# Patient Record
Sex: MALE | Race: WHITE | URBAN - METROPOLITAN AREA
[De-identification: names, ages, dates, MRNs, and addresses within clinical notes are randomized per-mention and may not be internally consistent; named-entity substitution may affect disease eponyms.]

---

## 2017-10-31 ENCOUNTER — OUTPATIENT (OUTPATIENT)
Dept: OUTPATIENT SERVICES | Facility: HOSPITAL | Age: 72
LOS: 1 days | Discharge: HOME | End: 2017-10-31

## 2017-10-31 DIAGNOSIS — E11.9 TYPE 2 DIABETES MELLITUS WITHOUT COMPLICATIONS: ICD-10-CM

## 2017-10-31 DIAGNOSIS — I62.00 NONTRAUMATIC SUBDURAL HEMORRHAGE, UNSPECIFIED: ICD-10-CM

## 2017-10-31 DIAGNOSIS — E78.5 HYPERLIPIDEMIA, UNSPECIFIED: ICD-10-CM

## 2017-10-31 DIAGNOSIS — L03.90 CELLULITIS, UNSPECIFIED: ICD-10-CM

## 2017-10-31 DIAGNOSIS — I10 ESSENTIAL (PRIMARY) HYPERTENSION: ICD-10-CM

## 2017-10-31 DIAGNOSIS — M19.90 UNSPECIFIED OSTEOARTHRITIS, UNSPECIFIED SITE: ICD-10-CM

## 2017-10-31 DIAGNOSIS — S06.5X9A TRAUMATIC SUBDURAL HEMORRHAGE WITH LOSS OF CONSCIOUSNESS OF UNSPECIFIED DURATION, INITIAL ENCOUNTER: ICD-10-CM

## 2017-10-31 DIAGNOSIS — M1A.00X0 IDIOPATHIC CHRONIC GOUT, UNSPECIFIED SITE, WITHOUT TOPHUS (TOPHI): ICD-10-CM

## 2018-01-28 ENCOUNTER — EMERGENCY (EMERGENCY)
Facility: HOSPITAL | Age: 73
LOS: 0 days | Discharge: HOME | End: 2018-01-29

## 2018-01-28 DIAGNOSIS — E78.5 HYPERLIPIDEMIA, UNSPECIFIED: ICD-10-CM

## 2018-01-28 DIAGNOSIS — M1A.00X0 IDIOPATHIC CHRONIC GOUT, UNSPECIFIED SITE, WITHOUT TOPHUS (TOPHI): ICD-10-CM

## 2018-01-28 DIAGNOSIS — Z79.84 LONG TERM (CURRENT) USE OF ORAL HYPOGLYCEMIC DRUGS: ICD-10-CM

## 2018-01-28 DIAGNOSIS — I62.00 NONTRAUMATIC SUBDURAL HEMORRHAGE, UNSPECIFIED: ICD-10-CM

## 2018-01-28 DIAGNOSIS — S06.5X9A TRAUMATIC SUBDURAL HEMORRHAGE WITH LOSS OF CONSCIOUSNESS OF UNSPECIFIED DURATION, INITIAL ENCOUNTER: ICD-10-CM

## 2018-01-28 DIAGNOSIS — E11.9 TYPE 2 DIABETES MELLITUS WITHOUT COMPLICATIONS: ICD-10-CM

## 2018-01-28 DIAGNOSIS — I10 ESSENTIAL (PRIMARY) HYPERTENSION: ICD-10-CM

## 2018-01-28 DIAGNOSIS — K06.8 OTHER SPECIFIED DISORDERS OF GINGIVA AND EDENTULOUS ALVEOLAR RIDGE: ICD-10-CM

## 2018-01-28 DIAGNOSIS — M19.90 UNSPECIFIED OSTEOARTHRITIS, UNSPECIFIED SITE: ICD-10-CM

## 2018-01-28 DIAGNOSIS — E78.00 PURE HYPERCHOLESTEROLEMIA, UNSPECIFIED: ICD-10-CM

## 2018-01-28 DIAGNOSIS — K03.81 CRACKED TOOTH: ICD-10-CM

## 2018-01-28 DIAGNOSIS — L03.90 CELLULITIS, UNSPECIFIED: ICD-10-CM

## 2018-01-28 DIAGNOSIS — Z79.899 OTHER LONG TERM (CURRENT) DRUG THERAPY: ICD-10-CM

## 2018-01-28 DIAGNOSIS — Z79.82 LONG TERM (CURRENT) USE OF ASPIRIN: ICD-10-CM

## 2018-02-07 ENCOUNTER — EMERGENCY (EMERGENCY)
Facility: HOSPITAL | Age: 73
LOS: 1 days | End: 2018-02-07
Admitting: INTERNAL MEDICINE

## 2018-02-07 VITALS
HEART RATE: 81 BPM | TEMPERATURE: 97 F | OXYGEN SATURATION: 100 % | SYSTOLIC BLOOD PRESSURE: 183 MMHG | DIASTOLIC BLOOD PRESSURE: 86 MMHG | RESPIRATION RATE: 20 BRPM

## 2018-02-07 DIAGNOSIS — E78.00 PURE HYPERCHOLESTEROLEMIA, UNSPECIFIED: ICD-10-CM

## 2018-02-07 DIAGNOSIS — Z79.899 OTHER LONG TERM (CURRENT) DRUG THERAPY: ICD-10-CM

## 2018-02-07 DIAGNOSIS — K08.89 OTHER SPECIFIED DISORDERS OF TEETH AND SUPPORTING STRUCTURES: ICD-10-CM

## 2018-02-07 DIAGNOSIS — I10 ESSENTIAL (PRIMARY) HYPERTENSION: ICD-10-CM

## 2018-02-07 DIAGNOSIS — Z79.84 LONG TERM (CURRENT) USE OF ORAL HYPOGLYCEMIC DRUGS: ICD-10-CM

## 2018-02-07 DIAGNOSIS — E11.9 TYPE 2 DIABETES MELLITUS WITHOUT COMPLICATIONS: ICD-10-CM

## 2018-02-07 DIAGNOSIS — Z79.82 LONG TERM (CURRENT) USE OF ASPIRIN: ICD-10-CM

## 2018-02-07 NOTE — ED PROVIDER NOTE - OBJECTIVE STATEMENT
72 y.o. male with a PMHx of HTN, hyperlipidemia, Gout, PUD, DM, and diverticulosis presented to ER c/o pain and bleeding after dental extraction last week.  Pt denies aspirin use or any other blood thinners.  Denies dysphagia, fever, chills, SOB.  No other complaints.

## 2018-02-07 NOTE — ED PROVIDER NOTE - ENMT, MLM
Airway patent, Nasal mucosa clear. Mouth with normal mucosa. Throat has no vesicles, no oropharyngeal exudates and uvula is midline, (+) tenderness with scant bleeding over gingiva s/p extraction tooth #12

## 2018-02-07 NOTE — ED ADULT NURSE NOTE - PMH
Chronic gout of foot, unspecified cause, unspecified laterality    Diverticulitis    Herniated disc, cervical    High cholesterol    Hypertension, unspecified type    Osteoarthritis of multiple joints, unspecified osteoarthritis type    Sciatica, unspecified laterality    Type 2 diabetes mellitus without complication, unspecified long term insulin use status

## 2019-09-27 ENCOUNTER — EMERGENCY (EMERGENCY)
Facility: HOSPITAL | Age: 74
LOS: 0 days | Discharge: HOME | End: 2019-09-27
Attending: EMERGENCY MEDICINE | Admitting: EMERGENCY MEDICINE
Payer: MEDICARE

## 2019-09-27 VITALS
OXYGEN SATURATION: 95 % | DIASTOLIC BLOOD PRESSURE: 80 MMHG | SYSTOLIC BLOOD PRESSURE: 128 MMHG | RESPIRATION RATE: 17 BRPM | HEART RATE: 77 BPM

## 2019-09-27 VITALS
HEART RATE: 75 BPM | RESPIRATION RATE: 20 BRPM | DIASTOLIC BLOOD PRESSURE: 58 MMHG | SYSTOLIC BLOOD PRESSURE: 131 MMHG | OXYGEN SATURATION: 100 % | TEMPERATURE: 98 F

## 2019-09-27 DIAGNOSIS — I10 ESSENTIAL (PRIMARY) HYPERTENSION: ICD-10-CM

## 2019-09-27 DIAGNOSIS — I83.893 VARICOSE VEINS OF BILATERAL LOWER EXTREMITIES WITH OTHER COMPLICATIONS: ICD-10-CM

## 2019-09-27 DIAGNOSIS — M79.673 PAIN IN UNSPECIFIED FOOT: ICD-10-CM

## 2019-09-27 LAB
BASOPHILS # BLD AUTO: 0.08 K/UL — SIGNIFICANT CHANGE UP (ref 0–0.2)
BASOPHILS NFR BLD AUTO: 0.7 % — SIGNIFICANT CHANGE UP (ref 0–1)
EOSINOPHIL # BLD AUTO: 0.37 K/UL — SIGNIFICANT CHANGE UP (ref 0–0.7)
EOSINOPHIL NFR BLD AUTO: 3.3 % — SIGNIFICANT CHANGE UP (ref 0–8)
HCT VFR BLD CALC: 30.3 % — LOW (ref 42–52)
HGB BLD-MCNC: 9.1 G/DL — LOW (ref 14–18)
IMM GRANULOCYTES NFR BLD AUTO: 0.5 % — HIGH (ref 0.1–0.3)
LYMPHOCYTES # BLD AUTO: 1.41 K/UL — SIGNIFICANT CHANGE UP (ref 1.2–3.4)
LYMPHOCYTES # BLD AUTO: 12.4 % — LOW (ref 20.5–51.1)
MCHC RBC-ENTMCNC: 26.3 PG — LOW (ref 27–31)
MCHC RBC-ENTMCNC: 30 G/DL — LOW (ref 32–37)
MCV RBC AUTO: 87.6 FL — SIGNIFICANT CHANGE UP (ref 80–94)
MONOCYTES # BLD AUTO: 1.02 K/UL — HIGH (ref 0.1–0.6)
MONOCYTES NFR BLD AUTO: 9 % — SIGNIFICANT CHANGE UP (ref 1.7–9.3)
NEUTROPHILS # BLD AUTO: 8.43 K/UL — HIGH (ref 1.4–6.5)
NEUTROPHILS NFR BLD AUTO: 74.1 % — SIGNIFICANT CHANGE UP (ref 42.2–75.2)
NRBC # BLD: 0 /100 WBCS — SIGNIFICANT CHANGE UP (ref 0–0)
PLATELET # BLD AUTO: 298 K/UL — SIGNIFICANT CHANGE UP (ref 130–400)
RBC # BLD: 3.46 M/UL — LOW (ref 4.7–6.1)
RBC # FLD: 17.3 % — HIGH (ref 11.5–14.5)
WBC # BLD: 11.37 K/UL — HIGH (ref 4.8–10.8)
WBC # FLD AUTO: 11.37 K/UL — HIGH (ref 4.8–10.8)

## 2019-09-27 PROCEDURE — 99284 EMERGENCY DEPT VISIT MOD MDM: CPT | Mod: 25,GC

## 2019-09-27 PROCEDURE — 12001 RPR S/N/AX/GEN/TRNK 2.5CM/<: CPT | Mod: GC

## 2019-09-27 NOTE — ED PROVIDER NOTE - PATIENT PORTAL LINK FT
You can access the FollowMyHealth Patient Portal offered by [facility] by registering at the following website: [email or web address]. By joining [facility]’s FollowMyHealth portal, you will also be able to view your health information using other applications (apps) compatible with our system.

## 2019-09-27 NOTE — ED PROVIDER NOTE - ATTENDING CONTRIBUTION TO CARE
72 y.o. male with a PMHx of HTN, hyperlipidemia, Gout, PUD, DM, and diverticulosis presented to ER with persistent bleeding from wound to dorsal R foot when scab peeled off. patientd enies head injury, no loc, no n/v/d, no cp/sob    CONSTITUTIONAL: Well-developed; well-nourished; in no acute distress. Sitting up and providing appropriate history and physical examination  SKIN: skin exam is warm and dry, no acute rash.  HEAD: Normocephalic; atraumatic.  EYES: PERRL, 3 mm bilateral, no nystagmus, EOM intact; conjunctiva and sclera clear.  ENT: No nasal discharge; airway clear.  NECK: Supple; non tender. + full passive ROM in all directions. No JVD  CARD: S1, S2 normal; no murmurs, gallops, or rubs. Regular rate and rhythm. + Symmetric Strong Pulses  RESP: No wheezes, rales or rhonchi. Good air movement bilaterally  ABD: soft; non-distended; non-tender. No Rebound, No Guarding, No signs of peritonitis, No CVA tenderness. No pulsatile abdominal mass. + Strong and Symmetric Pulses  EXT: + Right foot lateral varicose vein that is oozing, repaired successfully with figure of 8 stitch, + strong dp an dpt pulses,  Normal ROM. No clubbing, cyanosis or edema. Dp and Pt Pulses intact. Cap refill less than 3 seconds  NEURO:  Alert, oriented, grossly unremarkable. No Focal deficits. GCS 15. NIH 0  PSYCH: Cooperative, appropriate.

## 2019-09-27 NOTE — ED PROVIDER NOTE - NSFOLLOWUPINSTRUCTIONS_ED_ALL_ED_FT
Please follow-up with Dr. Buchanan as soon as possible and your vascular surgeon as well.    Varicose Veins  Varicose veins are veins that have become enlarged, bulged, and twisted. They most often appear in the legs.    What are the causes?  This condition is caused by damage to the valves in the vein. These valves help blood return to your heart. When they are damaged and they stop working properly, blood may flow backward and back up in the veins near the skin, causing the veins to get larger and appear twisted.    The condition can result from any issue that causes blood to back up, like pregnancy, prolonged standing, or obesity.    What increases the risk?  This condition is more likely to develop in people who are:  On their feet a lot.  Pregnant.  Overweight.  What are the signs or symptoms?  Symptoms of this condition include:  Bulging, twisted, and bluish veins.  A feeling of heaviness. This may be worse at the end of the day.  Leg pain. This may be worse at the end of the day.  Swelling in the leg.  Changes in skin color over the veins.  How is this diagnosed?  This condition may be diagnosed based on your symptoms, a physical exam, and an ultrasound test.    How is this treated?  Treatment for this condition may involve:  Avoiding sitting or standing in one position for long periods of time.  Wearing compression stockings. These stockings help to prevent blood clots and reduce swelling in the legs.  Raising (elevating) the legs when resting.  Losing weight.  Exercising regularly.  If you have persistent symptoms or want to improve the way your varicose veins look, you may choose to have a procedure to close the varicose veins off or to remove them.    Treatments to close off the veins include:  Sclerotherapy. In this treatment, a solution is injected into a vein to close it off.  Laser treatment. In this treatment, the vein is heated with a laser to close it off.  Radiofrequency vein ablation. In this treatment, an electrical current produced by radio waves is used to close off the vein.  Treatments to remove the veins include:  Phlebectomy. In this treatment, the veins are removed through small incisions made over the veins.  Vein ligation and stripping. In this treatment, incisions are made over the veins. The veins are then removed after being tied (ligated) with stitches (sutures).  Follow these instructions at home:  Activity     Walk as much as possible. Walking increases blood flow. This helps blood return to the heart and takes pressure off your veins. It also increases your cardiovascular strength.  Follow your health care provider's instructions about exercising.  Do not stand or sit in one position for a long period of time.  Do not sit with your legs crossed.  Rest with your legs raised during the day.  General instructions     Image   Follow any diet instructions given to you by your health care provider.  Wear compression stockings as directed by your health care provider. Do not wear other kinds of tight clothing around your legs, pelvis, or waist.  Elevate your legs at night to above the level of your heart.  If you get a cut in the skin over the varicose vein and the vein bleeds:  Lie down with your leg raised.  Apply firm pressure to the cut with a clean cloth until the bleeding stops.  Place a bandage (dressing) on the cut.  Contact a health care provider if:  The skin around your varicose veins starts to break down.  You have pain, redness, tenderness, or hard swelling over a vein.  You are uncomfortable because of pain.  You get a cut in the skin over a varicose vein and it will not stop bleeding.  Summary  Varicose veins are veins that have become enlarged, bulged, and twisted. They most often appear in the legs.  This condition is caused by damage to the valves in the vein. These valves help blood return to your heart.  Treatment for this condition includes frequent movements, wearing compression stockings, losing weight, and exercising regularly. In some cases, procedures are done to close off or remove the veins.  Treatment for this condition may include wearing compression stockings, elevating the legs, losing weight, and engaging in regular activity. In some cases, procedures are done to close off or remove the veins.

## 2019-09-27 NOTE — ED PROVIDER NOTE - CLINICAL SUMMARY MEDICAL DECISION MAKING FREE TEXT BOX
I personally evaluated the patient. I reviewed the Resident’s or Physician Assistant’s note (as assigned above), and agree with the findings and plan except as documented in my note. Labs reviewed with pmd Dr. Buchanan who stated that hemoglobin is at baseline. I have fully discussed the medical management and delivery of care with the patient. I have discussed any available labs, imaging and treatment options with the patient. Patient confirms understanding and has been given detailed return precautions. Patient instructed to return to the ED should symptoms persist or worsen. Patient has demonstrated capacity and has verbalized understanding. Patient is well appearing upon discharge.

## 2019-09-27 NOTE — ED PROVIDER NOTE - PROGRESS NOTE DETAILS
Dr. Rebollar placed a figure of 8 stitch to RLE to achieve hemostasis after scab pulled off and patient had venous bleed. Hgb was found to be 9.1. Per Dr. Buchanan, pt's PCP, patient's last hgb in 06/19 was 9.3. No further bleeding.

## 2019-09-27 NOTE — ED PROVIDER NOTE - NS ED ROS FT
Constitutional:  No fevers or chills.  Eyes:  No visual changes, eye pain, or discharge.  ENT:  No hearing changes. No sore throat.  Neck:  No neck pain.  Cardiac:  No CP.  Resp:  No cough or SOB.   GI:  No nausea, vomiting, diarrhea, or abdominal pain.  :  No dysuria, frequency, or hematuria.  MSK:  No myalgias. Wound to R foot.  Neuro:  No headache, dizziness, or weakness.  Skin:  No skin rash.

## 2019-09-27 NOTE — ED PROVIDER NOTE - OBJECTIVE STATEMENT
72 y.o. male with a PMHx of HTN, hyperlipidemia, Gout, PUD, DM, and diverticulosis presented to ER with persistent bleeding from wound to dorsal R foot when scab peeled off, patient also states he thinks he has varicose veins. Denies any CP, SOB, abd pain, n/v/d, back pain, other injuries/trauma, or falls. Takes a baby ASA, otherwise no AC.

## 2019-09-27 NOTE — ED ADULT NURSE NOTE - CHIEF COMPLAINT QUOTE
patient reports scab to top of right foot that came off while placing shoe on. reports bleeding . patient arrived to ed with tourniquet in place . slightly pale . awake alert oriented x3

## 2019-09-27 NOTE — ED PROVIDER NOTE - PHYSICAL EXAMINATION
PHYSICAL EXAM: I have reviewed current vital signs.  GENERAL: NAD, well-nourished; well-developed.  HEAD:  Normocephalic, atraumatic.  EYES: EOMI, PERRL, conjunctiva and sclera clear.  ENT: MMM, no erythema/exudates.  NECK: Supple, full ROM.  CHEST/LUNG: Clear to auscultation bilaterally; no wheezes, rales, or rhonchi.  HEART: Regular rate and rhythm, normal S1 and S2; no murmurs, rubs, or gallops.  ABDOMEN: Soft, nontender, nondistended.  EXTREMITIES:  2+ peripheral pulses; 1 cm wound to dorsal aspect to R foot- tourniquet in place from EMS.  NEUROLOGY: A&O x 3. Motor 5/5. No focal neurological deficits.  SKIN: Warm and dry.

## 2019-09-27 NOTE — ED ADULT TRIAGE NOTE - CHIEF COMPLAINT QUOTE
Pt BIBA, was putting on his shoes when scab to top of right foot fell off. bleeding was uncontrolled. Pt takes daily aspirin, denies blood thinners. tourniquet applied by EMS at 1658 and pressure dressing applied. as per EMS, patient was unreponsive and hypotensive on scene. Pt awake and alert in triage.

## 2019-09-28 PROBLEM — M15.9 POLYOSTEOARTHRITIS, UNSPECIFIED: Chronic | Status: ACTIVE | Noted: 2018-02-07

## 2019-09-28 PROBLEM — E78.00 PURE HYPERCHOLESTEROLEMIA, UNSPECIFIED: Chronic | Status: ACTIVE | Noted: 2018-02-07

## 2019-09-28 PROBLEM — I10 ESSENTIAL (PRIMARY) HYPERTENSION: Chronic | Status: ACTIVE | Noted: 2018-02-07

## 2019-09-28 PROBLEM — M54.30 SCIATICA, UNSPECIFIED SIDE: Chronic | Status: ACTIVE | Noted: 2018-02-07

## 2019-09-28 PROBLEM — M50.20 OTHER CERVICAL DISC DISPLACEMENT, UNSPECIFIED CERVICAL REGION: Chronic | Status: ACTIVE | Noted: 2018-02-07

## 2019-09-28 PROBLEM — M1A.0790 IDIOPATHIC CHRONIC GOUT, UNSPECIFIED ANKLE AND FOOT, WITHOUT TOPHUS (TOPHI): Chronic | Status: ACTIVE | Noted: 2018-02-07

## 2019-09-28 PROBLEM — K57.92 DIVERTICULITIS OF INTESTINE, PART UNSPECIFIED, WITHOUT PERFORATION OR ABSCESS WITHOUT BLEEDING: Chronic | Status: ACTIVE | Noted: 2018-02-07

## 2019-09-28 PROBLEM — E11.9 TYPE 2 DIABETES MELLITUS WITHOUT COMPLICATIONS: Chronic | Status: ACTIVE | Noted: 2018-02-07

## 2019-11-12 ENCOUNTER — EMERGENCY (EMERGENCY)
Facility: HOSPITAL | Age: 74
LOS: 0 days | Discharge: HOME | End: 2019-11-12
Admitting: EMERGENCY MEDICINE
Payer: MEDICARE

## 2019-11-12 VITALS
DIASTOLIC BLOOD PRESSURE: 66 MMHG | RESPIRATION RATE: 18 BRPM | TEMPERATURE: 97 F | OXYGEN SATURATION: 96 % | HEART RATE: 72 BPM | SYSTOLIC BLOOD PRESSURE: 154 MMHG

## 2019-11-12 DIAGNOSIS — E11.9 TYPE 2 DIABETES MELLITUS WITHOUT COMPLICATIONS: ICD-10-CM

## 2019-11-12 DIAGNOSIS — I83.91 ASYMPTOMATIC VARICOSE VEINS OF RIGHT LOWER EXTREMITY: ICD-10-CM

## 2019-11-12 DIAGNOSIS — M79.673 PAIN IN UNSPECIFIED FOOT: ICD-10-CM

## 2019-11-12 DIAGNOSIS — I10 ESSENTIAL (PRIMARY) HYPERTENSION: ICD-10-CM

## 2019-11-12 PROCEDURE — 99283 EMERGENCY DEPT VISIT LOW MDM: CPT

## 2019-11-12 RX ORDER — CEPHALEXIN 500 MG
1 CAPSULE ORAL
Qty: 28 | Refills: 0
Start: 2019-11-12 | End: 2019-11-18

## 2019-11-12 RX ORDER — TRANEXAMIC ACID 100 MG/ML
1 INJECTION, SOLUTION INTRAVENOUS ONCE
Refills: 0 | Status: DISCONTINUED | OUTPATIENT
Start: 2019-11-12 | End: 2019-11-12

## 2019-11-12 NOTE — ED PROVIDER NOTE - NS ED ROS FT
Constitutional: See HPI.  Eyes: No visual changes, eye pain or discharge.   ENMT: No hearing changes, pain, discharge or infections.   Cardiac: No SOB or edema. No chest pain with exertion.  Respiratory: No cough or respiratory distress.   GI: No nausea, vomiting, diarrhea or abdominal pain.  : No dysuria, frequency or burning. No Discharge  MS: No myalgia, muscle weakness, joint pain or back pain.  Neuro: No headache or weakness.   Skin: varicose vein bleeding, + erythema of RLE.   Except as documented in the HPI, all other systems are negative.

## 2019-11-12 NOTE — ED PROVIDER NOTE - PHYSICAL EXAMINATION
CONST: Well appearing in NAD  EYES: Sclera and conjunctiva clear.  MS: Normal ROM in all extremities. No edema of lower extremities, no calf pain, pedal pulses 2+ bilaterally  SKIN: Warm, dry, no active bleeding of varicose vein, + mild erythema R anterior shin.   NEURO: A&Ox3, No focal deficits. Strength 5/5 with no sensory deficits. Steady gait

## 2019-11-12 NOTE — ED PROVIDER NOTE - CARE PROVIDER_API CALL
Hua Holliday)  Surgery; Vascular Surgery  [address] [place] [address]  Phone: [phone]  Fax: [phone]  Follow Up Time: 1-3 Days

## 2019-11-12 NOTE — ED PROVIDER NOTE - CLINICAL SUMMARY MEDICAL DECISION MAKING FREE TEXT BOX
pt ambulatory after pressure dressing. no bleeding through dressing.  asx at this time.  requesting d/c. I have discussed the discharge plan with the patient. The patient agrees with the plan, as discussed.  The patient understands Emergency Department diagnosis is a preliminary diagnosis often based on limited information and that the patient must adhere to the follow-up plan as discussed.  The patient understands that if the symptoms worsen or if prescribed medications do not have the desired/planned effect that the patient may return to the Emergency Department at any time for further evaluation and treatment.

## 2019-11-12 NOTE — ED PROVIDER NOTE - PATIENT PORTAL LINK FT
You can access the FollowMyHealth Patient Portal offered by [facility] by registering at the following website: [email or web address]. By joining [facility] [employer] [facility]’s FollowMyHealth portal, you will also be able to view your health information using other applications (apps) compatible with our system.

## 2019-11-12 NOTE — ED PROVIDER NOTE - PROGRESS NOTE DETAILS
call from Dr. Buchanan.  requests CBC for blood loss. pt states small amount.  asx.  requesting d/c.  refuses blood work. [facility] Discussed results with pt.  All questions were answered and return precautions discussed.  Pt is asx and comfortable at this time.  Unremarkable re-exam.  No further concerns at this time from pt.  Will follow up with PMD and vascular.  Pt understands and agrees with tx plan.

## 2019-11-12 NOTE — ED PROVIDER NOTE - NSFOLLOWUPINSTRUCTIONS_ED_ALL_ED_FT
Varicose Veins    WHAT YOU NEED TO KNOW:    What are varicose veins? Varicose veins are veins that become large, twisted, and swollen. They are common on the back of the calves, knees, and thighs. Varicose veins are caused by valves in your veins that do not work properly. This causes blood to collect and increase pressure in the veins of your legs. The increased pressure causes your veins to stretch, get larger, swell, and twist.         What increases my risk for varicose veins?     Pregnancy      A family history of varicose veins      Being overweight or obese      Age 50 years or older      Sitting or standing for long periods of time      Wearing tight clothing    What are the signs and symptoms of varicose veins? Your symptoms may be worse after you stand or sit for long periods of time. You may have any of the following:     Blue, purple, or bulging veins in your legs       Pain, swelling, or muscle cramps in your legs      Feeling of fatigue or heaviness in your legs    How are varicose veins diagnosed? Your healthcare provider will examine your legs and ask about your medical history. You may need tests, such as a Doppler ultrasound or duplex scan. These tests show your veins and valves, and how your blood is flowing through them. These tests may also show if there is a blockage or blood clot.    How are varicose veins treated? The goal of treatment is to decrease symptoms, improve appearance, and prevent further problems. Treatment will depend on which veins are affected and how severe your condition is. You may need procedures to treat or remove your varicose veins. For example, your healthcare provider may inject a solution or use a laser to close the varicose veins. Surgery to remove long veins may also be done. Ask your healthcare provider for more information about procedures used to treat varicose veins.    What can I do to manage my symptoms?     Do not sit or stand for long periods of time. This can cause the blood to collect in your legs and make your symptoms worse. Bend or rotate your ankles several times every hour. Walk around for a few minutes every hour to get blood moving in your legs.      Do not cross your legs when you sit. This decreases blood flow to your feet and can make your symptoms worse.      Do not wear tight clothing or shoes. Do not wear high-heeled shoes. Do not wear clothes that are tight around the waist or knees.      Maintain a healthy weight. Being overweight or obese can make your varicose veins worse. Ask your healthcare provider how much you should weigh. Ask him or her to help you create a weight loss plan if you are overweight.       Wear pressure stockings as directed. The stockings are tight and put pressure on your legs. They improve blood flow and help prevent clots. Pressure Stockings            Elevate your legs. Keep them above the level of your heart for 15 to 30 minutes several times a day. You can also prop the end of your bed up slightly to elevate your legs while you sleep. This will help blood to flow back to your heart.      Get regular exercise. Talk to your healthcare provider about the best exercise plan for you. Exercise can improve blood flow to your legs and feet.    When should I seek immediate care?     You have a wound that does not heal or is infected.      You have an injury that has broken your skin and caused your varicose veins to bleed.      Your leg is swollen and hard.      You notice that your legs or feet are turning blue or black.      Your leg feels warm, tender, and painful. It may look swollen and red.    When should I contact my healthcare provider?     You have pain in your leg that does not go away or gets worse.      You notice sudden large bruising on your legs.       You have a rash on your leg.       Your symptoms keep you from doing your daily activities.      You have questions or concerns about your condition or care.    CARE AGREEMENT:    You have the right to help plan your care. Learn about your health condition and how it may be treated. Discuss treatment options with your healthcare providers to decide what care you want to receive. You always have the right to refuse treatment.        © Copyright [employer] 2019 All illustrations and images included in CareNotes are the copyrighted property of [employer] or [employer]     Follow up with your primary medical doctor in 1-2 days

## 2019-11-12 NOTE — ED ADULT TRIAGE NOTE - CHIEF COMPLAINT QUOTE
"I was at the doctor's office and my varicose vein bursted. They put stiches in October 25 and said they would dissolve."

## 2019-11-12 NOTE — ED PROVIDER NOTE - OBJECTIVE STATEMENT
74 y.o male w/ hx of HTN, HLD, gout, Dm, PUD, diverticulitis presents to the ED for evaluation of varicose veins of R foot.  Pt seen in ED 9/27 for bleeding varicose veins of right foot and had figure of 8 suture placed.  Was seen by his nephrologist today who tried removing absorbable suture and pt developed bleeding from site which was not controlled by direct pressure prompting visit to the ED. Denies dizziness, lightheaded sensation, weakness.  Notes erythema of RLE for past week.  NO fever, chills, streaking erythema, calf pain, edema of lower extremity.

## 2019-11-15 ENCOUNTER — INBOUND DOCUMENT (OUTPATIENT)
Age: 74
End: 2019-11-15

## 2019-11-15 PROBLEM — Z00.00 ENCOUNTER FOR PREVENTIVE HEALTH EXAMINATION: Status: ACTIVE | Noted: 2019-11-15

## 2020-08-20 ENCOUNTER — INPATIENT (INPATIENT)
Facility: HOSPITAL | Age: 75
LOS: 5 days | Discharge: NURSING, REHAB OR LONG-TERM CARE | End: 2020-08-26
Attending: INTERNAL MEDICINE | Admitting: INTERNAL MEDICINE
Payer: MEDICARE

## 2020-08-20 VITALS
HEART RATE: 56 BPM | OXYGEN SATURATION: 96 % | RESPIRATION RATE: 24 BRPM | DIASTOLIC BLOOD PRESSURE: 35 MMHG | TEMPERATURE: 99 F | SYSTOLIC BLOOD PRESSURE: 76 MMHG

## 2020-08-20 DIAGNOSIS — N18.3 CHRONIC KIDNEY DISEASE, STAGE 3 (MODERATE): ICD-10-CM

## 2020-08-20 LAB
ALBUMIN SERPL ELPH-MCNC: 4 G/DL — SIGNIFICANT CHANGE UP (ref 3.5–5.2)
ALP SERPL-CCNC: 88 U/L — SIGNIFICANT CHANGE UP (ref 30–115)
ALT FLD-CCNC: 22 U/L — SIGNIFICANT CHANGE UP (ref 0–41)
ANION GAP SERPL CALC-SCNC: 44 MMOL/L — HIGH (ref 7–14)
ANION GAP SERPL CALC-SCNC: 45 MMOL/L — HIGH (ref 7–14)
ANION GAP SERPL CALC-SCNC: 45 MMOL/L — HIGH (ref 7–14)
APPEARANCE UR: CLEAR — SIGNIFICANT CHANGE UP
APTT BLD: 39.7 SEC — HIGH (ref 27–39.2)
AST SERPL-CCNC: 54 U/L — HIGH (ref 0–41)
BACTERIA # UR AUTO: ABNORMAL
BASE EXCESS BLDV CALC-SCNC: -26.7 MMOL/L — LOW (ref -2–2)
BASE EXCESS BLDV CALC-SCNC: -28.1 MMOL/L — LOW (ref -2–2)
BASOPHILS # BLD AUTO: 0.02 K/UL — SIGNIFICANT CHANGE UP (ref 0–0.2)
BASOPHILS NFR BLD AUTO: 0.1 % — SIGNIFICANT CHANGE UP (ref 0–1)
BILIRUB SERPL-MCNC: <0.2 MG/DL — SIGNIFICANT CHANGE UP (ref 0.2–1.2)
BILIRUB UR-MCNC: NEGATIVE — SIGNIFICANT CHANGE UP
BLD GP AB SCN SERPL QL: SIGNIFICANT CHANGE UP
BUN SERPL-MCNC: 111 MG/DL — CRITICAL HIGH (ref 10–20)
BUN SERPL-MCNC: 78 MG/DL — CRITICAL HIGH (ref 10–20)
BUN SERPL-MCNC: 92 MG/DL — CRITICAL HIGH (ref 10–20)
CA-I SERPL-SCNC: 1.08 MMOL/L — LOW (ref 1.12–1.3)
CA-I SERPL-SCNC: 1.11 MMOL/L — LOW (ref 1.12–1.3)
CALCIUM SERPL-MCNC: 8.2 MG/DL — LOW (ref 8.5–10.1)
CALCIUM SERPL-MCNC: 8.8 MG/DL — SIGNIFICANT CHANGE UP (ref 8.5–10.1)
CALCIUM SERPL-MCNC: 9.1 MG/DL — SIGNIFICANT CHANGE UP (ref 8.5–10.1)
CHLORIDE SERPL-SCNC: 81 MMOL/L — LOW (ref 98–110)
CHLORIDE SERPL-SCNC: 83 MMOL/L — LOW (ref 98–110)
CHLORIDE SERPL-SCNC: 84 MMOL/L — LOW (ref 98–110)
CO2 SERPL-SCNC: 3 MMOL/L — CRITICAL LOW (ref 17–32)
CO2 SERPL-SCNC: 6 MMOL/L — CRITICAL LOW (ref 17–32)
CO2 SERPL-SCNC: 7 MMOL/L — CRITICAL LOW (ref 17–32)
COLOR SPEC: YELLOW — SIGNIFICANT CHANGE UP
CREAT SERPL-MCNC: 6.4 MG/DL — CRITICAL HIGH (ref 0.7–1.5)
CREAT SERPL-MCNC: 6.9 MG/DL — CRITICAL HIGH (ref 0.7–1.5)
CREAT SERPL-MCNC: 8.9 MG/DL — CRITICAL HIGH (ref 0.7–1.5)
DIFF PNL FLD: NEGATIVE — SIGNIFICANT CHANGE UP
EOSINOPHIL # BLD AUTO: 0.07 K/UL — SIGNIFICANT CHANGE UP (ref 0–0.7)
EOSINOPHIL NFR BLD AUTO: 0.4 % — SIGNIFICANT CHANGE UP (ref 0–8)
EPI CELLS # UR: SIGNIFICANT CHANGE UP
GAS PNL BLDV: 132 MMOL/L — LOW (ref 136–145)
GAS PNL BLDV: 134 MMOL/L — LOW (ref 136–145)
GAS PNL BLDV: SIGNIFICANT CHANGE UP
GAS PNL BLDV: SIGNIFICANT CHANGE UP
GLUCOSE BLDC GLUCOMTR-MCNC: 131 MG/DL — HIGH (ref 70–99)
GLUCOSE BLDC GLUCOMTR-MCNC: 145 MG/DL — HIGH (ref 70–99)
GLUCOSE SERPL-MCNC: 111 MG/DL — HIGH (ref 70–99)
GLUCOSE SERPL-MCNC: 112 MG/DL — HIGH (ref 70–99)
GLUCOSE SERPL-MCNC: 92 MG/DL — SIGNIFICANT CHANGE UP (ref 70–99)
GLUCOSE UR QL: NEGATIVE — SIGNIFICANT CHANGE UP
HCO3 BLDV-SCNC: 4 MMOL/L — LOW (ref 22–29)
HCO3 BLDV-SCNC: 4 MMOL/L — LOW (ref 22–29)
HCT VFR BLD CALC: 25.7 % — LOW (ref 42–52)
HCT VFR BLD CALC: 28.3 % — LOW (ref 42–52)
HCT VFR BLDA CALC: 21.1 % — LOW (ref 34–44)
HCT VFR BLDA CALC: 25.1 % — LOW (ref 34–44)
HGB BLD CALC-MCNC: 6.9 G/DL — LOW (ref 14–18)
HGB BLD CALC-MCNC: 8.2 G/DL — LOW (ref 14–18)
HGB BLD-MCNC: 6.8 G/DL — CRITICAL LOW (ref 14–18)
HGB BLD-MCNC: 7.4 G/DL — LOW (ref 14–18)
IMM GRANULOCYTES NFR BLD AUTO: 1.1 % — HIGH (ref 0.1–0.3)
INR BLD: 1.3 RATIO — SIGNIFICANT CHANGE UP (ref 0.65–1.3)
KETONES UR-MCNC: NEGATIVE — SIGNIFICANT CHANGE UP
LACTATE BLDV-MCNC: 20 MMOL/L — HIGH (ref 0.5–1.6)
LACTATE BLDV-MCNC: 22 MMOL/L — HIGH (ref 0.5–1.6)
LACTATE SERPL-SCNC: 17.2 MMOL/L — CRITICAL HIGH (ref 0.7–2)
LACTATE SERPL-SCNC: 19 MMOL/L — CRITICAL HIGH (ref 0.7–2)
LEUKOCYTE ESTERASE UR-ACNC: NEGATIVE — SIGNIFICANT CHANGE UP
LYMPHOCYTES # BLD AUTO: 1.77 K/UL — SIGNIFICANT CHANGE UP (ref 1.2–3.4)
LYMPHOCYTES # BLD AUTO: 10.5 % — LOW (ref 20.5–51.1)
MCHC RBC-ENTMCNC: 23.3 PG — LOW (ref 27–31)
MCHC RBC-ENTMCNC: 23.6 PG — LOW (ref 27–31)
MCHC RBC-ENTMCNC: 26.1 G/DL — LOW (ref 32–37)
MCHC RBC-ENTMCNC: 26.5 G/DL — LOW (ref 32–37)
MCV RBC AUTO: 88 FL — SIGNIFICANT CHANGE UP (ref 80–94)
MCV RBC AUTO: 90.1 FL — SIGNIFICANT CHANGE UP (ref 80–94)
MONOCYTES # BLD AUTO: 1.14 K/UL — HIGH (ref 0.1–0.6)
MONOCYTES NFR BLD AUTO: 6.7 % — SIGNIFICANT CHANGE UP (ref 1.7–9.3)
NEUTROPHILS # BLD AUTO: 13.74 K/UL — HIGH (ref 1.4–6.5)
NEUTROPHILS NFR BLD AUTO: 81.2 % — HIGH (ref 42.2–75.2)
NITRITE UR-MCNC: NEGATIVE — SIGNIFICANT CHANGE UP
NRBC # BLD: 0 /100 WBCS — SIGNIFICANT CHANGE UP (ref 0–0)
NRBC # BLD: 0 /100 WBCS — SIGNIFICANT CHANGE UP (ref 0–0)
PCO2 BLDV: 22 MMHG — LOW (ref 41–51)
PCO2 BLDV: 22 MMHG — LOW (ref 41–51)
PH BLDV: 6.85 — LOW (ref 7.26–7.43)
PH BLDV: 6.89 — LOW (ref 7.26–7.43)
PH UR: 6 — SIGNIFICANT CHANGE UP (ref 5–8)
PLATELET # BLD AUTO: 550 K/UL — HIGH (ref 130–400)
PLATELET # BLD AUTO: 612 K/UL — HIGH (ref 130–400)
PO2 BLDV: 56 MMHG — HIGH (ref 20–40)
PO2 BLDV: 71 MMHG — HIGH (ref 20–40)
POTASSIUM BLDV-SCNC: 6.8 MMOL/L — HIGH (ref 3.3–5.6)
POTASSIUM BLDV-SCNC: 7.3 MMOL/L — HIGH (ref 3.3–5.6)
POTASSIUM SERPL-MCNC: 6.2 MMOL/L — CRITICAL HIGH (ref 3.5–5)
POTASSIUM SERPL-MCNC: 6.5 MMOL/L — CRITICAL HIGH (ref 3.5–5)
POTASSIUM SERPL-MCNC: SIGNIFICANT CHANGE UP MMOL/L (ref 3.5–5)
POTASSIUM SERPL-SCNC: 6.2 MMOL/L — CRITICAL HIGH (ref 3.5–5)
POTASSIUM SERPL-SCNC: 6.5 MMOL/L — CRITICAL HIGH (ref 3.5–5)
POTASSIUM SERPL-SCNC: SIGNIFICANT CHANGE UP MMOL/L (ref 3.5–5)
PROT SERPL-MCNC: 7.2 G/DL — SIGNIFICANT CHANGE UP (ref 6–8)
PROT UR-MCNC: 100
PROTHROM AB SERPL-ACNC: 15 SEC — HIGH (ref 9.95–12.87)
RBC # BLD: 2.92 M/UL — LOW (ref 4.7–6.1)
RBC # BLD: 3.14 M/UL — LOW (ref 4.7–6.1)
RBC # FLD: 19 % — HIGH (ref 11.5–14.5)
RBC # FLD: 19.2 % — HIGH (ref 11.5–14.5)
SAO2 % BLDV: 71 % — SIGNIFICANT CHANGE UP
SAO2 % BLDV: 85 % — SIGNIFICANT CHANGE UP
SODIUM SERPL-SCNC: 131 MMOL/L — LOW (ref 135–146)
SODIUM SERPL-SCNC: 132 MMOL/L — LOW (ref 135–146)
SODIUM SERPL-SCNC: 135 MMOL/L — SIGNIFICANT CHANGE UP (ref 135–146)
SP GR SPEC: 1.02 — SIGNIFICANT CHANGE UP (ref 1.01–1.03)
TROPONIN T SERPL-MCNC: 0.11 NG/ML — CRITICAL HIGH
TROPONIN T SERPL-MCNC: 0.13 NG/ML — CRITICAL HIGH
UROBILINOGEN FLD QL: 0.2 — SIGNIFICANT CHANGE UP (ref 0.2–0.2)
WBC # BLD: 14.25 K/UL — HIGH (ref 4.8–10.8)
WBC # BLD: 16.92 K/UL — HIGH (ref 4.8–10.8)
WBC # FLD AUTO: 14.25 K/UL — HIGH (ref 4.8–10.8)
WBC # FLD AUTO: 16.92 K/UL — HIGH (ref 4.8–10.8)

## 2020-08-20 PROCEDURE — 74177 CT ABD & PELVIS W/CONTRAST: CPT | Mod: 26

## 2020-08-20 PROCEDURE — 76770 US EXAM ABDO BACK WALL COMP: CPT | Mod: 26

## 2020-08-20 PROCEDURE — 71045 X-RAY EXAM CHEST 1 VIEW: CPT | Mod: 26

## 2020-08-20 PROCEDURE — 99291 CRITICAL CARE FIRST HOUR: CPT | Mod: CS,25,GC

## 2020-08-20 PROCEDURE — 93970 EXTREMITY STUDY: CPT | Mod: 26

## 2020-08-20 PROCEDURE — 70450 CT HEAD/BRAIN W/O DYE: CPT | Mod: 26

## 2020-08-20 PROCEDURE — 93010 ELECTROCARDIOGRAM REPORT: CPT

## 2020-08-20 PROCEDURE — 36556 INSERT NON-TUNNEL CV CATH: CPT | Mod: 59,GC

## 2020-08-20 RX ORDER — HYDROCORTISONE 20 MG
100 TABLET ORAL ONCE
Refills: 0 | Status: COMPLETED | OUTPATIENT
Start: 2020-08-20 | End: 2020-08-20

## 2020-08-20 RX ORDER — CEFEPIME 1 G/1
1000 INJECTION, POWDER, FOR SOLUTION INTRAMUSCULAR; INTRAVENOUS ONCE
Refills: 0 | Status: COMPLETED | OUTPATIENT
Start: 2020-08-20 | End: 2020-08-20

## 2020-08-20 RX ORDER — METRONIDAZOLE 500 MG
TABLET ORAL
Refills: 0 | Status: DISCONTINUED | OUTPATIENT
Start: 2020-08-20 | End: 2020-08-24

## 2020-08-20 RX ORDER — INSULIN HUMAN 100 [IU]/ML
10 INJECTION, SOLUTION SUBCUTANEOUS ONCE
Refills: 0 | Status: COMPLETED | OUTPATIENT
Start: 2020-08-20 | End: 2020-08-20

## 2020-08-20 RX ORDER — DEXTROSE 50 % IN WATER 50 %
50 SYRINGE (ML) INTRAVENOUS ONCE
Refills: 0 | Status: COMPLETED | OUTPATIENT
Start: 2020-08-20 | End: 2020-08-20

## 2020-08-20 RX ORDER — ACETAMINOPHEN 500 MG
650 TABLET ORAL ONCE
Refills: 0 | Status: COMPLETED | OUTPATIENT
Start: 2020-08-20 | End: 2020-08-20

## 2020-08-20 RX ORDER — AMPICILLIN SODIUM AND SULBACTAM SODIUM 250; 125 MG/ML; MG/ML
1.5 INJECTION, POWDER, FOR SUSPENSION INTRAMUSCULAR; INTRAVENOUS ONCE
Refills: 0 | Status: COMPLETED | OUTPATIENT
Start: 2020-08-20 | End: 2020-08-20

## 2020-08-20 RX ORDER — HYDROCORTISONE 20 MG
100 TABLET ORAL EVERY 8 HOURS
Refills: 0 | Status: DISCONTINUED | OUTPATIENT
Start: 2020-08-20 | End: 2020-08-23

## 2020-08-20 RX ORDER — SODIUM CHLORIDE 9 MG/ML
1000 INJECTION, SOLUTION INTRAVENOUS
Refills: 0 | Status: DISCONTINUED | OUTPATIENT
Start: 2020-08-20 | End: 2020-08-21

## 2020-08-20 RX ORDER — ATORVASTATIN CALCIUM 80 MG/1
40 TABLET, FILM COATED ORAL AT BEDTIME
Refills: 0 | Status: DISCONTINUED | OUTPATIENT
Start: 2020-08-20 | End: 2020-08-26

## 2020-08-20 RX ORDER — AMPICILLIN SODIUM AND SULBACTAM SODIUM 250; 125 MG/ML; MG/ML
1.5 INJECTION, POWDER, FOR SUSPENSION INTRAMUSCULAR; INTRAVENOUS EVERY 24 HOURS
Refills: 0 | Status: DISCONTINUED | OUTPATIENT
Start: 2020-08-21 | End: 2020-08-21

## 2020-08-20 RX ORDER — LEVOTHYROXINE SODIUM 125 MCG
400 TABLET ORAL ONCE
Refills: 0 | Status: COMPLETED | OUTPATIENT
Start: 2020-08-20 | End: 2020-08-20

## 2020-08-20 RX ORDER — HEPARIN SODIUM 5000 [USP'U]/ML
5000 INJECTION INTRAVENOUS; SUBCUTANEOUS EVERY 12 HOURS
Refills: 0 | Status: DISCONTINUED | OUTPATIENT
Start: 2020-08-20 | End: 2020-08-21

## 2020-08-20 RX ORDER — METRONIDAZOLE 500 MG
500 TABLET ORAL EVERY 8 HOURS
Refills: 0 | Status: DISCONTINUED | OUTPATIENT
Start: 2020-08-20 | End: 2020-08-24

## 2020-08-20 RX ORDER — VANCOMYCIN HCL 1 G
1000 VIAL (EA) INTRAVENOUS ONCE
Refills: 0 | Status: COMPLETED | OUTPATIENT
Start: 2020-08-20 | End: 2020-08-20

## 2020-08-20 RX ORDER — ASPIRIN/CALCIUM CARB/MAGNESIUM 324 MG
1 TABLET ORAL
Qty: 0 | Refills: 0 | DISCHARGE

## 2020-08-20 RX ORDER — NOREPINEPHRINE BITARTRATE/D5W 8 MG/250ML
0.05 PLASTIC BAG, INJECTION (ML) INTRAVENOUS
Qty: 16 | Refills: 0 | Status: DISCONTINUED | OUTPATIENT
Start: 2020-08-20 | End: 2020-08-20

## 2020-08-20 RX ORDER — BENZOCAINE 10 %
1 GEL (GRAM) MUCOUS MEMBRANE ONCE
Refills: 0 | Status: COMPLETED | OUTPATIENT
Start: 2020-08-20 | End: 2020-08-21

## 2020-08-20 RX ORDER — CALCIUM CHLORIDE
500 POWDER (GRAM) MISCELLANEOUS ONCE
Refills: 0 | Status: COMPLETED | OUTPATIENT
Start: 2020-08-20 | End: 2020-08-20

## 2020-08-20 RX ORDER — SODIUM BICARBONATE 1 MEQ/ML
0.17 SYRINGE (ML) INTRAVENOUS
Qty: 150 | Refills: 0 | Status: DISCONTINUED | OUTPATIENT
Start: 2020-08-20 | End: 2020-08-20

## 2020-08-20 RX ORDER — SODIUM BICARBONATE 1 MEQ/ML
100 SYRINGE (ML) INTRAVENOUS ONCE
Refills: 0 | Status: COMPLETED | OUTPATIENT
Start: 2020-08-20 | End: 2020-08-20

## 2020-08-20 RX ORDER — SENNA PLUS 8.6 MG/1
2 TABLET ORAL AT BEDTIME
Refills: 0 | Status: DISCONTINUED | OUTPATIENT
Start: 2020-08-20 | End: 2020-08-26

## 2020-08-20 RX ORDER — METRONIDAZOLE 500 MG
500 TABLET ORAL ONCE
Refills: 0 | Status: COMPLETED | OUTPATIENT
Start: 2020-08-20 | End: 2020-08-20

## 2020-08-20 RX ORDER — SODIUM CHLORIDE 9 MG/ML
2000 INJECTION, SOLUTION INTRAVENOUS ONCE
Refills: 0 | Status: COMPLETED | OUTPATIENT
Start: 2020-08-20 | End: 2020-08-20

## 2020-08-20 RX ORDER — AMPICILLIN SODIUM AND SULBACTAM SODIUM 250; 125 MG/ML; MG/ML
INJECTION, POWDER, FOR SUSPENSION INTRAMUSCULAR; INTRAVENOUS
Refills: 0 | Status: DISCONTINUED | OUTPATIENT
Start: 2020-08-20 | End: 2020-08-21

## 2020-08-20 RX ORDER — PANTOPRAZOLE SODIUM 20 MG/1
8 TABLET, DELAYED RELEASE ORAL
Qty: 80 | Refills: 0 | Status: DISCONTINUED | OUTPATIENT
Start: 2020-08-20 | End: 2020-08-21

## 2020-08-20 RX ORDER — SODIUM CHLORIDE 9 MG/ML
1000 INJECTION, SOLUTION INTRAVENOUS
Refills: 0 | Status: DISCONTINUED | OUTPATIENT
Start: 2020-08-20 | End: 2020-08-20

## 2020-08-20 RX ORDER — METFORMIN HYDROCHLORIDE 850 MG/1
1 TABLET ORAL
Qty: 0 | Refills: 0 | DISCHARGE

## 2020-08-20 RX ORDER — VANCOMYCIN HCL 1 G
125 VIAL (EA) INTRAVENOUS EVERY 6 HOURS
Refills: 0 | Status: DISCONTINUED | OUTPATIENT
Start: 2020-08-20 | End: 2020-08-21

## 2020-08-20 RX ORDER — NOREPINEPHRINE BITARTRATE/D5W 8 MG/250ML
0.05 PLASTIC BAG, INJECTION (ML) INTRAVENOUS
Qty: 8 | Refills: 0 | Status: DISCONTINUED | OUTPATIENT
Start: 2020-08-20 | End: 2020-08-21

## 2020-08-20 RX ADMIN — Medication 100 MILLIGRAM(S): at 21:41

## 2020-08-20 RX ADMIN — Medication 500 MILLIGRAM(S): at 23:23

## 2020-08-20 RX ADMIN — INSULIN HUMAN 10 UNIT(S): 100 INJECTION, SOLUTION SUBCUTANEOUS at 23:23

## 2020-08-20 RX ADMIN — Medication 400 MICROGRAM(S): at 12:37

## 2020-08-20 RX ADMIN — Medication 6.56 MICROGRAM(S)/KG/MIN: at 11:11

## 2020-08-20 RX ADMIN — AMPICILLIN SODIUM AND SULBACTAM SODIUM 100 GRAM(S): 250; 125 INJECTION, POWDER, FOR SUSPENSION INTRAMUSCULAR; INTRAVENOUS at 21:40

## 2020-08-20 RX ADMIN — PANTOPRAZOLE SODIUM 10 MG/HR: 20 TABLET, DELAYED RELEASE ORAL at 21:40

## 2020-08-20 RX ADMIN — Medication 650 MILLIGRAM(S): at 17:43

## 2020-08-20 RX ADMIN — HEPARIN SODIUM 5000 UNIT(S): 5000 INJECTION INTRAVENOUS; SUBCUTANEOUS at 17:43

## 2020-08-20 RX ADMIN — Medication 100 MILLIEQUIVALENT(S): at 11:14

## 2020-08-20 RX ADMIN — Medication 125 MILLIGRAM(S): at 17:42

## 2020-08-20 RX ADMIN — SODIUM CHLORIDE 100 MILLILITER(S): 9 INJECTION, SOLUTION INTRAVENOUS at 23:24

## 2020-08-20 RX ADMIN — Medication 100 MILLIGRAM(S): at 11:41

## 2020-08-20 RX ADMIN — ATORVASTATIN CALCIUM 40 MILLIGRAM(S): 80 TABLET, FILM COATED ORAL at 21:41

## 2020-08-20 RX ADMIN — SODIUM CHLORIDE 2000 MILLILITER(S): 9 INJECTION, SOLUTION INTRAVENOUS at 11:12

## 2020-08-20 RX ADMIN — Medication 100 MILLIGRAM(S): at 17:43

## 2020-08-20 RX ADMIN — Medication 650 MILLIGRAM(S): at 18:00

## 2020-08-20 RX ADMIN — Medication 50 MILLILITER(S): at 23:23

## 2020-08-20 NOTE — CONSULT NOTE ADULT - SUBJECTIVE AND OBJECTIVE BOX
NEPHROLOGY CONSULTATION NOTE    Patient is a 75y Male whom presented to the hospital with lethargy and weakness, confusion.  History goes back to several days ptp when patient started to complain of lethargy weakness following multiple episodes of diarrhea, to note that patient had tooth extraction/ root canal  more than a week ago was on tylenol and codeine and poor po intake and developed diarrhea. no chest pain no abdominal pain no cough no hemoptysis no melena no lower GIB .  Seen in ED , hypothermic lethargic on IVF, gil in place     PAST MEDICAL & SURGICAL HISTORY:  Sciatica, unspecified laterality  Osteoarthritis of multiple joints, unspecified osteoarthritis type  High cholesterol  Hypertension, unspecified type  Type 2 diabetes mellitus without complication, unspecified long term insulin use status  Chronic gout of foot, unspecified cause, unspecified laterality  Herniated disc, cervical  Diverticulitis    Allergies:  No Known Allergies    Home Medications Reviewed  Home Medications:  aspirin 81 mg oral tablet, chewable: 1 tab(s) orally once a day (07 Feb 2018 11:41)  atorvastatin 40 mg oral tablet: 1 tab(s) orally once a day (07 Feb 2018 11:41)  metFORMIN 500 mg oral tablet: 1 tab(s) orally 2 times a day (07 Feb 2018 11:41)    Hospital Medications:   MEDICATIONS  (STANDING):  cefepime   IVPB 1000 milliGRAM(s) IV Intermittent once  levothyroxine Injectable 400 MICROGram(s) IV Push once  norepinephrine Infusion 0.05 MICROgram(s)/kG/Min (6.56 mL/Hr) IV Continuous <Continuous>  sodium chloride 0.9% 1000 milliLiter(s) (125 mL/Hr) IV Continuous <Continuous>      SOCIAL HISTORY:  Denies ETOH,Smoking,   FAMILY HISTORY:        REVIEW OF SYSTEMS:  All other review of systems is negative unless indicated above.    VITALS:  T(F): 98.6 (08-20-20 @ 09:45), Max: 98.6 (08-20-20 @ 09:45)  HR: 56 (08-20-20 @ 09:45)  BP: 76/35 (08-20-20 @ 09:45)  RR: 24 (08-20-20 @ 09:45)  SpO2: 96% (08-20-20 @ 09:45)        I&O's Detail        PHYSICAL EXAM:  Constitutional: confused   Neck: No JVD  Respiratory: CTAB, no wheezes, rales or rhonchi  Cardiovascular: S1, S2, RRR  Gastrointestinal: BS+, soft, NT/ND  Extremities: mottled cold  Psychiatric: Normal mood, normal affect  : positive gil   Skin: No rashes  Vascular Access:    LABS:  08-20    131<L>  |  83<L>  |  111<HH>  ----------------------------<  92  TNP   |  3<LL>  |  8.9<HH>    Ca    9.1      20 Aug 2020 10:02    TPro  7.2  /  Alb  4.0  /  TBili  <0.2  /  DBili      /  AST  54<H>  /  ALT  22  /  AlkPhos  88  08-20    Creatinine Trend: 8.9 <--  Blood Gas Venous - Potassium: 7.3: Dr Pop notified of all blood gas results, read back mmoL/L (08.20.20 @ 10:34)                          7.4    16.92 )-----------( 612      ( 20 Aug 2020 10:02 )             28.3     Blood Gas Profile - Venous (08.20.20 @ 10:34)    pH, Venous: 6.85: Dr Pop notified of all blood gas results, read back    pCO2, Venous: 22: Dr Pop notified of all blood gas results, read back mmHg    pO2, Venous: 56: Dr Pop notified of all blood gas results, read back mmHg    HCO3, Venous: 4: Dr Pop notified of all blood gas results, read back mmoL/L    Base Excess, Venous: -28.1: Dr Pop notified of all blood gas results, read back mmoL/L    Oxygen Saturation, Venous: 71: Dr Pop notified of all blood gas results, read back %    Blood Gas Venous - Lactate (08.20.20 @ 10:34)    Blood Gas Venous - Lactate: 20.0: Dr Pop notified of all blood gas results, read back mmoL/L      Urine Studies:

## 2020-08-20 NOTE — CONSULT NOTE ADULT - ATTENDING COMMENTS
Patient seen and examined, agree with above, septic shock/ multiorgan failure/ acute on chronic renal failure for HD, ro c diff colitis, taper pressors, ABX, Poor prognosis

## 2020-08-20 NOTE — ED PROVIDER NOTE - OBJECTIVE STATEMENT
75M hx  CKD (unknown stage) and follows Dr. Buchanan, gastric ulcers, HTN, DM, and gout on allopurinol presents with weakness. Patient had a recent dental procedure one week ago, was placed on antibiotics. Since then, wife notes that patient has been having diarrhea and generalized weakness, brought into the hospital because he has been less responsive today. denies falls/trauma/fever/cough

## 2020-08-20 NOTE — H&P ADULT - NSICDXPASTMEDICALHX_GEN_ALL_CORE_FT
PAST MEDICAL HISTORY:  Chronic gout of foot, unspecified cause, unspecified laterality     Diverticulitis     Herniated disc, cervical     High cholesterol     Hypertension, unspecified type     Osteoarthritis of multiple joints, unspecified osteoarthritis type     Sciatica, unspecified laterality     Type 2 diabetes mellitus without complication, unspecified long term insulin use status

## 2020-08-20 NOTE — H&P ADULT - NSHPREVIEWOFSYSTEMS_GEN_ALL_CORE
REVIEW OF SYSTEMS:    CONSTITUTIONAL: Admits to weakness and chills. Denies fevers.  EYES/ENT: No visual changes;  No vertigo or throat pain   NECK: No pain or stiffness  RESPIRATORY: No cough, wheezing, hemoptysis; No shortness of breath  CARDIOVASCULAR: No chest pain or palpitations  GASTROINTESTINAL: Admits to diffused abdominal pain. No nausea, vomiting, or hematemesis; Admits to diarrhea. No melena or hematochezia.  GENITOURINARY: No dysuria, frequency or hematuria  NEUROLOGICAL: No numbness or weakness  SKIN: No itching, rashes

## 2020-08-20 NOTE — H&P ADULT - NSHPLABSRESULTS_GEN_ALL_CORE
LABS:                          7.4    16.92 )-----------( 612      ( 20 Aug 2020 10:02 )             28.3     08-20    131<L>  |  83<L>  |  111<HH>  ----------------------------<  92  TNP   |  3<LL>  |  8.9<HH>    Ca    9.1      20 Aug 2020 10:02    TPro  7.2  /  Alb  4.0  /  TBili  <0.2  /  DBili  x   /  AST  54<H>  /  ALT  22  /  AlkPhos  88  08-20        PT/INR - ( 20 Aug 2020 10:02 )   PT: 15.00 sec;   INR: 1.30 ratio      PTT - ( 20 Aug 2020 10:02 )  PTT:39.7 sec  Lactate Trend      CAPILLARY BLOOD GLUCOSE    Blood Gas Venous - Lactate: 22.0: Dr. Pop was notified of all ABG results at 12:30pm in person, read back. mmoL/L (08.20.20 @ 11:11)        RADIOLOGY:    < from: CT Head No Cont (08.20.20 @ 13:43) >  IMPRESSION:  No evidence of acute intracranial pathology.  < end of copied text >      EKGS:

## 2020-08-20 NOTE — ED PROVIDER NOTE - CRITICAL CARE PROVIDED
documentation/additional history taking/direct patient care (not related to procedure)/interpretation of diagnostic studies/consultation with other physicians/consult w/ pt's family directly relating to pts condition

## 2020-08-20 NOTE — ED PROVIDER NOTE - CARE PLAN
Principal Discharge DX:	KELL (acute kidney injury)  Secondary Diagnosis:	Acidosis, lactic  Secondary Diagnosis:	Myxedema coma  Secondary Diagnosis:	Diarrhea

## 2020-08-20 NOTE — ED PROVIDER NOTE - NS ED ROS FT
Constitutional: (-) fever  Eyes/ENT: (-) blurry vision, (-) epistaxis  Cardiovascular: (-) chest pain, (-) syncope  Respiratory: (-) cough, (-) shortness of breath  Gastrointestinal: (-) vomiting, (+) diarrhea  Musculoskeletal: (-) neck pain, (-) back pain, (-) joint pain  Integumentary: (-) rash, (-) edema  Neurological: (-) headache, (+) altered mental status  Psychiatric: (-) hallucinations  Allergic/Immunologic: (-) pruritus

## 2020-08-20 NOTE — H&P ADULT - NSHPSOCIALHISTORY_GEN_ALL_CORE
Lives at home w/ wife.  Denies tobacco use ever.  Admits to social alcohol use.   Denies illicit drug use.

## 2020-08-20 NOTE — H&P ADULT - HISTORY OF PRESENT ILLNESS
76 yo M w/ PMH of CKD (unknown stage) and follows Dr. Buchanan, gastric ulcers, HTN, DM, and gout on allopurinol brought in from home by spouse for lethargy. History obtained from spouse over phone who reported that pt had dental procedure with root canal done about 1 week and was complicated by a dental infection. Pt was placed on amoxicillin and took it for 1 week. Pt also complained about severe dental pain limiting his oral intake of food or fluids. Over the past 5 days, pt also had 3-4 episodes of diarrhea per day associated with nonbloody nonbilious vomiting. The spouse found the pt today to be very lethargic and weak and brought him in to ED for further eval. Otherwise, pt denies fever, chill, SOB, CP, dysuria, flank pain, sick contacts or recent travel.    Vital Signs Last 24 Hrs  T(C): 32 (20 Aug 2020 12:45), Max: 37 (20 Aug 2020 09:45)  T(F): 89.6 (20 Aug 2020 12:45), Max: 98.6 (20 Aug 2020 09:45)  HR: 60 (20 Aug 2020 12:45) (48 - 60)  BP: 88/43 (20 Aug 2020 12:45) (63/31 - 91/48)  BP(mean): 61 (20 Aug 2020 12:45) (38 - 68)  RR: 20 (20 Aug 2020 12:45) (20 - 24)  SpO2: 100% (20 Aug 2020 12:45) (96% - 100%)    In the ED, pt was found to be bradycardic, hypothermic and hypotensive with lactate of 20 and Ph 6.8 and K of 8. [place] placed and pt sent for urgent HD and CT abdomen. Pt also given hydrocortisone and IV levothyroxine for suspected myedema coma. Pt is admitted to ICU for suspected septic shock requiring levophed.

## 2020-08-20 NOTE — CONSULT NOTE ADULT - SUBJECTIVE AND OBJECTIVE BOX
NEPHROLOGY CONSULTATION NOTE    Patient is a 76 yo man with long standing diabetes, retinopathy, nephrotic range proteinurua, bleeding gastric ulcer, gout, and subdural hematoma.  About 1 week ago had dental procedure which, per wife, developed into oral infection and was treated with amoxicillin  Over the past week he has decreased po intake, diarrhea and lethargy until brought to ER.  Labs indicated severe lactic acidosis, KELL, and hyperkalemia and pt was emergently dialyzed by the Renal team.  He is known to me for many years and will assume renal care at this time.    PAST MEDICAL & SURGICAL HISTORY:  Sciatica, unspecified laterality  Osteoarthritis of multiple joints, unspecified osteoarthritis type  High cholesterol  Hypertension, unspecified type  Type 2 diabetes mellitus without complication, unspecified long term insulin use status  Chronic gout of foot, unspecified cause, unspecified laterality  Herniated disc, cervical  Diverticulitis    Allergies:  No Known Allergies    Home Medications Reviewed  Hospital Medications:   MEDICATIONS  (STANDING):  atorvastatin 40 milliGRAM(s) Oral at bedtime  heparin   Injectable 5000 Unit(s) SubCutaneous every 12 hours  hydrocortisone sodium succinate Injectable 100 milliGRAM(s) IV Push every 8 hours  metroNIDAZOLE  IVPB      metroNIDAZOLE  IVPB 500 milliGRAM(s) IV Intermittent every 8 hours  norepinephrine Infusion 0.05 MICROgram(s)/kG/Min (6.56 mL/Hr) IV Continuous <Continuous>  vancomycin    Solution 125 milliGRAM(s) Oral every 6 hours      SOCIAL HISTORY:  Denies ETOH,Smoking,   FAMILY HISTORY:        REVIEW OF SYSTEMS:  CONSTITUTIONAL:  positive weakness and chills  EYES/ENT: No visual changes;  No vertigo or throat pain   NECK: No pain or stiffness  RESPIRATORY: No cough, wheezing, hemoptysis; No shortness of breath  CARDIOVASCULAR: No chest pain or palpitations.  GASTROINTESTINAL: No abdominal or epigastric pain. No nausea, vomiting, or hematemesis; No diarrhea or constipation. No melena or hematochezia.  GENITOURINARY: No dysuria, frequency, foamy urine, urinary urgency, incontinence or hematuria  NEUROLOGICAL: No numbness or weakness  SKIN: No itching, burning, rashes, or lesions   VASCULAR: No bilateral lower extremity edema.   All other review of systems is negative unless indicated above.    VITALS:  T(F): 91.8 (08-20-20 @ 16:00), Max: 98.6 (08-20-20 @ 09:45)  HR: 84 (08-20-20 @ 18:00)  BP: 100/56 (08-20-20 @ 18:00)  RR: 28 (08-20-20 @ 18:00)  SpO2: 100% (08-20-20 @ 18:00)    08-20 @ 07:01  -  08-20 @ 18:19  --------------------------------------------------------  IN: 0 mL / OUT: 0 mL / NET: 0 mL      Height (cm): 180.3 (08-20 @ 14:00)  Weight (kg): 90.6 (08-20 @ 14:00)  BMI (kg/m2): 27.9 (08-20 @ 14:00)  BSA (m2): 2.11 (08-20 @ 14:00)      I&O's Detail    20 Aug 2020 07:01  -  20 Aug 2020 18:19  --------------------------------------------------------  IN:  Total IN: 0 mL    OUT:  Total OUT: 0 mL    Total NET: 0 mL            PHYSICAL EXAM:  Constitutional: NAD  HEENT: anicteric sclera, oropharynx clear, MMM  Neck: No JVD  Respiratory: CTAB, no wheezes, rales or rhonchi  Cardiovascular: S1, S2, RRR  Gastrointestinal: BS+, soft, NT/ND  Extremities: LE edema  Neurological: A/O x 3, no focal deficits  Psychiatric: Normal mood, normal affect  : No CVA tenderness. No gil.   Skin: No rashes  Vascular Access:    LABS:  08-20    135  |  84<L>  |  92<HH>  ----------------------------<  111<H>  6.2<HH>   |  7<LL>  |  6.9<HH>    Ca    8.8      20 Aug 2020 15:10    TPro  7.2  /  Alb  4.0  /  TBili  <0.2  /  DBili      /  AST  54<H>  /  ALT  22  /  AlkPhos  88  08-20    Creatinine Trend: 6.9 <--, 8.9 <--                        6.8    14.25 )-----------( 550      ( 20 Aug 2020 15:10 )             25.7     Urine Studies:    ·	sepsis with shock - etiology from oral infection - on levophed and stress dose steroids  ·	KELL (baseline creatinine 1.3 and with nephrotic range proteinuria from diabetes)  ·	oliguric output  ·	failure to thrive over past week  ·	loose stools - in this setting very concerned over ischemic colitis although CT (per housestaff) no c/w this  ·	severe (lactic acidosis)  ·	sever hyperkalemia  ·	received dialysis today  ·	decreased Hgb (has h/o gastric bleeding)      1) f/u post dialysis labs  2) suggest 2 unit prbc and stool gauaic and GI prophylaxis with IV protonix  3) suggest PCN based abx - as primary source is likely oral organism consider unasyn or even clindamycin   4) please have ID follow  5) please have dental follow  6) if BP remains low should satrt saline based IVF (ie NS) with boluses or standing rate  7) further RRT as needed  d/w housestaff

## 2020-08-20 NOTE — H&P ADULT - NSHPPHYSICALEXAM_GEN_ALL_CORE
PHYSICAL EXAM:  GENERAL: AAO, 75y M, somnolent appearing on 3L of NC, resting comfortably  HEAD:  Atraumatic, Normocephalic  EYES: EOMI, conjunctiva clear and sclera white  NECK: Supple, No JVD  CHEST/LUNG: Clear to auscultation bilaterally; No wheeze; No crackles; No accessory muscles used  HEART: Regular rate and rhythm; No murmurs;   ABDOMEN: Distending, tympanic to percuss. Soft, Nontender,  Bowel sounds present; No guarding  EXTREMITIES: pitting LE edema +2; 2+ Peripheral Pulses, No cyanosis or edema  NEUROLOGY: non-focal

## 2020-08-20 NOTE — ED PROVIDER NOTE - ATTENDING CONTRIBUTION TO CARE
76yo M with PMHx HTN, HLD, DM (on metformin), gout, PUD, presents for weakness. Pt had root canal 10 days ago, put on amoxicillin 1 week ago, had 5 episodes of diarrhea since. Today weak, could not get out of bed.     Vital signs reviewed  GENERAL: Patient appears ill  HEAD: NCAT  EYES: Anicteric  ENT: Dry MM  RESPIRATORY: slightly tachypneic. CTA B/L. No wheezing, rales, rhonchi  CARDIOVASCULAR: Bradycardic, regular rhythm  ABDOMEN: Soft. Nondistended. Nontender. No guarding or rebound.   MUSCULOSKELETAL/EXTREMITIES: Brisk cap refill. 1+ radial pulses. No leg edema.  SKIN:  cool, pale. B/L leg wounds, no erythema, no purulence, stasis changes.   NEURO: AAOx2. No gross FND. 74yo M with PMHx HTN, HLD, DM (on metformin), gout, PUD, presents for weakness. Pt had root canal 10 days ago, put on amoxicillin 1 week ago, had 3-4 episodes of diarrhea daily since. Today weak, could not get out of bed, wife called EMS. Patient is AAOx2 but intermittently confused, currently unable to provide reliable history/ROS. Wife denies fever, CP, SOB, cough, nausea, vomiting, abd pain.     Vital signs reviewed  GENERAL: Patient appears ill  HEAD: NCAT  EYES: Anicteric  ENT: Dry MM  RESPIRATORY: slightly tachypneic. CTA B/L. No wheezing, rales, rhonchi  CARDIOVASCULAR: Bradycardic, regular rhythm  ABDOMEN: Soft. Nondistended. Nontender. No guarding or rebound.   MUSCULOSKELETAL/EXTREMITIES: Brisk cap refill. 1+ radial pulses. No leg edema.  SKIN:  Cool, pale. B/L lower leg skin discoloration and superficial wounds, no erythema, no purulence, + stasis changes.   NEURO: AAOx2. No gross FND. 76yo M with PMHx HTN, HLD, DM (on metformin), gout, PUD, presents for weakness. Pt had root canal 10 days ago, put on amoxicillin 1 week ago, had 3-4 episodes of diarrhea daily since. Today weak, could not get out of bed, wife called EMS. Patient is AAOx2 but intermittently confused, currently unable to provide reliable history/ROS. Wife denies fever, CP, SOB, cough, nausea, vomiting, abd pain.     Vital signs reviewed  GENERAL: Patient appears ill  HEAD: NCAT  EYES: Anicteric  ENT: Dry MM  RESPIRATORY: slightly tachypneic. CTA B/L. No wheezing, rales, rhonchi  CARDIOVASCULAR: Bradycardic, regular rhythm  ABDOMEN: Soft. Nondistended. Nontender. No guarding or rebound.   MUSCULOSKELETAL/EXTREMITIES: Brisk cap refill. 1+ radial pulses. B/L pedal edema  SKIN:  Cool, pale. B/L lower leg skin discoloration and superficial wounds, no erythema, no purulence, + stasis changes.   NEURO: AAOx2. No gross FND.

## 2020-08-20 NOTE — ED ADULT NURSE NOTE - NS ED NURSE DISCH DISPOSITION
Anesthesiology {review:27618} Case Review for Triage    Planned Procedure: Procedure(s) (LRB):  EXCISION,LIVER (N/A)  RESECTION, COLON, LOW ANTERIOR (Right)  HEMICOLECTOMY, RIGHT (Right)  ILEOSTOMY - diverting loop (N/A) (N/A  Right  Right  N/A)  Requested Anesthesia Type: General  Surgeon: Adrien Khan MD  Known or anticipated Date of Surgery: 2018    Accessible information regarding current medical status:  Surgeon notes: {surgeon notes:}  Anesthesia questionnaire completed by patient: {anes questionnaire:}  Previous anesthesia records: {prev anes:}  Last PCP note: {PCP note age:}  Subspecialty notes: {subspecialty note age:}, {subspecialty:}  Subspecialty evaluation: {subspecialty note:29131}  Records from recent hospitalization: {recent hospitalization:}  Outside medical records: {medical records:}  RN preliminary phone screening: {EARLY SCREENING CALL:}  Medication list: {MEDICATION LIST:}    Risk analysis from chart review  Planned surgery / procedure risk classification:  {surgery risk:44506}  Functional Capacity, (based on chart review): {functional capacity:39894}  Predicted ASA Classification: {ASA class:27735}  Cardiac Status: {risk analysis:33665}  Other important medical co-morbidities: {non-cardiac morbidity:67663}  Testing Status:  {testin}    Plan  Testing:  {test plan:05289}  Phone call:  {phone plan:98379}  Anesthesia Visit:  {ane plan:49759}   Visit focus:  {ane indications:14674}  Consult:  {consults:42446}  Indications:  {med consults:60893}  Sub-specialist consult:   {subspecialty:95910}  Indications:   {specialty consult:67639}    Lilibeth Flannery RN     Admitted

## 2020-08-20 NOTE — CONSULT NOTE ADULT - SUBJECTIVE AND OBJECTIVE BOX
Patient is a 75y old  Male who presents with a chief complaint of     HPI:  75  year old M known to have CKD, Gastric ulcers, HTN, DM, and gout brought in from home by spouse for lethargy. History obtained from spouse over phone who reported that pt had dental procedure with root canal done about 1 week PTP complicated by dental infection. Pt then placed on amoxicillin and took it for 1 week. Pt also complained about severe dental pain limiting his oral intake of food or fluids. Over past 5 days, pt also had 3-4 episodes of diarrhea per day associated with nonbloody nonbilious vomiting. The spouse found the pt today to be very lethargic and weak and brought him in to ED for further eval.  In ed, found to be bradycardic, hypothermic and hypotensive with lactate of 20 and Ph 6.8 and K of 8. [place] placed and pt sent for urgent HD and CT abdomen.    PAST MEDICAL & SURGICAL HISTORY:  Sciatica, unspecified laterality  Osteoarthritis of multiple joints, unspecified osteoarthritis type  High cholesterol  Hypertension, unspecified type  Type 2 diabetes mellitus without complication, unspecified long term insulin use status  Chronic gout of foot, unspecified cause, unspecified laterality  Herniated disc, cervical  Diverticulitis      SOCIAL HX:   never smoker, no etoh abuse    FAMILY HISTORY:  :  No known cardiovascular family history     ROS:  See HPI     Allergies    No Known Allergies    Intolerances          PHYSICAL EXAM    ICU Vital Signs Last 24 Hrs  T(C): 31.6 (20 Aug 2020 11:30), Max: 37 (20 Aug 2020 09:45)  T(F): 88.9 (20 Aug 2020 11:30), Max: 98.6 (20 Aug 2020 09:45)  HR: 58 (20 Aug 2020 11:40) (48 - 60)  BP: 78/36 (20 Aug 2020 11:40) (63/31 - 84/29)  BP(mean): --  ABP: --  ABP(mean): --  RR: 20 (20 Aug 2020 11:40) (20 - 24)  SpO2: 100% (20 Aug 2020 11:40) (96% - 100%)      General: letharic, ill-appearing  HEENT:  ARCADIO              Lymphatic system: No cervical LN   Lungs: clear to auscultation bilaterally  Cardiovascular: Regular  Gastrointestinal: Soft, Positive BS. no guarding or rebound tenderness illicited  Musculoskeletal: lethargic  Skin: cold.  Intact  Neurological: could not assess. does not respond to commands or opens eyes    LABS:                          7.4    16.92 )-----------( 612      ( 20 Aug 2020 10:02 )             28.3                                               08-20    131<L>  |  83<L>  |  111<HH>  ----------------------------<  92  TNP   |  3<LL>  |  8.9<HH>    Ca    9.1      20 Aug 2020 10:02    TPro  7.2  /  Alb  4.0  /  TBili  <0.2  /  DBili  x   /  AST  54<H>  /  ALT  22  /  AlkPhos  88  08-20      PT/INR - ( 20 Aug 2020 10:02 )   PT: 15.00 sec;   INR: 1.30 ratio         PTT - ( 20 Aug 2020 10:02 )  PTT:39.7 sec                                                                                     LIVER FUNCTIONS - ( 20 Aug 2020 10:02 )  Alb: 4.0 g/dL / Pro: 7.2 g/dL / ALK PHOS: 88 U/L / ALT: 22 U/L / AST: 54 U/L / GGT: x                                                      MEDICATIONS  (STANDING):    cefepime   IVPB 1000 milliGRAM(s) IV Intermittent once  levothyroxine Injectable 400 MICROGram(s) IV Push once  norepinephrine Infusion 0.05 MICROgram(s)/kG/Min (6.56 mL/Hr) IV Continuous <Continuous>  sodium chloride 0.9% 1000 milliLiter(s) (125 mL/Hr) IV Continuous <Continuous> Patient is a 75y old  Male who presents with a chief complaint of altered MS    HPI:  75  year old M known to have CKD, Gastric ulcers, HTN, DM, and gout brought in from home by spouse for lethargy. History obtained from spouse over phone who reported that pt had dental procedure with root canal done about 1 week PTP complicated by dental infection. Pt then placed on amoxicillin and took it for 1 week. Pt also complained about severe dental pain limiting his oral intake of food or fluids. Over past 5 days, pt also had 3-4 episodes of diarrhea per day associated with nonbloody nonbilious vomiting. The spouse found the pt today to be very lethargic and weak and brought him in to ED for further eval.  In ed, found to be bradycardic, hypothermic and hypotensive with lactate of 20 and Ph 6.8 and K of 8. [place] placed and pt sent for urgent HD and CT abdomen done admitted to MICU started on bicarb drip/ levophed 0.3    PAST MEDICAL & SURGICAL HISTORY:  Sciatica, unspecified laterality  Osteoarthritis of multiple joints, unspecified osteoarthritis type  High cholesterol  Hypertension, unspecified type  Type 2 diabetes mellitus without complication, unspecified long term insulin use status  Chronic gout of foot, unspecified cause, unspecified laterality  Herniated disc, cervical  Diverticulitis      SOCIAL HX:   never smoker, no etoh abuse    FAMILY HISTORY:  :  No known cardiovascular family history     ROS:  See HPI     Allergies    No Known Allergies    Intolerances          PHYSICAL EXAM    ICU Vital Signs Last 24 Hrs  T(C): 31.6 (20 Aug 2020 11:30), Max: 37 (20 Aug 2020 09:45)  T(F): 88.9 (20 Aug 2020 11:30), Max: 98.6 (20 Aug 2020 09:45)  HR: 58 (20 Aug 2020 11:40) (48 - 60)  BP: 78/36 (20 Aug 2020 11:40) (63/31 - 84/29)  RR: 20 (20 Aug 2020 11:40) (20 - 24)  SpO2: 100% (20 Aug 2020 11:40) (96% - 100%)      General: letharic, ill-appearing  HEENT:  ARCADIO              Lymphatic system: No cervical LN   Lungs: clear to auscultation bilaterally  Cardiovascular: RIA 3/6  Gastrointestinal: Soft, Positive BS. mildly distended  Musculoskeletal: lethargic  Skin: cold.  Intact  Neurological: could not assess. does not respond to commands or opens eyes    LABS:                          7.4    16.92 )-----------( 612      ( 20 Aug 2020 10:02 )             28.3                                               08-20    131<L>  |  83<L>  |  111<HH>  ----------------------------<  92  TNP   |  3<LL>  |  8.9<HH>    Ca    9.1      20 Aug 2020 10:02    TPro  7.2  /  Alb  4.0  /  TBili  <0.2  /  DBili  x   /  AST  54<H>  /  ALT  22  /  AlkPhos  88  08-20      PT/INR - ( 20 Aug 2020 10:02 )   PT: 15.00 sec;   INR: 1.30 ratio         PTT - ( 20 Aug 2020 10:02 )  PTT:39.7 sec                                                                                     LIVER FUNCTIONS - ( 20 Aug 2020 10:02 )  Alb: 4.0 g/dL / Pro: 7.2 g/dL / ALK PHOS: 88 U/L / ALT: 22 U/L / AST: 54 U/L / GGT: x                                                      MEDICATIONS  (STANDING):    cefepime   IVPB 1000 milliGRAM(s) IV Intermittent once  levothyroxine Injectable 400 MICROGram(s) IV Push once  norepinephrine Infusion 0.05 MICROgram(s)/kG/Min (6.56 mL/Hr) IV Continuous <Continuous>  sodium chloride 0.9% 1000 milliLiter(s) (125 mL/Hr) IV Continuous <Continuous>

## 2020-08-20 NOTE — ED PROVIDER NOTE - PHYSICAL EXAMINATION
Vital Signs: I have reviewed the initial vital signs.  Constitutional: well-nourished, appears stated age, mild-moderate acute distress.  HEENT: Airway patent, moist MM, no erythema/swelling/deformity of oral structures. EOMI, PERRLA.  CV: regular rate, regular rhythm, well-perfused extremities, 2+ b/l DP and radial pulses equal.  Lungs: BCTA, no increased WOB.  ABD: NTND, no guarding or rebound, no pulsatile mass, no hernias.   MSK: Neck supple, nontender, nl ROM, no stepoff. Chest nontender. Back nontender in TLS spine or to b/l bony structures or flanks. Ext nontender, nl rom, no deformity.   INTEG: Skin warm, dry, no rash.  NEURO: A&Ox3, moving all extremities, normal speech  PSYCH: Calm, cooperative, normal affect and interaction. Vital Signs: I have reviewed the initial vital signs.  Constitutional: pale, lethargic, mild-moderate acute distress.  HEENT: Airway patent, protected.   CV: regular rate, regular rhythm, well-perfused extremities, 2+ b/l DP and radial pulses equal.  Lungs: BCTA, no increased WOB.  ABD: soft, NTND, no guarding or rebound, no pulsatile mass, no hernias.   MSK: Chest nontender. Back nontender in flanks. Ext nontender, nl rom, mild non-pitting edema on lowe rextremities b/l.   INTEG: Skin warm, dry, no rash.  NEURO: A&Ox3, moving all extremities, fatigued speech  PSYCH: Calm, cooperative, normal affect and interaction.

## 2020-08-20 NOTE — ED ADULT NURSE NOTE - NSIMPLEMENTINTERV_GEN_ALL_ED
Implemented All Fall with Harm Risk Interventions:  [place] to call system. Call bell, personal items and telephone within reach. Instruct patient to call for assistance. Room bathroom lighting operational. Non-slip footwear when patient is off stretcher. Physically safe environment: no spills, clutter or unnecessary equipment. Stretcher in lowest position, wheels locked, appropriate side rails in place. Provide visual cue, wrist band, yellow gown, etc. Monitor gait and stability. Monitor for mental status changes and reorient to person, place, and time. Review medications for side effects contributing to fall risk. Reinforce activity limits and safety measures with patient and family. Provide visual clues: red socks.

## 2020-08-20 NOTE — CONSULT NOTE ADULT - ASSESSMENT
IMPRESSION:    septic shock unclear etiology  Encephalopathy likely metabolic/possible myxedema coma  HAGMA 2/2 lactic acidosis  KELL on CKD  HO HTN  HO DM on metformin    PLAN:    CNS: no sedation. CT head.     HEENT: oral care, aspiration precautions    PULMONARY: HOB >30. O2 as needed to maintain spo2>92%. low threshold for intubation    CARDIOVASCULAR: keep i=o. wean off pressors. 2d echo. check CE x2.     GI: GI prophylaxis. NPO. CT Abdomen with IV contrast before HD    RENAL: fu lytes. [place] placement for urgent HD per nephrology. renal/bladder sono    INFECTIOUS DISEASE: Vanc and Cefepime. pancultures    HEMATOLOGICAL:  DVT prophylaxis. Serial CBC. LE venous duplex    ENDOCRINE:  Follow up FS.  Insulin protocol if needed. hydrocortisone 100mg q8h. Levothyroxine x1. Check TSH    MUSCULOSKELETAL: bedrest    MICU monitoring  Poor overall prognosis  Full code per family IMPRESSION:    septic shock unclear etiology  Encephalopathy likely toxic/metabolic/possible myxedema coma  HAGMA 2/2 lactic acidosis  KELL on CKD  HO HTN  HO DM on metformin    PLAN:    CNS: no sedation. CT head.     HEENT: oral care, aspiration precautions    PULMONARY: HOB >30. O2 as needed to maintain spo2>92%. low threshold for intubation    CARDIOVASCULAR: keep i=o. wean off pressors. 2d echo. check CE x2.     GI: GI prophylaxis. NPO. CT Abdomen with IV contrast before HD    RENAL: fu lytes. [place] placement for urgent HD per nephrology. renal/bladder sono. Start Bicarb drip in D5W    INFECTIOUS DISEASE: Vanc and Cefepime. pancultures    HEMATOLOGICAL:  DVT prophylaxis. Serial CBC. LE venous duplex    ENDOCRINE:  hold metformin. Follow up FS.  Insulin protocol if needed. hydrocortisone 100mg q8h. Levothyroxine x1. Check TSH    MUSCULOSKELETAL: bedrest    MICU monitoring  Poor overall prognosis  Full code per family IMPRESSION:    septic shock present on admission RO C diff colitis  Encephalopathy ( metabolic)  HAGMA 2/2 lactic acidosis/ metformin toxicity  KELL on CKD now on HD  HO HTN    PLAN:    CNS: no sedation. CT head reviewed    HEENT: oral care, aspiration precautions    PULMONARY: HOB >30. O2 as needed to maintain spo2>92%. low threshold for intubation, repeat ABG    CARDIOVASCULAR:  wean off pressors. 2d echo. check CE x2.     GI: GI prophylaxis. NPO. CT F/UP    RENAL: fu lytes. [place] placement for emergent HD per nephrology. renal/bladder sono. Start Bicarb drip in D5W    INFECTIOUS DISEASE: Vanc/ meropenem/ flagyl IV/ Vanco po. pancultures, stool for C diff procal, ID eval    HEMATOLOGICAL:  DVT prophylaxis. Serial CBC. LE venous duplex    ENDOCRINE:  hold metformin. Follow up FS.  Insulin protocol if needed TSH    MUSCULOSKELETAL: bedrest    MICU monitoring  Poor overall prognosis  Full code per family

## 2020-08-20 NOTE — ED PROVIDER NOTE - CLINICAL SUMMARY MEDICAL DECISION MAKING FREE TEXT BOX
76yo M presents for weakness s/p 1 week of diarrhea in context of recent root canal and ABx use. Found to be in kidney failure, severe metabolic acidosis (lactic acidosis, poss CATALINA), poss myxedema ([place], hypotension, hypothermia), encephalopathy (poss uremic/metabolic/myxedema). Diagnosis of septic shock considered, given leukocytosis, hypothermia, hypotension, though no obvious source, covered with ABx. Nephrology evaluated pt at bedside, will take for emergent HD, [place] placed. Case d/w critical care, admitted to ICU.

## 2020-08-20 NOTE — CONSULT NOTE ADULT - ASSESSMENT
76 yo man with PMH of DM / HTN/ gout /sciatica presenting with KELL confusion sp tooth extraction     ·	KELL severe/ hyperkalemia / shock/ lactic acidosis   ·	rule out metformin associated lactic acidosis compounded by KELL prerenal leading to ATN   ·	will need RRT today / IHD for hyperkalemia and acidosis  ·	DC LR for IVF switch to D5w with 150 meq bicarb at 100 cc per hour   ·	check blood cultures and urine culture  ·	renal bladder sono ( rule out obstruction)  ·	consider MICU evaluation and admission   ·	check ABG  ·	serial BMP     will follow

## 2020-08-20 NOTE — PATIENT PROFILE ADULT - NSPROHMDIABETMGMTSTRAT_GEN_A_NUR
coping strategies/diet modification/activity/exercise/routine screenings/adequate rest/medication therapy

## 2020-08-20 NOTE — H&P ADULT - ASSESSMENT
74 yo M w/ PMH of CKD (unknown stage) and follows Dr. Buchanan, gastric ulcers, HTN, DM, and gout on allopurinol brought in from home by spouse for lethargy. Admitted to ICU for septic shock requiring levophed.     IMPRESSION:   Septic shock of unclear etiology  Encephalopathy likely toxic/metabolic/possible myxedema coma  HAGMA 2/2 lactic acidosis  KELL on CKD  HO HTN  HO DM on metformin    PLAN:    CNS:   - avoid sedation  - f/u CT head    HEENT:   - oral care  - aspiration precautions    PULMONARY: HOB >30. O2 as needed to maintain spo2>92%. low threshold for intubation    CARDIOVASCULAR: keep i=o. wean off pressors. 2d echo. check CE x2.     GI: GI prophylaxis. NPO. CT Abdomen with IV contrast before HD    RENAL: fu lytes. [place] placement for urgent HD per nephrology. renal/bladder sono. Start Bicarb drip in D5W    INFECTIOUS DISEASE: Vanc and Cefepime. pancultures    HEMATOLOGICAL:  DVT prophylaxis. Serial CBC. LE venous duplex    ENDOCRINE:  hold metformin. Follow up FS.  Insulin protocol if needed. hydrocortisone 100mg q8h. Levothyroxine x1. Check TSH    MUSCULOSKELETAL: bedrest    MICU monitoring  Poor overall prognosis  Full code per family 76 yo M w/ PMH of CKD (unknown stage) and follows Dr. Buchanan, gastric ulcers, HTN, DM, and gout on allopurinol brought in from home by spouse for lethargy. Admitted to ICU for septic shock requiring levophed.     IMPRESSION:   Septic shock of unclear etiology  Encephalopathy likely toxic/metabolic/possible myxedema coma  HAGMA 2/2 lactic acidosis  KELL on CKD  HO HTN  HO DM on metformin    PLAN:    CNS:   - avoid sedation  - f/u CT head    HEENT:   - oral care  - aspiration precautions    PULMONARY:   - HOB >30   - O2 as needed to maintain spo2>92%  - low threshold for intubation  - CXR in AM     CARDIOVASCULAR:   - keep i=o  - wean off pressors  - obtain 2d echo   - check CE x 2     GI:   - GI prophylaxis  - keep NPO for now  - f/u CT Abdomen with IV contrast     RENAL:   - f/u lytes and replete PRN  - [place] placed for urgent HD per nephrology and HD as per nephro  - f/u renal/bladder sono  - c/w Bicarb drip in D5W    INFECTIOUS DISEASE:   - start Vanc and Cefepime  - f/u pancultures and if diarrhea, can send C. diff and GI PCI     HEMATOLOGICAL:    - DVT prophylaxis  - obtain Serial CBC and keep active T&S   - f/u LE venous duplex    ENDOCRINE:    - hold metformin  - Follow up FS and insulin sliding scale, Insulin protocol if needed  - hydrocortisone 100mg q8h.   - s/p Levothyroxine IV x1, f/u TSH    MUSCULOSKELETAL: bedrest    Disposition:  MICU monitoring  Poor overall prognosis  Full code per family

## 2020-08-20 NOTE — ED PROVIDER NOTE - PROGRESS NOTE DETAILS
Spoke with nephrology, will come see pt. Fem CVC placed prior to labs back for hypotension, administration of pressors. Will need kavya as pt in renal failure. D/w ICU, to be admit.

## 2020-08-21 DIAGNOSIS — R31.0 GROSS HEMATURIA: ICD-10-CM

## 2020-08-21 LAB
ALBUMIN SERPL ELPH-MCNC: 3.2 G/DL — LOW (ref 3.5–5.2)
ALBUMIN SERPL ELPH-MCNC: 3.7 G/DL — SIGNIFICANT CHANGE UP (ref 3.5–5.2)
ALP SERPL-CCNC: 76 U/L — SIGNIFICANT CHANGE UP (ref 30–115)
ALP SERPL-CCNC: 89 U/L — SIGNIFICANT CHANGE UP (ref 30–115)
ALT FLD-CCNC: 33 U/L — SIGNIFICANT CHANGE UP (ref 0–41)
ALT FLD-CCNC: 34 U/L — SIGNIFICANT CHANGE UP (ref 0–41)
ANION GAP SERPL CALC-SCNC: 22 MMOL/L — HIGH (ref 7–14)
ANION GAP SERPL CALC-SCNC: 40 MMOL/L — HIGH (ref 7–14)
AST SERPL-CCNC: 60 U/L — HIGH (ref 0–41)
AST SERPL-CCNC: 60 U/L — HIGH (ref 0–41)
BASOPHILS # BLD AUTO: 0.01 K/UL — SIGNIFICANT CHANGE UP (ref 0–0.2)
BASOPHILS # BLD AUTO: 0.02 K/UL — SIGNIFICANT CHANGE UP (ref 0–0.2)
BASOPHILS NFR BLD AUTO: 0.1 % — SIGNIFICANT CHANGE UP (ref 0–1)
BASOPHILS NFR BLD AUTO: 0.1 % — SIGNIFICANT CHANGE UP (ref 0–1)
BILIRUB SERPL-MCNC: 0.2 MG/DL — SIGNIFICANT CHANGE UP (ref 0.2–1.2)
BILIRUB SERPL-MCNC: 0.4 MG/DL — SIGNIFICANT CHANGE UP (ref 0.2–1.2)
BUN SERPL-MCNC: 43 MG/DL — HIGH (ref 10–20)
BUN SERPL-MCNC: 91 MG/DL — CRITICAL HIGH (ref 10–20)
CALCIUM SERPL-MCNC: 7 MG/DL — LOW (ref 8.5–10.1)
CALCIUM SERPL-MCNC: 8.3 MG/DL — LOW (ref 8.5–10.1)
CHLORIDE SERPL-SCNC: 83 MMOL/L — LOW (ref 98–110)
CHLORIDE SERPL-SCNC: 94 MMOL/L — LOW (ref 98–110)
CK SERPL-CCNC: 775 U/L — HIGH (ref 0–225)
CO2 SERPL-SCNC: 10 MMOL/L — LOW (ref 17–32)
CO2 SERPL-SCNC: 22 MMOL/L — SIGNIFICANT CHANGE UP (ref 17–32)
CREAT SERPL-MCNC: 3.6 MG/DL — HIGH (ref 0.7–1.5)
CREAT SERPL-MCNC: 6.7 MG/DL — CRITICAL HIGH (ref 0.7–1.5)
EOSINOPHIL # BLD AUTO: 0 K/UL — SIGNIFICANT CHANGE UP (ref 0–0.7)
EOSINOPHIL # BLD AUTO: 0.01 K/UL — SIGNIFICANT CHANGE UP (ref 0–0.7)
EOSINOPHIL NFR BLD AUTO: 0 % — SIGNIFICANT CHANGE UP (ref 0–8)
EOSINOPHIL NFR BLD AUTO: 0.1 % — SIGNIFICANT CHANGE UP (ref 0–8)
GLUCOSE BLDC GLUCOMTR-MCNC: 137 MG/DL — HIGH (ref 70–99)
GLUCOSE BLDC GLUCOMTR-MCNC: 183 MG/DL — HIGH (ref 70–99)
GLUCOSE BLDC GLUCOMTR-MCNC: 218 MG/DL — HIGH (ref 70–99)
GLUCOSE BLDC GLUCOMTR-MCNC: 234 MG/DL — HIGH (ref 70–99)
GLUCOSE SERPL-MCNC: 210 MG/DL — HIGH (ref 70–99)
GLUCOSE SERPL-MCNC: 214 MG/DL — HIGH (ref 70–99)
HBV CORE AB SER-ACNC: SIGNIFICANT CHANGE UP
HBV CORE IGM SER-ACNC: SIGNIFICANT CHANGE UP
HBV SURFACE AB SER-ACNC: SIGNIFICANT CHANGE UP
HBV SURFACE AG SER-ACNC: SIGNIFICANT CHANGE UP
HCT VFR BLD CALC: 24.7 % — LOW (ref 42–52)
HCT VFR BLD CALC: 29.6 % — LOW (ref 42–52)
HGB BLD-MCNC: 7.8 G/DL — LOW (ref 14–18)
HGB BLD-MCNC: 8.5 G/DL — LOW (ref 14–18)
IMM GRANULOCYTES NFR BLD AUTO: 0.3 % — SIGNIFICANT CHANGE UP (ref 0.1–0.3)
IMM GRANULOCYTES NFR BLD AUTO: 0.7 % — HIGH (ref 0.1–0.3)
LACTATE SERPL-SCNC: 4.5 MMOL/L — CRITICAL HIGH (ref 0.7–2)
LACTATE SERPL-SCNC: 7.8 MMOL/L — CRITICAL HIGH (ref 0.7–2)
LDH SERPL L TO P-CCNC: 268 U/L — HIGH (ref 50–242)
LYMPHOCYTES # BLD AUTO: 0.43 K/UL — LOW (ref 1.2–3.4)
LYMPHOCYTES # BLD AUTO: 1 K/UL — LOW (ref 1.2–3.4)
LYMPHOCYTES # BLD AUTO: 3.3 % — LOW (ref 20.5–51.1)
LYMPHOCYTES # BLD AUTO: 5.3 % — LOW (ref 20.5–51.1)
MAGNESIUM SERPL-MCNC: 3.1 MG/DL — CRITICAL HIGH (ref 1.8–2.4)
MAGNESIUM SERPL-MCNC: 5.1 MG/DL — CRITICAL HIGH (ref 1.8–2.4)
MCHC RBC-ENTMCNC: 24.4 PG — LOW (ref 27–31)
MCHC RBC-ENTMCNC: 24.8 PG — LOW (ref 27–31)
MCHC RBC-ENTMCNC: 28.7 G/DL — LOW (ref 32–37)
MCHC RBC-ENTMCNC: 31.6 G/DL — LOW (ref 32–37)
MCV RBC AUTO: 78.4 FL — LOW (ref 80–94)
MCV RBC AUTO: 85.1 FL — SIGNIFICANT CHANGE UP (ref 80–94)
MONOCYTES # BLD AUTO: 1.59 K/UL — HIGH (ref 0.1–0.6)
MONOCYTES # BLD AUTO: 1.66 K/UL — HIGH (ref 0.1–0.6)
MONOCYTES NFR BLD AUTO: 12.1 % — HIGH (ref 1.7–9.3)
MONOCYTES NFR BLD AUTO: 8.8 % — SIGNIFICANT CHANGE UP (ref 1.7–9.3)
MRSA PCR RESULT.: NEGATIVE — SIGNIFICANT CHANGE UP
NEUTROPHILS # BLD AUTO: 11.07 K/UL — HIGH (ref 1.4–6.5)
NEUTROPHILS # BLD AUTO: 16.09 K/UL — HIGH (ref 1.4–6.5)
NEUTROPHILS NFR BLD AUTO: 84.1 % — HIGH (ref 42.2–75.2)
NEUTROPHILS NFR BLD AUTO: 85.1 % — HIGH (ref 42.2–75.2)
NRBC # BLD: 0 /100 WBCS — SIGNIFICANT CHANGE UP (ref 0–0)
NRBC # BLD: 0 /100 WBCS — SIGNIFICANT CHANGE UP (ref 0–0)
PHOSPHATE SERPL-MCNC: 10 MG/DL — HIGH (ref 2.1–4.9)
PHOSPHATE SERPL-MCNC: 5.1 MG/DL — HIGH (ref 2.1–4.9)
PLATELET # BLD AUTO: 364 K/UL — SIGNIFICANT CHANGE UP (ref 130–400)
PLATELET # BLD AUTO: 523 K/UL — HIGH (ref 130–400)
POTASSIUM SERPL-MCNC: 4.4 MMOL/L — SIGNIFICANT CHANGE UP (ref 3.5–5)
POTASSIUM SERPL-MCNC: 7.2 MMOL/L — CRITICAL HIGH (ref 3.5–5)
POTASSIUM SERPL-SCNC: 4.4 MMOL/L — SIGNIFICANT CHANGE UP (ref 3.5–5)
POTASSIUM SERPL-SCNC: 7.2 MMOL/L — CRITICAL HIGH (ref 3.5–5)
PROT SERPL-MCNC: 5.2 G/DL — LOW (ref 6–8)
PROT SERPL-MCNC: 6.3 G/DL — SIGNIFICANT CHANGE UP (ref 6–8)
RBC # BLD: 3.15 M/UL — LOW (ref 4.7–6.1)
RBC # BLD: 3.48 M/UL — LOW (ref 4.7–6.1)
RBC # FLD: 18.1 % — HIGH (ref 11.5–14.5)
RBC # FLD: 18.3 % — HIGH (ref 11.5–14.5)
SARS-COV-2 RNA SPEC QL NAA+PROBE: SIGNIFICANT CHANGE UP
SODIUM SERPL-SCNC: 133 MMOL/L — LOW (ref 135–146)
SODIUM SERPL-SCNC: 138 MMOL/L — SIGNIFICANT CHANGE UP (ref 135–146)
T3 SERPL-MCNC: 50 NG/DL — LOW (ref 80–200)
T4 AB SER-ACNC: 4.3 UG/DL — LOW (ref 4.6–12)
TROPONIN T SERPL-MCNC: 0.47 NG/ML — CRITICAL HIGH
TROPONIN T SERPL-MCNC: 0.84 NG/ML — CRITICAL HIGH
TSH SERPL-MCNC: 2.05 UIU/ML — SIGNIFICANT CHANGE UP (ref 0.27–4.2)
TSH SERPL-MCNC: 2.69 UIU/ML — SIGNIFICANT CHANGE UP (ref 0.27–4.2)
TSH SERPL-MCNC: 4.48 UIU/ML — HIGH (ref 0.27–4.2)
WBC # BLD: 13.15 K/UL — HIGH (ref 4.8–10.8)
WBC # BLD: 18.91 K/UL — HIGH (ref 4.8–10.8)
WBC # FLD AUTO: 13.15 K/UL — HIGH (ref 4.8–10.8)
WBC # FLD AUTO: 18.91 K/UL — HIGH (ref 4.8–10.8)

## 2020-08-21 PROCEDURE — 93010 ELECTROCARDIOGRAM REPORT: CPT | Mod: 76

## 2020-08-21 PROCEDURE — 99222 1ST HOSP IP/OBS MODERATE 55: CPT

## 2020-08-21 PROCEDURE — 71045 X-RAY EXAM CHEST 1 VIEW: CPT | Mod: 26

## 2020-08-21 RX ORDER — PANTOPRAZOLE SODIUM 20 MG/1
40 TABLET, DELAYED RELEASE ORAL
Refills: 0 | Status: DISCONTINUED | OUTPATIENT
Start: 2020-08-21 | End: 2020-08-26

## 2020-08-21 RX ORDER — CHLORHEXIDINE GLUCONATE 213 G/1000ML
1 SOLUTION TOPICAL EVERY 12 HOURS
Refills: 0 | Status: COMPLETED | OUTPATIENT
Start: 2020-08-21 | End: 2020-08-23

## 2020-08-21 RX ORDER — INSULIN LISPRO 100/ML
VIAL (ML) SUBCUTANEOUS
Refills: 0 | Status: DISCONTINUED | OUTPATIENT
Start: 2020-08-21 | End: 2020-08-26

## 2020-08-21 RX ORDER — VANCOMYCIN HCL 1 G
250 VIAL (EA) INTRAVENOUS EVERY 6 HOURS
Refills: 0 | Status: DISCONTINUED | OUTPATIENT
Start: 2020-08-21 | End: 2020-08-24

## 2020-08-21 RX ORDER — NOREPINEPHRINE BITARTRATE/D5W 8 MG/250ML
0.05 PLASTIC BAG, INJECTION (ML) INTRAVENOUS
Qty: 16 | Refills: 0 | Status: DISCONTINUED | OUTPATIENT
Start: 2020-08-21 | End: 2020-08-23

## 2020-08-21 RX ORDER — CEFEPIME 1 G/1
1000 INJECTION, POWDER, FOR SOLUTION INTRAMUSCULAR; INTRAVENOUS EVERY 24 HOURS
Refills: 0 | Status: DISCONTINUED | OUTPATIENT
Start: 2020-08-21 | End: 2020-08-23

## 2020-08-21 RX ORDER — ASPIRIN/CALCIUM CARB/MAGNESIUM 324 MG
81 TABLET ORAL DAILY
Refills: 0 | Status: DISCONTINUED | OUTPATIENT
Start: 2020-08-21 | End: 2020-08-26

## 2020-08-21 RX ORDER — DEXTROSE 50 % IN WATER 50 %
50 SYRINGE (ML) INTRAVENOUS ONCE
Refills: 0 | Status: COMPLETED | OUTPATIENT
Start: 2020-08-21 | End: 2020-08-21

## 2020-08-21 RX ORDER — INSULIN GLARGINE 100 [IU]/ML
18 INJECTION, SOLUTION SUBCUTANEOUS AT BEDTIME
Refills: 0 | Status: DISCONTINUED | OUTPATIENT
Start: 2020-08-21 | End: 2020-08-26

## 2020-08-21 RX ORDER — SODIUM CHLORIDE 9 MG/ML
1000 INJECTION INTRAMUSCULAR; INTRAVENOUS; SUBCUTANEOUS
Refills: 0 | Status: DISCONTINUED | OUTPATIENT
Start: 2020-08-21 | End: 2020-08-22

## 2020-08-21 RX ORDER — INSULIN HUMAN 100 [IU]/ML
10 INJECTION, SOLUTION SUBCUTANEOUS ONCE
Refills: 0 | Status: COMPLETED | OUTPATIENT
Start: 2020-08-21 | End: 2020-08-21

## 2020-08-21 RX ORDER — MIDODRINE HYDROCHLORIDE 2.5 MG/1
10 TABLET ORAL EVERY 8 HOURS
Refills: 0 | Status: DISCONTINUED | OUTPATIENT
Start: 2020-08-21 | End: 2020-08-26

## 2020-08-21 RX ORDER — SODIUM CHLORIDE 9 MG/ML
1000 INJECTION, SOLUTION INTRAVENOUS
Refills: 0 | Status: DISCONTINUED | OUTPATIENT
Start: 2020-08-21 | End: 2020-08-26

## 2020-08-21 RX ORDER — INSULIN LISPRO 100/ML
6 VIAL (ML) SUBCUTANEOUS
Refills: 0 | Status: DISCONTINUED | OUTPATIENT
Start: 2020-08-21 | End: 2020-08-26

## 2020-08-21 RX ORDER — DEXTROSE 50 % IN WATER 50 %
12.5 SYRINGE (ML) INTRAVENOUS ONCE
Refills: 0 | Status: DISCONTINUED | OUTPATIENT
Start: 2020-08-21 | End: 2020-08-26

## 2020-08-21 RX ORDER — DEXTROSE 50 % IN WATER 50 %
15 SYRINGE (ML) INTRAVENOUS ONCE
Refills: 0 | Status: DISCONTINUED | OUTPATIENT
Start: 2020-08-21 | End: 2020-08-26

## 2020-08-21 RX ORDER — HEPARIN SODIUM 5000 [USP'U]/ML
5000 INJECTION INTRAVENOUS; SUBCUTANEOUS EVERY 12 HOURS
Refills: 0 | Status: DISCONTINUED | OUTPATIENT
Start: 2020-08-21 | End: 2020-08-26

## 2020-08-21 RX ORDER — DEXTROSE 50 % IN WATER 50 %
25 SYRINGE (ML) INTRAVENOUS ONCE
Refills: 0 | Status: DISCONTINUED | OUTPATIENT
Start: 2020-08-21 | End: 2020-08-26

## 2020-08-21 RX ORDER — SODIUM ZIRCONIUM CYCLOSILICATE 10 G/10G
10 POWDER, FOR SUSPENSION ORAL EVERY 12 HOURS
Refills: 0 | Status: DISCONTINUED | OUTPATIENT
Start: 2020-08-21 | End: 2020-08-22

## 2020-08-21 RX ORDER — GLUCAGON INJECTION, SOLUTION 0.5 MG/.1ML
1 INJECTION, SOLUTION SUBCUTANEOUS ONCE
Refills: 0 | Status: DISCONTINUED | OUTPATIENT
Start: 2020-08-21 | End: 2020-08-26

## 2020-08-21 RX ORDER — CALCIUM GLUCONATE 100 MG/ML
2 VIAL (ML) INTRAVENOUS ONCE
Refills: 0 | Status: DISCONTINUED | OUTPATIENT
Start: 2020-08-21 | End: 2020-08-21

## 2020-08-21 RX ADMIN — Medication 250 MILLIGRAM(S): at 11:18

## 2020-08-21 RX ADMIN — Medication 100 MILLIGRAM(S): at 13:19

## 2020-08-21 RX ADMIN — Medication 100 MILLIGRAM(S): at 21:17

## 2020-08-21 RX ADMIN — MIDODRINE HYDROCHLORIDE 10 MILLIGRAM(S): 2.5 TABLET ORAL at 14:11

## 2020-08-21 RX ADMIN — HEPARIN SODIUM 5000 UNIT(S): 5000 INJECTION INTRAVENOUS; SUBCUTANEOUS at 18:47

## 2020-08-21 RX ADMIN — ATORVASTATIN CALCIUM 40 MILLIGRAM(S): 80 TABLET, FILM COATED ORAL at 21:17

## 2020-08-21 RX ADMIN — Medication 125 MILLIGRAM(S): at 01:56

## 2020-08-21 RX ADMIN — INSULIN HUMAN 10 UNIT(S): 100 INJECTION, SOLUTION SUBCUTANEOUS at 08:29

## 2020-08-21 RX ADMIN — Medication 125 MILLIGRAM(S): at 05:47

## 2020-08-21 RX ADMIN — PANTOPRAZOLE SODIUM 40 MILLIGRAM(S): 20 TABLET, DELAYED RELEASE ORAL at 18:47

## 2020-08-21 RX ADMIN — SODIUM ZIRCONIUM CYCLOSILICATE 10 GRAM(S): 10 POWDER, FOR SUSPENSION ORAL at 11:18

## 2020-08-21 RX ADMIN — Medication 250 MILLIGRAM(S): at 18:46

## 2020-08-21 RX ADMIN — INSULIN GLARGINE 18 UNIT(S): 100 INJECTION, SOLUTION SUBCUTANEOUS at 21:17

## 2020-08-21 RX ADMIN — Medication 100 MILLIGRAM(S): at 05:47

## 2020-08-21 RX ADMIN — Medication 1 SPRAY(S): at 13:12

## 2020-08-21 RX ADMIN — MIDODRINE HYDROCHLORIDE 10 MILLIGRAM(S): 2.5 TABLET ORAL at 21:17

## 2020-08-21 RX ADMIN — SODIUM CHLORIDE 100 MILLILITER(S): 9 INJECTION INTRAMUSCULAR; INTRAVENOUS; SUBCUTANEOUS at 18:45

## 2020-08-21 RX ADMIN — CEFEPIME 100 MILLIGRAM(S): 1 INJECTION, POWDER, FOR SOLUTION INTRAMUSCULAR; INTRAVENOUS at 11:19

## 2020-08-21 RX ADMIN — Medication 50 MILLILITER(S): at 08:28

## 2020-08-21 RX ADMIN — Medication 81 MILLIGRAM(S): at 14:10

## 2020-08-21 NOTE — PROGRESS NOTE ADULT - ASSESSMENT
IMPRESSION:  Sepsis present on admission   septic shock present on admission RO C diff colitis  Metabolic Encephalopathy improved   KELL on CKD SP HD  HO HTN  NSTEMI     PLAN:    CNS: no sedation.     HEENT: oral care, aspiration precautions    PULMONARY: HOB >30. O2 as needed to maintain spo2>92%.      CARDIOVASCULAR:  wean off pressors. 2d echo. Baby ASA     GI: GI prophylaxis. Feeding     RENAL: fu lytes. [facility] renal.  HD today.  FU PF post HD and decide on IVF     INFECTIOUS DISEASE: FU Cdiff and cultures.  Cefepime, Falgyl and Oral Vanc until C Diff     HEMATOLOGICAL:  DVT prophylaxis. Serial CBC. LE venous duplex    ENDOCRINE:  hold metformin. Follow up FS.  Insulin protocol if needed TSH    MUSCULOSKELETAL: bedrest    MICU monitoring    Full code per family    DC Femoral TLC if off pressors

## 2020-08-21 NOTE — CONSULT NOTE ADULT - PROBLEM SELECTOR RECOMMENDATION 9
Send Urine for culture and cytology  May irrigate gil q shift prn with 60 ml od 0.9 NS  Keep Gil secured to leg with stat-daniel without tension.  Will f/u with Attending

## 2020-08-21 NOTE — ED PROCEDURE NOTE - CPROC ED INFUS LINE DETAIL1
The guidewire was recovered./The location was identified, and the area was draped and prepped./The catheter was placed using sterile technique./Ultrasound guidance was used during placement.
The location was identified, and the area was draped and prepped./Ultrasound guidance was used during placement./The catheter was placed using sterile technique./The guidewire was recovered./All lumen(s) aspirated and flushed without difficulty.

## 2020-08-21 NOTE — CONSULT NOTE ADULT - SUBJECTIVE AND OBJECTIVE BOX
HPI:  76 yo M w/ PMH of CKD (unknown stage) and follows Dr. Buchanan, gastric ulcers, HTN, DM, and gout on allopurinol brought in from home by spouse for lethargy. History obtained from spouse over phone who reported that pt had dental procedure with root canal done about 1 week and was complicated by a dental infection. Pt was placed on amoxicillin and took it for 1 week. Pt also complained about severe dental pain limiting his oral intake of food or fluids. Over the past 5 days, pt also had 3-4 episodes of diarrhea per day associated with nonbloody nonbilious vomiting. The spouse found the pt today to be very lethargic and weak and brought him in to ED for further eval. Otherwise, pt denies fever, chill, SOB, CP, dysuria, flank pain, sick contacts or recent travel. In the ED, pt was found to be bradycardic, hypothermic and hypotensive with lactate of 20 and Ph 6.8 and K of 8. [place] placed and pt sent for urgent HD and CT abdomen. Pt also given hydrocortisone and IV levothyroxine for suspected myedema coma. Pt is admitted to ICU for suspected septic shock requiring levophed. (20 Aug 2020 14:08)    Urology is consulted for hematuria. Pt is ESRD and does not make much urine, is s/p HMD yesterday. Pt developed bloody urine overnight, described as dark. Denies any hx of hematuria    Vital Signs Last 24 Hrs  T(C): 32 (20 Aug 2020 12:45), Max: 37 (20 Aug 2020 09:45)  T(F): 89.6 (20 Aug 2020 12:45), Max: 98.6 (20 Aug 2020 09:45)  HR: 60 (20 Aug 2020 12:45) (48 - 60)  BP: 88/43 (20 Aug 2020 12:45) (63/31 - 91/48)  BP(mean): 61 (20 Aug 2020 12:45) (38 - 68)  RR: 20 (20 Aug 2020 12:45) (20 - 24)  SpO2: 100% (20 Aug 2020 12:45) (96% - 100%)        PAST MEDICAL & SURGICAL HISTORY:  CKD (?stage)  Sciatica, unspecified laterality  Osteoarthritis of multiple joints, unspecified osteoarthritis type  High cholesterol  Hypertension, unspecified type  Type 2 diabetes mellitus without complication, unspecified long term insulin use status  Chronic gout of foot, unspecified cause, unspecified laterality  Herniated disc, cervical  Diverticulitis      REVIEW OF SYSTEMS:    CONSTITUTIONAL:  fevers or chills  HEENT: No visual changes  ENDO: No sweating  NECK: No pain or stiffness  MUSCULOSKELETAL: No back pain, no joint pain  RESPIRATORY: No shortness of breath  CARDIOVASCULAR: No chest pain  GASTROINTESTINAL: No abdominal or epigastric pain. No nausea, vomiting,  No diarrhea or constipation.   NEUROLOGICAL: No mental status changes  PSYCH: No depression, no mood changes  SKIN: No itching      MEDICATIONS  (STANDING):  ampicillin/sulbactam  IVPB      ampicillin/sulbactam  IVPB 1.5 Gram(s) IV Intermittent every 24 hours  atorvastatin 40 milliGRAM(s) Oral at bedtime  benzocaine 20% Spray 1 Spray(s) Topical once  dextrose 5% 1000 milliLiter(s) (100 mL/Hr) IV Continuous <Continuous>  hydrocortisone sodium succinate Injectable 100 milliGRAM(s) IV Push every 8 hours  metroNIDAZOLE  IVPB      metroNIDAZOLE  IVPB 500 milliGRAM(s) IV Intermittent every 8 hours  norepinephrine Infusion 0.05 MICROgram(s)/kG/Min (4.25 mL/Hr) IV Continuous <Continuous>  pantoprazole Infusion 8 mG/Hr (10 mL/Hr) IV Continuous <Continuous>  vancomycin    Solution 125 milliGRAM(s) Oral every 6 hours    MEDICATIONS  (PRN):  senna 2 Tablet(s) Oral at bedtime PRN Constipation      Allergies    No Known Allergies        SOCIAL HISTORY: No illicit drug use        Vital Signs Last 24 Hrs  T(C): 35.8 (21 Aug 2020 00:00), Max: 37 (20 Aug 2020 09:45)  T(F): 96.5 (21 Aug 2020 00:00), Max: 98.6 (20 Aug 2020 09:45)  HR: 82 (21 Aug 2020 04:00) (48 - 86)  BP: 119/46 (21 Aug 2020 04:00) (54/45 - 140/53)  BP(mean): 72 (21 Aug 2020 04:00) (38 - 85)  RR: 18 (21 Aug 2020 04:00) (17 - 37)  SpO2: 100% (21 Aug 2020 04:00) (95% - 100%)    PHYSICAL EXAM:    Constitutional: NAD, well-developed  HEENT: EOMI  Neck: no pain  Back: No CVA tenderness  Respiratory: No accessory respiratory muscle use  Abd: Soft, NT/ND  no organomegally  no hernia  : Right groin HMD catheter in place, left groin triple lumen angiocatheter. Vargas in place. No scrotal or penile edema. Vargas draining dark blood tinged urine, oliguric, w/o clots  Extremities: no edema  Neurological: A/O x 3  Psychiatric: Normal mood, normal affect  Skin: No rashes    I&O's Summary    20 Aug 2020 07:01  -  21 Aug 2020 06:34  --------------------------------------------------------  IN: 2116.9 mL / OUT: 10 mL / NET: 2106.9 mL        LABS:                        8.5    18.91 )-----------( 523      ( 21 Aug 2020 05:30 )             29.6         133<L>  |  83<L>  |  91<HH>  ----------------------------<  214<H>  7.2<HH>   |  10<L>  |  6.7<HH>    Ca    8.3<L>      21 Aug 2020 05:30  Phos  10.0       Mg     5.1         TPro  6.3  /  Alb  3.7  /  TBili  0.4  /  DBili  x   /  AST  60<H>  /  ALT  34  /  AlkPhos  89      PT/INR - ( 20 Aug 2020 10:02 )   PT: 15.00 sec;   INR: 1.30 ratio         PTT - ( 20 Aug 2020 10:02 )  PTT:39.7 sec  Urinalysis Basic - ( 20 Aug 2020 18:00 )    Color: Yellow / Appearance: Clear / S.025 / pH: x  Gluc: x / Ketone: Negative  / Bili: Negative / Urobili: 0.2   Blood: x / Protein: 100 / Nitrite: Negative   Leuk Esterase: Negative / RBC: x / WBC x   Sq Epi: x / Non Sq Epi: Few / Bacteria: Few            RADIOLOGY & ADDITIONAL STUDIES:  < from: US Retroperitoneal Complete (20 @ 21:14) >    EXAM:  US RETROPERITONEAL COMPLETE            PROCEDURE DATE:  2020            INTERPRETATION:  CLINICAL HISTORY: End-stage renal disease. Diarrhea.    COMPARISON: CT abdomen and pelvis 2020.    PROCEDURE: Retroperitoneal Ultrasound was performed.    FINDINGS:    RIGHT KIDNEY: Normal in echogenicity, size measuring 11.8 cm in length. Right 1.8 cm renal cyst. No evidence of hydronephrosis, calculus . Vascular flow is demonstrated at the hilum.    LEFT KIDNEY: Normal in echogenicity, size measuring 10.5 cm in length. No evidence of hydronephrosis, calculus or solid mass. Vascular flow is demonstrated at the hilum.    URINARY BLADDER: Urinary bladder nondistended with Vargas catheter. Prostate gland not delineated.    IMPRESSION:  No hydronephrosis.  Right 1.8 cm renal cyst.  Urinary bladder nondistended with Vargas catheter.  Prostate gland not delineated.                KAY GOMEZ M.D., ATTENDING RADIOLOGIST  This document has been electronically signed. Aug 20 2020  9:18PM    < end of copied text >      < from: CT Abdomen and Pelvis w/ IV Cont (20 @ 13:52) >  EXAM:  CT ABDOMEN AND PELVIS IC            PROCEDURE DATE:  2020            INTERPRETATION:  CLINICAL HISTORY: Diarrhea. End-stage renal disease..    TECHNIQUE: Contiguous axial CT images were obtained from the lower chest to the pubic symphysis following administration of Optiray intravenous contrast. Oral contrast was not administered. Reformatted images in the coronal and sagittal planes were acquired.    COMPARISON: None.      FINDINGS:    LOWER CHEST: Unremarkable.    HEPATOBILIARY: Unremarkable.    SPLEEN: Unremarkable.    PANCREAS: Unremarkable.    ADRENAL GLANDS: Punctate left adrenal calcification.    KIDNEYS: No hydronephrosis bilaterally. Right renal hypodensity measuring at least 2.7 cm, measuring higher than simple fluid,and part may be related to beam hardening artifact from patient's right flank contacting the gantry - possibly a cyst but indeterminate. Additional subcentimeter bilateral renal hypodensities too small to characterize.    ABDOMINOPELVIC NODES: Nonspecific enlarged periportal and portacaval lymph nodes measuring up to 1.6 cm in short axis (series 5 image 150 and 180). Nonspecific mildly enlarged retroperitoneal lymph nodes. For example left para-aortic lymph node measuring 1.2 cm (series 5 image 264).    PELVIC ORGANS: Bladder contracted around Vargas catheter. Intraluminal gas likely related to instrumentation.    PERITONEUM/MESENTERY/BOWEL: No evidence of bowel obstruction. Colonic diverticulosis. Unremarkable appendix. No ascites or free air.    BONES/SOFT TISSUES: Moderate right and small left fat-containing inguinal hernias. Degenerative changes of the spine. L3 vertebral body hemangioma.    OTHER: Bilateral femoral venous lines. Atherosclerotic calcifications    IMPRESSION:    1.  No evidence of bowel obstruction, colitis, or appendicitis.  2.  Enlarged periportal and portacaval lymph nodes measuring up to 1.6 cm in short axis of unclear etiology. Correlate for any history of hepatobiliary disease. Also noted mildly enlarged retroperitoneal lymph nodes measuring up to 1.2 cm, nonspecific. Correlation with any prior studies for stability would be most helpful.  3.  Right renal hypodensity measuring at least 2.7 cm, measuring higher than simple fluid, which may be artifactual asdescribed -lesion may represent a cyst but is indeterminate. Recommend evaluation with renal sonogram and/or correlation with prior imaging if available.    < end of copied text > HPI:  74 yo M w/ PMH of CKD (unknown stage) and follows Dr. Buchanan, gastric ulcers, HTN, DM, and gout on allopurinol brought in from home by spouse for lethargy. History obtained from spouse over phone who reported that pt had dental procedure with root canal done about 1 week and was complicated by a dental infection. Pt was placed on amoxicillin and took it for 1 week. Pt also complained about severe dental pain limiting his oral intake of food or fluids. Over the past 5 days, pt also had 3-4 episodes of diarrhea per day associated with nonbloody nonbilious vomiting. The spouse found the pt today to be very lethargic and weak and brought him in to ED for further eval. Otherwise, pt denies fever, chill, SOB, CP, dysuria, flank pain, sick contacts or recent travel. In the ED, pt was found to be bradycardic, hypothermic and hypotensive with lactate of 20 and Ph 6.8 and K of 8. [place] placed and pt sent for urgent HD and CT abdomen. Pt also given hydrocortisone and IV levothyroxine for suspected myedema coma. Pt is admitted to ICU for suspected septic shock requiring levophed. (20 Aug 2020 14:08)    Urology is consulted for hematuria. Pt is ESRD and does not make much urine, is s/p HMD yesterday. Pt developed bloody urine overnight, described as dark. Denies any hx of hematuria    Vital Signs Last 24 Hrs  T(C): 32 (20 Aug 2020 12:45), Max: 37 (20 Aug 2020 09:45)  T(F): 89.6 (20 Aug 2020 12:45), Max: 98.6 (20 Aug 2020 09:45)  HR: 60 (20 Aug 2020 12:45) (48 - 60)  BP: 88/43 (20 Aug 2020 12:45) (63/31 - 91/48)  BP(mean): 61 (20 Aug 2020 12:45) (38 - 68)  RR: 20 (20 Aug 2020 12:45) (20 - 24)  SpO2: 100% (20 Aug 2020 12:45) (96% - 100%)        PAST MEDICAL & SURGICAL HISTORY:  CKD (?stage)  Sciatica, unspecified laterality  Osteoarthritis of multiple joints, unspecified osteoarthritis type  High cholesterol  Hypertension, unspecified type  Type 2 diabetes mellitus without complication, unspecified long term insulin use status  Chronic gout of foot, unspecified cause, unspecified laterality  Herniated disc, cervical  Diverticulitis      REVIEW OF SYSTEMS:    CONSTITUTIONAL:  fevers or chills  HEENT: No visual changes  ENDO: No sweating  NECK: No pain or stiffness  MUSCULOSKELETAL: No back pain, no joint pain  RESPIRATORY: No shortness of breath  CARDIOVASCULAR: No chest pain  GASTROINTESTINAL: No abdominal or epigastric pain. No nausea, vomiting,  No diarrhea or constipation.   NEUROLOGICAL: No mental status changes  PSYCH: No depression, no mood changes  SKIN: No itching      MEDICATIONS  (STANDING):  ampicillin/sulbactam  IVPB      ampicillin/sulbactam  IVPB 1.5 Gram(s) IV Intermittent every 24 hours  atorvastatin 40 milliGRAM(s) Oral at bedtime  benzocaine 20% Spray 1 Spray(s) Topical once  dextrose 5% 1000 milliLiter(s) (100 mL/Hr) IV Continuous <Continuous>  hydrocortisone sodium succinate Injectable 100 milliGRAM(s) IV Push every 8 hours  metroNIDAZOLE  IVPB      metroNIDAZOLE  IVPB 500 milliGRAM(s) IV Intermittent every 8 hours  norepinephrine Infusion 0.05 MICROgram(s)/kG/Min (4.25 mL/Hr) IV Continuous <Continuous>  pantoprazole Infusion 8 mG/Hr (10 mL/Hr) IV Continuous <Continuous>  vancomycin    Solution 125 milliGRAM(s) Oral every 6 hours    MEDICATIONS  (PRN):  senna 2 Tablet(s) Oral at bedtime PRN Constipation      Allergies    No Known Allergies        SOCIAL HISTORY: No illicit drug use      fam hx no gu hx   Vital Signs Last 24 Hrs  T(C): 35.8 (21 Aug 2020 00:00), Max: 37 (20 Aug 2020 09:45)  T(F): 96.5 (21 Aug 2020 00:00), Max: 98.6 (20 Aug 2020 09:45)  HR: 82 (21 Aug 2020 04:00) (48 - 86)  BP: 119/46 (21 Aug 2020 04:00) (54/45 - 140/53)  BP(mean): 72 (21 Aug 2020 04:00) (38 - 85)  RR: 18 (21 Aug 2020 04:00) (17 - 37)  SpO2: 100% (21 Aug 2020 04:00) (95% - 100%)    PHYSICAL EXAM:    Constitutional: NAD, well-developed  HEENT: EOMI  Neck: no pain  Back: No CVA tenderness  Respiratory: No accessory respiratory muscle use  Abd: Soft, NT/ND  no organomegally  no hernia  : Right groin HMD catheter in place, left groin triple lumen angiocatheter. Vargas in place. No scrotal or penile edema. Vargas draining dark blood tinged urine, oliguric, w/o clots  Extremities: no edema  Neurological: A/O x 3  Psychiatric: Normal mood, normal affect  Skin: No rashes    I&O's Summary    20 Aug 2020 07:01  -  21 Aug 2020 06:34  --------------------------------------------------------  IN: 2116.9 mL / OUT: 10 mL / NET: 2106.9 mL        LABS:                        8.5    18.91 )-----------( 523      ( 21 Aug 2020 05:30 )             29.6         133<L>  |  83<L>  |  91<HH>  ----------------------------<  214<H>  7.2<HH>   |  10<L>  |  6.7<HH>    Ca    8.3<L>      21 Aug 2020 05:30  Phos  10.0       Mg     5.1         TPro  6.3  /  Alb  3.7  /  TBili  0.4  /  DBili  x   /  AST  60<H>  /  ALT  34  /  AlkPhos  89      PT/INR - ( 20 Aug 2020 10:02 )   PT: 15.00 sec;   INR: 1.30 ratio         PTT - ( 20 Aug 2020 10:02 )  PTT:39.7 sec  Urinalysis Basic - ( 20 Aug 2020 18:00 )    Color: Yellow / Appearance: Clear / S.025 / pH: x  Gluc: x / Ketone: Negative  / Bili: Negative / Urobili: 0.2   Blood: x / Protein: 100 / Nitrite: Negative   Leuk Esterase: Negative / RBC: x / WBC x   Sq Epi: x / Non Sq Epi: Few / Bacteria: Few            RADIOLOGY & ADDITIONAL STUDIES:  < from: US Retroperitoneal Complete (20 @ 21:14) >    EXAM:  US RETROPERITONEAL COMPLETE            PROCEDURE DATE:  2020            INTERPRETATION:  CLINICAL HISTORY: End-stage renal disease. Diarrhea.    COMPARISON: CT abdomen and pelvis 2020.    PROCEDURE: Retroperitoneal Ultrasound was performed.    FINDINGS:    RIGHT KIDNEY: Normal in echogenicity, size measuring 11.8 cm in length. Right 1.8 cm renal cyst. No evidence of hydronephrosis, calculus . Vascular flow is demonstrated at the hilum.    LEFT KIDNEY: Normal in echogenicity, size measuring 10.5 cm in length. No evidence of hydronephrosis, calculus or solid mass. Vascular flow is demonstrated at the hilum.    URINARY BLADDER: Urinary bladder nondistended with Vargas catheter. Prostate gland not delineated.    IMPRESSION:  No hydronephrosis.  Right 1.8 cm renal cyst.  Urinary bladder nondistended with Vargas catheter.  Prostate gland not delineated.                KAY GOMEZ M.D., ATTENDING RADIOLOGIST  This document has been electronically signed. Aug 20 2020  9:18PM    < end of copied text >      < from: CT Abdomen and Pelvis w/ IV Cont (20 @ 13:52) >  EXAM:  CT ABDOMEN AND PELVIS IC            PROCEDURE DATE:  2020            INTERPRETATION:  CLINICAL HISTORY: Diarrhea. End-stage renal disease..    TECHNIQUE: Contiguous axial CT images were obtained from the lower chest to the pubic symphysis following administration of Optiray intravenous contrast. Oral contrast was not administered. Reformatted images in the coronal and sagittal planes were acquired.    COMPARISON: None.      FINDINGS:    LOWER CHEST: Unremarkable.    HEPATOBILIARY: Unremarkable.    SPLEEN: Unremarkable.    PANCREAS: Unremarkable.    ADRENAL GLANDS: Punctate left adrenal calcification.    KIDNEYS: No hydronephrosis bilaterally. Right renal hypodensity measuring at least 2.7 cm, measuring higher than simple fluid,and part may be related to beam hardening artifact from patient's right flank contacting the gantry - possibly a cyst but indeterminate. Additional subcentimeter bilateral renal hypodensities too small to characterize.    ABDOMINOPELVIC NODES: Nonspecific enlarged periportal and portacaval lymph nodes measuring up to 1.6 cm in short axis (series 5 image 150 and 180). Nonspecific mildly enlarged retroperitoneal lymph nodes. For example left para-aortic lymph node measuring 1.2 cm (series 5 image 264).    PELVIC ORGANS: Bladder contracted around Vargas catheter. Intraluminal gas likely related to instrumentation.    PERITONEUM/MESENTERY/BOWEL: No evidence of bowel obstruction. Colonic diverticulosis. Unremarkable appendix. No ascites or free air.    BONES/SOFT TISSUES: Moderate right and small left fat-containing inguinal hernias. Degenerative changes of the spine. L3 vertebral body hemangioma.    OTHER: Bilateral femoral venous lines. Atherosclerotic calcifications    IMPRESSION:    1.  No evidence of bowel obstruction, colitis, or appendicitis.  2.  Enlarged periportal and portacaval lymph nodes measuring up to 1.6 cm in short axis of unclear etiology. Correlate for any history of hepatobiliary disease. Also noted mildly enlarged retroperitoneal lymph nodes measuring up to 1.2 cm, nonspecific. Correlation with any prior studies for stability would be most helpful.  3.  Right renal hypodensity measuring at least 2.7 cm, measuring higher than simple fluid, which may be artifactual asdescribed -lesion may represent a cyst but is indeterminate. Recommend evaluation with renal sonogram and/or correlation with prior imaging if available.    < end of copied text >

## 2020-08-21 NOTE — CONSULT NOTE ADULT - ASSESSMENT
74 yo M w/ PMH of CKD (unknown stage) and follows Dr. Buchanan, gastric ulcers, HTN, DM, and gout on allopurinol brought in from home by spouse for lethargy. Admitted to ICU for septic shock requiring levophed.     IMPRESSION;  #Septic shock ( HR 60/m, BP 88/43 on pressors with severe lactic acidemia 0f 19, WBC 18.9  ) secondary to CD colitis   ( pt was on po Amoxicillin for dental infection one week PTA )  MSOF with bradycardia, metabolic encephalopathy ( which has resolved ) ARF ( s/p [place] with HD )  No evidence of HUS/TTP  CT with no colitis ( several key indicators of poor prognosis : age, hypotension , septic shock on admission, lactic acidemia )  Probably has underlying mesenteric ischemia exacerbating the lactic acidemia  No pyuria  #Dental infection : still symptomatic    RECOMMENDATIONS;  Monitor lactate level  BCx  Dental evaluation  Po Fidaxomicin 200 mg q12h  Unasyn 3 gm iv q24h

## 2020-08-21 NOTE — PROGRESS NOTE ADULT - ASSESSMENT
Patient is a 74 yo man with long standing diabetes, retinopathy, nephrotic range proteinurua, bleeding gastric ulcer, gout, and subdural hematoma.  About 1 week ago had dental procedure which, per wife, developed into oral infection and was treated with amoxicillin  Over the past week he has decreased po intake, diarrhea and lethargy until brought to ER.     _lactic acidosis -diarrhea     -septic shock    -blood pressure improved    plan    getting dialysis now    antibiotic for  c diff    CPK level    IVF    infectious dis REC    care as per ICU team

## 2020-08-21 NOTE — PROGRESS NOTE ADULT - SUBJECTIVE AND OBJECTIVE BOX
SUBJECTIVE:    Patient is a 75y old Male who presents with a chief complaint of Generalized weakness (21 Aug 2020 08:48)    Currently admitted to medicine with the primary diagnosis of KELL (acute kidney injury)     Today is hospital day 1d. This morning he is resting in bed and reports no complaints. Overnight patient was pulling at lines, restraints were placed.       PAST MEDICAL & SURGICAL HISTORY  Sciatica, unspecified laterality  Osteoarthritis of multiple joints, unspecified osteoarthritis type  High cholesterol  Hypertension, unspecified type  Type 2 diabetes mellitus without complication, unspecified long term insulin use status  Chronic gout of foot, unspecified cause, unspecified laterality  Herniated disc, cervical  Diverticulitis    SOCIAL HISTORY:  Negative for smoking/alcohol/drug use.     ALLERGIES:  No Known Allergies    MEDICATIONS:  STANDING MEDICATIONS  aspirin enteric coated 81 milliGRAM(s) Oral daily  atorvastatin 40 milliGRAM(s) Oral at bedtime  benzocaine 20% Spray 1 Spray(s) Topical once  cefepime   IVPB 1000 milliGRAM(s) IV Intermittent every 24 hours  dextrose 5% 1000 milliLiter(s) IV Continuous <Continuous>  heparin   Injectable 5000 Unit(s) SubCutaneous every 12 hours  hydrocortisone sodium succinate Injectable 100 milliGRAM(s) IV Push every 8 hours  metroNIDAZOLE  IVPB      metroNIDAZOLE  IVPB 500 milliGRAM(s) IV Intermittent every 8 hours  norepinephrine Infusion 0.05 MICROgram(s)/kG/Min IV Continuous <Continuous>  pantoprazole    Tablet 40 milliGRAM(s) Oral before breakfast  sodium zirconium cyclosilicate 10 Gram(s) Oral every 12 hours  vancomycin    Solution 250 milliGRAM(s) Oral every 6 hours    PRN MEDICATIONS  senna 2 Tablet(s) Oral at bedtime PRN    VITALS:   T(F): 97  HR: 76  BP: 123/50  RR: 19  SpO2: 100%    PHYSICAL EXAM:  GEN: NAD  HEENT: Normocephalic, atraumatic, EOMI, PERRL  LUNGS: Clear to auscultation bilaterally  HEART: S1/S2 present. RRR.   ABD: Soft, non-tender, non-distended. Bowel sounds present.  EXT: 2+ peripheral pulses. Mild edema.   NEURO: Non focal.  Skin: Stasis dermatitis of bilateral lower extremities     Vargas in place draining ligia colored urine     LABS:                        8.5    18.91 )-----------( 523      ( 21 Aug 2020 05:30 )             29.6     08-21    133<L>  |  83<L>  |  91<HH>  ----------------------------<  214<H>  7.2<HH>   |  10<L>  |  6.7<HH>    Ca    8.3<L>      21 Aug 2020 05:30  Phos  10.0       Mg     5.1         TPro  6.3  /  Alb  3.7  /  TBili  0.4  /  DBili  x   /  AST  60<H>  /  ALT  34  /  AlkPhos  89      PT/INR - ( 20 Aug 2020 10:02 )   PT: 15.00 sec;   INR: 1.30 ratio         PTT - ( 20 Aug 2020 10:02 )  PTT:39.7 sec  Urinalysis Basic - ( 20 Aug 2020 18:00 )    Color: Yellow / Appearance: Clear / S.025 / pH: x  Gluc: x / Ketone: Negative  / Bili: Negative / Urobili: 0.2   Blood: x / Protein: 100 / Nitrite: Negative   Leuk Esterase: Negative / RBC: x / WBC x   Sq Epi: x / Non Sq Epi: Few / Bacteria: Few      Lactate, Blood: 7.8 mmol/L <HH> (20 @ 06:08)  Troponin T, Serum: 0.47 ng/mL <HH> (20 @ 06:08)  Troponin T, Serum: 0.13 ng/mL <HH> (20 @ 20:05)  Lactate, Blood: 17.2 mmol/L <HH> (20 @ 20:05)  Troponin T, Serum: 0.11 ng/mL <HH> (20 @ 16:00)  Lactate, Blood: 19.0 mmol/L <HH> (20 @ 16:00)      CARDIAC MARKERS ( 21 Aug 2020 06:08 )  x     / 0.47 ng/mL / x     / x     / x      CARDIAC MARKERS ( 20 Aug 2020 20:05 )  x     / 0.13 ng/mL / x     / x     / x      CARDIAC MARKERS ( 20 Aug 2020 16:00 )  x     / 0.11 ng/mL / x     / x     / x          RADIOLOGY:  < from: CT Head No Cont (20 @ 13:43) >  INTERPRETATION:  Clinical History / Reason for exam: Altered mental status    Technique: Noncontrast head CT.  Contiguous unenhanced CT axial images of the head from the base to the vertex with coronal and sagittal reformats.    Comparison: CT head dated 2016.    Findings:    The ventricles and cortical sulci are normal in size and configuration.    There is no acute intracranial hemorrhage, extra-axial fluid collection or midline shift. The previously seen interhemispheric fissure subdural hemorrhage has resolved.    There are patchy white matter lucencies consistent with mild chronic microvascular change. Gray-white matter differentiation is maintained.    There is trace mucosal thickening within the maxillary sinuses. The visualized paranasal sinuses and mastoids are otherwise clear.    IMPRESSION:    No evidence of acute intracranial pathology.    < end of copied text >      < from: CT Abdomen and Pelvis w/ IV Cont (20 @ 13:52) >  FINDINGS:    LOWER CHEST: Unremarkable.    HEPATOBILIARY: Unremarkable.    SPLEEN: Unremarkable.    PANCREAS: Unremarkable.    ADRENAL GLANDS: Punctate left adrenal calcification.    KIDNEYS: No hydronephrosis bilaterally. Right renal hypodensity measuring at least 2.7 cm, measuring higher than simple fluid,and part may be related to beam hardening artifact from patient's right flank contacting the gantry - possibly a cyst but indeterminate. Additional subcentimeter bilateral renal hypodensities too small to characterize.    ABDOMINOPELVIC NODES: Nonspecific enlarged periportal and portacaval lymph nodes measuring up to 1.6 cm in short axis (series 5 image 150 and 180). Nonspecific mildly enlarged retroperitoneal lymph nodes. For example left para-aortic lymph node measuring 1.2 cm (series 5 image 264).    PELVIC ORGANS: Bladder contracted around Vargas catheter. Intraluminal gas likely related to instrumentation.    PERITONEUM/MESENTERY/BOWEL: No evidence of bowel obstruction. Colonic diverticulosis. Unremarkable appendix. No ascites or free air.    BONES/SOFT TISSUES: Moderate right and small left fat-containing inguinal hernias. Degenerative changes of the spine. L3 vertebral body hemangioma.    OTHER: Bilateral femoral venous lines. Atherosclerotic calcifications    IMPRESSION:    1.  No evidence of bowel obstruction, colitis, or appendicitis.  2.  Enlarged periportal and portacaval lymph nodes measuring up to 1.6 cm in short axis of unclear etiology. Correlate for any history of hepatobiliary disease. Also noted mildly enlarged retroperitoneal lymph nodes measuring up to 1.2 cm, nonspecific. Correlation with any prior studies for stability would be most helpful.  3.  Right renal hypodensity measuring at least 2.7 cm, measuring higher than simple fluid, which may be artifactual asdescribed -lesion may represent a cyst but is indeterminate. Recommend evaluation with renal sonogram and/or correlation with prior imaging if available.    < end of copied text > SUBJECTIVE:    Patient is a 75y old Male who presents with a chief complaint of Generalized weakness (21 Aug 2020 08:48)    Currently admitted to medicine with the primary diagnosis of KELL (acute kidney injury)     Today is hospital day 1d. This morning he is resting in bed and reports no complaints. Overnight patient was pulling at lines, restraints were placed. Hematuria overnight.       PAST MEDICAL & SURGICAL HISTORY  Sciatica, unspecified laterality  Osteoarthritis of multiple joints, unspecified osteoarthritis type  High cholesterol  Hypertension, unspecified type  Type 2 diabetes mellitus without complication, unspecified long term insulin use status  Chronic gout of foot, unspecified cause, unspecified laterality  Herniated disc, cervical  Diverticulitis    SOCIAL HISTORY:  Negative for smoking/alcohol/drug use.     ALLERGIES:  No Known Allergies    MEDICATIONS:  STANDING MEDICATIONS  aspirin enteric coated 81 milliGRAM(s) Oral daily  atorvastatin 40 milliGRAM(s) Oral at bedtime  benzocaine 20% Spray 1 Spray(s) Topical once  cefepime   IVPB 1000 milliGRAM(s) IV Intermittent every 24 hours  dextrose 5% 1000 milliLiter(s) IV Continuous <Continuous>  heparin   Injectable 5000 Unit(s) SubCutaneous every 12 hours  hydrocortisone sodium succinate Injectable 100 milliGRAM(s) IV Push every 8 hours  metroNIDAZOLE  IVPB      metroNIDAZOLE  IVPB 500 milliGRAM(s) IV Intermittent every 8 hours  norepinephrine Infusion 0.05 MICROgram(s)/kG/Min IV Continuous <Continuous>  pantoprazole    Tablet 40 milliGRAM(s) Oral before breakfast  sodium zirconium cyclosilicate 10 Gram(s) Oral every 12 hours  vancomycin    Solution 250 milliGRAM(s) Oral every 6 hours    PRN MEDICATIONS  senna 2 Tablet(s) Oral at bedtime PRN    VITALS:   T(F): 97  HR: 76  BP: 123/50  RR: 19  SpO2: 100%    PHYSICAL EXAM:  GEN: NAD  HEENT: Normocephalic, atraumatic, EOMI, PERRL  LUNGS: Clear to auscultation bilaterally  HEART: S1/S2 present. RRR.   ABD: Soft, non-tender, non-distended. Bowel sounds present.  EXT: 2+ peripheral pulses. Mild edema.   NEURO: Non focal.  Skin: Stasis dermatitis of bilateral lower extremities     Vargas in place draining ligia colored urine     LABS:                        8.5    18.91 )-----------( 523      ( 21 Aug 2020 05:30 )             29.6     08-21    133<L>  |  83<L>  |  91<HH>  ----------------------------<  214<H>  7.2<HH>   |  10<L>  |  6.7<HH>    Ca    8.3<L>      21 Aug 2020 05:30  Phos  10.0       Mg     5.1         TPro  6.3  /  Alb  3.7  /  TBili  0.4  /  DBili  x   /  AST  60<H>  /  ALT  34  /  AlkPhos  89      PT/INR - ( 20 Aug 2020 10:02 )   PT: 15.00 sec;   INR: 1.30 ratio         PTT - ( 20 Aug 2020 10:02 )  PTT:39.7 sec  Urinalysis Basic - ( 20 Aug 2020 18:00 )    Color: Yellow / Appearance: Clear / S.025 / pH: x  Gluc: x / Ketone: Negative  / Bili: Negative / Urobili: 0.2   Blood: x / Protein: 100 / Nitrite: Negative   Leuk Esterase: Negative / RBC: x / WBC x   Sq Epi: x / Non Sq Epi: Few / Bacteria: Few      Lactate, Blood: 7.8 mmol/L <HH> (20 @ 06:08)  Troponin T, Serum: 0.47 ng/mL <HH> (20 @ 06:08)  Troponin T, Serum: 0.13 ng/mL <HH> (20 @ 20:05)  Lactate, Blood: 17.2 mmol/L <HH> (20 @ 20:05)  Troponin T, Serum: 0.11 ng/mL <HH> (20 @ 16:00)  Lactate, Blood: 19.0 mmol/L <HH> (20 @ 16:00)      CARDIAC MARKERS ( 21 Aug 2020 06:08 )  x     / 0.47 ng/mL / x     / x     / x      CARDIAC MARKERS ( 20 Aug 2020 20:05 )  x     / 0.13 ng/mL / x     / x     / x      CARDIAC MARKERS ( 20 Aug 2020 16:00 )  x     / 0.11 ng/mL / x     / x     / x          RADIOLOGY:  < from: CT Head No Cont (20 @ 13:43) >  INTERPRETATION:  Clinical History / Reason for exam: Altered mental status    Technique: Noncontrast head CT.  Contiguous unenhanced CT axial images of the head from the base to the vertex with coronal and sagittal reformats.    Comparison: CT head dated 2016.    Findings:    The ventricles and cortical sulci are normal in size and configuration.    There is no acute intracranial hemorrhage, extra-axial fluid collection or midline shift. The previously seen interhemispheric fissure subdural hemorrhage has resolved.    There are patchy white matter lucencies consistent with mild chronic microvascular change. Gray-white matter differentiation is maintained.    There is trace mucosal thickening within the maxillary sinuses. The visualized paranasal sinuses and mastoids are otherwise clear.    IMPRESSION:    No evidence of acute intracranial pathology.    < end of copied text >      < from: CT Abdomen and Pelvis w/ IV Cont (20 @ 13:52) >  FINDINGS:    LOWER CHEST: Unremarkable.    HEPATOBILIARY: Unremarkable.    SPLEEN: Unremarkable.    PANCREAS: Unremarkable.    ADRENAL GLANDS: Punctate left adrenal calcification.    KIDNEYS: No hydronephrosis bilaterally. Right renal hypodensity measuring at least 2.7 cm, measuring higher than simple fluid,and part may be related to beam hardening artifact from patient's right flank contacting the gantry - possibly a cyst but indeterminate. Additional subcentimeter bilateral renal hypodensities too small to characterize.    ABDOMINOPELVIC NODES: Nonspecific enlarged periportal and portacaval lymph nodes measuring up to 1.6 cm in short axis (series 5 image 150 and 180). Nonspecific mildly enlarged retroperitoneal lymph nodes. For example left para-aortic lymph node measuring 1.2 cm (series 5 image 264).    PELVIC ORGANS: Bladder contracted around Vargas catheter. Intraluminal gas likely related to instrumentation.    PERITONEUM/MESENTERY/BOWEL: No evidence of bowel obstruction. Colonic diverticulosis. Unremarkable appendix. No ascites or free air.    BONES/SOFT TISSUES: Moderate right and small left fat-containing inguinal hernias. Degenerative changes of the spine. L3 vertebral body hemangioma.    OTHER: Bilateral femoral venous lines. Atherosclerotic calcifications    IMPRESSION:    1.  No evidence of bowel obstruction, colitis, or appendicitis.  2.  Enlarged periportal and portacaval lymph nodes measuring up to 1.6 cm in short axis of unclear etiology. Correlate for any history of hepatobiliary disease. Also noted mildly enlarged retroperitoneal lymph nodes measuring up to 1.2 cm, nonspecific. Correlation with any prior studies for stability would be most helpful.  3.  Right renal hypodensity measuring at least 2.7 cm, measuring higher than simple fluid, which may be artifactual asdescribed -lesion may represent a cyst but is indeterminate. Recommend evaluation with renal sonogram and/or correlation with prior imaging if available.    < end of copied text >

## 2020-08-21 NOTE — PROGRESS NOTE ADULT - ASSESSMENT
74 yo M w/ PMH of DM, retinopathy, nephrotic range proteinuria, bleeding gastric ulcer and subdural hematoma who was brought in from home by his spouse for lethargy s/p Amoxicillin po for odontogenic infection. Admitted to ICU for septic shock requiring levophed.     #Severe sepsis   - possible etiology includes oral infection/ c. diff since hx of abx and diarrhea at home  - hypotensive (70's/30's) and hypothermic (30.8 C) on arrival with WBC 16.92   - lactate 20 on arrival --> 7.8   - CTH 8/20 negative   - CTAP 8/20 without evidence of colitis/bowel obstruction, renal hypodensity  - Retroperitoneal US right renal cyst  - s/p Unasyn 1500mg x 1, Vanc po 125mg q6h x 3  - c/w Cefepime 1g qd, Vanc po 250mg q6h, Flagyl 500mg q8h   - ID following   - f/u dental consult   - f/u lactate  - f/u AM CXR  - f/u BCx  - f/u CPK  - UA negative  - f/u stool for c. dif  - on levophed, try weaning  - c/w midodrine 10mg po q8h    #Severe KELL  #Lactic acidosis   #Severe Hyperkalemia/ Hypermagnesemia/ Hyperphosphatemia  - likely prerenal/ATN (baseline creatinine 1.3), r/o Metformin lactic acidosis  - hold home metformin    - s/p emergent dialysis on 8/21  - s/p insulin and D50 x2, calcium chloride x1 overnight??   - lactate 20 on arrival --> 7.8   - plan for HD today   - on D5 bicarb drip until HD   - f/u pH post dialysis, can then change IVF to NS @100   - f/u post dialysis BMP   - Nephro following     #Normocytic anemia  - Hx of bleeding gastric ulcer, per wife a few years ago had transfusion then but no longer  - s/p 2 u pRBC on 8/21  - monitor CBC   - keep active T+S   - f/u stool guaiac      #Hematuria  - started overnight   - Vargas in place  - f/u urology   - monitor CBC    #Troponemia  - likely secondary to   - f/u EKG   - f/u 2D echo  - ASA 81 mg po qd    #Loose stools  - possible c. diff as hx of abx use  - f/u stool for c. diff   - no BM since admission     #DM  - hold home metformin due to KELL/severe lactic acidosis   - fingersticks qac and qhs   - start insulin basal/bolus regimen if glucose consistently >180     #History of HTN: holding home bumetanide    #DLD: continue home Lipitor 40mg qd    #Gout: holding home meds     DVT ppx: Lovenox  GI ppx: Protonix  Diet: Renal and CC  Dispo: MICU  FULL CODE 74 yo M w/ PMH of DM, retinopathy, nephrotic range proteinuria, bleeding gastric ulcer and subdural hematoma who was brought in from home by his spouse for lethargy s/p Amoxicillin po for odontogenic infection. Admitted to ICU for septic shock requiring levophed.     #Severe sepsis   - possible etiology includes oral infection/ c. diff since hx of abx and diarrhea at home  - hypotensive (70's/30's) and hypothermic (30.8 C) on arrival with WBC 16.92   - lactate 20 on arrival --> 7.8   - CTH 8/20 negative   - CTAP 8/20 without evidence of colitis/bowel obstruction, renal hypodensity  - Retroperitoneal US right renal cyst  - s/p Unasyn 1500mg x 1, Vanc po 125mg q6h x 3  - c/w Cefepime 1g qd, Vanc po 250mg q6h, Flagyl 500mg q8h   - ID following   - f/u dental consult   - f/u lactate  - f/u AM CXR  - f/u BCx  - f/u UCx  - f/u CPK  - UA negative  - f/u stool for c. dif  - on levophed, try weaning  - c/w midodrine 10mg po q8h    #Severe KELL  #Lactic acidosis   #Severe Hyperkalemia/ Hypermagnesemia/ Hyperphosphatemia  - likely prerenal/ATN (baseline creatinine 1.3), r/o Metformin lactic acidosis  - hold home metformin    - s/p emergent dialysis on 8/21  - s/p insulin and D50 x2, calcium chloride x1 overnight??   - lactate 20 on arrival --> 7.8   - plan for HD today   - on D5 bicarb drip until HD   - f/u pH post dialysis, can then change IVF to NS @100   - f/u post dialysis BMP   - Nephro following     #Normocytic anemia  - Hx of bleeding gastric ulcer, per wife a few years ago had transfusion then but no longer  - s/p 2 u pRBC on 8/21  - monitor CBC   - keep active T+S   - f/u stool guaiac     #Hematuria  - started overnight   - Vargas in place  - f/u urology recs  - monitor CBC    #Troponemia  - likely secondary to renal   - f/u EKG   - f/u 2D echo  - ASA 81 mg po qd    #Loose stools  - possible c. diff as hx of abx use  - f/u stool for c. diff   - no BM since admission     #DM  - hold home metformin due to KELL/severe lactic acidosis   - fingersticks qac and qhs   - start insulin basal/bolus regimen if glucose consistently >180     #History of HTN: holding home bumetanide    #DLD: continue home Lipitor 40mg qd    #Gout: holding home meds     DVT ppx: Lovenox  GI ppx: Protonix  Diet: Renal and CC  Dispo: MICU  FULL CODE 76 yo M w/ PMH of DM, retinopathy, nephrotic range proteinuria, bleeding gastric ulcer and subdural hematoma who was brought in from home by his spouse for lethargy s/p Amoxicillin po for odontogenic infection. Admitted to ICU for septic shock requiring levophed.     #Severe sepsis   - possible etiology includes oral infection/ c. diff since hx of abx and diarrhea at home  - hypotensive (70's/30's) and hypothermic (30.8 C) on arrival with WBC 16.92   - lactate 20 on arrival --> 7.8   - CTH 8/20 negative   - CTAP 8/20 without evidence of colitis/bowel obstruction, renal hypodensity  - Retroperitoneal US right renal cyst  - s/p Unasyn 1500mg x 1, Vanc po 125mg q6h x 3  - c/w Cefepime 1g qd, Vanc po 250mg q6h, Flagyl 500mg q8h   - ID following   - f/u dental consult   - f/u lactate  - f/u AM CXR  - f/u BCx  - f/u UCx  - f/u CPK  - UA negative  - f/u stool for c. dif  - on levophed, try weaning  - c/w midodrine 10mg po q8h    #Severe KELL  #Lactic acidosis   #Severe Hyperkalemia/ Hypermagnesemia/ Hyperphosphatemia  - likely prerenal/ATN (baseline creatinine 1.3), decreased po intake, r/o Metformin lactic acidosis  - hold home metformin    - s/p emergent dialysis on 8/21  - s/p insulin and D50 x2, calcium chloride x1 overnight??   - lactate 20 on arrival --> 7.8   - plan for HD today   - on D5 bicarb drip until HD   - f/u pH post dialysis, can then change IVF to NS @100   - f/u post dialysis BMP   - Nephro following     #Normocytic anemia  - Hx of bleeding gastric ulcer, per wife in 1978 had transfusion and resolved with medication afterwards   - s/p 2 u pRBC on 8/21  - monitor CBC   - keep active T+S   - f/u stool guaiac     #Hematuria  - started overnight   - Vargas in place  - f/u urology recs  - monitor CBC    #Troponemia  - no chest pain   - likely secondary to KELL  - EKG without acute changes   - f/u 2D echo  - ASA 81 mg po qd    #Loose stools  - possible c. diff as hx of abx use  - f/u stool for c. diff   - no BM since admission     #DM  - hold home metformin due to KELL/severe lactic acidosis   - fingersticks qac and qhs   - start insulin basal/bolus regimen if glucose consistently >180     #History of HTN: holding home bumetanide    #DLD: continue home Lipitor 40mg qd    #Gout: holding home meds     DVT ppx: Lovenox  GI ppx: Protonix  Diet: Renal and CC  Dispo: MICU  FULL CODE 76 yo M w/ PMH of DM, retinopathy, nephrotic range proteinuria, bleeding gastric ulcer and subdural hematoma who was brought in from home by his spouse for lethargy s/p Amoxicillin po for odontogenic infection. Admitted to ICU for septic shock requiring levophed.     #Severe sepsis   - possible etiology includes oral infection/ c. diff since hx of abx and diarrhea at home  - hypotensive (70's/30's) and hypothermic (30.8 C) on arrival with WBC 16.92   - lactate 20 on arrival --> 7.8   - CTH 8/20 negative   - CTAP 8/20 without evidence of colitis/bowel obstruction, renal hypodensity  - Retroperitoneal US right renal cyst  - s/p Unasyn 1500mg x 1, Vanc po 125mg q6h x 3  - c/w Cefepime 1g qd, Vanc po 250mg q6h, Flagyl 500mg q8h   - ID following   - f/u dental consult   - f/u lactate  - f/u AM CXR  - f/u BCx  - f/u UCx  - f/u CPK, TSH  - UA negative  - f/u stool for c. dif  - on levophed, try weaning  - c/w midodrine 10mg po q8h    #Severe KELL  #Lactic acidosis   #Severe Hyperkalemia/ Hypermagnesemia/ Hyperphosphatemia  - likely prerenal/ATN (baseline creatinine 1.3), decreased po intake, r/o Metformin lactic acidosis  - hold home metformin    - s/p emergent dialysis on 8/21  - s/p insulin and D50 x2, calcium chloride x1 overnight??   - lactate 20 on arrival --> 7.8   - plan for HD today   - on D5 bicarb drip until HD   - f/u pH post dialysis, can then change IVF to NS @100   - f/u post dialysis BMP   - Nephro following     #Normocytic anemia  - Hx of bleeding gastric ulcer, per wife in 1978 had transfusion and resolved with medication afterwards   - Hb 6.8 on 8/20  - s/p 2 u pRBC, repeat Hb 8.5  - monitor CBC   - keep active T+S   - f/u stool guaiac     #Hematuria  - started overnight   - Vargas in place  - f/u urology recs  - monitor CBC    #Troponemia  - no chest pain   - likely secondary to KELL  - EKG without acute changes   - f/u 2D echo  - ASA 81 mg po qd    #Loose stools  - possible c. diff as hx of abx use  - f/u stool for c. diff   - no BM since admission     #DM  - hold home metformin due to KELL/severe lactic acidosis   - fingersticks qac and qhs   - start insulin basal/bolus regimen if glucose consistently >180     #History of HTN: holding home bumetanide    #DLD: continue home Lipitor 40mg qd    #Gout: holding home meds     DVT ppx: Lovenox  GI ppx: Protonix  Diet: Renal and CC- kosher  Dispo: MICU  FULL CODE 74 yo M w/ PMH of DM, retinopathy, nephrotic range proteinuria, bleeding gastric ulcer and subdural hematoma who was brought in from home by his spouse for lethargy s/p Amoxicillin po for odontogenic infection. Admitted to ICU for septic shock requiring levophed.     #Severe sepsis   - possible etiology includes oral infection/ c. diff since hx of abx and diarrhea at home  - hypotensive (70's/30's) and hypothermic (30.8 C) on arrival with WBC 16.92   - lactate 20 on arrival --> 7.8   - CTH 8/20 negative   - CTAP 8/20 without evidence of colitis/bowel obstruction, renal hypodensity  - Retroperitoneal US right renal cyst  - s/p Unasyn 1500mg x 1, Vanc po 125mg q6h x 3  - c/w Cefepime 1g qd, Vanc po 250mg q6h, Flagyl 500mg q8h   - ID following   - f/u dental consult   - f/u lactate  - f/u AM CXR  - f/u BCx  - f/u UCx  - f/u CPK, TSH  - UA negative  - f/u stool for c. dif  - on levophed, try weaning  - c/w midodrine 10mg po q8h  - c/w solucortef for 3-7 days (day 2)    #Severe KELL  #Lactic acidosis   #Severe Hyperkalemia/ Hypermagnesemia/ Hyperphosphatemia  - likely prerenal/ATN (baseline creatinine 1.3), decreased po intake, r/o Metformin lactic acidosis  - hold home metformin    - s/p emergent dialysis on 8/21  - s/p insulin and D50 x2, calcium chloride x1 overnight??   - lactate 20 on arrival --> 7.8   - plan for HD today   - on D5 bicarb drip until HD   - f/u pH post dialysis, can then decide if to change IVF to NS @100   - f/u post dialysis BMP   - Nephro following     #Normocytic anemia  - Hx of bleeding gastric ulcer, per wife in 1978 had transfusion and resolved with medication afterwards   - Hb 6.8 on 8/20  - s/p 2 u pRBC, repeat Hb 8.5  - monitor CBC   - keep active T+S   - f/u stool guaiac     #Hematuria  - started overnight   - Vargas in place  - f/u urology recs  - monitor CBC    #Troponemia  - no chest pain   - likely secondary to KELL  - EKG without acute changes   - f/u 2D echo  - ASA 81 mg po qd    #Loose stools  - possible c. diff as hx of abx use  - f/u stool for c. diff   - no BM since admission     #DM  - hold home metformin due to KELL/severe lactic acidosis   - fingersticks qac and qhs   - start insulin basal/bolus regimen if glucose consistently >180     #History of HTN: holding home bumetanide    #DLD: continue home Lipitor 40mg qd    #Gout: holding home meds     DVT ppx: Lovenox  GI ppx: Protonix  Diet: Renal and CC- kosher  Dispo: MICU  FULL CODE 74 yo M w/ PMH of DM, retinopathy, nephrotic range proteinuria, bleeding gastric ulcer and subdural hematoma who was brought in from home by his spouse for lethargy s/p Amoxicillin po for odontogenic infection. Admitted to ICU for septic shock requiring levophed.     #Severe sepsis   - possible etiology includes oral infection/ c. diff since hx of abx and diarrhea at home  - hypotensive (70's/30's) and hypothermic (30.8 C) on arrival with WBC 16.92   - lactate 20 on arrival --> 7.8   - CTH 8/20 negative   - CTAP 8/20 without evidence of colitis/bowel obstruction, renal hypodensity  - Retroperitoneal US right renal cyst  - s/p Unasyn 1500mg x 1, Vanc po 125mg q6h x 3  - c/w Cefepime 1g qd, Vanc po 250mg q6h, Flagyl 500mg q8h   - ID following   - f/u dental consult   - f/u lactate  - f/u AM CXR  - f/u BCx  - f/u UCx  - f/u CPK, TSH  - UA negative  - f/u stool for c. dif  - on levophed, try weaning  - dc femoral TLC if off pressors (can place IJ line)   - c/w midodrine 10mg po q8h  - c/w solucortef for 3-7 days (day 2)    #Severe KELL  #Lactic acidosis   #Severe Hyperkalemia/ Hypermagnesemia/ Hyperphosphatemia  - likely prerenal/ATN (baseline creatinine 1.3), decreased po intake, r/o Metformin lactic acidosis  - hold home metformin    - s/p emergent dialysis on 8/21  - s/p insulin and D50 x2, calcium chloride x1 overnight??   - lactate 20 on arrival --> 7.8   - plan for HD today   - on D5 bicarb drip until HD   - f/u pH post dialysis, can then decide if to change IVF to NS @100   - f/u post dialysis BMP   - Nephro following     #Normocytic anemia  - Hx of bleeding gastric ulcer, per wife in 1978 had transfusion and resolved with medication afterwards   - Hb 6.8 on 8/20  - s/p 2 u pRBC, repeat Hb 8.5  - monitor CBC   - keep active T+S   - f/u stool guaiac     #Hematuria  - started overnight   - Vargas in place  - f/u urology recs  - monitor CBC    #Troponemia  - no chest pain   - likely secondary to KELL  - EKG without acute changes   - f/u 2D echo  - ASA 81 mg po qd    #Loose stools  - possible c. diff as hx of abx use  - f/u stool for c. diff   - no BM since admission     #DM  - hold home metformin due to KELL/severe lactic acidosis   - fingersticks qac and qhs   - start insulin basal/bolus regimen if glucose consistently >180     #History of HTN: holding home bumetanide    #DLD: continue home Lipitor 40mg qd    #Gout: holding home meds     DVT ppx: Lovenox  GI ppx: Protonix  Diet: Renal and CC- kosher  Dispo: MICU  FULL CODE 74 yo M w/ PMH of DM, retinopathy, nephrotic range proteinuria, bleeding gastric ulcer and subdural hematoma who was brought in from home by his spouse for lethargy s/p Amoxicillin po for odontogenic infection. Admitted to ICU for septic shock requiring levophed.     #Severe sepsis   - possible etiology includes oral infection/ c. diff since hx of abx and diarrhea at home  - hypotensive (70's/30's) and hypothermic (30.8 C) on arrival with WBC 16.92   - lactate 20 on arrival --> 7.8   - CTH 8/20 negative   - CTAP 8/20 without evidence of colitis/bowel obstruction, renal hypodensity  - Retroperitoneal US right renal cyst  - s/p Unasyn 1500mg x 1, Vanc po 125mg q6h x 3  - c/w Cefepime 1g qd, Vanc po 250mg q6h, Flagyl 500mg q8h   - ID following   - f/u dental consult   - f/u lactate  - f/u AM CXR  - f/u BCx  - f/u UCx  - f/u CPK, TSH  - UA negative  - f/u stool for c. dif  - on levophed, try weaning  - dc femoral TLC if off pressors (can place IJ line)   - c/w midodrine 10mg po q8h  - c/w solucortef for 3-7 days (day 2)    #Severe KELL  #Lactic acidosis   #Severe Hyperkalemia/ Hypermagnesemia/ Hyperphosphatemia  - likely prerenal/ATN (baseline creatinine 1.3), decreased po intake, r/o Metformin lactic acidosis  - hold home metformin    - s/p emergent dialysis on 8/21  - s/p insulin and D50 x2, calcium chloride x1 overnight??   - lactate 20 on arrival --> 7.8   - plan for HD today   - on D5 bicarb drip until HD   - f/u pH post dialysis, can then decide if to change IVF to NS @100   - f/u post dialysis BMP   - Nephro following     #Normocytic anemia  - Hx of bleeding gastric ulcer, per wife in 1978 had transfusion and resolved with medication afterwards   - Hb 6.8 on 8/20  - s/p 2 u pRBC, repeat Hb 8.5  - monitor CBC   - keep active T+S   - f/u stool guaiac     #Hematuria  - started overnight   - Vargas in place  - f/u urology recs  - monitor CBC    #Troponemia  - no chest pain   - likely secondary to KELL  - EKG without acute changes   - f/u 2D echo  - ASA 81 mg po qd    #Loose stools  - possible c. diff as hx of abx use  - f/u stool for c. diff   - no BM since admission     #DM  - hold home metformin due to KELL/severe lactic acidosis   - fingersticks qac and qhs   - nn basal bolus insulin regimen  - f/u HbA1c    #History of HTN: holding home bumetanide    #DLD: continue home Lipitor 40mg qd    #Gout: holding home meds     DVT ppx: Lovenox  GI ppx: Protonix  Diet: Renal and CC- kosher  Dispo: MICU  FULL CODE

## 2020-08-21 NOTE — ED PROCEDURE NOTE - CPROC ED TIME OUT STATEMENT1
“Patient's name, , procedure and correct site were confirmed during the [place] Timeout.”
“Patient's name, , procedure and correct site were confirmed during the [place] Timeout.”

## 2020-08-21 NOTE — PROGRESS NOTE ADULT - SUBJECTIVE AND OBJECTIVE BOX
Patient is a 75y old  Male who presents with a chief complaint of Generalized weakness (21 Aug 2020 06:33)        Over Night Events:  On Levophed 0.4.  SP 2 Unit PRBC.  Hematuria.  On 2 liters.  More awake         ROS:     All ROS are negative except HPI         PHYSICAL EXAM    ICU Vital Signs Last 24 Hrs  T(C): 36.5 (21 Aug 2020 04:00), Max: 37 (20 Aug 2020 09:45)  T(F): 97.7 (21 Aug 2020 04:00), Max: 98.6 (20 Aug 2020 09:45)  HR: 80 (21 Aug 2020 07:00) (48 - 98)  BP: 102/39 (21 Aug 2020 07:00) (54/45 - 148/63)  BP(mean): 63 (21 Aug 2020 07:00) (38 - 92)  ABP: --  ABP(mean): --  RR: 19 (21 Aug 2020 07:00) (17 - 37)  SpO2: 99% (21 Aug 2020 07:00) (95% - 100%)      CONSTITUTIONAL:  Well nourished.  NAD    ENT:   Airway patent,   Mouth with normal mucosa.   No thrush    EYES:   Pupils equal,   Round and reactive to light.    CARDIAC:   Normal rate,   Regular rhythm.    LE edema      Vascular:  Normal systolic impulse  No Carotid bruits    RESPIRATORY:   No wheezing  Bilateral BS  Normal chest expansion  Not tachypneic,  No use of accessory muscles    GASTROINTESTINAL:  Abdomen soft,   Non-tender,   No guarding,   + BS    MUSCULOSKELETAL:   Range of motion is not limited,  No clubbing, cyanosis    NEUROLOGICAL:   Alert and oriented   No motor  deficits.    SKIN:   Skin normal color for race,   Warm and dry  No evidence of rash.    PSYCHIATRIC:   No apparent risk to self or others.    HEMATOLOGICAL:  No cervical  lymphadenopathy.  no inguinal lymphadenopathy      20 @ 07:01  -  20 @ 07:00  --------------------------------------------------------  IN:    dextrose 5%: 900 mL    IV PiggyBack: 250 mL    norepinephrine Infusion: 764.9 mL    norepinephrine Infusion: 159 mL    Oral Fluid: 120 mL    Packed Red Blood Cells: 312 mL    pantoprazole Infusion: 100 mL  Total IN: 2605.9 mL    OUT:    Indwelling Catheter - Urethral: 20 mL    Voided: 10 mL  Total OUT: 30 mL    Total NET: 2575.9 mL          LABS:                            8.5    18.91 )-----------( 523      ( 21 Aug 2020 05:30 )             29.6                                               08-21    133<L>  |  83<L>  |  91<HH>  ----------------------------<  214<H>  7.2<HH>   |  10<L>  |  6.7<HH>    Ca    8.3<L>      21 Aug 2020 05:30  Phos  10.0     08-  Mg     5.1         TPro  6.3  /  Alb  3.7  /  TBili  0.4  /  DBili  x   /  AST  60<H>  /  ALT  34  /  AlkPhos  89  -21      PT/INR - ( 20 Aug 2020 10:02 )   PT: 15.00 sec;   INR: 1.30 ratio         PTT - ( 20 Aug 2020 10:02 )  PTT:39.7 sec                                       Urinalysis Basic - ( 20 Aug 2020 18:00 )    Color: Yellow / Appearance: Clear / S.025 / pH: x  Gluc: x / Ketone: Negative  / Bili: Negative / Urobili: 0.2   Blood: x / Protein: 100 / Nitrite: Negative   Leuk Esterase: Negative / RBC: x / WBC x   Sq Epi: x / Non Sq Epi: Few / Bacteria: Few        CARDIAC MARKERS ( 21 Aug 2020 06:08 )  x     / 0.47 ng/mL / x     / x     / x      CARDIAC MARKERS ( 20 Aug 2020 20:05 )  x     / 0.13 ng/mL / x     / x     / x      CARDIAC MARKERS ( 20 Aug 2020 16:00 )  x     / 0.11 ng/mL / x     / x     / x                                                LIVER FUNCTIONS - ( 21 Aug 2020 05:30 )  Alb: 3.7 g/dL / Pro: 6.3 g/dL / ALK PHOS: 89 U/L / ALT: 34 U/L / AST: 60 U/L / GGT: x                                                                                                                                       MEDICATIONS  (STANDING):  ampicillin/sulbactam  IVPB      ampicillin/sulbactam  IVPB 1.5 Gram(s) IV Intermittent every 24 hours  atorvastatin 40 milliGRAM(s) Oral at bedtime  benzocaine 20% Spray 1 Spray(s) Topical once  dextrose 5% 1000 milliLiter(s) (100 mL/Hr) IV Continuous <Continuous>  hydrocortisone sodium succinate Injectable 100 milliGRAM(s) IV Push every 8 hours  metroNIDAZOLE  IVPB      metroNIDAZOLE  IVPB 500 milliGRAM(s) IV Intermittent every 8 hours  norepinephrine Infusion 0.05 MICROgram(s)/kG/Min (4.25 mL/Hr) IV Continuous <Continuous>  pantoprazole Infusion 8 mG/Hr (10 mL/Hr) IV Continuous <Continuous>  sodium zirconium cyclosilicate 10 Gram(s) Oral every 12 hours  vancomycin    Solution 125 milliGRAM(s) Oral every 6 hours    MEDICATIONS  (PRN):  senna 2 Tablet(s) Oral at bedtime PRN Constipation      New X-rays reviewed:                                                                                  ECHO    CXR interpreted by me:  NO infiltrates

## 2020-08-21 NOTE — CONSULT NOTE ADULT - SUBJECTIVE AND OBJECTIVE BOX
KEVINMANDA CHAVEZ  75y, Male  Allergy: No Known Allergies      All historical available data reviewed.    HPI:  74 yo M w/ PMH of CKD (unknown stage) and follows Dr. Buchanan, gastric ulcers, HTN, DM, and gout on allopurinol brought in from home by spouse for lethargy. History obtained from spouse over phone who reported that pt had dental procedure with root canal done about 1 week and was complicated by a dental infection. Pt was placed on amoxicillin and took it for 1 week. Pt also complained about severe dental pain limiting his oral intake of food or fluids. Over the past 5 days, pt also had 3-4 episodes of diarrhea per day associated with nonbloody nonbilious vomiting. The spouse found the pt today to be very lethargic and weak and brought him in to ED for further eval. Otherwise, pt denies fever, chill, SOB, CP, dysuria, flank pain, sick contacts or recent travel.    Vital Signs Last 24 Hrs  T(C): 32 (20 Aug 2020 12:45), Max: 37 (20 Aug 2020 09:45)  T(F): 89.6 (20 Aug 2020 12:45), Max: 98.6 (20 Aug 2020 09:45)  HR: 60 (20 Aug 2020 12:45) (48 - 60)  BP: 88/43 (20 Aug 2020 12:45) (63/31 - 91/48)  BP(mean): 61 (20 Aug 2020 12:45) (38 - 68)  RR: 20 (20 Aug 2020 12:45) (20 - 24)  SpO2: 100% (20 Aug 2020 12:45) (96% - 100%)    In the ED, pt was found to be bradycardic, hypothermic and hypotensive with lactate of 20 and Ph 6.8 and K of 8. [place] placed and pt sent for urgent HD and CT abdomen. Pt also given hydrocortisone and IV levothyroxine for suspected myedema coma. Pt is admitted to ICU for suspected septic shock requiring levophed. (20 Aug 2020 14:08)  ID called for septic shock and CD colitis    FAMILY HISTORY:    PAST MEDICAL & SURGICAL HISTORY:  Sciatica, unspecified laterality  Osteoarthritis of multiple joints, unspecified osteoarthritis type  High cholesterol  Hypertension, unspecified type  Type 2 diabetes mellitus without complication, unspecified long term insulin use status  Chronic gout of foot, unspecified cause, unspecified laterality  Herniated disc, cervical  Diverticulitis        VITALS:  T(F): 96.5, Max: 98.6 (- @ 09:45)  HR: 82  BP: 119/46  RR: 18Vital Signs Last 24 Hrs  T(C): 35.8 (21 Aug 2020 00:00), Max: 37 (20 Aug 2020 09:45)  T(F): 96.5 (21 Aug 2020 00:00), Max: 98.6 (20 Aug 2020 09:45)  HR: 82 (21 Aug 2020 04:00) (48 - 86)  BP: 119/46 (21 Aug 2020 04:00) (54/45 - 140/53)  BP(mean): 72 (21 Aug 2020 04:00) (38 - 85)  RR: 18 (21 Aug 2020 04:00) (17 - 37)  SpO2: 100% (21 Aug 2020 04:00) (95% - 100%)    TESTS & MEASUREMENTS:                        8.5    18.91 )-----------( 523      ( 21 Aug 2020 05:30 )             29.6     08-20    132<L>  |  81<L>  |  78<HH>  ----------------------------<  112<H>  6.5<HH>   |  6<LL>  |  6.4<HH>    Ca    8.2<L>      20 Aug 2020 20:05    TPro  7.2  /  Alb  4.0  /  TBili  <0.2  /  DBili  x   /  AST  54<H>  /  ALT  22  /  AlkPhos  88  08-20    LIVER FUNCTIONS - ( 20 Aug 2020 10:02 )  Alb: 4.0 g/dL / Pro: 7.2 g/dL / ALK PHOS: 88 U/L / ALT: 22 U/L / AST: 54 U/L / GGT: x             Urinalysis Basic - ( 20 Aug 2020 18:00 )    Color: Yellow / Appearance: Clear / S.025 / pH: x  Gluc: x / Ketone: Negative  / Bili: Negative / Urobili: 0.2   Blood: x / Protein: 100 / Nitrite: Negative   Leuk Esterase: Negative / RBC: x / WBC x   Sq Epi: x / Non Sq Epi: Few / Bacteria: Few          RADIOLOGY & ADDITIONAL TESTS:  Personal review of radiological diagnostics performed  Echo and EKG results noted when applicable.     MEDICATIONS:  ampicillin/sulbactam  IVPB      ampicillin/sulbactam  IVPB 1.5 Gram(s) IV Intermittent every 24 hours  atorvastatin 40 milliGRAM(s) Oral at bedtime  benzocaine 20% Spray 1 Spray(s) Topical once  dextrose 5% 1000 milliLiter(s) IV Continuous <Continuous>  hydrocortisone sodium succinate Injectable 100 milliGRAM(s) IV Push every 8 hours  metroNIDAZOLE  IVPB      metroNIDAZOLE  IVPB 500 milliGRAM(s) IV Intermittent every 8 hours  norepinephrine Infusion 0.05 MICROgram(s)/kG/Min IV Continuous <Continuous>  pantoprazole Infusion 8 mG/Hr IV Continuous <Continuous>  senna 2 Tablet(s) Oral at bedtime PRN  vancomycin    Solution 125 milliGRAM(s) Oral every 6 hours      ANTIBIOTICS:  ampicillin/sulbactam  IVPB      ampicillin/sulbactam  IVPB 1.5 Gram(s) IV Intermittent every 24 hours  metroNIDAZOLE  IVPB      metroNIDAZOLE  IVPB 500 milliGRAM(s) IV Intermittent every 8 hours  vancomycin    Solution 125 milliGRAM(s) Oral every 6 hours

## 2020-08-21 NOTE — PROGRESS NOTE ADULT - SUBJECTIVE AND OBJECTIVE BOX
SUBJECTIVE:    Patient is a 75y old Male who presents with a chief complaint of Generalized weakness (21 Aug 2020 08:48)    Currently admitted to medicine with the primary diagnosis of KELL (acute kidney injury)     Today is hospital day 1d.       PAST MEDICAL & SURGICAL HISTORY  Sciatica, unspecified laterality  Osteoarthritis of multiple joints, unspecified osteoarthritis type  High cholesterol  Hypertension, unspecified type  Type 2 diabetes mellitus without complication, unspecified long term insulin use status  Chronic gout of foot, unspecified cause, unspecified laterality  Herniated disc, cervical  Diverticulitis    SOCIAL HISTORY:  Negative for smoking/alcohol/drug use.     ALLERGIES:  No Known Allergies    MEDICATIONS:  STANDING MEDICATIONS  aspirin enteric coated 81 milliGRAM(s) Oral daily  atorvastatin 40 milliGRAM(s) Oral at bedtime  benzocaine 20% Spray 1 Spray(s) Topical once  cefepime   IVPB 1000 milliGRAM(s) IV Intermittent every 24 hours  dextrose 5% 1000 milliLiter(s) IV Continuous <Continuous>  heparin   Injectable 5000 Unit(s) SubCutaneous every 12 hours  hydrocortisone sodium succinate Injectable 100 milliGRAM(s) IV Push every 8 hours  metroNIDAZOLE  IVPB      metroNIDAZOLE  IVPB 500 milliGRAM(s) IV Intermittent every 8 hours  midodrine 10 milliGRAM(s) Oral every 8 hours  norepinephrine Infusion 0.05 MICROgram(s)/kG/Min IV Continuous <Continuous>  pantoprazole    Tablet 40 milliGRAM(s) Oral before breakfast  sodium zirconium cyclosilicate 10 Gram(s) Oral every 12 hours  vancomycin    Solution 250 milliGRAM(s) Oral every 6 hours    PRN MEDICATIONS  senna 2 Tablet(s) Oral at bedtime PRN    VITALS:   T(F): 97  HR: 72  BP: 120/44  RR: 17  SpO2: 100%    LABS:                        8.5    18.91 )-----------( 523      ( 21 Aug 2020 05:30 )             29.6     08-21    133<L>  |  83<L>  |  91<HH>  ----------------------------<  214<H>  7.2<HH>   |  10<L>  |  6.7<HH>    Ca    8.3<L>      21 Aug 2020 05:30  Phos  10.0     08-21  Mg     5.1     08-21    TPro  6.3  /  Alb  3.7  /  TBili  0.4  /  DBili  x   /  AST  60<H>  /  ALT  34  /  AlkPhos  89  08    PT/INR - ( 20 Aug 2020 10:02 )   PT: 15.00 sec;   INR: 1.30 ratio         PTT - ( 20 Aug 2020 10:02 )  PTT:39.7 sec  Urinalysis Basic - ( 20 Aug 2020 18:00 )    Color: Yellow / Appearance: Clear / S.025 / pH: x  Gluc: x / Ketone: Negative  / Bili: Negative / Urobili: 0.2   Blood: x / Protein: 100 / Nitrite: Negative   Leuk Esterase: Negative / RBC: x / WBC x   Sq Epi: x / Non Sq Epi: Few / Bacteria: Few        Lactate, Blood: 7.8 mmol/L <HH> (20 @ 06:08)  Troponin T, Serum: 0.47 ng/mL <HH> (20 @ 06:08)  Troponin T, Serum: 0.13 ng/mL <HH> (20 @ 20:05)  Lactate, Blood: 17.2 mmol/L <HH> (20 @ 20:05)  Troponin T, Serum: 0.11 ng/mL <HH> (20 @ 16:00)  Lactate, Blood: 19.0 mmol/L <HH> (20 @ 16:00)      CARDIAC MARKERS ( 21 Aug 2020 06:08 )  x     / 0.47 ng/mL / x     / x     / x      CARDIAC MARKERS ( 20 Aug 2020 20:05 )  x     / 0.13 ng/mL / x     / x     / x      CARDIAC MARKERS ( 20 Aug 2020 16:00 )  x     / 0.11 ng/mL / x     / x     / x          RADIOLOGY:    PHYSICAL EXAM:  GEN: No acute distress  LUNGS: Clear to auscultation bilaterally   HEART: Regular  ABD: Soft, non-tender, non-distended.  EXT: NC/NC/NE/2+PP/SUNSHINE/Skin Intact.   NEURO: confused     Intravenous access:   NG tube:   Vargas Catheter:   Indwelling Urethral Catheter:     Connect To:  Straight Drainage/Gravity    Indication:  Urine Output Monitoring in Critically Ill (20 @ 03:27) (not performed) [active]

## 2020-08-21 NOTE — PROGRESS NOTE ADULT - SUBJECTIVE AND OBJECTIVE BOX
Nephrology progress note     Patient is a 74 yo man with long standing diabetes, retinopathy, nephrotic range proteinurua, bleeding gastric ulcer, gout, and subdural hematoma.  About 1 week ago had dental procedure which, per wife, developed into oral infection and was treated with amoxicillin  Over the past week he has decreased po intake, diarrhea and lethargy until brought to ER.  Labs indicated severe lactic acidosis, KELL, and hyperkalemia and pt was emergently dialyzed by the Renal team.    ON events/Subjective:     Allergies:  No Known Allergies    Hospital Medications:   MEDICATIONS  (STANDING):  aspirin enteric coated 81 milliGRAM(s) Oral daily  atorvastatin 40 milliGRAM(s) Oral at bedtime  benzocaine 20% Spray 1 Spray(s) Topical once  cefepime   IVPB 1000 milliGRAM(s) IV Intermittent every 24 hours  dextrose 5% 1000 milliLiter(s) (100 mL/Hr) IV Continuous <Continuous>  heparin   Injectable 5000 Unit(s) SubCutaneous every 12 hours  hydrocortisone sodium succinate Injectable 100 milliGRAM(s) IV Push every 8 hours  metroNIDAZOLE  IVPB      metroNIDAZOLE  IVPB 500 milliGRAM(s) IV Intermittent every 8 hours  norepinephrine Infusion 0.05 MICROgram(s)/kG/Min (4.25 mL/Hr) IV Continuous <Continuous>  pantoprazole Infusion 8 mG/Hr (10 mL/Hr) IV Continuous <Continuous>  sodium zirconium cyclosilicate 10 Gram(s) Oral every 12 hours  vancomycin    Solution 125 milliGRAM(s) Oral every 6 hours    REVIEW OF SYSTEMS:  CONSTITUTIONAL: No weakness, fevers or chills  EYES/ENT: No visual changes;  No vertigo or throat pain   NECK: No pain or stiffness  RESPIRATORY: No cough, wheezing, hemoptysis; No shortness of breath  CARDIOVASCULAR: No chest pain or palpitations.  GASTROINTESTINAL: No abdominal or epigastric pain. No nausea, vomiting, or hematemesis; No diarrhea or constipation. No melena or hematochezia.  GENITOURINARY: No dysuria, frequency, foamy urine, urinary urgency, incontinence or hematuria  NEUROLOGICAL: weakness  SKIN: No itching, burning, rashes, or lesions   VASCULAR: No bilateral lower extremity edema.   All other review of systems is negative unless indicated above.    VITALS:  T(F): 97.7 (20 @ 04:00), Max: 98.6 (20 @ 09:45)  HR: 78 (20 @ 08:30)  BP: 133/58 (20 @ 08:30)  RR: 19 (20 @ 08:30)  SpO2: 99% (20 @ 08:30)  Wt(kg): --     @ 07:01  -   @ 07:00  --------------------------------------------------------  IN: 2605.9 mL / OUT: 30 mL / NET: 2575.9 mL      Height (cm): 180.3 ( @ 14:00)  Weight (kg): 90.6 ( @ 14:00)  BMI (kg/m2): 27.9 ( @ 14:00)  BSA (m2): 2.11 ( 14:00)  PHYSICAL EXAM:  Constitutional: NAD  HEENT: anicteric sclera, oropharynx clear, MMM  Neck: No JVD  Respiratory: CTAB, no wheezes, rales or rhonchi  Cardiovascular: S1, S2, RRR  Gastrointestinal: BS+, soft, NT/ND  Extremities: bl edema  Neurological: A/O x 3, no focal deficits  Psychiatric: Normal mood, normal affect  : No CVA tenderness. No gil.   Skin: No rashes  Vascular Access:    LABS:      133<L>  |  83<L>  |  91<HH>  ----------------------------<  214<H>  7.2<HH>   |  10<L>  |  6.7<HH>    Ca    8.3<L>      21 Aug 2020 05:30  Phos  10.0       Mg     5.1         TPro  6.3  /  Alb  3.7  /  TBili  0.4  /  DBili      /  AST  60<H>  /  ALT  34  /  AlkPhos  89                            8.5    18.91 )-----------( 523      ( 21 Aug 2020 05:30 )             29.6       Urine Studies:  Creatinine Trend: 6.7<--, 6.4<--, 6.9<--, 8.9<--  Urinalysis Basic - ( 20 Aug 2020 18:00 )    Color: Yellow / Appearance: Clear / S.025 / pH:   Gluc:  / Ketone: Negative  / Bili: Negative / Urobili: 0.2   Blood:  / Protein: 100 / Nitrite: Negative   Leuk Esterase: Negative / RBC:  / WBC    Sq Epi:  / Non Sq Epi: Few / Bacteria: Few    ·	KELL in setting of sepsis/shock and lactic acidosis  ·	recent dental procedure and infection (past week) on amoxicillin  ·	loose stools  ·	lactic acidosis - likely metformin with combination sepsis/shock -   ·	anemia - s/p 2 units prbc  ·	levophed dependent but BP improved  ·	more alert    1) dialysis again today - will use optiflux 200 (large dialyzer), 1 K bath, no uf  2) agree with abx with cdiff and oral infection coverage  3) continue bicarb drip up until dialysis then can change to NS at 100 cc/hr  4) check ph and lactic acid post dialysis to determine need for bicarbonate drip  d/w icu team and pt Nephrology progress note     Patient is a 76 yo man with long standing diabetes, retinopathy, nephrotic range proteinurua, bleeding gastric ulcer, gout, and subdural hematoma.  About 1 week ago had dental procedure which, per wife, developed into oral infection and was treated with amoxicillin  Over the past week he has decreased po intake, diarrhea and lethargy until brought to ER.  Labs indicated severe lactic acidosis, KELL, and hyperkalemia and pt was emergently dialyzed by the Renal team.    ON events/Subjective:     Allergies:  No Known Allergies    Hospital Medications:   MEDICATIONS  (STANDING):  aspirin enteric coated 81 milliGRAM(s) Oral daily  atorvastatin 40 milliGRAM(s) Oral at bedtime  benzocaine 20% Spray 1 Spray(s) Topical once  cefepime   IVPB 1000 milliGRAM(s) IV Intermittent every 24 hours  dextrose 5% 1000 milliLiter(s) (100 mL/Hr) IV Continuous <Continuous>  heparin   Injectable 5000 Unit(s) SubCutaneous every 12 hours  hydrocortisone sodium succinate Injectable 100 milliGRAM(s) IV Push every 8 hours  metroNIDAZOLE  IVPB      metroNIDAZOLE  IVPB 500 milliGRAM(s) IV Intermittent every 8 hours  norepinephrine Infusion 0.05 MICROgram(s)/kG/Min (4.25 mL/Hr) IV Continuous <Continuous>  pantoprazole Infusion 8 mG/Hr (10 mL/Hr) IV Continuous <Continuous>  sodium zirconium cyclosilicate 10 Gram(s) Oral every 12 hours  vancomycin    Solution 125 milliGRAM(s) Oral every 6 hours    REVIEW OF SYSTEMS:  CONSTITUTIONAL: No weakness, fevers or chills  EYES/ENT: No visual changes;  No vertigo or throat pain   NECK: No pain or stiffness  RESPIRATORY: No cough, wheezing, hemoptysis; No shortness of breath  CARDIOVASCULAR: No chest pain or palpitations.  GASTROINTESTINAL: No abdominal or epigastric pain. No nausea, vomiting, or hematemesis; No diarrhea or constipation. No melena or hematochezia.  GENITOURINARY: No dysuria, frequency, foamy urine, urinary urgency, incontinence or hematuria  NEUROLOGICAL: weakness  SKIN: No itching, burning, rashes, or lesions   VASCULAR: No bilateral lower extremity edema.   All other review of systems is negative unless indicated above.    VITALS:  T(F): 97.7 (20 @ 04:00), Max: 98.6 (20 @ 09:45)  HR: 78 (20 @ 08:30)  BP: 133/58 (20 @ 08:30)  RR: 19 (20 @ 08:30)  SpO2: 99% (20 @ 08:30)  Wt(kg): --     @ 07:01  -   @ 07:00  --------------------------------------------------------  IN: 2605.9 mL / OUT: 30 mL / NET: 2575.9 mL      Height (cm): 180.3 ( @ 14:00)  Weight (kg): 90.6 ( @ 14:00)  BMI (kg/m2): 27.9 ( @ 14:00)  BSA (m2): 2.11 ( 14:00)  PHYSICAL EXAM:  Constitutional: NAD  HEENT: anicteric sclera, oropharynx clear, MMM  Neck: No JVD  Respiratory: CTAB, no wheezes, rales or rhonchi  Cardiovascular: S1, S2, RRR  Gastrointestinal: BS+, soft, NT/ND  Extremities: bl edema  Neurological: A/O x 3, no focal deficits  Psychiatric: Normal mood, normal affect  : No CVA tenderness. No gil.   Skin: No rashes  Vascular Access:    LABS:      133<L>  |  83<L>  |  91<HH>  ----------------------------<  214<H>  7.2<HH>   |  10<L>  |  6.7<HH>    Ca    8.3<L>      21 Aug 2020 05:30  Phos  10.0       Mg     5.1         TPro  6.3  /  Alb  3.7  /  TBili  0.4  /  DBili      /  AST  60<H>  /  ALT  34  /  AlkPhos  89                            8.5    18.91 )-----------( 523      ( 21 Aug 2020 05:30 )             29.6       Urine Studies:  Creatinine Trend: 6.7<--, 6.4<--, 6.9<--, 8.9<--  Urinalysis Basic - ( 20 Aug 2020 18:00 )    Color: Yellow / Appearance: Clear / S.025 / pH:   Gluc:  / Ketone: Negative  / Bili: Negative / Urobili: 0.2   Blood:  / Protein: 100 / Nitrite: Negative   Leuk Esterase: Negative / RBC:  / WBC    Sq Epi:  / Non Sq Epi: Few / Bacteria: Few    ·	KELL in setting of sepsis/shock and lactic acidosis  ·	recent dental procedure and infection (past week) on amoxicillin  ·	loose stools  ·	lactic acidosis - likely metformin with combination sepsis/shock -   ·	anemia - s/p 2 units prbc  ·	levophed dependent but BP improved  ·	more alert    1) dialysis again today - will use optiflux 200 (large dialyzer), 1 K bath, no uf  2) agree with abx with cdiff and oral infection coverage  3) continue bicarb drip up until dialysis then can change to NS at 100 cc/hr  4) check ph and lactic acid post dialysis to determine need for bicarbonate drip  5) cpk level  d/w icu team and pt

## 2020-08-21 NOTE — CONSULT NOTE ADULT - ATTENDING COMMENTS
pt seen and examined 8/22. urine completely clear in gil. recommend gil mgmt as per primary team ie can tov whenever no longer needed. reg CT, the images were visualized by me there was a hyperdense right renal cyst and other simple renal cysts. sono after shows probable simple renal cyst however would still recommend fu w gu as outpatient.

## 2020-08-22 LAB
A1C WITH ESTIMATED AVERAGE GLUCOSE RESULT: 6 % — HIGH (ref 4–5.6)
ALBUMIN SERPL ELPH-MCNC: 3 G/DL — LOW (ref 3.5–5.2)
ALBUMIN SERPL ELPH-MCNC: 3.3 G/DL — LOW (ref 3.5–5.2)
ALBUMIN SERPL ELPH-MCNC: 3.3 G/DL — LOW (ref 3.5–5.2)
ALP SERPL-CCNC: 83 U/L — SIGNIFICANT CHANGE UP (ref 30–115)
ALP SERPL-CCNC: 85 U/L — SIGNIFICANT CHANGE UP (ref 30–115)
ALP SERPL-CCNC: 87 U/L — SIGNIFICANT CHANGE UP (ref 30–115)
ALT FLD-CCNC: 36 U/L — SIGNIFICANT CHANGE UP (ref 0–41)
ANION GAP SERPL CALC-SCNC: 16 MMOL/L — HIGH (ref 7–14)
AST SERPL-CCNC: 65 U/L — HIGH (ref 0–41)
AST SERPL-CCNC: 66 U/L — HIGH (ref 0–41)
AST SERPL-CCNC: 67 U/L — HIGH (ref 0–41)
BASOPHILS # BLD AUTO: 0 K/UL — SIGNIFICANT CHANGE UP (ref 0–0.2)
BASOPHILS # BLD AUTO: 0.01 K/UL — SIGNIFICANT CHANGE UP (ref 0–0.2)
BASOPHILS # BLD AUTO: 0.01 K/UL — SIGNIFICANT CHANGE UP (ref 0–0.2)
BASOPHILS NFR BLD AUTO: 0 % — SIGNIFICANT CHANGE UP (ref 0–1)
BASOPHILS NFR BLD AUTO: 0.1 % — SIGNIFICANT CHANGE UP (ref 0–1)
BASOPHILS NFR BLD AUTO: 0.1 % — SIGNIFICANT CHANGE UP (ref 0–1)
BILIRUB SERPL-MCNC: 0.5 MG/DL — SIGNIFICANT CHANGE UP (ref 0.2–1.2)
BILIRUB SERPL-MCNC: <0.2 MG/DL — SIGNIFICANT CHANGE UP (ref 0.2–1.2)
BILIRUB SERPL-MCNC: <0.2 MG/DL — SIGNIFICANT CHANGE UP (ref 0.2–1.2)
BUN SERPL-MCNC: 56 MG/DL — HIGH (ref 10–20)
BUN SERPL-MCNC: 59 MG/DL — HIGH (ref 10–20)
BUN SERPL-MCNC: 61 MG/DL — CRITICAL HIGH (ref 10–20)
C DIFF BY PCR RESULT: POSITIVE
C DIFF TOX GENS STL QL NAA+PROBE: SIGNIFICANT CHANGE UP
CALCIUM SERPL-MCNC: 7.1 MG/DL — LOW (ref 8.5–10.1)
CALCIUM SERPL-MCNC: 7.6 MG/DL — LOW (ref 8.5–10.1)
CALCIUM SERPL-MCNC: 7.8 MG/DL — LOW (ref 8.5–10.1)
CHLORIDE SERPL-SCNC: 93 MMOL/L — LOW (ref 98–110)
CHLORIDE SERPL-SCNC: 93 MMOL/L — LOW (ref 98–110)
CHLORIDE SERPL-SCNC: 94 MMOL/L — LOW (ref 98–110)
CK SERPL-CCNC: 846 U/L — HIGH (ref 0–225)
CO2 SERPL-SCNC: 27 MMOL/L — SIGNIFICANT CHANGE UP (ref 17–32)
CO2 SERPL-SCNC: 27 MMOL/L — SIGNIFICANT CHANGE UP (ref 17–32)
CO2 SERPL-SCNC: 28 MMOL/L — SIGNIFICANT CHANGE UP (ref 17–32)
CREAT SERPL-MCNC: 4.5 MG/DL — CRITICAL HIGH (ref 0.7–1.5)
CREAT SERPL-MCNC: 4.6 MG/DL — CRITICAL HIGH (ref 0.7–1.5)
CREAT SERPL-MCNC: 4.7 MG/DL — CRITICAL HIGH (ref 0.7–1.5)
CULTURE RESULTS: NO GROWTH — SIGNIFICANT CHANGE UP
EOSINOPHIL # BLD AUTO: 0 K/UL — SIGNIFICANT CHANGE UP (ref 0–0.7)
EOSINOPHIL NFR BLD AUTO: 0 % — SIGNIFICANT CHANGE UP (ref 0–8)
ESTIMATED AVERAGE GLUCOSE: 126 MG/DL — HIGH (ref 68–114)
GLUCOSE BLDC GLUCOMTR-MCNC: 130 MG/DL — HIGH (ref 70–99)
GLUCOSE BLDC GLUCOMTR-MCNC: 132 MG/DL — HIGH (ref 70–99)
GLUCOSE BLDC GLUCOMTR-MCNC: 159 MG/DL — HIGH (ref 70–99)
GLUCOSE BLDC GLUCOMTR-MCNC: 161 MG/DL — HIGH (ref 70–99)
GLUCOSE SERPL-MCNC: 122 MG/DL — HIGH (ref 70–99)
GLUCOSE SERPL-MCNC: 132 MG/DL — HIGH (ref 70–99)
GLUCOSE SERPL-MCNC: 157 MG/DL — HIGH (ref 70–99)
HCT VFR BLD CALC: 24.8 % — LOW (ref 42–52)
HCT VFR BLD CALC: 26.8 % — LOW (ref 42–52)
HCT VFR BLD CALC: 28 % — LOW (ref 42–52)
HGB BLD-MCNC: 7.9 G/DL — LOW (ref 14–18)
HGB BLD-MCNC: 8.6 G/DL — LOW (ref 14–18)
HGB BLD-MCNC: 8.8 G/DL — LOW (ref 14–18)
IMM GRANULOCYTES NFR BLD AUTO: 0.2 % — SIGNIFICANT CHANGE UP (ref 0.1–0.3)
IMM GRANULOCYTES NFR BLD AUTO: 0.3 % — SIGNIFICANT CHANGE UP (ref 0.1–0.3)
IMM GRANULOCYTES NFR BLD AUTO: 0.4 % — HIGH (ref 0.1–0.3)
LACTATE SERPL-SCNC: 3.6 MMOL/L — HIGH (ref 0.7–2)
LACTATE SERPL-SCNC: 4.9 MMOL/L — CRITICAL HIGH (ref 0.7–2)
LACTATE SERPL-SCNC: 5.7 MMOL/L — CRITICAL HIGH (ref 0.7–2)
LYMPHOCYTES # BLD AUTO: 0.46 K/UL — LOW (ref 1.2–3.4)
LYMPHOCYTES # BLD AUTO: 0.59 K/UL — LOW (ref 1.2–3.4)
LYMPHOCYTES # BLD AUTO: 0.66 K/UL — LOW (ref 1.2–3.4)
LYMPHOCYTES # BLD AUTO: 4.4 % — LOW (ref 20.5–51.1)
LYMPHOCYTES # BLD AUTO: 4.6 % — LOW (ref 20.5–51.1)
LYMPHOCYTES # BLD AUTO: 4.9 % — LOW (ref 20.5–51.1)
MAGNESIUM SERPL-MCNC: 3.4 MG/DL — CRITICAL HIGH (ref 1.8–2.4)
MAGNESIUM SERPL-MCNC: 3.8 MG/DL — CRITICAL HIGH (ref 1.8–2.4)
MCHC RBC-ENTMCNC: 24.3 PG — LOW (ref 27–31)
MCHC RBC-ENTMCNC: 24.8 PG — LOW (ref 27–31)
MCHC RBC-ENTMCNC: 25.3 PG — LOW (ref 27–31)
MCHC RBC-ENTMCNC: 31.4 G/DL — LOW (ref 32–37)
MCHC RBC-ENTMCNC: 31.9 G/DL — LOW (ref 32–37)
MCHC RBC-ENTMCNC: 32.1 G/DL — SIGNIFICANT CHANGE UP (ref 32–37)
MCV RBC AUTO: 77.2 FL — LOW (ref 80–94)
MCV RBC AUTO: 77.3 FL — LOW (ref 80–94)
MCV RBC AUTO: 79.5 FL — LOW (ref 80–94)
MONOCYTES # BLD AUTO: 0.6 K/UL — SIGNIFICANT CHANGE UP (ref 0.1–0.6)
MONOCYTES # BLD AUTO: 0.82 K/UL — HIGH (ref 0.1–0.6)
MONOCYTES # BLD AUTO: 0.86 K/UL — HIGH (ref 0.1–0.6)
MONOCYTES NFR BLD AUTO: 5.7 % — SIGNIFICANT CHANGE UP (ref 1.7–9.3)
MONOCYTES NFR BLD AUTO: 6.3 % — SIGNIFICANT CHANGE UP (ref 1.7–9.3)
MONOCYTES NFR BLD AUTO: 6.4 % — SIGNIFICANT CHANGE UP (ref 1.7–9.3)
NEUTROPHILS # BLD AUTO: 11.32 K/UL — HIGH (ref 1.4–6.5)
NEUTROPHILS # BLD AUTO: 12 K/UL — HIGH (ref 1.4–6.5)
NEUTROPHILS # BLD AUTO: 9.38 K/UL — HIGH (ref 1.4–6.5)
NEUTROPHILS NFR BLD AUTO: 88.4 % — HIGH (ref 42.2–75.2)
NEUTROPHILS NFR BLD AUTO: 88.7 % — HIGH (ref 42.2–75.2)
NEUTROPHILS NFR BLD AUTO: 89.5 % — HIGH (ref 42.2–75.2)
NRBC # BLD: 0 /100 WBCS — SIGNIFICANT CHANGE UP (ref 0–0)
PHOSPHATE SERPL-MCNC: 6.1 MG/DL — HIGH (ref 2.1–4.9)
PHOSPHATE SERPL-MCNC: 6.3 MG/DL — HIGH (ref 2.1–4.9)
PLATELET # BLD AUTO: 289 K/UL — SIGNIFICANT CHANGE UP (ref 130–400)
PLATELET # BLD AUTO: 355 K/UL — SIGNIFICANT CHANGE UP (ref 130–400)
PLATELET # BLD AUTO: 365 K/UL — SIGNIFICANT CHANGE UP (ref 130–400)
POTASSIUM SERPL-MCNC: 4 MMOL/L — SIGNIFICANT CHANGE UP (ref 3.5–5)
POTASSIUM SERPL-MCNC: 4.7 MMOL/L — SIGNIFICANT CHANGE UP (ref 3.5–5)
POTASSIUM SERPL-MCNC: 4.7 MMOL/L — SIGNIFICANT CHANGE UP (ref 3.5–5)
POTASSIUM SERPL-SCNC: 4 MMOL/L — SIGNIFICANT CHANGE UP (ref 3.5–5)
POTASSIUM SERPL-SCNC: 4.7 MMOL/L — SIGNIFICANT CHANGE UP (ref 3.5–5)
POTASSIUM SERPL-SCNC: 4.7 MMOL/L — SIGNIFICANT CHANGE UP (ref 3.5–5)
PROT SERPL-MCNC: 5.1 G/DL — LOW (ref 6–8)
PROT SERPL-MCNC: 5.9 G/DL — LOW (ref 6–8)
PROT SERPL-MCNC: 5.9 G/DL — LOW (ref 6–8)
RBC # BLD: 3.12 M/UL — LOW (ref 4.7–6.1)
RBC # BLD: 3.47 M/UL — LOW (ref 4.7–6.1)
RBC # BLD: 3.62 M/UL — LOW (ref 4.7–6.1)
RBC # FLD: 18.2 % — HIGH (ref 11.5–14.5)
RBC # FLD: 18.2 % — HIGH (ref 11.5–14.5)
RBC # FLD: 18.4 % — HIGH (ref 11.5–14.5)
SODIUM SERPL-SCNC: 136 MMOL/L — SIGNIFICANT CHANGE UP (ref 135–146)
SODIUM SERPL-SCNC: 137 MMOL/L — SIGNIFICANT CHANGE UP (ref 135–146)
SODIUM SERPL-SCNC: 137 MMOL/L — SIGNIFICANT CHANGE UP (ref 135–146)
SPECIMEN SOURCE: SIGNIFICANT CHANGE UP
TROPONIN T SERPL-MCNC: 0.88 NG/ML — CRITICAL HIGH
WBC # BLD: 10.48 K/UL — SIGNIFICANT CHANGE UP (ref 4.8–10.8)
WBC # BLD: 12.77 K/UL — HIGH (ref 4.8–10.8)
WBC # BLD: 13.57 K/UL — HIGH (ref 4.8–10.8)
WBC # FLD AUTO: 10.48 K/UL — SIGNIFICANT CHANGE UP (ref 4.8–10.8)
WBC # FLD AUTO: 12.77 K/UL — HIGH (ref 4.8–10.8)
WBC # FLD AUTO: 13.57 K/UL — HIGH (ref 4.8–10.8)

## 2020-08-22 PROCEDURE — 71045 X-RAY EXAM CHEST 1 VIEW: CPT | Mod: 26

## 2020-08-22 PROCEDURE — 99291 CRITICAL CARE FIRST HOUR: CPT

## 2020-08-22 RX ORDER — SODIUM CHLORIDE 9 MG/ML
1000 INJECTION INTRAMUSCULAR; INTRAVENOUS; SUBCUTANEOUS
Refills: 0 | Status: DISCONTINUED | OUTPATIENT
Start: 2020-08-22 | End: 2020-08-26

## 2020-08-22 RX ORDER — SODIUM CHLORIDE 9 MG/ML
1000 INJECTION, SOLUTION INTRAVENOUS
Refills: 0 | Status: DISCONTINUED | OUTPATIENT
Start: 2020-08-22 | End: 2020-08-22

## 2020-08-22 RX ORDER — LIDOCAINE 4 G/100G
1 CREAM TOPICAL EVERY 4 HOURS
Refills: 0 | Status: DISCONTINUED | OUTPATIENT
Start: 2020-08-22 | End: 2020-08-23

## 2020-08-22 RX ADMIN — CHLORHEXIDINE GLUCONATE 1 APPLICATION(S): 213 SOLUTION TOPICAL at 05:20

## 2020-08-22 RX ADMIN — Medication 100 MILLIGRAM(S): at 22:16

## 2020-08-22 RX ADMIN — MIDODRINE HYDROCHLORIDE 10 MILLIGRAM(S): 2.5 TABLET ORAL at 14:07

## 2020-08-22 RX ADMIN — Medication 6 UNIT(S): at 11:00

## 2020-08-22 RX ADMIN — Medication 6 UNIT(S): at 15:48

## 2020-08-22 RX ADMIN — MIDODRINE HYDROCHLORIDE 10 MILLIGRAM(S): 2.5 TABLET ORAL at 05:18

## 2020-08-22 RX ADMIN — Medication 100 MILLIGRAM(S): at 14:06

## 2020-08-22 RX ADMIN — HEPARIN SODIUM 5000 UNIT(S): 5000 INJECTION INTRAVENOUS; SUBCUTANEOUS at 17:58

## 2020-08-22 RX ADMIN — MIDODRINE HYDROCHLORIDE 10 MILLIGRAM(S): 2.5 TABLET ORAL at 22:16

## 2020-08-22 RX ADMIN — Medication 250 MILLIGRAM(S): at 11:56

## 2020-08-22 RX ADMIN — Medication 250 MILLIGRAM(S): at 05:18

## 2020-08-22 RX ADMIN — Medication 100 MILLIGRAM(S): at 05:19

## 2020-08-22 RX ADMIN — Medication 100 MILLIGRAM(S): at 14:07

## 2020-08-22 RX ADMIN — Medication 250 MILLIGRAM(S): at 23:31

## 2020-08-22 RX ADMIN — PANTOPRAZOLE SODIUM 40 MILLIGRAM(S): 20 TABLET, DELAYED RELEASE ORAL at 06:50

## 2020-08-22 RX ADMIN — Medication 250 MILLIGRAM(S): at 17:58

## 2020-08-22 RX ADMIN — Medication 250 MILLIGRAM(S): at 00:29

## 2020-08-22 RX ADMIN — Medication 81 MILLIGRAM(S): at 11:55

## 2020-08-22 RX ADMIN — CEFEPIME 100 MILLIGRAM(S): 1 INJECTION, POWDER, FOR SOLUTION INTRAMUSCULAR; INTRAVENOUS at 11:55

## 2020-08-22 RX ADMIN — HEPARIN SODIUM 5000 UNIT(S): 5000 INJECTION INTRAVENOUS; SUBCUTANEOUS at 05:18

## 2020-08-22 RX ADMIN — ATORVASTATIN CALCIUM 40 MILLIGRAM(S): 80 TABLET, FILM COATED ORAL at 22:16

## 2020-08-22 RX ADMIN — Medication 100 MILLIGRAM(S): at 05:18

## 2020-08-22 RX ADMIN — SODIUM ZIRCONIUM CYCLOSILICATE 10 GRAM(S): 10 POWDER, FOR SUSPENSION ORAL at 05:19

## 2020-08-22 RX ADMIN — Medication 4.25 MICROGRAM(S)/KG/MIN: at 07:00

## 2020-08-22 RX ADMIN — INSULIN GLARGINE 18 UNIT(S): 100 INJECTION, SOLUTION SUBCUTANEOUS at 22:17

## 2020-08-22 RX ADMIN — Medication 1: at 15:48

## 2020-08-22 NOTE — PROGRESS NOTE ADULT - SUBJECTIVE AND OBJECTIVE BOX
[place] NEPHROLOGY FOLLOW UP NOTE  --------------------------------------------------------------------------------  24 hour events/subjective: Patient examined in ICU. Appears comfortable.    PAST HISTORY  --------------------------------------------------------------------------------  No significant changes to PMH, PSH, FHx, SHx, unless otherwise noted    ALLERGIES & MEDICATIONS  --------------------------------------------------------------------------------  Allergies    No Known Allergies    Standing Inpatient Medications  aspirin enteric coated 81 milliGRAM(s) Oral daily  atorvastatin 40 milliGRAM(s) Oral at bedtime  cefepime   IVPB 1000 milliGRAM(s) IV Intermittent every 24 hours  chlorhexidine 4% Liquid 1 Application(s) Topical every 12 hours  dextrose 5%. 1000 milliLiter(s) IV Continuous <Continuous>  dextrose 50% Injectable 12.5 Gram(s) IV Push once  dextrose 50% Injectable 25 Gram(s) IV Push once  dextrose 50% Injectable 25 Gram(s) IV Push once  heparin   Injectable 5000 Unit(s) SubCutaneous every 12 hours  hydrocortisone sodium succinate Injectable 100 milliGRAM(s) IV Push every 8 hours  insulin glargine Injectable (LANTUS) 18 Unit(s) SubCutaneous at bedtime  insulin lispro (HumaLOG) corrective regimen sliding scale   SubCutaneous three times a day before meals  insulin lispro Injectable (HumaLOG) 6 Unit(s) SubCutaneous three times a day before meals  metroNIDAZOLE  IVPB      metroNIDAZOLE  IVPB 500 milliGRAM(s) IV Intermittent every 8 hours  midodrine 10 milliGRAM(s) Oral every 8 hours  norepinephrine Infusion 0.05 MICROgram(s)/kG/Min IV Continuous <Continuous>  pantoprazole    Tablet 40 milliGRAM(s) Oral before breakfast  sodium chloride 0.9%. 1000 milliLiter(s) IV Continuous <Continuous>  vancomycin    Solution 250 milliGRAM(s) Oral every 6 hours    PRN Inpatient Medications  dextrose 40% Gel 15 Gram(s) Oral once PRN  glucagon  Injectable 1 milliGRAM(s) IntraMuscular once PRN  senna 2 Tablet(s) Oral at bedtime PRN    VITALS/PHYSICAL EXAM  --------------------------------------------------------------------------------  T(C): 35.9 (08-22-20 @ 08:00), Max: 36.9 (08-21-20 @ 20:00)  HR: 66 (08-22-20 @ 14:00) (60 - 80)  BP: 112/55 (08-22-20 @ 14:00) (77/46 - 167/51)  RR: 24 (08-22-20 @ 14:00) (14 - 38)  SpO2: 96% (08-22-20 @ 14:00) (90% - 99%)  Height (cm): 180.34 (08-21-20 @ 11:49)  Weight (kg): 90.6 (08-21-20 @ 11:49)  BMI (kg/m2): 27.9 (08-21-20 @ 11:49)  BSA (m2): 2.11 (08-21-20 @ 11:49)    08-21-20 @ 07:01  -  08-22-20 @ 07:00  --------------------------------------------------------  IN: 2994.6 mL / OUT: 1890 mL / NET: 1104.6 mL    08-22-20 @ 07:01  -  08-22-20 @ 14:12  --------------------------------------------------------  IN: 612.9 mL / OUT: 575 mL / NET: 37.9 mL    Physical Exam:  	Gen: NAD  	Pulm: CTA B/L  	CV: RRR, S1S2  	Abd: +BS, soft, nontender/nondistended  	: No suprapubic tenderness  	LE: Warm, no edema  	Vascular access: Udal    LABS/STUDIES  --------------------------------------------------------------------------------              8.8    13.57 >-----------<  365      [08-22-20 @ 05:30]              28.0     137  |  93  |  59  ----------------------------<  122      [08-22-20 @ 05:30]  4.7   |  28  |  4.5        Ca     7.8     [08-22-20 @ 05:30]      Mg     3.8     [08-22-20 @ 05:30]      Phos  6.1     [08-22-20 @ 05:30]    TPro  5.9  /  Alb  3.3  /  TBili  <0.2  /  DBili  x   /  AST  65  /  ALT  36  /  AlkPhos  85  [08-22-20 @ 05:30]      Troponin 0.88      [08-21-20 @ 20:00]        [08-22-20 @ 05:30]        [08-21-20 @ 05:30]    Creatinine Trend:  SCr 4.5 [08-22 @ 05:30]  SCr 4.6 [08-21 @ 23:30]  SCr 3.6 [08-21 @ 16:54]  SCr 6.7 [08-21 @ 05:30]  SCr 6.4 [08-20 @ 20:05]    Urinalysis - [08-20-20 @ 18:00]      Color Yellow / Appearance Clear / SG 1.025 / pH 6.0      Gluc Negative / Ketone Negative  / Bili Negative / Urobili 0.2       Blood Negative / Protein 100 / Leuk Est Negative / Nitrite Negative      RBC  / WBC  / Hyaline  / Gran  / Sq Epi  / Non Sq Epi Few / Bacteria Few    TSH 2.05      [08-21-20 @ 05:30]    HBsAb Nonreact      [08-20-20 @ 15:10]  HBsAg Nonreact      [08-20-20 @ 15:10]  HBcAb Nonreact      [08-20-20 @ 15:10]

## 2020-08-22 NOTE — PROGRESS NOTE ADULT - SUBJECTIVE AND OBJECTIVE BOX
HPI: Patient is a 76 yo M with PMHx of ESRD last HD yesterday, gastric ulcers, HTN, DM, and gout a/w sepsis and C. diff. Pt seen and examined at bedside with Dr. Monterroso. Pt has no complaints at this time and states his urine is "fine".  Denies history of hematuria.    REVIEW OF SYSTEMS:  [ x ] A 10 Point Review of Systems was negative except where noted  [    ] Due to altered mental status/intubation, subjective information was not able to be obtained from the patient. History was obtained to the extent possible from review of the chart and collateral sources of information.     Allergies  No Known Allergies    MEDICATIONS  (STANDING):  aspirin enteric coated 81 milliGRAM(s) Oral daily  atorvastatin 40 milliGRAM(s) Oral at bedtime  cefepime   IVPB 1000 milliGRAM(s) IV Intermittent every 24 hours  chlorhexidine 4% Liquid 1 Application(s) Topical every 12 hours  dextrose 5%. 1000 milliLiter(s) (50 mL/Hr) IV Continuous <Continuous>  dextrose 50% Injectable 12.5 Gram(s) IV Push once  dextrose 50% Injectable 25 Gram(s) IV Push once  dextrose 50% Injectable 25 Gram(s) IV Push once  heparin   Injectable 5000 Unit(s) SubCutaneous every 12 hours  hydrocortisone sodium succinate Injectable 100 milliGRAM(s) IV Push every 8 hours  insulin glargine Injectable (LANTUS) 18 Unit(s) SubCutaneous at bedtime  insulin lispro (HumaLOG) corrective regimen sliding scale   SubCutaneous three times a day before meals  insulin lispro Injectable (HumaLOG) 6 Unit(s) SubCutaneous three times a day before meals  metroNIDAZOLE  IVPB      metroNIDAZOLE  IVPB 500 milliGRAM(s) IV Intermittent every 8 hours  midodrine 10 milliGRAM(s) Oral every 8 hours  norepinephrine Infusion 0.05 MICROgram(s)/kG/Min (4.25 mL/Hr) IV Continuous <Continuous>  pantoprazole    Tablet 40 milliGRAM(s) Oral before breakfast  sodium chloride 0.9%. 1000 milliLiter(s) (75 mL/Hr) IV Continuous <Continuous>  vancomycin    Solution 250 milliGRAM(s) Oral every 6 hours    MEDICATIONS  (PRN):  dextrose 40% Gel 15 Gram(s) Oral once PRN Blood Glucose LESS THAN 70 milliGRAM(s)/deciliter  glucagon  Injectable 1 milliGRAM(s) IntraMuscular once PRN Glucose LESS THAN 70 milligrams/deciliter  lidocaine 2% Gel 1 Application(s) Topical every 4 hours PRN pain  senna 2 Tablet(s) Oral at bedtime PRN Constipation    Vital Signs Last 24 Hrs  T(C): 35.6 (22 Aug 2020 16:00), Max: 36.9 (21 Aug 2020 20:00)  T(F): 96.1 (22 Aug 2020 16:00), Max: 98.5 (21 Aug 2020 20:00)  HR: 68 (22 Aug 2020 17:30) (58 - 80)  BP: 109/60 (22 Aug 2020 17:30) (77/46 - 154/59)  BP(mean): 75 (22 Aug 2020 17:30) (54 - 136)  RR: 25 (22 Aug 2020 17:30) (14 - 38)  SpO2: 97% (22 Aug 2020 17:30) (90% - 99%)    PHYSICAL EXAM:  Constitutional: NAD, well-developed, awake/alert  Back: No CVA tenderness B/L  Respiratory: No accessory respiratory muscle use  Abd: Soft, NT/ND, bladder non palpable  : +Right groin HMD catheter, +Vargas draining clear yellow urine. Meatus intact, no discharge. Testicles symmetrical, nontedner.   Neurological: A/O x 3    I&O's Summary    21 Aug 2020 07:01  -  22 Aug 2020 07:00  --------------------------------------------------------  IN: 2994.6 mL / OUT: 1890 mL / NET: 1104.6 mL    22 Aug 2020 07:01  -  22 Aug 2020 18:01  --------------------------------------------------------  IN: 788.9 mL / OUT: 780 mL / NET: 8.9 mL    LABS:             7.9    10.48 )-----------( 289      ( 22 Aug 2020 16:10 )             24.8     08-22    137  |  94<L>  |  61<HH>  ----------------------------<  157<H>  4.0   |  27  |  4.7<HH>    Ca    7.1<L>      22 Aug 2020 16:10  Phos  6.3     08-22  Mg     3.8     08-22    TPro  5.1<L>  /  Alb  3.0<L>  /  TBili  <0.2  /  DBili  x   /  AST  66<H>  /  ALT  36  /  AlkPhos  87  08-22    Culture - Urine (08.20.20 @ 18:00)    Specimen Source: .Urine Clean Catch (Midstream)    Culture Results:   No growth

## 2020-08-22 NOTE — PROGRESS NOTE ADULT - ASSESSMENT
1. Severe sepsis/lactic acidosis   2. Acute renal failure - ATN (nonoliguric), last HD yesterday  3. Hyperkalemia, resolved with HD  4. CKD III, baseline creatinine 1.3    Plan:    Hold HD over weekend and monitor for renal recovery  Wean pressor support  Continue midodrine  Taper steroids  Monitor I/Os  No NSAIDs  No IV contrast

## 2020-08-22 NOTE — PROGRESS NOTE ADULT - SUBJECTIVE AND OBJECTIVE BOX
MANDA TAVAREZ 75y Male  MRN#: [id number]   Hospital Day: 2d    SUBJECTIVE  Patient is a 75y old Male who presents with a chief complaint of Generalized weakness (21 Aug 2020 11:49)  Currently admitted to medicine with the primary diagnosis of KELL (acute kidney injury)    INTERVAL HPI AND OVERNIGHT EVENTS:  Patient was examined and seen at bedside. This morning he is resting comfortably in bed. No acute issues or overnight events.    REVIEW OF SYMPTOMS:  As per interval HPI    OBJECTIVE  PAST MEDICAL & SURGICAL HISTORY  Sciatica, unspecified laterality  Osteoarthritis of multiple joints, unspecified osteoarthritis type  High cholesterol  Hypertension, unspecified type  Type 2 diabetes mellitus without complication, unspecified long term insulin use status  Chronic gout of foot, unspecified cause, unspecified laterality  Herniated disc, cervical  Diverticulitis    ALLERGIES:  No Known Allergies    MEDICATIONS:  STANDING MEDICATIONS  aspirin enteric coated 81 milliGRAM(s) Oral daily  atorvastatin 40 milliGRAM(s) Oral at bedtime  cefepime   IVPB 1000 milliGRAM(s) IV Intermittent every 24 hours  chlorhexidine 4% Liquid 1 Application(s) Topical every 12 hours  dextrose 5%. 1000 milliLiter(s) IV Continuous <Continuous>  dextrose 50% Injectable 12.5 Gram(s) IV Push once  dextrose 50% Injectable 25 Gram(s) IV Push once  dextrose 50% Injectable 25 Gram(s) IV Push once  heparin   Injectable 5000 Unit(s) SubCutaneous every 12 hours  hydrocortisone sodium succinate Injectable 100 milliGRAM(s) IV Push every 8 hours  insulin glargine Injectable (LANTUS) 18 Unit(s) SubCutaneous at bedtime  insulin lispro (HumaLOG) corrective regimen sliding scale   SubCutaneous three times a day before meals  insulin lispro Injectable (HumaLOG) 6 Unit(s) SubCutaneous three times a day before meals  metroNIDAZOLE  IVPB      metroNIDAZOLE  IVPB 500 milliGRAM(s) IV Intermittent every 8 hours  midodrine 10 milliGRAM(s) Oral every 8 hours  norepinephrine Infusion 0.05 MICROgram(s)/kG/Min IV Continuous <Continuous>  pantoprazole    Tablet 40 milliGRAM(s) Oral before breakfast  sodium chloride 0.9%. 1000 milliLiter(s) IV Continuous <Continuous>  sodium zirconium cyclosilicate 10 Gram(s) Oral every 12 hours  vancomycin    Solution 250 milliGRAM(s) Oral every 6 hours    PRN MEDICATIONS  dextrose 40% Gel 15 Gram(s) Oral once PRN  glucagon  Injectable 1 milliGRAM(s) IntraMuscular once PRN  senna 2 Tablet(s) Oral at bedtime PRN      VITAL SIGNS: Last 24 Hours  T(C): 36.6 (22 Aug 2020 04:00), Max: 36.9 (21 Aug 2020 20:00)  T(F): 97.8 (22 Aug 2020 04:00), Max: 98.5 (21 Aug 2020 20:00)  HR: 70 (22 Aug 2020 05:00) (60 - 90)  BP: 119/56 (22 Aug 2020 05:00) (87/44 - 167/51)  BP(mean): 83 (22 Aug 2020 05:00) (55 - 136)  RR: 38 (22 Aug 2020 05:00) (14 - 38)  SpO2: 94% (22 Aug 2020 05:00) (90% - 100%)    LABS:                        8.8    13.57 )-----------( 365      ( 22 Aug 2020 05:30 )             28.0         136  |  93<L>  |  56<H>  ----------------------------<  132<H>  4.7   |  27  |  4.6<HH>    Ca    7.6<L>      21 Aug 2020 23:30  Phos  5.1       Mg     3.4         TPro  5.9<L>  /  Alb  3.3<L>  /  TBili  0.5  /  DBili  x   /  AST  67<H>  /  ALT  36  /  AlkPhos  83      PT/INR - ( 20 Aug 2020 10:02 )   PT: 15.00 sec;   INR: 1.30 ratio         PTT - ( 20 Aug 2020 10:02 )  PTT:39.7 sec  Urinalysis Basic - ( 20 Aug 2020 18:00 )    Color: Yellow / Appearance: Clear / S.025 / pH: x  Gluc: x / Ketone: Negative  / Bili: Negative / Urobili: 0.2   Blood: x / Protein: 100 / Nitrite: Negative   Leuk Esterase: Negative / RBC: x / WBC x   Sq Epi: x / Non Sq Epi: Few / Bacteria: Few        Lactate, Blood: 5.7 mmol/L <HH> (20 @ 23:30)  Troponin T, Serum: 0.88 ng/mL <HH> (20 @ 20:00)  Lactate, Blood: 4.5 mmol/L <HH> (20 @ 16:54)  Creatine Kinase, Serum: 775 U/L <H> (20 @ 16:54)  Troponin T, Serum: 0.84 ng/mL <HH> (20 @ 16:54)      Culture - Blood (collected 20 Aug 2020 10:02)  Source: .Blood Blood-Peripheral  Preliminary Report (21 Aug 2020 17:02):    No growth to date.    Culture - Blood (collected 20 Aug 2020 10:02)  Source: .Blood Blood-Peripheral  Preliminary Report (21 Aug 2020 17:01):    No growth to date.      CARDIAC MARKERS ( 21 Aug 2020 20:00 )  x     / 0.88 ng/mL / x     / x     / x      CARDIAC MARKERS ( 21 Aug 2020 16:54 )  x     / 0.84 ng/mL / 775 U/L / x     / x      CARDIAC MARKERS ( 21 Aug 2020 06:08 )  x     / 0.47 ng/mL / x     / x     / x      CARDIAC MARKERS ( 20 Aug 2020 20:05 )  x     / 0.13 ng/mL / x     / x     / x      CARDIAC MARKERS ( 20 Aug 2020 16:00 )  x     / 0.11 ng/mL / x     / x     / x          RADIOLOGY:      PHYSICAL EXAM:  CONSTITUTIONAL: No acute distress, well-developed, well-groomed, AAOx3  HEAD: Atraumatic, normocephalic  EYES: EOM intact, PERRLA, conjunctiva and sclera clear  ENT: Supple, no masses, no thyromegaly, no bruits, no JVD; moist mucous membranes  PULMONARY: Clear to auscultation bilaterally; no wheezes, rales, or rhonchi  CARDIOVASCULAR: Regular rate and rhythm; no murmurs, rubs, or gallops  GASTROINTESTINAL: Soft, non-tender, non-distended; bowel sounds present  MUSCULOSKELETAL: 2+ peripheral pulses; no clubbing, no cyanosis, no edema  NEUROLOGY: non-focal  SKIN: No rashes or lesions; warm and dry    ASSESSMENT & PLAN  #    PAST MEDICAL & SURGICAL HISTORY:  Sciatica, unspecified laterality  Osteoarthritis of multiple joints, unspecified osteoarthritis type  High cholesterol  Hypertension, unspecified type  Type 2 diabetes mellitus without complication, unspecified long term insulin use status  Chronic gout of foot, unspecified cause, unspecified laterality  Herniated disc, cervical  Diverticulitis      #Misc  - DVT Prophylaxis:  - Diet:  - GI Prophylaxis:  - Activity:  - IV Fluids:  - Code Status:    Dispo:

## 2020-08-22 NOTE — PROGRESS NOTE ADULT - ASSESSMENT
76 yo M w/ PMH of DM, retinopathy, nephrotic range proteinuria, bleeding gastric ulcer and subdural hematoma who was brought in from home by his spouse for lethargy s/p Amoxicillin po for odontogenic infection. Admitted to ICU for septic shock requiring levophed.     #Severe sepsis   - possible 2/2 c. diff since hx of abx and diarrhea at home  - on admission hypotensive (70's/30's) and hypothermic (30.8 C) on arrival with WBC 16.92   - lactate 20 on arrival --> 7.8   - CTH 8/20 negative   - CTAP 8/20 without evidence of colitis/bowel obstruction, renal hypodensity  - UA negative  - Retroperitoneal US right renal cyst  - s/p Unasyn 1500mg x 1, Vanc po 125mg q6h x 3  - c/w Cefepime 1g qd, Vanc po 250mg q6h, Flagyl 500mg q8h   - ID following   - s/p dialysis yesterday: BUN 56, Cr: 4.6  - lactate of 5.4   - f/u stool for c. dif  - f/u dental consult   - f/u AM CXR  - f/u BCx  - F/u urine culture   - f/u CPK, TSH      - on levophed, try weaning  - dc femoral TLC if off pressors (can place IJ line)   - c/w midodrine 10mg po q8h  - c/w solucortef for 3-7 days (day 2)    #Severe KELL  #Lactic acidosis   #Severe Hyperkalemia/ Hypermagnesemia/ Hyperphosphatemia  - likely prerenal/ATN (baseline creatinine 1.3), decreased po intake, r/o Metformin lactic acidosis  - hold home metformin    - s/p emergent dialysis on 8/21  - s/p insulin and D50 x2, calcium chloride x1 overnight??   - lactate 20 on arrival --> 7.8   - plan for HD today   - on D5 bicarb drip until HD   - f/u pH post dialysis, can then decide if to change IVF to NS @100   - post dialysis BMP shows elevated BUN, Cr, lactate   - Nephro following     #Normocytic anemia  - Hx of bleeding gastric ulcer, per wife in 1978 had transfusion and resolved with medication afterwards   - Hb 6.8 on 8/20  - s/p 2 u pRBC, repeat Hb 8.5  - monitor CBC   - keep active T+S   - f/u stool guaiac     #Hematuria  - started overnight   - Vargas in place  - f/u urology recs  - monitor CBC    #Troponemia  - no chest pain   - likely secondary to KELL  - EKG without acute changes   - f/u 2D echo  - ASA 81 mg po qd      #DM  - hold home metformin due to KELL/severe lactic acidosis   - fingersticks qac and qhs   - nn basal bolus insulin regimen  - f/u HbA1c    #History of HTN: holding home bumetanide    #DLD: continue home Lipitor 40mg qd    #Gout: holding home meds     DVT ppx: Lovenox  GI ppx: Protonix  Diet: Renal and CC- kosher  Dispo: MICU  FULL CODE

## 2020-08-22 NOTE — PROGRESS NOTE ADULT - SUBJECTIVE AND OBJECTIVE BOX
SUBJECTIVE:    Patient is a 75y old Male who presents with a chief complaint of Generalized weakness (22 Aug 2020 18:00)    Currently admitted to medicine with the primary diagnosis of KELL (acute kidney injury)     Today is hospital day 2d. This morning he is resting comfortably in bed and reports no new issues or overnight events.     PAST MEDICAL & SURGICAL HISTORY  Sciatica, unspecified laterality  Osteoarthritis of multiple joints, unspecified osteoarthritis type  High cholesterol  Hypertension, unspecified type  Type 2 diabetes mellitus without complication, unspecified long term insulin use status  Chronic gout of foot, unspecified cause, unspecified laterality  Herniated disc, cervical  Diverticulitis    SOCIAL HISTORY:  Negative for smoking/alcohol/drug use.     ALLERGIES:  No Known Allergies    MEDICATIONS:  STANDING MEDICATIONS  aspirin enteric coated 81 milliGRAM(s) Oral daily  atorvastatin 40 milliGRAM(s) Oral at bedtime  cefepime   IVPB 1000 milliGRAM(s) IV Intermittent every 24 hours  chlorhexidine 4% Liquid 1 Application(s) Topical every 12 hours  dextrose 5%. 1000 milliLiter(s) IV Continuous <Continuous>  dextrose 50% Injectable 12.5 Gram(s) IV Push once  dextrose 50% Injectable 25 Gram(s) IV Push once  dextrose 50% Injectable 25 Gram(s) IV Push once  heparin   Injectable 5000 Unit(s) SubCutaneous every 12 hours  hydrocortisone sodium succinate Injectable 100 milliGRAM(s) IV Push every 8 hours  insulin glargine Injectable (LANTUS) 18 Unit(s) SubCutaneous at bedtime  insulin lispro (HumaLOG) corrective regimen sliding scale   SubCutaneous three times a day before meals  insulin lispro Injectable (HumaLOG) 6 Unit(s) SubCutaneous three times a day before meals  metroNIDAZOLE  IVPB      metroNIDAZOLE  IVPB 500 milliGRAM(s) IV Intermittent every 8 hours  midodrine 10 milliGRAM(s) Oral every 8 hours  norepinephrine Infusion 0.05 MICROgram(s)/kG/Min IV Continuous <Continuous>  pantoprazole    Tablet 40 milliGRAM(s) Oral before breakfast  sodium chloride 0.9%. 1000 milliLiter(s) IV Continuous <Continuous>  vancomycin    Solution 250 milliGRAM(s) Oral every 6 hours    PRN MEDICATIONS  dextrose 40% Gel 15 Gram(s) Oral once PRN  glucagon  Injectable 1 milliGRAM(s) IntraMuscular once PRN  lidocaine 2% Gel 1 Application(s) Topical every 4 hours PRN  senna 2 Tablet(s) Oral at bedtime PRN    VITALS:   T(F): 96.1  HR: 64  BP: 99/63  RR: 23  SpO2: 98%    LABS:                        7.9    10.48 )-----------( 289      ( 22 Aug 2020 16:10 )             24.8     08-22    137  |  94<L>  |  61<HH>  ----------------------------<  157<H>  4.0   |  27  |  4.7<HH>    Ca    7.1<L>      22 Aug 2020 16:10  Phos  6.3     08-22  Mg     3.8     08-22    TPro  5.1<L>  /  Alb  3.0<L>  /  TBili  <0.2  /  DBili  x   /  AST  66<H>  /  ALT  36  /  AlkPhos  87  08-22          Lactate, Blood: 3.6 mmol/L <H> (08-22-20 @ 16:10)  Lactate, Blood: 4.9 mmol/L <HH> (08-22-20 @ 05:30)  Creatine Kinase, Serum: 846 U/L <H> (08-22-20 @ 05:30)  Lactate, Blood: 5.7 mmol/L <HH> (08-21-20 @ 23:30)  Troponin T, Serum: 0.88 ng/mL <HH> (08-21-20 @ 20:00)      Culture - Urine (collected 20 Aug 2020 18:00)  Source: .Urine Clean Catch (Midstream)  Final Report (22 Aug 2020 07:23):    No growth    Culture - Blood (collected 20 Aug 2020 10:02)  Source: .Blood Blood-Peripheral  Preliminary Report (21 Aug 2020 17:02):    No growth to date.    Culture - Blood (collected 20 Aug 2020 10:02)  Source: .Blood Blood-Peripheral  Preliminary Report (21 Aug 2020 17:01):    No growth to date.      CARDIAC MARKERS ( 22 Aug 2020 05:30 )  x     / x     / 846 U/L / x     / x      CARDIAC MARKERS ( 21 Aug 2020 20:00 )  x     / 0.88 ng/mL / x     / x     / x      CARDIAC MARKERS ( 21 Aug 2020 16:54 )  x     / 0.84 ng/mL / 775 U/L / x     / x      CARDIAC MARKERS ( 21 Aug 2020 06:08 )  x     / 0.47 ng/mL / x     / x     / x      CARDIAC MARKERS ( 20 Aug 2020 20:05 )  x     / 0.13 ng/mL / x     / x     / x          RADIOLOGY:  < from: Xray Chest 1 View- PORTABLE-Routine (08.22.20 @ 06:06) >  Bilateral opacities    < end of copied text >    PHYSICAL EXAM:  GEN: No acute distress  LUNGS: Clear to auscultation bilaterally   HEART: S1/S2 present. RRR.   ABD: Soft, non-tender, non-distended. Bowel sounds present  EXT: NC/NC/NE/2+PP/SUNSHINE  NEURO: AAOX3  Vascular access: Udal

## 2020-08-22 NOTE — PROGRESS NOTE ADULT - SUBJECTIVE AND OBJECTIVE BOX
Patient is a 75y old  Male who presents with a chief complaint of Generalized weakness (22 Aug 2020 06:10)        Over Night Events:  On Levophed 0.05.  SP HD yesterday.          ROS:     All ROS are negative except HPI         PHYSICAL EXAM    ICU Vital Signs Last 24 Hrs  T(C): 36.6 (22 Aug 2020 04:00), Max: 36.9 (21 Aug 2020 20:00)  T(F): 97.8 (22 Aug 2020 04:00), Max: 98.5 (21 Aug 2020 20:00)  HR: 62 (22 Aug 2020 07:00) (60 - 83)  BP: 120/58 (22 Aug 2020 07:00) (87/44 - 167/51)  BP(mean): 85 (22 Aug 2020 07:00) (55 - 136)  ABP: --  ABP(mean): --  RR: 16 (22 Aug 2020 07:00) (14 - 38)  SpO2: 97% (22 Aug 2020 07:00) (90% - 100%)      CONSTITUTIONAL:  Well nourished.  NAD    ENT:   Airway patent,   Mouth with normal mucosa.   No thrush    EYES:   Pupils equal,   Round and reactive to light.    CARDIAC:   Normal rate,   Regular rhythm.    No edema      Vascular:  Normal systolic impulse  No Carotid bruits    RESPIRATORY:   No wheezing  Bilateral BS  Normal chest expansion  Not tachypneic,  No use of accessory muscles    GASTROINTESTINAL:  Abdomen soft,   Non-tender,   No guarding,   + BS    MUSCULOSKELETAL:   Range of motion is not limited,  No clubbing, cyanosis    NEUROLOGICAL:   Alert and oriented   No motor  deficits.    SKIN:   Skin normal color for race,   Warm and dry   No evidence of rash.    PSYCHIATRIC:   Normal mood and affect.   No apparent risk to self or others.    HEMATOLOGICAL:  No cervical  lymphadenopathy.  no inguinal lymphadenopathy      20 @ 07:01  -  20 @ 07:00  --------------------------------------------------------  IN:    dextrose 5%: 1000 mL    IV PiggyBack: 350 mL    norepinephrine Infusion: 344.6 mL    sodium chloride 0.9%.: 1300 mL  Total IN: 2994.6 mL    OUT:    Indwelling Catheter - Urethral: 1815 mL  Total OUT: 1815 mL    Total NET: 1179.6 mL          LABS:                            8.8    13.57 )-----------( 365      ( 22 Aug 2020 05:30 )             28.0                                               08-    137  |  93<L>  |  59<H>  ----------------------------<  122<H>  4.7   |  28  |  4.5<HH>    Ca    7.8<L>      22 Aug 2020 05:30  Phos  6.1       Mg     3.8         TPro  5.9<L>  /  Alb  3.3<L>  /  TBili  <0.2  /  DBili  x   /  AST  65<H>  /  ALT  36  /  AlkPhos  85  22      PT/INR - ( 20 Aug 2020 10:02 )   PT: 15.00 sec;   INR: 1.30 ratio         PTT - ( 20 Aug 2020 10:02 )  PTT:39.7 sec                                       Urinalysis Basic - ( 20 Aug 2020 18:00 )    Color: Yellow / Appearance: Clear / S.025 / pH: x  Gluc: x / Ketone: Negative  / Bili: Negative / Urobili: 0.2   Blood: x / Protein: 100 / Nitrite: Negative   Leuk Esterase: Negative / RBC: x / WBC x   Sq Epi: x / Non Sq Epi: Few / Bacteria: Few        CARDIAC MARKERS ( 22 Aug 2020 05:30 )  x     / x     / 846 U/L / x     / x      CARDIAC MARKERS ( 21 Aug 2020 20:00 )  x     / 0.88 ng/mL / x     / x     / x      CARDIAC MARKERS ( 21 Aug 2020 16:54 )  x     / 0.84 ng/mL / 775 U/L / x     / x      CARDIAC MARKERS ( 21 Aug 2020 06:08 )  x     / 0.47 ng/mL / x     / x     / x      CARDIAC MARKERS ( 20 Aug 2020 20:05 )  x     / 0.13 ng/mL / x     / x     / x      CARDIAC MARKERS ( 20 Aug 2020 16:00 )  x     / 0.11 ng/mL / x     / x     / x                                                LIVER FUNCTIONS - ( 22 Aug 2020 05:30 )  Alb: 3.3 g/dL / Pro: 5.9 g/dL / ALK PHOS: 85 U/L / ALT: 36 U/L / AST: 65 U/L / GGT: x                                                  Culture - Urine (collected 20 Aug 2020 18:00)  Source: .Urine Clean Catch (Midstream)  Final Report (22 Aug 2020 07:23):    No growth    Culture - Blood (collected 20 Aug 2020 10:02)  Source: .Blood Blood-Peripheral  Preliminary Report (21 Aug 2020 17:02):    No growth to date.    Culture - Blood (collected 20 Aug 2020 10:02)  Source: .Blood Blood-Peripheral  Preliminary Report (21 Aug 2020 17:01):    No growth to date.                                                                                           MEDICATIONS  (STANDING):  aspirin enteric coated 81 milliGRAM(s) Oral daily  atorvastatin 40 milliGRAM(s) Oral at bedtime  cefepime   IVPB 1000 milliGRAM(s) IV Intermittent every 24 hours  chlorhexidine 4% Liquid 1 Application(s) Topical every 12 hours  dextrose 5%. 1000 milliLiter(s) (50 mL/Hr) IV Continuous <Continuous>  dextrose 50% Injectable 12.5 Gram(s) IV Push once  dextrose 50% Injectable 25 Gram(s) IV Push once  dextrose 50% Injectable 25 Gram(s) IV Push once  heparin   Injectable 5000 Unit(s) SubCutaneous every 12 hours  hydrocortisone sodium succinate Injectable 100 milliGRAM(s) IV Push every 8 hours  insulin glargine Injectable (LANTUS) 18 Unit(s) SubCutaneous at bedtime  insulin lispro (HumaLOG) corrective regimen sliding scale   SubCutaneous three times a day before meals  insulin lispro Injectable (HumaLOG) 6 Unit(s) SubCutaneous three times a day before meals  metroNIDAZOLE  IVPB      metroNIDAZOLE  IVPB 500 milliGRAM(s) IV Intermittent every 8 hours  midodrine 10 milliGRAM(s) Oral every 8 hours  norepinephrine Infusion 0.05 MICROgram(s)/kG/Min (4.25 mL/Hr) IV Continuous <Continuous>  pantoprazole    Tablet 40 milliGRAM(s) Oral before breakfast  sodium chloride 0.9%. 1000 milliLiter(s) (100 mL/Hr) IV Continuous <Continuous>  vancomycin    Solution 250 milliGRAM(s) Oral every 6 hours    MEDICATIONS  (PRN):  dextrose 40% Gel 15 Gram(s) Oral once PRN Blood Glucose LESS THAN 70 milliGRAM(s)/deciliter  glucagon  Injectable 1 milliGRAM(s) IntraMuscular once PRN Glucose LESS THAN 70 milligrams/deciliter  senna 2 Tablet(s) Oral at bedtime PRN Constipation      New X-rays reviewed:                                                                                  ECHO    CXR interpreted by me:  Small bilateral effusions

## 2020-08-22 NOTE — PROGRESS NOTE ADULT - ASSESSMENT
IMPRESSION:  Sepsis present on admission   septic shock present on admission RO C diff colitis  Metabolic Encephalopathy improved   KELL on CKD SP HD  HO HTN  NSTEMI     PLAN:    CNS: no sedation.     HEENT: oral care, aspiration precautions    PULMONARY: HOB >30. O2 as needed to maintain spo2>92%.      CARDIOVASCULAR:  wean off pressors.  Baby ASA.  NS 75 cc per hour     GI: GI prophylaxis. Feeding     RENAL:  FU     INFECTIOUS DISEASE: FU Cdiff and cultures.  Cefepime, Flagyl and Oral Vanc until C Diff     HEMATOLOGICAL:  DVT prophylaxis.    ENDOCRINE:  hold metformin. Follow up FS.  Insulin protocol if needed TSH    MUSCULOSKELETAL: bedrest    MICU monitoring    Full code per family    DC Femoral TLC if off pressors

## 2020-08-22 NOTE — PROGRESS NOTE ADULT - ASSESSMENT
Patient is a 76 yo M a/w sepsis and C. diff with new onset hematuria - resolved.    Plan  - Continue care as per medical/ICU team  - No further acute  intervention at this time  - Vargas to be discontinued as per medical team  - Recall urology prn  - Discussed with Dr. Monterroso

## 2020-08-22 NOTE — PROGRESS NOTE ADULT - ASSESSMENT
74 yo M w/ PMH of DM, retinopathy, nephrotic range proteinuria, bleeding gastric ulcer and subdural hematoma admitted to ICU for Septic shock.    # Septic shock secondary to C. Diff. Colitis  - C Diff (08/22) positive  - CTAP 8/20 without evidence of colitis/bowel obstruction  - BCx and UCx: no growth   - Lactic acidosis improving: 3.6 <-- 4.9 <-- 5.7  - plan to wean levophed --> d/c central line when off pressor   - c/w Cefepime, Vanc, Flagyl   - c/w midodrine 10mg po q8h  - c/w Solu-Cortef --> consider symone  - f/u ID recommendation     # KELL on CKD 3  - likely secondary to per-renal and ATN  - s/p emergent dialysis on 8/21  - Cr today 4.7 (baseline 1.3)  - c/w NS@75  - Nephro following     # Elevated Troponin  - likely NSTEMI type 2  - trop 0.88 <-- 0.84 < -- 0.44  - EKG: no ST changes noted  - Echo (08/21) EF 55-60%  - c/w ASA and atorvastatin     # Hematuria  - resolved  - H&H stable   - urology recommendation appreciated    # DM  - c/w sliding scale insulin  - monitor FS    # History of HTN  - bp on the lower side 99/63   - on low dose levophed  - hold bumetanide for now    # Full code 74 yo M w/ PMH of DM, retinopathy, nephrotic range proteinuria, bleeding gastric ulcer and subdural hematoma admitted to ICU for Septic shock.    # Septic shock secondary to C. Diff. Colitis  - clinically better, two loose stool since morning  - C Diff (08/22) positive  - CTAP 8/20 without evidence of colitis/bowel obstruction  - BCx and UCx: no growth   - Lactic acidosis improving: 3.6 <-- 4.9 <-- 5.7  - off levophed since 3pm  - c/w Cefepime, Vanc, Flagyl   - c/w midodrine 10mg po q8h  - c/w Solu-Cortef --> consider symone  - f/u ID recommendation     # KELL on CKD 3  - likely secondary to per-renal and ATN  - s/p emergent dialysis on 8/21  - Cr today 4.7 (baseline 1.3)  - c/w NS@75  - Nephro following     # Elevated Troponin  - likely NSTEMI type 2  - trop 0.88 <-- 0.84 < -- 0.44  - EKG: no ST changes noted  - Echo (08/21) EF 55-60%  - c/w ASA and atorvastatin     # Hematuria  - resolved  - H&H stable   - urology recommendation appreciated    # DM  - c/w sliding scale insulin  - monitor FS    # History of HTN  - bp on the lower side 99/63   - on low dose levophed  - hold bumetanide for now    # Full code

## 2020-08-23 LAB
ALBUMIN SERPL ELPH-MCNC: 3.1 G/DL — LOW (ref 3.5–5.2)
ALP SERPL-CCNC: 96 U/L — SIGNIFICANT CHANGE UP (ref 30–115)
ALT FLD-CCNC: 36 U/L — SIGNIFICANT CHANGE UP (ref 0–41)
ANION GAP SERPL CALC-SCNC: 13 MMOL/L — SIGNIFICANT CHANGE UP (ref 7–14)
AST SERPL-CCNC: 62 U/L — HIGH (ref 0–41)
BASOPHILS # BLD AUTO: 0 K/UL — SIGNIFICANT CHANGE UP (ref 0–0.2)
BASOPHILS NFR BLD AUTO: 0 % — SIGNIFICANT CHANGE UP (ref 0–1)
BILIRUB SERPL-MCNC: 0.5 MG/DL — SIGNIFICANT CHANGE UP (ref 0.2–1.2)
BUN SERPL-MCNC: 67 MG/DL — CRITICAL HIGH (ref 10–20)
CALCIUM SERPL-MCNC: 7.3 MG/DL — LOW (ref 8.5–10.1)
CHLORIDE SERPL-SCNC: 96 MMOL/L — LOW (ref 98–110)
CO2 SERPL-SCNC: 29 MMOL/L — SIGNIFICANT CHANGE UP (ref 17–32)
CREAT SERPL-MCNC: 4.7 MG/DL — CRITICAL HIGH (ref 0.7–1.5)
EOSINOPHIL # BLD AUTO: 0 K/UL — SIGNIFICANT CHANGE UP (ref 0–0.7)
EOSINOPHIL NFR BLD AUTO: 0 % — SIGNIFICANT CHANGE UP (ref 0–8)
GLUCOSE BLDC GLUCOMTR-MCNC: 137 MG/DL — HIGH (ref 70–99)
GLUCOSE BLDC GLUCOMTR-MCNC: 165 MG/DL — HIGH (ref 70–99)
GLUCOSE BLDC GLUCOMTR-MCNC: 184 MG/DL — HIGH (ref 70–99)
GLUCOSE BLDC GLUCOMTR-MCNC: 190 MG/DL — HIGH (ref 70–99)
GLUCOSE SERPL-MCNC: 119 MG/DL — HIGH (ref 70–99)
HCT VFR BLD CALC: 26.3 % — LOW (ref 42–52)
HGB BLD-MCNC: 8.2 G/DL — LOW (ref 14–18)
IMM GRANULOCYTES NFR BLD AUTO: 0.3 % — SIGNIFICANT CHANGE UP (ref 0.1–0.3)
LACTATE SERPL-SCNC: 2.1 MMOL/L — HIGH (ref 0.7–2)
LYMPHOCYTES # BLD AUTO: 0.48 K/UL — LOW (ref 1.2–3.4)
LYMPHOCYTES # BLD AUTO: 4.6 % — LOW (ref 20.5–51.1)
MAGNESIUM SERPL-MCNC: 3.6 MG/DL — CRITICAL HIGH (ref 1.8–2.4)
MCHC RBC-ENTMCNC: 24.9 PG — LOW (ref 27–31)
MCHC RBC-ENTMCNC: 31.2 G/DL — LOW (ref 32–37)
MCV RBC AUTO: 79.9 FL — LOW (ref 80–94)
MONOCYTES # BLD AUTO: 0.84 K/UL — HIGH (ref 0.1–0.6)
MONOCYTES NFR BLD AUTO: 8 % — SIGNIFICANT CHANGE UP (ref 1.7–9.3)
NEUTROPHILS # BLD AUTO: 9.18 K/UL — HIGH (ref 1.4–6.5)
NEUTROPHILS NFR BLD AUTO: 87.1 % — HIGH (ref 42.2–75.2)
NRBC # BLD: 0 /100 WBCS — SIGNIFICANT CHANGE UP (ref 0–0)
PHOSPHATE SERPL-MCNC: 6.1 MG/DL — HIGH (ref 2.1–4.9)
PLATELET # BLD AUTO: 281 K/UL — SIGNIFICANT CHANGE UP (ref 130–400)
POTASSIUM SERPL-MCNC: 3.8 MMOL/L — SIGNIFICANT CHANGE UP (ref 3.5–5)
POTASSIUM SERPL-SCNC: 3.8 MMOL/L — SIGNIFICANT CHANGE UP (ref 3.5–5)
PROT SERPL-MCNC: 5.4 G/DL — LOW (ref 6–8)
RBC # BLD: 3.29 M/UL — LOW (ref 4.7–6.1)
RBC # FLD: 18.6 % — HIGH (ref 11.5–14.5)
SODIUM SERPL-SCNC: 138 MMOL/L — SIGNIFICANT CHANGE UP (ref 135–146)
TROPONIN T SERPL-MCNC: 0.46 NG/ML — CRITICAL HIGH
WBC # BLD: 10.53 K/UL — SIGNIFICANT CHANGE UP (ref 4.8–10.8)
WBC # FLD AUTO: 10.53 K/UL — SIGNIFICANT CHANGE UP (ref 4.8–10.8)

## 2020-08-23 PROCEDURE — 71045 X-RAY EXAM CHEST 1 VIEW: CPT | Mod: 26

## 2020-08-23 RX ORDER — LIDOCAINE 4 G/100G
15 CREAM TOPICAL EVERY 4 HOURS
Refills: 0 | Status: DISCONTINUED | OUTPATIENT
Start: 2020-08-23 | End: 2020-08-26

## 2020-08-23 RX ORDER — OXYCODONE AND ACETAMINOPHEN 5; 325 MG/1; MG/1
1 TABLET ORAL EVERY 6 HOURS
Refills: 0 | Status: DISCONTINUED | OUTPATIENT
Start: 2020-08-23 | End: 2020-08-26

## 2020-08-23 RX ADMIN — Medication 100 MILLIGRAM(S): at 05:18

## 2020-08-23 RX ADMIN — Medication 250 MILLIGRAM(S): at 17:03

## 2020-08-23 RX ADMIN — Medication 250 MILLIGRAM(S): at 23:05

## 2020-08-23 RX ADMIN — Medication 6 UNIT(S): at 12:13

## 2020-08-23 RX ADMIN — Medication 100 MILLIGRAM(S): at 21:15

## 2020-08-23 RX ADMIN — Medication 250 MILLIGRAM(S): at 11:55

## 2020-08-23 RX ADMIN — Medication 100 MILLIGRAM(S): at 13:19

## 2020-08-23 RX ADMIN — Medication 1: at 12:13

## 2020-08-23 RX ADMIN — Medication 250 MILLIGRAM(S): at 05:18

## 2020-08-23 RX ADMIN — MIDODRINE HYDROCHLORIDE 10 MILLIGRAM(S): 2.5 TABLET ORAL at 21:15

## 2020-08-23 RX ADMIN — HEPARIN SODIUM 5000 UNIT(S): 5000 INJECTION INTRAVENOUS; SUBCUTANEOUS at 17:03

## 2020-08-23 RX ADMIN — MIDODRINE HYDROCHLORIDE 10 MILLIGRAM(S): 2.5 TABLET ORAL at 13:19

## 2020-08-23 RX ADMIN — Medication 81 MILLIGRAM(S): at 11:55

## 2020-08-23 RX ADMIN — HEPARIN SODIUM 5000 UNIT(S): 5000 INJECTION INTRAVENOUS; SUBCUTANEOUS at 05:18

## 2020-08-23 RX ADMIN — CHLORHEXIDINE GLUCONATE 1 APPLICATION(S): 213 SOLUTION TOPICAL at 05:18

## 2020-08-23 RX ADMIN — INSULIN GLARGINE 18 UNIT(S): 100 INJECTION, SOLUTION SUBCUTANEOUS at 23:05

## 2020-08-23 RX ADMIN — Medication 1: at 09:33

## 2020-08-23 RX ADMIN — ATORVASTATIN CALCIUM 40 MILLIGRAM(S): 80 TABLET, FILM COATED ORAL at 21:15

## 2020-08-23 RX ADMIN — SODIUM CHLORIDE 75 MILLILITER(S): 9 INJECTION INTRAMUSCULAR; INTRAVENOUS; SUBCUTANEOUS at 05:17

## 2020-08-23 RX ADMIN — MIDODRINE HYDROCHLORIDE 10 MILLIGRAM(S): 2.5 TABLET ORAL at 05:18

## 2020-08-23 RX ADMIN — Medication 6 UNIT(S): at 17:00

## 2020-08-23 RX ADMIN — PANTOPRAZOLE SODIUM 40 MILLIGRAM(S): 20 TABLET, DELAYED RELEASE ORAL at 05:18

## 2020-08-23 NOTE — PROGRESS NOTE ADULT - ASSESSMENT
74 yo M w/ PMH of CKD (unknown stage) and follows Dr. Buchanan, gastric ulcers, HTN, DM, and gout on allopurinol brought in from home by spouse for lethargy. Admitted to ICU for septic shock requiring levophed.     IMPRESSION;  #Septic shock ( HR 60/m, BP 88/43 on pressors with severe lactic acidemia 0f 19, WBC 18.9  ) secondary to CD colitis   ( pt was on po Amoxicillin for dental infection one week PTA )  MSOF with bradycardia, metabolic encephalopathy ( which has resolved ) ARF ( s/p [place] with HD )  No evidence of HUS/TTP  CT with no colitis ( several key indicators of poor prognosis : age, hypotension , septic shock on admission, lactic acidemia )  Probably has underlying mesenteric ischemia exacerbating the lactic acidemia  No pyuria  #Dental infection : still symptomatic  Lactic acid improved: 22 to 2.1    8/20 B/C x2 negative    On metroNIDAZOLE  IVPB 500 milliGRAM(s) IV Intermittent every 8 hours  vancomycin    Solution 250 milliGRAM(s) Oral every 6 hours    RECOMMENDATIONS;  To be transferred to floor or tele  Continue PO Vanco & IV Flagyl

## 2020-08-23 NOTE — CHART NOTE - NSCHARTNOTEFT_GEN_A_CORE
MICU Transfer Note    Transfer from: MICU  Transfer to:  (  ) Medicine    (x) Telemetry    (  ) RCU    (  ) Palliative    (  ) Stroke Unit    (  ) _______________    HPI:  74 yo M w/ PMH of CKD (unknown stage) and follows Dr. Buchanan, gastric ulcers, HTN, DM, and gout on allopurinol brought in from home by spouse for lethargy. History obtained from spouse over phone who reported that pt had dental procedure with root canal done about 1 week and was complicated by a dental infection. Pt was placed on amoxicillin and took it for 1 week. Pt also complained about severe dental pain limiting his oral intake of food or fluids. Over the past 5 days, pt also had 3-4 episodes of diarrhea per day associated with nonbloody nonbilious vomiting. The spouse found the pt today to be very lethargic and weak and brought him in to ED for further eval. Otherwise, pt denies fever, chill, SOB, CP, dysuria, flank pain, sick contacts or recent travel.    Vital Signs Last 24 Hrs  T(C): 32 (20 Aug 2020 12:45), Max: 37 (20 Aug 2020 09:45)  T(F): 89.6 (20 Aug 2020 12:45), Max: 98.6 (20 Aug 2020 09:45)  HR: 60 (20 Aug 2020 12:45) (48 - 60)  BP: 88/43 (20 Aug 2020 12:45) (63/31 - 91/48)  BP(mean): 61 (20 Aug 2020 12:45) (38 - 68)  RR: 20 (20 Aug 2020 12:45) (20 - 24)  SpO2: 100% (20 Aug 2020 12:45) (96% - 100%)    In the ED, pt was found to be bradycardic, hypothermic and hypotensive with lactate of 20 and Ph 6.8 and K of 8. [place] placed and pt sent for urgent HD and CT abdomen. Pt also given hydrocortisone and IV levothyroxine for suspected myedema coma. Pt is admitted to ICU for suspected septic shock requiring levophed. (20 Aug 2020 14:08)      MICU COURSE:  Patient was started on IV for suspected c. difficile, which was later confirmed positive. Was found to have hyperkalemia, hyperphosphatemia, hypermagnesemia. S/p 2 HD sessions on 8/20 and 8/21. Placed on Levophed and Midodrine for septic shock. Lactate trended down and electrolytes post HD on 8/21. Weaned off Levophed. Troponins trended up during ICU stay, EKG was normal, patient without chest pain cardio was consulted.         ASSESSMENT & PLAN:   74 yo M w/ PMH of DM, retinopathy, nephrotic range proteinuria, bleeding gastric ulcer and subdural hematoma who was brought in from home by his spouse for lethargy s/p Amoxicillin po for odontogenic infection. Admitted to ICU for septic shock requiring levophed.     #Severe sepsis   - Secondary to c. diff colitis, ptnt on Amoxicillin for odontogenic infection   - hypotensive (70's/30's) and hypothermic (30.8 C) on arrival with WBC 16.92   - lactate 20 on arrival --> 7.8 --> 2.1 today   - CTH 8/20 negative   - CTAP 8/20 without evidence of colitis/bowel obstruction, renal hypodensity  - Retroperitoneal US right renal cyst  - BCx, UCx NGTD  - s/p Unasyn 1500mg x 1, Vanc po 125mg q6h x 3  - D/c Cefepime 1g qd  - C/w Vanc po 250mg q6h, Flagyl 500mg IV q8h   - ID following   - C. diff positive   - f/u dental consult, percocet pain management   - off levophed   - c/w midodrine 10mg po q8h  - d/c steroids     #Severe KELL  #Lactic acidosis   #Severe Hyperkalemia/ Hypermagnesemia/ Hyperphosphatemia  - likely prerenal/ATN (baseline creatinine 1.3), decreased po intake, r/o Metformin lactic acidosis  - hold home metformin    - s/p two sessions HD   - Nephro following     #Normocytic anemia  - Hx of bleeding gastric ulcer, per wife in 1978 had transfusion and resolved with medication afterwards   - Hb 6.8 on 8/20  - s/p 2 u pRBC, repeat Hb 8.5  - monitor CBC   - f/u stool guaiac     #Hematuria- resolved   - started overnight on 8/20  - Varg[place] in place  - urology noted  - monitor CBC    #Troponemia  - no chest pain   - EKG without acute changes   - Echo: EF 55-60%  - ASA 81 mg po qd  - F/u cardiology     #DM  - hold home metformin due to KELL/severe lactic acidosis   - fingersticks qac and qhs   - basal bolus insulin regimen  - HbA1c: 6    #History of HTN: holding home bumetanide    #DLD: continue home Lipitor 40mg qd    #Gout: holding home meds     DVT ppx: Lovenox  GI ppx: Protonix  Diet: Renal and CC- kosher  Dispo: MICU  Activity: OOB to chair, PT/OT  FULL CODE        For Follow-Up:  Continue oral Vanc and IV Flagyl   Continue Midodrine   Monitor electrolytes   F/u cardiology  F/u nephrology   F/u dental         Vital Signs Last 24 Hrs  T(C): 35.1 (23 Aug 2020 12:00), Max: 35.7 (22 Aug 2020 20:00)  T(F): 95.1 (23 Aug 2020 12:00), Max: 96.2 (22 Aug 2020 20:00)  HR: 70 (23 Aug 2020 18:00) (48 - 70)  BP: 100/42 (23 Aug 2020 18:00) (85/46 - 128/67)  BP(mean): 64 (23 Aug 2020 18:00) (58 - 104)  RR: 21 (23 Aug 2020 18:00) (14 - 28)  SpO2: 95% (23 Aug 2020 18:00) (84% - 100%)  I&O's Summary    22 Aug 2020 07:01  -  23 Aug 2020 07:00  --------------------------------------------------------  IN: 2188.9 mL / OUT: 1745 mL / NET: 443.9 mL    23 Aug 2020 07:01  -  23 Aug 2020 19:11  --------------------------------------------------------  IN: 1000 mL / OUT: 435 mL / NET: 565 mL          MEDICATIONS  (STANDING):  aspirin enteric coated 81 milliGRAM(s) Oral daily  atorvastatin 40 milliGRAM(s) Oral at bedtime  dextrose 5%. 1000 milliLiter(s) (50 mL/Hr) IV Continuous <Continuous>  dextrose 50% Injectable 12.5 Gram(s) IV Push once  dextrose 50% Injectable 25 Gram(s) IV Push once  dextrose 50% Injectable 25 Gram(s) IV Push once  heparin   Injectable 5000 Unit(s) SubCutaneous every 12 hours  insulin glargine Injectable (LANTUS) 18 Unit(s) SubCutaneous at bedtime  insulin lispro (HumaLOG) corrective regimen sliding scale   SubCutaneous three times a day before meals  insulin lispro Injectable (HumaLOG) 6 Unit(s) SubCutaneous three times a day before meals  metroNIDAZOLE  IVPB      metroNIDAZOLE  IVPB 500 milliGRAM(s) IV Intermittent every 8 hours  midodrine 10 milliGRAM(s) Oral every 8 hours  pantoprazole    Tablet 40 milliGRAM(s) Oral before breakfast  sodium chloride 0.9%. 1000 milliLiter(s) (75 mL/Hr) IV Continuous <Continuous>  vancomycin    Solution 250 milliGRAM(s) Oral every 6 hours    MEDICATIONS  (PRN):  dextrose 40% Gel 15 Gram(s) Oral once PRN Blood Glucose LESS THAN 70 milliGRAM(s)/deciliter  glucagon  Injectable 1 milliGRAM(s) IntraMuscular once PRN Glucose LESS THAN 70 milligrams/deciliter  lidocaine 2% Viscous 15 milliLiter(s) Swish and Spit every 4 hours PRN tooth pain  oxycodone    5 mG/acetaminophen 325 mG 1 Tablet(s) Oral every 6 hours PRN Severe Pain (7 - 10)  senna 2 Tablet(s) Oral at bedtime PRN Constipation        LABS                                            8.2                   Neurophils% (auto):   87.1   (08-23 @ 04:30):    10.53)-----------(281          Lymphocytes% (auto):  4.6                                           26.3                   Eosinphils% (auto):   0.0      Manual%: Neutrophils x    ; Lymphocytes x    ; Eosinophils x    ; Bands%: x    ; Blasts x                                    138    |  96     |  67                  Calcium: 7.3   / iCa: x      (08-23 @ 04:30)    ----------------------------<  119       Magnesium: 3.6                              3.8     |  29     |  4.7              Phosphorous: 6.1      TPro  5.4    /  Alb  3.1    /  TBili  0.5    /  DBili  x      /  AST  62     /  ALT  36     /  AlkPhos  96     23 Aug 2020 04:30

## 2020-08-23 NOTE — PHYSICAL THERAPY INITIAL EVALUATION ADULT - GENERAL OBSERVATIONS, REHAB EVAL
Pt encountered semi-garner in bed with +tele, +IV, +sequentials, +BP, +pulse ox in NAD with wife present at bedside. Pt left same as found with +tele, +IV, +sequentials, +BP, +pulse ox in NAD with wife present at bedside. JOSUE Godwin made aware.

## 2020-08-23 NOTE — PHYSICAL THERAPY INITIAL EVALUATION ADULT - PATIENT/FAMILY/SIGNIFICANT OTHER GOALS STATEMENT, PT EVAL
Pt lives in pvt home with 4 KEILY, no HR. and 6 stairs up to bedroom with b/l HR and 5 stairs to basement without HR.

## 2020-08-23 NOTE — OCCUPATIONAL THERAPY INITIAL EVALUATION ADULT - SPECIFY REASON(S)
attempted to see pt for OT evaluation, pt with +L femoral A line and R femoral UDall, hold until b/l fem lines d/c'd

## 2020-08-23 NOTE — CONSULT NOTE ADULT - SUBJECTIVE AND OBJECTIVE BOX
HPI:  74 yo M w/ PMH of CKD (baseline 1.2), gastric ulcers, HTN, DM, and gout on allopurinol brought in from home by spouse for lethargy.    1 week ptp, pt had root canal, c/b post procedural dental infection and received amox.  Developed diarrhea, and was brought to ed for lethargy.      In ed, found to be bradycardic, hypothermic, with severe lactic acidosis / hagma (initial bicarb 3, pH 6.8), acute renal failure with hyperK (scr 8.9) requiring urgent HD, septic shock with wbc 16 requiring pressors and stress dose steroids.  Was treated for C. Diff colitis (improved diarrhea, still present), with improvement in labwork (scr 4.7, bicarb nml, wbc 10.      Hosp course c/b nstemi (rise in trop .1 --> peak .88 --> .46 8/23).  Pt denied any cp /palpitations. Denied any exertional symptoms and prior cardiac history.  EKG showing ischemi chagnes lateral leads:  twi I, avl, minor st dep v5-6  (new from 2016).  Echo nml lvef and mild AS.       PAST MEDICAL & SURGICAL HISTORY  Sciatica, unspecified laterality  Osteoarthritis of multiple joints, unspecified osteoarthritis type  High cholesterol  Hypertension, unspecified type  Type 2 diabetes mellitus without complication, unspecified long term insulin use status  Chronic gout of foot, unspecified cause, unspecified laterality  Herniated disc, cervical  Diverticulitis    ALLERGIES:  No Known Allergies      MEDICATIONS:  aspirin enteric coated 81 milliGRAM(s) Oral daily  atorvastatin 40 milliGRAM(s) Oral at bedtime  dextrose 40% Gel 15 Gram(s) Oral once PRN  dextrose 5%. 1000 milliLiter(s) (50 mL/Hr) IV Continuous <Continuous>  dextrose 50% Injectable 12.5 Gram(s) IV Push once  dextrose 50% Injectable 25 Gram(s) IV Push once  dextrose 50% Injectable 25 Gram(s) IV Push once  glucagon  Injectable 1 milliGRAM(s) IntraMuscular once PRN  heparin   Injectable 5000 Unit(s) SubCutaneous every 12 hours  insulin glargine Injectable (LANTUS) 18 Unit(s) SubCutaneous at bedtime  insulin lispro (HumaLOG) corrective regimen sliding scale   SubCutaneous three times a day before meals  insulin lispro Injectable (HumaLOG) 6 Unit(s) SubCutaneous three times a day before meals  lidocaine 2% Viscous 15 milliLiter(s) Swish and Spit every 4 hours PRN  metroNIDAZOLE  IVPB      metroNIDAZOLE  IVPB 500 milliGRAM(s) IV Intermittent every 8 hours  midodrine 10 milliGRAM(s) Oral every 8 hours  oxycodone    5 mG/acetaminophen 325 mG 1 Tablet(s) Oral every 6 hours PRN  pantoprazole    Tablet 40 milliGRAM(s) Oral before breakfast  senna 2 Tablet(s) Oral at bedtime PRN  sodium chloride 0.9%. 1000 milliLiter(s) (75 mL/Hr) IV Continuous <Continuous>  vancomycin    Solution 250 milliGRAM(s) Oral every 6 hours      HOME MEDICATIONS:  Home Medications:  allopurinol 300 mg oral tablet: 1 tab(s) orally once a day (20 Aug 2020 14:28)  atorvastatin 40 mg oral tablet: 1 tab(s) orally once a day (20 Aug 2020 14:28)  bumetanide 1 mg oral tablet: 1 tab(s) orally once a day (20 Aug 2020 14:28)  metFORMIN 1000 mg oral tablet: 1 tab(s) orally 2 times a day (20 Aug 2020 14:28)  Omega-3 oral capsule: 1 tab(s) orally once a day (20 Aug 2020 14:28)  quinapril 40 mg oral tablet: 1 tab(s) orally once a day (20 Aug 2020 14:28)      VITALS:   T(F): 95.1 (08-23 @ 12:00), Max: 98.5 (08-21 @ 20:00)  HR: 70 (08-23 @ 18:00) (48 - 98)  BP: 100/42 (08-23 @ 18:00) (54/45 - 167/51)  BP(mean): 64 (08-23 @ 18:00) (43 - 136)  RR: 21 (08-23 @ 18:00) (14 - 38)  SpO2: 95% (08-23 @ 18:00) (84% - 100%)    I&O's Summary    22 Aug 2020 07:01  -  23 Aug 2020 07:00  --------------------------------------------------------  IN: 2188.9 mL / OUT: 1745 mL / NET: 443.9 mL    23 Aug 2020 07:01  -  23 Aug 2020 20:18  --------------------------------------------------------  IN: 1000 mL / OUT: 435 mL / NET: 565 mL        REVIEW OF SYSTEMS:  CONSTITUTIONAL: No weakness, fevers or chills  HEENT: No visual changes, neck/ear pain  RESPIRATORY: No cough, sob  CARDIOVASCULAR: No chest pain or palpitations  GASTROINTESTINAL: No abdominal pain. +diarrhea  GENITOURINARY: No dysuria, frequency or hematuria  NEUROLOGICAL: No new focal deficits  SKIN: No new rashes    PHYSICAL EXAM:  General: Not in distress.  Non-toxic appearing.   HEENT: EOMI  Cardio: Regular rate and rhythm, S1, S2, no murmur  Pulm: B/L BS.  No wheezing / crackles  Abdomen: Soft, non-tender, non-distended. Normoactive bowel sounds  Extremities: No edema b/l   Neuro: A&O x3. No focal deficits    LABS:                        8.2    10.53 )-----------( 281      ( 23 Aug 2020 04:30 )             26.3     08-23    138  |  96<L>  |  67<HH>  ----------------------------<  119<H>  3.8   |  29  |  4.7<HH>    Ca    7.3<L>      23 Aug 2020 04:30  Phos  6.1     08-23  Mg     3.6     08-23    TPro  5.4<L>  /  Alb  3.1<L>  /  TBili  0.5  /  DBili  x   /  AST  62<H>  /  ALT  36  /  AlkPhos  96  08-23      Lactate, Blood: 2.1 mmol/L <H> (08-23-20 @ 04:30)  Troponin T, Serum: 0.46 ng/mL <HH> (08-23-20 @ 04:30)    CARDIAC MARKERS ( 23 Aug 2020 04:30 )  x     / 0.46 ng/mL / x     / x     / x      CARDIAC MARKERS ( 22 Aug 2020 05:30 )  x     / x     / 846 U/L / x     / x            Troponin trend:            RADIOLOGY:  -CXR:  -TTE:  -CCTA:  -STRESS TEST:  -CATHETERIZATION:    ECG:      TELEMETRY EVENTS: HPI:  76 yo M w/ PMH of CKD (baseline 1.2), gastric ulcers, HTN, DM, and gout on allopurinol brought in from home by spouse for lethargy.    1 week ptp, pt had root canal, c/b post procedural dental infection and received amox.  Developed diarrhea, and was brought to ed for lethargy.      In ed, found to be bradycardic, hypothermic, with severe lactic acidosis / hagma (initial bicarb 3, pH 6.8), acute renal failure with hyperK (scr 8.9) requiring urgent HD, septic shock with wbc 16 requiring pressors and stress dose steroids.  Was treated for C. Diff colitis (improved diarrhea, still present), with improvement in labwork (scr 4.7, bicarb nml, wbc 10.      Hosp course c/b nstemi (rise in trop .1 --> peak .88 --> .46 8/23).  Pt denied any cp /palpitations. Denied any exertional symptoms and prior cardiac history.  EKG showing ischemi chagnes lateral leads:  twi I, avl, minor st dep v5-6  (new from 2016).  Echo nml lvef and mild AS.       PAST MEDICAL & SURGICAL HISTORY  Sciatica, unspecified laterality  Osteoarthritis of multiple joints, unspecified osteoarthritis type  High cholesterol  Hypertension, unspecified type  Type 2 diabetes mellitus without complication, unspecified long term insulin use status  Chronic gout of foot, unspecified cause, unspecified laterality  Herniated disc, cervical  Diverticulitis    ALLERGIES:  No Known Allergies      MEDICATIONS:  aspirin enteric coated 81 milliGRAM(s) Oral daily  atorvastatin 40 milliGRAM(s) Oral at bedtime  dextrose 40% Gel 15 Gram(s) Oral once PRN  dextrose 5%. 1000 milliLiter(s) (50 mL/Hr) IV Continuous <Continuous>  dextrose 50% Injectable 12.5 Gram(s) IV Push once  dextrose 50% Injectable 25 Gram(s) IV Push once  dextrose 50% Injectable 25 Gram(s) IV Push once  glucagon  Injectable 1 milliGRAM(s) IntraMuscular once PRN  heparin   Injectable 5000 Unit(s) SubCutaneous every 12 hours  insulin glargine Injectable (LANTUS) 18 Unit(s) SubCutaneous at bedtime  insulin lispro (HumaLOG) corrective regimen sliding scale   SubCutaneous three times a day before meals  insulin lispro Injectable (HumaLOG) 6 Unit(s) SubCutaneous three times a day before meals  lidocaine 2% Viscous 15 milliLiter(s) Swish and Spit every 4 hours PRN  metroNIDAZOLE  IVPB      metroNIDAZOLE  IVPB 500 milliGRAM(s) IV Intermittent every 8 hours  midodrine 10 milliGRAM(s) Oral every 8 hours  oxycodone    5 mG/acetaminophen 325 mG 1 Tablet(s) Oral every 6 hours PRN  pantoprazole    Tablet 40 milliGRAM(s) Oral before breakfast  senna 2 Tablet(s) Oral at bedtime PRN  sodium chloride 0.9%. 1000 milliLiter(s) (75 mL/Hr) IV Continuous <Continuous>  vancomycin    Solution 250 milliGRAM(s) Oral every 6 hours      HOME MEDICATIONS:  Home Medications:  allopurinol 300 mg oral tablet: 1 tab(s) orally once a day (20 Aug 2020 14:28)  atorvastatin 40 mg oral tablet: 1 tab(s) orally once a day (20 Aug 2020 14:28)  bumetanide 1 mg oral tablet: 1 tab(s) orally once a day (20 Aug 2020 14:28)  metFORMIN 1000 mg oral tablet: 1 tab(s) orally 2 times a day (20 Aug 2020 14:28)  Omega-3 oral capsule: 1 tab(s) orally once a day (20 Aug 2020 14:28)  quinapril 40 mg oral tablet: 1 tab(s) orally once a day (20 Aug 2020 14:28)      VITALS:   T(F): 95.1 (08-23 @ 12:00), Max: 98.5 (08-21 @ 20:00)  HR: 70 (08-23 @ 18:00) (48 - 98)  BP: 100/42 (08-23 @ 18:00) (54/45 - 167/51)  BP(mean): 64 (08-23 @ 18:00) (43 - 136)  RR: 21 (08-23 @ 18:00) (14 - 38)  SpO2: 95% (08-23 @ 18:00) (84% - 100%)    I&O's Summary    22 Aug 2020 07:01  -  23 Aug 2020 07:00  --------------------------------------------------------  IN: 2188.9 mL / OUT: 1745 mL / NET: 443.9 mL    23 Aug 2020 07:01  -  23 Aug 2020 20:18  --------------------------------------------------------  IN: 1000 mL / OUT: 435 mL / NET: 565 mL        REVIEW OF SYSTEMS:  CONSTITUTIONAL: No weakness, fevers or chills  HEENT: No visual changes, neck/ear pain  RESPIRATORY: No cough, sob  CARDIOVASCULAR: No chest pain or palpitations  GASTROINTESTINAL: No abdominal pain. +diarrhea  GENITOURINARY: No dysuria, frequency or hematuria  NEUROLOGICAL: No new focal deficits  SKIN: No new rashes    PHYSICAL EXAM:  General: Not in distress.  Non-toxic appearing.   HEENT: EOMI  Cardio: Regular rate and rhythm, S1, S2, no murmur  Pulm: B/L BS.  No wheezing / crackles  Abdomen: Soft, non-tender, non-distended. Normoactive bowel sounds  Extremities: No edema b/l   Neuro: A&O x3. No focal deficits    LABS:                        8.2    10.53 )-----------( 281      ( 23 Aug 2020 04:30 )             26.3     08-23    138  |  96<L>  |  67<HH>  ----------------------------<  119<H>  3.8   |  29  |  4.7<HH>    Ca    7.3<L>      23 Aug 2020 04:30  Phos  6.1     08-23  Mg     3.6     08-23    TPro  5.4<L>  /  Alb  3.1<L>  /  TBili  0.5  /  DBili  x   /  AST  62<H>  /  ALT  36  /  AlkPhos  96  08-23      Lactate, Blood: 2.1 mmol/L <H> (08-23-20 @ 04:30)      Troponin T, Serum: 0.46 ng/mL <HH> (08-23-20 @ 04:30) HPI:  74 yo M w/ PMH of CKD (baseline 1.2), gastric ulcers, HTN, DM, and gout on allopurinol brought in from home by spouse for lethargy.    1 week ptp, pt had root canal, c/b post procedural dental infection and received amox.  Developed diarrhea, and was brought to ed for lethargy.      In ed, found to be bradycardic, hypothermic, with severe lactic acidosis / hagma (initial bicarb 3, pH 6.8), acute renal failure with hyperK (scr 8.9) requiring urgent HD, septic shock with wbc 16 requiring pressors and stress dose steroids.  Was treated for C. Diff colitis (improved diarrhea, still present), with improvement in labwork (scr 4.7, bicarb nml, wbc 10.      Hosp course c/b nstemi (rise in trop .1 --> peak .88 --> .46 8/23).  Pt denied any cp /palpitations. Denied any exertional symptoms and prior cardiac history.  EKG showing ischemi chagnes lateral leads:  twi I, avl, minor st dep v5-6  (new from 2016).  Echo nml lvef and mild AS.       PAST MEDICAL & SURGICAL HISTORY  Sciatica, unspecified laterality  Osteoarthritis of multiple joints, unspecified osteoarthritis type  High cholesterol  Hypertension, unspecified type  Type 2 diabetes mellitus without complication, unspecified long term insulin use status  Chronic gout of foot, unspecified cause, unspecified laterality  Herniated disc, cervical  Diverticulitis    ALLERGIES:  No Known Allergies      MEDICATIONS:  aspirin enteric coated 81 milliGRAM(s) Oral daily  atorvastatin 40 milliGRAM(s) Oral at bedtime  dextrose 40% Gel 15 Gram(s) Oral once PRN  dextrose 5%. 1000 milliLiter(s) (50 mL/Hr) IV Continuous <Continuous>  dextrose 50% Injectable 12.5 Gram(s) IV Push once  dextrose 50% Injectable 25 Gram(s) IV Push once  dextrose 50% Injectable 25 Gram(s) IV Push once  glucagon  Injectable 1 milliGRAM(s) IntraMuscular once PRN  heparin   Injectable 5000 Unit(s) SubCutaneous every 12 hours  insulin glargine Injectable (LANTUS) 18 Unit(s) SubCutaneous at bedtime  insulin lispro (HumaLOG) corrective regimen sliding scale   SubCutaneous three times a day before meals  insulin lispro Injectable (HumaLOG) 6 Unit(s) SubCutaneous three times a day before meals  lidocaine 2% Viscous 15 milliLiter(s) Swish and Spit every 4 hours PRN  metroNIDAZOLE  IVPB      metroNIDAZOLE  IVPB 500 milliGRAM(s) IV Intermittent every 8 hours  midodrine 10 milliGRAM(s) Oral every 8 hours  oxycodone    5 mG/acetaminophen 325 mG 1 Tablet(s) Oral every 6 hours PRN  pantoprazole    Tablet 40 milliGRAM(s) Oral before breakfast  senna 2 Tablet(s) Oral at bedtime PRN  sodium chloride 0.9%. 1000 milliLiter(s) (75 mL/Hr) IV Continuous <Continuous>  vancomycin    Solution 250 milliGRAM(s) Oral every 6 hours      HOME MEDICATIONS:  Home Medications:  allopurinol 300 mg oral tablet: 1 tab(s) orally once a day (20 Aug 2020 14:28)  atorvastatin 40 mg oral tablet: 1 tab(s) orally once a day (20 Aug 2020 14:28)  bumetanide 1 mg oral tablet: 1 tab(s) orally once a day (20 Aug 2020 14:28)  metFORMIN 1000 mg oral tablet: 1 tab(s) orally 2 times a day (20 Aug 2020 14:28)  Omega-3 oral capsule: 1 tab(s) orally once a day (20 Aug 2020 14:28)  quinapril 40 mg oral tablet: 1 tab(s) orally once a day (20 Aug 2020 14:28)      VITALS:   T(F): 95.1 (08-23 @ 12:00), Max: 98.5 (08-21 @ 20:00)  HR: 70 (08-23 @ 18:00) (48 - 98)  BP: 100/42 (08-23 @ 18:00) (54/45 - 167/51)  BP(mean): 64 (08-23 @ 18:00) (43 - 136)  RR: 21 (08-23 @ 18:00) (14 - 38)  SpO2: 95% (08-23 @ 18:00) (84% - 100%)    I&O's Summary    22 Aug 2020 07:01  -  23 Aug 2020 07:00  --------------------------------------------------------  IN: 2188.9 mL / OUT: 1745 mL / NET: 443.9 mL    23 Aug 2020 07:01  -  23 Aug 2020 20:18  --------------------------------------------------------  IN: 1000 mL / OUT: 435 mL / NET: 565 mL          PHYSICAL EXAM:  General: Not in distress.  Non-toxic appearing.   HEENT: EOMI  Cardio: Regular rate and rhythm, S1, S2, no murmur  Pulm: B/L BS.  No wheezing / crackles  Abdomen: Soft, non-tender, non-distended, Normoactive bowel sounds  Extremities: No edema b/l   Neuro: A&O x3. No focal deficits    LABS:                        8.2    10.53 )-----------( 281      ( 23 Aug 2020 04:30 )             26.3     08-23    138  |  96<L>  |  67<HH>  ----------------------------<  119<H>  3.8   |  29  |  4.7<HH>    Ca    7.3<L>      23 Aug 2020 04:30  Phos  6.1     08-23  Mg     3.6     08-23    TPro  5.4<L>  /  Alb  3.1<L>  /  TBili  0.5  /  DBili  x   /  AST  62<H>  /  ALT  36  /  AlkPhos  96  08-23      Lactate, Blood: 2.1 mmol/L <H> (08-23-20 @ 04:30)      Troponin T, Serum: 0.46 ng/mL <HH> (08-23-20 @ 04:30)        < from: Transthoracic Echocardiogram (08.21.20 @ 08:29) >  Summary:   1. Left ventricular ejection fraction, by visual estimation, is 55 to 60%.   2. Mild dynamic subvalvular gradient up to 45mmhg.   3. Trace mitral valve regurgitation.   4. Mild tricuspid regurgitation.   5. The aortic valve peak gradient is 35.2 mmHg consistent with mild aortic stenosis. Mild dynamic subvalvular gradient of 45mmhg.    < end of copied text >

## 2020-08-23 NOTE — PROGRESS NOTE ADULT - ASSESSMENT
1. Severe sepsis/lactic acidosis   2. Acute renal failure - ATN (nonoliguric), last HD Friday  3. Hyperkalemia, resolved with HD  4. CKD III, baseline creatinine 1.3    Plan:    Hold HD over weekend and monitor for renal recovery  Continue midodrine  Taper steroids  Monitor I/Os  No NSAIDs  No IV contrast

## 2020-08-23 NOTE — CONSULT NOTE ADULT - ATTENDING COMMENTS
Type 2 MI secondary to demand ischemia in the setting of septic shock, KELL, C.diff colitis    - D/C Telemetry  - Continue ASA / statin  - Type 2 MI secondary to demand ischemia in the setting of septic shock, KELL, C.diff colitis    - D/C Telemetry  - Continue ASA / statin  - No indication for acute cardiac intervention at this time

## 2020-08-23 NOTE — PROGRESS NOTE ADULT - ASSESSMENT
Septic shock secondary to C. Diff. Colitis improvin  KELL on CKD 3  Elevated Troponin type 2 kidney dis   Hematuria resolved       plan    CT ap without evidence of colitis    BCx and UCx: no growth     Cefepime, Flagyl and Vancomycin     c diff positive    echo normal EF    continue ASA and Lipitor    care as per ICU team     renal on case

## 2020-08-23 NOTE — PROGRESS NOTE ADULT - SUBJECTIVE AND OBJECTIVE BOX
SUBJECTIVE:    Patient is a 75y old Male who presents with a chief complaint of Generalized weakness (23 Aug 2020 12:41)    Currently admitted to medicine with the primary diagnosis of KELL (acute kidney injury)     Today is hospital day 3d. This morning he is resting in bed. No acute events overnight.       PAST MEDICAL & SURGICAL HISTORY  Sciatica, unspecified laterality  Osteoarthritis of multiple joints, unspecified osteoarthritis type  High cholesterol  Hypertension, unspecified type  Type 2 diabetes mellitus without complication, unspecified long term insulin use status  Chronic gout of foot, unspecified cause, unspecified laterality  Herniated disc, cervical  Diverticulitis    SOCIAL HISTORY:  Negative for smoking/alcohol/drug use.     ALLERGIES:  No Known Allergies    MEDICATIONS:  STANDING MEDICATIONS  aspirin enteric coated 81 milliGRAM(s) Oral daily  atorvastatin 40 milliGRAM(s) Oral at bedtime  dextrose 5%. 1000 milliLiter(s) IV Continuous <Continuous>  dextrose 50% Injectable 12.5 Gram(s) IV Push once  dextrose 50% Injectable 25 Gram(s) IV Push once  dextrose 50% Injectable 25 Gram(s) IV Push once  heparin   Injectable 5000 Unit(s) SubCutaneous every 12 hours  insulin glargine Injectable (LANTUS) 18 Unit(s) SubCutaneous at bedtime  insulin lispro (HumaLOG) corrective regimen sliding scale   SubCutaneous three times a day before meals  insulin lispro Injectable (HumaLOG) 6 Unit(s) SubCutaneous three times a day before meals  metroNIDAZOLE  IVPB      metroNIDAZOLE  IVPB 500 milliGRAM(s) IV Intermittent every 8 hours  midodrine 10 milliGRAM(s) Oral every 8 hours  pantoprazole    Tablet 40 milliGRAM(s) Oral before breakfast  sodium chloride 0.9%. 1000 milliLiter(s) IV Continuous <Continuous>  vancomycin    Solution 250 milliGRAM(s) Oral every 6 hours    PRN MEDICATIONS  dextrose 40% Gel 15 Gram(s) Oral once PRN  glucagon  Injectable 1 milliGRAM(s) IntraMuscular once PRN  lidocaine 2% Viscous 15 milliLiter(s) Swish and Spit every 4 hours PRN  oxycodone    5 mG/acetaminophen 325 mG 1 Tablet(s) Oral every 6 hours PRN  senna 2 Tablet(s) Oral at bedtime PRN    VITALS:   T(F): 95.1  HR: 54  BP: 92/47  RR: 16  SpO2: 100%    PHYSICAL EXAM:  GEN: NAD  HEENT: Normocephalic, atraumatic, EOMI, PERRL  LUNGS: Clear to auscultation bilaterally  HEART: S1/S2 present. RRR.   ABD: Soft, non-tender. Bowel sounds present.  EXT: 2+ peripheral pulses.   NEURO: Non focal.  Skin: Stasis dermatitis of bilateral lower extremities     LABS:                        8.2    10.53 )-----------( 281      ( 23 Aug 2020 04:30 )             26.3     08-23    138  |  96<L>  |  67<HH>  ----------------------------<  119<H>  3.8   |  29  |  4.7<HH>    Ca    7.3<L>      23 Aug 2020 04:30  Phos  6.1     08-23  Mg     3.6     08-23    TPro  5.4<L>  /  Alb  3.1<L>  /  TBili  0.5  /  DBili  x   /  AST  62<H>  /  ALT  36  /  AlkPhos  96  08-23    Lactate, Blood: 2.1 mmol/L <H> (08-23-20 @ 04:30)  Troponin T, Serum: 0.46 ng/mL <HH> (08-23-20 @ 04:30)  Lactate, Blood: 3.6 mmol/L <H> (08-22-20 @ 16:10)      Culture - Urine (collected 20 Aug 2020 18:00)  Source: .Urine Clean Catch (Midstream)  Final Report (22 Aug 2020 07:23):    No growth      CARDIAC MARKERS ( 23 Aug 2020 04:30 )  x     / 0.46 ng/mL / x     / x     / x      CARDIAC MARKERS ( 22 Aug 2020 05:30 )  x     / x     / 846 U/L / x     / x      CARDIAC MARKERS ( 21 Aug 2020 20:00 )  x     / 0.88 ng/mL / x     / x     / x      CARDIAC MARKERS ( 21 Aug 2020 16:54 )  x     / 0.84 ng/mL / 775 U/L / x     / x          RADIOLOGY:  < from: Xray Chest 1 View- PORTABLE-Routine (08.23.20 @ 06:24) >  Impression:    Stable bilateral opacities. No pneumothorax.    < end of copied text > SUBJECTIVE:    Patient is a 75y old Male who presents with a chief complaint of Generalized weakness (23 Aug 2020 12:41)    Currently admitted to medicine with the primary diagnosis of KELL (acute kidney injury)     Today is hospital day 3d. This morning he is resting in bed. No acute events overnight. Patient complaining of tooth pain.       PAST MEDICAL & SURGICAL HISTORY  Sciatica, unspecified laterality  Osteoarthritis of multiple joints, unspecified osteoarthritis type  High cholesterol  Hypertension, unspecified type  Type 2 diabetes mellitus without complication, unspecified long term insulin use status  Chronic gout of foot, unspecified cause, unspecified laterality  Herniated disc, cervical  Diverticulitis    SOCIAL HISTORY:  Negative for smoking/alcohol/drug use.     ALLERGIES:  No Known Allergies    MEDICATIONS:  STANDING MEDICATIONS  aspirin enteric coated 81 milliGRAM(s) Oral daily  atorvastatin 40 milliGRAM(s) Oral at bedtime  dextrose 5%. 1000 milliLiter(s) IV Continuous <Continuous>  dextrose 50% Injectable 12.5 Gram(s) IV Push once  dextrose 50% Injectable 25 Gram(s) IV Push once  dextrose 50% Injectable 25 Gram(s) IV Push once  heparin   Injectable 5000 Unit(s) SubCutaneous every 12 hours  insulin glargine Injectable (LANTUS) 18 Unit(s) SubCutaneous at bedtime  insulin lispro (HumaLOG) corrective regimen sliding scale   SubCutaneous three times a day before meals  insulin lispro Injectable (HumaLOG) 6 Unit(s) SubCutaneous three times a day before meals  metroNIDAZOLE  IVPB      metroNIDAZOLE  IVPB 500 milliGRAM(s) IV Intermittent every 8 hours  midodrine 10 milliGRAM(s) Oral every 8 hours  pantoprazole    Tablet 40 milliGRAM(s) Oral before breakfast  sodium chloride 0.9%. 1000 milliLiter(s) IV Continuous <Continuous>  vancomycin    Solution 250 milliGRAM(s) Oral every 6 hours    PRN MEDICATIONS  dextrose 40% Gel 15 Gram(s) Oral once PRN  glucagon  Injectable 1 milliGRAM(s) IntraMuscular once PRN  lidocaine 2% Viscous 15 milliLiter(s) Swish and Spit every 4 hours PRN  oxycodone    5 mG/acetaminophen 325 mG 1 Tablet(s) Oral every 6 hours PRN  senna 2 Tablet(s) Oral at bedtime PRN    VITALS:   T(F): 95.1  HR: 54  BP: 92/47  RR: 16  SpO2: 100%    PHYSICAL EXAM:  GEN: NAD  HEENT: Normocephalic, atraumatic, EOMI, PERRL  LUNGS: Clear to auscultation bilaterally  HEART: S1/S2 present. RRR.   ABD: Soft, non-tender. Bowel sounds present.  EXT: 2+ peripheral pulses.   NEURO: Non focal.  Skin: Stasis dermatitis of bilateral lower extremities     LABS:                        8.2    10.53 )-----------( 281      ( 23 Aug 2020 04:30 )             26.3     08-23    138  |  96<L>  |  67<HH>  ----------------------------<  119<H>  3.8   |  29  |  4.7<HH>    Ca    7.3<L>      23 Aug 2020 04:30  Phos  6.1     08-23  Mg     3.6     08-23    TPro  5.4<L>  /  Alb  3.1<L>  /  TBili  0.5  /  DBili  x   /  AST  62<H>  /  ALT  36  /  AlkPhos  96  08-23    Lactate, Blood: 2.1 mmol/L <H> (08-23-20 @ 04:30)  Troponin T, Serum: 0.46 ng/mL <HH> (08-23-20 @ 04:30)  Lactate, Blood: 3.6 mmol/L <H> (08-22-20 @ 16:10)      Culture - Urine (collected 20 Aug 2020 18:00)  Source: .Urine Clean Catch (Midstream)  Final Report (22 Aug 2020 07:23):    No growth      CARDIAC MARKERS ( 23 Aug 2020 04:30 )  x     / 0.46 ng/mL / x     / x     / x      CARDIAC MARKERS ( 22 Aug 2020 05:30 )  x     / x     / 846 U/L / x     / x      CARDIAC MARKERS ( 21 Aug 2020 20:00 )  x     / 0.88 ng/mL / x     / x     / x      CARDIAC MARKERS ( 21 Aug 2020 16:54 )  x     / 0.84 ng/mL / 775 U/L / x     / x          RADIOLOGY:  < from: Xray Chest 1 View- PORTABLE-Routine (08.23.20 @ 06:24) >  Impression:    Stable bilateral opacities. No pneumothorax.    < end of copied text >

## 2020-08-23 NOTE — PROGRESS NOTE ADULT - ASSESSMENT
76 yo M w/ PMH of DM, retinopathy, nephrotic range proteinuria, bleeding gastric ulcer and subdural hematoma who was brought in from home by his spouse for lethargy s/p Amoxicillin po for odontogenic infection. Admitted to ICU for septic shock requiring levophed.     #Severe sepsis   - Secondary to c. diff colitis, ptnt on Amoxicillin for odontogenic infection   - hypotensive (70's/30's) and hypothermic (30.8 C) on arrival with WBC 16.92   - lactate 20 on arrival --> 7.8 --> 2.1 today   - CTH 8/20 negative   - CTAP 8/20 without evidence of colitis/bowel obstruction, renal hypodensity  - Retroperitoneal US right renal cyst  - BCx, UCx NGTD  - s/p Unasyn 1500mg x 1, Vanc po 125mg q6h x 3  - D/c Cefepime 1g qd  - C/w Vanc po 250mg q6h, Flagyl 500mg IV q8h   - ID following   - C. diff positive   - f/u dental consult, percocet pain management   - off levophed   - c/w midodrine 10mg po q8h  - d/c steroids     #Severe KELL  #Lactic acidosis   #Severe Hyperkalemia/ Hypermagnesemia/ Hyperphosphatemia  - likely prerenal/ATN (baseline creatinine 1.3), decreased po intake, r/o Metformin lactic acidosis  - hold home metformin    - s/p two sessions HD   - Nephro following     #Normocytic anemia  - Hx of bleeding gastric ulcer, per wife in 1978 had transfusion and resolved with medication afterwards   - Hb 6.8 on 8/20  - s/p 2 u pRBC, repeat Hb 8.5  - monitor CBC   - f/u stool guaiac     #Hematuria- resolved   - started overnight on 8/20  - Vargas in place  - urology noted  - monitor CBC    #Troponemia  - no chest pain   - EKG without acute changes   - Echo: EF 55-60%  - ASA 81 mg po qd  - F/u cardiology     #DM  - hold home metformin due to KELL/severe lactic acidosis   - fingersticks qac and qhs   - basal bolus insulin regimen  - HbA1c: 6    #History of HTN: holding home bumetanide    #DLD: continue home Lipitor 40mg qd    #Gout: holding home meds     DVT ppx: Lovenox  GI ppx: Protonix  Diet: Renal and CC- kosher  Dispo: MICU  Activity: OOB to chair, PT/OT  FULL CODE

## 2020-08-23 NOTE — PROGRESS NOTE ADULT - ASSESSMENT
IMPRESSION:  Sepsis present on admission   septic shock resolved  C diff colitis  Metabolic Encephalopathy improved   KELL on CKD SP HD  HO HTN  NSTEMI     PLAN:    CNS: no sedation.     HEENT: oral care, aspiration precautions    PULMONARY: HOB >30. O2 as needed to maintain spo2>92%.      CARDIOVASCULAR:  Baby ASA.  NS 75 cc per hour     GI: GI prophylaxis. Feeding     RENAL:  FU lytes.      INFECTIOUS DISEASE: Continue CDiff therapy.  DC Cefepime      HEMATOLOGICAL:  DVT prophylaxis.    ENDOCRINE: Follow up FS.      MUSCULOSKELETAL: OOB to chair.  PT OT     DC Femoral TLC     Transfer to floor or tele per cardiology IMPRESSION:  Sepsis present on admission   septic shock resolved  C diff colitis  Metabolic Encephalopathy improved   KELL on CKD SP HD  HO HTN  NSTEMI     PLAN:    CNS: no sedation.     HEENT: oral care, aspiration precautions    PULMONARY: HOB >30. O2 as needed to maintain spo2>92%.      CARDIOVASCULAR:  Baby ASA.  NS 75 cc per hour     GI: GI prophylaxis. Feeding     RENAL:  FU lytes.      INFECTIOUS DISEASE: Continue CDiff therapy.  DC Cefepime      HEMATOLOGICAL:  DVT prophylaxis.    ENDOCRINE: Follow up FS.  DC HC    MUSCULOSKELETAL: OOB to chair.  PT OT     DC Femoral TLC, UDall, and Vargas    Transfer to floor or tele per cardiology

## 2020-08-23 NOTE — PROGRESS NOTE ADULT - SUBJECTIVE AND OBJECTIVE BOX
SUBJECTIVE:    Patient is a 75y old Male who presents with a chief complaint of Generalized weakness (23 Aug 2020 08:00)    Currently admitted to medicine with the primary diagnosis of KELL (acute kidney injury)     Today is hospital day 3d.   off pressor   c diff positive              PAST MEDICAL & SURGICAL HISTORY  Sciatica, unspecified laterality  Osteoarthritis of multiple joints, unspecified osteoarthritis type  High cholesterol  Hypertension, unspecified type  Type 2 diabetes mellitus without complication, unspecified long term insulin use status  Chronic gout of foot, unspecified cause, unspecified laterality  Herniated disc, cervical  Diverticulitis    SOCIAL HISTORY:  Negative for smoking/alcohol/drug use.     ALLERGIES:  No Known Allergies    MEDICATIONS:  STANDING MEDICATIONS  aspirin enteric coated 81 milliGRAM(s) Oral daily  atorvastatin 40 milliGRAM(s) Oral at bedtime  cefepime   IVPB 1000 milliGRAM(s) IV Intermittent every 24 hours  dextrose 5%. 1000 milliLiter(s) IV Continuous <Continuous>  dextrose 50% Injectable 12.5 Gram(s) IV Push once  dextrose 50% Injectable 25 Gram(s) IV Push once  dextrose 50% Injectable 25 Gram(s) IV Push once  heparin   Injectable 5000 Unit(s) SubCutaneous every 12 hours  insulin glargine Injectable (LANTUS) 18 Unit(s) SubCutaneous at bedtime  insulin lispro (HumaLOG) corrective regimen sliding scale   SubCutaneous three times a day before meals  insulin lispro Injectable (HumaLOG) 6 Unit(s) SubCutaneous three times a day before meals  metroNIDAZOLE  IVPB      metroNIDAZOLE  IVPB 500 milliGRAM(s) IV Intermittent every 8 hours  midodrine 10 milliGRAM(s) Oral every 8 hours  pantoprazole    Tablet 40 milliGRAM(s) Oral before breakfast  sodium chloride 0.9%. 1000 milliLiter(s) IV Continuous <Continuous>  vancomycin    Solution 250 milliGRAM(s) Oral every 6 hours    PRN MEDICATIONS  dextrose 40% Gel 15 Gram(s) Oral once PRN  glucagon  Injectable 1 milliGRAM(s) IntraMuscular once PRN  lidocaine 2% Viscous 15 milliLiter(s) Swish and Spit every 4 hours PRN  senna 2 Tablet(s) Oral at bedtime PRN    VITALS:   T(F): 96.1  HR: 50  BP: 119/66  RR: 14  SpO2: 100%    LABS:                        8.2    10.53 )-----------( 281      ( 23 Aug 2020 04:30 )             26.3     08-23    138  |  96<L>  |  67<HH>  ----------------------------<  119<H>  3.8   |  29  |  4.7<HH>    Ca    7.3<L>      23 Aug 2020 04:30  Phos  6.1     08-23  Mg     3.6     08-23    TPro  5.4<L>  /  Alb  3.1<L>  /  TBili  0.5  /  DBili  x   /  AST  62<H>  /  ALT  36  /  AlkPhos  96  08-23          Lactate, Blood: 2.1 mmol/L <H> (08-23-20 @ 04:30)  Troponin T, Serum: 0.46 ng/mL <HH> (08-23-20 @ 04:30)  Lactate, Blood: 3.6 mmol/L <H> (08-22-20 @ 16:10)      Culture - Urine (collected 20 Aug 2020 18:00)  Source: .Urine Clean Catch (Midstream)  Final Report (22 Aug 2020 07:23):    No growth    Culture - Blood (collected 20 Aug 2020 10:02)  Source: .Blood Blood-Peripheral  Preliminary Report (21 Aug 2020 17:02):    No growth to date.    Culture - Blood (collected 20 Aug 2020 10:02)  Source: .Blood Blood-Peripheral  Preliminary Report (21 Aug 2020 17:01):    No growth to date.      CARDIAC MARKERS ( 23 Aug 2020 04:30 )  x     / 0.46 ng/mL / x     / x     / x      CARDIAC MARKERS ( 22 Aug 2020 05:30 )  x     / x     / 846 U/L / x     / x      CARDIAC MARKERS ( 21 Aug 2020 20:00 )  x     / 0.88 ng/mL / x     / x     / x      CARDIAC MARKERS ( 21 Aug 2020 16:54 )  x     / 0.84 ng/mL / 775 U/L / x     / x          RADIOLOGY:    PHYSICAL EXAM:  GEN: No acute distress  LUNGS: Clear to auscultation bilaterally   HEART: Regular  ABD: Soft, non-tender, non-distended.  EXT: NC/NC/NE/2+PP/SUNSHINE/Skin Intact.   NEURO: AAOX3    Intravenous access:   NG tube:   Vargas Catheter:

## 2020-08-23 NOTE — PROGRESS NOTE ADULT - SUBJECTIVE AND OBJECTIVE BOX
MANDA TAVAREZ  75y, Male  Allergy: No Known Allergies      CHIEF COMPLAINT: Generalized weakness (23 Aug 2020 08:07)      INTERVAL EVENTS/HPI  - No acute events overnight  - T(F): , Max: 96.2 (08-22-20 @ 20:00)  - Denies any worsening symptoms  - Tolerating medication  - WBC Count: 10.53 K/uL (08-23-20 @ 04:30)      ROS  General: Denies fevers, chills, nightsweats, weight loss  HEENT: Denies headache, rhinorrhea, sore throat, eye pain  CV: Denies CP, palpitations  PULM: Denies SOB, cough  GI: Denies abdominal pain, diarrhea  : Denies dysuria, hematuria  MSK: Denies arthralgias  SKIN: Denies rash   NEURO: Denies paresthesias, weakness  PSYCH: Denies depression    FH non-contributory   Social Hx non-contributory    VITALS:  T(F): 95, Max: 96.2 (08-22-20 @ 20:00)  HR: 62  BP: 125/58  RR: 22Vital Signs Last 24 Hrs  T(C): 35 (23 Aug 2020 09:00), Max: 35.7 (22 Aug 2020 20:00)  T(F): 95 (23 Aug 2020 09:00), Max: 96.2 (22 Aug 2020 20:00)  HR: 62 (23 Aug 2020 09:00) (50 - 68)  BP: 125/58 (23 Aug 2020 09:00) (77/46 - 125/70)  BP(mean): 77 (23 Aug 2020 09:00) (54 - 106)  RR: 22 (23 Aug 2020 09:00) (14 - 29)  SpO2: 98% (23 Aug 2020 09:00) (94% - 100%)    PHYSICAL EXAM:  Gen: NAD, resting in bed  HEENT: Normocephalic, atraumatic  Neck: supple, no lymphadenopathy  CV: Regular rate & regular rhythm  Lungs: decreased BS at bases, no fremitus  Abdomen: Soft, BS present  Ext: Warm, well perfused  Neuro: non focal, awake  Skin: no rash, no erythema      TESTS & MEASUREMENTS:                        8.2    10.53 )-----------( 281      ( 23 Aug 2020 04:30 )             26.3     08-23    138  |  96<L>  |  67<HH>  ----------------------------<  119<H>  3.8   |  29  |  4.7<HH>    Ca    7.3<L>      23 Aug 2020 04:30  Phos  6.1     08-23  Mg     3.6     08-23    TPro  5.4<L>  /  Alb  3.1<L>  /  TBili  0.5  /  DBili  x   /  AST  62<H>  /  ALT  36  /  AlkPhos  96  08-23    eGFR if Non African American: 11 mL/min/1.73M2 (08-23-20 @ 04:30)  eGFR if African American: 13 mL/min/1.73M2 (08-23-20 @ 04:30)  eGFR if Non African American: 11 mL/min/1.73M2 (08-22-20 @ 16:10)  eGFR if : 13 mL/min/1.73M2 (08-22-20 @ 16:10)    LIVER FUNCTIONS - ( 23 Aug 2020 04:30 )  Alb: 3.1 g/dL / Pro: 5.4 g/dL / ALK PHOS: 96 U/L / ALT: 36 U/L / AST: 62 U/L / GGT: x               Culture - Urine (collected 08-20-20 @ 18:00)  Source: .Urine Clean Catch (Midstream)  Final Report (08-22-20 @ 07:23):    No growth    Culture - Blood (collected 08-20-20 @ 10:02)  Source: .Blood Blood-Peripheral  Preliminary Report (08-21-20 @ 17:02):    No growth to date.    Culture - Blood (collected 08-20-20 @ 10:02)  Source: .Blood Blood-Peripheral  Preliminary Report (08-21-20 @ 17:01):    No growth to date.        Lactate, Blood: 2.1 mmol/L (08-23-20 @ 04:30)  Lactate, Blood: 3.6 mmol/L (08-22-20 @ 16:10)  Lactate, Blood: 4.9 mmol/L (08-22-20 @ 05:30)  Lactate, Blood: 5.7 mmol/L (08-21-20 @ 23:30)  Lactate, Blood: 4.5 mmol/L (08-21-20 @ 16:54)  Lactate, Blood: 7.8 mmol/L (08-21-20 @ 06:08)  Lactate, Blood: 17.2 mmol/L (08-20-20 @ 20:05)  Lactate, Blood: 19.0 mmol/L (08-20-20 @ 16:00)  Blood Gas Venous - Lactate: 22.0 mmoL/L (08-20-20 @ 11:11)  Blood Gas Venous - Lactate: 20.0 mmoL/L (08-20-20 @ 10:34)      INFECTIOUS DISEASES TESTING  MRSA PCR Result.: Negative (08-21-20 @ 06:20)  Hepatitis B Surface Antigen: Nonreact (08-20-20 @ 15:10)      RADIOLOGY & ADDITIONAL TESTS:  I have personally reviewed the last Chest xray  CXR      CT  CT Abdomen and Pelvis w/ IV Cont:   EXAM:  CT ABDOMEN AND PELVIS IC            PROCEDURE DATE:  08/20/2020            INTERPRETATION:  CLINICAL HISTORY: Diarrhea. End-stage renal disease..    TECHNIQUE: Contiguous axial CT images were obtained from the lower chest to the pubic symphysis following administration of Optiray intravenous contrast. Oral contrast was not administered. Reformatted images in the coronal and sagittal planes were acquired.    COMPARISON: None.      FINDINGS:    LOWER CHEST: Unremarkable.    HEPATOBILIARY: Unremarkable.    SPLEEN: Unremarkable.    PANCREAS: Unremarkable.    ADRENAL GLANDS: Punctate left adrenal calcification.    KIDNEYS: No hydronephrosis bilaterally. Right renal hypodensity measuring at least 2.7 cm, measuring higher than simple fluid,and part may be related to beam hardening artifact from patient's right flank contacting the gantry - possibly a cyst but indeterminate. Additional subcentimeter bilateral renal hypodensities too small to characterize.    ABDOMINOPELVIC NODES: Nonspecific enlarged periportal and portacaval lymph nodes measuring up to 1.6 cm in short axis (series 5 image 150 and 180). Nonspecific mildly enlarged retroperitoneal lymph nodes. For example left para-aortic lymph node measuring 1.2 cm (series 5 image 264).    PELVIC ORGANS: Bladder contracted around Vargas catheter. Intraluminal gas likely related to instrumentation.    PERITONEUM/MESENTERY/BOWEL: No evidence of bowel obstruction. Colonic diverticulosis. Unremarkable appendix. No ascites or free air.    BONES/SOFT TISSUES: Moderate right and small left fat-containing inguinal hernias. Degenerative changes of the spine. L3 vertebral body hemangioma.    OTHER: Bilateral femoral venous lines. Atherosclerotic calcifications    IMPRESSION:    1.  No evidence of bowel obstruction, colitis, or appendicitis.  2.  Enlarged periportal and portacaval lymph nodes measuring up to 1.6 cm in short axis of unclear etiology. Correlate for any history of hepatobiliary disease. Also noted mildly enlarged retroperitoneal lymph nodes measuring up to 1.2 cm, nonspecific. Correlation with any prior studies for stability would be most helpful.  3.  Right renal hypodensity measuring at least 2.7 cm, measuring higher than simple fluid, which may be artifactual asdescribed -lesion may represent a cyst but is indeterminate. Recommend evaluation with renal sonogram and/or correlation with prior imaging if available.                GISELA PATEL M.D., ATTENDING RADIOLOGIST  This document has been electronically signed. Aug 20 2020  3:32PM             (08-20-20 @ 13:52)      CARDIOLOGY TESTING  Transthoracic Echocardiogram:    EXAM:  2-D ECHO (TTE) COMPLETE        PROCEDURE DATE:  08/21/2020      INTERPRETATION:  REPORT:  TRANSTHORACIC ECHOCARDIOGRAM REPORT        Patient Name:   MANDA TAVAREZ Accession #: [id number]  Medical Rec #:  [id number]       Height:      72.0 in 182.9 cm  YOB: 1945      Weight:      199.0 lb 90.26 kg  Patient Age:    75 years       BSA:         2.13 m²  Patient Gender: M              BP:          105/43 mmHg      Date of Exam:        8/21/2020 8:29:01 AM  Referring Physician: [id number] GEORGE VARGAS  Sonographer:         Tomasa Santiago  Reading Physician:   Ronn Garibay M.D.    Procedure:     2D Echo/Doppler/Color Doppler Complete.  Indications:   I42.9 - Cardiomyopathy, unspecified  Diagnosis:     Cardiomyopathy, unspecified - I42.9  Study Details: Technically difficult study. Study quality was adversely affected                 due to restricted patients mobility.        Summary:   1. Left ventricular ejection fraction, by visual estimation, is 55 to 60%.   2. Mild dynamic subvalvular gradient up to 45mmhg.   3. Trace mitral valve regurgitation.   4. Mild tricuspid regurgitation.   5. The aortic valve peak gradient is 35.2 mmHg consistent with mild aortic stenosis. Mild dynamic subvalvular gradient of 45mmhg.    PHYSICIAN INTERPRETATION:  Left Ventricle: Normal left ventricular size and wall thicknesses, with normal systolic and diastolic function. Left ventricular ejection fraction, by visual estimation, is 55 to 60%. Normal segmental left ventricular systolic function. Mild dynamic subvalvular gradient up to 45mmhg.  Right Ventricle: Normal right ventricular size and function.  Pericardium: There is no evidence of pericardial effusion.  Mitral Valve: Structurally normal mitral valve, with normal leaflet excursion. Trace mitral valve regurgitation is seen.  Tricuspid Valve: Structurally normal tricuspid valve, with normal leaflet excursion. Mild tricuspid regurgitation is visualized.  Aortic Valve: Peak transaortic gradient equals 35.2 mmHg, mean transaortic gradient equals 20.9 mmHg, the calculated aortic valve area equals 1.97 cm² by the continuity equation consistent with mild aortic stenosis. The aortic valve mean gradient is 20.9 mmHg consistent with mild aortic stenosis. No evidence of aortic valve regurgitation is seen. Aortic valve mildly thickened with adequate leaflet motion.  Pulmonic Valve: Structurally normal pulmonic valve, with normal leaflet excursion. No indication of pulmonic valve regurgitation.  Aorta: The aortic root and ascending aorta are structurally normal, with no evidence of dilitation.  Pulmonary Artery: The main pulmonary artery is normal in size.      2D AND M-MODE MEASUREMENTS (normal ranges within parentheses):  Left                  Normal   Aorta/Left           Normal  Ventricle:                     Atrium:  IVSd (2D):  1.57 cm  (0.7-1.1) AoV Cusp       1.84  (1.5-2.6)  LVPWd       1.13 cm  (0.7-1.1) Separation:     cm  (2D):                          Left Atrium    3.63  (1.9-4.0)  LVIDd       4.04 cm  (3.4-5.7) (Mmode):        cm  (2D):                          LA Volume      23.8  LVIDs       2.61 cm            Index         ml/m²  (2D):                          Right  LV FS       35.2 %    (>25%)   Ventricle:  (2D): RVd (2D):      2.73 cm  IVSd        1.17 cm  (0.7-1.1)  (Mmode):  LVPWd       1.14 cm  (0.7-1.1)  (Mmode):  LVIDd       4.52 cm  (3.4-5.7)  (Mmode):  LVIDs       2.74 cm  (Mmode):  LV FS       39.3 %    (>25%)  (Mmode):  Relative     0.56     (<0.42)  Wall  Thickness  Rel. Wall    0.50     (<0.42)  Thickness  Mm  LV Mass    88.8 g/m²  Index:  Mmode    SPECTRAL DOPPLER ANALYSIS:  LV DIASTOLIC FUNCTION:  MV Peak E: 0.93 m/s Decel Time: 237 msec  MV Peak A: 0.73 m/s  E/A Ratio: 1.28    Aortic Valve:  AoV VMax:    3.36 m/s  AoV Area, Vmax: 1.52 cm² Vmax Indx: 0.72 cm²/m²  AoV VTI:     0.48 m    AoV Area, VTI:  1.97 cm² VTI Indx:  0.93 cm²/m²  AoV Pk Grad: 35.2 mmHg  AoV Mn Grad: 20.9 mmHg    LVOT Vmax: 1.66 m/s  LVOT VTI:  0.30 m  LVOT Diam: 1.98 cm    Mitral Valve:  MV P1/2 Time: 68.83 msec  MV Area, PHT: 3.20 cm²    Tricuspid Valve and PA/RV Systolic Pressure: TR Max Velocity: 3.29 m/s RA Pressure:  RVSP/PASP: 44.5 mmHg    Pulmonic Valve:  PV Max Velocity: 1.62 m/s PV Max PG: 10.5 mmHg PV Mean PG:      [id number] Ronn Garibay M.D., Electronically signed on 8/21/2020 at 2:12:10 PM            *** Final ***                  ESTEFANI GARIBAY M.D., ATTENDING CARDIOLOGIST  This document has been electronically signed. Aug 21 2020  8:29AM           (08-21-20 @ 08:29)  12 Lead ECG:   Ventricular Rate 82 BPM    Atrial Rate 82 BPM    P-R Interval 154 ms    QRS Duration 108 ms    Q-T Interval 398 ms    QTC Calculation(Bezet) 464 ms    P Axis 31 degrees    R Axis 48 degrees    T Axis 143 degrees    Diagnosis Line Normal sinus rhythm  Marked ST abnormality, possible inferior subendocardial injury  Abnormal ECG    Confirmed by Levi Starks ([id number]) on 8/21/2020 12:29:09 PM (08-20-20 @ 19:45)      MEDICATIONS  aspirin enteric coated 81  atorvastatin 40  dextrose 5%. 1000  dextrose 50% Injectable 12.5  dextrose 50% Injectable 25  dextrose 50% Injectable 25  heparin   Injectable 5000  insulin glargine Injectable (LANTUS) 18  insulin lispro (HumaLOG) corrective regimen sliding scale   insulin lispro Injectable (HumaLOG) 6  metroNIDAZOLE  IVPB   metroNIDAZOLE  IVPB 500  midodrine 10  pantoprazole    Tablet 40  sodium chloride 0.9%. 1000  vancomycin    Solution 250      ANTIBIOTICS:  metroNIDAZOLE  IVPB      metroNIDAZOLE  IVPB 500 milliGRAM(s) IV Intermittent every 8 hours  vancomycin    Solution 250 milliGRAM(s) Oral every 6 hours      All available historical data has been reviewed

## 2020-08-23 NOTE — PROGRESS NOTE ADULT - SUBJECTIVE AND OBJECTIVE BOX
[facility] FOLLOW UP NOTE  --------------------------------------------------------------------------------  24 hour events/subjective: Patient examined. Appears comfortable.    PAST HISTORY  --------------------------------------------------------------------------------  No significant changes to PMH, PSH, FHx, SHx, unless otherwise noted    ALLERGIES & MEDICATIONS  --------------------------------------------------------------------------------  Allergies    No Known Allergies    Standing Inpatient Medications  aspirin enteric coated 81 milliGRAM(s) Oral daily  atorvastatin 40 milliGRAM(s) Oral at bedtime  dextrose 5%. 1000 milliLiter(s) IV Continuous <Continuous>  dextrose 50% Injectable 12.5 Gram(s) IV Push once  dextrose 50% Injectable 25 Gram(s) IV Push once  dextrose 50% Injectable 25 Gram(s) IV Push once  heparin   Injectable 5000 Unit(s) SubCutaneous every 12 hours  insulin glargine Injectable (LANTUS) 18 Unit(s) SubCutaneous at bedtime  insulin lispro (HumaLOG) corrective regimen sliding scale   SubCutaneous three times a day before meals  insulin lispro Injectable (HumaLOG) 6 Unit(s) SubCutaneous three times a day before meals  metroNIDAZOLE  IVPB      metroNIDAZOLE  IVPB 500 milliGRAM(s) IV Intermittent every 8 hours  midodrine 10 milliGRAM(s) Oral every 8 hours  pantoprazole    Tablet 40 milliGRAM(s) Oral before breakfast  sodium chloride 0.9%. 1000 milliLiter(s) IV Continuous <Continuous>  vancomycin    Solution 250 milliGRAM(s) Oral every 6 hours    PRN Inpatient Medications  dextrose 40% Gel 15 Gram(s) Oral once PRN  glucagon  Injectable 1 milliGRAM(s) IntraMuscular once PRN  lidocaine 2% Viscous 15 milliLiter(s) Swish and Spit every 4 hours PRN  senna 2 Tablet(s) Oral at bedtime PRN    VITALS/PHYSICAL EXAM  --------------------------------------------------------------------------------  T(C): 35 (08-23-20 @ 09:00), Max: 35.7 (08-22-20 @ 20:00)  HR: 62 (08-23-20 @ 12:00) (48 - 68)  BP: 117/62 (08-23-20 @ 12:00) (85/46 - 128/67)  RR: 21 (08-23-20 @ 12:00) (14 - 29)  SpO2: 99% (08-23-20 @ 12:00) (84% - 100%)  Wt(kg): --  Height (cm): 180.34 (08-23-20 @ 08:07)  Weight (kg): 90.6 (08-23-20 @ 08:07)  BMI (kg/m2): 27.9 (08-23-20 @ 08:07)  BSA (m2): 2.11 (08-23-20 @ 08:07)    08-22-20 @ 07:01  -  08-23-20 @ 07:00  --------------------------------------------------------  IN: 2188.9 mL / OUT: 1745 mL / NET: 443.9 mL    08-23-20 @ 07:01  -  08-23-20 @ 12:42  --------------------------------------------------------  IN: 300 mL / OUT: 275 mL / NET: 25 mL    Physical Exam:  	Gen: NAD  	Pulm: CTA B/L  	CV: RRR, S1S2  	Abd: +BS, soft, nontender/nondistended  	: No suprapubic tenderness  	LE: Warm, no edema  	Vascular access: [place]    LABS/STUDIES  --------------------------------------------------------------------------------              8.2    10.53 >-----------<  281      [08-23-20 @ 04:30]              26.3     138  |  96  |  67  ----------------------------<  119      [08-23-20 @ 04:30]  3.8   |  29  |  4.7        Ca     7.3     [08-23-20 @ 04:30]      Mg     3.6     [08-23-20 @ 04:30]      Phos  6.1     [08-23-20 @ 04:30]    TPro  5.4  /  Alb  3.1  /  TBili  0.5  /  DBili  x   /  AST  62  /  ALT  36  /  AlkPhos  96  [08-23-20 @ 04:30]    Troponin 0.46      [08-23-20 @ 04:30]        [08-22-20 @ 05:30]    Creatinine Trend:  SCr 4.7 [08-23 @ 04:30]  SCr 4.7 [08-22 @ 16:10]  SCr 4.5 [08-22 @ 05:30]  SCr 4.6 [08-21 @ 23:30]  SCr 3.6 [08-21 @ 16:54]    Urinalysis - [08-20-20 @ 18:00]      Color Yellow / Appearance Clear / SG 1.025 / pH 6.0      Gluc Negative / Ketone Negative  / Bili Negative / Urobili 0.2       Blood Negative / Protein 100 / Leuk Est Negative / Nitrite Negative      RBC  / WBC  / Hyaline  / Gran  / Sq Epi  / Non Sq Epi Few / Bacteria Few    TSH 2.05      [08-21-20 @ 05:30]    HBsAb Nonreact      [08-20-20 @ 15:10]  HBsAg Nonreact      [08-20-20 @ 15:10]  HBcAb Nonreact      [08-20-20 @ 15:10]

## 2020-08-23 NOTE — PROGRESS NOTE ADULT - SUBJECTIVE AND OBJECTIVE BOX
Patient is a 75y old  Male who presents with a chief complaint of Generalized weakness (22 Aug 2020 19:54)        Over Night Events:  O)ff pressors.  Off O2.  C-Diff positive         ROS:     All ROS are negative except HPI         PHYSICAL EXAM    ICU Vital Signs Last 24 Hrs  T(C): 35.6 (23 Aug 2020 04:00), Max: 35.7 (22 Aug 2020 20:00)  T(F): 96.1 (23 Aug 2020 04:00), Max: 96.2 (22 Aug 2020 20:00)  HR: 50 (23 Aug 2020 07:00) (50 - 68)  BP: 119/66 (23 Aug 2020 07:00) (77/46 - 125/70)  BP(mean): 92 (23 Aug 2020 07:00) (54 - 106)  ABP: --  ABP(mean): --  RR: 14 (23 Aug 2020 07:00) (14 - 29)  SpO2: 100% (23 Aug 2020 07:00) (94% - 100%)      CONSTITUTIONAL:  Well nourished.  NAD    ENT:   Airway patent,   Mouth with normal mucosa.   No thrush    EYES:   Pupils equal,   Round and reactive to light.    CARDIAC:   Normal rate,   Regular rhythm.    edema      Vascular:  Normal systolic impulse  No Carotid bruits    RESPIRATORY:   No wheezing  Bilateral BS  Normal chest expansion  Not tachypneic,  No use of accessory muscles    GASTROINTESTINAL:  Abdomen soft,   Non-tender,   No guarding,   + BS    MUSCULOSKELETAL:   Range of motion is not limited,  No clubbing, cyanosis    NEUROLOGICAL:   Alert and oriented   No motor  deficits.    SKIN:   Skin normal color for race,   Warm and dry and intact.   No evidence of rash.    PSYCHIATRIC:   Normal mood and affect.   No apparent risk to self or others.    HEMATOLOGICAL:  No cervical  lymphadenopathy.  no inguinal lymphadenopathy      08-22-20 @ 07:01  -  08-23-20 @ 07:00  --------------------------------------------------------  IN:    IV PiggyBack: 350 mL    norepinephrine Infusion: 13.9 mL    sodium chloride 0.9%: 100 mL    sodium chloride 0.9%.: 1725 mL  Total IN: 2188.9 mL    OUT:    Indwelling Catheter - Urethral: 1745 mL  Total OUT: 1745 mL    Total NET: 443.9 mL          LABS:                            8.2    10.53 )-----------( 281      ( 23 Aug 2020 04:30 )             26.3                                               08-23    138  |  96<L>  |  67<HH>  ----------------------------<  119<H>  3.8   |  29  |  4.7<HH>    23 Aug 2020 04:30    138    |  96<L>  |  67<HH>  ----------------------------<  119<H>  3.8     |  29     |  4.7<HH>  22 Aug 2020 16:10    137    |  94<L>  |  61<HH>  ----------------------------<  157<H>  4.0     |  27     |  4.7<HH>    Ca    7.3<L>      23 Aug 2020 04:30  Ca    7.1<L>      22 Aug 2020 16:10  Phos  6.1<H>     23 Aug 2020 04:30  Phos  6.3<H>     22 Aug 2020 16:10  Mg     3.6<HH>     23 Aug 2020 04:30  Mg     3.8<HH>     22 Aug 2020 05:30    TPro  5.4<L>  /  Alb  3.1<L>  /  TBili  0.5    /  DBili  x      /  AST  62<H>  /  ALT  36     /  AlkPhos  96     23 Aug 2020 04:30  TPro  5.1<L>  /  Alb  3.0<L>  /  TBili  <0.2   /  DBili  x      /  AST  66<H>  /  ALT  36     /  AlkPhos  87     22 Aug 2020 16:10      Ca    7.3<L>      23 Aug 2020 04:30  Phos  6.1     08-23  Mg     3.6     08-23    TPro  5.4<L>  /  Alb  3.1<L>  /  TBili  0.5  /  DBili  x   /  AST  62<H>  /  ALT  36  /  AlkPhos  96  08-23                                                 CARDIAC MARKERS ( 23 Aug 2020 04:30 )  x     / 0.46 ng/mL / x     / x     / x      CARDIAC MARKERS ( 22 Aug 2020 05:30 )  x     / x     / 846 U/L / x     / x      CARDIAC MARKERS ( 21 Aug 2020 20:00 )  x     / 0.88 ng/mL / x     / x     / x      CARDIAC MARKERS ( 21 Aug 2020 16:54 )  x     / 0.84 ng/mL / 775 U/L / x     / x                                                LIVER FUNCTIONS - ( 23 Aug 2020 04:30 )  Alb: 3.1 g/dL / Pro: 5.4 g/dL / ALK PHOS: 96 U/L / ALT: 36 U/L / AST: 62 U/L / GGT: x                                                  Culture - Urine (collected 20 Aug 2020 18:00)  Source: .Urine Clean Catch (Midstream)  Final Report (22 Aug 2020 07:23):    No growth    Culture - Blood (collected 20 Aug 2020 10:02)  Source: .Blood Blood-Peripheral  Preliminary Report (21 Aug 2020 17:02):    No growth to date.    Culture - Blood (collected 20 Aug 2020 10:02)  Source: .Blood Blood-Peripheral  Preliminary Report (21 Aug 2020 17:01):    No growth to date.                                                                                           MEDICATIONS  (STANDING):  aspirin enteric coated 81 milliGRAM(s) Oral daily  atorvastatin 40 milliGRAM(s) Oral at bedtime  cefepime   IVPB 1000 milliGRAM(s) IV Intermittent every 24 hours  dextrose 5%. 1000 milliLiter(s) (50 mL/Hr) IV Continuous <Continuous>  dextrose 50% Injectable 12.5 Gram(s) IV Push once  dextrose 50% Injectable 25 Gram(s) IV Push once  dextrose 50% Injectable 25 Gram(s) IV Push once  heparin   Injectable 5000 Unit(s) SubCutaneous every 12 hours  hydrocortisone sodium succinate Injectable 100 milliGRAM(s) IV Push every 8 hours  insulin glargine Injectable (LANTUS) 18 Unit(s) SubCutaneous at bedtime  insulin lispro (HumaLOG) corrective regimen sliding scale   SubCutaneous three times a day before meals  insulin lispro Injectable (HumaLOG) 6 Unit(s) SubCutaneous three times a day before meals  metroNIDAZOLE  IVPB      metroNIDAZOLE  IVPB 500 milliGRAM(s) IV Intermittent every 8 hours  midodrine 10 milliGRAM(s) Oral every 8 hours  norepinephrine Infusion 0.05 MICROgram(s)/kG/Min (4.25 mL/Hr) IV Continuous <Continuous>  pantoprazole    Tablet 40 milliGRAM(s) Oral before breakfast  sodium chloride 0.9%. 1000 milliLiter(s) (75 mL/Hr) IV Continuous <Continuous>  vancomycin    Solution 250 milliGRAM(s) Oral every 6 hours    MEDICATIONS  (PRN):  dextrose 40% Gel 15 Gram(s) Oral once PRN Blood Glucose LESS THAN 70 milliGRAM(s)/deciliter  glucagon  Injectable 1 milliGRAM(s) IntraMuscular once PRN Glucose LESS THAN 70 milligrams/deciliter  lidocaine 2% Viscous 15 milliLiter(s) Swish and Spit every 4 hours PRN tooth pain  senna 2 Tablet(s) Oral at bedtime PRN Constipation      New X-rays reviewed:                                                                                  ECHO    CXR interpreted by me:  No infiltrates

## 2020-08-23 NOTE — CONSULT NOTE ADULT - ASSESSMENT
IMPRESSION:  - NSTEMI likely type 2 in setting of severe acidemia, lactic acidosis, acute renal failure requiring urgent HD (from baseline ckd 3), septic shock 2/2 CD colitis requiring pressors)  - Ischemic lateral wall EKG changes (twi I, aVL, mild st dep v5-v6), no known pmh cad, asymptomatic.     PLAN  - cw current management for c. diff / jourdan (now on hd)  - echo reviewed. nml ef, mild as.   - labs reviewed.  scr + wbc + trops all downtrending  - no need for urgent cardiac w/u  - repeat EKG in am.   - may consider nonurgent outpt (vs inpt before dc) functional stress testing once stable and current issues resolved. IMPRESSION:  - NSTEMI likely type 2 in setting of severe acidemia, lactic acidosis, acute renal failure requiring urgent HD (from baseline ckd 3), septic shock 2/2 CD colitis requiring pressors)  - Ischemic lateral wall EKG changes (twi I, aVL, mild st dep v5-v6), no known pmh cad, asymptomatic.     PLAN  - cw current management for c. diff / jourdan (now on hd)  - echo reviewed. nml ef, mild as.   - labs reviewed.  scr + wbc + trops all downtrending  - no need for urgent cardiac w/u  - may consider nonurgent outpt functional stress testing once stable and current issues resolved

## 2020-08-24 LAB
ALBUMIN SERPL ELPH-MCNC: 3.1 G/DL — LOW (ref 3.5–5.2)
ALBUMIN SERPL ELPH-MCNC: 3.2 G/DL — LOW (ref 3.5–5.2)
ALP SERPL-CCNC: 124 U/L — HIGH (ref 30–115)
ALP SERPL-CCNC: 131 U/L — HIGH (ref 30–115)
ALT FLD-CCNC: 32 U/L — SIGNIFICANT CHANGE UP (ref 0–41)
ALT FLD-CCNC: 34 U/L — SIGNIFICANT CHANGE UP (ref 0–41)
ANION GAP SERPL CALC-SCNC: 14 MMOL/L — SIGNIFICANT CHANGE UP (ref 7–14)
ANION GAP SERPL CALC-SCNC: 14 MMOL/L — SIGNIFICANT CHANGE UP (ref 7–14)
AST SERPL-CCNC: 50 U/L — HIGH (ref 0–41)
AST SERPL-CCNC: 53 U/L — HIGH (ref 0–41)
BASOPHILS # BLD AUTO: 0 K/UL — SIGNIFICANT CHANGE UP (ref 0–0.2)
BASOPHILS # BLD AUTO: 0.01 K/UL — SIGNIFICANT CHANGE UP (ref 0–0.2)
BASOPHILS NFR BLD AUTO: 0 % — SIGNIFICANT CHANGE UP (ref 0–1)
BASOPHILS NFR BLD AUTO: 0.1 % — SIGNIFICANT CHANGE UP (ref 0–1)
BILIRUB SERPL-MCNC: 0.2 MG/DL — SIGNIFICANT CHANGE UP (ref 0.2–1.2)
BILIRUB SERPL-MCNC: 0.5 MG/DL — SIGNIFICANT CHANGE UP (ref 0.2–1.2)
BUN SERPL-MCNC: 57 MG/DL — HIGH (ref 10–20)
BUN SERPL-MCNC: 63 MG/DL — CRITICAL HIGH (ref 10–20)
CALCIUM SERPL-MCNC: 7.1 MG/DL — LOW (ref 8.5–10.1)
CALCIUM SERPL-MCNC: 7.5 MG/DL — LOW (ref 8.5–10.1)
CHLORIDE SERPL-SCNC: 101 MMOL/L — SIGNIFICANT CHANGE UP (ref 98–110)
CHLORIDE SERPL-SCNC: 101 MMOL/L — SIGNIFICANT CHANGE UP (ref 98–110)
CO2 SERPL-SCNC: 25 MMOL/L — SIGNIFICANT CHANGE UP (ref 17–32)
CO2 SERPL-SCNC: 27 MMOL/L — SIGNIFICANT CHANGE UP (ref 17–32)
CREAT SERPL-MCNC: 3.6 MG/DL — HIGH (ref 0.7–1.5)
CREAT SERPL-MCNC: 4 MG/DL — HIGH (ref 0.7–1.5)
EOSINOPHIL # BLD AUTO: 0.14 K/UL — SIGNIFICANT CHANGE UP (ref 0–0.7)
EOSINOPHIL # BLD AUTO: 0.18 K/UL — SIGNIFICANT CHANGE UP (ref 0–0.7)
EOSINOPHIL NFR BLD AUTO: 1.5 % — SIGNIFICANT CHANGE UP (ref 0–8)
EOSINOPHIL NFR BLD AUTO: 1.8 % — SIGNIFICANT CHANGE UP (ref 0–8)
GLUCOSE BLDC GLUCOMTR-MCNC: 126 MG/DL — HIGH (ref 70–99)
GLUCOSE BLDC GLUCOMTR-MCNC: 139 MG/DL — HIGH (ref 70–99)
GLUCOSE BLDC GLUCOMTR-MCNC: 143 MG/DL — HIGH (ref 70–99)
GLUCOSE BLDC GLUCOMTR-MCNC: 230 MG/DL — HIGH (ref 70–99)
GLUCOSE SERPL-MCNC: 138 MG/DL — HIGH (ref 70–99)
GLUCOSE SERPL-MCNC: 211 MG/DL — HIGH (ref 70–99)
HCT VFR BLD CALC: 27.3 % — LOW (ref 42–52)
HCT VFR BLD CALC: 28.4 % — LOW (ref 42–52)
HGB BLD-MCNC: 8.4 G/DL — LOW (ref 14–18)
HGB BLD-MCNC: 8.7 G/DL — LOW (ref 14–18)
IMM GRANULOCYTES NFR BLD AUTO: 0.5 % — HIGH (ref 0.1–0.3)
IMM GRANULOCYTES NFR BLD AUTO: 0.7 % — HIGH (ref 0.1–0.3)
LACTATE SERPL-SCNC: 1.2 MMOL/L — SIGNIFICANT CHANGE UP (ref 0.7–2)
LACTATE SERPL-SCNC: 2.6 MMOL/L — HIGH (ref 0.7–2)
LYMPHOCYTES # BLD AUTO: 0.46 K/UL — LOW (ref 1.2–3.4)
LYMPHOCYTES # BLD AUTO: 0.81 K/UL — LOW (ref 1.2–3.4)
LYMPHOCYTES # BLD AUTO: 5 % — LOW (ref 20.5–51.1)
LYMPHOCYTES # BLD AUTO: 8 % — LOW (ref 20.5–51.1)
MAGNESIUM SERPL-MCNC: 3.4 MG/DL — CRITICAL HIGH (ref 1.8–2.4)
MCHC RBC-ENTMCNC: 24.5 PG — LOW (ref 27–31)
MCHC RBC-ENTMCNC: 24.8 PG — LOW (ref 27–31)
MCHC RBC-ENTMCNC: 30.6 G/DL — LOW (ref 32–37)
MCHC RBC-ENTMCNC: 30.8 G/DL — LOW (ref 32–37)
MCV RBC AUTO: 80 FL — SIGNIFICANT CHANGE UP (ref 80–94)
MCV RBC AUTO: 80.5 FL — SIGNIFICANT CHANGE UP (ref 80–94)
MONOCYTES # BLD AUTO: 0.62 K/UL — HIGH (ref 0.1–0.6)
MONOCYTES # BLD AUTO: 0.85 K/UL — HIGH (ref 0.1–0.6)
MONOCYTES NFR BLD AUTO: 6.7 % — SIGNIFICANT CHANGE UP (ref 1.7–9.3)
MONOCYTES NFR BLD AUTO: 8.4 % — SIGNIFICANT CHANGE UP (ref 1.7–9.3)
NEUTROPHILS # BLD AUTO: 7.94 K/UL — HIGH (ref 1.4–6.5)
NEUTROPHILS # BLD AUTO: 8.28 K/UL — HIGH (ref 1.4–6.5)
NEUTROPHILS NFR BLD AUTO: 81.3 % — HIGH (ref 42.2–75.2)
NEUTROPHILS NFR BLD AUTO: 86 % — HIGH (ref 42.2–75.2)
NRBC # BLD: 0 /100 WBCS — SIGNIFICANT CHANGE UP (ref 0–0)
NRBC # BLD: 0 /100 WBCS — SIGNIFICANT CHANGE UP (ref 0–0)
PHOSPHATE SERPL-MCNC: 2.9 MG/DL — SIGNIFICANT CHANGE UP (ref 2.1–4.9)
PHOSPHATE SERPL-MCNC: 4.2 MG/DL — SIGNIFICANT CHANGE UP (ref 2.1–4.9)
PLATELET # BLD AUTO: 287 K/UL — SIGNIFICANT CHANGE UP (ref 130–400)
PLATELET # BLD AUTO: 302 K/UL — SIGNIFICANT CHANGE UP (ref 130–400)
POTASSIUM SERPL-MCNC: 2.9 MMOL/L — LOW (ref 3.5–5)
POTASSIUM SERPL-MCNC: 3.3 MMOL/L — LOW (ref 3.5–5)
POTASSIUM SERPL-SCNC: 2.9 MMOL/L — LOW (ref 3.5–5)
POTASSIUM SERPL-SCNC: 3.3 MMOL/L — LOW (ref 3.5–5)
PROT SERPL-MCNC: 5.7 G/DL — LOW (ref 6–8)
PROT SERPL-MCNC: 5.8 G/DL — LOW (ref 6–8)
RBC # BLD: 3.39 M/UL — LOW (ref 4.7–6.1)
RBC # BLD: 3.55 M/UL — LOW (ref 4.7–6.1)
RBC # FLD: 18.7 % — HIGH (ref 11.5–14.5)
RBC # FLD: 19.1 % — HIGH (ref 11.5–14.5)
SODIUM SERPL-SCNC: 140 MMOL/L — SIGNIFICANT CHANGE UP (ref 135–146)
SODIUM SERPL-SCNC: 142 MMOL/L — SIGNIFICANT CHANGE UP (ref 135–146)
WBC # BLD: 10.17 K/UL — SIGNIFICANT CHANGE UP (ref 4.8–10.8)
WBC # BLD: 9.23 K/UL — SIGNIFICANT CHANGE UP (ref 4.8–10.8)
WBC # FLD AUTO: 10.17 K/UL — SIGNIFICANT CHANGE UP (ref 4.8–10.8)
WBC # FLD AUTO: 9.23 K/UL — SIGNIFICANT CHANGE UP (ref 4.8–10.8)

## 2020-08-24 PROCEDURE — 71045 X-RAY EXAM CHEST 1 VIEW: CPT | Mod: 26

## 2020-08-24 PROCEDURE — 99222 1ST HOSP IP/OBS MODERATE 55: CPT

## 2020-08-24 RX ORDER — VANCOMYCIN HCL 1 G
125 VIAL (EA) INTRAVENOUS EVERY 6 HOURS
Refills: 0 | Status: DISCONTINUED | OUTPATIENT
Start: 2020-08-24 | End: 2020-08-26

## 2020-08-24 RX ORDER — PHENAZOPYRIDINE HCL 100 MG
100 TABLET ORAL EVERY 8 HOURS
Refills: 0 | Status: DISCONTINUED | OUTPATIENT
Start: 2020-08-24 | End: 2020-08-26

## 2020-08-24 RX ORDER — POTASSIUM CHLORIDE 20 MEQ
20 PACKET (EA) ORAL ONCE
Refills: 0 | Status: COMPLETED | OUTPATIENT
Start: 2020-08-24 | End: 2020-08-24

## 2020-08-24 RX ORDER — POTASSIUM CHLORIDE 20 MEQ
10 PACKET (EA) ORAL
Refills: 0 | Status: COMPLETED | OUTPATIENT
Start: 2020-08-24 | End: 2020-08-24

## 2020-08-24 RX ADMIN — Medication 100 MILLIGRAM(S): at 15:17

## 2020-08-24 RX ADMIN — Medication 125 MILLIGRAM(S): at 13:05

## 2020-08-24 RX ADMIN — OXYCODONE AND ACETAMINOPHEN 1 TABLET(S): 5; 325 TABLET ORAL at 11:23

## 2020-08-24 RX ADMIN — Medication 125 MILLIGRAM(S): at 17:29

## 2020-08-24 RX ADMIN — Medication 6 UNIT(S): at 17:28

## 2020-08-24 RX ADMIN — MIDODRINE HYDROCHLORIDE 10 MILLIGRAM(S): 2.5 TABLET ORAL at 05:07

## 2020-08-24 RX ADMIN — MIDODRINE HYDROCHLORIDE 10 MILLIGRAM(S): 2.5 TABLET ORAL at 21:59

## 2020-08-24 RX ADMIN — Medication 6 UNIT(S): at 12:35

## 2020-08-24 RX ADMIN — OXYCODONE AND ACETAMINOPHEN 1 TABLET(S): 5; 325 TABLET ORAL at 21:51

## 2020-08-24 RX ADMIN — OXYCODONE AND ACETAMINOPHEN 1 TABLET(S): 5; 325 TABLET ORAL at 21:11

## 2020-08-24 RX ADMIN — HEPARIN SODIUM 5000 UNIT(S): 5000 INJECTION INTRAVENOUS; SUBCUTANEOUS at 17:29

## 2020-08-24 RX ADMIN — Medication 250 MILLIGRAM(S): at 05:07

## 2020-08-24 RX ADMIN — Medication 81 MILLIGRAM(S): at 13:05

## 2020-08-24 RX ADMIN — PANTOPRAZOLE SODIUM 40 MILLIGRAM(S): 20 TABLET, DELAYED RELEASE ORAL at 06:39

## 2020-08-24 RX ADMIN — Medication 2: at 17:27

## 2020-08-24 RX ADMIN — Medication 50 MILLIEQUIVALENT(S): at 21:08

## 2020-08-24 RX ADMIN — ATORVASTATIN CALCIUM 40 MILLIGRAM(S): 80 TABLET, FILM COATED ORAL at 21:59

## 2020-08-24 RX ADMIN — HEPARIN SODIUM 5000 UNIT(S): 5000 INJECTION INTRAVENOUS; SUBCUTANEOUS at 05:07

## 2020-08-24 RX ADMIN — Medication 100 MILLIGRAM(S): at 05:07

## 2020-08-24 RX ADMIN — SODIUM CHLORIDE 75 MILLILITER(S): 9 INJECTION INTRAMUSCULAR; INTRAVENOUS; SUBCUTANEOUS at 13:06

## 2020-08-24 RX ADMIN — INSULIN GLARGINE 18 UNIT(S): 100 INJECTION, SOLUTION SUBCUTANEOUS at 21:59

## 2020-08-24 RX ADMIN — MIDODRINE HYDROCHLORIDE 10 MILLIGRAM(S): 2.5 TABLET ORAL at 13:05

## 2020-08-24 RX ADMIN — Medication 6 UNIT(S): at 08:16

## 2020-08-24 RX ADMIN — OXYCODONE AND ACETAMINOPHEN 1 TABLET(S): 5; 325 TABLET ORAL at 09:55

## 2020-08-24 NOTE — PROGRESS NOTE ADULT - SUBJECTIVE AND OBJECTIVE BOX
Patient was seen and examined. Spoke with RN. Chart reviewed.  No new events overnight- just complaining of dysuria  Vital Signs Last 24 Hrs  T(F): 96.4 (24 Aug 2020 05:24), Max: 97.4 (23 Aug 2020 22:30)  HR: 60 (24 Aug 2020 05:24) (48 - 70)  BP: 107/61 (24 Aug 2020 05:24) (75/47 - 130/65)  SpO2: 98% (23 Aug 2020 22:42) (84% - 100%)  MEDICATIONS  (STANDING):  aspirin enteric coated 81 milliGRAM(s) Oral daily  atorvastatin 40 milliGRAM(s) Oral at bedtime  dextrose 5%. 1000 milliLiter(s) (50 mL/Hr) IV Continuous <Continuous>  dextrose 50% Injectable 12.5 Gram(s) IV Push once  dextrose 50% Injectable 25 Gram(s) IV Push once  dextrose 50% Injectable 25 Gram(s) IV Push once  heparin   Injectable 5000 Unit(s) SubCutaneous every 12 hours  insulin glargine Injectable (LANTUS) 18 Unit(s) SubCutaneous at bedtime  insulin lispro (HumaLOG) corrective regimen sliding scale   SubCutaneous three times a day before meals  insulin lispro Injectable (HumaLOG) 6 Unit(s) SubCutaneous three times a day before meals  metroNIDAZOLE  IVPB      metroNIDAZOLE  IVPB 500 milliGRAM(s) IV Intermittent every 8 hours  midodrine 10 milliGRAM(s) Oral every 8 hours  pantoprazole    Tablet 40 milliGRAM(s) Oral before breakfast  sodium chloride 0.9%. 1000 milliLiter(s) (75 mL/Hr) IV Continuous <Continuous>  vancomycin    Solution 250 milliGRAM(s) Oral every 6 hours    MEDICATIONS  (PRN):  dextrose 40% Gel 15 Gram(s) Oral once PRN Blood Glucose LESS THAN 70 milliGRAM(s)/deciliter  glucagon  Injectable 1 milliGRAM(s) IntraMuscular once PRN Glucose LESS THAN 70 milligrams/deciliter  lidocaine 2% Viscous 15 milliLiter(s) Swish and Spit every 4 hours PRN tooth pain  oxycodone    5 mG/acetaminophen 325 mG 1 Tablet(s) Oral every 6 hours PRN Severe Pain (7 - 10)  senna 2 Tablet(s) Oral at bedtime PRN Constipation    Labs:                        8.7    10.17 )-----------( 302      ( 24 Aug 2020 06:43 )             28.4                         8.2    10.53 )-----------( 281      ( 23 Aug 2020 04:30 )             26.3     24 Aug 2020 06:43    142    |  101    |  63     ----------------------------<  138    3.3     |  27     |  4.0    23 Aug 2020 04:30    138    |  96     |  67     ----------------------------<  119    3.8     |  29     |  4.7      Ca    7.5        24 Aug 2020 06:43  Ca    7.3        23 Aug 2020 04:30  Phos  4.2       24 Aug 2020 06:43  Phos  6.1       23 Aug 2020 04:30  Mg     3.4       24 Aug 2020 06:43  Mg     3.6       23 Aug 2020 04:30    TPro  5.8    /  Alb  3.2    /  TBili  0.5    /  DBili  x      /  AST  53     /  ALT  34     /  AlkPhos  124    24 Aug 2020 06:43  TPro  5.4    /  Alb  3.1    /  TBili  0.5    /  DBili  x      /  AST  62     /  ALT  36     /  AlkPhos  96     23 Aug 2020 04:30          Culture - Blood (collected 22 Aug 2020 04:47)  Source: .Blood None  Preliminary Report (23 Aug 2020 18:01):    No growth to date.      General: comfortable, NAD  Neurology: A&Ox3, nonfocal  Head:  Normocephalic, atraumatic  ENT:  Mucosa moist, no ulcerations  Neck:  Supple, no JVD,   Resp: CTA B/L  CV: RRR, S1S2,   GI: Soft, NT, bowel sounds        A/P:  74 yo M w/ PMH of DM, retinopathy, nephrotic range proteinuria, bleeding gastric ulcer and subdural hematoma . Admitted with septic shock - now clinically improved    IV abx as per ID    ID f/u today    please call urology to f/u today- pt complaining of dysuria- poossibly related to gil    replace K as needed- give 40 PO    PT/rehab    renal f/u appreciated    DVT prophylaxis  Decubitus prevention- all measures as per RN protocol  Please call or text me with any questions or updates

## 2020-08-24 NOTE — PROGRESS NOTE ADULT - ASSESSMENT
74 yo M w/ PMH of DM, retinopathy, nephrotic range proteinuria, bleeding gastric ulcer and subdural hematoma who was brought in from home by his spouse for lethargy s/p Amoxicillin po for odontogenic infection. Admitted to ICU for septic shock requiring levophed.     #Severe sepsis   - Secondary to c. diff colitis, ptnt on Amoxicillin for odontogenic infection   - hypotensive (70's/30's) and hypothermic (30.8 C) on arrival with WBC 16.92   - lactate 20 on arrival --> 7.8 --> 2.1 today   - CTH 8/20 negative   - CTAP 8/20 without evidence of colitis/bowel obstruction, renal hypodensity  - Retroperitoneal US right renal cyst  - BCx, UCx NGTD  - s/p Unasyn 1500mg x 1, Vanc po 125mg q6h x 3  - D/c Cefepime 1g qd  - C/w Vanc po 250mg q6h, Flagyl 500mg IV q8h   - ID following   - C. diff positive   - f/u dental consult, percocet pain management   - off levophed   - c/w midodrine 10mg po q8h  - d/c steroids     #Severe KELL  #Lactic acidosis   #Severe Hyperkalemia/ Hypermagnesemia/ Hyperphosphatemia  - likely prerenal/ATN (baseline creatinine 1.3), decreased po intake, r/o Metformin lactic acidosis  - hold home metformin    - s/p two sessions HD   - Nephro following     #Normocytic anemia  - Hx of bleeding gastric ulcer, per wife in 1978 had transfusion and resolved with medication afterwards   - Hb 6.8 on 8/20  - s/p 2 u pRBC, repeat Hb 8.5  - monitor CBC   - f/u stool guaiac     #Hematuria- resolved   - started overnight on 8/20  - Vargas in place  - urology noted  - monitor CBC    #Troponemia  - no chest pain   - EKG without acute changes   - Echo: EF 55-60%  - ASA 81 mg po qd  - F/u cardiology     #DM  - hold home metformin due to KELL/severe lactic acidosis   - fingersticks qac and qhs   - basal bolus insulin regimen  - HbA1c: 6    #History of HTN: holding home bumetanide    #DLD: continue home Lipitor 40mg qd    #Gout: holding home meds     DVT ppx: Lovenox  GI ppx: Protonix  Diet: Renal and CC- kosher  Dispo: MICU  Activity: OOB to chair, PT/OT  FULL CODE        For Follow-Up:  Continue oral Vanc and IV Flagyl   Continue Midodrine   Monitor electrolytes   F/u cardiology  F/u nephrology   F/u dental 76 yo M w/ PMH of DM, retinopathy, nephrotic range proteinuria, bleeding gastric ulcer and subdural hematoma who was brought in from home by his spouse for lethargy s/p Amoxicillin po for odontogenic infection. Admitted to ICU for septic shock requiring levophed.     Severe sepsis   - Secondary to c. diff colitis, pt on Amoxicillin for odontogenic infection   - Hypotensive (70's/30's) and hypothermic (30.8 C) on arrival with WBC 16.92   - lactate 20 on arrival --> 7.8 --> 2.1 today   - CTH 8/20 negative   - CTAP 8/20 without evidence of colitis/bowel obstruction, renal hypodensity  - Retroperitoneal US right renal cyst  - BCx, UCx NGTD  - s/p Unasyn 1500mg x 1, Vanc po 125mg q6h x 3  - D/c Cefepime 1g qd  - C/w Vanc po 250mg q6h, Flagyl 500mg IV q8h   - ID following   - C. diff positive   - f/u dental consult, percocet pain management   - off levophed   - c/w midodrine 10mg po q8h  - d/c steroids     #Severe KELL  #Lactic acidosis   #Severe Hyperkalemia/ Hypermagnesemia/ Hyperphosphatemia  - likely prerenal/ATN (baseline creatinine 1.3), decreased po intake, r/o Metformin lactic acidosis  - hold home metformin    - s/p two sessions HD   - Nephro following     #Normocytic anemia  - Hx of bleeding gastric ulcer, per wife in 1978 had transfusion and resolved with medication afterwards   - Hb 6.8 on 8/20  - s/p 2 u pRBC, repeat Hb 8.5  - monitor CBC   - f/u stool guaiac     #Hematuria- resolved   - started overnight on 8/20  - Vargas in place  - urology noted  - monitor CBC    #Troponemia  - no chest pain   - EKG without acute changes   - Echo: EF 55-60%  - ASA 81 mg po qd  - F/u cardiology     #DM  - hold home metformin due to KELL/severe lactic acidosis   - fingersticks qac and qhs   - basal bolus insulin regimen  - HbA1c: 6    #History of HTN: holding home bumetanide    #DLD: continue home Lipitor 40mg qd    #Gout: holding home meds     DVT ppx: Lovenox  GI ppx: Protonix  Diet: Renal and CC- kosher  Dispo: MICU  Activity: OOB to chair, PT/OT  FULL CODE        For Follow-Up:  Continue oral Vanc and IV Flagyl   Continue Midodrine   Monitor electrolytes   F/u cardiology  F/u nephrology   F/u dental 76 yo M w/ PMH of DM, retinopathy, nephrotic range proteinuria, bleeding gastric ulcer and subdural hematoma who was brought in from home by his spouse for lethargy s/p Amoxicillin po for odontogenic infection. Admitted to ICU for septic shock requiring levophed.     Severe sepsis - improving  - Secondary to c. diff colitis, pt on Amoxicillin for odontogenic infection   - Hypotensive (70's/30's) and hypothermic (30.8 C) on arrival with WBC 16.92   - lactate 20 on arrival --> 7.8 --> 2.1 --> 1.2 today  - CTH (8/20) negative   - CTAP (8/20) without evidence of colitis/bowel obstruction, renal hypodensity  - Retroperitoneal US right renal cyst  - BCx, UCx NGTD  - S/p Unasyn 1500mg x 1, Vanc po 125mg q6h x 3  - On Vanc po 250mg q6h and Flagyl 500mg IV q8h   - ID following: rec Dental eval, continue with Vanc only  - C. diff positive   - off levophed, steroids  - c/w midodrine 10mg po q8h    Severe KELL on CKD, Lactic acidosis, Severe Hyperkalemia/ Hypermagnesemia/ Hyperphosphatemia  - likely prerenal/ATN (baseline creatinine 1.3), decreased po intake, r/o Metformin lactic acidosis  - hold home metformin    - s/p two sessions HD   - K 3.3 today, repleted  - Nephro following: reassess need for HD daily  - Trend BMP    Normocytic anemia  - Hx of bleeding gastric ulcer, per wife in 1978 had transfusion and resolved with medication afterwards   - Hb 6.8 on 8/20  - S/p 2u pRBC, repeat Hb 8.5 --> 8.7 today    Hematuria- resolved   - Started overnight on 8/20  - Vargas in place  - Urology noted    Troponemia  - No chest pain   - EKG without acute changes   - Echo: EF 55-60%  - ASA 81 mg po qd    DM  - Hold home metformin due to KELL/severe lactic acidosis   - Fingersticks qac and qhs   - Basal bolus insulin regimen  - HbA1c: 6    History of HTN: holding home bumetanide    DLD: continue home Lipitor 40mg qd    Gout: holding home meds     DVT ppx: Lovenox  GI ppx: Protonix  Diet: Renal and CC- kosher  Dispo: MICU  Activity: OOB to chair, PT/OT  FULL CODE

## 2020-08-24 NOTE — PROGRESS NOTE ADULT - SUBJECTIVE AND OBJECTIVE BOX
Nephrology progress note     Patient is a 76 yo man with long standing diabetes, retinopathy, nephrotic range proteinurua, bleeding gastric ulcer, gout, and subdural hematoma.  About 1 week ago had dental procedure which, per wife, developed into oral infection and was treated with amoxicillin  Over the past week he has decreased po intake, diarrhea and lethargy until brought to ER.  Labs indicated severe lactic acidosis, KELL, and hyperkalemia and pt was emergently dialyzed by the Renal team.  Now tranfered totelemetry and dialysis catheter removed.  Lactic acidosis resolved.    ON events/Subjective:     Allergies:  No Known Allergies    Hospital Medications:   MEDICATIONS  (STANDING):  aspirin enteric coated 81 milliGRAM(s) Oral daily  atorvastatin 40 milliGRAM(s) Oral at bedtime  dextrose 5%. 1000 milliLiter(s) (50 mL/Hr) IV Continuous <Continuous>  dextrose 50% Injectable 12.5 Gram(s) IV Push once  dextrose 50% Injectable 25 Gram(s) IV Push once  dextrose 50% Injectable 25 Gram(s) IV Push once  heparin   Injectable 5000 Unit(s) SubCutaneous every 12 hours  insulin glargine Injectable (LANTUS) 18 Unit(s) SubCutaneous at bedtime  insulin lispro (HumaLOG) corrective regimen sliding scale   SubCutaneous three times a day before meals  insulin lispro Injectable (HumaLOG) 6 Unit(s) SubCutaneous three times a day before meals  metroNIDAZOLE  IVPB      metroNIDAZOLE  IVPB 500 milliGRAM(s) IV Intermittent every 8 hours  midodrine 10 milliGRAM(s) Oral every 8 hours  pantoprazole    Tablet 40 milliGRAM(s) Oral before breakfast  sodium chloride 0.9%. 1000 milliLiter(s) (75 mL/Hr) IV Continuous <Continuous>  vancomycin    Solution 250 milliGRAM(s) Oral every 6 hours    REVIEW OF SYSTEMS:  CONSTITUTIONAL: No weakness, fevers or chills  EYES/ENT: No visual changes;  No vertigo or throat pain   NECK: No pain or stiffness  RESPIRATORY: No cough, wheezing, hemoptysis; No shortness of breath  CARDIOVASCULAR: No chest pain or palpitations.  GASTROINTESTINAL: No abdominal or epigastric pain. No nausea, vomiting, or hematemesis; No diarrhea or constipation. No melena or hematochezia.  GENITOURINARY: No dysuria, frequency, foamy urine, urinary urgency, incontinence or hematuria  NEUROLOGICAL: No numbness or weakness  SKIN: No itching, burning, rashes, or lesions   VASCULAR: No bilateral lower extremity edema.   All other review of systems is negative unless indicated above.    VITALS:  T(F): 96.4 (20 @ 05:24), Max: 97.4 (20 @ 22:30)  HR: 60 (20 @ 05:24)  BP: 107/61 (20 @ 05:24)  RR: 18 (20 @ 05:24)  SpO2: 98% (20 @ 22:42)  Wt(kg): --     @ 07:  -   @ 07:00  --------------------------------------------------------  IN: 2188.9 mL / OUT: 1745 mL / NET: 443.9 mL     @ 07:  -   @ 07:00  --------------------------------------------------------  IN: 1325 mL / OUT: 810 mL / NET: 515 mL        PHYSICAL EXAM:  Constitutional: NAD  HEENT: anicteric sclera, oropharynx clear, MMM  Neck: No JVD  Respiratory: CTAB, no wheezes, rales or rhonchi  Cardiovascular: S1, S2, RRR  Gastrointestinal: BS+, soft, NT/ND  Extremities: No cyanosis or clubbing. No peripheral edema  Neurological: A/O x 3, no focal deficits  Psychiatric: Normal mood, normal affect  : No CVA tenderness. No gil.   Skin: No rashes  Vascular Access:    LABS:      142  |  101  |  63<HH>  ----------------------------<  138<H>  3.3<L>   |  27  |  4.0<H>    Ca    7.5<L>      24 Aug 2020 06:43  Phos  4.2       Mg     3.6         TPro  5.8<L>  /  Alb  3.2<L>  /  TBili  0.5  /  DBili      /  AST  53<H>  /  ALT  34  /  AlkPhos  124<H>                            8.7    10.17 )-----------( 302      ( 24 Aug 2020 06:43 )             28.4       Urine Studies:  Creatinine Trend: 4.0<--, 4.7<--, 4.7<--, 4.5<--, 4.6<--, 3.6<--  Urinalysis Basic - ( 20 Aug 2020 18:00 )    Color: Yellow / Appearance: Clear / S.025 / pH:   Gluc:  / Ketone: Negative  / Bili: Negative / Urobili: 0.2         Blood:  / Protein: 100 / Nitrite: Negative   Leuk Esterase: Negative / RBC:  / WBC    Sq Epi:  / Non Sq Epi: Few / Bacteria: Few      ·	KELL in setting of sepsis/shock and lactic acidosis - stable/improved - catheter removed  ·	recent dental procedure and infection (past week) and was on amoxicillin  ·	loose stools with c diff colitis on flagyland vanc  ·	lactic acidosis - likely metformin with combination sepsis/shock - resolved  ·	anemia - s/p 2 units prbc  ·	more alert  ·	still with tooth pain    1) no indication fro dialysis at this time and will evaluate daily  2) agree with dental evaluation  3) abx per ID flagyl and vancomycin  4) ok with IVF  5) am labs

## 2020-08-24 NOTE — OCCUPATIONAL THERAPY INITIAL EVALUATION ADULT - PERTINENT HX OF CURRENT PROBLEM, REHAB EVAL
Pt brought in from home by spouse for lethargy. Pt had dental procedure with root canal done approximately 1 week prior to ED visit and was complicated by a dental infection.

## 2020-08-24 NOTE — OCCUPATIONAL THERAPY INITIAL EVALUATION ADULT - GENERAL OBSERVATIONS, REHAB EVAL
Pt received supine in bed. + IV drip, + B/L sequentials, + tele. Tx: [id number] Pt chart thoroughly reviewed prior to OT evaluation.

## 2020-08-24 NOTE — PROGRESS NOTE ADULT - SUBJECTIVE AND OBJECTIVE BOX
MANDA TAVAREZ  75y, Male    All available historical data reviewed    OVERNIGHT EVENTS:  no fevers  has penile bleed, epistaxis  diarrhea x2/24h  no abdominal pain  ROS:  General: Denies rigors, nightsweats  HEENT: Denies headache, rhinorrhea, sore throat, eye pain  CV: Denies CP, palpitations  PULM: Denies wheezing, hemoptysis  GI: Denies hematemesis, hematochezia, melena  : Denies discharge, hematuria  MSK: Denies arthralgias, myalgias  SKIN: Denies rash, lesions  NEURO: Denies paresthesias, weakness  PSYCH: Denies depression, anxiety    VITALS:  T(F): 96.4, Max: 97.4 (08-23-20 @ 22:30)  HR: 60  BP: 107/61  RR: 18Vital Signs Last 24 Hrs  T(C): 35.8 (24 Aug 2020 05:24), Max: 36.3 (23 Aug 2020 22:30)  T(F): 96.4 (24 Aug 2020 05:24), Max: 97.4 (23 Aug 2020 22:30)  HR: 60 (24 Aug 2020 05:24) (52 - 70)  BP: 107/61 (24 Aug 2020 05:24) (75/47 - 130/65)  BP(mean): 65 (23 Aug 2020 21:00) (57 - 83)  RR: 18 (24 Aug 2020 05:24) (14 - 23)  SpO2: 98% (23 Aug 2020 22:42) (95% - 100%)    TESTS & MEASUREMENTS:                        8.7    10.17 )-----------( 302      ( 24 Aug 2020 06:43 )             28.4     08-24    142  |  101  |  63<HH>  ----------------------------<  138<H>  3.3<L>   |  27  |  4.0<H>    Ca    7.5<L>      24 Aug 2020 06:43  Phos  4.2     08-24  Mg     3.4     08-24    TPro  5.8<L>  /  Alb  3.2<L>  /  TBili  0.5  /  DBili  x   /  AST  53<H>  /  ALT  34  /  AlkPhos  124<H>  08-24    LIVER FUNCTIONS - ( 24 Aug 2020 06:43 )  Alb: 3.2 g/dL / Pro: 5.8 g/dL / ALK PHOS: 124 U/L / ALT: 34 U/L / AST: 53 U/L / GGT: x             Culture - Blood (collected 08-22-20 @ 04:47)  Source: .Blood None  Preliminary Report (08-23-20 @ 18:01):    No growth to date.    Culture - Urine (collected 08-20-20 @ 18:00)  Source: .Urine Clean Catch (Midstream)  Final Report (08-22-20 @ 07:23):    No growth    Culture - Blood (collected 08-20-20 @ 10:02)  Source: .Blood Blood-Peripheral  Preliminary Report (08-21-20 @ 17:02):    No growth to date.    Culture - Blood (collected 08-20-20 @ 10:02)  Source: .Blood Blood-Peripheral  Preliminary Report (08-21-20 @ 17:01):    No growth to date.            RADIOLOGY & ADDITIONAL TESTS:  Personal review of radiological diagnostics performed  Echo and EKG results noted when applicable.     MEDICATIONS:  aspirin enteric coated 81 milliGRAM(s) Oral daily  atorvastatin 40 milliGRAM(s) Oral at bedtime  dextrose 40% Gel 15 Gram(s) Oral once PRN  dextrose 5%. 1000 milliLiter(s) IV Continuous <Continuous>  dextrose 50% Injectable 12.5 Gram(s) IV Push once  dextrose 50% Injectable 25 Gram(s) IV Push once  dextrose 50% Injectable 25 Gram(s) IV Push once  glucagon  Injectable 1 milliGRAM(s) IntraMuscular once PRN  heparin   Injectable 5000 Unit(s) SubCutaneous every 12 hours  insulin glargine Injectable (LANTUS) 18 Unit(s) SubCutaneous at bedtime  insulin lispro (HumaLOG) corrective regimen sliding scale   SubCutaneous three times a day before meals  insulin lispro Injectable (HumaLOG) 6 Unit(s) SubCutaneous three times a day before meals  lidocaine 2% Viscous 15 milliLiter(s) Swish and Spit every 4 hours PRN  metroNIDAZOLE  IVPB      metroNIDAZOLE  IVPB 500 milliGRAM(s) IV Intermittent every 8 hours  midodrine 10 milliGRAM(s) Oral every 8 hours  oxycodone    5 mG/acetaminophen 325 mG 1 Tablet(s) Oral every 6 hours PRN  pantoprazole    Tablet 40 milliGRAM(s) Oral before breakfast  senna 2 Tablet(s) Oral at bedtime PRN  sodium chloride 0.9%. 1000 milliLiter(s) IV Continuous <Continuous>  vancomycin    Solution 250 milliGRAM(s) Oral every 6 hours      ANTIBIOTICS:  metroNIDAZOLE  IVPB      metroNIDAZOLE  IVPB 500 milliGRAM(s) IV Intermittent every 8 hours  vancomycin    Solution 250 milliGRAM(s) Oral every 6 hours

## 2020-08-24 NOTE — PROGRESS NOTE ADULT - ASSESSMENT
· Assessment		  76 yo M w/ PMH of CKD (unknown stage) and follows Dr. Buchanan, gastric ulcers, HTN, DM, and gout on allopurinol brought in from home by spouse for lethargy. Admitted to ICU for septic shock requiring levophed.     IMPRESSION;  #resolved septic shock secondary to resolving CD colitis   ( pt was on po Amoxicillin for dental infection one week PTA )  CD 8/22 positive  BCX 8/20, 22 NGTD  MSOF : resolving   Metabolic encephalopathy : resolved    ARF ( s/p [place] with HD )  CT with no colitis   No pyuria  #Dental infection : needs evaluation  Penile bleed : trauma induced    RECOMMENDATIONS;  Dental evaluation  Po Vancomycin 125 mg q6h  d/c other ABx

## 2020-08-24 NOTE — PROGRESS NOTE ADULT - SUBJECTIVE AND OBJECTIVE BOX
Patient is a 75y old Male who presents with a chief complaint of Generalized weakness (24 Aug 2020 13:18)    No acute events overnight. Patient complaining of dysuria. Denies chest pain, SOB, N/V/D,    PAST MEDICAL & SURGICAL HISTORY  Sciatica, unspecified laterality  Osteoarthritis of multiple joints, unspecified osteoarthritis type  High cholesterol  Hypertension, unspecified type  Type 2 diabetes mellitus without complication, unspecified long term insulin use status  Chronic gout of foot, unspecified cause, unspecified laterality  Herniated disc, cervical  Diverticulitis      PHYSICAL EXAM  Vital Signs Last 24 Hrs  T(C): 36.1 (24 Aug 2020 12:56), Max: 36.3 (23 Aug 2020 22:30)  T(F): 97 (24 Aug 2020 12:56), Max: 97.4 (23 Aug 2020 22:30)  HR: 82 (24 Aug 2020 12:56) (52 - 82)  BP: 107/53 (24 Aug 2020 12:56) (75/47 - 130/65)  BP(mean): 65 (23 Aug 2020 21:00) (57 - 83)  RR: 18 (24 Aug 2020 12:56) (14 - 23)  SpO2: 98% (23 Aug 2020 22:42) (95% - 100%)    I&O's Summary    23 Aug 2020 07:01  -  24 Aug 2020 07:00  --------------------------------------------------------  IN: 1325 mL / OUT: 810 mL / NET: 515 mL      General: comfortable, NAD  Neurology: A&Ox3, nonfocal  Head:  Normocephalic, atraumatic  ENT:  Mucosa moist, no ulcerations  Neck:  Supple, no JVD,   Resp: CTA B/L  CV: RRR, S1S2,   GI: Soft, NT, bowel sounds      LABS                        8.7    10.17 )-----------( 302      ( 24 Aug 2020 06:43 )             28.4     Hemoglobin: 8.7 g/dL (08-24 @ 06:43)  Hemoglobin: 8.2 g/dL (08-23 @ 04:30)  Hemoglobin: 7.9 g/dL (08-22 @ 16:10)  Hemoglobin: 8.8 g/dL (08-22 @ 05:30)  Hemoglobin: 8.6 g/dL (08-21 @ 23:30)    CBC Full  -  ( 24 Aug 2020 06:43 )  WBC Count : 10.17 K/uL  RBC Count : 3.55 M/uL  Hemoglobin : 8.7 g/dL  Hematocrit : 28.4 %  Platelet Count - Automated : 302 K/uL  Mean Cell Volume : 80.0 fL  Mean Cell Hemoglobin : 24.5 pg  Mean Cell Hemoglobin Concentration : 30.6 g/dL  Auto Neutrophil # : 8.28 K/uL  Auto Lymphocyte # : 0.81 K/uL  Auto Monocyte # : 0.85 K/uL  Auto Eosinophil # : 0.18 K/uL  Auto Basophil # : 0.00 K/uL  Auto Neutrophil % : 81.3 %  Auto Lymphocyte % : 8.0 %  Auto Monocyte % : 8.4 %  Auto Eosinophil % : 1.8 %  Auto Basophil % : 0.0 %    08-24    142  |  101  |  63<HH>  ----------------------------<  138<H>  3.3<L>   |  27  |  4.0<H>    Ca    7.5<L>      24 Aug 2020 06:43  Phos  4.2     08-24  Mg     3.4     08-24    TPro  5.8<L>  /  Alb  3.2<L>  /  TBili  0.5  /  DBili  x   /  AST  53<H>  /  ALT  34  /  AlkPhos  124<H>  08-24    Creatinine Trend: 4.0<--, 4.7<--, 4.7<--, 4.5<--, 4.6<--, 3.6<--  LIVER FUNCTIONS - ( 24 Aug 2020 06:43 )  Alb: 3.2 g/dL / Pro: 5.8 g/dL / ALK PHOS: 124 U/L / ALT: 34 U/L / AST: 53 U/L / GGT: x             CARDIAC MARKERS ( 23 Aug 2020 04:30 )  x     / 0.46 ng/mL / x     / x     / x          MICROBIOLOGY    Culture - Blood (collected 22 Aug 2020 04:47)  Source: .Blood None  Preliminary Report (23 Aug 2020 18:01):    No growth to date.

## 2020-08-25 LAB
ALBUMIN SERPL ELPH-MCNC: 3 G/DL — LOW (ref 3.5–5.2)
ALP SERPL-CCNC: 119 U/L — HIGH (ref 30–115)
ALT FLD-CCNC: 27 U/L — SIGNIFICANT CHANGE UP (ref 0–41)
ANION GAP SERPL CALC-SCNC: 10 MMOL/L — SIGNIFICANT CHANGE UP (ref 7–14)
ANION GAP SERPL CALC-SCNC: 14 MMOL/L — SIGNIFICANT CHANGE UP (ref 7–14)
AST SERPL-CCNC: 39 U/L — SIGNIFICANT CHANGE UP (ref 0–41)
BILIRUB SERPL-MCNC: 0.3 MG/DL — SIGNIFICANT CHANGE UP (ref 0.2–1.2)
BUN SERPL-MCNC: 49 MG/DL — HIGH (ref 10–20)
BUN SERPL-MCNC: 50 MG/DL — HIGH (ref 10–20)
CALCIUM SERPL-MCNC: 7 MG/DL — LOW (ref 8.5–10.1)
CALCIUM SERPL-MCNC: 7.1 MG/DL — LOW (ref 8.5–10.1)
CHLORIDE SERPL-SCNC: 106 MMOL/L — SIGNIFICANT CHANGE UP (ref 98–110)
CHLORIDE SERPL-SCNC: 108 MMOL/L — SIGNIFICANT CHANGE UP (ref 98–110)
CO2 SERPL-SCNC: 22 MMOL/L — SIGNIFICANT CHANGE UP (ref 17–32)
CO2 SERPL-SCNC: 26 MMOL/L — SIGNIFICANT CHANGE UP (ref 17–32)
CREAT SERPL-MCNC: 2.8 MG/DL — HIGH (ref 0.7–1.5)
CREAT SERPL-MCNC: 3.2 MG/DL — HIGH (ref 0.7–1.5)
CULTURE RESULTS: SIGNIFICANT CHANGE UP
CULTURE RESULTS: SIGNIFICANT CHANGE UP
GLUCOSE BLDC GLUCOMTR-MCNC: 107 MG/DL — HIGH (ref 70–99)
GLUCOSE BLDC GLUCOMTR-MCNC: 166 MG/DL — HIGH (ref 70–99)
GLUCOSE BLDC GLUCOMTR-MCNC: 206 MG/DL — HIGH (ref 70–99)
GLUCOSE BLDC GLUCOMTR-MCNC: 91 MG/DL — SIGNIFICANT CHANGE UP (ref 70–99)
GLUCOSE SERPL-MCNC: 104 MG/DL — HIGH (ref 70–99)
GLUCOSE SERPL-MCNC: 143 MG/DL — HIGH (ref 70–99)
HCT VFR BLD CALC: 27.2 % — LOW (ref 42–52)
HGB BLD-MCNC: 8.3 G/DL — LOW (ref 14–18)
MAGNESIUM SERPL-MCNC: 2.8 MG/DL — HIGH (ref 1.8–2.4)
MCHC RBC-ENTMCNC: 25 PG — LOW (ref 27–31)
MCHC RBC-ENTMCNC: 30.5 G/DL — LOW (ref 32–37)
MCV RBC AUTO: 81.9 FL — SIGNIFICANT CHANGE UP (ref 80–94)
NRBC # BLD: 0 /100 WBCS — SIGNIFICANT CHANGE UP (ref 0–0)
PHOSPHATE SERPL-MCNC: 3 MG/DL — SIGNIFICANT CHANGE UP (ref 2.1–4.9)
PLATELET # BLD AUTO: 263 K/UL — SIGNIFICANT CHANGE UP (ref 130–400)
POTASSIUM SERPL-MCNC: 3 MMOL/L — LOW (ref 3.5–5)
POTASSIUM SERPL-MCNC: 3.6 MMOL/L — SIGNIFICANT CHANGE UP (ref 3.5–5)
POTASSIUM SERPL-SCNC: 3 MMOL/L — LOW (ref 3.5–5)
POTASSIUM SERPL-SCNC: 3.6 MMOL/L — SIGNIFICANT CHANGE UP (ref 3.5–5)
PROT SERPL-MCNC: 5.2 G/DL — LOW (ref 6–8)
RBC # BLD: 3.32 M/UL — LOW (ref 4.7–6.1)
RBC # FLD: 18.9 % — HIGH (ref 11.5–14.5)
SODIUM SERPL-SCNC: 142 MMOL/L — SIGNIFICANT CHANGE UP (ref 135–146)
SODIUM SERPL-SCNC: 144 MMOL/L — SIGNIFICANT CHANGE UP (ref 135–146)
SPECIMEN SOURCE: SIGNIFICANT CHANGE UP
SPECIMEN SOURCE: SIGNIFICANT CHANGE UP
WBC # BLD: 8.53 K/UL — SIGNIFICANT CHANGE UP (ref 4.8–10.8)
WBC # FLD AUTO: 8.53 K/UL — SIGNIFICANT CHANGE UP (ref 4.8–10.8)

## 2020-08-25 RX ORDER — POTASSIUM CHLORIDE 20 MEQ
10 PACKET (EA) ORAL
Refills: 0 | Status: DISCONTINUED | OUTPATIENT
Start: 2020-08-25 | End: 2020-08-25

## 2020-08-25 RX ORDER — POTASSIUM CHLORIDE 20 MEQ
20 PACKET (EA) ORAL ONCE
Refills: 0 | Status: COMPLETED | OUTPATIENT
Start: 2020-08-25 | End: 2020-08-25

## 2020-08-25 RX ORDER — POTASSIUM CHLORIDE 20 MEQ
40 PACKET (EA) ORAL ONCE
Refills: 0 | Status: COMPLETED | OUTPATIENT
Start: 2020-08-25 | End: 2020-08-25

## 2020-08-25 RX ADMIN — INSULIN GLARGINE 18 UNIT(S): 100 INJECTION, SOLUTION SUBCUTANEOUS at 22:33

## 2020-08-25 RX ADMIN — Medication 125 MILLIGRAM(S): at 17:03

## 2020-08-25 RX ADMIN — Medication 50 MILLIEQUIVALENT(S): at 13:08

## 2020-08-25 RX ADMIN — MIDODRINE HYDROCHLORIDE 10 MILLIGRAM(S): 2.5 TABLET ORAL at 05:16

## 2020-08-25 RX ADMIN — Medication 125 MILLIGRAM(S): at 01:07

## 2020-08-25 RX ADMIN — OXYCODONE AND ACETAMINOPHEN 1 TABLET(S): 5; 325 TABLET ORAL at 06:50

## 2020-08-25 RX ADMIN — PANTOPRAZOLE SODIUM 40 MILLIGRAM(S): 20 TABLET, DELAYED RELEASE ORAL at 05:14

## 2020-08-25 RX ADMIN — HEPARIN SODIUM 5000 UNIT(S): 5000 INJECTION INTRAVENOUS; SUBCUTANEOUS at 17:05

## 2020-08-25 RX ADMIN — MIDODRINE HYDROCHLORIDE 10 MILLIGRAM(S): 2.5 TABLET ORAL at 21:39

## 2020-08-25 RX ADMIN — OXYCODONE AND ACETAMINOPHEN 1 TABLET(S): 5; 325 TABLET ORAL at 11:27

## 2020-08-25 RX ADMIN — Medication 2: at 13:02

## 2020-08-25 RX ADMIN — Medication 6 UNIT(S): at 07:58

## 2020-08-25 RX ADMIN — Medication 81 MILLIGRAM(S): at 11:27

## 2020-08-25 RX ADMIN — OXYCODONE AND ACETAMINOPHEN 1 TABLET(S): 5; 325 TABLET ORAL at 05:13

## 2020-08-25 RX ADMIN — ATORVASTATIN CALCIUM 40 MILLIGRAM(S): 80 TABLET, FILM COATED ORAL at 21:40

## 2020-08-25 RX ADMIN — HEPARIN SODIUM 5000 UNIT(S): 5000 INJECTION INTRAVENOUS; SUBCUTANEOUS at 05:16

## 2020-08-25 RX ADMIN — Medication 125 MILLIGRAM(S): at 11:27

## 2020-08-25 RX ADMIN — OXYCODONE AND ACETAMINOPHEN 1 TABLET(S): 5; 325 TABLET ORAL at 13:06

## 2020-08-25 RX ADMIN — Medication 6 UNIT(S): at 13:02

## 2020-08-25 RX ADMIN — Medication 40 MILLIEQUIVALENT(S): at 11:27

## 2020-08-25 RX ADMIN — MIDODRINE HYDROCHLORIDE 10 MILLIGRAM(S): 2.5 TABLET ORAL at 13:07

## 2020-08-25 RX ADMIN — Medication 125 MILLIGRAM(S): at 23:34

## 2020-08-25 RX ADMIN — Medication 125 MILLIGRAM(S): at 05:14

## 2020-08-25 NOTE — CONSULT NOTE ADULT - REASON FOR ADMISSION
Generalized weakness
shock
Generalized weakness

## 2020-08-25 NOTE — PROGRESS NOTE ADULT - SUBJECTIVE AND OBJECTIVE BOX
Patient was seen and examined. Spoke with RN. Chart reviewed.  No events overnight.  Vital Signs Last 24 Hrs  T(F): 97.4 (25 Aug 2020 04:59), Max: 97.4 (25 Aug 2020 04:59)  HR: 63 (25 Aug 2020 04:59) (63 - 96)  BP: 143/64 (25 Aug 2020 04:59) (107/53 - 143/64)  SpO2: --  MEDICATIONS  (STANDING):  aspirin enteric coated 81 milliGRAM(s) Oral daily  atorvastatin 40 milliGRAM(s) Oral at bedtime  dextrose 5%. 1000 milliLiter(s) (50 mL/Hr) IV Continuous <Continuous>  dextrose 50% Injectable 12.5 Gram(s) IV Push once  dextrose 50% Injectable 25 Gram(s) IV Push once  dextrose 50% Injectable 25 Gram(s) IV Push once  heparin   Injectable 5000 Unit(s) SubCutaneous every 12 hours  insulin glargine Injectable (LANTUS) 18 Unit(s) SubCutaneous at bedtime  insulin lispro (HumaLOG) corrective regimen sliding scale   SubCutaneous three times a day before meals  insulin lispro Injectable (HumaLOG) 6 Unit(s) SubCutaneous three times a day before meals  midodrine 10 milliGRAM(s) Oral every 8 hours  pantoprazole    Tablet 40 milliGRAM(s) Oral before breakfast  sodium chloride 0.9%. 1000 milliLiter(s) (75 mL/Hr) IV Continuous <Continuous>  vancomycin    Solution 125 milliGRAM(s) Oral every 6 hours    MEDICATIONS  (PRN):  dextrose 40% Gel 15 Gram(s) Oral once PRN Blood Glucose LESS THAN 70 milliGRAM(s)/deciliter  glucagon  Injectable 1 milliGRAM(s) IntraMuscular once PRN Glucose LESS THAN 70 milligrams/deciliter  lidocaine 2% Viscous 15 milliLiter(s) Swish and Spit every 4 hours PRN tooth pain  oxycodone    5 mG/acetaminophen 325 mG 1 Tablet(s) Oral every 6 hours PRN Severe Pain (7 - 10)  phenazopyridine 100 milliGRAM(s) Oral every 8 hours PRN Bladder spasms  senna 2 Tablet(s) Oral at bedtime PRN Constipation    Labs:                        8.3    8.53  )-----------( 263      ( 25 Aug 2020 06:14 )             27.2                         8.4    9.23  )-----------( 287      ( 24 Aug 2020 18:52 )             27.3     25 Aug 2020 06:14    144    |  108    |  50     ----------------------------<  104    3.0     |  26     |  3.2    24 Aug 2020 18:52    140    |  101    |  57     ----------------------------<  211    2.9     |  25     |  3.6      Ca    7.0        25 Aug 2020 06:14  Ca    7.1        24 Aug 2020 18:52  Phos  3.0       25 Aug 2020 06:14  Phos  2.9       24 Aug 2020 18:52  Mg     2.8       25 Aug 2020 06:14  Mg     3.4       24 Aug 2020 06:43    TPro  5.2    /  Alb  3.0    /  TBili  0.3    /  DBili  x      /  AST  39     /  ALT  27     /  AlkPhos  119    25 Aug 2020 06:14  TPro  5.7    /  Alb  3.1    /  TBili  0.2    /  DBili  x      /  AST  50     /  ALT  32     /  AlkPhos  131    24 Aug 2020 18:52          General: comfortable, NAD  Neurology: A&Ox3, nonfocal  Head:  Normocephalic, atraumatic  ENT:  Mucosa moist, no ulcerations  Neck:  Supple, no JVD,   Skin: no breakdowns (as per RN)  Resp: CTA B/L  CV: RRR, S1S2,   GI: Soft, NT, bowel sounds  MS: No edema, + peripheral pulses, FROM all 4 extremity      A/P:  76 yo M w/ PMH of DM, retinopathy, nephrotic range proteinuria, bleeding gastric ulcer and subdural hematoma . Admitted with septic shock, cdiff- now clinically improved    ID f/u appreciated- PO vanco    dental eval    please call urology to f/u today- pt complaining of pelvic discomfort had CT done 8/20; repeat CT if pain worsens    needs to get OOB today    PT/rehab    outpt f/u anemia    DC planning 24-48    DVT prophylaxis  Decubitus prevention- all measures as per RN protocol  Please call or text me with any questions or updates

## 2020-08-25 NOTE — CONSULT NOTE ADULT - SUBJECTIVE AND OBJECTIVE BOX
Patient is a 75y old  Male who presents with a chief complaint of Generalized weakness (25 Aug 2020 11:11).       HPI:  76 yo M w/ PMH of CKD (unknown stage) and follows Dr. Buchanan, gastric ulcers, HTN, DM, and gout on allopurinol brought in from home by spouse for lethargy. History obtained from spouse over phone who reported that pt had dental procedure with root canal done about 1 week and was complicated by a dental infection. Pt was placed on amoxicillin and took it for 1 week. Pt also complained about severe dental pain limiting his oral intake of food or fluids. Over the past 5 days, pt also had 3-4 episodes of diarrhea per day associated with nonbloody nonbilious vomiting. The spouse found the pt today to be very lethargic and weak and brought him in to ED for further eval. Otherwise, pt denies fever, chill, SOB, CP, dysuria, flank pain, sick contacts or recent travel.    Vital Signs Last 24 Hrs  T(C): 32 (20 Aug 2020 12:45), Max: 37 (20 Aug 2020 09:45)  T(F): 89.6 (20 Aug 2020 12:45), Max: 98.6 (20 Aug 2020 09:45)  HR: 60 (20 Aug 2020 12:45) (48 - 60)  BP: 88/43 (20 Aug 2020 12:45) (63/31 - 91/48)  BP(mean): 61 (20 Aug 2020 12:45) (38 - 68)  RR: 20 (20 Aug 2020 12:45) (20 - 24)  SpO2: 100% (20 Aug 2020 12:45) (96% - 100%)    In the ED, pt was found to be bradycardic, hypothermic and hypotensive with lactate of 20 and Ph 6.8 and K of 8. [place] placed and pt sent for urgent HD and CT abdomen. Pt also given hydrocortisone and IV levothyroxine for suspected myedema coma. Pt is admitted to ICU for suspected septic shock requiring levophed. (20 Aug 2020 14:08)      PAST MEDICAL & SURGICAL HISTORY:  Sciatica, unspecified laterality  Osteoarthritis of multiple joints, unspecified osteoarthritis type  High cholesterol  Hypertension, unspecified type  Type 2 diabetes mellitus without complication, unspecified long term insulin use status  Chronic gout of foot, unspecified cause, unspecified laterality  Herniated disc, cervical  Diverticulitis      MEDICATIONS  (STANDING):  aspirin enteric coated 81 milliGRAM(s) Oral daily  atorvastatin 40 milliGRAM(s) Oral at bedtime  dextrose 5%. 1000 milliLiter(s) (50 mL/Hr) IV Continuous <Continuous>  dextrose 50% Injectable 12.5 Gram(s) IV Push once  dextrose 50% Injectable 25 Gram(s) IV Push once  dextrose 50% Injectable 25 Gram(s) IV Push once  heparin   Injectable 5000 Unit(s) SubCutaneous every 12 hours  insulin glargine Injectable (LANTUS) 18 Unit(s) SubCutaneous at bedtime  insulin lispro (HumaLOG) corrective regimen sliding scale   SubCutaneous three times a day before meals  insulin lispro Injectable (HumaLOG) 6 Unit(s) SubCutaneous three times a day before meals  midodrine 10 milliGRAM(s) Oral every 8 hours  pantoprazole    Tablet 40 milliGRAM(s) Oral before breakfast  potassium chloride  20 mEq/100 mL IVPB 20 milliEquivalent(s) IV Intermittent once  sodium chloride 0.9%. 1000 milliLiter(s) (75 mL/Hr) IV Continuous <Continuous>  vancomycin    Solution 125 milliGRAM(s) Oral every 6 hours    MEDICATIONS  (PRN):  dextrose 40% Gel 15 Gram(s) Oral once PRN Blood Glucose LESS THAN 70 milliGRAM(s)/deciliter  glucagon  Injectable 1 milliGRAM(s) IntraMuscular once PRN Glucose LESS THAN 70 milligrams/deciliter  lidocaine 2% Viscous 15 milliLiter(s) Swish and Spit every 4 hours PRN tooth pain  oxycodone    5 mG/acetaminophen 325 mG 1 Tablet(s) Oral every 6 hours PRN Severe Pain (7 - 10)  phenazopyridine 100 milliGRAM(s) Oral every 8 hours PRN Bladder spasms  senna 2 Tablet(s) Oral at bedtime PRN Constipation      Allergies: No Known Allergies        *SOCIAL HISTORY: ( - ) Tobacco; ( - ) ETOH    *Last Dental Visit: 1 week ago for completion of root canal on front tooth.     Vital Signs Last 24 Hrs  T(C): 36.3 (25 Aug 2020 04:59), Max: 36.3 (25 Aug 2020 04:59)  T(F): 97.4 (25 Aug 2020 04:59), Max: 97.4 (25 Aug 2020 04:59)  HR: 63 (25 Aug 2020 04:59) (63 - 96)  BP: 143/64 (25 Aug 2020 04:59) (107/53 - 143/64)  BP(mean): --  RR: 18 (25 Aug 2020 04:59) (18 - 18)  SpO2: --    LABS:                        8.3    8.53  )-----------( 263      ( 25 Aug 2020 06:14 )             27.2     08-25    144  |  108  |  50<H>  ----------------------------<  104<H>  3.0<L>   |  26  |  3.2<H>    Ca    7.0<L>      25 Aug 2020 06:14  Phos  3.0     08-25  Mg     2.8     08-25    TPro  5.2<L>  /  Alb  3.0<L>  /  TBili  0.3  /  DBili  x   /  AST  39  /  ALT  27  /  AlkPhos  119<H>  08-25    WBC Count: 8.53 K/uL [4.80 - 10.80] (08-25 @ 06:14)  Platelet Count - Automated: 263 K/uL [130 - 400] (08-25 @ 06:14)  Platelet Count - Automated: 287 K/uL [130 - 400] (08-24 @ 18:52)  WBC Count: 9.23 K/uL [4.80 - 10.80] (08-24 @ 18:52)  Platelet Count - Automated: 302 K/uL [130 - 400] (08-24 @ 06:43)  WBC Count: 10.17 K/uL [4.80 - 10.80] (08-24 @ 06:43)  Platelet Count - Automated: 281 K/uL [130 - 400] (08-23 @ 04:30)  WBC Count: 10.53 K/uL [4.80 - 10.80] (08-23 @ 04:30)  Platelet Count - Automated: 289 K/uL [130 - 400] (08-22 @ 16:10)  WBC Count: 10.48 K/uL [4.80 - 10.80] (08-22 @ 16:10)    IOE:  Permanent dentition with multiple missing teeth           hard/soft palate:  No pathology noted           tongue/FOM: No pathology noted           labial/buccal mucosa No pathology noted           ( + ) percussion           (  - ) palpation           ( -  ) swelling            ( -  ) abscess           ( - ) sinus tract    Dentition present: Partial edentulism  Mobility: #2 grade 3 mobility, residual root tips #4 and 5.     *DENTAL RADIOGRAPHS: 3 periapicals     *ASSESSMENT: Severe bone loss around #2 with residual root tips on #4 and 5. Patient complains of not being able to eat on that side due to pain. Patient stated that he had a root canal completed at his general dentist about one week or so ago, but that the root canal was completed.     *PLAN: Extract #2, 4, 5     PROCEDURE:   Verbal and written consent given.  Anesthesia: 1 Carpule 4% septocaine with 1:100,000 epinephrine  Treatment: All risks and benefits were reviewed as per OS sheet dated 7/13/00. Consent obtained. Side site marked. Anesthetized with 1 carpule 4% septocaine with 1:100,000 epinephrine. Elevated and extracted #2 and residual root tips #4 and 5 from sockets. Curettage and saline irrigation. Post operative radiograph obtained. Discussed with MD that there is no need for patient to be on antibiotics as there is no swelling or signs of active dental infection. Reviewed post operative instructions. Hemostasis achieved. Patient dismissed.     RECOMMENDATIONS:  1) Soft foods. NO straws, NO spitting, No swishing/rinsing until following day. No hot liquids for 12 hours.   2) Dental F/U with outpatient dentist for comprehensive dental care.     Emely Harvey

## 2020-08-25 NOTE — PROGRESS NOTE ADULT - SUBJECTIVE AND OBJECTIVE BOX
Nephrology progress note     Patient is a 76 yo man with long standing diabetes, retinopathy, nephrotic range proteinurua, bleeding gastric ulcer, gout, and subdural hematoma.  About 1 week ago had dental procedure which, per wife, developed into oral infection and was treated with amoxicillin  Over the past week he has decreased po intake, diarrhea and lethargy until brought to ER.  Labs indicated severe lactic acidosis, KELL, and hyperkalemia and pt was emergently dialyzed by the Renal team.  Now tranfered totelemetry and dialysis catheter removed.  Lactic acidosis resolved.    Had infected tooth extracted today    ON events/Subjective:     Allergies:  No Known Allergies    Hospital Medications:   MEDICATIONS  (STANDING):  aspirin enteric coated 81 milliGRAM(s) Oral daily  atorvastatin 40 milliGRAM(s) Oral at bedtime  dextrose 5%. 1000 milliLiter(s) (50 mL/Hr) IV Continuous <Continuous>  dextrose 50% Injectable 12.5 Gram(s) IV Push once  dextrose 50% Injectable 25 Gram(s) IV Push once  dextrose 50% Injectable 25 Gram(s) IV Push once  heparin   Injectable 5000 Unit(s) SubCutaneous every 12 hours  insulin glargine Injectable (LANTUS) 18 Unit(s) SubCutaneous at bedtime  insulin lispro (HumaLOG) corrective regimen sliding scale   SubCutaneous three times a day before meals  insulin lispro Injectable (HumaLOG) 6 Unit(s) SubCutaneous three times a day before meals  midodrine 10 milliGRAM(s) Oral every 8 hours  pantoprazole    Tablet 40 milliGRAM(s) Oral before breakfast  potassium chloride  20 mEq/100 mL IVPB 20 milliEquivalent(s) IV Intermittent once  sodium chloride 0.9%. 1000 milliLiter(s) (75 mL/Hr) IV Continuous <Continuous>  vancomycin    Solution 125 milliGRAM(s) Oral every 6 hours    REVIEW OF SYSTEMS:  CONSTITUTIONAL: No weakness, fevers or chills  EYES/ENT: No visual changes;  No vertigo or throat pain   NECK: No pain or stiffness  RESPIRATORY: No cough, wheezing, hemoptysis; No shortness of breath  CARDIOVASCULAR: No chest pain or palpitations.  GASTROINTESTINAL: No abdominal or epigastric pain. No nausea, vomiting, or hematemesis; No diarrhea or constipation. No melena or hematochezia.  GENITOURINARY: No dysuria, frequency, foamy urine, urinary urgency, incontinence or hematuria  NEUROLOGICAL: No numbness or weakness  SKIN: No itching, burning, rashes, or lesions   VASCULAR: No bilateral lower extremity edema.   All other review of systems is negative unless indicated above.    VITALS:  T(F): 97.4 (20 @ 04:59), Max: 97.4 (20 @ 04:59)  HR: 63 (20 @ 04:59)  BP: 143/64 (20 @ 04:59)  RR: 18 (20 @ 04:59)  SpO2: --  Wt(kg): --     @ 07:01  -   @ 07:00  --------------------------------------------------------  IN: 1325 mL / OUT: 810 mL / NET: 515 mL        PHYSICAL EXAM:  Constitutional: NAD  HEENT: anicteric sclera, oropharynx clear, MMM  Neck: No JVD  Respiratory: CTAB, no wheezes, rales or rhonchi  Cardiovascular: S1, S2, RRR  Gastrointestinal: BS+, soft, NT/ND  Extremities:  peripheral edema  Neurological: A/O x 3, no focal deficits  Psychiatric: Normal mood, normal affect  : No CVA tenderness. No gil.   Skin: No rashes  Vascular Access:    LABS:      144  |  108  |  50<H>  ----------------------------<  104<H>  3.0<L>   |  26  |  3.2<H>    Ca    7.0<L>      25 Aug 2020 06:14  Phos  3.0       Mg     2.8         TPro  5.2<L>  /  Alb  3.0<L>  /  TBili  0.3  /  DBili      /  AST  39  /  ALT  27  /  AlkPhos  119<H>                            8.3    8.53  )-----------( 263      ( 25 Aug 2020 06:14 )             27.2       Urine Studies:  Creatinine Trend: 3.2<--, 3.6<--, 4.0<--, 4.7<--, 4.7<--, 4.5<--  Urinalysis Basic - ( 20 Aug 2020 18:00 )    Color: Yellow / Appearance: Clear / S.025 / pH:   Gluc:  / Ketone: Negative  / Bili: Negative / Urobili: 0.2   Blood:  / Protein: 100 / Nitrite: Negative   Leuk Esterase: Negative / RBC:  / WBC    Sq Epi:  / Non Sq Epi: Few / Bacteria: Few    ·	KLEL in setting of sepsis/shock and lactic acidosis - stable/improving - catheter removed  ·	recent dental procedure and infection (past week) and was on amoxicillin  ·	loose stools with c diff colitis on vanc  ·	lactic acidosis - likely metformin with combination sepsis/shock - resolved  ·	anemia - s/p 2 units prbc  ·	more alert  ·	still with tooth pain and was extracted by dental today  ·	BP mproved on midodrine  ·	hypokalemia    1) per d/w dental no need abx for tooth extraction although ID to follow  2) if BP remains normal may stop midodrine  3) agree with replacwement of potassium but will likley need more please repeat bmp in evening

## 2020-08-25 NOTE — PROGRESS NOTE ADULT - ASSESSMENT
· Assessment		  76 yo M w/ PMH of CKD (unknown stage) and follows Dr. Buchanan, gastric ulcers, HTN, DM, and gout on allopurinol brought in from home by spouse for lethargy. Admitted to ICU for septic shock requiring levophed.     IMPRESSION;  #resolved septic shock secondary to resolving CD colitis   ( pt was on po Amoxicillin for dental infection one week PTA )  CD 8/22 positive  BCX 8/20, 22 NGTD  MSOF : resolving   Metabolic encephalopathy : resolved    ARF ( s/p [place] with HD )  CT with no colitis   No pyuria  #Dental infection : needs evaluation  Penile bleed : trauma induced    RECOMMENDATIONS;  Dental evaluation  Po Vancomycin 125 mg q6h for at least 14 more days

## 2020-08-25 NOTE — PROGRESS NOTE ADULT - SUBJECTIVE AND OBJECTIVE BOX
MANDA TAVAREZ  75y, Male    All available historical data reviewed    OVERNIGHT EVENTS:  no fevers  no epistaxis/hematuria  had one BM last night  has groin discomfort    ROS:  General: Denies rigors, nightsweats  HEENT: Denies headache, rhinorrhea, sore throat, eye pain  CV: Denies CP, palpitations  PULM: Denies wheezing, hemoptysis  GI: Denies hematemesis, hematochezia, melena  : Denies discharge, hematuria  MSK: Denies arthralgias, myalgias  SKIN: Denies rash, lesions  NEURO: Denies paresthesias, weakness  PSYCH: Denies depression, anxiety    VITALS:  T(F): 97.4, Max: 97.4 (08-25-20 @ 04:59)  HR: 63  BP: 143/64  RR: 18Vital Signs Last 24 Hrs  T(C): 36.3 (25 Aug 2020 04:59), Max: 36.3 (25 Aug 2020 04:59)  T(F): 97.4 (25 Aug 2020 04:59), Max: 97.4 (25 Aug 2020 04:59)  HR: 63 (25 Aug 2020 04:59) (63 - 96)  BP: 143/64 (25 Aug 2020 04:59) (107/53 - 143/64)  BP(mean): --  RR: 18 (25 Aug 2020 04:59) (18 - 18)  SpO2: --    TESTS & MEASUREMENTS:                        8.3    8.53  )-----------( 263      ( 25 Aug 2020 06:14 )             27.2     08-25    144  |  108  |  50<H>  ----------------------------<  104<H>  3.0<L>   |  26  |  3.2<H>    Ca    7.0<L>      25 Aug 2020 06:14  Phos  3.0     08-25  Mg     2.8     08-25    TPro  5.2<L>  /  Alb  3.0<L>  /  TBili  0.3  /  DBili  x   /  AST  39  /  ALT  27  /  AlkPhos  119<H>  08-25    LIVER FUNCTIONS - ( 25 Aug 2020 06:14 )  Alb: 3.0 g/dL / Pro: 5.2 g/dL / ALK PHOS: 119 U/L / ALT: 27 U/L / AST: 39 U/L / GGT: x             Culture - Blood (collected 08-22-20 @ 04:47)  Source: .Blood None  Preliminary Report (08-23-20 @ 18:01):    No growth to date.    Culture - Urine (collected 08-20-20 @ 18:00)  Source: .Urine Clean Catch (Midstream)  Final Report (08-22-20 @ 07:23):    No growth    Culture - Blood (collected 08-20-20 @ 10:02)  Source: .Blood Blood-Peripheral  Preliminary Report (08-21-20 @ 17:02):    No growth to date.    Culture - Blood (collected 08-20-20 @ 10:02)  Source: .Blood Blood-Peripheral  Preliminary Report (08-21-20 @ 17:01):    No growth to date.            RADIOLOGY & ADDITIONAL TESTS:  Personal review of radiological diagnostics performed  Echo and EKG results noted when applicable.     MEDICATIONS:  aspirin enteric coated 81 milliGRAM(s) Oral daily  atorvastatin 40 milliGRAM(s) Oral at bedtime  dextrose 40% Gel 15 Gram(s) Oral once PRN  dextrose 5%. 1000 milliLiter(s) IV Continuous <Continuous>  dextrose 50% Injectable 12.5 Gram(s) IV Push once  dextrose 50% Injectable 25 Gram(s) IV Push once  dextrose 50% Injectable 25 Gram(s) IV Push once  glucagon  Injectable 1 milliGRAM(s) IntraMuscular once PRN  heparin   Injectable 5000 Unit(s) SubCutaneous every 12 hours  insulin glargine Injectable (LANTUS) 18 Unit(s) SubCutaneous at bedtime  insulin lispro (HumaLOG) corrective regimen sliding scale   SubCutaneous three times a day before meals  insulin lispro Injectable (HumaLOG) 6 Unit(s) SubCutaneous three times a day before meals  lidocaine 2% Viscous 15 milliLiter(s) Swish and Spit every 4 hours PRN  midodrine 10 milliGRAM(s) Oral every 8 hours  oxycodone    5 mG/acetaminophen 325 mG 1 Tablet(s) Oral every 6 hours PRN  pantoprazole    Tablet 40 milliGRAM(s) Oral before breakfast  phenazopyridine 100 milliGRAM(s) Oral every 8 hours PRN  potassium chloride    Tablet ER 40 milliEquivalent(s) Oral once  potassium chloride  20 mEq/100 mL IVPB 20 milliEquivalent(s) IV Intermittent once  senna 2 Tablet(s) Oral at bedtime PRN  sodium chloride 0.9%. 1000 milliLiter(s) IV Continuous <Continuous>  vancomycin    Solution 125 milliGRAM(s) Oral every 6 hours      ANTIBIOTICS:  vancomycin    Solution 125 milliGRAM(s) Oral every 6 hours

## 2020-08-25 NOTE — PROGRESS NOTE ADULT - SUBJECTIVE AND OBJECTIVE BOX
Patient is a 75y old  Male who presents with a chief complaint of Generalized weakness (25 Aug 2020 12:37)        SUBJECTIVE:  Resting in bed.  no Pressors.        REVIEW OF SYSTEMS:    All Pertinent ROS are negative except per HPI       PHYSICAL EXAM  Vital Signs Last 24 Hrs  T(C): 36.6 (25 Aug 2020 13:06), Max: 36.6 (25 Aug 2020 13:06)  T(F): 97.8 (25 Aug 2020 13:06), Max: 97.8 (25 Aug 2020 13:06)  HR: 58 (25 Aug 2020 13:06) (58 - 96)  BP: 122/64 (25 Aug 2020 13:06) (122/64 - 143/64)  BP(mean): --  RR: 18 (25 Aug 2020 13:06) (18 - 18)  SpO2: --    CONSTITUTIONAL:  Well nourished.  NAD    ENT:   Airway patent,   No thrush    CARDIAC:   Normal rate,   regular rhythm.    no edema      RESPIRATORY:   NO Wheezing  Nnormal chest expansion  Nnot tachypneic,  No use of accessory muscles    GASTROINTESTINAL:  Abdomen soft,   non-tender,   no guarding,   + BS    MUSCULOSKELETAL:   range of motion is not limited,  no clubbing, cyanosis    NEUROLOGICAL:   Alert and oriented   no motor or deficits.    SKIN:   Skin normal color for race,   warm, dry   No evidence of rash.        LABS:                          8.3    8.53  )-----------( 263      ( 25 Aug 2020 06:14 )             27.2                                               08-25    144  |  108  |  50<H>  ----------------------------<  104<H>  3.0<L>   |  26  |  3.2<H>    Ca    7.0<L>      25 Aug 2020 06:14  Phos  3.0     08-25  Mg     2.8     08-25    TPro  5.2<L>  /  Alb  3.0<L>  /  TBili  0.3  /  DBili  x   /  AST  39  /  ALT  27  /  AlkPhos  119<H>  08-25                                                                                           LIVER FUNCTIONS - ( 25 Aug 2020 06:14 )  Alb: 3.0 g/dL / Pro: 5.2 g/dL / ALK PHOS: 119 U/L / ALT: 27 U/L / AST: 39 U/L / GGT: x                                                                                                MEDICATIONS  (STANDING):  aspirin enteric coated 81 milliGRAM(s) Oral daily  atorvastatin 40 milliGRAM(s) Oral at bedtime  dextrose 5%. 1000 milliLiter(s) (50 mL/Hr) IV Continuous <Continuous>  dextrose 50% Injectable 12.5 Gram(s) IV Push once  dextrose 50% Injectable 25 Gram(s) IV Push once  dextrose 50% Injectable 25 Gram(s) IV Push once  heparin   Injectable 5000 Unit(s) SubCutaneous every 12 hours  insulin glargine Injectable (LANTUS) 18 Unit(s) SubCutaneous at bedtime  insulin lispro (HumaLOG) corrective regimen sliding scale   SubCutaneous three times a day before meals  insulin lispro Injectable (HumaLOG) 6 Unit(s) SubCutaneous three times a day before meals  midodrine 10 milliGRAM(s) Oral every 8 hours  pantoprazole    Tablet 40 milliGRAM(s) Oral before breakfast  sodium chloride 0.9%. 1000 milliLiter(s) (75 mL/Hr) IV Continuous <Continuous>  vancomycin    Solution 125 milliGRAM(s) Oral every 6 hours    MEDICATIONS  (PRN):  dextrose 40% Gel 15 Gram(s) Oral once PRN Blood Glucose LESS THAN 70 milliGRAM(s)/deciliter  glucagon  Injectable 1 milliGRAM(s) IntraMuscular once PRN Glucose LESS THAN 70 milligrams/deciliter  lidocaine 2% Viscous 15 milliLiter(s) Swish and Spit every 4 hours PRN tooth pain  oxycodone    5 mG/acetaminophen 325 mG 1 Tablet(s) Oral every 6 hours PRN Severe Pain (7 - 10)  phenazopyridine 100 milliGRAM(s) Oral every 8 hours PRN Bladder spasms  senna 2 Tablet(s) Oral at bedtime PRN Constipation      X-Rays reviewed    CXR interpreted by me:

## 2020-08-25 NOTE — PROGRESS NOTE ADULT - ASSESSMENT
IMPRESSION:  Sepsis present on admission   septic shock resolved  C diff colitis  KELL on CKD improving     PLAN:    CNS: no sedation.     HEENT: oral care, aspiration precautions    PULMONARY: HOB >30. O2 as needed to maintain spo2>92%.      CARDIOVASCULAR:  avoid volume overload.  Continue hydration per renal     GI: GI prophylaxis. Feeding     RENAL:  FU lytes.      INFECTIOUS DISEASE: Continue CDiff therapy.       HEMATOLOGICAL:  DVT prophylaxis.    ENDOCRINE: Follow up FS.      MUSCULOSKELETAL: OOB to chair.  PT OT     Recall PRN

## 2020-08-25 NOTE — PROGRESS NOTE ADULT - ASSESSMENT
74 yo M w/ PMH of DM, retinopathy, nephrotic range proteinuria, bleeding gastric ulcer and subdural hematoma who was brought in from home by his spouse for lethargy s/p Amoxicillin po for odontogenic infection. Admitted to ICU for septic shock requiring levophed.     Severe sepsis - improving  - Secondary to c. diff colitis, pt on Amoxicillin for odontogenic infection   - Hypotensive (70's/30's) and hypothermic (30.8 C) on arrival with WBC 16.92   - lactate 20 on arrival --> 7.8 --> 2.1 --> 1.2 today  - CTH (8/20) negative   - CTAP (8/20) without evidence of colitis/bowel obstruction, renal hypodensity  - Retroperitoneal US right renal cyst  - BCx, UCx NGTD  - S/p Unasyn 1500mg x 1, Vanc po 125mg q6h x 3  - On Vanc po 250mg q6h and Flagyl 500mg IV q8h   - ID following: rec Dental eval, continue with Vanc only  - C. diff positive   - off levophed, steroids  - c/w midodrine 10mg po q8h    Severe KELL on CKD, Lactic acidosis, Severe Hyperkalemia/ Hypermagnesemia/ Hyperphosphatemia  - likely prerenal/ATN (baseline creatinine 1.3), decreased po intake, r/o Metformin lactic acidosis  - hold home metformin    - s/p two sessions HD   - K 3.3 yesterday, repleted  - Nephro following: reassess need for HD daily  - Trend BMP    Normocytic anemia  - Hx of bleeding gastric ulcer, per wife in 1978 had transfusion and resolved with medication afterwards   - Hb 6.8 on 8/20  - S/p 2u pRBC, repeat Hb 8.5 --> 8.7 today    Hematuria- resolved   - Started overnight on 8/20  - Vargas in place  - Urology noted    Troponemia  - No chest pain   - EKG without acute changes   - Echo: EF 55-60%  - ASA 81 mg po qd    DM  - Hold home metformin due to KELL/severe lactic acidosis   - Fingersticks qac and qhs   - Basal bolus insulin regimen  - HbA1c: 6    History of HTN: holding home bumetanide    DLD: continue home Lipitor 40mg qd    Gout: holding home meds     DVT ppx: Lovenox  GI ppx: Protonix  Diet: Renal and CC- kosher  Dispo: MICU  Activity: OOB to chair, PT/OT  FULL CODE 74 yo M w/ PMH of DM, retinopathy, nephrotic range proteinuria, bleeding gastric ulcer and subdural hematoma who was brought in from home by his spouse for lethargy s/p Amoxicillin po for odontogenic infection. Admitted to ICU for septic shock requiring levophed.     Severe sepsis - improving  - Secondary to c. diff colitis, pt on Amoxicillin for odontogenic infection   - Hypotensive (70's/30's) and hypothermic (30.8 C) on arrival with WBC 16.92   - lactate 20 on arrival --> 7.8 --> 2.1 --> 1.2 --> 2.6   - CTH (8/20) negative   - CTAP (8/20) without evidence of colitis/bowel obstruction, renal hypodensity  - Retroperitoneal US right renal cyst  - BCx, UCx NGTD  - S/p Unasyn 1500mg x 1, Vanc po 125mg q6h x 3  - On Vanc po 250mg q6h and Flagyl 500mg IV q8h   - ID following: rec Dental eval, continue with Vanc only  - C. diff positive   - off levophed, steroids  - c/w midodrine 10mg po q8h    Severe KELL on CKD, Lactic acidosis, Severe Hyperkalemia/ Hypermagnesemia/ Hyperphosphatemia  - likely prerenal/ATN (baseline creatinine 1.3), decreased po intake, r/o Metformin lactic acidosis  - hold home metformin    - s/p two sessions HD   - K 3.3 yesterday, repleted  - Nephro following: reassess need for HD daily  - Trend BMP    Normocytic anemia  - Hx of bleeding gastric ulcer, per wife in 1978 had transfusion and resolved with medication afterwards   - Hb 6.8 on 8/20  - S/p 2u pRBC, repeat Hb 8.5 --> 8.3 today    Hematuria- resolved   - Started overnight on 8/20  - Vargas in place  - Urology noted    Troponemia  - No chest pain   - EKG without acute changes   - Echo: EF 55-60%  - ASA 81 mg po qd    DM  - Hold home metformin due to KELL/severe lactic acidosis   - Fingersticks qac and qhs   - Basal bolus insulin regimen  - HbA1c: 6    History of HTN: holding home bumetanide    DLD: continue home Lipitor 40mg qd    Gout: holding home meds     DVT ppx: Lovenox  GI ppx: Protonix  Diet: Renal and CC- kosher  Dispo: MICU  Activity: OOB to chair, PT/OT  FULL CODE

## 2020-08-26 ENCOUNTER — TRANSCRIPTION ENCOUNTER (OUTPATIENT)
Age: 75
End: 2020-08-26

## 2020-08-26 VITALS
SYSTOLIC BLOOD PRESSURE: 158 MMHG | TEMPERATURE: 98 F | RESPIRATION RATE: 18 BRPM | DIASTOLIC BLOOD PRESSURE: 72 MMHG | WEIGHT: 218.26 LBS | HEART RATE: 75 BPM

## 2020-08-26 DIAGNOSIS — N18.9 CHRONIC KIDNEY DISEASE, UNSPECIFIED: ICD-10-CM

## 2020-08-26 LAB
ALBUMIN SERPL ELPH-MCNC: 3.2 G/DL — LOW (ref 3.5–5.2)
ALP SERPL-CCNC: 132 U/L — HIGH (ref 30–115)
ALT FLD-CCNC: 26 U/L — SIGNIFICANT CHANGE UP (ref 0–41)
ANION GAP SERPL CALC-SCNC: 14 MMOL/L — SIGNIFICANT CHANGE UP (ref 7–14)
AST SERPL-CCNC: 40 U/L — SIGNIFICANT CHANGE UP (ref 0–41)
BILIRUB SERPL-MCNC: 0.6 MG/DL — SIGNIFICANT CHANGE UP (ref 0.2–1.2)
BUN SERPL-MCNC: 41 MG/DL — HIGH (ref 10–20)
CA-I BLD-SCNC: 0.98 MMOL/L — LOW (ref 1.12–1.3)
CALCIUM SERPL-MCNC: 7.5 MG/DL — LOW (ref 8.5–10.1)
CHLORIDE SERPL-SCNC: 106 MMOL/L — SIGNIFICANT CHANGE UP (ref 98–110)
CO2 SERPL-SCNC: 24 MMOL/L — SIGNIFICANT CHANGE UP (ref 17–32)
CREAT SERPL-MCNC: 2.4 MG/DL — HIGH (ref 0.7–1.5)
GLUCOSE BLDC GLUCOMTR-MCNC: 103 MG/DL — HIGH (ref 70–99)
GLUCOSE BLDC GLUCOMTR-MCNC: 139 MG/DL — HIGH (ref 70–99)
GLUCOSE SERPL-MCNC: 108 MG/DL — HIGH (ref 70–99)
HCT VFR BLD CALC: 30.6 % — LOW (ref 42–52)
HGB BLD-MCNC: 9.1 G/DL — LOW (ref 14–18)
MAGNESIUM SERPL-MCNC: 2.7 MG/DL — HIGH (ref 1.8–2.4)
MCHC RBC-ENTMCNC: 24.8 PG — LOW (ref 27–31)
MCHC RBC-ENTMCNC: 29.7 G/DL — LOW (ref 32–37)
MCV RBC AUTO: 83.4 FL — SIGNIFICANT CHANGE UP (ref 80–94)
NRBC # BLD: 0 /100 WBCS — SIGNIFICANT CHANGE UP (ref 0–0)
PHOSPHATE SERPL-MCNC: 2.9 MG/DL — SIGNIFICANT CHANGE UP (ref 2.1–4.9)
PLATELET # BLD AUTO: 243 K/UL — SIGNIFICANT CHANGE UP (ref 130–400)
POTASSIUM SERPL-MCNC: 4 MMOL/L — SIGNIFICANT CHANGE UP (ref 3.5–5)
POTASSIUM SERPL-SCNC: 4 MMOL/L — SIGNIFICANT CHANGE UP (ref 3.5–5)
PROT SERPL-MCNC: 5.9 G/DL — LOW (ref 6–8)
RBC # BLD: 3.67 M/UL — LOW (ref 4.7–6.1)
RBC # FLD: 19.7 % — HIGH (ref 11.5–14.5)
SARS-COV-2 RNA SPEC QL NAA+PROBE: SIGNIFICANT CHANGE UP
SODIUM SERPL-SCNC: 144 MMOL/L — SIGNIFICANT CHANGE UP (ref 135–146)
WBC # BLD: 8.77 K/UL — SIGNIFICANT CHANGE UP (ref 4.8–10.8)
WBC # FLD AUTO: 8.77 K/UL — SIGNIFICANT CHANGE UP (ref 4.8–10.8)

## 2020-08-26 RX ORDER — PANTOPRAZOLE SODIUM 20 MG/1
1 TABLET, DELAYED RELEASE ORAL
Qty: 0 | Refills: 0 | DISCHARGE
Start: 2020-08-26

## 2020-08-26 RX ORDER — PANTOPRAZOLE SODIUM 20 MG/1
1 TABLET, DELAYED RELEASE ORAL
Qty: 30 | Refills: 0
Start: 2020-08-26 | End: 2020-09-24

## 2020-08-26 RX ORDER — MIDODRINE HYDROCHLORIDE 2.5 MG/1
1 TABLET ORAL
Qty: 30 | Refills: 0
Start: 2020-08-26 | End: 2020-09-04

## 2020-08-26 RX ORDER — VANCOMYCIN HCL 1 G
1 VIAL (EA) INTRAVENOUS
Qty: 56 | Refills: 0
Start: 2020-08-26 | End: 2020-09-08

## 2020-08-26 RX ORDER — ASPIRIN/CALCIUM CARB/MAGNESIUM 324 MG
1 TABLET ORAL
Qty: 30 | Refills: 0
Start: 2020-08-26 | End: 2020-09-24

## 2020-08-26 RX ADMIN — HEPARIN SODIUM 5000 UNIT(S): 5000 INJECTION INTRAVENOUS; SUBCUTANEOUS at 05:27

## 2020-08-26 RX ADMIN — OXYCODONE AND ACETAMINOPHEN 1 TABLET(S): 5; 325 TABLET ORAL at 11:50

## 2020-08-26 RX ADMIN — PANTOPRAZOLE SODIUM 40 MILLIGRAM(S): 20 TABLET, DELAYED RELEASE ORAL at 05:27

## 2020-08-26 RX ADMIN — Medication 6 UNIT(S): at 07:51

## 2020-08-26 RX ADMIN — OXYCODONE AND ACETAMINOPHEN 1 TABLET(S): 5; 325 TABLET ORAL at 07:00

## 2020-08-26 RX ADMIN — Medication 6 UNIT(S): at 11:51

## 2020-08-26 RX ADMIN — OXYCODONE AND ACETAMINOPHEN 1 TABLET(S): 5; 325 TABLET ORAL at 00:28

## 2020-08-26 RX ADMIN — Medication 125 MILLIGRAM(S): at 05:28

## 2020-08-26 RX ADMIN — Medication 81 MILLIGRAM(S): at 11:48

## 2020-08-26 RX ADMIN — OXYCODONE AND ACETAMINOPHEN 1 TABLET(S): 5; 325 TABLET ORAL at 06:36

## 2020-08-26 RX ADMIN — OXYCODONE AND ACETAMINOPHEN 1 TABLET(S): 5; 325 TABLET ORAL at 12:50

## 2020-08-26 RX ADMIN — Medication 125 MILLIGRAM(S): at 11:46

## 2020-08-26 RX ADMIN — OXYCODONE AND ACETAMINOPHEN 1 TABLET(S): 5; 325 TABLET ORAL at 00:22

## 2020-08-26 NOTE — DISCHARGE NOTE PROVIDER - NSDCMRMEDTOKEN_GEN_ALL_CORE_FT
allopurinol 300 mg oral tablet: 1 tab(s) orally once a day  aspirin 81 mg oral delayed release tablet: 1 tab(s) orally once a day  atorvastatin 40 mg oral tablet: 1 tab(s) orally once a day  bumetanide 1 mg oral tablet: 1 tab(s) orally once a day  metFORMIN 1000 mg oral tablet: 1 tab(s) orally 2 times a day  midodrine 10 mg oral tablet: 1 tab(s) orally every 8 hours  Omega-3 oral capsule: 1 tab(s) orally once a day  pantoprazole 40 mg oral delayed release tablet: 1 tab(s) orally once a day (before a meal)  pantoprazole 40 mg oral delayed release tablet: 1 tab(s) orally once a day (before a meal)  quinapril 40 mg oral tablet: 1 tab(s) orally once a day  vancomycin 125 mg oral capsule: 1 cap(s) orally every 6 hours

## 2020-08-26 NOTE — PROGRESS NOTE ADULT - SUBJECTIVE AND OBJECTIVE BOX
Nephrology progress note     Patient is a 76 yo man with long standing diabetes, retinopathy, nephrotic range proteinurua, bleeding gastric ulcer, gout, and subdural hematoma.  About 1 week ago had dental procedure which, per wife, developed into oral infection and was treated with amoxicillin  Over the past week he has decreased po intake, diarrhea and lethargy until brought to ER.  Labs indicated severe lactic acidosis, KELL, and hyperkalemia and pt was emergently dialyzed by the Renal team.  Now tranfered totelemetry and dialysis catheter removed.  Lactic acidosis resolved.    ON events/Subjective:     Allergies:  No Known Allergies    Hospital Medications:   MEDICATIONS  (STANDING):  aspirin enteric coated 81 milliGRAM(s) Oral daily  atorvastatin 40 milliGRAM(s) Oral at bedtime  dextrose 5%. 1000 milliLiter(s) (50 mL/Hr) IV Continuous <Continuous>  dextrose 50% Injectable 12.5 Gram(s) IV Push once  dextrose 50% Injectable 25 Gram(s) IV Push once  dextrose 50% Injectable 25 Gram(s) IV Push once  heparin   Injectable 5000 Unit(s) SubCutaneous every 12 hours  insulin glargine Injectable (LANTUS) 18 Unit(s) SubCutaneous at bedtime  insulin lispro (HumaLOG) corrective regimen sliding scale   SubCutaneous three times a day before meals  insulin lispro Injectable (HumaLOG) 6 Unit(s) SubCutaneous three times a day before meals  midodrine 10 milliGRAM(s) Oral every 8 hours  pantoprazole    Tablet 40 milliGRAM(s) Oral before breakfast  sodium chloride 0.9%. 1000 milliLiter(s) (75 mL/Hr) IV Continuous <Continuous>  vancomycin    Solution 125 milliGRAM(s) Oral every 6 hours    REVIEW OF SYSTEMS:  CONSTITUTIONAL: No weakness, fevers or chills  EYES/ENT: No visual changes;  No vertigo or throat pain   NECK: No pain or stiffness  RESPIRATORY: No cough, wheezing, hemoptysis; No shortness of breath  CARDIOVASCULAR: No chest pain or palpitations.  GASTROINTESTINAL: No abdominal or epigastric pain. No nausea, vomiting, or hematemesis; No diarrhea or constipation. No melena or hematochezia.  GENITOURINARY: No dysuria, frequency, foamy urine, urinary urgency, incontinence or hematuria  NEUROLOGICAL: No numbness or weakness  SKIN: No itching, burning, rashes, or lesions   VASCULAR: No bilateral lower extremity edema.   All other review of systems is negative unless indicated above.    VITALS:  T(F): 98 (20 @ 05:31), Max: 98.5 (20 @ 20:38)  HR: 75 (20 @ 05:31)  BP: 158/72 (20 @ 05:31)  RR: 18 (20 @ 05:31)  SpO2: --  Wt(kg): --     @ 07:01  -   @ 07:00  --------------------------------------------------------  IN: 0 mL / OUT: 1 mL / NET: -1 mL     @ 07:01  -   @ 13:37  --------------------------------------------------------  IN: 300 mL / OUT: 1 mL / NET: 299 mL        PHYSICAL EXAM:  Constitutional: NAD  HEENT: anicteric sclera, oropharynx clear, MMM  Neck: No JVD  Respiratory: CTAB, no wheezes, rales or rhonchi  Cardiovascular: S1, S2, RRR  Gastrointestinal: BS+, soft, NT/ND  Extremities: No cyanosis or clubbing. No peripheral edema  Neurological: A/O x 3, no focal deficits  Psychiatric: Normal mood, normal affect  : No CVA tenderness. No gil.   Skin: No rashes  Vascular Access:    LABS:      144  |  106  |  41<H>  ----------------------------<  108<H>  4.0   |  24  |  2.4<H>    Ca    7.5<L>      26 Aug 2020 06:15  Phos  2.9       Mg     2.7         TPro  5.9<L>  /  Alb  3.2<L>  /  TBili  0.6  /  DBili      /  AST  40  /  ALT  26  /  AlkPhos  132<H>                            9.1    8.77  )-----------( 243      ( 26 Aug 2020 06:15 )             30.6       Urine Studies:  Creatinine Trend: 2.4<--, 2.8<--, 3.2<--, 3.6<--, 4.0<--, 4.7<--  Urinalysis Basic - ( 20 Aug 2020 18:00 )    Color: Yellow / Appearance: Clear / S.025 / pH:   Gluc:  / Ketone: Negative  / Bili: Negative / Urobili: 0.2   Blood:  / Protein: 100 / Nitrite: Negative   Leuk Esterase: Negative / RBC:  / WBC    Sq Epi:  / Non Sq Epi: Few / Bacteria: Few      ·	KELL resolving  ·	lactic acidosis resolved  ·	anemia stable  ·	BP stable    1) if pt to be discharged may follow in my office within 2 weeks -   d/w pt and family

## 2020-08-26 NOTE — DISCHARGE NOTE NURSING/CASE MANAGEMENT/SOCIAL WORK - PATIENT PORTAL LINK FT
You can access the FollowMyHealth Patient Portal offered by [facility] by registering at the following website: [email or web address]. By joining [employer] [employer]’s FollowMyHealth portal, you will also be able to view your health information using other applications (apps) compatible with our system.

## 2020-08-26 NOTE — PROGRESS NOTE ADULT - SUBJECTIVE AND OBJECTIVE BOX
MANDA TAVAREZ  75y, Male    All available historical data reviewed    OVERNIGHT EVENTS:  no fevers  feels well and has no complaints   no hematuria/epistaxis  no BM last night    ROS:  General: Denies rigors, nightsweats  HEENT: Denies headache, rhinorrhea, sore throat, eye pain  CV: Denies CP, palpitations  PULM: Denies wheezing, hemoptysis  GI: Denies hematemesis, hematochezia, melena  : Denies discharge, hematuria  MSK: Denies arthralgias, myalgias  SKIN: Denies rash, lesions  NEURO: Denies paresthesias, weakness  PSYCH: Denies depression, anxiety    VITALS:  T(F): 98, Max: 98.5 (08-25-20 @ 20:38)  HR: 75  BP: 158/72  RR: 18Vital Signs Last 24 Hrs  T(C): 36.7 (26 Aug 2020 05:31), Max: 36.9 (25 Aug 2020 20:38)  T(F): 98 (26 Aug 2020 05:31), Max: 98.5 (25 Aug 2020 20:38)  HR: 75 (26 Aug 2020 05:31) (58 - 75)  BP: 158/72 (26 Aug 2020 05:31) (122/64 - 158/72)  BP(mean): --  RR: 18 (26 Aug 2020 05:31) (18 - 18)  SpO2: --    TESTS & MEASUREMENTS:                        9.1    8.77  )-----------( 243      ( 26 Aug 2020 06:15 )             30.6     08-26    144  |  106  |  41<H>  ----------------------------<  108<H>  4.0   |  24  |  2.4<H>    Ca    7.5<L>      26 Aug 2020 06:15  Phos  2.9     08-26  Mg     2.7     08-26    TPro  5.9<L>  /  Alb  3.2<L>  /  TBili  0.6  /  DBili  x   /  AST  40  /  ALT  26  /  AlkPhos  132<H>  08-26    LIVER FUNCTIONS - ( 26 Aug 2020 06:15 )  Alb: 3.2 g/dL / Pro: 5.9 g/dL / ALK PHOS: 132 U/L / ALT: 26 U/L / AST: 40 U/L / GGT: x             Culture - Blood (collected 08-22-20 @ 04:47)  Source: .Blood None  Preliminary Report (08-23-20 @ 18:01):    No growth to date.    Culture - Urine (collected 08-20-20 @ 18:00)  Source: .Urine Clean Catch (Midstream)  Final Report (08-22-20 @ 07:23):    No growth    Culture - Blood (collected 08-20-20 @ 10:02)  Source: .Blood Blood-Peripheral  Final Report (08-25-20 @ 17:07):    No Growth Final    Culture - Blood (collected 08-20-20 @ 10:02)  Source: .Blood Blood-Peripheral  Final Report (08-25-20 @ 17:07):    No Growth Final            RADIOLOGY & ADDITIONAL TESTS:  Personal review of radiological diagnostics performed  Echo and EKG results noted when applicable.     MEDICATIONS:  aspirin enteric coated 81 milliGRAM(s) Oral daily  atorvastatin 40 milliGRAM(s) Oral at bedtime  dextrose 40% Gel 15 Gram(s) Oral once PRN  dextrose 5%. 1000 milliLiter(s) IV Continuous <Continuous>  dextrose 50% Injectable 12.5 Gram(s) IV Push once  dextrose 50% Injectable 25 Gram(s) IV Push once  dextrose 50% Injectable 25 Gram(s) IV Push once  glucagon  Injectable 1 milliGRAM(s) IntraMuscular once PRN  heparin   Injectable 5000 Unit(s) SubCutaneous every 12 hours  insulin glargine Injectable (LANTUS) 18 Unit(s) SubCutaneous at bedtime  insulin lispro (HumaLOG) corrective regimen sliding scale   SubCutaneous three times a day before meals  insulin lispro Injectable (HumaLOG) 6 Unit(s) SubCutaneous three times a day before meals  lidocaine 2% Viscous 15 milliLiter(s) Swish and Spit every 4 hours PRN  midodrine 10 milliGRAM(s) Oral every 8 hours  oxycodone    5 mG/acetaminophen 325 mG 1 Tablet(s) Oral every 6 hours PRN  pantoprazole    Tablet 40 milliGRAM(s) Oral before breakfast  phenazopyridine 100 milliGRAM(s) Oral every 8 hours PRN  senna 2 Tablet(s) Oral at bedtime PRN  sodium chloride 0.9%. 1000 milliLiter(s) IV Continuous <Continuous>  vancomycin    Solution 125 milliGRAM(s) Oral every 6 hours      ANTIBIOTICS:  vancomycin    Solution 125 milliGRAM(s) Oral every 6 hours

## 2020-08-26 NOTE — PROGRESS NOTE ADULT - SUBJECTIVE AND OBJECTIVE BOX
Patient was seen and examined. Spoke with HO. Chart reviewed.  No events overnight. Feeling better, no pelvic pain   Vital Signs Last 24 Hrs  T(F): 98 (26 Aug 2020 05:31), Max: 98.5 (25 Aug 2020 20:38)  HR: 75 (26 Aug 2020 05:31) (58 - 75)  BP: 158/72 (26 Aug 2020 05:31) (122/64 - 158/72)  SpO2: --  MEDICATIONS  (STANDING):  aspirin enteric coated 81 milliGRAM(s) Oral daily  atorvastatin 40 milliGRAM(s) Oral at bedtime  dextrose 5%. 1000 milliLiter(s) (50 mL/Hr) IV Continuous <Continuous>  dextrose 50% Injectable 12.5 Gram(s) IV Push once  dextrose 50% Injectable 25 Gram(s) IV Push once  dextrose 50% Injectable 25 Gram(s) IV Push once  heparin   Injectable 5000 Unit(s) SubCutaneous every 12 hours  insulin glargine Injectable (LANTUS) 18 Unit(s) SubCutaneous at bedtime  insulin lispro (HumaLOG) corrective regimen sliding scale   SubCutaneous three times a day before meals  insulin lispro Injectable (HumaLOG) 6 Unit(s) SubCutaneous three times a day before meals  midodrine 10 milliGRAM(s) Oral every 8 hours  pantoprazole    Tablet 40 milliGRAM(s) Oral before breakfast  sodium chloride 0.9%. 1000 milliLiter(s) (75 mL/Hr) IV Continuous <Continuous>  vancomycin    Solution 125 milliGRAM(s) Oral every 6 hours    MEDICATIONS  (PRN):  dextrose 40% Gel 15 Gram(s) Oral once PRN Blood Glucose LESS THAN 70 milliGRAM(s)/deciliter  glucagon  Injectable 1 milliGRAM(s) IntraMuscular once PRN Glucose LESS THAN 70 milligrams/deciliter  lidocaine 2% Viscous 15 milliLiter(s) Swish and Spit every 4 hours PRN tooth pain  oxycodone    5 mG/acetaminophen 325 mG 1 Tablet(s) Oral every 6 hours PRN Severe Pain (7 - 10)  phenazopyridine 100 milliGRAM(s) Oral every 8 hours PRN Bladder spasms  senna 2 Tablet(s) Oral at bedtime PRN Constipation    Labs:                        9.1    8.77  )-----------( 243      ( 26 Aug 2020 06:15 )             30.6                         8.3    8.53  )-----------( 263      ( 25 Aug 2020 06:14 )             27.2     26 Aug 2020 06:15    144    |  106    |  41     ----------------------------<  108    4.0     |  24     |  2.4    25 Aug 2020 18:00    142    |  106    |  49     ----------------------------<  143    3.6     |  22     |  2.8      Ca    7.5        26 Aug 2020 06:15  Ca    7.1        25 Aug 2020 18:00  Phos  2.9       26 Aug 2020 06:15  Phos  3.0       25 Aug 2020 06:14  Mg     2.7       26 Aug 2020 06:15  Mg     2.8       25 Aug 2020 06:14    TPro  5.9    /  Alb  3.2    /  TBili  0.6    /  DBili  x      /  AST  40     /  ALT  26     /  AlkPhos  132    26 Aug 2020 06:15  TPro  5.2    /  Alb  3.0    /  TBili  0.3    /  DBili  x      /  AST  39     /  ALT  27     /  AlkPhos  119    25 Aug 2020 06:14          General: comfortable, NAD  Neurology: A&Ox3, nonfocal  Head:  Normocephalic, atraumatic  ENT:  Mucosa moist, no ulcerations  Neck:  Supple, no JVD,   Resp: CTA B/L  CV: RRR, S1S2,   GI: Soft, NT, bowel sounds      A/P:  76 yo M w/ PMH of DM, retinopathy, nephrotic range proteinuria, bleeding gastric ulcer and subdural hematoma . Admitted with septic shock, cdiff- now clinically improved    ID f/u appreciated- PO vanco    dental eval appreciated- outpt f/u    outpt urology f/u     renal to decide on midodrine    OOB today    PT/rehab    DC planning 24  DVT prophylaxis  Decubitus prevention- all measures as per RN protocol  Please call or text me with any questions or updates

## 2020-08-26 NOTE — PROGRESS NOTE ADULT - GASTROINTESTINAL DETAILS
nontender/soft/no guarding/no rebound tenderness/no rigidity
no guarding/no rebound tenderness/no rigidity/nontender/soft
soft/no guarding/nontender/no rebound tenderness/no rigidity

## 2020-08-26 NOTE — PROGRESS NOTE ADULT - ASSESSMENT
· Assessment		  74 yo M w/ PMH of CKD (unknown stage) and follows Dr. Buchanan, gastric ulcers, HTN, DM, and gout on allopurinol brought in from home by spouse for lethargy. Admitted to ICU for septic shock requiring levophed.     IMPRESSION;  #resolved septic shock secondary to resolving CD colitis   ( pt was on po Amoxicillin for dental infection one week PTA )  CD 8/22 positive  BCX 8/20, 22 NGTD  MSOF : resolving   Metabolic encephalopathy : resolved    ARF ( s/p [place] with HD )  CT with no colitis   No pyuria  #Dental infection : 8/25 teeth extracted. Dental will f/u as outpt  Penile bleed : trauma induced, resolved    RECOMMENDATIONS;  Po Vancomycin 125 mg q6h for at least 14 more days  d/c isolation if no diarrhea

## 2020-08-26 NOTE — DISCHARGE NOTE PROVIDER - PROVIDER TOKENS
PROVIDER:[TOKEN:[[id number]],FOLLOWUP:[1 week]],PROVIDER:[TOKEN:[[id number]],FOLLOWUP:[2 weeks]]

## 2020-08-26 NOTE — PROGRESS NOTE ADULT - SUBJECTIVE AND OBJECTIVE BOX
Patient is a 75y old Male who presents with a chief complaint of Generalized weakness (26 Aug 2020 08:55)  No acute events overnight. Patient has no complaints this morning. Denies chest pain, SOB, N/V/D,    PAST MEDICAL & SURGICAL HISTORY  Sciatica, unspecified laterality  Osteoarthritis of multiple joints, unspecified osteoarthritis type  High cholesterol  Hypertension, unspecified type  Type 2 diabetes mellitus without complication, unspecified long term insulin use status  Chronic gout of foot, unspecified cause, unspecified laterality  Herniated disc, cervical  Diverticulitis      PHYSICAL EXAM  Vital Signs Last 24 Hrs  T(C): 36.7 (26 Aug 2020 05:31), Max: 36.9 (25 Aug 2020 20:38)  T(F): 98 (26 Aug 2020 05:31), Max: 98.5 (25 Aug 2020 20:38)  HR: 75 (26 Aug 2020 05:31) (58 - 75)  BP: 158/72 (26 Aug 2020 05:31) (122/64 - 158/72)  BP(mean): --  RR: 18 (26 Aug 2020 05:31) (18 - 18)  SpO2: --    I&O's Summary    25 Aug 2020 07:01  -  26 Aug 2020 07:00  --------------------------------------------------------  IN: 0 mL / OUT: 1 mL / NET: -1 mL    26 Aug 2020 07:01  -  26 Aug 2020 09:54  --------------------------------------------------------  IN: 300 mL / OUT: 1 mL / NET: 299 mL      GENERAL: No acute distress, well-developed  HEAD:  Atraumatic, Normocephalic  ENT: PERRL, conjunctiva and sclera clear, neck supple  CHEST/LUNG: Clear to auscultation bilaterally; No wheeze, equal breath sounds bilaterally, respirations nonlabored  HEART: Regular rate and rhythm; No murmurs, rubs, or gallops  ABDOMEN: Soft, nontender, nondistended  EXTREMITIES:  No clubbing, cyanosis, or edema  PSYCH: Nl behavior, nl affect  NEUROLOGY: AAOx3, non-focal, moves all extremities spontaneously  SKIN: Normal color, No rashes or lesions      LABS                        9.1    8.77  )-----------( 243      ( 26 Aug 2020 06:15 )             30.6     Hemoglobin: 9.1 g/dL (08-26 @ 06:15)  Hemoglobin: 8.3 g/dL (08-25 @ 06:14)  Hemoglobin: 8.4 g/dL (08-24 @ 18:52)  Hemoglobin: 8.7 g/dL (08-24 @ 06:43)  Hemoglobin: 8.2 g/dL (08-23 @ 04:30)    CBC Full  -  ( 26 Aug 2020 06:15 )  WBC Count : 8.77 K/uL  RBC Count : 3.67 M/uL  Hemoglobin : 9.1 g/dL  Hematocrit : 30.6 %  Platelet Count - Automated : 243 K/uL  Mean Cell Volume : 83.4 fL  Mean Cell Hemoglobin : 24.8 pg  Mean Cell Hemoglobin Concentration : 29.7 g/dL  Auto Neutrophil # : x  Auto Lymphocyte # : x  Auto Monocyte # : x  Auto Eosinophil # : x  Auto Basophil # : x  Auto Neutrophil % : x  Auto Lymphocyte % : x  Auto Monocyte % : x  Auto Eosinophil % : x  Auto Basophil % : x    08-26    144  |  106  |  41<H>  ----------------------------<  108<H>  4.0   |  24  |  2.4<H>    Ca    7.5<L>      26 Aug 2020 06:15  Phos  2.9     08-26  Mg     2.7     08-26    TPro  5.9<L>  /  Alb  3.2<L>  /  TBili  0.6  /  DBili  x   /  AST  40  /  ALT  26  /  AlkPhos  132<H>  08-26    Creatinine Trend: 2.4<--, 2.8<--, 3.2<--, 3.6<--, 4.0<--, 4.7<--  LIVER FUNCTIONS - ( 26 Aug 2020 06:15 )  Alb: 3.2 g/dL / Pro: 5.9 g/dL / ALK PHOS: 132 U/L / ALT: 26 U/L / AST: 40 U/L / GGT: x

## 2020-08-26 NOTE — DISCHARGE NOTE PROVIDER - CARE PROVIDER_API CALL
Wes Stallings  INTERNAL MEDICINE  [address] [place] [address]  Phone: [phone]  Fax: [phone]  Follow Up Time: 1 week    Milton Buchanan  INTERNAL MEDICINE  [address] [place] [id number]  Phone: [phone]  Fax: [phone]  Follow Up Time: 2 weeks

## 2020-08-26 NOTE — DISCHARGE NOTE PROVIDER - HOSPITAL COURSE
76 yo M w/ PMH of CKD (unknown stage) and follows Dr. Buchanan, gastric ulcers, HTN, DM, and gout on allopurinol brought in from home by spouse for lethargy. History obtained from spouse over phone who reported that pt had dental procedure with root canal done about 1 week and was complicated by a dental infection. Pt was placed on amoxicillin and took it for 1 week. Pt also complained about severe dental pain limiting his oral intake of food or fluids. Over the past 5 days, pt also had 3-4 episodes of diarrhea per day associated with nonbloody nonbilious vomiting. The spouse found the pt today to be very lethargic and weak and brought him in to ED for further eval. Otherwise, pt denies fever, chill, SOB, CP, dysuria, flank pain, sick contacts or recent travel.        In the ED, pt was found to be bradycardic, hypothermic and hypotensive with lactate of 20 and Ph 6.8 and K of 8. [place] placed and pt sent for urgent HD and CT abdomen. 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 Pt is admitted to ICU for suspected septic shock requiring levophed. (20 Aug 2020 14:08)            MICU COURSE:    Patient was started on IV for suspected c. difficile, which was later confirmed positive. Was found to have hyperkalemia, hyperphosphatemia, hypermagnesemia. S/p 2 HD sessions on 8/20 and 8/21. Placed on Levophed and Midodrine for septic shock. Lactate trended down and electrolytes post HD on 8/21. Weaned off Levophed. Troponins trended up during ICU stay, EKG was normal, patient without chest pain cardio was consulted.         Sepsis resolved, Pt will continue Vancomycin outpatient for 14 more days. KELL improved, creatinine downtrending.        Dental pain was evaluated with extraction of teeth #2,4,5. Pt will follow up with outpatient dentist.

## 2020-08-26 NOTE — PROGRESS NOTE ADULT - PROVIDER SPECIALTY LIST ADULT
Critical Care
Infectious Disease
Internal Medicine
Nephrology
Urology
Infectious Disease
Internal Medicine
Nephrology

## 2020-08-26 NOTE — DISCHARGE NOTE PROVIDER - NSDCCPCAREPLAN_GEN_ALL_CORE_FT
PRINCIPAL DISCHARGE DIAGNOSIS  Diagnosis: Severe sepsis  Assessment and Plan of Treatment: Sepsis is a serious condition that occurs when the body overreacts to an infection. It is also called systemic inflammatory response syndrome (SIRS) with infection. An infection is usually caused by bacteria that attack the body. The body's defense system normally fights off infection within the affected body part. With sepsis, the body overreacts and causes symptoms to occur throughout the body. This leads to uncontrolled and widespread inflammation and clotting in small blood vessels. Blood flow to different body parts decreases and may lead to organ failure. Sepsis requires immediate treatment.  You were treated in the hospital with IV antibiotics, and your symptoms resolved. Please continue to take Vancomycin for 14 more days. THis antibiotic was sent to your pharmacy.  Please seek immediate medical attention if you develop fever >100.4 F, feel dizzy, have nausea or vomiting, coughing blood, have sudden trouble breathing or chest pain, severe weakness, or your skin or lips are turning blue.      SECONDARY DISCHARGE DIAGNOSES  Diagnosis: KELL (acute kidney injury)  Assessment and Plan of Treatment: You were noted to have a temporary insult to your kidney function either at the time that you arrived at the hospital or during your stay here. We have monitored your kidney function with blood work during your time here and you are at a level that no longer requires continued hospital level care, but we do recommend that you follow up to continually have your kidney function checked. You can follow up with your primary care doctor, or, if recommended in the discharge paperwork, you should follow up with a Kidney specialist called a Nephrologist.    Diagnosis: Anemia  Assessment and Plan of Treatment: Anemia  Anemia is a condition in which the concentration of red blood cells or hemoglobin in the blood is below normal. Hemoglobin is a substance in red blood cells that carries oxygen to the tissues of the body. Anemia results in not enough oxygen reaching these tissues which can cause symptoms such as weakness, dizziness/lightheadedness, shortness of breath, chest pain, paleness, or nausea. The cause of your anemia may or may not be determined immediately. If your hemoglobin was dangerously low, you may have received a blood transfusion. Usually reactions to transfusions occur immediately but monitor yourself for any fevers, rash, or shortness of breath.  Your anemia was treated with two units of blood, with appropriate response.  SEEK IMMEDIATE MEDICAL CARE IF YOU HAVE ANY OF THE FOLLOWING SYMPTOMS: extreme weakness/chest pain/shortness of breath, black or bloody stools, vomiting blood, fainting, fever, or any signs of dehydration.

## 2020-08-26 NOTE — PROGRESS NOTE ADULT - ASSESSMENT
76 yo M w/ PMH of DM, retinopathy, nephrotic range proteinuria, bleeding gastric ulcer and subdural hematoma who was brought in from home by his spouse for lethargy s/p Amoxicillin po for odontogenic infection. Admitted to ICU for septic shock requiring levophed.     Severe sepsis - resolved  - Secondary to c. diff colitis, pt on Amoxicillin for odontogenic infection   - Hypotensive (70's/30's) and hypothermic (30.8 C) on arrival with WBC 16.92   - lactate 20 on arrival --> 7.8 --> 2.1 --> 1.2 --> 2.6  - CTH (8/20) negative   - CTAP (8/20) without evidence of colitis/bowel obstruction, renal hypodensity  - Retroperitoneal US right renal cyst  - BCx, UCx NGTD  - S/p Unasyn 1500mg x 1, Vanc po 125mg q6h x 3  - On Vanc po 250mg q6h and Flagyl 500mg IV q8h   - ID recs appreciated: continue with Vanc only for at least 14 more days  - Dental consult: severe bone loss in #2 with residual roots noted in #4 and #5. #s 2,4,5 extracted, post care instructions left in note. No need for additional abx  - C. diff positive   - Off levophed, steroids  - C/w midodrine 10mg po q8h, Per Nephro, if BP remains normal can consider stopping midodrine    Severe KELL on CKD, Lactic acidosis, Severe Hyperkalemia/ Hypermagnesemia/ Hyperphosphatemia  - Creatinine downtrending  - likely prerenal/ATN (baseline creatinine 1.3), decreased po intake, r/o Metformin lactic acidosis  - hold home metformin    - s/p two sessions HD   - K+ repleted  - Nephro following  - Trend BMP    Normocytic anemia  - Hx of bleeding gastric ulcer, per wife in 1978 had transfusion and resolved with medication afterwards   - Hb 6.8 on 8/20  - S/p 2u pRBC, repeat Hb 8.5 --> 8.3 today    Hematuria- resolved   - Started overnight on 8/20  - Vargas in place  - Urology noted    Troponemia  - No chest pain   - EKG without acute changes   - Echo: EF 55-60%  - ASA 81 mg po qd    DM  - Hold home metformin due to KELL/severe lactic acidosis   - Fingersticks qac and qhs   - Basal bolus insulin regimen  - HbA1c: 6    History of HTN  - Holding home bumetanide    DLD  - Continue home Lipitor 40mg qd    Gout  - Holding home meds     Misc  DVT ppx: Lovenox  GI ppx: Protonix  Diet: Renal and CC- kosher  Dispo: MICU  Activity: OOB to chair, PT/OT  FULL CODE 74 yo M w/ PMH of DM, retinopathy, nephrotic range proteinuria, bleeding gastric ulcer and subdural hematoma who was brought in from home by his spouse for lethargy s/p Amoxicillin po for odontogenic infection. Admitted to ICU for septic shock requiring levophed.     Severe sepsis - resolved  - Secondary to c. diff colitis, pt on Amoxicillin for odontogenic infection   - Hypotensive (70's/30's) and hypothermic (30.8 C) on arrival with WBC 16.92   - lactate 20 on arrival --> 7.8 --> 2.1 --> 1.2 --> 2.6  - CTH (8/20) negative   - CTAP (8/20) without evidence of colitis/bowel obstruction, renal hypodensity  - Retroperitoneal US right renal cyst  - BCx, UCx NGTD  - S/p Unasyn 1500mg x 1, Vanc po 125mg q6h x 3  - On Vanc po 250mg q6h and Flagyl 500mg IV q8h   - ID recs appreciated: continue with Vanc only for at least 14 more days  - Dental consult: severe bone loss in #2 with residual roots noted in #4 and #5. #s 2,4,5 extracted, post care instructions left in note. No need for additional abx  - C. diff positive   - Off levophed, steroids  - C/w midodrine 10mg po q8h, Per Nephro, if BP remains normal can consider stopping midodrine    Severe KELL on CKD, Lactic acidosis, Severe Hyperkalemia/ Hypermagnesemia/ Hyperphosphatemia  - Creatinine downtrending  - likely prerenal/ATN (baseline creatinine 1.3), decreased po intake, r/o Metformin lactic acidosis  - hold home metformin    - s/p two sessions HD   - K+ repleted yesterday, 4.0 today  - Nephro following  - Trend BMP    Normocytic anemia  - Hx of bleeding gastric ulcer, per wife in 1978 had transfusion and resolved with medication afterwards   - Hb 6.8 on 8/20  - S/p 2u pRBC, repeat Hb 8.5 --> 8.3 today    Hematuria- resolved   - Started overnight on 8/20  - Urology noted  - Voiding    Troponemia  - No chest pain   - EKG without acute changes   - Echo: EF 55-60%  - ASA 81 mg po qd    DM  - Hold home metformin due to KELL/severe lactic acidosis   - Fingersticks qac and qhs   - Basal bolus insulin regimen  - HbA1c: 6    History of HTN  - Holding home bumetanide    DLD  - Continue home Lipitor 40mg qd    Gout  - Holding home meds     Misc  DVT ppx: Lovenox  GI ppx: Protonix  Diet: Renal and CC- kosher  Dispo: MICU  Activity: OOB to chair, PT/OT  FULL CODE

## 2020-08-26 NOTE — PROGRESS NOTE ADULT - REASON FOR ADMISSION
Generalized weakness

## 2020-08-27 LAB
CULTURE RESULTS: SIGNIFICANT CHANGE UP
SPECIMEN SOURCE: SIGNIFICANT CHANGE UP

## 2020-08-28 DIAGNOSIS — N18.3 CHRONIC KIDNEY DISEASE, STAGE 3 (MODERATE): ICD-10-CM

## 2020-08-28 DIAGNOSIS — N17.9 ACUTE KIDNEY FAILURE, UNSPECIFIED: ICD-10-CM

## 2020-08-28 DIAGNOSIS — E87.5 HYPERKALEMIA: ICD-10-CM

## 2020-08-28 DIAGNOSIS — E11.22 TYPE 2 DIABETES MELLITUS WITH DIABETIC CHRONIC KIDNEY DISEASE: ICD-10-CM

## 2020-08-28 DIAGNOSIS — E78.5 HYPERLIPIDEMIA, UNSPECIFIED: ICD-10-CM

## 2020-08-28 DIAGNOSIS — M50.20 OTHER CERVICAL DISC DISPLACEMENT, UNSPECIFIED CERVICAL REGION: ICD-10-CM

## 2020-08-28 DIAGNOSIS — I21.A1 MYOCARDIAL INFARCTION TYPE 2: ICD-10-CM

## 2020-08-28 DIAGNOSIS — R68.0 HYPOTHERMIA, NOT ASSOCIATED WITH LOW ENVIRONMENTAL TEMPERATURE: ICD-10-CM

## 2020-08-28 DIAGNOSIS — E83.41 HYPERMAGNESEMIA: ICD-10-CM

## 2020-08-28 DIAGNOSIS — K05.6 PERIODONTAL DISEASE, UNSPECIFIED: ICD-10-CM

## 2020-08-28 DIAGNOSIS — T38.3X5A ADVERSE EFFECT OF INSULIN AND ORAL HYPOGLYCEMIC [ANTIDIABETIC] DRUGS, INITIAL ENCOUNTER: ICD-10-CM

## 2020-08-28 DIAGNOSIS — A04.72 ENTEROCOLITIS DUE TO CLOSTRIDIUM DIFFICILE, NOT SPECIFIED AS RECURRENT: ICD-10-CM

## 2020-08-28 DIAGNOSIS — K25.9 GASTRIC ULCER, UNSPECIFIED AS ACUTE OR CHRONIC, WITHOUT HEMORRHAGE OR PERFORATION: ICD-10-CM

## 2020-08-28 DIAGNOSIS — N17.0 ACUTE KIDNEY FAILURE WITH TUBULAR NECROSIS: ICD-10-CM
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
## 2020-08-28 DIAGNOSIS — E87.6 HYPOKALEMIA: ICD-10-CM
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
## 2020-08-28 DIAGNOSIS — K04.7 PERIAPICAL ABSCESS WITHOUT SINUS: ICD-10-CM

## 2020-08-28 DIAGNOSIS — N99.89 OTHER POSTPROCEDURAL COMPLICATIONS AND DISORDERS OF GENITOURINARY SYSTEM: ICD-10-CM

## 2020-08-28 DIAGNOSIS — I12.9 HYPERTENSIVE CHRONIC KIDNEY DISEASE WITH STAGE 1 THROUGH STAGE 4 CHRONIC KIDNEY DISEASE, OR UNSPECIFIED CHRONIC KIDNEY DISEASE: ICD-10-CM

## 2020-08-28 DIAGNOSIS — R65.21 SEVERE SEPSIS WITH SEPTIC SHOCK: ICD-10-CM

## 2020-08-28 DIAGNOSIS — M1A.9XX0 CHRONIC GOUT, UNSPECIFIED, WITHOUT TOPHUS (TOPHI): ICD-10-CM

## 2020-08-28 DIAGNOSIS — E87.4 MIXED DISORDER OF ACID-BASE BALANCE: ICD-10-CM
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
## 2020-08-28 DIAGNOSIS — E83.39 OTHER DISORDERS OF PHOSPHORUS METABOLISM: ICD-10-CM

## 2020-08-28 DIAGNOSIS — E11.319 TYPE 2 DIABETES MELLITUS WITH UNSPECIFIED DIABETIC RETINOPATHY WITHOUT MACULAR EDEMA: ICD-10-CM

## 2020-08-28 DIAGNOSIS — R62.7 ADULT FAILURE TO THRIVE: ICD-10-CM

## 2020-08-28 DIAGNOSIS — A41.4 SEPSIS DUE TO ANAEROBES: ICD-10-CM

## 2020-08-28 DIAGNOSIS — G93.41 METABOLIC ENCEPHALOPATHY: ICD-10-CM

## 2020-08-28 DIAGNOSIS — R31.0 GROSS HEMATURIA: ICD-10-CM

## 2020-08-28 DIAGNOSIS — D64.9 ANEMIA, UNSPECIFIED: ICD-10-CM

## 2020-09-07 DIAGNOSIS — E03.5 MYXEDEMA COMA: ICD-10-CM

## 2020-12-30 ENCOUNTER — APPOINTMENT (OUTPATIENT)
Dept: UROLOGY | Facility: CLINIC | Age: 75
End: 2020-12-30

## 2021-01-05 ENCOUNTER — OUTPATIENT (OUTPATIENT)
Dept: OUTPATIENT SERVICES | Facility: HOSPITAL | Age: 76
LOS: 1 days | Discharge: HOME | End: 2021-01-05

## 2021-01-05 DIAGNOSIS — Z11.59 ENCOUNTER FOR SCREENING FOR OTHER VIRAL DISEASES: ICD-10-CM

## 2021-01-08 ENCOUNTER — OUTPATIENT (OUTPATIENT)
Dept: OUTPATIENT SERVICES | Facility: HOSPITAL | Age: 76
LOS: 1 days | Discharge: HOME | End: 2021-01-08
Payer: MEDICARE

## 2021-01-08 ENCOUNTER — RESULT REVIEW (OUTPATIENT)
Age: 76
End: 2021-01-08

## 2021-01-08 ENCOUNTER — TRANSCRIPTION ENCOUNTER (OUTPATIENT)
Age: 76
End: 2021-01-08

## 2021-01-08 VITALS
DIASTOLIC BLOOD PRESSURE: 67 MMHG | TEMPERATURE: 98 F | HEIGHT: 70 IN | RESPIRATION RATE: 18 BRPM | HEART RATE: 798 BPM | SYSTOLIC BLOOD PRESSURE: 147 MMHG | WEIGHT: 175.05 LBS

## 2021-01-08 VITALS — DIASTOLIC BLOOD PRESSURE: 66 MMHG | SYSTOLIC BLOOD PRESSURE: 143 MMHG | HEART RATE: 76 BPM | RESPIRATION RATE: 18 BRPM

## 2021-01-08 DIAGNOSIS — Z98.890 OTHER SPECIFIED POSTPROCEDURAL STATES: Chronic | ICD-10-CM

## 2021-01-08 PROCEDURE — 88312 SPECIAL STAINS GROUP 1: CPT | Mod: 26

## 2021-01-08 PROCEDURE — 88305 TISSUE EXAM BY PATHOLOGIST: CPT | Mod: 26

## 2021-01-08 RX ORDER — QUINAPRIL HYDROCHLORIDE 40 MG/1
1 TABLET, FILM COATED ORAL
Qty: 0 | Refills: 0 | DISCHARGE

## 2021-01-08 NOTE — ASU PATIENT PROFILE, ADULT - BILL OF RIGHTS/ADMISSION INFORMATION PROVIDED TO:
KIM WEISS  65y, Female  Allergy: No Known Allergies    Hospital Day: 15d    Patient seen and examined earlier today. No acute events overnight.    PMH/PSH:  PAST MEDICAL & SURGICAL HISTORY:  Diverticulitis  Hypothyroidism  H/O tubal ligation    VITALS:  T(F): 98.2 (02-13-20 @ 05:24), Max: 98.3 (02-12-20 @ 14:11)  HR: 78 (02-13-20 @ 05:24)  BP: 118/66 (02-13-20 @ 05:24) (115/57 - 129/65)  RR: 16 (02-13-20 @ 05:24)  SpO2: --    TESTS & MEASUREMENTS:  Weight (Kg):       02-11-20 @ 07:01  -  02-12-20 @ 07:00  --------------------------------------------------------  IN: 385.2 mL / OUT: 0 mL / NET: 385.2 mL                            11.3   10.09 )-----------( 653      ( 13 Feb 2020 08:18 )             34.0       02-12    139  |  103  |  19  ----------------------------<  119<H>  4.0   |  22  |  0.6<L>    Ca    8.7      12 Feb 2020 07:01  Phos  3.2     02-12  Mg     2.1     02-12    Culture - Blood (collected 02-11-20 @ 08:00)  Source: .Blood None  Preliminary Report (02-12-20 @ 23:01):    No growth to date.    Culture - Blood (collected 02-07-20 @ 15:25)  Source: .Blood None  Final Report (02-12-20 @ 23:00):    No growth at 5 days.    Culture - Blood (collected 02-07-20 @ 15:25)  Source: .Blood None  Final Report (02-12-20 @ 23:00):    No growth at 5 days.    RECENT DIAGNOSTIC ORDERS:  Magnesium, Serum: AM Sched. Collection: 13-Feb-2020 04:30 (02-12-20 @ 12:26)  Phosphorus Level, Serum: AM Sched. Collection: 13-Feb-2020 04:30 (02-12-20 @ 12:26)  IR Procedure: Routine  Transport: Wheelchair (02-12-20 @ 16:34)    MEDICATIONS:  MEDICATIONS  (STANDING):  chlorhexidine 4% Liquid 1 Application(s) Topical <User Schedule>  heparin  Injectable 5000 Unit(s) SubCutaneous every 12 hours  levothyroxine 25 MICROGram(s) Oral daily  meropenem  IVPB 1000 milliGRAM(s) IV Intermittent every 8 hours  Parenteral Nutrition - Adult 1 Each (65 mL/Hr) TPN Continuous <Continuous>    MEDICATIONS  (PRN):  acetaminophen   Tablet .. 650 milliGRAM(s) Oral every 6 hours PRN Temp greater or equal to 38C (100.4F)  aluminum hydroxide/magnesium hydroxide/simethicone Suspension 30 milliLiter(s) Oral every 6 hours PRN Dyspepsia  morphine  - Injectable 2 milliGRAM(s) IV Push every 3 hours PRN Severe Pain (7 - 10)    HOME MEDICATIONS:  levothyroxine 25 mcg (0.025 mg) oral tablet (02-11)  Lypholyte II/Nutrilyte II/TPN Electrolytes intravenous solution (02-11)  meropenem 1000 mg intravenous injection (02-11)    REVIEW OF SYSTEMS:  All other review of systems is negative unless indicated above.     PHYSICAL EXAM:  GENERAL: NAD  HEENT: No Swelling  CHEST/LUNG: Good air entry, No wheezing  HEART: RRR, No murmurs  ABDOMEN: Soft, Bowel sounds present  EXTREMITIES:  No clubbing Patient

## 2021-01-08 NOTE — CHART NOTE - NSCHARTNOTEFT_GEN_A_CORE
PACU ANESTHESIA ADMISSION NOTE      Procedure: EGD  Post op diagnosis:      ____  Intubated  TV:______       Rate: ______      FiO2: ______    _x___  Patent Airway    _x___  Full return of protective reflexes    _x___  Full recovery from anesthesia / back to baseline status    Vitals:            T: n/a               BP :114/53                R:  18            Sat:  100             P:66      Mental Status:  _x___ Awake   _____ Alert   _____ Drowsy   _____ Sedated    Nausea/Vomiting:  _x___  NO       ______Yes,   See Post - Op Orders         Pain Scale (0-10):  __0___    Treatment: _x___ None    ____ See Post - Op/PCA Orders    Post - Operative Fluids:   __x__ Oral   ____ See Post - Op Orders    Plan: Discharge:   _x___Home       _____Floor     _____Critical Care    _____  Other:_________________    Comments:  No anesthesia issues or complications noted.  Discharge when criteria met.

## 2021-01-08 NOTE — ASU DISCHARGE PLAN (ADULT/PEDIATRIC) - CALL YOUR DOCTOR IF YOU HAVE ANY OF THE FOLLOWING:
Bleeding that does not stop/Swelling that gets worse/Pain not relieved by Medications/Fever greater than (need to indicate Fahrenheit or Celsius)/Nausea and vomiting that does not stop/Inability to tolerate liquids or foods

## 2021-01-12 LAB — SURGICAL PATHOLOGY STUDY: SIGNIFICANT CHANGE UP

## 2021-01-21 DIAGNOSIS — Z86.73 PERSONAL HISTORY OF TRANSIENT ISCHEMIC ATTACK (TIA), AND CEREBRAL INFARCTION WITHOUT RESIDUAL DEFICITS: ICD-10-CM

## 2021-01-21 DIAGNOSIS — M15.9 POLYOSTEOARTHRITIS, UNSPECIFIED: ICD-10-CM

## 2021-01-21 DIAGNOSIS — K92.2 GASTROINTESTINAL HEMORRHAGE, UNSPECIFIED: ICD-10-CM

## 2021-01-21 DIAGNOSIS — Z79.82 LONG TERM (CURRENT) USE OF ASPIRIN: ICD-10-CM

## 2021-01-21 DIAGNOSIS — D64.9 ANEMIA, UNSPECIFIED: ICD-10-CM

## 2021-01-21 DIAGNOSIS — M1A.0790 IDIOPATHIC CHRONIC GOUT, UNSPECIFIED ANKLE AND FOOT, WITHOUT TOPHUS (TOPHI): ICD-10-CM

## 2021-01-21 DIAGNOSIS — K29.50 UNSPECIFIED CHRONIC GASTRITIS WITHOUT BLEEDING: ICD-10-CM

## 2021-01-21 DIAGNOSIS — M54.30 SCIATICA, UNSPECIFIED SIDE: ICD-10-CM

## 2021-01-21 DIAGNOSIS — I10 ESSENTIAL (PRIMARY) HYPERTENSION: ICD-10-CM

## 2021-01-21 DIAGNOSIS — K29.80 DUODENITIS WITHOUT BLEEDING: ICD-10-CM

## 2021-01-21 DIAGNOSIS — E11.9 TYPE 2 DIABETES MELLITUS WITHOUT COMPLICATIONS: ICD-10-CM

## 2021-01-21 DIAGNOSIS — E78.00 PURE HYPERCHOLESTEROLEMIA, UNSPECIFIED: ICD-10-CM

## 2021-01-21 DIAGNOSIS — Z79.84 LONG TERM (CURRENT) USE OF ORAL HYPOGLYCEMIC DRUGS: ICD-10-CM

## 2021-01-22 ENCOUNTER — INPATIENT (INPATIENT)
Facility: HOSPITAL | Age: 76
LOS: 3 days | Discharge: NURSING, REHAB OR LONG-TERM CARE | End: 2021-01-26
Attending: INTERNAL MEDICINE | Admitting: INTERNAL MEDICINE
Payer: MEDICARE

## 2021-01-22 VITALS
HEIGHT: 70 IN | SYSTOLIC BLOOD PRESSURE: 159 MMHG | HEART RATE: 91 BPM | DIASTOLIC BLOOD PRESSURE: 78 MMHG | OXYGEN SATURATION: 98 % | TEMPERATURE: 97 F | RESPIRATION RATE: 18 BRPM

## 2021-01-22 DIAGNOSIS — Z98.890 OTHER SPECIFIED POSTPROCEDURAL STATES: Chronic | ICD-10-CM

## 2021-01-22 LAB
ALBUMIN SERPL ELPH-MCNC: 4 G/DL — SIGNIFICANT CHANGE UP (ref 3.5–5.2)
ALP SERPL-CCNC: 112 U/L — SIGNIFICANT CHANGE UP (ref 30–115)
ALT FLD-CCNC: 17 U/L — SIGNIFICANT CHANGE UP (ref 0–41)
ANION GAP SERPL CALC-SCNC: 11 MMOL/L — SIGNIFICANT CHANGE UP (ref 7–14)
APTT BLD: 39.7 SEC — HIGH (ref 27–39.2)
AST SERPL-CCNC: 18 U/L — SIGNIFICANT CHANGE UP (ref 0–41)
BASOPHILS # BLD AUTO: 0.03 K/UL — SIGNIFICANT CHANGE UP (ref 0–0.2)
BASOPHILS NFR BLD AUTO: 0.3 % — SIGNIFICANT CHANGE UP (ref 0–1)
BILIRUB SERPL-MCNC: 0.2 MG/DL — SIGNIFICANT CHANGE UP (ref 0.2–1.2)
BLD GP AB SCN SERPL QL: SIGNIFICANT CHANGE UP
BUN SERPL-MCNC: 38 MG/DL — HIGH (ref 10–20)
CALCIUM SERPL-MCNC: 9.3 MG/DL — SIGNIFICANT CHANGE UP (ref 8.5–10.1)
CHLORIDE SERPL-SCNC: 105 MMOL/L — SIGNIFICANT CHANGE UP (ref 98–110)
CO2 SERPL-SCNC: 24 MMOL/L — SIGNIFICANT CHANGE UP (ref 17–32)
CREAT SERPL-MCNC: 1.1 MG/DL — SIGNIFICANT CHANGE UP (ref 0.7–1.5)
EOSINOPHIL # BLD AUTO: 0.18 K/UL — SIGNIFICANT CHANGE UP (ref 0–0.7)
EOSINOPHIL NFR BLD AUTO: 2 % — SIGNIFICANT CHANGE UP (ref 0–8)
ETHANOL SERPL-MCNC: <10 MG/DL — SIGNIFICANT CHANGE UP
GLUCOSE SERPL-MCNC: 190 MG/DL — HIGH (ref 70–99)
HCT VFR BLD CALC: 19.1 % — LOW (ref 42–52)
HGB BLD-MCNC: 5.8 G/DL — CRITICAL LOW (ref 14–18)
IMM GRANULOCYTES NFR BLD AUTO: 0.7 % — HIGH (ref 0.1–0.3)
INR BLD: 1.27 RATIO — SIGNIFICANT CHANGE UP (ref 0.65–1.3)
LACTATE SERPL-SCNC: 2.1 MMOL/L — HIGH (ref 0.7–2)
LIDOCAIN IGE QN: 30 U/L — SIGNIFICANT CHANGE UP (ref 7–60)
LYMPHOCYTES # BLD AUTO: 0.55 K/UL — LOW (ref 1.2–3.4)
LYMPHOCYTES # BLD AUTO: 6.3 % — LOW (ref 20.5–51.1)
MCHC RBC-ENTMCNC: 27 PG — SIGNIFICANT CHANGE UP (ref 27–31)
MCHC RBC-ENTMCNC: 30.4 G/DL — LOW (ref 32–37)
MCV RBC AUTO: 88.8 FL — SIGNIFICANT CHANGE UP (ref 80–94)
MONOCYTES # BLD AUTO: 0.84 K/UL — HIGH (ref 0.1–0.6)
MONOCYTES NFR BLD AUTO: 9.5 % — HIGH (ref 1.7–9.3)
NEUTROPHILS # BLD AUTO: 7.14 K/UL — HIGH (ref 1.4–6.5)
NEUTROPHILS NFR BLD AUTO: 81.2 % — HIGH (ref 42.2–75.2)
NRBC # BLD: 0 /100 WBCS — SIGNIFICANT CHANGE UP (ref 0–0)
PLATELET # BLD AUTO: 286 K/UL — SIGNIFICANT CHANGE UP (ref 130–400)
POTASSIUM SERPL-MCNC: 4 MMOL/L — SIGNIFICANT CHANGE UP (ref 3.5–5)
POTASSIUM SERPL-SCNC: 4 MMOL/L — SIGNIFICANT CHANGE UP (ref 3.5–5)
PROT SERPL-MCNC: 6.7 G/DL — SIGNIFICANT CHANGE UP (ref 6–8)
PROTHROM AB SERPL-ACNC: 14.6 SEC — HIGH (ref 9.95–12.87)
RBC # BLD: 2.15 M/UL — LOW (ref 4.7–6.1)
RBC # FLD: 17.6 % — HIGH (ref 11.5–14.5)
SODIUM SERPL-SCNC: 140 MMOL/L — SIGNIFICANT CHANGE UP (ref 135–146)
WBC # BLD: 8.8 K/UL — SIGNIFICANT CHANGE UP (ref 4.8–10.8)
WBC # FLD AUTO: 8.8 K/UL — SIGNIFICANT CHANGE UP (ref 4.8–10.8)

## 2021-01-22 PROCEDURE — 70450 CT HEAD/BRAIN W/O DYE: CPT | Mod: 26

## 2021-01-22 PROCEDURE — 72125 CT NECK SPINE W/O DYE: CPT | Mod: 26

## 2021-01-22 PROCEDURE — 93010 ELECTROCARDIOGRAM REPORT: CPT

## 2021-01-22 PROCEDURE — 71260 CT THORAX DX C+: CPT | Mod: 26

## 2021-01-22 PROCEDURE — 72170 X-RAY EXAM OF PELVIS: CPT | Mod: 26

## 2021-01-22 PROCEDURE — 99285 EMERGENCY DEPT VISIT HI MDM: CPT

## 2021-01-22 PROCEDURE — 74177 CT ABD & PELVIS W/CONTRAST: CPT | Mod: 26

## 2021-01-22 PROCEDURE — 71045 X-RAY EXAM CHEST 1 VIEW: CPT | Mod: 26

## 2021-01-22 RX ORDER — METHOCARBAMOL 500 MG/1
1500 TABLET, FILM COATED ORAL ONCE
Refills: 0 | Status: COMPLETED | OUTPATIENT
Start: 2021-01-22 | End: 2021-01-22

## 2021-01-22 RX ORDER — ACETAMINOPHEN 500 MG
975 TABLET ORAL ONCE
Refills: 0 | Status: COMPLETED | OUTPATIENT
Start: 2021-01-22 | End: 2021-01-22

## 2021-01-22 RX ORDER — SODIUM CHLORIDE 9 MG/ML
1000 INJECTION, SOLUTION INTRAVENOUS ONCE
Refills: 0 | Status: COMPLETED | OUTPATIENT
Start: 2021-01-22 | End: 2021-01-22

## 2021-01-22 RX ADMIN — Medication 975 MILLIGRAM(S): at 16:37

## 2021-01-22 RX ADMIN — SODIUM CHLORIDE 1000 MILLILITER(S): 9 INJECTION, SOLUTION INTRAVENOUS at 16:38

## 2021-01-22 NOTE — ED PROVIDER NOTE - OBJECTIVE STATEMENT
76 yo male with a pmh of HTN, HLD, and DM presents for fall. pt states yesterday while sitting at the table he fell asleep causing him to fall out the chair onto his L side. pt states to have head trauma with no LOC. pt c/o L hip pain that is worsened with movement. denies any other symptoms including fevers, chill, headache, recent illness/travel, cough, abdominal pain, chest pain, or SOB.

## 2021-01-22 NOTE — ED ADULT NURSE REASSESSMENT NOTE - NS ED NURSE REASSESS COMMENT FT1
Pt assessed. Pt is awake and alert. Pt denies any pain or discomfort at this time. 1st unit PRBC running. Cardiac and 02 monitoring maintained. Pt has bruising to L eye and lac to L ankle. Fall precautions maintained. Pt is not in any distress. Safety and comfort maintained.

## 2021-01-22 NOTE — ED ADULT NURSE NOTE - NSIMPLEMENTINTERV_GEN_ALL_ED
Implemented All Fall Risk Interventions:  [place] to call system. Call bell, personal items and telephone within reach. Instruct patient to call for assistance. Room bathroom lighting operational. Non-slip footwear when patient is off stretcher. Physically safe environment: no spills, clutter or unnecessary equipment. Stretcher in lowest position, wheels locked, appropriate side rails in place. Provide visual cue, wrist band, yellow gown, etc. Monitor gait and stability. Monitor for mental status changes and reorient to person, place, and time. Review medications for side effects contributing to fall risk. Reinforce activity limits and safety measures with patient and family.

## 2021-01-22 NOTE — ED PROVIDER NOTE - ATTENDING CONTRIBUTION TO CARE
74 yo male PMH anemia, OA, elevated cholesterol, HTN, DM, diverticulosis, disc disease here for evaluation of facial ecchymosis after a fall yesterday, landed on his left side.  Patient reports he fell asleep on a chair at a table when he fell off , his wife helped him up, today they noticed facial bruising and he came for eval.  Denies any HA, neck pain, CP, SOB, abdominal pain, no black or bloody stools, no focal weakness or paresthesias.  Reports bruising on his left thigh.  Elderly male resting on a stretcher, NAD, PERRL, + left periorbital ecchymosis, no orbital rim step off, no midline spine ttp, nml work of breathing, lungs CTA b/l, no chest wall or abdominal/midline spine ttp, abdomen soft, NT/ND, FROM at both hips. + rt knee ecchymosis, but intact ROM, + left thigh ecchymosis, awake and alert, no gross neuro deficits.  Will get brain and body imaging, labs. 76 yo male PMH anemia, OA, elevated cholesterol, HTN, DM, diverticulosis, disc disease, remote h/o gastric ulcer here for evaluation of facial ecchymosis after a fall yesterday, landed on his left side.  Patient reports he fell asleep on a chair at a table when he fell off , his wife helped him up, today they noticed facial bruising and he came for eval.  Denies any HA, neck pain, CP, SOB, abdominal pain, no black or bloody stools, no focal weakness or paresthesias.  Reports bruising on his left thigh.  Elderly male resting on a stretcher, NAD, PERRL, + left periorbital ecchymosis, no orbital rim step off, no midline spine ttp, nml work of breathing, lungs CTA b/l, no chest wall or abdominal/midline spine ttp, abdomen soft, NT/ND, FROM at both hips. + rt knee ecchymosis, but intact ROM, + left thigh ecchymosis, awake and alert, no gross neuro deficits.  Will get brain and body imaging, labs.

## 2021-01-22 NOTE — ED PROVIDER NOTE - CARE PLAN
Principal Discharge DX:	Anemia  Secondary Diagnosis:	Fall in home, initial encounter  Secondary Diagnosis:	Contusion

## 2021-01-22 NOTE — ED PROVIDER NOTE - PHYSICAL EXAMINATION
Gen: NAD, AOx3  Head: NCAT  HEENT: PERRL, oral mucosa moist, normal conjunctiva, oropharynx clear without exudate or erythema  Lung: CTAB, no respiratory distress, no wheezing, rales, rhonchi  CV: normal s1/s2, rrr, Normal perfusion, pulses 2+ throughout  Abd: soft, NTND, no CVA tenderness  Genitourinary: no pelvic tenderness  MSK: No edema, no visible deformities, full range of motion in all 4 extremities, no spinal tenderness   Neuro: CN II-XII grossly intact, No focal neurologic deficits, no nystagmus/pronator drift, coordination/sensation intact, strength 5/5 BUE/BLE  Skin: ecchymosis to L periorbit and L lateral thigh  Psych: normal affect

## 2021-01-22 NOTE — ED PROVIDER NOTE - CLINICAL SUMMARY MEDICAL DECISION MAKING FREE TEXT BOX
76 yo male PMH anemia, OA, elevated cholesterol, HTN, DM, diverticulosis, disc disease here for evaluation of facial ecchymosis after a fall yesterday, landed on his left side.  Patient reports he fell asleep on a chair at a table when he fell off , his wife helped him up, today they noticed facial bruising and he came for eval.  Denies any HA, neck pain, CP, SOB, abdominal pain, no black or bloody stools, no focal weakness or paresthesias.  Reports bruising on his left thigh.  Elderly male resting on a stretcher, NAD, PERRL, + left periorbital ecchymosis, no orbital rim step off, no midline spine ttp, nml work of breathing, lungs CTA b/l, no chest wall or abdominal/midline spine ttp, abdomen soft, NT/ND, FROM at both hips. + rt knee ecchymosis, but intact ROM, + left thigh ecchymosis, awake and alert, no gross neuro deficits.  Will get brain and body imaging, labs.  Imaging studies negative for acute pathology, H&H lower than baseline, will admit for anemia work up.

## 2021-01-23 LAB
ALBUMIN SERPL ELPH-MCNC: 3.6 G/DL — SIGNIFICANT CHANGE UP (ref 3.5–5.2)
ALP SERPL-CCNC: 107 U/L — SIGNIFICANT CHANGE UP (ref 30–115)
ALT FLD-CCNC: 16 U/L — SIGNIFICANT CHANGE UP (ref 0–41)
ANION GAP SERPL CALC-SCNC: 11 MMOL/L — SIGNIFICANT CHANGE UP (ref 7–14)
APPEARANCE UR: CLEAR — SIGNIFICANT CHANGE UP
AST SERPL-CCNC: 22 U/L — SIGNIFICANT CHANGE UP (ref 0–41)
BASOPHILS # BLD AUTO: 0.03 K/UL — SIGNIFICANT CHANGE UP (ref 0–0.2)
BASOPHILS # BLD AUTO: 0.03 K/UL — SIGNIFICANT CHANGE UP (ref 0–0.2)
BASOPHILS # BLD AUTO: 0.04 K/UL — SIGNIFICANT CHANGE UP (ref 0–0.2)
BASOPHILS NFR BLD AUTO: 0.3 % — SIGNIFICANT CHANGE UP (ref 0–1)
BILIRUB SERPL-MCNC: 0.4 MG/DL — SIGNIFICANT CHANGE UP (ref 0.2–1.2)
BILIRUB UR-MCNC: NEGATIVE — SIGNIFICANT CHANGE UP
BUN SERPL-MCNC: 31 MG/DL — HIGH (ref 10–20)
CALCIUM SERPL-MCNC: 9.1 MG/DL — SIGNIFICANT CHANGE UP (ref 8.5–10.1)
CHLORIDE SERPL-SCNC: 109 MMOL/L — SIGNIFICANT CHANGE UP (ref 98–110)
CO2 SERPL-SCNC: 25 MMOL/L — SIGNIFICANT CHANGE UP (ref 17–32)
COLOR SPEC: SIGNIFICANT CHANGE UP
CREAT SERPL-MCNC: 1 MG/DL — SIGNIFICANT CHANGE UP (ref 0.7–1.5)
DIFF PNL FLD: NEGATIVE — SIGNIFICANT CHANGE UP
EOSINOPHIL # BLD AUTO: 0.09 K/UL — SIGNIFICANT CHANGE UP (ref 0–0.7)
EOSINOPHIL # BLD AUTO: 0.12 K/UL — SIGNIFICANT CHANGE UP (ref 0–0.7)
EOSINOPHIL # BLD AUTO: 0.14 K/UL — SIGNIFICANT CHANGE UP (ref 0–0.7)
EOSINOPHIL NFR BLD AUTO: 0.9 % — SIGNIFICANT CHANGE UP (ref 0–8)
EOSINOPHIL NFR BLD AUTO: 1.2 % — SIGNIFICANT CHANGE UP (ref 0–8)
EOSINOPHIL NFR BLD AUTO: 1.3 % — SIGNIFICANT CHANGE UP (ref 0–8)
GLUCOSE BLDC GLUCOMTR-MCNC: 156 MG/DL — HIGH (ref 70–99)
GLUCOSE BLDC GLUCOMTR-MCNC: 160 MG/DL — HIGH (ref 70–99)
GLUCOSE BLDC GLUCOMTR-MCNC: 183 MG/DL — HIGH (ref 70–99)
GLUCOSE BLDC GLUCOMTR-MCNC: 222 MG/DL — HIGH (ref 70–99)
GLUCOSE SERPL-MCNC: 168 MG/DL — HIGH (ref 70–99)
GLUCOSE UR QL: NEGATIVE — SIGNIFICANT CHANGE UP
HCT VFR BLD CALC: 23.7 % — LOW (ref 42–52)
HCT VFR BLD CALC: 24.9 % — LOW (ref 42–52)
HCT VFR BLD CALC: 26 % — LOW (ref 42–52)
HGB BLD-MCNC: 7.3 G/DL — LOW (ref 14–18)
HGB BLD-MCNC: 7.7 G/DL — LOW (ref 14–18)
HGB BLD-MCNC: 8.1 G/DL — LOW (ref 14–18)
IMM GRANULOCYTES NFR BLD AUTO: 0.4 % — HIGH (ref 0.1–0.3)
IMM GRANULOCYTES NFR BLD AUTO: 0.7 % — HIGH (ref 0.1–0.3)
IMM GRANULOCYTES NFR BLD AUTO: 0.9 % — HIGH (ref 0.1–0.3)
KETONES UR-MCNC: NEGATIVE — SIGNIFICANT CHANGE UP
LEUKOCYTE ESTERASE UR-ACNC: NEGATIVE — SIGNIFICANT CHANGE UP
LYMPHOCYTES # BLD AUTO: 0.54 K/UL — LOW (ref 1.2–3.4)
LYMPHOCYTES # BLD AUTO: 0.55 K/UL — LOW (ref 1.2–3.4)
LYMPHOCYTES # BLD AUTO: 0.76 K/UL — LOW (ref 1.2–3.4)
LYMPHOCYTES # BLD AUTO: 5.4 % — LOW (ref 20.5–51.1)
LYMPHOCYTES # BLD AUTO: 6 % — LOW (ref 20.5–51.1)
LYMPHOCYTES # BLD AUTO: 6.3 % — LOW (ref 20.5–51.1)
MAGNESIUM SERPL-MCNC: 2.2 MG/DL — SIGNIFICANT CHANGE UP (ref 1.8–2.4)
MCHC RBC-ENTMCNC: 27.4 PG — SIGNIFICANT CHANGE UP (ref 27–31)
MCHC RBC-ENTMCNC: 27.4 PG — SIGNIFICANT CHANGE UP (ref 27–31)
MCHC RBC-ENTMCNC: 27.6 PG — SIGNIFICANT CHANGE UP (ref 27–31)
MCHC RBC-ENTMCNC: 30.8 G/DL — LOW (ref 32–37)
MCHC RBC-ENTMCNC: 30.9 G/DL — LOW (ref 32–37)
MCHC RBC-ENTMCNC: 31.2 G/DL — LOW (ref 32–37)
MCV RBC AUTO: 88.6 FL — SIGNIFICANT CHANGE UP (ref 80–94)
MCV RBC AUTO: 88.7 FL — SIGNIFICANT CHANGE UP (ref 80–94)
MCV RBC AUTO: 89.1 FL — SIGNIFICANT CHANGE UP (ref 80–94)
MONOCYTES # BLD AUTO: 0.88 K/UL — HIGH (ref 0.1–0.6)
MONOCYTES # BLD AUTO: 1.04 K/UL — HIGH (ref 0.1–0.6)
MONOCYTES # BLD AUTO: 1.51 K/UL — HIGH (ref 0.1–0.6)
MONOCYTES NFR BLD AUTO: 10.2 % — HIGH (ref 1.7–9.3)
MONOCYTES NFR BLD AUTO: 12.4 % — HIGH (ref 1.7–9.3)
MONOCYTES NFR BLD AUTO: 9.8 % — HIGH (ref 1.7–9.3)
NEUTROPHILS # BLD AUTO: 7.37 K/UL — HIGH (ref 1.4–6.5)
NEUTROPHILS # BLD AUTO: 8.39 K/UL — HIGH (ref 1.4–6.5)
NEUTROPHILS # BLD AUTO: 9.62 K/UL — HIGH (ref 1.4–6.5)
NEUTROPHILS NFR BLD AUTO: 79.1 % — HIGH (ref 42.2–75.2)
NEUTROPHILS NFR BLD AUTO: 82.2 % — HIGH (ref 42.2–75.2)
NEUTROPHILS NFR BLD AUTO: 82.3 % — HIGH (ref 42.2–75.2)
NITRITE UR-MCNC: NEGATIVE — SIGNIFICANT CHANGE UP
NRBC # BLD: 0 /100 WBCS — SIGNIFICANT CHANGE UP (ref 0–0)
PH UR: 6.5 — SIGNIFICANT CHANGE UP (ref 5–8)
PLATELET # BLD AUTO: 293 K/UL — SIGNIFICANT CHANGE UP (ref 130–400)
PLATELET # BLD AUTO: 307 K/UL — SIGNIFICANT CHANGE UP (ref 130–400)
PLATELET # BLD AUTO: 341 K/UL — SIGNIFICANT CHANGE UP (ref 130–400)
POTASSIUM SERPL-MCNC: 4.4 MMOL/L — SIGNIFICANT CHANGE UP (ref 3.5–5)
POTASSIUM SERPL-SCNC: 4.4 MMOL/L — SIGNIFICANT CHANGE UP (ref 3.5–5)
PROT SERPL-MCNC: 6.2 G/DL — SIGNIFICANT CHANGE UP (ref 6–8)
PROT UR-MCNC: ABNORMAL
RBC # BLD: 2.66 M/UL — LOW (ref 4.7–6.1)
RBC # BLD: 2.81 M/UL — LOW (ref 4.7–6.1)
RBC # BLD: 2.93 M/UL — LOW (ref 4.7–6.1)
RBC # FLD: 16.7 % — HIGH (ref 11.5–14.5)
RBC # FLD: 17 % — HIGH (ref 11.5–14.5)
RBC # FLD: 17.2 % — HIGH (ref 11.5–14.5)
SARS-COV-2 RNA SPEC QL NAA+PROBE: SIGNIFICANT CHANGE UP
SODIUM SERPL-SCNC: 145 MMOL/L — SIGNIFICANT CHANGE UP (ref 135–146)
SP GR SPEC: 1.03 — SIGNIFICANT CHANGE UP (ref 1.01–1.03)
UROBILINOGEN FLD QL: SIGNIFICANT CHANGE UP
WBC # BLD: 10.19 K/UL — SIGNIFICANT CHANGE UP (ref 4.8–10.8)
WBC # BLD: 12.15 K/UL — HIGH (ref 4.8–10.8)
WBC # BLD: 8.98 K/UL — SIGNIFICANT CHANGE UP (ref 4.8–10.8)
WBC # FLD AUTO: 10.19 K/UL — SIGNIFICANT CHANGE UP (ref 4.8–10.8)
WBC # FLD AUTO: 12.15 K/UL — HIGH (ref 4.8–10.8)
WBC # FLD AUTO: 8.98 K/UL — SIGNIFICANT CHANGE UP (ref 4.8–10.8)

## 2021-01-23 PROCEDURE — 99223 1ST HOSP IP/OBS HIGH 75: CPT

## 2021-01-23 RX ORDER — PANTOPRAZOLE SODIUM 20 MG/1
40 TABLET, DELAYED RELEASE ORAL
Refills: 0 | Status: DISCONTINUED | OUTPATIENT
Start: 2021-01-23 | End: 2021-01-26

## 2021-01-23 RX ORDER — MIDODRINE HYDROCHLORIDE 2.5 MG/1
10 TABLET ORAL EVERY 8 HOURS
Refills: 0 | Status: DISCONTINUED | OUTPATIENT
Start: 2021-01-23 | End: 2021-01-26

## 2021-01-23 RX ORDER — ALLOPURINOL 300 MG
300 TABLET ORAL DAILY
Refills: 0 | Status: DISCONTINUED | OUTPATIENT
Start: 2021-01-23 | End: 2021-01-26

## 2021-01-23 RX ORDER — ATORVASTATIN CALCIUM 80 MG/1
40 TABLET, FILM COATED ORAL DAILY
Refills: 0 | Status: DISCONTINUED | OUTPATIENT
Start: 2021-01-23 | End: 2021-01-26

## 2021-01-23 RX ORDER — BUMETANIDE 0.25 MG/ML
1 INJECTION INTRAMUSCULAR; INTRAVENOUS DAILY
Refills: 0 | Status: DISCONTINUED | OUTPATIENT
Start: 2021-01-23 | End: 2021-01-26

## 2021-01-23 RX ORDER — OMEGA-3 ACID ETHYL ESTERS 1 G
2 CAPSULE ORAL
Refills: 0 | Status: DISCONTINUED | OUTPATIENT
Start: 2021-01-23 | End: 2021-01-26

## 2021-01-23 RX ADMIN — Medication 2 GRAM(S): at 05:31

## 2021-01-23 RX ADMIN — ATORVASTATIN CALCIUM 40 MILLIGRAM(S): 80 TABLET, FILM COATED ORAL at 13:06

## 2021-01-23 RX ADMIN — Medication 300 MILLIGRAM(S): at 13:05

## 2021-01-23 RX ADMIN — MIDODRINE HYDROCHLORIDE 10 MILLIGRAM(S): 2.5 TABLET ORAL at 13:06

## 2021-01-23 RX ADMIN — MIDODRINE HYDROCHLORIDE 10 MILLIGRAM(S): 2.5 TABLET ORAL at 21:44

## 2021-01-23 RX ADMIN — MIDODRINE HYDROCHLORIDE 10 MILLIGRAM(S): 2.5 TABLET ORAL at 05:31

## 2021-01-23 RX ADMIN — METHOCARBAMOL 1500 MILLIGRAM(S): 500 TABLET, FILM COATED ORAL at 00:50

## 2021-01-23 RX ADMIN — PANTOPRAZOLE SODIUM 40 MILLIGRAM(S): 20 TABLET, DELAYED RELEASE ORAL at 05:30

## 2021-01-23 RX ADMIN — Medication 2 GRAM(S): at 19:13

## 2021-01-23 RX ADMIN — BUMETANIDE 1 MILLIGRAM(S): 0.25 INJECTION INTRAMUSCULAR; INTRAVENOUS at 05:31

## 2021-01-23 NOTE — PHYSICAL THERAPY INITIAL EVALUATION ADULT - BED MOBILITY TRAINING, PT EVAL
Pt will participate in supine to sit and reverse using side rails with Min A by discharge to facilitate return to PLOF.

## 2021-01-23 NOTE — PATIENT PROFILE ADULT - NSPROHMDIABETMGMTSTRAT_GEN_A_NUR
activity/adequate rest/coping strategies/diet modification/exercise/medication therapy/routine screenings

## 2021-01-23 NOTE — PHYSICAL THERAPY INITIAL EVALUATION ADULT - GAIT TRAINING, PT EVAL
Pt will ambulate using RW or least restrictive AD for 30 ft with Min A by discharge to facilitate return to PLOF. Negotiate 8 steps using 1 HR with Mod A.

## 2021-01-23 NOTE — H&P ADULT - ASSESSMENT
75 PMH diverticulosis, DM, HTN, HLD, CKD, ostearthritis, bleeding gastric ulcer, anemia, disc disease presented s/p mechanically fall yesterday. Patient was sitting at the table when he fell, and had some facial bruising patient was brought to ED    #Normocytic anemia- Hx of bleeding gastric ulcer, per wife in 1978 had transfusion and resolved with medication afterwards   - s/p 2 u pRBC, repeat Hb  - monitor CBC   - keep active T+S   - DELANEY negative      #DM    #History of HTN: holding home bumetanide  #DLD: continue home Lipitor 40mg qd  #Gout: holding home meds     DVT ppx: Lovenox  GI ppx: Protonix  Diet: Renal and CC- kosher  Dispo: MICU  FULL CODE 75 PMH diverticulosis, DM, HTN, HLD, CKD, ostearthritis, bleeding gastric ulcer, anemia, disc disease presented s/p mechanically fall yesterday. Patient was sitting at the table when he fell, and had some facial bruising patient was brought to ED. Found to have Hgb 5.8, admitted for w/u anemia    #Normocytic anemia- Hx of bleeding gastric ulcer, per wife in 1978 had transfusion and resolved with medication afterwards   - s/p 2 u pRBC, repeat Hb  - monitor CBC   - keep active T+S   - DELANEY negative  - EGD report: non erosive gastritis and duodenitis  - iron studies likely not of benefit as patient already transfused  - defer GI consult (was just scoped, no evidence this is acute GI problem). Can f/u Dr. Jones op  - c/w protonix    #DM-finger stick AC/HS, begiin insulin if needed  #HTN-c/w home meds (bumex 1 mg), on quanapril 40mg at home, will give lisinopril 40mg inpt  #DLD- continue home Lipitor 40mg qd  #Gout-c/w allopurinol 300 Q24  _______________________________________________________________________  DVT ppx: Lovenox  GI ppx: Protonix  Diet: DASH/KOSHER  Dispo: acute   75 PMH diverticulosis, DM, HTN, HLD, CKD, ostearthritis, bleeding gastric ulcer, anemia, disc disease presented s/p mechanically fall yesterday. Patient was sitting at the table when he fell, and had some facial bruising patient was brought to ED. Found to have Hgb 5.8, admitted for w/u anemia    #Normocytic anemia- Hx of bleeding gastric ulcer, per wife in 1978 had transfusion and resolved with medication afterwards. Of note patient baseline hemoglobin on low side in EMR (8-9).  - s/p 2 u pRBC, repeat Hb  - monitor CBC   - keep active T+S   - DELANEY negative  - EGD report: non erosive gastritis and duodenitis  - iron studies likely not of benefit as patient already transfused  - defer GI consult (was just scoped, no evidence this is acute GI problem). Can f/u Dr. Jones op  - c/w protonix    #Mechanical Fall  -s/p fall, can f/u trauma  -PT/eval    #Hepatic Lobe Lesion-outpatient MRI  #DM-finger stick AC/HS, begin insulin if FS>180  #HTN-c/w home meds (bumex 1 mg), on quanapril 40mg at home, will give lisinopril 40mg inpt  #DLD- continue home Lipitor 40mg qd  #Gout-c/w allopurinol 300 Q24  _______________________________________________________________________  DVT ppx: Lovenox  GI ppx: Protonix  Diet: DASH/KOSHER  Dispo: acute

## 2021-01-23 NOTE — CONSULT NOTE ADULT - ASSESSMENT
75 year old male with  PMH diverticulosis, DM, HTN, HLD, CKD, ostearthritis, anemia, disc disease   presented s/p mechanically fall yesterday , trauma work up was unremarkable   On labs noted to have Hb of 5.8 drop from bl hb of 9, no overt gi bleed, recent egd no upper gi source   s/p 2 unit prbc hb improved to 7.7 , vital signs were stable   On ct abd - no retroperitoneal hematoma     #)  Acute on chronic anemia with no overt gi bleeding and stable vitals    Hb of 5.8 drop from bl hb of 9, s/p 2 unit prbc hb improved to 7.7   recent egd no upper gi source   On ct abd - no retroperitoneal hematoma   Rec   diet as tolerated  Protonix 40 mg daily   monitor cbc if hb stable pt can follow as outpatient with  for colonoscopy   if hb drops or develops overt gi bleed  will consider inpatient colonoscopy +/- EGD   check iron studies, if iron deficiency noted pt will need capsule endoscopy       #)  Incidentally noted right hepatic lobe liver lesion of 3 cm size   no evidence of liver cirrhosis   Rec   check MRI Liver protocol, AFP    75 year old male with  PMH diverticulosis, DM, HTN, HLD, CKD, ostearthritis, anemia, disc disease   presented s/p mechanically fall yesterday , trauma work up was unremarkable   On labs noted to have Hb of 5.8 drop from bl hb of 9, no overt gi bleed, recent egd no upper gi source   s/p 2 unit prbc hb improved to 7.7 , vital signs were stable   On ct abd - no retroperitoneal hematoma     #)  Acute on chronic anemia with no overt gi bleeding and stable vitals    Hb of 5.8 drop from bl hb of 9, s/p 2 unit prbc hb improved to 7.7   recent egd no upper gi source   On ct abd - no retroperitoneal hematoma   Rec   diet as tolerated  Protonix 40 mg daily   monitor cbc if hb stable pt can follow as outpatient with  for colonoscopy   if hb drops or develops overt gi bleed  will consider inpatient colonoscopy +/- EGD   check iron studies, if iron deficiency noted pt will need capsule endoscopy   - Dr. Jones to follow Monday am       #)  Incidentally noted right hepatic lobe liver lesion of 3 cm size   no evidence of liver cirrhosis   Rec   check MRI Liver protocol, AFP

## 2021-01-23 NOTE — H&P ADULT - HISTORY OF PRESENT ILLNESS
75 PMH diverticulosis, DM, HTN, HLD, CKD, ostearthritis, anemia, disc disease presented s/p mechanically fall yesterday. Patient was sitting at the table when he fell, and had some facial bruising patient was brought to ED. Patient states his left leg is stiff and locked up since the fall. During workup patient found to have anemia with Hgb 5.8. Patient had recent endoscopy with Dr. Jones 2 weeks ago showing non-erosive gastritis  75 PMH diverticulosis, DM, HTN, HLD, CKD, ostearthritis, anemia, disc disease presented s/p mechanically fall yesterday. Patient was sitting at the table when he fell, and had some facial bruising patient was brought to ED. Patient states his left leg is stiff and locked up since the fall. During workup patient found to have anemia with Hgb 5.8. Patient had recent endoscopy with Dr. Jones 2 weeks ago showing non-erosive gastritis. Patient states he has had gastric ulcers for many years. Denies any symptoms of anemia, including heart racing, dizziness, blurry vision;  denies fevers, chills chest pain, dyspna. Endorses leg discomfort secondary to swelling, Left>right.     Trauma workup did not show acute fractures, re-demonstrated known spinal stenosis. Coags were wnl in ED. Patient given 2 units, was never hemodynamically unstable. Patient being admitted for anemia.

## 2021-01-23 NOTE — H&P ADULT - NSHPSOCIALHISTORY_GEN_ALL_CORE
non smoker, occasional EtOH use. Lives with wife, has adult children, son lives in [place] currently undergoing Ca treatments which patient states has been stress in his life

## 2021-01-23 NOTE — PHYSICAL THERAPY INITIAL EVALUATION ADULT - PERTINENT HX OF CURRENT PROBLEM, REHAB EVAL
75 PMH diverticulosis, DM, HTN, HLD, CKD, ostearthritis, anemia, disc disease presented s/p mechanically fall yesterday. Patient was sitting at the table when he fell, and had some facial bruising patient was brought to ED. Patient states his left leg is stiff and locked up since the fall. During workup patient found to have anemia with Hgb 5.8. Patient had recent endoscopy with Dr. Jones 2 weeks ago showing non-erosive gastritis. Patient states he has had gastric ulcers for many years.

## 2021-01-23 NOTE — H&P ADULT - ATTENDING COMMENTS
#Fall, unclear if syncope; in setting of anemia  s/p 2 units prbc  check retic, iron studies  egd 1/8 with gastritis/ duodenitis  gi consult  ct abd no rp bleed  trauma w/u neg

## 2021-01-23 NOTE — CONSULT NOTE ADULT - SUBJECTIVE AND OBJECTIVE BOX
GI HPI:  Patient is a 75y old  Male who presents with a chief complaint of Anemia (23 Jan 2021 01:24)  . Patient complaining of       Hospital course:  75 PMH diverticulosis, DM, HTN, HLD, CKD, ostearthritis, anemia, disc disease presented s/p mechanically fall yesterday. Patient was sitting at the table when he fell, and had some facial bruising patient was brought to ED. Patient states his left leg is stiff and locked up since the fall. During workup patient found to have anemia with Hgb 5.8. Patient had recent endoscopy with Dr. Jones 2 weeks ago showing non-erosive gastritis. Patient states he has had gastric ulcers for many years. Denies any symptoms of anemia, including heart racing, dizziness, blurry vision;  denies fevers, chills chest pain, dyspna. Endorses leg discomfort secondary to swelling, Left>right.     Trauma workup did not show acute fractures, re-demonstrated known spinal stenosis. Coags were wnl in ED. Patient given 2 units, was never hemodynamically unstable. Patient being admitted for anemia.  (23 Jan 2021 01:24)      PAST MEDICAL & SURGICAL HISTORY  Sciatica, unspecified laterality    Osteoarthritis of multiple joints, unspecified osteoarthritis type    High cholesterol    Hypertension, unspecified type    Type 2 diabetes mellitus without complication, unspecified long term insulin use status    Chronic gout of foot, unspecified cause, unspecified laterality    Herniated disc, cervical    Diverticulitis    H/O colonoscopy  2018    History of tonsillectomy and adenoidectomy        FAMILY HISTORY:  FAMILY HISTORY:      SOCIAL HISTORY:  smoker:   Alcohol:  Drug:    ALLERGIES:  No Known Allergies      MEDICATIONS:  MEDICATIONS  (STANDING):  allopurinol 300 milliGRAM(s) Oral daily  atorvastatin Oral Tab/Cap - Peds 40 milliGRAM(s) Oral daily  buMETAnide 1 milliGRAM(s) Oral daily  midodrine 10 milliGRAM(s) Oral every 8 hours  omega-3-Acid Ethyl Esters 2 Gram(s) Oral two times a day  pantoprazole    Tablet 40 milliGRAM(s) Oral before breakfast    MEDICATIONS  (PRN):      HOME MEDICATIONS:  Home Medications:  allopurinol 300 mg oral tablet: 1 tab(s) orally once a day (08 Jan 2021 07:54)  atorvastatin 40 mg oral tablet: 1 tab(s) orally once a day (08 Jan 2021 07:54)  bumetanide 1 mg oral tablet: 1 tab(s) orally once a day (08 Jan 2021 07:54)  metFORMIN 1000 mg oral tablet: 1 tab(s) orally 2 times a day (08 Jan 2021 07:54)  Omega-3 oral capsule: 1 tab(s) orally once a day (08 Jan 2021 07:54)      ROS:     REVIEW OF SYSTEMS  General:  No fevers  Eyes:  No reported pain   ENT:  No sore throat   NECK: No stiffness   CV:  No chest pain   Resp:  No shortness of breath  GI:  See HPI  :  No dysuria  Muscle:  No weakness  Neuro:  No tingling  Endocrine:  No polyuria  Heme:  No ecchymosis          VITALS:   T(F): 97.2 (01-23 @ 14:04), Max: 97.9 (01-23 @ 00:54)  HR: 93 (01-23 @ 14:04) (91 - 102)  BP: 136/62 (01-23 @ 14:04) (105/49 - 159/78)  BP(mean): --  RR: 18 (01-23 @ 14:04) (18 - 20)  SpO2: 96% (01-23 @ 06:11) (96% - 98%)    I&O's Summary    22 Jan 2021 07:01  -  23 Jan 2021 07:00  --------------------------------------------------------  IN: 0 mL / OUT: 550 mL / NET: -550 mL    23 Jan 2021 07:01  -  23 Jan 2021 15:33  --------------------------------------------------------  IN: 0 mL / OUT: 900 mL / NET: -900 mL        PHYSICAL EXAM:  EYES: No scleral icterus   LUNG: Clear to auscultation bilaterally; No rales, rhonchi, wheezing, or rubs  HEART: RRR; No murmurs  ABDOMEN: Soft, +BS, Abdominal Tenderness, No guarding, No Ceron Sign   Rectal Exam:     LABS:                        7.7    10.19 )-----------( 307      ( 23 Jan 2021 11:42 )             24.9     PT/INR - ( 22 Jan 2021 17:08 )  INR: 1.27          PTT - ( 22 Jan 2021 17:08 )  PTT:39.7<H>  LIVER FUNCTIONS - ( 23 Jan 2021 05:38 )  Alb: 3.6 g/dL / Pro: 6.2 g/dL / ALK PHOS: 107 U/L / ALT: 16 U/L / AST: 22 U/L / GGT: x           01-23    145  |  109  |  31<H>  ----------------------------<  168<H>  4.4   |  25  |  1.0    Ca    9.1      23 Jan 2021 05:38  Mg     2.2     01-23              Previous EGD:    Previous colonoscopy:       RADIOLOGY:        GI HPI:  Patient is a 75y old  Male who presents with a chief complaint of Anemia (23 Jan 2021 01:24)  75 PMH diverticulosis, DM, HTN, HLD, CKD, ostearthritis, anemia, disc disease presented s/p mechanically fall yesterday. Patient was sitting at the table when he fell, and had some facial bruising patient was brought to ED. Patient states his left leg is stiff and locked up since the fall. During workup patient found to have anemia with Hgb 5.8. Patient had recent endoscopy with Dr. Jones 2 weeks ago showing non-erosive gastritis. Patient states he has had gastric ulcers for many years. Denies any symptoms of anemia, including heart racing, dizziness, blurry vision;  denies fevers, chills chest pain, dyspna. Endorses leg discomfort secondary to swelling, Left>right.     Trauma workup did not show acute fractures, re-demonstrated known spinal stenosis. Coags were wnl in ED. Patient given 2 units, was never hemodynamically unstable. Patient being admitted for anemia.  (23 Jan 2021 01:24)      PAST MEDICAL & SURGICAL HISTORY  Sciatica, unspecified laterality    Osteoarthritis of multiple joints, unspecified osteoarthritis type    High cholesterol    Hypertension, unspecified type    Type 2 diabetes mellitus without complication, unspecified long term insulin use status    Chronic gout of foot, unspecified cause, unspecified laterality    Herniated disc, cervical    Diverticulitis    H/O colonoscopy  2018    History of tonsillectomy and adenoidectomy        FAMILY HISTORY:  FAMILY HISTORY: no significant GI family history       SOCIAL HISTORY:  smoker:  denies   Alcohol: denies   Drug: denies     ALLERGIES:  No Known Allergies      MEDICATIONS:  MEDICATIONS  (STANDING):  allopurinol 300 milliGRAM(s) Oral daily  atorvastatin Oral Tab/Cap - Peds 40 milliGRAM(s) Oral daily  buMETAnide 1 milliGRAM(s) Oral daily  midodrine 10 milliGRAM(s) Oral every 8 hours  omega-3-Acid Ethyl Esters 2 Gram(s) Oral two times a day  pantoprazole    Tablet 40 milliGRAM(s) Oral before breakfast    MEDICATIONS  (PRN):      HOME MEDICATIONS:  Home Medications:  allopurinol 300 mg oral tablet: 1 tab(s) orally once a day (08 Jan 2021 07:54)  atorvastatin 40 mg oral tablet: 1 tab(s) orally once a day (08 Jan 2021 07:54)  bumetanide 1 mg oral tablet: 1 tab(s) orally once a day (08 Jan 2021 07:54)  metFORMIN 1000 mg oral tablet: 1 tab(s) orally 2 times a day (08 Jan 2021 07:54)  Omega-3 oral capsule: 1 tab(s) orally once a day (08 Jan 2021 07:54)      ROS:     REVIEW OF SYSTEMS  General:  No fevers  Eyes:  No reported pain   ENT:  No sore throat   NECK: No stiffness   CV:  No chest pain   Resp:  No shortness of breath  GI:  See HPI  :  No dysuria  Muscle:  No weakness  Neuro:  No tingling  Endocrine:  No polyuria  Heme:  No ecchymosis          VITALS:   T(F): 97.2 (01-23 @ 14:04), Max: 97.9 (01-23 @ 00:54)  HR: 93 (01-23 @ 14:04) (91 - 102)  BP: 136/62 (01-23 @ 14:04) (105/49 - 159/78)  BP(mean): --  RR: 18 (01-23 @ 14:04) (18 - 20)  SpO2: 96% (01-23 @ 06:11) (96% - 98%)    I&O's Summary    22 Jan 2021 07:01  -  23 Jan 2021 07:00  --------------------------------------------------------  IN: 0 mL / OUT: 550 mL / NET: -550 mL    23 Jan 2021 07:01  -  23 Jan 2021 15:33  --------------------------------------------------------  IN: 0 mL / OUT: 900 mL / NET: -900 mL        PHYSICAL EXAM:  Gen: comfortable   EYES: No scleral icterus   LUNG: Clear to auscultation bilaterally;  HEART: s1 and s2 heard   ABDOMEN: Soft, +BS, no abd distension, no Abdominal Tenderness, No guarding, No Ceron Sign   Neuro: AAO x 3  Ext: no edema     LABS:                        7.7    10.19 )-----------( 307      ( 23 Jan 2021 11:42 )             24.9     PT/INR - ( 22 Jan 2021 17:08 )  INR: 1.27          PTT - ( 22 Jan 2021 17:08 )  PTT:39.7<H>  LIVER FUNCTIONS - ( 23 Jan 2021 05:38 )  Alb: 3.6 g/dL / Pro: 6.2 g/dL / ALK PHOS: 107 U/L / ALT: 16 U/L / AST: 22 U/L / GGT: x           01-23    145  |  109  |  31<H>  ----------------------------<  168<H>  4.4   |  25  |  1.0    Ca    9.1      23 Jan 2021 05:38  Mg     2.2     01-23        Previous EGD: < from: EGD (01.08.21 @ 08:30) >    Impressions:    Normal mucosa in the whole esophagus.    Erythema in the stomach compatible with non-erosive gastritis.    Erosions in the distal bulb compatible with duodenitis. (Biopsy).    Erythema in the distal bulb and Second part of duodenum compatible with  duodenitis.     Plan: Dietary management education, guidance, and counseling  [id number] mg po bid      < end of copied text >      Previous colonoscopy:  none on file         RADIOLOGY:    ad< from: CT Abdomen and Pelvis w/ IV Cont (01.22.21 @ 19:32) >  FINDINGS CHEST:    TUBES/LINES: None    HEART AND VESSELS: The heart is normal in size. No evidence of central pulmonary embolism. There is no pericardial effusion.    Mild aortic calcifications are noted. There is no evidence of aortic aneurysm or dissection.    Normal caliber aorta and pulmonary artery. Brachiocephalic vessels are unremarkable.    MEDIASTINUM: There are no enlarged mediastinal, hilar or axillary lymph nodes. The visualized portion of the thyroid gland is unremarkable.    AIRWAYS, LUNGS AND PLEURA: The central tracheobronchial tree is patent. There are mild dependent atelectatic changes at the lung bases, otherwise the lungs are clear.  There is no pleural effusion. There is no pneumothorax.        FINDINGS ABDOMEN AND PELVIS:    HEPATIC: The liver is normal in size with no evidence of bile duct dilatation. Compared with the previous CT scan of 8/20/2020, there is a new 3.6 x 2.7 cm hypoechoic enhancing lesion in thelateral aspect of the right lobe of the liver (3/70). Hepatic MRI with and without contrast may be useful for further evaluation. The portal vein is patent.    BILIARY: Cholelithiasis.    SPLEEN: Unremarkable.    PANCREAS: The pancreas is normal in size and configuration. No evidence of mass or pancreatitis.    ADRENAL GLANDS: Unremarkable.    KIDNEYS: There is symmetric bilateral renal enhancement. No evidence of hydronephrosis, calcified stones, or solid mass. Right lower pole renal cyst. Several subcentimeter hypodense cortical lesions, too small to characterize, are noted in the left kidney.    ABDOMINOPELVIC NODES: Stable periportal, portacaval, and paraaortic lymph nodes, the largest measuring 2.3 x 1.6 cm.    PELVIC ORGANS: No evidenceof pelvic mass, lymphadenopathy, or fluid collection.    PERITONEUM/MESENTERY/BOWEL: Colonic diverticula noted with no evidence of diverticulitis. No evidence of bowel obstruction, colitis, inflammatory process, or ascites within the abdomen or pelvis. No pneumoperitoneum.    BONES/SOFT TISSUES: Degenerative changes and mild scoliosis of the spine are noted. No acute fractures.    OTHER: No evidence of abdominal aortic aneurysm. Fat-containing right inguinal hernia.      IMPRESSION:      No evidence of intra-thoracic, abdominal or pelvic visceral laceration or hematoma.  No acute fractures are identified.    Compared with the previous CT scan of 8/20/2020, there is a new 3.6 x 2.7 cm hypoechoic enhancing lesion in the lateral aspect of the right lobe of the liver (3/70). Hepatic MRI with and without contrast may be useful for further evaluation.      < end of copied text >

## 2021-01-23 NOTE — PHYSICAL THERAPY INITIAL EVALUATION ADULT - GENERAL OBSERVATIONS, REHAB EVAL
8:45-9:15. chart reviewed. Pt received semi-garner at B/S, alert, oriented, able to follow multi-step instructions and agreeable to PT evaluation. + IV, + L eye/knee ecchymosis, stable vitals, HGb 7.3 denies dizziness. c/o chronic L knee pain.

## 2021-01-24 LAB
ALBUMIN SERPL ELPH-MCNC: 3.5 G/DL — SIGNIFICANT CHANGE UP (ref 3.5–5.2)
ALP SERPL-CCNC: 123 U/L — HIGH (ref 30–115)
ALT FLD-CCNC: 22 U/L — SIGNIFICANT CHANGE UP (ref 0–41)
ANION GAP SERPL CALC-SCNC: 13 MMOL/L — SIGNIFICANT CHANGE UP (ref 7–14)
AST SERPL-CCNC: 39 U/L — SIGNIFICANT CHANGE UP (ref 0–41)
BASOPHILS # BLD AUTO: 0.05 K/UL — SIGNIFICANT CHANGE UP (ref 0–0.2)
BASOPHILS NFR BLD AUTO: 0.5 % — SIGNIFICANT CHANGE UP (ref 0–1)
BILIRUB SERPL-MCNC: 0.4 MG/DL — SIGNIFICANT CHANGE UP (ref 0.2–1.2)
BUN SERPL-MCNC: 37 MG/DL — HIGH (ref 10–20)
CALCIUM SERPL-MCNC: 8.9 MG/DL — SIGNIFICANT CHANGE UP (ref 8.5–10.1)
CHLORIDE SERPL-SCNC: 107 MMOL/L — SIGNIFICANT CHANGE UP (ref 98–110)
CO2 SERPL-SCNC: 23 MMOL/L — SIGNIFICANT CHANGE UP (ref 17–32)
CREAT SERPL-MCNC: 1.2 MG/DL — SIGNIFICANT CHANGE UP (ref 0.7–1.5)
EOSINOPHIL # BLD AUTO: 0.21 K/UL — SIGNIFICANT CHANGE UP (ref 0–0.7)
EOSINOPHIL NFR BLD AUTO: 2.1 % — SIGNIFICANT CHANGE UP (ref 0–8)
GLUCOSE BLDC GLUCOMTR-MCNC: 139 MG/DL — HIGH (ref 70–99)
GLUCOSE BLDC GLUCOMTR-MCNC: 139 MG/DL — HIGH (ref 70–99)
GLUCOSE BLDC GLUCOMTR-MCNC: 144 MG/DL — HIGH (ref 70–99)
GLUCOSE BLDC GLUCOMTR-MCNC: 204 MG/DL — HIGH (ref 70–99)
GLUCOSE SERPL-MCNC: 131 MG/DL — HIGH (ref 70–99)
HCT VFR BLD CALC: 24.9 % — LOW (ref 42–52)
HGB BLD-MCNC: 7.7 G/DL — LOW (ref 14–18)
IMM GRANULOCYTES NFR BLD AUTO: 0.7 % — HIGH (ref 0.1–0.3)
LYMPHOCYTES # BLD AUTO: 0.74 K/UL — LOW (ref 1.2–3.4)
LYMPHOCYTES # BLD AUTO: 7.4 % — LOW (ref 20.5–51.1)
MCHC RBC-ENTMCNC: 27.4 PG — SIGNIFICANT CHANGE UP (ref 27–31)
MCHC RBC-ENTMCNC: 30.9 G/DL — LOW (ref 32–37)
MCV RBC AUTO: 88.6 FL — SIGNIFICANT CHANGE UP (ref 80–94)
MONOCYTES # BLD AUTO: 1.23 K/UL — HIGH (ref 0.1–0.6)
MONOCYTES NFR BLD AUTO: 12.3 % — HIGH (ref 1.7–9.3)
NEUTROPHILS # BLD AUTO: 7.73 K/UL — HIGH (ref 1.4–6.5)
NEUTROPHILS NFR BLD AUTO: 77 % — HIGH (ref 42.2–75.2)
NRBC # BLD: 0 /100 WBCS — SIGNIFICANT CHANGE UP (ref 0–0)
PLATELET # BLD AUTO: 334 K/UL — SIGNIFICANT CHANGE UP (ref 130–400)
POTASSIUM SERPL-MCNC: 4.5 MMOL/L — SIGNIFICANT CHANGE UP (ref 3.5–5)
POTASSIUM SERPL-SCNC: 4.5 MMOL/L — SIGNIFICANT CHANGE UP (ref 3.5–5)
PROT SERPL-MCNC: 6 G/DL — SIGNIFICANT CHANGE UP (ref 6–8)
RBC # BLD: 2.81 M/UL — LOW (ref 4.7–6.1)
RBC # FLD: 17.3 % — HIGH (ref 11.5–14.5)
SODIUM SERPL-SCNC: 143 MMOL/L — SIGNIFICANT CHANGE UP (ref 135–146)
WBC # BLD: 10.03 K/UL — SIGNIFICANT CHANGE UP (ref 4.8–10.8)
WBC # FLD AUTO: 10.03 K/UL — SIGNIFICANT CHANGE UP (ref 4.8–10.8)

## 2021-01-24 PROCEDURE — 99233 SBSQ HOSP IP/OBS HIGH 50: CPT

## 2021-01-24 RX ORDER — OXYCODONE AND ACETAMINOPHEN 5; 325 MG/1; MG/1
1 TABLET ORAL EVERY 4 HOURS
Refills: 0 | Status: DISCONTINUED | OUTPATIENT
Start: 2021-01-24 | End: 2021-01-26

## 2021-01-24 RX ADMIN — BUMETANIDE 1 MILLIGRAM(S): 0.25 INJECTION INTRAMUSCULAR; INTRAVENOUS at 06:07

## 2021-01-24 RX ADMIN — MIDODRINE HYDROCHLORIDE 10 MILLIGRAM(S): 2.5 TABLET ORAL at 06:07

## 2021-01-24 RX ADMIN — Medication 2 GRAM(S): at 17:04

## 2021-01-24 RX ADMIN — MIDODRINE HYDROCHLORIDE 10 MILLIGRAM(S): 2.5 TABLET ORAL at 13:21

## 2021-01-24 RX ADMIN — ATORVASTATIN CALCIUM 40 MILLIGRAM(S): 80 TABLET, FILM COATED ORAL at 11:58

## 2021-01-24 RX ADMIN — PANTOPRAZOLE SODIUM 40 MILLIGRAM(S): 20 TABLET, DELAYED RELEASE ORAL at 06:07

## 2021-01-24 RX ADMIN — OXYCODONE AND ACETAMINOPHEN 1 TABLET(S): 5; 325 TABLET ORAL at 16:04

## 2021-01-24 RX ADMIN — MIDODRINE HYDROCHLORIDE 10 MILLIGRAM(S): 2.5 TABLET ORAL at 22:19

## 2021-01-24 RX ADMIN — Medication 2 GRAM(S): at 06:08

## 2021-01-24 RX ADMIN — OXYCODONE AND ACETAMINOPHEN 1 TABLET(S): 5; 325 TABLET ORAL at 22:22

## 2021-01-24 RX ADMIN — Medication 300 MILLIGRAM(S): at 11:58

## 2021-01-24 NOTE — PROGRESS NOTE ADULT - SUBJECTIVE AND OBJECTIVE BOX
INTERVAL HPI/OVERNIGHT EVENTS:    SUBJECTIVE: Patient seen and examined at bedside.     no cp, sob, abd pain, fever  no abd pain, melena, hematochezia, vomiting    OBJECTIVE:    VITAL SIGNS:  Vital Signs Last 24 Hrs  T(C): 35.4 (2021 04:56), Max: 36.2 (2021 14:04)  T(F): 95.8 (2021 04:56), Max: 97.2 (2021 14:04)  HR: 95 (2021 04:56) (93 - 104)  BP: 132/56 (2021 04:56) (127/59 - 145/63)  BP(mean): 84 (2021 04:56) (84 - 84)  RR: 20 (2021 04:56) (18 - 20)  SpO2: --      PHYSICAL EXAM:    General: NAD  HEENT: NC/AT; PERRL, clear conjunctiva  Neck: supple  Respiratory: CTA b/l  Cardiovascular: +S1/S2; RRR  Abdomen: soft, NT/ND; +BS x4  Extremities: WWP, 2+ peripheral pulses b/l; no LE edema  Skin: normal color and turgor; no rash  Neurological:    MEDICATIONS:  MEDICATIONS  (STANDING):  allopurinol 300 milliGRAM(s) Oral daily  atorvastatin Oral Tab/Cap - Peds 40 milliGRAM(s) Oral daily  buMETAnide 1 milliGRAM(s) Oral daily  midodrine 10 milliGRAM(s) Oral every 8 hours  omega-3-Acid Ethyl Esters 2 Gram(s) Oral two times a day  pantoprazole    Tablet 40 milliGRAM(s) Oral before breakfast    MEDICATIONS  (PRN):      ALLERGIES:  Allergies    No Known Allergies    Intolerances        LABS:                        7.7    10.03 )-----------( 334      ( 2021 07:10 )             24.9     Hemoglobin: 7.7 g/dL ( @ 07:10)  Hemoglobin: 8.1 g/dL ( @ 19:11)  Hemoglobin: 7.7 g/dL ( @ 11:42)  Hemoglobin: 7.3 g/dL ( @ 05:38)  Hemoglobin: 5.8 g/dL ( @ 17:08)    CBC Full  -  ( 2021 07:10 )  WBC Count : 10.03 K/uL  RBC Count : 2.81 M/uL  Hemoglobin : 7.7 g/dL  Hematocrit : 24.9 %  Platelet Count - Automated : 334 K/uL  Mean Cell Volume : 88.6 fL  Mean Cell Hemoglobin : 27.4 pg  Mean Cell Hemoglobin Concentration : 30.9 g/dL  Auto Neutrophil # : 7.73 K/uL  Auto Lymphocyte # : 0.74 K/uL  Auto Monocyte # : 1.23 K/uL  Auto Eosinophil # : 0.21 K/uL  Auto Basophil # : 0.05 K/uL  Auto Neutrophil % : 77.0 %  Auto Lymphocyte % : 7.4 %  Auto Monocyte % : 12.3 %  Auto Eosinophil % : 2.1 %  Auto Basophil % : 0.5 %        143  |  107  |  37<H>  ----------------------------<  131<H>  4.5   |  23  |  1.2    Ca    8.9      2021 07:10  Mg     2.2         TPro  6.0  /  Alb  3.5  /  TBili  0.4  /  DBili  x   /  AST  39  /  ALT  22  /  AlkPhos  123<H>      Creatinine Trend: 1.2<--, 1.0<--, 1.1<--  LIVER FUNCTIONS - ( 2021 07:10 )  Alb: 3.5 g/dL / Pro: 6.0 g/dL / ALK PHOS: 123 U/L / ALT: 22 U/L / AST: 39 U/L / GGT: x           PT/INR - ( 2021 17:08 )   PT: 14.60 sec;   INR: 1.27 ratio         PTT - ( 2021 17:08 )  PTT:39.7 sec    hs Troponin:            Urinalysis Basic - ( 2021 16:16 )    Color: Light Yellow / Appearance: Clear / S.028 / pH: x  Gluc: x / Ketone: Negative  / Bili: Negative / Urobili: <2 mg/dL   Blood: x / Protein: 30 mg/dL / Nitrite: Negative   Leuk Esterase: Negative / RBC: x / WBC x   Sq Epi: x / Non Sq Epi: x / Bacteria: x      CSF:                      EKG:   MICROBIOLOGY:    IMAGING:      Labs, imaging, EKG personally reviewed    RADIOLOGY & ADDITIONAL TESTS: Reviewed.

## 2021-01-24 NOTE — PROGRESS NOTE ADULT - ASSESSMENT
75M PMHx diverticulosis, DM2, HTN here with fall. Found to have normocytic anemia s/p 2 units prbc.    #Fall, unclear if syncope; in setting of anemia  s/p 2 units prbc  check retic, iron studies  egd 1/8 with gastritis/ duodenitis  appreciate gi  h/h stable, trend  ct abd no rp bleed  trauma w/u neg   PT  #DM2  controlled off medications  #HTN  bumex 1  #DVT ppx  scds    #Progress Note Handoff:  Pending (specify):  Consults_________, Tests________, Test Results_______, Other____trend h/h_____  Family discussion:d/w pt at bedside re: treatment plan, primary dx  Disposition: Home___/SNF___/Other________/Unknown at this time_____x___

## 2021-01-25 LAB
ALBUMIN SERPL ELPH-MCNC: 3.2 G/DL — LOW (ref 3.5–5.2)
ALP SERPL-CCNC: 131 U/L — HIGH (ref 30–115)
ALT FLD-CCNC: 21 U/L — SIGNIFICANT CHANGE UP (ref 0–41)
ANION GAP SERPL CALC-SCNC: 13 MMOL/L — SIGNIFICANT CHANGE UP (ref 7–14)
AST SERPL-CCNC: 27 U/L — SIGNIFICANT CHANGE UP (ref 0–41)
BASOPHILS # BLD AUTO: 0.06 K/UL — SIGNIFICANT CHANGE UP (ref 0–0.2)
BASOPHILS NFR BLD AUTO: 0.7 % — SIGNIFICANT CHANGE UP (ref 0–1)
BILIRUB SERPL-MCNC: 0.5 MG/DL — SIGNIFICANT CHANGE UP (ref 0.2–1.2)
BUN SERPL-MCNC: 42 MG/DL — HIGH (ref 10–20)
CALCIUM SERPL-MCNC: 8.8 MG/DL — SIGNIFICANT CHANGE UP (ref 8.5–10.1)
CHLORIDE SERPL-SCNC: 105 MMOL/L — SIGNIFICANT CHANGE UP (ref 98–110)
CO2 SERPL-SCNC: 23 MMOL/L — SIGNIFICANT CHANGE UP (ref 17–32)
CREAT SERPL-MCNC: 1.2 MG/DL — SIGNIFICANT CHANGE UP (ref 0.7–1.5)
EOSINOPHIL # BLD AUTO: 0.2 K/UL — SIGNIFICANT CHANGE UP (ref 0–0.7)
EOSINOPHIL NFR BLD AUTO: 2.2 % — SIGNIFICANT CHANGE UP (ref 0–8)
GLUCOSE BLDC GLUCOMTR-MCNC: 121 MG/DL — HIGH (ref 70–99)
GLUCOSE BLDC GLUCOMTR-MCNC: 155 MG/DL — HIGH (ref 70–99)
GLUCOSE BLDC GLUCOMTR-MCNC: 156 MG/DL — HIGH (ref 70–99)
GLUCOSE BLDC GLUCOMTR-MCNC: 224 MG/DL — HIGH (ref 70–99)
GLUCOSE SERPL-MCNC: 101 MG/DL — HIGH (ref 70–99)
HCT VFR BLD CALC: 25.8 % — LOW (ref 42–52)
HGB BLD-MCNC: 7.7 G/DL — LOW (ref 14–18)
IMM GRANULOCYTES NFR BLD AUTO: 0.9 % — HIGH (ref 0.1–0.3)
LYMPHOCYTES # BLD AUTO: 0.66 K/UL — LOW (ref 1.2–3.4)
LYMPHOCYTES # BLD AUTO: 7.2 % — LOW (ref 20.5–51.1)
MCHC RBC-ENTMCNC: 27 PG — SIGNIFICANT CHANGE UP (ref 27–31)
MCHC RBC-ENTMCNC: 29.8 G/DL — LOW (ref 32–37)
MCV RBC AUTO: 90.5 FL — SIGNIFICANT CHANGE UP (ref 80–94)
MONOCYTES # BLD AUTO: 1.16 K/UL — HIGH (ref 0.1–0.6)
MONOCYTES NFR BLD AUTO: 12.6 % — HIGH (ref 1.7–9.3)
NEUTROPHILS # BLD AUTO: 7.05 K/UL — HIGH (ref 1.4–6.5)
NEUTROPHILS NFR BLD AUTO: 76.4 % — HIGH (ref 42.2–75.2)
NRBC # BLD: 0 /100 WBCS — SIGNIFICANT CHANGE UP (ref 0–0)
PLATELET # BLD AUTO: 377 K/UL — SIGNIFICANT CHANGE UP (ref 130–400)
POTASSIUM SERPL-MCNC: 4.3 MMOL/L — SIGNIFICANT CHANGE UP (ref 3.5–5)
POTASSIUM SERPL-SCNC: 4.3 MMOL/L — SIGNIFICANT CHANGE UP (ref 3.5–5)
PROT SERPL-MCNC: 6.5 G/DL — SIGNIFICANT CHANGE UP (ref 6–8)
RBC # BLD: 2.85 M/UL — LOW (ref 4.7–6.1)
RBC # FLD: 17.2 % — HIGH (ref 11.5–14.5)
SARS-COV-2 RNA SPEC QL NAA+PROBE: SIGNIFICANT CHANGE UP
SODIUM SERPL-SCNC: 141 MMOL/L — SIGNIFICANT CHANGE UP (ref 135–146)
WBC # BLD: 9.21 K/UL — SIGNIFICANT CHANGE UP (ref 4.8–10.8)
WBC # FLD AUTO: 9.21 K/UL — SIGNIFICANT CHANGE UP (ref 4.8–10.8)

## 2021-01-25 PROCEDURE — 73700 CT LOWER EXTREMITY W/O DYE: CPT | Mod: 26,LT

## 2021-01-25 PROCEDURE — 99233 SBSQ HOSP IP/OBS HIGH 50: CPT

## 2021-01-25 RX ORDER — HEPARIN SODIUM 5000 [USP'U]/ML
5000 INJECTION INTRAVENOUS; SUBCUTANEOUS EVERY 8 HOURS
Refills: 0 | Status: DISCONTINUED | OUTPATIENT
Start: 2021-01-25 | End: 2021-01-26

## 2021-01-25 RX ORDER — HEPARIN SODIUM 5000 [USP'U]/ML
5000 INJECTION INTRAVENOUS; SUBCUTANEOUS EVERY 12 HOURS
Refills: 0 | Status: DISCONTINUED | OUTPATIENT
Start: 2021-01-25 | End: 2021-01-25

## 2021-01-25 RX ORDER — TAMSULOSIN HYDROCHLORIDE 0.4 MG/1
0.4 CAPSULE ORAL AT BEDTIME
Refills: 0 | Status: DISCONTINUED | OUTPATIENT
Start: 2021-01-25 | End: 2021-01-26

## 2021-01-25 RX ORDER — HEPARIN SODIUM 5000 [USP'U]/ML
5000 INJECTION INTRAVENOUS; SUBCUTANEOUS EVERY 8 HOURS
Refills: 0 | Status: DISCONTINUED | OUTPATIENT
Start: 2021-01-25 | End: 2021-01-25

## 2021-01-25 RX ADMIN — OXYCODONE AND ACETAMINOPHEN 1 TABLET(S): 5; 325 TABLET ORAL at 02:59

## 2021-01-25 RX ADMIN — Medication 300 MILLIGRAM(S): at 11:19

## 2021-01-25 RX ADMIN — TAMSULOSIN HYDROCHLORIDE 0.4 MILLIGRAM(S): 0.4 CAPSULE ORAL at 22:51

## 2021-01-25 RX ADMIN — Medication 2 GRAM(S): at 05:52

## 2021-01-25 RX ADMIN — HEPARIN SODIUM 5000 UNIT(S): 5000 INJECTION INTRAVENOUS; SUBCUTANEOUS at 22:51

## 2021-01-25 RX ADMIN — MIDODRINE HYDROCHLORIDE 10 MILLIGRAM(S): 2.5 TABLET ORAL at 05:51

## 2021-01-25 RX ADMIN — OXYCODONE AND ACETAMINOPHEN 1 TABLET(S): 5; 325 TABLET ORAL at 22:51

## 2021-01-25 RX ADMIN — Medication 2 GRAM(S): at 16:58

## 2021-01-25 RX ADMIN — HEPARIN SODIUM 5000 UNIT(S): 5000 INJECTION INTRAVENOUS; SUBCUTANEOUS at 16:58

## 2021-01-25 RX ADMIN — BUMETANIDE 1 MILLIGRAM(S): 0.25 INJECTION INTRAMUSCULAR; INTRAVENOUS at 05:51

## 2021-01-25 RX ADMIN — ATORVASTATIN CALCIUM 40 MILLIGRAM(S): 80 TABLET, FILM COATED ORAL at 11:21

## 2021-01-25 RX ADMIN — PANTOPRAZOLE SODIUM 40 MILLIGRAM(S): 20 TABLET, DELAYED RELEASE ORAL at 05:52

## 2021-01-25 RX ADMIN — MIDODRINE HYDROCHLORIDE 10 MILLIGRAM(S): 2.5 TABLET ORAL at 14:10

## 2021-01-25 RX ADMIN — MIDODRINE HYDROCHLORIDE 10 MILLIGRAM(S): 2.5 TABLET ORAL at 22:51

## 2021-01-25 NOTE — PROGRESS NOTE ADULT - ATTENDING COMMENTS
Patient seen and examined independently earlier today. Case discussed with housestaff, nursing, case mgmt. I agree with most of the resident's note, physical exam, and plan except as below. Patient feels better. No complaints. Denies CP, SOB, AP. Remainder ROS unremarkable.    Gen: NAD, AA0x3  HEENT: EOMI, no LAD  CV: nl S1 S2  Resp: decreased BS b/l  GI: NT/ND/S +BS  MS: no c/c/e, +pulses. Lt thigh is warm to touch and slightly bigger than Rt  Neuro: nonfocal, +reflexes    #Mechanical fall/Debility: aggressive PT  #Acute on chronic anemia: GI recommends repeat c-scopy (had 2yrs ago). Also suspect hematoma in Lt thigh. F/u Lt thigh CT. If all negative, will need outpt capsule endoscopy.  #Obesity: wt loss advised  #Liver lesion: outpt MRI  #HTN/DM/HLD: cont current meds    #Progress Note Handoff  Pending (specify):  Consults____Clinical improvement and stability__x__Tests_ CT Lt thigh, C-scopy____PT_x___  Pt/Family discussion: Pt informed and agrees with the current plan  Disposition: Home______SNF__?_____4A______To be determined____    My note supersedes the residents note should a discrepancy arise.

## 2021-01-25 NOTE — CONSULT NOTE ADULT - SUBJECTIVE AND OBJECTIVE BOX
Pt added on to treatment room. Super float aware.    74 yo male well known to me - for many years.  Admitted this time after syncope and fall at the dinner table.  Pt found to have hgb 5.8.  Has h/o gastric ulcer many years ago with recent EGD about 2 weeks ago which did not show significant pathology.   Neuro w/u is underway and GI w/u to follow looking for etiology of  severe anemia    PE - no significant findings, anemia n bruises noted

## 2021-01-25 NOTE — PROGRESS NOTE ADULT - SUBJECTIVE AND OBJECTIVE BOX
24H events:    Patient is a 75y old Male who presents with a chief complaint of Fall (25 Jan 2021 11:10)    Primary diagnosis of Acute on top of chronic anemia    Today is hospital day 3d. This morning patient was seen and examined at bedside, resting comfortably in bed. No events overnight. No melena, no bleeding episodes.    PAST MEDICAL & SURGICAL HISTORY  Sciatica, unspecified laterality    Osteoarthritis of multiple joints, unspecified osteoarthritis type    High cholesterol    Hypertension, unspecified type    Type 2 diabetes mellitus without complication, unspecified long term insulin use status    Chronic gout of foot, unspecified cause, unspecified laterality    Herniated disc, cervical    Diverticulitis    H/O colonoscopy  2018    History of tonsillectomy and adenoidectomy      SOCIAL HISTORY:  Social History:  non smoker, occasional EtOH use. Lives with wife, has adult children, son lives in [place] currently undergoing Ca treatments which patient states has been stress in his life (23 Jan 2021 01:24)      ALLERGIES:  No Known Allergies    MEDICATIONS:  STANDING MEDICATIONS  allopurinol 300 milliGRAM(s) Oral daily  atorvastatin Oral Tab/Cap - Peds 40 milliGRAM(s) Oral daily  buMETAnide 1 milliGRAM(s) Oral daily  heparin   Injectable 5000 Unit(s) SubCutaneous every 12 hours  midodrine 10 milliGRAM(s) Oral every 8 hours  omega-3-Acid Ethyl Esters 2 Gram(s) Oral two times a day  pantoprazole    Tablet 40 milliGRAM(s) Oral before breakfast  tamsulosin 0.4 milliGRAM(s) Oral at bedtime    PRN MEDICATIONS  oxycodone    5 mG/acetaminophen 325 mG 1 Tablet(s) Oral every 4 hours PRN    VITALS:   T(F): 97.3  HR: 94  BP: 135/63  RR: 18  SpO2: 96%    PHYSICAL EXAM:  HEENT: Not in distress, bruises noted over the left eye. With mild skin bruises  PERRL, clear conjunctiva  Neck: supple  Respiratory: CTA b/l  Cardiovascular: +S1/S2; RRR  Abdomen: soft, NT/ND; +BS x4  Extremities: Left sided thigh tenderness, warm likely secondary to hematoma. Pulses positive  Skin: Multiple skin lesions pruritic   LABS:                        7.7    9.21  )-----------( 377      ( 25 Jan 2021 06:08 )             25.8     01-25    141  |  105  |  42<H>  ----------------------------<  101<H>  4.3   |  23  |  1.2    Ca    8.8      25 Jan 2021 06:08    TPro  6.5  /  Alb  3.2<L>  /  TBili  0.5  /  DBili  x   /  AST  27  /  ALT  21  /  AlkPhos  131<H>  01-25        RADIOLOGY:           24H events:    Patient is a 75y old Male who presents with a chief complaint of Fall (25 Jan 2021 11:10)    Primary diagnosis of Acute on top of chronic anemia    Today is hospital day 3d. This morning patient was seen and examined at bedside, resting comfortably in bed. No events overnight. No melena, no bleeding episodes.    PAST MEDICAL & SURGICAL HISTORY  Sciatica, unspecified laterality    Osteoarthritis of multiple joints, unspecified osteoarthritis type    High cholesterol    Hypertension, unspecified type    Type 2 diabetes mellitus without complication, unspecified long term insulin use status    Chronic gout of foot, unspecified cause, unspecified laterality    Herniated disc, cervical    Diverticulitis    H/O colonoscopy  2018    History of tonsillectomy and adenoidectomy      SOCIAL HISTORY:  Social History:  non smoker, occasional EtOH use. Lives with wife, has adult children, son lives in [place] currently undergoing Ca treatments which patient states has been stress in his life (23 Jan 2021 01:24)      ALLERGIES:  No Known Allergies    MEDICATIONS:  STANDING MEDICATIONS  allopurinol 300 milliGRAM(s) Oral daily  atorvastatin Oral Tab/Cap - Peds 40 milliGRAM(s) Oral daily  buMETAnide 1 milliGRAM(s) Oral daily  heparin   Injectable 5000 Unit(s) SubCutaneous every 12 hours  midodrine 10 milliGRAM(s) Oral every 8 hours  omega-3-Acid Ethyl Esters 2 Gram(s) Oral two times a day  pantoprazole    Tablet 40 milliGRAM(s) Oral before breakfast  tamsulosin 0.4 milliGRAM(s) Oral at bedtime    PRN MEDICATIONS  oxycodone    5 mG/acetaminophen 325 mG 1 Tablet(s) Oral every 4 hours PRN    VITALS:   T(F): 97.3  HR: 94  BP: 135/63  RR: 18  SpO2: 96%    PHYSICAL EXAM:  HEENT: Not in distress, bruises noted over the left eye. With mild skin bruises  PERRL, clear conjunctiva  Neck: supple  Respiratory: CTA b/l  Cardiovascular: +S1/S2; RRR  Abdomen: soft, NT/ND; +BS x4  Extremities: Left sided thigh tenderness, warm likely secondary to hematoma. Pulses positive  Skin: Multiple skin lesions pruritic   LABS:                        7.7    9.21  )-----------( 377      ( 25 Jan 2021 06:08 )             25.8     01-25    141  |  105  |  42<H>  ----------------------------<  101<H>  4.3   |  23  |  1.2    Ca    8.8      25 Jan 2021 06:08    TPro  6.5  /  Alb  3.2<L>  /  TBili  0.5  /  DBili  x   /  AST  27  /  ALT  21  /  AlkPhos  131<H>  01-25        RADIOLOGY:    CT abdomen - pelvis  Compared with the previous CT scan of 8/20/2020, there is a new 3.6 x 2.7 cm hypoechoic enhancing lesion in the lateral aspect of the right lobe of the liver (3/70). Hepatic MRI with and without contrast may be useful for further evaluation.

## 2021-01-25 NOTE — PROGRESS NOTE ADULT - ASSESSMENT
75 PMH diverticulosis, DM, HTN, HLD, CKD, ostearthritis, bleeding gastric ulcer, anemia, disc disease presented s/p mechanically fall yesterday. Patient was sitting at the table when he fell, and had some facial bruising patient was brought to ED. Found to have Hgb 5.8, admitted for w/u anemia    #Normocytic anemia- Hx of bleeding gastric ulcer, per wife in 1978 had transfusion and resolved with medication afterwards. Of note patient baseline hemoglobin on low side in EMR (8-9).  - s/p 2 u pRBC, repeat Hb  - monitor CBC   - keep active T+S   - DELANEY negative  - EGD report: non erosive gastritis and duodenitis  - iron studies likely not of benefit as patient already transfused  - defer GI consult (was just scoped, no evidence this is acute GI problem). Can f/u Dr. Jones op  - c/w protonix    #Mechanical Fall  -s/p fall, can f/u trauma  -PT/eval    #Hepatic Lobe Lesion-outpatient MRI  #DM-finger stick AC/HS, begin insulin if FS>180  #HTN-c/w home meds (bumex 1 mg), on quanapril 40mg at home, will give lisinopril 40mg inpt  #DLD- continue home Lipitor 40mg qd  #Gout-c/w allopurinol 300 Q24  ______________________________________________________________________ 	  75 PMH diverticulosis, DM, HTN, HLD, CKD, ostearthritis, bleeding gastric ulcer, anemia, disc disease presented s/p mechanically fall yesterday. Patient was sitting at the table when he fell, and had some facial bruising patient was brought to ED. Found to have Hgb 5.8, admitted for w/u anemia    #Acute on top of chronic anemia  - No overt GI bleeding and stable vitals   - Hb of 5.8 drop from base line hb of 9, s/p 2 unit PRBCs hb improved to 7.7   - recent EGD no upper GIsource   - On ct abd - no retroperitoneal hematoma   - Suspicion of left thigh hematoma--?>CT left thigh  - Protonix 40 mg daily   - seen by Dr. Jones today: Needs colonoscopy before discharge      #Mechanical Fall  -Trauma workup negative  -PT/eval    #Hepatic Lobe Lesion  -outpatient MRI    #DM  -finger stick AC/HS, begin insulin if FS>180    #HTN  -c/w home meds (bumex 1 mg), on quanapril 40mg at home, will give lisinopril 40mg inpt    #DLD  - continue home Lipitor 40mg qd    #Gout  -c/w allopurinol 300 Q24    # DVT PPx: Hep 5000 s/c   GI PPX: Protonix 40 mg PO   Diet: DASH/Carb consistent  Activity: Increase as tolerated  Dispo: from home, no needs  Code Status: full code     	  75 PMH diverticulosis, DM, HTN, HLD, CKD, ostearthritis, bleeding gastric ulcer, anemia, disc disease presented s/p mechanically fall yesterday. Patient was sitting at the table when he fell, and had some facial bruising patient was brought to ED. Found to have Hgb 5.8, admitted for w/u anemia    #Acute on top of chronic anemia  - No overt GI bleeding and stable vitals   - Hb of 5.8 drop from base line hb of 9, s/p 2 unit PRBCs hb improved to 7.7   - recent EGD no upper GIsource   - On ct abd - no retroperitoneal hematoma   - Suspicion of left thigh hematoma--?>CT left thigh  - Protonix 40 mg daily   - seen by Dr. Jones today: Plan for colonoscopy on Wednesday. Tomorrow clear fluid diet. 4L golytle and dulcolax 20 mg . Npo after midnight on Tuesday.       #Mechanical Fall  -Trauma workup negative  -PT/eval    #Hepatic Lobe Lesion  -outpatient MRI    #DM  -finger stick AC/HS, begin insulin if FS>180    #HTN  -c/w home meds (bumex 1 mg), on quanapril 40mg at home, will give lisinopril 40mg inpt    #DLD  - continue home Lipitor 40mg qd    #Gout  -c/w allopurinol 300 Q24    # DVT PPx: Hep 5000 s/c   GI PPX: Protonix 40 mg PO   Diet: DASH/Carb consistent  Activity: Increase as tolerated  Dispo: from home, no needs  Code Status: full code

## 2021-01-25 NOTE — CONSULT NOTE ADULT - ASSESSMENT
Severe anemia r/o gi bleeding      Pt having further w/u .  Before discharge he should have a colonoscopy

## 2021-01-26 ENCOUNTER — TRANSCRIPTION ENCOUNTER (OUTPATIENT)
Age: 76
End: 2021-01-26

## 2021-01-26 VITALS
HEART RATE: 75 BPM | SYSTOLIC BLOOD PRESSURE: 112 MMHG | RESPIRATION RATE: 20 BRPM | TEMPERATURE: 96 F | DIASTOLIC BLOOD PRESSURE: 58 MMHG

## 2021-01-26 LAB
ALBUMIN SERPL ELPH-MCNC: 3 G/DL — LOW (ref 3.5–5.2)
ALP SERPL-CCNC: 154 U/L — HIGH (ref 30–115)
ALT FLD-CCNC: 21 U/L — SIGNIFICANT CHANGE UP (ref 0–41)
ANION GAP SERPL CALC-SCNC: 14 MMOL/L — SIGNIFICANT CHANGE UP (ref 7–14)
AST SERPL-CCNC: 22 U/L — SIGNIFICANT CHANGE UP (ref 0–41)
BASOPHILS # BLD AUTO: 0.04 K/UL — SIGNIFICANT CHANGE UP (ref 0–0.2)
BASOPHILS NFR BLD AUTO: 0.5 % — SIGNIFICANT CHANGE UP (ref 0–1)
BILIRUB SERPL-MCNC: 0.5 MG/DL — SIGNIFICANT CHANGE UP (ref 0.2–1.2)
BUN SERPL-MCNC: 44 MG/DL — HIGH (ref 10–20)
CALCIUM SERPL-MCNC: 8.3 MG/DL — LOW (ref 8.5–10.1)
CHLORIDE SERPL-SCNC: 102 MMOL/L — SIGNIFICANT CHANGE UP (ref 98–110)
CO2 SERPL-SCNC: 22 MMOL/L — SIGNIFICANT CHANGE UP (ref 17–32)
CREAT SERPL-MCNC: 1.4 MG/DL — SIGNIFICANT CHANGE UP (ref 0.7–1.5)
EOSINOPHIL # BLD AUTO: 0.22 K/UL — SIGNIFICANT CHANGE UP (ref 0–0.7)
EOSINOPHIL NFR BLD AUTO: 2.5 % — SIGNIFICANT CHANGE UP (ref 0–8)
GLUCOSE BLDC GLUCOMTR-MCNC: 140 MG/DL — HIGH (ref 70–99)
GLUCOSE BLDC GLUCOMTR-MCNC: 142 MG/DL — HIGH (ref 70–99)
GLUCOSE BLDC GLUCOMTR-MCNC: 172 MG/DL — HIGH (ref 70–99)
GLUCOSE SERPL-MCNC: 118 MG/DL — HIGH (ref 70–99)
HCT VFR BLD CALC: 24.5 % — LOW (ref 42–52)
HGB BLD-MCNC: 7.6 G/DL — LOW (ref 14–18)
IMM GRANULOCYTES NFR BLD AUTO: 0.7 % — HIGH (ref 0.1–0.3)
IRON SATN MFR SERPL: 10 % — LOW (ref 15–50)
IRON SATN MFR SERPL: 26 UG/DL — LOW (ref 35–150)
LYMPHOCYTES # BLD AUTO: 1.09 K/UL — LOW (ref 1.2–3.4)
LYMPHOCYTES # BLD AUTO: 12.4 % — LOW (ref 20.5–51.1)
MCHC RBC-ENTMCNC: 27.6 PG — SIGNIFICANT CHANGE UP (ref 27–31)
MCHC RBC-ENTMCNC: 31 G/DL — LOW (ref 32–37)
MCV RBC AUTO: 89.1 FL — SIGNIFICANT CHANGE UP (ref 80–94)
MONOCYTES # BLD AUTO: 1.14 K/UL — HIGH (ref 0.1–0.6)
MONOCYTES NFR BLD AUTO: 13 % — HIGH (ref 1.7–9.3)
NEUTROPHILS # BLD AUTO: 6.21 K/UL — SIGNIFICANT CHANGE UP (ref 1.4–6.5)
NEUTROPHILS NFR BLD AUTO: 70.9 % — SIGNIFICANT CHANGE UP (ref 42.2–75.2)
NRBC # BLD: 0 /100 WBCS — SIGNIFICANT CHANGE UP (ref 0–0)
PLATELET # BLD AUTO: 362 K/UL — SIGNIFICANT CHANGE UP (ref 130–400)
POTASSIUM SERPL-MCNC: 4.2 MMOL/L — SIGNIFICANT CHANGE UP (ref 3.5–5)
POTASSIUM SERPL-SCNC: 4.2 MMOL/L — SIGNIFICANT CHANGE UP (ref 3.5–5)
PROT SERPL-MCNC: 6.3 G/DL — SIGNIFICANT CHANGE UP (ref 6–8)
RBC # BLD: 2.74 M/UL — LOW (ref 4.7–6.1)
RBC # BLD: 2.75 M/UL — LOW (ref 4.7–6.1)
RBC # FLD: 16.9 % — HIGH (ref 11.5–14.5)
RETICS #: 92.1 K/UL — SIGNIFICANT CHANGE UP (ref 25–125)
RETICS/RBC NFR: 3.4 % — HIGH (ref 0.5–1.5)
SODIUM SERPL-SCNC: 138 MMOL/L — SIGNIFICANT CHANGE UP (ref 135–146)
TIBC SERPL-MCNC: 262 UG/DL — SIGNIFICANT CHANGE UP (ref 220–430)
UIBC SERPL-MCNC: 236 UG/DL — SIGNIFICANT CHANGE UP (ref 110–370)
WBC # BLD: 8.76 K/UL — SIGNIFICANT CHANGE UP (ref 4.8–10.8)
WBC # FLD AUTO: 8.76 K/UL — SIGNIFICANT CHANGE UP (ref 4.8–10.8)

## 2021-01-26 PROCEDURE — 99239 HOSP IP/OBS DSCHRG MGMT >30: CPT

## 2021-01-26 RX ORDER — CALAMINE AND ZINC OXIDE AND PHENOL 160; 10 MG/ML; MG/ML
1 LOTION TOPICAL
Refills: 0 | Status: DISCONTINUED | OUTPATIENT
Start: 2021-01-26 | End: 2021-01-26

## 2021-01-26 RX ORDER — CALAMINE AND ZINC OXIDE AND PHENOL 160; 10 MG/ML; MG/ML
1 LOTION TOPICAL
Qty: 0 | Refills: 0 | DISCHARGE
Start: 2021-01-26

## 2021-01-26 RX ORDER — TAMSULOSIN HYDROCHLORIDE 0.4 MG/1
1 CAPSULE ORAL
Qty: 0 | Refills: 0 | DISCHARGE
Start: 2021-01-26

## 2021-01-26 RX ORDER — SOD SULF/SODIUM/NAHCO3/KCL/PEG
4000 SOLUTION, RECONSTITUTED, ORAL ORAL ONCE
Refills: 0 | Status: DISCONTINUED | OUTPATIENT
Start: 2021-01-26 | End: 2021-01-26

## 2021-01-26 RX ORDER — HYDROXYZINE HCL 10 MG
25 TABLET ORAL ONCE
Refills: 0 | Status: COMPLETED | OUTPATIENT
Start: 2021-01-26 | End: 2021-01-26

## 2021-01-26 RX ADMIN — MIDODRINE HYDROCHLORIDE 10 MILLIGRAM(S): 2.5 TABLET ORAL at 05:07

## 2021-01-26 RX ADMIN — Medication 25 MILLIGRAM(S): at 11:01

## 2021-01-26 RX ADMIN — CALAMINE AND ZINC OXIDE AND PHENOL 1 APPLICATION(S): 160; 10 LOTION TOPICAL at 16:04

## 2021-01-26 RX ADMIN — PANTOPRAZOLE SODIUM 40 MILLIGRAM(S): 20 TABLET, DELAYED RELEASE ORAL at 05:07

## 2021-01-26 RX ADMIN — OXYCODONE AND ACETAMINOPHEN 1 TABLET(S): 5; 325 TABLET ORAL at 10:26

## 2021-01-26 RX ADMIN — Medication 2 GRAM(S): at 05:07

## 2021-01-26 RX ADMIN — BUMETANIDE 1 MILLIGRAM(S): 0.25 INJECTION INTRAMUSCULAR; INTRAVENOUS at 05:07

## 2021-01-26 RX ADMIN — ATORVASTATIN CALCIUM 40 MILLIGRAM(S): 80 TABLET, FILM COATED ORAL at 11:03

## 2021-01-26 RX ADMIN — OXYCODONE AND ACETAMINOPHEN 1 TABLET(S): 5; 325 TABLET ORAL at 05:06

## 2021-01-26 RX ADMIN — HEPARIN SODIUM 5000 UNIT(S): 5000 INJECTION INTRAVENOUS; SUBCUTANEOUS at 14:24

## 2021-01-26 RX ADMIN — HEPARIN SODIUM 5000 UNIT(S): 5000 INJECTION INTRAVENOUS; SUBCUTANEOUS at 05:07

## 2021-01-26 RX ADMIN — Medication 300 MILLIGRAM(S): at 11:04

## 2021-01-26 RX ADMIN — Medication 2 GRAM(S): at 16:06

## 2021-01-26 RX ADMIN — MIDODRINE HYDROCHLORIDE 10 MILLIGRAM(S): 2.5 TABLET ORAL at 14:24

## 2021-01-26 NOTE — DISCHARGE NOTE NURSING/CASE MANAGEMENT/SOCIAL WORK - PATIENT PORTAL LINK FT
You can access the FollowMyHealth Patient Portal offered by [facility] by registering at the following website: [email or web address]. By joining [facility]’s FollowMyHealth portal, you will also be able to view your health information using other applications (apps) compatible with our system.

## 2021-01-26 NOTE — PROGRESS NOTE ADULT - SUBJECTIVE AND OBJECTIVE BOX
Pt seen and examined independently. Has some itching over the upper extremities. No other. complaints. Feeling better. Denies CP, SOB, AP. Remainder ROS unremarkable. D/w Dr. Vázquez, in view of +hematoma as explanation for pt's anemia, GI is not planning c-scopy. Needs outpt workup of liver lesion.    Gen: NAD, AAOx3  CV: S1 S2  Resp: Decreased BS b/l  GI: NT/ND/S +BS  MS: neg c/c/e, +pulses  Neuro: nonfocal, +reflexes    Discharge instructions discussed and patient knows when to seek immediate medical attention.  Patient has proper follow up.  All results discussed and patient aware they require further follow up/work up.  Stressed importance of proper follow up.  Medications prescribed and changes discussed.  All questions and concerns from patient  addressed. Understanding of instructions verbalized.    Time spent in completing discharge process and coordinating care 40 minutes.    Discussed with housestaff, nursing, case mgmt, GI

## 2021-01-26 NOTE — DISCHARGE NOTE PROVIDER - HOSPITAL COURSE
75 PMH diverticulosis, DM, HTN, HLD, CKD, ostearthritis, bleeding gastric ulcer, anemia, disc disease presented s/p mechanically fall yesterday. Patient was sitting at the table when he fell, and had some facial bruising patient was brought to ED. Found to have Hgb 5.8, admitted for w/u anemia    #Acute on top of chronic anemia secondary to large intramuscular hematoma 25 cm over the left thigh  - No overt GI bleeding and stable vitals   - Hb 7.7 stable after 2 units of PRBCs  - recent EGD no upper GI source   - On ct abd - no retroperitoneal hematoma   - Protonix 40 mg daily   - seen by Dr. Jones today: was plan for colonoscopy before discharge. Cancelled       #Mechanical Fall  -Trauma workup negative  -PT/eval    #Hepatic Lobe Lesion  -outpatient MRI    #DM  -finger stick AC/HS, begin insulin if FS>180    #HTN  -c/w home meds (bumex 1 mg), on quanapril 40mg at home, will give lisinopril 40mg inpt    #DLD  - continue home Lipitor 40mg qd    #Gout  -c/w allopurinol 300 Q24    Patient seen and examined today. Patient has no complains overnight except for mild pruritis. Otherwise feeling good. Clinically and hemodynamically stable for discharge.

## 2021-01-26 NOTE — DISCHARGE NOTE PROVIDER - NSDCMRMEDTOKEN_GEN_ALL_CORE_FT
allopurinol 300 mg oral tablet: 1 tab(s) orally once a day  atorvastatin 40 mg oral tablet: 1 tab(s) orally once a day  bumetanide 1 mg oral tablet: 1 tab(s) orally once a day  calamine topical lotion: 1 application topically 2 times a day  Dulcolax Laxative 5 mg oral delayed release tablet: 1 tab(s) orally once a day  ferrous sulfate 160 mg (50 mg elemental iron) oral tablet, extended release: 1 tab(s) orally 3 times a week  metFORMIN 1000 mg oral tablet: 1 tab(s) orally 2 times a day  midodrine 10 mg oral tablet: 1 tab(s) orally every 8 hours  Omega-3 oral capsule: 1 tab(s) orally once a day  pantoprazole 40 mg oral delayed release tablet: 1 tab(s) orally once a day (before a meal)  tamsulosin 0.4 mg oral capsule: 1 cap(s) orally once a day (at bedtime)

## 2021-01-26 NOTE — DISCHARGE NOTE PROVIDER - PROVIDER TOKENS
PROVIDER:[TOKEN:[[id number]],FOLLOWUP:[1 week]],PROVIDER:[TOKEN:[[id number]],FOLLOWUP:[1 month]]

## 2021-01-26 NOTE — DISCHARGE NOTE PROVIDER - NSDCCPCAREPLAN_GEN_ALL_CORE_FT
PRINCIPAL DISCHARGE DIAGNOSIS  Diagnosis: Hematoma of left thigh  Assessment and Plan of Treatment: You were admitted for fall, and found to have left intramuscular hematoma which is a collection of blood in your muscle which casued drop in your hemoglobin requiring 2 units of transfusion.  Plan:  -Stop aspirin for now  -Continue iron supplement as prescribed  -you may need to repeat CT of the left thigh in 1 month  -If you feel dizzy, or noticed worsening pain over your left thigh, please visit your doctor.  -help keep from falling, you can:  -->To avoid falling at home, get rid of things that might make you trip or slip. This might include furniture, electrical cords, clutter, and loose rugs. Keep your home well-lit so that you can easily see where you are going. Avoid storing things in high places so you don't have to reach or climb.  -->Wear sturdy shoes that fit well – Wearing shoes with high heels or slippery soles, or shoes that are too loose, can lead to falls. Walking around in bare feet, or only socks, can also increase your risk of falling.  -      SECONDARY DISCHARGE DIAGNOSES  Diagnosis: Contusion  Assessment and Plan of Treatment:     Diagnosis: Fall in home, initial encounter  Assessment and Plan of Treatment:      PRINCIPAL DISCHARGE DIAGNOSIS  Diagnosis: Hematoma of left thigh  Assessment and Plan of Treatment: You were admitted for mechanical fall, and found to have left intramuscular hematoma which is a collection of blood in your muscle which casued drop in your hemoglobin requiring 2 units of transfusion.  Plan:  -Stop aspirin for now  -Continue iron supplement as prescribed  -monitor CBC  -If you feel dizzy, or noticed worsening pain, swelling, decreased sensation in the left leg, alert urgently the medical personnel        SECONDARY DISCHARGE DIAGNOSES  Diagnosis: Fall in home, initial encounter  Assessment and Plan of Treatment: -->To avoid falling at home, get rid of things that might make you trip or slip. This might include furniture, electrical cords, clutter, and loose rugs. Keep your home well-lit so that you can easily see where you are going. Avoid storing things in high places so you don't have to reach or climb.  -->Wear sturdy shoes that fit well – Wearing shoes with high heels or slippery soles, or shoes that are too loose, can lead to falls. Walking around in bare feet, or only socks, can also increase your risk of falling.       PRINCIPAL DISCHARGE DIAGNOSIS  Diagnosis: Hematoma of left thigh  Assessment and Plan of Treatment: You were admitted for mechanical fall, and found to have left intramuscular hematoma which is a collection of blood in your muscle which casued drop in your hemoglobin requiring 2 units of transfusion.  Plan:  -Stop aspirin for now  -Continue iron supplement as prescribed  -monitor CBC  -If you feel dizzy, or noticed worsening pain, swelling, decreased sensation in the left leg, alert urgently the medical personnel        SECONDARY DISCHARGE DIAGNOSES  Diagnosis: Liver lesion  Assessment and Plan of Treatment: please obtain an outpt MRI or dedicated CT of the liver at earliest convenience    Diagnosis: Fall in home, initial encounter  Assessment and Plan of Treatment: -->To avoid falling at home, get rid of things that might make you trip or slip. This might include furniture, electrical cords, clutter, and loose rugs. Keep your home well-lit so that you can easily see where you are going. Avoid storing things in high places so you don't have to reach or climb.  -->Wear sturdy shoes that fit well – Wearing shoes with high heels or slippery soles, or shoes that are too loose, can lead to falls. Walking around in bare feet, or only socks, can also increase your risk of falling.

## 2021-01-26 NOTE — DISCHARGE NOTE PROVIDER - CARE PROVIDER_API CALL
Sunil Mosqueda  INTERNAL MEDICINE  [address] [place] [address]  Phone: [phone]  Fax: [phone]  Follow Up Time: 1 week    Josemanuel Jones)  Gastroenterology; Internal Medicine  [address] [place] [address]  Phone: [phone]  Fax: [phone]  Follow Up Time: 1 month

## 2021-01-27 LAB — FERRITIN SERPL-MCNC: 60 NG/ML — SIGNIFICANT CHANGE UP (ref 30–400)

## 2021-02-02 DIAGNOSIS — K76.9 LIVER DISEASE, UNSPECIFIED: ICD-10-CM

## 2021-02-02 DIAGNOSIS — I10 ESSENTIAL (PRIMARY) HYPERTENSION: ICD-10-CM
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
## 2021-02-02 DIAGNOSIS — E78.00 PURE HYPERCHOLESTEROLEMIA, UNSPECIFIED: ICD-10-CM

## 2021-02-02 DIAGNOSIS — M48.00 SPINAL STENOSIS, SITE UNSPECIFIED: ICD-10-CM

## 2021-02-02 DIAGNOSIS — E11.9 TYPE 2 DIABETES MELLITUS WITHOUT COMPLICATIONS: ICD-10-CM

## 2021-02-02 DIAGNOSIS — K29.80 DUODENITIS WITHOUT BLEEDING: ICD-10-CM

## 2021-02-02 DIAGNOSIS — Z79.84 LONG TERM (CURRENT) USE OF ORAL HYPOGLYCEMIC DRUGS: ICD-10-CM

## 2021-02-02 DIAGNOSIS — D50.0 IRON DEFICIENCY ANEMIA SECONDARY TO BLOOD LOSS (CHRONIC): ICD-10-CM

## 2021-02-02 DIAGNOSIS — M10.9 GOUT, UNSPECIFIED: ICD-10-CM

## 2021-02-02 DIAGNOSIS — Z53.8 PROCEDURE AND TREATMENT NOT CARRIED OUT FOR OTHER REASONS: ICD-10-CM

## 2021-02-02 DIAGNOSIS — M19.90 UNSPECIFIED OSTEOARTHRITIS, UNSPECIFIED SITE: ICD-10-CM

## 2021-02-02 DIAGNOSIS — M25.552 PAIN IN LEFT HIP: ICD-10-CM

## 2021-02-02 DIAGNOSIS — K57.90 DIVERTICULOSIS OF INTESTINE, PART UNSPECIFIED, WITHOUT PERFORATION OR ABSCESS WITHOUT BLEEDING: ICD-10-CM

## 2021-02-02 DIAGNOSIS — S70.12XA CONTUSION OF LEFT THIGH, INITIAL ENCOUNTER: ICD-10-CM

## 2021-02-02 DIAGNOSIS — K29.70 GASTRITIS, UNSPECIFIED, WITHOUT BLEEDING: ICD-10-CM

## 2021-02-02 DIAGNOSIS — Z79.82 LONG TERM (CURRENT) USE OF ASPIRIN: ICD-10-CM
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
## 2021-02-02 DIAGNOSIS — E66.9 OBESITY, UNSPECIFIED: ICD-10-CM

## 2021-02-05 NOTE — ED PROCEDURE NOTE - CPROC ED TOLERANCE1
Patient tolerated procedure well.
Tylenol as needed for pain   Ice and heat to the knee/leg   Follow up with physical therapy  If swelling, erythema, or increased pain proceed to the ER    Leg Pain   WHAT YOU NEED TO KNOW:   Leg pain may be caused by a variety of health conditions  Your tests did not show any broken bones or blood clots  DISCHARGE INSTRUCTIONS:   Return to the emergency department if:   · You have a fever  · Your leg starts to swell  · Your leg pain gets worse  · You have numbness or tingling in your leg or toes  · You cannot put any weight on or move your leg  Contact your healthcare provider if:   · Your pain does not decrease, even after treatment  · You have questions or concerns about your condition or care  Medicines:   · NSAIDs , such as ibuprofen, help decrease swelling, pain, and fever  This medicine is available with or without a doctor's order  NSAIDs can cause stomach bleeding or kidney problems in certain people  If you take blood thinner medicine, always ask your healthcare provider if NSAIDs are safe for you  Always read the medicine label and follow directions  · Take your medicine as directed  Contact your healthcare provider if you think your medicine is not helping or if you have side effects  Tell him of her if you are allergic to any medicine  Keep a list of the medicines, vitamins, and herbs you take  Include the amounts, and when and why you take them  Bring the list or the pill bottles to follow-up visits  Carry your medicine list with you in case of an emergency  Follow up with your healthcare provider as directed: You may need more tests to find the cause of your leg pain  You may need to see an orthopedic specialist or a physical therapist  Write down your questions so you remember to ask them during your visits  Manage your leg pain:   · Rest  your injured leg so that it can heal  You may need an immobilizer, brace, or splint to limit the movement of your leg   You may need
to avoid putting any weight on your leg for at least 48 hours  Return to normal activities as directed  · Ice  the injury for 20 minutes every 4 hours for up to 24 hours, or as directed  Use an ice pack, or put crushed ice in a plastic bag  Cover it with a towel to protect your skin  Ice helps prevent tissue damage and decreases swelling and pain  · Elevate  your injured leg above the level of your heart as often as you can  This will help decrease swelling and pain  If possible, prop your leg on pillows or blankets to keep the area elevated comfortably  · Use assistive devices as directed  You may need to use a cane or crutches  Assistive devices help decrease pain and pressure on your leg when you walk  Ask your healthcare provider for more information about assistive devices and how to use them correctly  · Maintain a healthy weight  Extra body weight can cause pressure and pain in your hip, knee, and ankle joints  Ask your healthcare provider how much you should weigh  Ask him to help you create a weight loss plan if you are overweight  © Copyright [address] Information is for End User's use only and may not be sold, redistributed or otherwise used for commercial purposes  All illustrations and images included in CareNotes® are the copyrighted property of A D [employer] M [employer]  or [address] [place] [address]  The above information is an  only  It is not intended as medical advice for individual conditions or treatments  Talk to your doctor, nurse or pharmacist before following any medical regimen to see if it is safe and effective for you 
Patient tolerated procedure well.

## 2021-05-13 ENCOUNTER — INPATIENT (INPATIENT)
Facility: HOSPITAL | Age: 76
LOS: 5 days | Discharge: NURSING, REHAB OR LONG-TERM CARE | End: 2021-05-19
Attending: INTERNAL MEDICINE | Admitting: INTERNAL MEDICINE
Payer: MEDICARE

## 2021-05-13 VITALS
SYSTOLIC BLOOD PRESSURE: 154 MMHG | HEIGHT: 70 IN | OXYGEN SATURATION: 97 % | HEART RATE: 85 BPM | DIASTOLIC BLOOD PRESSURE: 66 MMHG | RESPIRATION RATE: 20 BRPM | WEIGHT: 169.98 LBS | TEMPERATURE: 98 F
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
## 2021-05-13 DIAGNOSIS — N18.32 CHRONIC KIDNEY DISEASE, STAGE 3B: ICD-10-CM

## 2021-05-13 DIAGNOSIS — E78.5 HYPERLIPIDEMIA, UNSPECIFIED: ICD-10-CM

## 2021-05-13 DIAGNOSIS — Z98.890 OTHER SPECIFIED POSTPROCEDURAL STATES: Chronic | ICD-10-CM

## 2021-05-13 DIAGNOSIS — I12.9 HYPERTENSIVE CHRONIC KIDNEY DISEASE WITH STAGE 1 THROUGH STAGE 4 CHRONIC KIDNEY DISEASE, OR UNSPECIFIED CHRONIC KIDNEY DISEASE: ICD-10-CM

## 2021-05-13 DIAGNOSIS — G93.49 OTHER ENCEPHALOPATHY: ICD-10-CM

## 2021-05-13 DIAGNOSIS — Z79.899 OTHER LONG TERM (CURRENT) DRUG THERAPY: ICD-10-CM

## 2021-05-13 DIAGNOSIS — M1A.9XX0 CHRONIC GOUT, UNSPECIFIED, WITHOUT TOPHUS (TOPHI): ICD-10-CM

## 2021-05-13 DIAGNOSIS — D64.9 ANEMIA, UNSPECIFIED: ICD-10-CM

## 2021-05-13 DIAGNOSIS — E11.22 TYPE 2 DIABETES MELLITUS WITH DIABETIC CHRONIC KIDNEY DISEASE: ICD-10-CM

## 2021-05-13 DIAGNOSIS — K57.90 DIVERTICULOSIS OF INTESTINE, PART UNSPECIFIED, WITHOUT PERFORATION OR ABSCESS WITHOUT BLEEDING: ICD-10-CM

## 2021-05-13 DIAGNOSIS — K27.9 PEPTIC ULCER, SITE UNSPECIFIED, UNSPECIFIED AS ACUTE OR CHRONIC, WITHOUT HEMORRHAGE OR PERFORATION: ICD-10-CM

## 2021-05-13 DIAGNOSIS — M54.30 SCIATICA, UNSPECIFIED SIDE: ICD-10-CM

## 2021-05-13 DIAGNOSIS — S06.5X9A TRAUMATIC SUBDURAL HEMORRHAGE WITH LOSS OF CONSCIOUSNESS OF UNSPECIFIED DURATION, INITIAL ENCOUNTER: ICD-10-CM

## 2021-05-13 DIAGNOSIS — M50.20 OTHER CERVICAL DISC DISPLACEMENT, UNSPECIFIED CERVICAL REGION: ICD-10-CM

## 2021-05-13 DIAGNOSIS — R41.82 ALTERED MENTAL STATUS, UNSPECIFIED: ICD-10-CM
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
## 2021-05-13 DIAGNOSIS — E11.649 TYPE 2 DIABETES MELLITUS WITH HYPOGLYCEMIA WITHOUT COMA: ICD-10-CM

## 2021-05-13 LAB
ALBUMIN SERPL ELPH-MCNC: 4.4 G/DL — SIGNIFICANT CHANGE UP (ref 3.5–5.2)
ALP SERPL-CCNC: 144 U/L — HIGH (ref 30–115)
ALT FLD-CCNC: 21 U/L — SIGNIFICANT CHANGE UP (ref 0–41)
ANION GAP SERPL CALC-SCNC: 15 MMOL/L — HIGH (ref 7–14)
APPEARANCE UR: CLEAR — SIGNIFICANT CHANGE UP
AST SERPL-CCNC: 27 U/L — SIGNIFICANT CHANGE UP (ref 0–41)
BASE EXCESS BLDV CALC-SCNC: -1.3 MMOL/L — SIGNIFICANT CHANGE UP (ref -2–2)
BASOPHILS # BLD AUTO: 0.02 K/UL — SIGNIFICANT CHANGE UP (ref 0–0.2)
BASOPHILS NFR BLD AUTO: 0.3 % — SIGNIFICANT CHANGE UP (ref 0–1)
BILIRUB SERPL-MCNC: <0.2 MG/DL — SIGNIFICANT CHANGE UP (ref 0.2–1.2)
BILIRUB UR-MCNC: NEGATIVE — SIGNIFICANT CHANGE UP
BLD GP AB SCN SERPL QL: SIGNIFICANT CHANGE UP
BUN SERPL-MCNC: 59 MG/DL — HIGH (ref 10–20)
CA-I SERPL-SCNC: 1.27 MMOL/L — SIGNIFICANT CHANGE UP (ref 1.12–1.3)
CALCIUM SERPL-MCNC: 9.6 MG/DL — SIGNIFICANT CHANGE UP (ref 8.5–10.1)
CHLORIDE SERPL-SCNC: 107 MMOL/L — SIGNIFICANT CHANGE UP (ref 98–110)
CO2 SERPL-SCNC: 20 MMOL/L — SIGNIFICANT CHANGE UP (ref 17–32)
COLOR SPEC: COLORLESS — SIGNIFICANT CHANGE UP
CREAT SERPL-MCNC: 1.2 MG/DL — SIGNIFICANT CHANGE UP (ref 0.7–1.5)
DIFF PNL FLD: NEGATIVE — SIGNIFICANT CHANGE UP
EOSINOPHIL # BLD AUTO: 0.55 K/UL — SIGNIFICANT CHANGE UP (ref 0–0.7)
EOSINOPHIL NFR BLD AUTO: 8.1 % — HIGH (ref 0–8)
GAS PNL BLDV: 146 MMOL/L — HIGH (ref 136–145)
GAS PNL BLDV: SIGNIFICANT CHANGE UP
GAS PNL BLDV: SIGNIFICANT CHANGE UP
GLUCOSE SERPL-MCNC: 88 MG/DL — SIGNIFICANT CHANGE UP (ref 70–99)
GLUCOSE UR QL: NEGATIVE — SIGNIFICANT CHANGE UP
HCO3 BLDV-SCNC: 24 MMOL/L — SIGNIFICANT CHANGE UP (ref 22–29)
HCT VFR BLD CALC: 33.4 % — LOW (ref 42–52)
HCT VFR BLDA CALC: 34.4 % — SIGNIFICANT CHANGE UP (ref 34–44)
HGB BLD CALC-MCNC: 11.2 G/DL — LOW (ref 14–18)
HGB BLD-MCNC: 10.5 G/DL — LOW (ref 14–18)
IMM GRANULOCYTES NFR BLD AUTO: 0.3 % — SIGNIFICANT CHANGE UP (ref 0.1–0.3)
KETONES UR-MCNC: NEGATIVE — SIGNIFICANT CHANGE UP
LACTATE BLDV-MCNC: 1 MMOL/L — SIGNIFICANT CHANGE UP (ref 0.5–1.6)
LACTATE SERPL-SCNC: 1.1 MMOL/L — SIGNIFICANT CHANGE UP (ref 0.7–2)
LEUKOCYTE ESTERASE UR-ACNC: NEGATIVE — SIGNIFICANT CHANGE UP
LYMPHOCYTES # BLD AUTO: 0.65 K/UL — LOW (ref 1.2–3.4)
LYMPHOCYTES # BLD AUTO: 9.6 % — LOW (ref 20.5–51.1)
MCHC RBC-ENTMCNC: 27.3 PG — SIGNIFICANT CHANGE UP (ref 27–31)
MCHC RBC-ENTMCNC: 31.4 G/DL — LOW (ref 32–37)
MCV RBC AUTO: 86.8 FL — SIGNIFICANT CHANGE UP (ref 80–94)
MONOCYTES # BLD AUTO: 0.49 K/UL — SIGNIFICANT CHANGE UP (ref 0.1–0.6)
MONOCYTES NFR BLD AUTO: 7.2 % — SIGNIFICANT CHANGE UP (ref 1.7–9.3)
NEUTROPHILS # BLD AUTO: 5.05 K/UL — SIGNIFICANT CHANGE UP (ref 1.4–6.5)
NEUTROPHILS NFR BLD AUTO: 74.5 % — SIGNIFICANT CHANGE UP (ref 42.2–75.2)
NITRITE UR-MCNC: NEGATIVE — SIGNIFICANT CHANGE UP
NRBC # BLD: 0 /100 WBCS — SIGNIFICANT CHANGE UP (ref 0–0)
PCO2 BLDV: 44 MMHG — SIGNIFICANT CHANGE UP (ref 41–51)
PH BLDV: 7.35 — SIGNIFICANT CHANGE UP (ref 7.26–7.43)
PH UR: 6 — SIGNIFICANT CHANGE UP (ref 5–8)
PLATELET # BLD AUTO: 292 K/UL — SIGNIFICANT CHANGE UP (ref 130–400)
PO2 BLDV: 42 MMHG — HIGH (ref 20–40)
POTASSIUM BLDV-SCNC: 4.5 MMOL/L — SIGNIFICANT CHANGE UP (ref 3.3–5.6)
POTASSIUM SERPL-MCNC: 4.7 MMOL/L — SIGNIFICANT CHANGE UP (ref 3.5–5)
POTASSIUM SERPL-SCNC: 4.7 MMOL/L — SIGNIFICANT CHANGE UP (ref 3.5–5)
PROT SERPL-MCNC: 7.5 G/DL — SIGNIFICANT CHANGE UP (ref 6–8)
PROT UR-MCNC: SIGNIFICANT CHANGE UP
RBC # BLD: 3.85 M/UL — LOW (ref 4.7–6.1)
RBC # FLD: 19.6 % — HIGH (ref 11.5–14.5)
SAO2 % BLDV: 74 % — SIGNIFICANT CHANGE UP
SARS-COV-2 RNA SPEC QL NAA+PROBE: SIGNIFICANT CHANGE UP
SODIUM SERPL-SCNC: 142 MMOL/L — SIGNIFICANT CHANGE UP (ref 135–146)
SP GR SPEC: 1.01 — SIGNIFICANT CHANGE UP (ref 1.01–1.03)
UROBILINOGEN FLD QL: SIGNIFICANT CHANGE UP
WBC # BLD: 6.78 K/UL — SIGNIFICANT CHANGE UP (ref 4.8–10.8)
WBC # FLD AUTO: 6.78 K/UL — SIGNIFICANT CHANGE UP (ref 4.8–10.8)

## 2021-05-13 PROCEDURE — 70450 CT HEAD/BRAIN W/O DYE: CPT | Mod: 26,MA

## 2021-05-13 PROCEDURE — 99221 1ST HOSP IP/OBS SF/LOW 40: CPT

## 2021-05-13 PROCEDURE — 99291 CRITICAL CARE FIRST HOUR: CPT | Mod: CS,GC

## 2021-05-13 PROCEDURE — 71045 X-RAY EXAM CHEST 1 VIEW: CPT | Mod: 26

## 2021-05-13 PROCEDURE — 86077 PHYS BLOOD BANK SERV XMATCH: CPT

## 2021-05-13 PROCEDURE — 93010 ELECTROCARDIOGRAM REPORT: CPT

## 2021-05-13 RX ORDER — BUMETANIDE 0.25 MG/ML
1 INJECTION INTRAMUSCULAR; INTRAVENOUS DAILY
Refills: 0 | Status: DISCONTINUED | OUTPATIENT
Start: 2021-05-13 | End: 2021-05-19

## 2021-05-13 RX ORDER — TAMSULOSIN HYDROCHLORIDE 0.4 MG/1
0.4 CAPSULE ORAL AT BEDTIME
Refills: 0 | Status: DISCONTINUED | OUTPATIENT
Start: 2021-05-13 | End: 2021-05-14

## 2021-05-13 RX ORDER — LEVETIRACETAM 250 MG/1
500 TABLET, FILM COATED ORAL EVERY 12 HOURS
Refills: 0 | Status: DISCONTINUED | OUTPATIENT
Start: 2021-05-13 | End: 2021-05-19

## 2021-05-13 RX ORDER — METFORMIN HYDROCHLORIDE 850 MG/1
1 TABLET ORAL
Qty: 0 | Refills: 0 | DISCHARGE

## 2021-05-13 RX ORDER — ALLOPURINOL 300 MG
300 TABLET ORAL DAILY
Refills: 0 | Status: DISCONTINUED | OUTPATIENT
Start: 2021-05-13 | End: 2021-05-19

## 2021-05-13 RX ORDER — ACETAMINOPHEN 500 MG
650 TABLET ORAL ONCE
Refills: 0 | Status: COMPLETED | OUTPATIENT
Start: 2021-05-13 | End: 2021-05-13

## 2021-05-13 RX ORDER — CHLORHEXIDINE GLUCONATE 213 G/1000ML
1 SOLUTION TOPICAL DAILY
Refills: 0 | Status: DISCONTINUED | OUTPATIENT
Start: 2021-05-13 | End: 2021-05-19

## 2021-05-13 RX ORDER — LISINOPRIL 2.5 MG/1
40 TABLET ORAL DAILY
Refills: 0 | Status: DISCONTINUED | OUTPATIENT
Start: 2021-05-13 | End: 2021-05-16

## 2021-05-13 RX ORDER — FERROUS SULFATE 325(65) MG
325 TABLET ORAL DAILY
Refills: 0 | Status: DISCONTINUED | OUTPATIENT
Start: 2021-05-13 | End: 2021-05-19

## 2021-05-13 RX ORDER — ATORVASTATIN CALCIUM 80 MG/1
40 TABLET, FILM COATED ORAL AT BEDTIME
Refills: 0 | Status: DISCONTINUED | OUTPATIENT
Start: 2021-05-13 | End: 2021-05-19

## 2021-05-13 RX ORDER — PANTOPRAZOLE SODIUM 20 MG/1
40 TABLET, DELAYED RELEASE ORAL DAILY
Refills: 0 | Status: DISCONTINUED | OUTPATIENT
Start: 2021-05-13 | End: 2021-05-19

## 2021-05-13 RX ORDER — OMEGA-3 ACID ETHYL ESTERS 1 G
2 CAPSULE ORAL
Refills: 0 | Status: DISCONTINUED | OUTPATIENT
Start: 2021-05-13 | End: 2021-05-19

## 2021-05-13 RX ORDER — CALAMINE AND ZINC OXIDE AND PHENOL 160; 10 MG/ML; MG/ML
1 LOTION TOPICAL
Refills: 0 | Status: DISCONTINUED | OUTPATIENT
Start: 2021-05-13 | End: 2021-05-19

## 2021-05-13 RX ORDER — LEVETIRACETAM 250 MG/1
500 TABLET, FILM COATED ORAL ONCE
Refills: 0 | Status: COMPLETED | OUTPATIENT
Start: 2021-05-13 | End: 2021-05-13

## 2021-05-13 RX ADMIN — LEVETIRACETAM 420 MILLIGRAM(S): 250 TABLET, FILM COATED ORAL at 21:31

## 2021-05-13 RX ADMIN — Medication 650 MILLIGRAM(S): at 20:00

## 2021-05-13 NOTE — ED PROVIDER NOTE - OBJECTIVE STATEMENT
76y M pmh diverticulosis, DM, HTN, HLD, CKD, ostearthritis, bleeding gastric ulcer, anemia presenting with period of AMS. Per wife pt had a large breakfast and shortly after was found acting confused and unable to open his eyes but saying his eyes were open. Ambulance was called, FS was found to be 70. Per wife pt seldom runs a FS under 90. Pt was given PO food/drink and FS improved as did pt's mental status. Wife says pt is currently at baseline in the ED. Pt endorsing chills everytime he goes to urinate xfew days. No hematuria, dysuria, increased frequency in urination. No f/n/v. No abdominal pain/back pain. No cough/sore throat/headache/weakness. 76y M pmh diverticulosis, DM, HTN, HLD, CKD, ostearthritis, bleeding gastric ulcer, anemia presenting with period of AMS. Per wife pt had a large breakfast and shortly after was found acting confused and unable to open his eyes but saying his eyes were open. Ambulance was called, FS was found to be 70. Per wife pt seldom runs a FS under 90. Pt was given PO food/drink and FS improved as did pt's mental status. Wife says pt is currently at baseline in the ED. Pt endorsing chills everytime he goes to urinate xfew days. No hematuria, dysuria, increased frequency in urination. No f/n/v. No abdominal pain/back pain. No cough/sore throat/headache/weakness. Pt endorses fall 10 days ago with no LOC. No A/c.

## 2021-05-13 NOTE — ED PROVIDER NOTE - ATTENDING CONTRIBUTION TO CARE
75 yo male PMH OA, sciatica, elevated cholesterol, HTN, DM, diverticulitis here for evaluation of an episode of confusion while getting ready to make to his doctor's appointment. Wife reported that he briefly slurred his words , appeared weak and pale.  When EMS arrived his glucose was 70 , his was given some juice and glucose and seemed to improve.  Patient denies any acute complaints, but states that his shoulder hurts after a fall some time ago. Patient is at his baseline now.  Denies HA, neck pain, N/V/D/black or bloody stools., no CP, SOB, or any other acute complaints.   Well-appearing well-nourished elderly male in  NAD, head AT/NC, PERRL, pink conjunctivae,  mmm, nml oropharynx, nml phonation without drooling or trismus, supple neck without midline spine ttp, nml work of breathing, lungs CTA b/l, equal air entry, RRR, well-perfused extremities, distal pulses intact, abdomen soft, NT/ND, BS present in all quadrants, no unilateral calf swelling, moving all extremities, A&Ox3, no gross neuro deficits, nml mood and affect.  CT head, labs, ECG, plan to admit.

## 2021-05-13 NOTE — ED PROVIDER NOTE - CLINICAL SUMMARY MEDICAL DECISION MAKING FREE TEXT BOX
77 yo male PMH OA, sciatica, elevated cholesterol, HTN, DM, diverticulitis here for evaluation of an episode of confusion while getting ready to make to his doctor's appointment. Wife reported that he briefly slurred his words , appeared weak and pale.  When EMS arrived his glucose was 70 , his was given some juice and glucose and seemed to improve.  Patient denies any acute complaints, but states that his shoulder hurts after a fall some time ago. Patient is at his baseline now.  Denies HA, neck pain, N/V/D/black or bloody stools., no CP, SOB, or any other acute complaints.   Well-appearing well-nourished elderly male in  NAD, head AT/NC, PERRL, pink conjunctivae,  mmm, nml oropharynx, nml phonation without drooling or trismus, supple neck without midline spine ttp, nml work of breathing, lungs CTA b/l, equal air entry, RRR, well-perfused extremities, distal pulses intact, abdomen soft, NT/ND, BS present in all quadrants, no unilateral calf swelling, moving all extremities, A&Ox3, no gross neuro deficits, nml mood and affect.  CT head shows subacute and acute subdural hematoma, mental status is at baseline, he was evaluated by neurosurgery, will admit to ICU.

## 2021-05-13 NOTE — CONSULT NOTE ADULT - SUBJECTIVE AND OBJECTIVE BOX
HISTORY OF PRESENT ILLNESS:     76y M pmh diverticulosis, DM, HTN, HLD, CKD, ostearthritis, bleeding gastric ulcer, anemia presenting with period of AMS. Per wife pt had a large breakfast and shortly after was found acting confused and unable to open his eyes but saying his eyes were open. Ambulance was called, FS was found to be 70. Per wife pt seldom runs a FS under 90. Pt was given PO food/drink and FS improved as did pt's mental status. Wife says pt is currently at baseline in the ED. Pt endorsing chills everytime he goes to urinate xfew days. No hematuria, dysuria, increased frequency in urination. No f/n/v. No abdominal pain/back pain. No cough/sore throat/headache/weakness. Pt endorses fall 10 days ago with no LOC. No A/c.      PAST MEDICAL & SURGICAL HISTORY:  Diverticulitis    Herniated disc, cervical    Chronic gout of foot, unspecified cause, unspecified laterality    Type 2 diabetes mellitus without complication, unspecified long term insulin use status    Hypertension, unspecified type    High cholesterol    Osteoarthritis of multiple joints, unspecified osteoarthritis type    Sciatica, unspecified laterality    History of tonsillectomy and adenoidectomy    H/O colonoscopy  2018      FAMILY HISTORY:  No pertinent family history in first degree relatives        SOCIAL HISTORY:  Tobacco Use:  EtOH use:   Substance:    Allergies    No Known Allergies    Intolerances        REVIEW OF SYSTEMS      MEDICATIONS:  Antibiotics:    Neuro:    Anticoagulation:    OTHER:    IVF:      Vital Signs Last 24 Hrs  T(C): 36.9 (13 May 2021 15:00), Max: 36.9 (13 May 2021 15:00)  T(F): 98.5 (13 May 2021 15:00), Max: 98.5 (13 May 2021 15:00)  HR: 85 (13 May 2021 15:00) (85 - 85)  BP: 154/66 (13 May 2021 15:00) (154/66 - 154/66)  BP(mean): --  RR: 20 (13 May 2021 15:00) (20 - 20)  SpO2: 97% (13 May 2021 15:00) (97% - 97%)    PHYSICAL EXAM:      LABS:                        10.5   6.78  )-----------( 292      ( 13 May 2021 15:51 )             33.4     05-13    142  |  107  |  59<H>  ----------------------------<  88  4.7   |  20  |  1.2    Ca    9.6      13 May 2021 15:51    TPro  7.5  /  Alb  4.4  /  TBili  <0.2  /  DBili  x   /  AST  27  /  ALT  21  /  AlkPhos  144<H>  05-13      Urinalysis Basic - ( 13 May 2021 15:51 )    Color: Colorless / Appearance: Clear / S.010 / pH: x  Gluc: x / Ketone: Negative  / Bili: Negative / Urobili: <2 mg/dL   Blood: x / Protein: Trace / Nitrite: Negative   Leuk Esterase: Negative / RBC: x / WBC x   Sq Epi: x / Non Sq Epi: x / Bacteria: x            RADIOLOGY & ADDITIONAL STUDIES:  < from: CT Head No Cont (21 @ 17:44) >    Complex right convexity subdural hematoma or collection measuring up to 1.5 cm in diameter.Hyperattenuating component along the right anterior falx suggestive of acute blood products. Localized mass effect. No midline shift. MR follow-up may be obtained as clinically warranted.    A/p          HISTORY OF PRESENT ILLNESS:     76y M pmh diverticulosis, DM, HTN, HLD, CKD, ostearthritis, bleeding gastric ulcer, anemia presenting with period of AMS. Per wife pt had a large breakfast and shortly after was found acting confused and unable to open his eyes but saying his eyes were open. Ambulance was called, FS was found to be 70. Per wife pt seldom runs a FS under 90. Pt was given PO food/drink and FS improved as did pt's mental status. Wife says pt is currently at baseline in the ED. Pt endorsing chills everytime he goes to urinate xfew days. No hematuria, dysuria, increased frequency in urination. No f/n/v. No abdominal pain/back pain. No cough/sore throat/headache/weakness. Pt endorses fall 10 days ago with no LOC. takes ASA 81 mg     pt seen and examined at bedside pt is awake alert, has c./o left shoulder pain . No HA at this time but some neck pain , follows commands       PAST MEDICAL & SURGICAL HISTORY:  Diverticulitis    Herniated disc, cervical    Chronic gout of foot, unspecified cause, unspecified laterality    Type 2 diabetes mellitus without complication, unspecified long term insulin use status    Hypertension, unspecified type    High cholesterol    Osteoarthritis of multiple joints, unspecified osteoarthritis type    Sciatica, unspecified laterality    History of tonsillectomy and adenoidectomy    H/O colonoscopy  2018      FAMILY HISTORY:  No pertinent family history in first degree relatives        SOCIAL HISTORY:  Tobacco Use:  EtOH use:   Substance:    Allergies    No Known Allergies    Intolerances        · Review of Systems: Eyes:  No visual changes, eye pain or discharge.  	ENMT:  No hearing changes, pain, no sore throat or runny nose, no difficulty swallowing  	Cardiac:  No chest pain, SOB or edema. No chest pain with exertion.  	Respiratory:  No cough or respiratory distress. No hemoptysis. No history of asthma or RAD.  	GI:  No nausea, vomiting, diarrhea or abdominal pain.  	:  No dysuria, frequency or burning.  	MS:  No myalgia, muscle weakness, joint pain or back pain.  	Neuro: see HPI  	Skin:  No skin rash.   Endocrine: No history of thyroid disease       MEDICATIONS:  Antibiotics:    Neuro:    Anticoagulation:    OTHER:    IVF:      Vital Signs Last 24 Hrs  T(C): 36.9 (13 May 2021 15:00), Max: 36.9 (13 May 2021 15:00)  T(F): 98.5 (13 May 2021 15:00), Max: 98.5 (13 May 2021 15:00)  HR: 85 (13 May 2021 15:00) (85 - 85)  BP: 154/66 (13 May 2021 15:00) (154/66 - 154/66)  BP(mean): --  RR: 20 (13 May 2021 15:00) (20 - 20)  SpO2: 97% (13 May 2021 15:00) (97% - 97%)    PHYSICAL EXAM:    A&OX3, PERRL   EOM ( I )   MAEX4 UE> LE   MS  RUE 5/5          LUE proximal 4/ 5 secondary to pain in shoulder                 distal 5/5          RLE  proximal 4+/5 , distal 4/5         LLE   proximal 4+/5 , distal 4/5   unable to fully assess secondary to pain in the left shoulder     LABS:                        10.5   6.78  )-----------( 292      ( 13 May 2021 15:51 )             33.4     05    142  |  107  |  59<H>  ----------------------------<  88  4.7   |  20  |  1.2    Ca    9.6      13 May 2021 15:51    TPro  7.5  /  Alb  4.4  /  TBili  <0.2  /  DBili  x   /  AST  27  /  ALT  21  /  AlkPhos  144<H>  0513      Urinalysis Basic - ( 13 May 2021 15:51 )    Color: Colorless / Appearance: Clear / S.010 / pH: x  Gluc: x / Ketone: Negative  / Bili: Negative / Urobili: <2 mg/dL   Blood: x / Protein: Trace / Nitrite: Negative   Leuk Esterase: Negative / RBC: x / WBC x   Sq Epi: x / Non Sq Epi: x / Bacteria: x            RADIOLOGY & ADDITIONAL STUDIES:  < from: CT Head No Cont (21 @ 17:44) >    Complex right convexity subdural hematoma or collection measuring up to 1.5 cm in diameter.Hyperattenuating component along the right anterior falx suggestive of acute blood products. Localized mass effect. No midline shift. MR follow-up may be obtained as clinically warranted.    A/p       s/p fall 10 days had AMS for short period of time today             Right Subacute / chronic SDH              platelets for reversal of ASA             neuro checks q 1hour             medical admission             Rpt HCT in am unless change in MS then Stat                HISTORY OF PRESENT ILLNESS:     76y M pmh diverticulosis, DM, HTN, HLD, CKD, ostearthritis, bleeding gastric ulcer, anemia presenting with period of AMS. Per wife pt had a large breakfast and shortly after was found acting confused and unable to open his eyes but saying his eyes were open. Ambulance was called, FS was found to be 70. Per wife pt seldom runs a FS under 90. Pt was given PO food/drink and FS improved as did pt's mental status. Wife says pt is currently at baseline in the ED. Pt endorsing chills everytime he goes to urinate xfew days. No hematuria, dysuria, increased frequency in urination. No f/n/v. No abdominal pain/back pain. No cough/sore throat/headache/weakness. Pt endorses fall 10 days ago with no LOC. takes ASA 81 mg     pt seen and examined at bedside pt is awake alert, has c./o left shoulder pain . No HA at this time but some neck pain , follows commands       PAST MEDICAL & SURGICAL HISTORY:  Diverticulitis    Herniated disc, cervical    Chronic gout of foot, unspecified cause, unspecified laterality    Type 2 diabetes mellitus without complication, unspecified long term insulin use status    Hypertension, unspecified type    High cholesterol    Osteoarthritis of multiple joints, unspecified osteoarthritis type    Sciatica, unspecified laterality    History of tonsillectomy and adenoidectomy    H/O colonoscopy  2018      FAMILY HISTORY:  No pertinent family history in first degree relatives        SOCIAL HISTORY:  Tobacco Use:  EtOH use:   Substance:    Allergies    No Known Allergies    Intolerances        · Review of Systems: Eyes:  No visual changes, eye pain or discharge.  	ENMT:  No hearing changes, pain, no sore throat or runny nose, no difficulty swallowing  	Cardiac:  No chest pain, SOB or edema. No chest pain with exertion.  	Respiratory:  No cough or respiratory distress. No hemoptysis. No history of asthma or RAD.  	GI:  No nausea, vomiting, diarrhea or abdominal pain.  	:  No dysuria, frequency or burning.  	MS:  No myalgia, muscle weakness, joint pain or back pain.  	Neuro: see HPI  	Skin:  No skin rash.   Endocrine: No history of thyroid disease       MEDICATIONS:  Antibiotics:    Neuro:    Anticoagulation:    OTHER:    IVF:      Vital Signs Last 24 Hrs  T(C): 36.9 (13 May 2021 15:00), Max: 36.9 (13 May 2021 15:00)  T(F): 98.5 (13 May 2021 15:00), Max: 98.5 (13 May 2021 15:00)  HR: 85 (13 May 2021 15:00) (85 - 85)  BP: 154/66 (13 May 2021 15:00) (154/66 - 154/66)  BP(mean): --  RR: 20 (13 May 2021 15:00) (20 - 20)  SpO2: 97% (13 May 2021 15:00) (97% - 97%)    PHYSICAL EXAM:    A&OX3, PERRL   EOM ( I )   MAEX4 UE> LE   MS  RUE 5/5          LUE proximal 4/ 5 secondary to pain in shoulder                 distal 5/5          RLE  proximal 4+/5 , distal 4/5         LLE   proximal 4+/5 , distal 4/5   unable to fully assess secondary to pain in the left shoulder     LABS:                        10.5   6.78  )-----------( 292      ( 13 May 2021 15:51 )             33.4     05    142  |  107  |  59<H>  ----------------------------<  88  4.7   |  20  |  1.2    Ca    9.6      13 May 2021 15:51    TPro  7.5  /  Alb  4.4  /  TBili  <0.2  /  DBili  x   /  AST  27  /  ALT  21  /  AlkPhos  144<H>  0513      Urinalysis Basic - ( 13 May 2021 15:51 )    Color: Colorless / Appearance: Clear / S.010 / pH: x  Gluc: x / Ketone: Negative  / Bili: Negative / Urobili: <2 mg/dL   Blood: x / Protein: Trace / Nitrite: Negative   Leuk Esterase: Negative / RBC: x / WBC x   Sq Epi: x / Non Sq Epi: x / Bacteria: x            RADIOLOGY & ADDITIONAL STUDIES:  < from: CT Head No Cont (21 @ 17:44) >    Complex right convexity subdural hematoma or collection measuring up to 1.5 cm in diameter.Hyperattenuating component along the right anterior falx suggestive of acute blood products. Localized mass effect. No midline shift. MR follow-up may be obtained as clinically warranted.    A/p       s/p fall 10 days had AMS for short period of time today             Right Subacute / chronic SDH             keppra 500 mg q 12              platelets for reversal of ASA             neuro checks q 1hour             medical admission             Rpt HCT in am unless change in MS then Stat

## 2021-05-13 NOTE — ED ADULT NURSE NOTE - NSIMPLEMENTINTERV_GEN_ALL_ED
Implemented All Fall with Harm Risk Interventions:  [place] to call system. Call bell, personal items and telephone within reach. Instruct patient to call for assistance. Room bathroom lighting operational. Non-slip footwear when patient is off stretcher. Physically safe environment: no spills, clutter or unnecessary equipment. Stretcher in lowest position, wheels locked, appropriate side rails in place. Provide visual cue, wrist band, yellow gown, etc. Monitor gait and stability. Monitor for mental status changes and reorient to person, place, and time. Review medications for side effects contributing to fall risk. Reinforce activity limits and safety measures with patient and family. Provide visual clues: red socks.

## 2021-05-13 NOTE — ED PROVIDER NOTE - PROGRESS NOTE DETAILS
Neurosurgery c/s. Neuro-critical care c/s Yobani Additional history revealed that patient fell 10 days ago at home, there was no LOC, he fell onto his left side injuring his shoulder.  This history may explain the subacute appearance of collection of CT scan of the head done today.  Patient remains awake and alert, he was evaluated by neurosurgery team, Keppra was recommended and Platelets for ASA reversal.  T7S sent to the lab. PP: Signed patient out to ICU resident. Approved by melinda. PP: Signed patient out to ICU resident. Approved by melinda. Consent for platelets given to .

## 2021-05-13 NOTE — ED ADULT TRIAGE NOTE - CHIEF COMPLAINT QUOTE
patient AMS at home . f/s 70 on scene lethargic  - given juice and dextrose patient more awake in triage. aox2

## 2021-05-13 NOTE — ED ADULT NURSE NOTE - OBJECTIVE STATEMENT
pt presents to ED BIBA from home for AMS, pt was hypoglycemic at home with fs of 70, EMS given dex and juice and pt awake and alert at this time.

## 2021-05-13 NOTE — ED ADULT NURSE NOTE - NSINTERVENTIONOPT_GEN_ALL_ED
Suturegard Intro: Intraoperative tissue expansion was performed, utilizing the SUTUREGARD device, in order to reduce wound tension. Stretcher Alarms/[place]ourly Rounding

## 2021-05-14 LAB
A1C WITH ESTIMATED AVERAGE GLUCOSE RESULT: 6.6 % — HIGH (ref 4–5.6)
ALBUMIN SERPL ELPH-MCNC: 3.7 G/DL — SIGNIFICANT CHANGE UP (ref 3.5–5.2)
ALLERGY+IMMUNOLOGY DIAG STUDY NOTE: SIGNIFICANT CHANGE UP
ALP SERPL-CCNC: 117 U/L — HIGH (ref 30–115)
ALT FLD-CCNC: 17 U/L — SIGNIFICANT CHANGE UP (ref 0–41)
ANION GAP SERPL CALC-SCNC: 13 MMOL/L — SIGNIFICANT CHANGE UP (ref 7–14)
AST SERPL-CCNC: 25 U/L — SIGNIFICANT CHANGE UP (ref 0–41)
BASOPHILS # BLD AUTO: 0.04 K/UL — SIGNIFICANT CHANGE UP (ref 0–0.2)
BASOPHILS NFR BLD AUTO: 0.6 % — SIGNIFICANT CHANGE UP (ref 0–1)
BILIRUB SERPL-MCNC: 0.2 MG/DL — SIGNIFICANT CHANGE UP (ref 0.2–1.2)
BUN SERPL-MCNC: 55 MG/DL — HIGH (ref 10–20)
CALCIUM SERPL-MCNC: 9.5 MG/DL — SIGNIFICANT CHANGE UP (ref 8.5–10.1)
CHLORIDE SERPL-SCNC: 112 MMOL/L — HIGH (ref 98–110)
CHOLEST SERPL-MCNC: 116 MG/DL — SIGNIFICANT CHANGE UP
CO2 SERPL-SCNC: 21 MMOL/L — SIGNIFICANT CHANGE UP (ref 17–32)
COVID-19 SPIKE DOMAIN AB INTERP: POSITIVE
COVID-19 SPIKE DOMAIN ANTIBODY RESULT: 21.5 U/ML — HIGH
CREAT SERPL-MCNC: 1.2 MG/DL — SIGNIFICANT CHANGE UP (ref 0.7–1.5)
CULTURE RESULTS: SIGNIFICANT CHANGE UP
DIR ANTIGLOB POLYSPECIFIC INTERPRETATION: SIGNIFICANT CHANGE UP
EOSINOPHIL # BLD AUTO: 0.44 K/UL — SIGNIFICANT CHANGE UP (ref 0–0.7)
EOSINOPHIL NFR BLD AUTO: 7 % — SIGNIFICANT CHANGE UP (ref 0–8)
ESTIMATED AVERAGE GLUCOSE: 143 MG/DL — HIGH (ref 68–114)
FERRITIN SERPL-MCNC: 79 NG/ML — SIGNIFICANT CHANGE UP (ref 30–400)
FOLATE SERPL-MCNC: 11.1 NG/ML — SIGNIFICANT CHANGE UP
GLUCOSE BLDC GLUCOMTR-MCNC: 92 MG/DL — SIGNIFICANT CHANGE UP (ref 70–99)
GLUCOSE SERPL-MCNC: 100 MG/DL — HIGH (ref 70–99)
HCT VFR BLD CALC: 31 % — LOW (ref 42–52)
HDLC SERPL-MCNC: 56 MG/DL — SIGNIFICANT CHANGE UP
HGB BLD-MCNC: 9.7 G/DL — LOW (ref 14–18)
IMM GRANULOCYTES NFR BLD AUTO: 0.3 % — SIGNIFICANT CHANGE UP (ref 0.1–0.3)
IRON SATN MFR SERPL: 17 % — SIGNIFICANT CHANGE UP (ref 15–50)
IRON SATN MFR SERPL: 55 UG/DL — SIGNIFICANT CHANGE UP (ref 35–150)
LIPID PNL WITH DIRECT LDL SERPL: 48 MG/DL — SIGNIFICANT CHANGE UP
LYMPHOCYTES # BLD AUTO: 0.9 K/UL — LOW (ref 1.2–3.4)
LYMPHOCYTES # BLD AUTO: 14.3 % — LOW (ref 20.5–51.1)
MAGNESIUM SERPL-MCNC: 2.3 MG/DL — SIGNIFICANT CHANGE UP (ref 1.8–2.4)
MCHC RBC-ENTMCNC: 27.5 PG — SIGNIFICANT CHANGE UP (ref 27–31)
MCHC RBC-ENTMCNC: 31.3 G/DL — LOW (ref 32–37)
MCV RBC AUTO: 87.8 FL — SIGNIFICANT CHANGE UP (ref 80–94)
MONOCYTES # BLD AUTO: 0.72 K/UL — HIGH (ref 0.1–0.6)
MONOCYTES NFR BLD AUTO: 11.5 % — HIGH (ref 1.7–9.3)
NEUTROPHILS # BLD AUTO: 4.16 K/UL — SIGNIFICANT CHANGE UP (ref 1.4–6.5)
NEUTROPHILS NFR BLD AUTO: 66.3 % — SIGNIFICANT CHANGE UP (ref 42.2–75.2)
NON HDL CHOLESTEROL: 60 MG/DL — SIGNIFICANT CHANGE UP
NRBC # BLD: 0 /100 WBCS — SIGNIFICANT CHANGE UP (ref 0–0)
PHOSPHATE SERPL-MCNC: 3.9 MG/DL — SIGNIFICANT CHANGE UP (ref 2.1–4.9)
PLATELET # BLD AUTO: 284 K/UL — SIGNIFICANT CHANGE UP (ref 130–400)
POTASSIUM SERPL-MCNC: 4.2 MMOL/L — SIGNIFICANT CHANGE UP (ref 3.5–5)
POTASSIUM SERPL-SCNC: 4.2 MMOL/L — SIGNIFICANT CHANGE UP (ref 3.5–5)
PROT SERPL-MCNC: 6.5 G/DL — SIGNIFICANT CHANGE UP (ref 6–8)
RBC # BLD: 3.53 M/UL — LOW (ref 4.7–6.1)
RBC # FLD: 19.5 % — HIGH (ref 11.5–14.5)
SARS-COV-2 IGG+IGM SERPL QL IA: 21.5 U/ML — HIGH
SARS-COV-2 IGG+IGM SERPL QL IA: POSITIVE
SODIUM SERPL-SCNC: 146 MMOL/L — SIGNIFICANT CHANGE UP (ref 135–146)
SPECIMEN SOURCE: SIGNIFICANT CHANGE UP
TIBC SERPL-MCNC: 324 UG/DL — SIGNIFICANT CHANGE UP (ref 220–430)
TRANSFERRIN SERPL-MCNC: 259 MG/DL — SIGNIFICANT CHANGE UP (ref 200–360)
TRIGL SERPL-MCNC: 73 MG/DL — SIGNIFICANT CHANGE UP
TSH SERPL-MCNC: 4.82 UIU/ML — HIGH (ref 0.27–4.2)
UIBC SERPL-MCNC: 269 UG/DL — SIGNIFICANT CHANGE UP (ref 110–370)
VIT B12 SERPL-MCNC: 464 PG/ML — SIGNIFICANT CHANGE UP (ref 232–1245)
WBC # BLD: 6.28 K/UL — SIGNIFICANT CHANGE UP (ref 4.8–10.8)
WBC # FLD AUTO: 6.28 K/UL — SIGNIFICANT CHANGE UP (ref 4.8–10.8)

## 2021-05-14 PROCEDURE — 99222 1ST HOSP IP/OBS MODERATE 55: CPT

## 2021-05-14 PROCEDURE — 70450 CT HEAD/BRAIN W/O DYE: CPT | Mod: 26

## 2021-05-14 RX ORDER — ACETAMINOPHEN 500 MG
650 TABLET ORAL EVERY 6 HOURS
Refills: 0 | Status: DISCONTINUED | OUTPATIENT
Start: 2021-05-14 | End: 2021-05-19

## 2021-05-14 RX ORDER — SODIUM CHLORIDE 9 MG/ML
500 INJECTION, SOLUTION INTRAVENOUS ONCE
Refills: 0 | Status: COMPLETED | OUTPATIENT
Start: 2021-05-14 | End: 2021-05-14

## 2021-05-14 RX ORDER — ACETAMINOPHEN 500 MG
650 TABLET ORAL EVERY 6 HOURS
Refills: 0 | Status: DISCONTINUED | OUTPATIENT
Start: 2021-05-14 | End: 2021-05-14

## 2021-05-14 RX ADMIN — Medication 650 MILLIGRAM(S): at 17:57

## 2021-05-14 RX ADMIN — LEVETIRACETAM 420 MILLIGRAM(S): 250 TABLET, FILM COATED ORAL at 17:57

## 2021-05-14 RX ADMIN — Medication 2 GRAM(S): at 06:19

## 2021-05-14 RX ADMIN — Medication 650 MILLIGRAM(S): at 09:42

## 2021-05-14 RX ADMIN — CALAMINE AND ZINC OXIDE AND PHENOL 1 APPLICATION(S): 160; 10 LOTION TOPICAL at 06:18

## 2021-05-14 RX ADMIN — Medication 300 MILLIGRAM(S): at 12:52

## 2021-05-14 RX ADMIN — Medication 650 MILLIGRAM(S): at 02:20

## 2021-05-14 RX ADMIN — ATORVASTATIN CALCIUM 40 MILLIGRAM(S): 80 TABLET, FILM COATED ORAL at 22:27

## 2021-05-14 RX ADMIN — SODIUM CHLORIDE 1000 MILLILITER(S): 9 INJECTION, SOLUTION INTRAVENOUS at 06:30

## 2021-05-14 RX ADMIN — BUMETANIDE 1 MILLIGRAM(S): 0.25 INJECTION INTRAMUSCULAR; INTRAVENOUS at 06:18

## 2021-05-14 RX ADMIN — Medication 650 MILLIGRAM(S): at 18:25

## 2021-05-14 RX ADMIN — Medication 2 GRAM(S): at 17:58

## 2021-05-14 RX ADMIN — LEVETIRACETAM 420 MILLIGRAM(S): 250 TABLET, FILM COATED ORAL at 06:18

## 2021-05-14 RX ADMIN — PANTOPRAZOLE SODIUM 40 MILLIGRAM(S): 20 TABLET, DELAYED RELEASE ORAL at 12:52

## 2021-05-14 RX ADMIN — Medication 325 MILLIGRAM(S): at 12:52

## 2021-05-14 NOTE — H&P ADULT - NSHPPHYSICALEXAM_GEN_ALL_CORE
Vital Signs Last 24 Hrs  T(C): 36.2 (13 May 2021 22:00), Max: 36.9 (13 May 2021 15:00)  T(F): 97.1 (13 May 2021 22:00), Max: 98.5 (13 May 2021 15:00)  HR: 95 (13 May 2021 22:00) (85 - 95)  BP: 118/63 (13 May 2021 22:00) (118/63 - 154/66)  RR: 20 (13 May 2021 22:00) (20 - 20)  SpO2: 96% (13 May 2021 22:00) (96% - 97%)    PHYSICAL EXAM:  GENERAL: NAD, speaks in full sentences, no signs of respiratory distress  HEAD:  Normocephalic  EYES: conjunctiva and sclera clear  NECK: Supple, No JVD  CHEST/LUNG: Clear to auscultation bilaterally; No wheeze; No crackles; No accessory muscles used  HEART: Regular rate and rhythm;  ABDOMEN: Soft, Nontender, Nondistended; No guarding  EXTREMITIES:  erythematous bilateral lower extremities, with dressing in place  PSYCH: AAOx3  NEUROLOGY: non-focal  SKIN: skin sloughing on posterior right thigh and sacral area

## 2021-05-14 NOTE — CONSULT NOTE ADULT - SUBJECTIVE AND OBJECTIVE BOX
NEPHROLOGY CONSULTATION NOTE    Patient is a Patient is a 77 yo man with long standing diabetes, retinopathy, nephrotic range proteinurua, bleeding gastric ulcer, gout, and subdural hematoma.  Recent admission with metformin toxicity and KELL and lactic acidosis - required hemodialysis.  He was admitted with confusion but currently is alert clsoe to baseline. Per his account he fell afew days ago  fell asleep and fell of hair) and struch head.  Now with moderate subdural hematoms      PAST MEDICAL & SURGICAL HISTORY:  Diverticulitis    Herniated disc, cervical    Chronic gout of foot, unspecified cause, unspecified laterality    Type 2 diabetes mellitus without complication, unspecified long term insulin use status    Hypertension, unspecified type    High cholesterol    Osteoarthritis of multiple joints, unspecified osteoarthritis type    Sciatica, unspecified laterality    History of tonsillectomy and adenoidectomy    H/O colonoscopy        Allergies:  No Known Allergies    Home Medications Reviewed  Hospital Medications:   MEDICATIONS  (STANDING):  allopurinol 300 milliGRAM(s) Oral daily  atorvastatin 40 milliGRAM(s) Oral at bedtime  buMETAnide 1 milliGRAM(s) Oral daily  calamine/zinc oxide Lotion 1 Application(s) Topical two times a day  chlorhexidine 4% Liquid 1 Application(s) Topical daily  ferrous    sulfate 325 milliGRAM(s) Oral daily  levETIRAcetam  IVPB 500 milliGRAM(s) IV Intermittent every 12 hours  lisinopril 40 milliGRAM(s) Oral daily  omega-3-Acid Ethyl Esters 2 Gram(s) Oral two times a day  pantoprazole  Injectable 40 milliGRAM(s) IV Push daily      SOCIAL HISTORY:  Denies ETOH,Smoking,   FAMILY HISTORY:  No pertinent family history in first degree relatives          REVIEW OF SYSTEMS:  CONSTITUTIONAL: No weakness, fevers or chills  EYES/ENT: No visual changes;  No vertigo or throat pain   NECK: No pain or stiffness  RESPIRATORY: No cough, wheezing, hemoptysis; No shortness of breath  CARDIOVASCULAR: No chest pain or palpitations.  GASTROINTESTINAL: No abdominal or epigastric pain. No nausea, vomiting, or hematemesis; No diarrhea or constipation. No melena or hematochezia.  GENITOURINARY: No dysuria, frequency, foamy urine, urinary urgency, incontinence or hematuria  NEUROLOGICAL: No numbness or weakness  SKIN: No itching, burning, rashes, or lesions   VASCULAR: No bilateral lower extremity edema.   All other review of systems is negative unless indicated above.    VITALS:  T(F): 94.9 (21 @ 09:44), Max: 98.5 (21 @ 15:00)  HR: 69 (21 @ 09:44)  BP: 130/60 (21 @ 09:44)  RR: 16 (21 @ 09:44)  SpO2: 98% (21 @ 09:44)    Height (cm): 177.8 (05-13 @ 15:00)  Weight (kg): 77.1 (05-13 @ 15:00)  BMI (kg/m2): 24.4 (05-13 @ 15:00)  BSA (m2): 1.95 (05-13 @ 15:00)    I&O's Detail        PHYSICAL EXAM:  Constitutional: NAD  HEENT: anicteric sclera, oropharynx clear, MMM  Neck: No JVD  Respiratory: CTAB, no wheezes, rales or rhonchi  Cardiovascular: S1, S2, RRR  Gastrointestinal: BS+, soft, NT/ND  Extremities: LE edema  Neurological: A/O x 3, no focal deficits  Psychiatric: Normal mood, normal affect  : No CVA tenderness. No gil.   Skin: No rashes  Vascular Access:    LABS:      146  |  112<H>  |  55<H>  ----------------------------<  100<H>  4.2   |  21  |  1.2    Ca    9.5      14 May 2021 04:43  Phos  3.9       Mg     2.3         TPro  6.5  /  Alb  3.7  /  TBili  0.2  /  DBili      /  AST  25  /  ALT  17  /  AlkPhos  117<H>      Creatinine Trend: 1.2 <--, 1.2 <--                        9.7    6.28  )-----------( 284      ( 14 May 2021 04:43 )             31.0     Urine Studies:  Urinalysis Basic - ( 13 May 2021 15:51 )    Color: Colorless / Appearance: Clear / S.010 / pH:   Gluc:  / Ketone: Negative  / Bili: Negative / Urobili: <2 mg/dL   Blood:  / Protein: Trace / Nitrite: Negative   Leuk Esterase: Negative / RBC:  / WBC    Sq Epi:  / Non Sq Epi:  / Bacteria:     bumex 1 mg po qd, liptor 40 mg po qd, allopurinol 300 mg po qd, quinapril 40 mg po qd, pantoprazole 40 mg po qd, omega 3 1 gr qid, ASA 81 mg qd, osemi bid    ·	s/p fall 2 weeks ago with head trauma and now subacute moderate subdural hematoma  ·	ckd likely stage 3  ·	bp stable on meds  ·	nephrotic 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

## 2021-05-14 NOTE — H&P ADULT - ASSESSMENT
Mr. Polanco is a 75 yo male patient with PMH of diverticulosis, DM, HTN, HLD, CKD stage 3A, ostearthritis, bleeding gastric ulcer, anemia presenting with period of AMS.     # Traumatic complex right convexity subdural hematoma  - Fall 10 days ago   - On aspirin.   - Evaluated by neurosurgery: recommending platelets transfusion for reversal of ASA  - Started on keppra 500 mg q 12    - Neuro checks q 1hour    - Repeat HCT in am    - NPO for now    # HTN  - continue Lisinopril 40 mg daily   - Continue Bumex 1 mg daily.    # DM  - holding oral antidiabetic medications  - Fingerstick Q 6 hours    # CKD stage 3B  - Serum creatinine around baseline     # PUD  # Normocytic anemia  - continue pantoprazole 40 mg daily   - Iron/TIBC, ferritin ordered    DVT prophylaxis: no pharmacologic given dubdural hematoma, and no ICD given LE chronic lesions.        Mr. Polanco is a 77 yo male patient with PMH of diverticulosis, DM, HTN, HLD, CKD stage 3A, ostearthritis, bleeding gastric ulcer, anemia presenting with period of AMS.     # Traumatic complex right convexity subdural hematoma  - Fall 10 days ago   - On aspirin.   - Evaluated by neurosurgery: recommending platelets transfusion for reversal of ASA  - Started on keppra 500 mg q 12    - Neuro checks q 1hour    - Repeat HCT in am    - NPO for now    # HTN  - continue Lisinopril 40 mg daily   - Continue Bumex 1 mg daily.    # DM  - holding oral antidiabetic medications  - Fingerstick Q 4 hours    # CKD stage 3B  - Serum creatinine around baseline     # PUD  # Normocytic anemia  - continue pantoprazole 40 mg daily   - Iron/TIBC, ferritin ordered    DVT prophylaxis: no pharmacologic given dubdural hematoma, and no ICD given LE chronic lesions.

## 2021-05-14 NOTE — SWALLOW BEDSIDE ASSESSMENT ADULT - SLP PERTINENT HISTORY OF CURRENT PROBLEM
pt is a 77 y/o w/ PMHx: diverticulosis, DM, HTN, HLD, CKD stage 3A, ostearthritis, bleeding gastric ulcer, anemia presenting with period of AMS. CTH-> Complex right convexity subdural hematoma or collection measuring up to 1.5 cm in diameter. Of note, pt w/ fall ~10 days ago. SLP c/s to assess swallow function.

## 2021-05-14 NOTE — SWALLOW BEDSIDE ASSESSMENT ADULT - SLP GENERAL OBSERVATIONS
pt received on ED stretcher asleep arousable w/ c/o pain to L-side of his body. RN aware. +room air +speech clear, pt a+ox4

## 2021-05-14 NOTE — ED ADULT NURSE REASSESSMENT NOTE - NS ED NURSE REASSESS COMMENT FT1
[id number] made aware pt rectal temp- as per md ok to go to ct scan then repeat temperature. warm blankets applied to pt. pt speaking clearly and denies complaints of discomfort

## 2021-05-14 NOTE — H&P ADULT - ATTENDING COMMENTS
Impression:    Traumatic subdural  HO DM     Recommend    FU with NeuroSX  Repeat CTH  Stroke Unit / Step down if CTH Unchanged  NPO for now   Monitor FS  DVT prophylaxis seq

## 2021-05-14 NOTE — H&P ADULT - NSHPLABSRESULTS_GEN_ALL_CORE
Labs:                            10.5   6.78  )-----------( 292      ( 13 May 2021 15:51 )             33.4           142  |  107  |  59<H>  ----------------------------<  88  4.7   |  20  |  1.2    Ca    9.6      13 May 2021 15:51    TPro  7.5  /  Alb  4.4  /  TBili  <0.2  /  DBili  x   /  AST  27  /  ALT  21  /  AlkPhos  144<H>                Urinalysis Basic - ( 13 May 2021 15:51 )    Color: Colorless / Appearance: Clear / S.010 / pH: x  Gluc: x / Ketone: Negative  / Bili: Negative / Urobili: <2 mg/dL   Blood: x / Protein: Trace / Nitrite: Negative   Leuk Esterase: Negative / RBC: x / WBC x   Sq Epi: x / Non Sq Epi: x / Bacteria: x        Lactate Trend   @ 15:51 Lactate:1.1       CAPILLARY BLOOD GLUCOSE      POCT Blood Glucose.: 111 mg/dL (13 May 2021 15:01)      < from: CT Head No Cont (21 @ 17:44) >    IMPRESSION:    Complex right convexity subdural hematoma or collection measuring up to 1.5 cm in diameter. Hyperattenuating component along the right anterior falx suggestive of acute blood products. Localized mass effect. No midline shift. MR follow-up may be obtained as clinically warranted.    < end of copied text >

## 2021-05-14 NOTE — H&P ADULT - HISTORY OF PRESENT ILLNESS
Mr. Polanco is a 75 yo male patient with PMH of diverticulosis, DM, HTN, HLD, CKD stage 3A, ostearthritis, bleeding gastric ulcer, anemia presenting with period of AMS.     Per wife pt had a large breakfast and shortly after was found acting confused and unable to open his eyes but saying his eyes were open. Ambulance was called, FS was found to be 70. Per wife pt seldom runs a FS under 90. Pt was given PO food/drink and FS improved as did pt's mental status. Wife says pt is currently at baseline in the ED. Pt endorsing chills everytime he goes to urinate xfew days. No hematuria, dysuria, increased frequency in urination. No f/n/v. No abdominal pain/back pain. No cough/sore throat/headache/weakness. Pt endorses fall 10 days ago with no LOC. No A/c.    In ED, patient was normotensive, afebrile. CT head was done showing subacute subdural hematoma. Neurosurgery evaluated patient in ED, no immediate surgical indication. Started on seizure prophylaxis. WIll be admitted to ICU with neurocheck Q 1 hour.

## 2021-05-14 NOTE — CHART NOTE - NSCHARTNOTEFT_GEN_A_CORE
Specialty: Neuro Critical Care     Interval Hx: No acute events. Repeat CTH stable. Pt seen and assessed, no complaints.      PE:  Gen: WN/WD male resting comfortably in bed  Mental status: Drowsy, responds to voice, oriented x3.  Attention normal. Follows multistep commands. Language fluent, naming and repetition intact.   Cranial nerves: PERRL, VFF, no nystagmus, EOMI, facial sensation intact bilaterally and symmetric, face symmetric, no dysarthria  Motor: 5/5 strength RUE, RLE. 4/5 strength LUE, LLE  Sensation: Intact to light touch        EXAM:  CT BRAIN        PROCEDURE DATE:  05/14/2021    IMPRESSION:  In comparison with the prior CT scan of the brain dated May 13, 2021:  Again demonstrated is a right-sided acute on chronic subdural hemorrhage with associated mass effect, unchanged.  No new areas of acute intracranial hemorrhage.          Okay to downgrade to stroke unit for q4h neurochecks  Continue Keppra, atorvastatin  Keep SBP<160        Plan discussed with GEORGE Martinez  Please feel free to contact for any questions or concerns. Thank you.  Extension: #[phone]

## 2021-05-14 NOTE — CONSULT NOTE ADULT - SUBJECTIVE AND OBJECTIVE BOX
Specialty: Neuro Critical Care     HPI:  76y M pmh diverticulosis, DM, HTN, HLD, CKD, ostearthritis, bleeding gastric ulcer, anemia presenting with period of AMS. Per wife pt had a large breakfast and shortly after was found acting confused and unable to open his eyes but saying his eyes were open. Ambulance was called, FS was found to be 70. Per wife pt seldom runs a FS under 90. Pt was given PO food/drink and FS improved as did pt's mental status. Wife says pt is currently at baseline in the ED. Pt endorsing chills everytime he goes to urinate xfew days. No hematuria, dysuria, increased frequency in urination. No f/n/v. No abdominal pain/back pain. No cough/sore throat/headache/weakness. Pt endorses fall 10 days ago with no LOC. takes ASA 81 mg     Past Medical and Surgical History:  Diverticulitis  Herniated disc, cervical  Chronic gout of foot, unspecified cause, unspecified laterality  Type 2 diabetes mellitus without complication, unspecified long term insulin use status  Hypertension, unspecified type  High cholesterol  Osteoarthritis of multiple joints, unspecified osteoarthritis type  Sciatica, unspecified laterality  History of tonsillectomy and adenoidectomy  H/O colonoscopy  2018    Allergies: No Known Allergies    ROS: Patient endorses B/L frontal headache, pain level 5/10, dull and aching in quality, and pain along left side 2/2 s/p fall. Patient denies nausea/vomiting, blurred vision, double vision, or dizziness. Otherwise, 10-point ROS is negative.    PE:  General: well-nourished, well-groomed male of appropriately stated age, lying supine on stretcher. NAD.   Neurologic:  Mental status: Awake, alert, oriented to person, place, and time. Speech is fluent, able to name objects. Follows two- and three-step commands. Attention/concentration intact. No dysarthria, no aphasia.  Cranial Nerves:  II: Visual fields are full to confrontation. Pupils equally round and reactive to light, 3mm brisk bilaterally  III, IV, VI: EOMI without nystagmus  V:  V1-V3 sensation intact bilaterally   VII: No facial droop, face is symmetric with normal eye closure and smile, no loss of nasolabial folds  XII: Tongue midline with no deviation   Motor: Normal bulk and tone. No pronator drift noted bilaterally, except LUE, with slight pronator drift however does not hit bed. Strength 5/5 in B/L UE and RLE,  strength 5/5. LLE weakness, however limited 2/2 injury and torn thigh muscle   Sensation: Intact to light touch. No neglect.  Coordination: No dysmetria on finger-to-nose    Vital Signs:  ICU Vital Signs Last 24 Hrs  T(C): 36.2 (13 May 2021 22:00), Max: 36.9 (13 May 2021 15:00)  T(F): 97.1 (13 May 2021 22:00), Max: 98.5 (13 May 2021 15:00)  HR: 95 (13 May 2021 22:00) (85 - 95)  BP: 118/63 (13 May 2021 22:00) (118/63 - 154/66)  BP(mean): --  ABP: --  ABP(mean): --  RR: 20 (13 May 2021 22:00) (20 - 20)  SpO2: 96% (13 May 2021 22:00) (96% - 97%)    Labs:      142  |  107  |  59<H>  ----------------------------<  88  4.7   |  20  |  1.2    Ca    9.6      13 May 2021 15:51    TPro  7.5  /  Alb  4.4  /  TBili  <0.2  /  DBili  x   /  AST  27  /  ALT  21  /  AlkPhos  144<H>                          10.5   6.78  )-----------( 292      ( 13 May 2021 15:51 )             33.4     LIVER FUNCTIONS - ( 13 May 2021 15:51 )  Alb: 4.4 g/dL / Pro: 7.5 g/dL / ALK PHOS: 144 U/L / ALT: 21 U/L / AST: 27 U/L / GGT: x           Microbiology:  Urinalysis Basic - ( 13 May 2021 15:51 )  Color: Colorless / Appearance: Clear / S.010 / pH: x  Gluc: x / Ketone: Negative  / Bili: Negative / Urobili: <2 mg/dL   Blood: x / Protein: Trace / Nitrite: Negative   Leuk Esterase: Negative / RBC: x / WBC x   Sq Epi: x / Non Sq Epi: x / Bacteria: x    Medications Current and PRN:  MEDICATIONS  (STANDING):  allopurinol 300 milliGRAM(s) Oral daily  atorvastatin 40 milliGRAM(s) Oral at bedtime  buMETAnide 1 milliGRAM(s) Oral daily  calamine/zinc oxide Lotion 1 Application(s) Topical two times a day  chlorhexidine 4% Liquid 1 Application(s) Topical daily  ferrous    sulfate 325 milliGRAM(s) Oral daily  levETIRAcetam  IVPB 500 milliGRAM(s) IV Intermittent every 12 hours  lisinopril 40 milliGRAM(s) Oral daily  omega-3-Acid Ethyl Esters 2 Gram(s) Oral two times a day  pantoprazole  Injectable 40 milliGRAM(s) IV Push daily  tamsulosin 0.4 milliGRAM(s) Oral at bedtime    Imaging:  EXAM:  CT BRAIN (2021):   IMPRESSION:  Complex right convexity subdural hematoma or collection measuring up to 1.5 cm in diameter. Hyperattenuating component along the right anterior falx suggestive of acute blood products. Localized mass effect. No midline shift. MR follow-up may be obtained as clinically warranted.    Assessment:  76-year-old male PMHx diverticulosis, T2DM, HTN, HLD, CKD, OA, cervical herniated disc, gout, sciatica, h/o T/A, and colonoscopy, 2018, p/w AMS. After eating breakfast, as per wife, patient was acting confused and unable to open his eyes. Blood glucose = 70. As per wife, patient blood glucose typically remains > 90; was given PO intake and mental status improved. Patient endorsed subjective chills during urination for the last few days, and s/p fall 10 days ago with no LOC, currently on ASA 81mg. CTH, 2021, revealed complex right convexity subdural hematoma or collection measuring up to 1.5 cm in diameter, with hyperattenuating component along the right anterior falx suggestive of acute blood products, and localized mass effect, with no midline shift. NeuroSx c/s, with no neurosurgical interventions at this time. On examination, patient is awake, alert and oriented, c/o bilateral frontal headache and pain along left side 2/2 fall, with slight LUE pronator drift and limited mobility LLE 2/2 injury and torn thigh muscle. Admit to ICU for frequent neurological monitoring.     Plan:  #Neuro  - Neuro checks q1hrs  - Repeat CTH in AM, 2021  - Continue Keppra 500mg IV q12hrs  - Hold A/C and A/P for now  - Continue Atorvastatin 40mg PO q24hrs HS  - Seizure precautions  - ICU delirium precautions  - Telemetry monitoring  - Send Lipid profile, TSH, HgA1c  - PT/OT  - Stat CTH if any worsening or deterioration in neurological examination and call [facility]/Neurology    #CV:  - 12-lead ECG  - 2D echo/TTE  - Keep SBP < 160  - Continue Lisinopril 40mg PO q24hrs    #Resp:  - Aggressive chest PT/pulmonary toileting/NT suctioning as needed   - Incentive spirometry 10x/hr while awake  - HOB > 35 degrees  - Aspiration precautions  - Keep SaO2 > 95%    #Renal/Fluid/Electrolytes:  - Strict I/Os  - Daily weights  - Keep euvolemic  - Keep normoglycemic  - Keep normonatremic   - Keep Magnesium level > 2  - Monitor lytes, replete as needed  - Continue Bumetanide 1mg PO q24hrs  - Continue Tamsulosin 0.4mg PO q24hrs HS  - Continue Allopurinol 300mg PO q24hrs    #GI:  - EN as per primary team  - Dysphagia screening/speech and swallow evaluation  - Continue Protonix 40mg IV q24hrs  - Continue Ferrous sulfate 325mg PO q24hrs    #Heme/Onc:  - SCS while in bed  - Hold A/C and A/P for now    #ID:  - Keep normothermic; avoid fevers      Code status: Full code  Disposition: ICU      Clemencia Rowell [facility]  Please feel free to contact for any questions or concerns   #[phone]

## 2021-05-15 ENCOUNTER — TRANSCRIPTION ENCOUNTER (OUTPATIENT)
Age: 76
End: 2021-05-15

## 2021-05-15 LAB
ALBUMIN SERPL ELPH-MCNC: 3.8 G/DL — SIGNIFICANT CHANGE UP (ref 3.5–5.2)
ALP SERPL-CCNC: 125 U/L — HIGH (ref 30–115)
ALT FLD-CCNC: 17 U/L — SIGNIFICANT CHANGE UP (ref 0–41)
ANION GAP SERPL CALC-SCNC: 14 MMOL/L — SIGNIFICANT CHANGE UP (ref 7–14)
AST SERPL-CCNC: 24 U/L — SIGNIFICANT CHANGE UP (ref 0–41)
BASOPHILS # BLD AUTO: 0.04 K/UL — SIGNIFICANT CHANGE UP (ref 0–0.2)
BASOPHILS NFR BLD AUTO: 0.6 % — SIGNIFICANT CHANGE UP (ref 0–1)
BILIRUB SERPL-MCNC: <0.2 MG/DL — SIGNIFICANT CHANGE UP (ref 0.2–1.2)
BUN SERPL-MCNC: 53 MG/DL — HIGH (ref 10–20)
CALCIUM SERPL-MCNC: 9.5 MG/DL — SIGNIFICANT CHANGE UP (ref 8.5–10.1)
CHLORIDE SERPL-SCNC: 109 MMOL/L — SIGNIFICANT CHANGE UP (ref 98–110)
CO2 SERPL-SCNC: 21 MMOL/L — SIGNIFICANT CHANGE UP (ref 17–32)
CREAT SERPL-MCNC: 1.4 MG/DL — SIGNIFICANT CHANGE UP (ref 0.7–1.5)
EOSINOPHIL # BLD AUTO: 0.48 K/UL — SIGNIFICANT CHANGE UP (ref 0–0.7)
EOSINOPHIL NFR BLD AUTO: 6.8 % — SIGNIFICANT CHANGE UP (ref 0–8)
GLUCOSE BLDC GLUCOMTR-MCNC: 102 MG/DL — HIGH (ref 70–99)
GLUCOSE BLDC GLUCOMTR-MCNC: 154 MG/DL — HIGH (ref 70–99)
GLUCOSE BLDC GLUCOMTR-MCNC: 207 MG/DL — HIGH (ref 70–99)
GLUCOSE SERPL-MCNC: 110 MG/DL — HIGH (ref 70–99)
HCT VFR BLD CALC: 32 % — LOW (ref 42–52)
HGB BLD-MCNC: 9.9 G/DL — LOW (ref 14–18)
IMM GRANULOCYTES NFR BLD AUTO: 0.3 % — SIGNIFICANT CHANGE UP (ref 0.1–0.3)
LYMPHOCYTES # BLD AUTO: 0.99 K/UL — LOW (ref 1.2–3.4)
LYMPHOCYTES # BLD AUTO: 14.1 % — LOW (ref 20.5–51.1)
MAGNESIUM SERPL-MCNC: 2.2 MG/DL — SIGNIFICANT CHANGE UP (ref 1.8–2.4)
MCHC RBC-ENTMCNC: 26.9 PG — LOW (ref 27–31)
MCHC RBC-ENTMCNC: 30.9 G/DL — LOW (ref 32–37)
MCV RBC AUTO: 87 FL — SIGNIFICANT CHANGE UP (ref 80–94)
MONOCYTES # BLD AUTO: 0.76 K/UL — HIGH (ref 0.1–0.6)
MONOCYTES NFR BLD AUTO: 10.8 % — HIGH (ref 1.7–9.3)
NEUTROPHILS # BLD AUTO: 4.72 K/UL — SIGNIFICANT CHANGE UP (ref 1.4–6.5)
NEUTROPHILS NFR BLD AUTO: 67.4 % — SIGNIFICANT CHANGE UP (ref 42.2–75.2)
NRBC # BLD: 0 /100 WBCS — SIGNIFICANT CHANGE UP (ref 0–0)
PLATELET # BLD AUTO: 297 K/UL — SIGNIFICANT CHANGE UP (ref 130–400)
POTASSIUM SERPL-MCNC: 4.5 MMOL/L — SIGNIFICANT CHANGE UP (ref 3.5–5)
POTASSIUM SERPL-SCNC: 4.5 MMOL/L — SIGNIFICANT CHANGE UP (ref 3.5–5)
PROT SERPL-MCNC: 6.6 G/DL — SIGNIFICANT CHANGE UP (ref 6–8)
RBC # BLD: 3.68 M/UL — LOW (ref 4.7–6.1)
RBC # FLD: 19.4 % — HIGH (ref 11.5–14.5)
SODIUM SERPL-SCNC: 144 MMOL/L — SIGNIFICANT CHANGE UP (ref 135–146)
WBC # BLD: 7.01 K/UL — SIGNIFICANT CHANGE UP (ref 4.8–10.8)
WBC # FLD AUTO: 7.01 K/UL — SIGNIFICANT CHANGE UP (ref 4.8–10.8)

## 2021-05-15 PROCEDURE — 99232 SBSQ HOSP IP/OBS MODERATE 35: CPT

## 2021-05-15 RX ORDER — ENOXAPARIN SODIUM 100 MG/ML
40 INJECTION SUBCUTANEOUS AT BEDTIME
Refills: 0 | Status: DISCONTINUED | OUTPATIENT
Start: 2021-05-15 | End: 2021-05-19

## 2021-05-15 RX ADMIN — Medication 2 GRAM(S): at 18:21

## 2021-05-15 RX ADMIN — BUMETANIDE 1 MILLIGRAM(S): 0.25 INJECTION INTRAMUSCULAR; INTRAVENOUS at 05:49

## 2021-05-15 RX ADMIN — Medication 325 MILLIGRAM(S): at 13:29

## 2021-05-15 RX ADMIN — ENOXAPARIN SODIUM 40 MILLIGRAM(S): 100 INJECTION SUBCUTANEOUS at 21:13

## 2021-05-15 RX ADMIN — Medication 650 MILLIGRAM(S): at 22:52

## 2021-05-15 RX ADMIN — PANTOPRAZOLE SODIUM 40 MILLIGRAM(S): 20 TABLET, DELAYED RELEASE ORAL at 13:29

## 2021-05-15 RX ADMIN — ATORVASTATIN CALCIUM 40 MILLIGRAM(S): 80 TABLET, FILM COATED ORAL at 21:13

## 2021-05-15 RX ADMIN — Medication 300 MILLIGRAM(S): at 13:29

## 2021-05-15 RX ADMIN — CALAMINE AND ZINC OXIDE AND PHENOL 1 APPLICATION(S): 160; 10 LOTION TOPICAL at 06:10

## 2021-05-15 RX ADMIN — LEVETIRACETAM 420 MILLIGRAM(S): 250 TABLET, FILM COATED ORAL at 05:49

## 2021-05-15 RX ADMIN — Medication 2 GRAM(S): at 06:11

## 2021-05-15 RX ADMIN — CHLORHEXIDINE GLUCONATE 1 APPLICATION(S): 213 SOLUTION TOPICAL at 13:30

## 2021-05-15 RX ADMIN — LEVETIRACETAM 420 MILLIGRAM(S): 250 TABLET, FILM COATED ORAL at 18:19

## 2021-05-15 RX ADMIN — CALAMINE AND ZINC OXIDE AND PHENOL 1 APPLICATION(S): 160; 10 LOTION TOPICAL at 18:19

## 2021-05-15 RX ADMIN — LISINOPRIL 40 MILLIGRAM(S): 2.5 TABLET ORAL at 05:50

## 2021-05-15 NOTE — PROGRESS NOTE ADULT - SUBJECTIVE AND OBJECTIVE BOX
Hospital Course:   HD 2 with right SDH.      Vital Signs Last 24 Hrs  T(C): 35.7 (14 May 2021 22:12), Max: 36.2 (14 May 2021 20:00)  T(F): 96.2 (14 May 2021 22:12), Max: 97.2 (14 May 2021 20:00)  HR: 76 (15 May 2021 10:00) (62 - 83)  BP: 103/52 (15 May 2021 10:00) (90/45 - 143/64)  BP(mean): 80 (14 May 2021 20:00) (65 - 87)  RR: 18 (15 May 2021 05:17) (18 - 20)  SpO2: 98% (15 May 2021 05:17) (98% - 100%)    I&O's Detail    14 May 2021 07:01  -  15 May 2021 07:00  --------------------------------------------------------  IN:  Total IN: 0 mL    OUT:    Voided (mL): 750 mL  Total OUT: 750 mL    Total NET: -750 mL        I&O's Summary    14 May 2021 07:01  -  15 May 2021 07:00  --------------------------------------------------------  IN: 0 mL / OUT: 750 mL / NET: -750 mL        PHYSICAL EXAM:  Neurological: awake, alertx3, NAD, verbalizing well, follows commands, complaints of left shoulder and arm pain and left hip and leg pain worse since fall limiting movement otherwise intact      LABS:                        9.9    7.01  )-----------( 297      ( 15 May 2021 05:57 )             32.0     05-15    144  |  109  |  53<H>  ----------------------------<  110<H>  4.5   |  21  |  1.4    Ca    9.5      15 May 2021 05:57  Phos  3.9     -  Mg     2.2     05-15    TPro  6.6  /  Alb  3.8  /  TBili  <0.2  /  DBili  x   /  AST  24  /  ALT  17  /  AlkPhos  125<H>  05-15      Urinalysis Basic - ( 13 May 2021 15:51 )    Color: Colorless / Appearance: Clear / S.010 / pH: x  Gluc: x / Ketone: Negative  / Bili: Negative / Urobili: <2 mg/dL   Blood: x / Protein: Trace / Nitrite: Negative   Leuk Esterase: Negative / RBC: x / WBC x   Sq Epi: x / Non Sq Epi: x / Bacteria: x          CAPILLARY BLOOD GLUCOSE      POCT Blood Glucose.: 154 mg/dL (15 May 2021 11:53)  POCT Blood Glucose.: 102 mg/dL (15 May 2021 07:36)      Drug Levels: [] N/A    CSF Analysis: [] N/A      Allergies    No Known Allergies    Intolerances      MEDICATIONS:  Antibiotics:    Neuro:  acetaminophen   Tablet .. 650 milliGRAM(s) Oral every 6 hours PRN  levETIRAcetam  IVPB 500 milliGRAM(s) IV Intermittent every 12 hours    Anticoagulation:  enoxaparin Injectable 40 milliGRAM(s) SubCutaneous at bedtime    OTHER:  allopurinol 300 milliGRAM(s) Oral daily  atorvastatin 40 milliGRAM(s) Oral at bedtime  buMETAnide 1 milliGRAM(s) Oral daily  calamine/zinc oxide Lotion 1 Application(s) Topical two times a day  chlorhexidine 4% Liquid 1 Application(s) Topical daily  lisinopril 40 milliGRAM(s) Oral daily  omega-3-Acid Ethyl Esters 2 Gram(s) Oral two times a day  pantoprazole  Injectable 40 milliGRAM(s) IV Push daily    IVF:  ferrous    sulfate 325 milliGRAM(s) Oral daily    CULTURES:  Culture Results:   <10,000 CFU/mL Normal Urogenital Dasia ( @ 15:51)    RADIOLOGY & ADDITIONAL TESTS:  < from: CT Head No Cont (21 @ 10:39) >    IMPRESSION:    In comparison with the prior CT scan of the brain dated May 13, 2021:    Again demonstrated is a right-sided acute on chronic subdural hemorrhage with associated mass effect, unchanged.    No new areas of acute intracranial hemorrhage.    < end of copied text >        ASSESSMENT:  76y Male right stable SDH on repeat HCT, left shoulder, arm pain, left hip and leg pain    SUBDURAL HEMATOMA    ^SUBDURAL HEMATOMA    No pertinent family history in first degree relatives    Handoff    MEWS Score    Diverticulitis    Herniated disc, cervical    Chronic gout of foot, unspecified cause, unspecified laterality    Type 2 diabetes mellitus without complication, unspecified long term insulin use status    Hypertension, unspecified type    High cholesterol    Osteoarthritis of multiple joints, unspecified osteoarthritis type    Sciatica, unspecified laterality    Subdural hematoma    History of tonsillectomy and adenoidectomy    H/O colonoscopy    AMS    90+    [email or web address]    PLAN: continue Keppra, neuro checks, may be a candidate for MMA with DR Peraza, needs X-rays of left upper and lower extremities with possible ortho evaluation. PT

## 2021-05-15 NOTE — PROGRESS NOTE ADULT - ASSESSMENT
Impression:  77 yo male patient with PMH of diverticulosis, DM, HTN, HLD, CKD stage 3A, ostearthritis, bleeding gastric ulcer, anemia presenting with period of AMS.     # Traumatic complex right convexity subdural hematoma  - Fall 10 days ago   - On aspirin  - Evaluated by neurosurgery: recommended platelets transfusion for reversal of ASA  - keppra 500 mg q 12    - Neuro checks q 1hour   - CTH: right-sided acute on chronic subdural hemorrhage with associated mass effect, unchanged.No new areas of acute intracranial hemorrhage.    # HTN  - continue Lisinopril 40 mg daily   - Continue Bumex 1 mg daily.    # DM  - holding oral antidiabetic medications  - Fingerstick Q 4 hours    # CKD stage 3B  - Serum creatinine around baseline     # PUD  # Normocytic anemia  - continue pantoprazole 40 mg daily   - Iron/TIBC wnl    DVT prophylaxis: no pharmacologic given subdural hematoma, and no ICD given LE chronic lesions.      Impression:  77 yo male patient with PMH of diverticulosis, DM, HTN, HLD, CKD stage 3A, ostearthritis, bleeding gastric ulcer, anemia presenting with period of AMS.     # Traumatic complex right convexity subdural hematoma  - Fall 10 days ago   - On aspirin  - Evaluated by neurosurgery: recommended platelets transfusion for reversal of ASA  - keppra 500 mg q 12    - Neuro checks q4h  1hour   - CTH: right-sided acute on chronic subdural hemorrhage with associated mass effect, unchanged.No new areas of acute intracranial hemorrhage.    # HTN  - continue Lisinopril 40 mg daily   - Continue Bumex 1 mg daily.  - SBP< 140     # DM  - holding oral antidiabetic medications  - Fingerstick Q 4 hours    # CKD stage 3B  - Serum creatinine around baseline     # PUD  # Normocytic anemia  - continue pantoprazole 40 mg daily   - Iron/TIBC wnl    DVT prophylaxis: no pharmacologic given subdural hematoma, and no ICD given LE chronic lesions.

## 2021-05-15 NOTE — DISCHARGE NOTE NURSING/CASE MANAGEMENT/SOCIAL WORK - PATIENT PORTAL LINK FT
You can access the FollowMyHealth Patient Portal offered by [facility] by registering at the following website: [email or web address]. By joining [employer]’s FollowMyHealth portal, you will also be able to view your health information using other applications (apps) compatible with our system.

## 2021-05-15 NOTE — PROGRESS NOTE ADULT - ASSESSMENT
s/p fall with sub dural hematoma  CKD - patient at baseline, likely diabetic nephropathy  HTN - controlled on current regimen  diabetes - BS at target      Plan:  patient clinically looks euvolemic today so would not give IVF or aggressively diurese just continue his home medication including bumetamide  BS is at target  management of SDH per neurology/neurosurgery team recommendations.

## 2021-05-15 NOTE — PROGRESS NOTE ADULT - SUBJECTIVE AND OBJECTIVE BOX
Neurology Progress Note    Interval History:    Mr. Polanco is a 77 yo male patient with PMH of diverticulosis, DM, HTN, HLD, CKD stage 3A, ostearthritis, bleeding gastric ulcer, anemia presenting with period of AMS.     Per wife pt had a large breakfast and shortly after was found acting confused and unable to open his eyes but saying his eyes were open. Ambulance was called, FS was found to be 70. Per wife pt seldom runs a FS under 90. Pt was given PO food/drink and FS improved as did pt's mental status. Wife says pt is currently at baseline in the ED. Pt endorsing chills everytime he goes to urinate xfew days. No hematuria, dysuria, increased frequency in urination. No f/n/v. No abdominal pain/back pain. No cough/sore throat/headache/weakness. Pt endorses fall 10 days ago with no LOC. No A/c.    In ED, patient was normotensive, afebrile. CT head was done showing subacute subdural hematoma. Neurosurgery evaluated patient in ED, no immediate surgical indication. Started on seizure prophylaxis. WIll be admitted to ICU with neurocheck Q 1 hour. (14 May 2021 00:01)        Vital Signs Last 24 Hrs  T(C): 36.1 (15 May 2021 18:00), Max: 36.1 (15 May 2021 18:00)  T(F): 97 (15 May 2021 18:00), Max: 97 (15 May 2021 18:00)  HR: 81 (15 May 2021 21:06) (75 - 83)  BP: 105/45 (15 May 2021 21:06) (94/51 - 143/64)  BP(mean): 67 (15 May 2021 21:06) (67 - 86)  RR: 18 (15 May 2021 21:06) (18 - 20)  SpO2: 97% (15 May 2021 18:00) (97% - 98%)    Neurological Exam:   Mental status: Awake, alert and oriented x3.  Recent and remote memory intact.  Naming, repetition and comprehension intact.  Attention/concentration intact.  No dysarthria, no aphasia.  Fund of knowledge appropriate.    Cranial nerves: Pupils equally round and reactive to light, visual fields full, no nystagmus, extraocular muscles intact, V1 through V3 intact bilaterally and symmetric, face symmetric, hearing intact to finger rub, palate elevation symmetric, tongue was midline.  Motor: RUE 5/5 LUE 4/5(limited by injury/pain) bilateral LE 5/5 Normal tone and bulk.  No abnormal movements.    Sensation: Intact to light touch, proprioception, and pinprick.   Coordination: No dysmetria on finger-to-nose and heel-to-shin.  No dysdiadokinesia.  Reflexes: 2+ in bilateral UE/LE, downgoing toes bilaterally. (-) Ko.  Gait: deferred    Medications:  MEDICATIONS  (STANDING):  allopurinol 300 milliGRAM(s) Oral daily  atorvastatin 40 milliGRAM(s) Oral at bedtime  buMETAnide 1 milliGRAM(s) Oral daily  calamine/zinc oxide Lotion 1 Application(s) Topical two times a day  chlorhexidine 4% Liquid 1 Application(s) Topical daily  enoxaparin Injectable 40 milliGRAM(s) SubCutaneous at bedtime  ferrous    sulfate 325 milliGRAM(s) Oral daily  levETIRAcetam  IVPB 500 milliGRAM(s) IV Intermittent every 12 hours  lisinopril 40 milliGRAM(s) Oral daily  omega-3-Acid Ethyl Esters 2 Gram(s) Oral two times a day  pantoprazole  Injectable 40 milliGRAM(s) IV Push daily      Labs:  CBC Full  -  ( 15 May 2021 05:57 )  WBC Count : 7.01 K/uL  RBC Count : 3.68 M/uL  Hemoglobin : 9.9 g/dL  Hematocrit : 32.0 %  Platelet Count - Automated : 297 K/uL  Mean Cell Volume : 87.0 fL  Mean Cell Hemoglobin : 26.9 pg  Mean Cell Hemoglobin Concentration : 30.9 g/dL  Auto Neutrophil # : 4.72 K/uL  Auto Lymphocyte # : 0.99 K/uL  Auto Monocyte # : 0.76 K/uL  Auto Eosinophil # : 0.48 K/uL  Auto Basophil # : 0.04 K/uL  Auto Neutrophil % : 67.4 %  Auto Lymphocyte % : 14.1 %  Auto Monocyte % : 10.8 %  Auto Eosinophil % : 6.8 %  Auto Basophil % : 0.6 %    05-15    144  |  109  |  53<H>  ----------------------------<  110<H>  4.5   |  21  |  1.4    Ca    9.5      15 May 2021 05:57  Phos  3.9     05-14  Mg     2.2     05-15    TPro  6.6  /  Alb  3.8  /  TBili  <0.2  /  DBili  x   /  AST  24  /  ALT  17  /  AlkPhos  125<H>  05-15    LIVER FUNCTIONS - ( 15 May 2021 05:57 )  Alb: 3.8 g/dL / Pro: 6.6 g/dL / ALK PHOS: 125 U/L / ALT: 17 U/L / AST: 24 U/L / GGT: x           < from: CT Abdomen and Pelvis w/ IV Cont (01.22.21 @ 19:32) >    IMPRESSION:      No evidence of intra-thoracic, abdominal or pelvic visceral laceration or hematoma.  No acute fractures are identified.    Compared with the previous CT scan of 8/20/2020, there is a new 3.6 x 2.7 cm hypoechoic enhancing lesion in the lateral aspect of the right lobe of the liver (3/70). Hepatic MRI with and without contrast may be useful for further evaluation.      < end of copied text >

## 2021-05-15 NOTE — PROGRESS NOTE ADULT - SUBJECTIVE AND OBJECTIVE BOX
24H events:    Patient is a 75y old Male who presents with a chief complaint of Fall (2021 11:10)    Primary diagnosis of Acute on top of chronic anemia    Today is hospital day 4. This morning patient was seen and examined at bedside, resting comfortably in bed. No events overnight. No melena, no bleeding episodes.    PAST MEDICAL & SURGICAL HISTORY  Sciatica, unspecified laterality    Osteoarthritis of multiple joints, unspecified osteoarthritis type    High cholesterol    Hypertension, unspecified type    Type 2 diabetes mellitus without complication, unspecified long term insulin use status    Chronic gout of foot, unspecified cause, unspecified laterality    Herniated disc, cervical    Diverticulitis    H/O colonoscopy  2018    History of tonsillectomy and adenoidectomy      SOCIAL HISTORY:  Social History:  non smoker, occasional EtOH use. Lives with wife, has adult children, son lives in [place] currently undergoing Ca treatments which patient states has been stress in his life (2021 01:24)      ALLERGIES:  No Known Allergies    MEDICATIONS:  STANDING MEDICATIONS  allopurinol 300 milliGRAM(s) Oral daily  atorvastatin Oral Tab/Cap - Peds 40 milliGRAM(s) Oral daily  buMETAnide 1 milliGRAM(s) Oral daily  heparin   Injectable 5000 Unit(s) SubCutaneous every 12 hours  midodrine 10 milliGRAM(s) Oral every 8 hours  omega-3-Acid Ethyl Esters 2 Gram(s) Oral two times a day  pantoprazole    Tablet 40 milliGRAM(s) Oral before breakfast  tamsulosin 0.4 milliGRAM(s) Oral at bedtime    PRN MEDICATIONS  oxycodone    5 mG/acetaminophen 325 mG 1 Tablet(s) Oral every 4 hours PRN    VITALS:   T(C): 35.7 (14 May 2021 22:12), Max: 36.2 (14 May 2021 20:00)  T(F): 96.2 (14 May 2021 22:12), Max: 97.2 (14 May 2021 20:00)  HR: 83 (15 May 2021 05:17) (62 - 83)  BP: 114/49 (15 May 2021 05:17) (90/45 - 143/64)  BP(mean): 80 (14 May 2021 20:00) (65 - 87)  RR: 18 (15 May 2021 05:17) (18 - 20)  SpO2: 98% (15 May 2021 05:17) (98% - 100%)      PHYSICAL EXAM:  HEENT: Not in distress, bruises noted over the left eye. With mild skin bruises  PERRL, clear conjunctiva  Neck: supple  Respiratory: CTA b/l  Cardiovascular: +S1/S2; RRR  Abdomen: soft, NT/ND; +BS x4  Extremities: Left sided thigh tenderness, warm likely secondary to hematoma. Pulses positive  Skin: Multiple skin lesions pruritic   LABS:                                     9.9    7.01  )-----------( 297      ( 15 May 2021 05:57 )             32.0       0515    144  |  109  |  53<H>  ----------------------------<  110<H>  4.5   |  21  |  1.4    Ca    9.5      15 May 2021 05:57  Phos  3.9     05-14  Mg     2.2     05-15    TPro  6.6  /  Alb  3.8  /  TBili  <0.2  /  DBili  x   /  AST  24  /  ALT  17  /  AlkPhos  125<H>  05-15              Urinalysis Basic - ( 13 May 2021 15:51 )    Color: Colorless / Appearance: Clear / S.010 / pH: x  Gluc: x / Ketone: Negative  / Bili: Negative / Urobili: <2 mg/dL   Blood: x / Protein: Trace / Nitrite: Negative   Leuk Esterase: Negative / RBC: x / WBC x   Sq Epi: x / Non Sq Epi: x / Bacteria: x            Lactate Trend   @ 15:51 Lactate:1.1             CAPILLARY BLOOD GLUCOSE      POCT Blood Glucose.: 102 mg/dL (15 May 2021 07:36)        Culture Results:   <10,000 CFU/mL Normal Urogenital Dasia ( @ 15:51)          RADIOLOGY:    CT abdomen - pelvis  Compared with the previous CT scan of 2020, there is a new 3.6 x 2.7 cm hypoechoic enhancing lesion in the lateral aspect of the right lobe of the liver (3/70). Hepatic MRI with and without contrast may be useful for further evaluation.         24H events:    Patient is a 75y old Male who presents with a chief complaint of Fall (2021 11:10)    Primary diagnosis of Acute on top of chronic anemia    Today is hospital day 4. This morning patient was seen and examined at bedside, resting comfortably in bed. No events overnight. No melena, no bleeding episodes.    PAST MEDICAL & SURGICAL HISTORY  Sciatica, unspecified laterality    Osteoarthritis of multiple joints, unspecified osteoarthritis type    High cholesterol    Hypertension, unspecified type    Type 2 diabetes mellitus without complication, unspecified long term insulin use status    Chronic gout of foot, unspecified cause, unspecified laterality    Herniated disc, cervical    Diverticulitis    H/O colonoscopy  2018    History of tonsillectomy and adenoidectomy      SOCIAL HISTORY:  Social History:  non smoker, occasional EtOH use. Lives with wife, has adult children, son lives in [place] currently undergoing Ca treatments which patient states has been stress in his life (2021 01:24)      ALLERGIES:  No Known Allergies    MEDICATIONS:  STANDING MEDICATIONS  allopurinol 300 milliGRAM(s) Oral daily  atorvastatin Oral Tab/Cap - Peds 40 milliGRAM(s) Oral daily  buMETAnide 1 milliGRAM(s) Oral daily  heparin   Injectable 5000 Unit(s) SubCutaneous every 12 hours  midodrine 10 milliGRAM(s) Oral every 8 hours  omega-3-Acid Ethyl Esters 2 Gram(s) Oral two times a day  pantoprazole    Tablet 40 milliGRAM(s) Oral before breakfast  tamsulosin 0.4 milliGRAM(s) Oral at bedtime    PRN MEDICATIONS  oxycodone    5 mG/acetaminophen 325 mG 1 Tablet(s) Oral every 4 hours PRN    VITALS:   T(C): 35.7 (14 May 2021 22:12), Max: 36.2 (14 May 2021 20:00)  T(F): 96.2 (14 May 2021 22:12), Max: 97.2 (14 May 2021 20:00)  HR: 83 (15 May 2021 05:17) (62 - 83)  BP: 114/49 (15 May 2021 05:17) (90/45 - 143/64)  BP(mean): 80 (14 May 2021 20:00) (65 - 87)  RR: 18 (15 May 2021 05:17) (18 - 20)  SpO2: 98% (15 May 2021 05:17) (98% - 100%)      PHYSICAL EXAM:  HEENT: Not in distress, bruises noted over the left eye. With mild skin bruises  PERRL, clear conjunctiva  Neck: supple  Respiratory: CTA b/l  Cardiovascular: +S1/S2; RRR  Abdomen: soft, NT/ND; +BS x4  Extremities: Left sided thigh tenderness, warm likely secondary to hematoma. Pulses positive  Neuro: CN 2-12 intact, AAOx3, Strength 5/5 except LLE 4/5 limited to pain/injury, sensation intact, no dysarthria or dysmetria  Skin: Multiple skin lesions pruritic   LABS:                                     9.9    7.01  )-----------( 297      ( 15 May 2021 05:57 )             32.0       05-15    144  |  109  |  53<H>  ----------------------------<  110<H>  4.5   |  21  |  1.4    Ca    9.5      15 May 2021 05:57  Phos  3.9     05-14  Mg     2.2     05-15    TPro  6.6  /  Alb  3.8  /  TBili  <0.2  /  DBili  x   /  AST  24  /  ALT  17  /  AlkPhos  125<H>  05-15              Urinalysis Basic - ( 13 May 2021 15:51 )    Color: Colorless / Appearance: Clear / S.010 / pH: x  Gluc: x / Ketone: Negative  / Bili: Negative / Urobili: <2 mg/dL   Blood: x / Protein: Trace / Nitrite: Negative   Leuk Esterase: Negative / RBC: x / WBC x   Sq Epi: x / Non Sq Epi: x / Bacteria: x            Lactate Trend   @ 15:51 Lactate:1.1             CAPILLARY BLOOD GLUCOSE      POCT Blood Glucose.: 102 mg/dL (15 May 2021 07:36)        Culture Results:   <10,000 CFU/mL Normal Urogenital Dasia ( @ 15:51)          RADIOLOGY:    CT abdomen - pelvis  Compared with the previous CT scan of 2020, there is a new 3.6 x 2.7 cm hypoechoic enhancing lesion in the lateral aspect of the right lobe of the liver (3/70). Hepatic MRI with and without contrast may be useful for further evaluation.

## 2021-05-15 NOTE — PROGRESS NOTE ADULT - SUBJECTIVE AND OBJECTIVE BOX
patient seen and examined earlier today.  patient currently in stroke unit.  denies HA . has no c/o blurry vision , no ches tpain no SOB  Vital Signs Last 24 Hrs  T(C): 35.7 (14 May 2021 22:12), Max: 36.2 (14 May 2021 20:00)  T(F): 96.2 (14 May 2021 22:12), Max: 97.2 (14 May 2021 20:00)  HR: 76 (15 May 2021 10:00) (69 - 83)  BP: 103/52 (15 May 2021 10:00) (103/52 - 143/64)  BP(mean): 80 (14 May 2021 20:00) (80 - 80)  RR: 18 (15 May 2021 05:17) (18 - 20)  SpO2: 98% (15 May 2021 05:17) (98% - 100%)    conj pale, no jaundice  neck supple no JVD, no bruits  lungs clear to auscultation  heart has regular rhythm.      no murmur heard no gallop  abd. Bs pos. soft nontender  extremities no edema today.  skin turgor good              9.9    7.01  )-----------( 297      ( 15 May 2021 05:57 )             32.0   05-15    144  |  109  |  53<H>  ----------------------------<  110<H>  4.5   |  21  |  1.4    Ca    9.5      15 May 2021 05:57  Phos  3.9     05-14  Mg     2.2     05-15    TPro  6.6  /  Alb  3.8  /  TBili  <0.2  /  DBili  x   /  AST  24  /  ALT  17  /  AlkPhos  125<H>  05-15  CAPILLARY BLOOD GLUCOSE      POCT Blood Glucose.: 207 mg/dL (15 May 2021 16:30)  POCT Blood Glucose.: 154 mg/dL (15 May 2021 11:53)  POCT Blood Glucose.: 102 mg/dL (15 May 2021 07:36)    MEDICATIONS  (STANDING):  allopurinol 300 milliGRAM(s) Oral daily  atorvastatin 40 milliGRAM(s) Oral at bedtime  buMETAnide 1 milliGRAM(s) Oral daily  calamine/zinc oxide Lotion 1 Application(s) Topical two times a day  chlorhexidine 4% Liquid 1 Application(s) Topical daily  enoxaparin Injectable 40 milliGRAM(s) SubCutaneous at bedtime  ferrous    sulfate 325 milliGRAM(s) Oral daily  levETIRAcetam  IVPB 500 milliGRAM(s) IV Intermittent every 12 hours  lisinopril 40 milliGRAM(s) Oral daily  omega-3-Acid Ethyl Esters 2 Gram(s) Oral two times a day  pantoprazole  Injectable 40 milliGRAM(s) IV Push daily

## 2021-05-15 NOTE — PROGRESS NOTE ADULT - ASSESSMENT
77 yo male patient with PMH of diverticulosis, DM, HTN, HLD, CKD stage 3A, ostearthritis, bleeding gastric ulcer, anemia presenting with period of AMS.     # Traumatic complex right convexity subdural hematoma  - Fall 10 days ago   - On aspirin  - Evaluated by neurosurgery: recommending platelets transfusion for reversal of ASA  - Started on keppra 500 mg q 12    - Neuro checks q 1hour   - CTH: right-sided acute on chronic subdural hemorrhage with associated mass effect, unchanged.No new areas of acute intracranial hemorrhage.    # HTN  - continue Lisinopril 40 mg daily   - Continue Bumex 1 mg daily.    # DM  - holding oral antidiabetic medications  - Fingerstick Q 4 hours    # CKD stage 3B  - Serum creatinine around baseline     # PUD  # Normocytic anemia  - continue pantoprazole 40 mg daily   - Iron/TIBC, ferritin ordered    DVT prophylaxis: no pharmacologic given dubdural hematoma, and no ICD given LE chronic lesions.  77 yo male patient with PMH of diverticulosis, DM, HTN, HLD, CKD stage 3A, ostearthritis, bleeding gastric ulcer, anemia presenting with period of AMS.     # Traumatic complex right convexity subdural hematoma  - Fall 10 days ago   - On aspirin  - Evaluated by neurosurgery: recommending platelets transfusion for reversal of ASA  - keppra 500 mg q 12    - Neuro checks q 1hour   - CTH: right-sided acute on chronic subdural hemorrhage with associated mass effect, unchanged.No new areas of acute intracranial hemorrhage.    # HTN  - continue Lisinopril 40 mg daily   - Continue Bumex 1 mg daily.    # DM  - holding oral antidiabetic medications  - Fingerstick Q 4 hours    # CKD stage 3B  - Serum creatinine around baseline     # PUD  # Normocytic anemia  - continue pantoprazole 40 mg daily   - Iron/TIBC wnl    DVT prophylaxis: no pharmacologic given subdural hematoma, and no ICD given LE chronic lesions.

## 2021-05-16 LAB
ALBUMIN SERPL ELPH-MCNC: 3.9 G/DL — SIGNIFICANT CHANGE UP (ref 3.5–5.2)
ALP SERPL-CCNC: 132 U/L — HIGH (ref 30–115)
ALT FLD-CCNC: 15 U/L — SIGNIFICANT CHANGE UP (ref 0–41)
ANION GAP SERPL CALC-SCNC: 14 MMOL/L — SIGNIFICANT CHANGE UP (ref 7–14)
AST SERPL-CCNC: 21 U/L — SIGNIFICANT CHANGE UP (ref 0–41)
BASOPHILS # BLD AUTO: 0.04 K/UL — SIGNIFICANT CHANGE UP (ref 0–0.2)
BASOPHILS NFR BLD AUTO: 0.6 % — SIGNIFICANT CHANGE UP (ref 0–1)
BILIRUB SERPL-MCNC: <0.2 MG/DL — SIGNIFICANT CHANGE UP (ref 0.2–1.2)
BUN SERPL-MCNC: 49 MG/DL — HIGH (ref 10–20)
CALCIUM SERPL-MCNC: 9.6 MG/DL — SIGNIFICANT CHANGE UP (ref 8.5–10.1)
CHLORIDE SERPL-SCNC: 107 MMOL/L — SIGNIFICANT CHANGE UP (ref 98–110)
CO2 SERPL-SCNC: 22 MMOL/L — SIGNIFICANT CHANGE UP (ref 17–32)
CREAT SERPL-MCNC: 1.2 MG/DL — SIGNIFICANT CHANGE UP (ref 0.7–1.5)
EOSINOPHIL # BLD AUTO: 0.5 K/UL — SIGNIFICANT CHANGE UP (ref 0–0.7)
EOSINOPHIL NFR BLD AUTO: 7 % — SIGNIFICANT CHANGE UP (ref 0–8)
GLUCOSE BLDC GLUCOMTR-MCNC: 121 MG/DL — HIGH (ref 70–99)
GLUCOSE BLDC GLUCOMTR-MCNC: 123 MG/DL — HIGH (ref 70–99)
GLUCOSE BLDC GLUCOMTR-MCNC: 133 MG/DL — HIGH (ref 70–99)
GLUCOSE SERPL-MCNC: 105 MG/DL — HIGH (ref 70–99)
HCT VFR BLD CALC: 34.5 % — LOW (ref 42–52)
HGB BLD-MCNC: 10.8 G/DL — LOW (ref 14–18)
IMM GRANULOCYTES NFR BLD AUTO: 0.3 % — SIGNIFICANT CHANGE UP (ref 0.1–0.3)
LYMPHOCYTES # BLD AUTO: 1.2 K/UL — SIGNIFICANT CHANGE UP (ref 1.2–3.4)
LYMPHOCYTES # BLD AUTO: 16.7 % — LOW (ref 20.5–51.1)
MAGNESIUM SERPL-MCNC: 2.1 MG/DL — SIGNIFICANT CHANGE UP (ref 1.8–2.4)
MCHC RBC-ENTMCNC: 27.1 PG — SIGNIFICANT CHANGE UP (ref 27–31)
MCHC RBC-ENTMCNC: 31.3 G/DL — LOW (ref 32–37)
MCV RBC AUTO: 86.5 FL — SIGNIFICANT CHANGE UP (ref 80–94)
MONOCYTES # BLD AUTO: 0.81 K/UL — HIGH (ref 0.1–0.6)
MONOCYTES NFR BLD AUTO: 11.3 % — HIGH (ref 1.7–9.3)
NEUTROPHILS # BLD AUTO: 4.6 K/UL — SIGNIFICANT CHANGE UP (ref 1.4–6.5)
NEUTROPHILS NFR BLD AUTO: 64.1 % — SIGNIFICANT CHANGE UP (ref 42.2–75.2)
NRBC # BLD: 0 /100 WBCS — SIGNIFICANT CHANGE UP (ref 0–0)
PLATELET # BLD AUTO: 302 K/UL — SIGNIFICANT CHANGE UP (ref 130–400)
POTASSIUM SERPL-MCNC: 4.3 MMOL/L — SIGNIFICANT CHANGE UP (ref 3.5–5)
POTASSIUM SERPL-SCNC: 4.3 MMOL/L — SIGNIFICANT CHANGE UP (ref 3.5–5)
PROT SERPL-MCNC: 6.9 G/DL — SIGNIFICANT CHANGE UP (ref 6–8)
RBC # BLD: 3.99 M/UL — LOW (ref 4.7–6.1)
RBC # FLD: 19.3 % — HIGH (ref 11.5–14.5)
SODIUM SERPL-SCNC: 143 MMOL/L — SIGNIFICANT CHANGE UP (ref 135–146)
WBC # BLD: 7.17 K/UL — SIGNIFICANT CHANGE UP (ref 4.8–10.8)
WBC # FLD AUTO: 7.17 K/UL — SIGNIFICANT CHANGE UP (ref 4.8–10.8)

## 2021-05-16 PROCEDURE — 93306 TTE W/DOPPLER COMPLETE: CPT | Mod: 26

## 2021-05-16 RX ORDER — SODIUM CHLORIDE 9 MG/ML
1000 INJECTION INTRAMUSCULAR; INTRAVENOUS; SUBCUTANEOUS
Refills: 0 | Status: DISCONTINUED | OUTPATIENT
Start: 2021-05-16 | End: 2021-05-18

## 2021-05-16 RX ORDER — SODIUM CHLORIDE 9 MG/ML
500 INJECTION INTRAMUSCULAR; INTRAVENOUS; SUBCUTANEOUS ONCE
Refills: 0 | Status: COMPLETED | OUTPATIENT
Start: 2021-05-16 | End: 2021-05-16

## 2021-05-16 RX ORDER — SODIUM CHLORIDE 9 MG/ML
1000 INJECTION INTRAMUSCULAR; INTRAVENOUS; SUBCUTANEOUS ONCE
Refills: 0 | Status: DISCONTINUED | OUTPATIENT
Start: 2021-05-16 | End: 2021-05-18

## 2021-05-16 RX ORDER — LISINOPRIL 2.5 MG/1
20 TABLET ORAL DAILY
Refills: 0 | Status: DISCONTINUED | OUTPATIENT
Start: 2021-05-17 | End: 2021-05-17

## 2021-05-16 RX ADMIN — LEVETIRACETAM 420 MILLIGRAM(S): 250 TABLET, FILM COATED ORAL at 05:39

## 2021-05-16 RX ADMIN — SODIUM CHLORIDE 1000 MILLILITER(S): 9 INJECTION INTRAMUSCULAR; INTRAVENOUS; SUBCUTANEOUS at 18:03

## 2021-05-16 RX ADMIN — Medication 300 MILLIGRAM(S): at 11:55

## 2021-05-16 RX ADMIN — LISINOPRIL 40 MILLIGRAM(S): 2.5 TABLET ORAL at 05:39

## 2021-05-16 RX ADMIN — CALAMINE AND ZINC OXIDE AND PHENOL 1 APPLICATION(S): 160; 10 LOTION TOPICAL at 05:38

## 2021-05-16 RX ADMIN — CHLORHEXIDINE GLUCONATE 1 APPLICATION(S): 213 SOLUTION TOPICAL at 12:03

## 2021-05-16 RX ADMIN — BUMETANIDE 1 MILLIGRAM(S): 0.25 INJECTION INTRAMUSCULAR; INTRAVENOUS at 05:39

## 2021-05-16 RX ADMIN — Medication 325 MILLIGRAM(S): at 11:55

## 2021-05-16 RX ADMIN — Medication 2 GRAM(S): at 18:03

## 2021-05-16 RX ADMIN — ENOXAPARIN SODIUM 40 MILLIGRAM(S): 100 INJECTION SUBCUTANEOUS at 21:31

## 2021-05-16 RX ADMIN — CALAMINE AND ZINC OXIDE AND PHENOL 1 APPLICATION(S): 160; 10 LOTION TOPICAL at 18:03

## 2021-05-16 RX ADMIN — Medication 2 GRAM(S): at 05:40

## 2021-05-16 RX ADMIN — Medication 650 MILLIGRAM(S): at 06:08

## 2021-05-16 RX ADMIN — SODIUM CHLORIDE 75 MILLILITER(S): 9 INJECTION INTRAMUSCULAR; INTRAVENOUS; SUBCUTANEOUS at 18:03

## 2021-05-16 RX ADMIN — PANTOPRAZOLE SODIUM 40 MILLIGRAM(S): 20 TABLET, DELAYED RELEASE ORAL at 11:56

## 2021-05-16 RX ADMIN — ATORVASTATIN CALCIUM 40 MILLIGRAM(S): 80 TABLET, FILM COATED ORAL at 21:32

## 2021-05-16 RX ADMIN — Medication 650 MILLIGRAM(S): at 12:19

## 2021-05-16 NOTE — CHART NOTE - NSCHARTNOTEFT_GEN_A_CORE
RN called as the patient was hypotensive  Saw and examined the patient at bedside  AAO*3  No chest pains, no shortness of breath  Vitals are stable ( No fevers, no tachy), SBP: 77mmHg  2 IV boluses given ( total of 1.5L)  The patient has been started on fluids  Neurology recommendations noted for blood pressure ( to Keep SBP <120) due to Hematoma  Will decrease Lisinopril to 20mg po once daily as off tomorrow, increase to 40mg po once daily again if needed

## 2021-05-16 NOTE — PHYSICAL THERAPY INITIAL EVALUATION ADULT - ACTIVE RANGE OF MOTION EXAMINATION, REHAB EVAL
B hips 3/4 AROM, B knees/ ankles WFL, L shoulder  90 degrees flexion with 10/10 pain , Bessie SALAS aware pt c/ o.

## 2021-05-16 NOTE — PHYSICAL THERAPY INITIAL EVALUATION ADULT - LIVES WITH, PROFILE
in pvt home, has 2 steps to enter, 6 steps to bedroom., 2 rails. Pt reports was receiving home PT 2x/week,/spouse

## 2021-05-17 LAB
ALBUMIN SERPL ELPH-MCNC: 3.5 G/DL — SIGNIFICANT CHANGE UP (ref 3.5–5.2)
ALP SERPL-CCNC: 127 U/L — HIGH (ref 30–115)
ALT FLD-CCNC: 14 U/L — SIGNIFICANT CHANGE UP (ref 0–41)
ANION GAP SERPL CALC-SCNC: 17 MMOL/L — HIGH (ref 7–14)
AST SERPL-CCNC: 15 U/L — SIGNIFICANT CHANGE UP (ref 0–41)
BASOPHILS # BLD AUTO: 0.04 K/UL — SIGNIFICANT CHANGE UP (ref 0–0.2)
BASOPHILS NFR BLD AUTO: 0.6 % — SIGNIFICANT CHANGE UP (ref 0–1)
BILIRUB SERPL-MCNC: 0.2 MG/DL — SIGNIFICANT CHANGE UP (ref 0.2–1.2)
BUN SERPL-MCNC: 51 MG/DL — HIGH (ref 10–20)
CALCIUM SERPL-MCNC: 8.8 MG/DL — SIGNIFICANT CHANGE UP (ref 8.5–10.1)
CHLORIDE SERPL-SCNC: 109 MMOL/L — SIGNIFICANT CHANGE UP (ref 98–110)
CO2 SERPL-SCNC: 17 MMOL/L — SIGNIFICANT CHANGE UP (ref 17–32)
CREAT SERPL-MCNC: 1.2 MG/DL — SIGNIFICANT CHANGE UP (ref 0.7–1.5)
EOSINOPHIL # BLD AUTO: 0.59 K/UL — SIGNIFICANT CHANGE UP (ref 0–0.7)
EOSINOPHIL NFR BLD AUTO: 8.6 % — HIGH (ref 0–8)
GLUCOSE BLDC GLUCOMTR-MCNC: 100 MG/DL — HIGH (ref 70–99)
GLUCOSE BLDC GLUCOMTR-MCNC: 112 MG/DL — HIGH (ref 70–99)
GLUCOSE BLDC GLUCOMTR-MCNC: 125 MG/DL — HIGH (ref 70–99)
GLUCOSE BLDC GLUCOMTR-MCNC: 167 MG/DL — HIGH (ref 70–99)
GLUCOSE SERPL-MCNC: 99 MG/DL — SIGNIFICANT CHANGE UP (ref 70–99)
HCT VFR BLD CALC: 34.2 % — LOW (ref 42–52)
HGB BLD-MCNC: 10.4 G/DL — LOW (ref 14–18)
IMM GRANULOCYTES NFR BLD AUTO: 0.3 % — SIGNIFICANT CHANGE UP (ref 0.1–0.3)
LYMPHOCYTES # BLD AUTO: 0.9 K/UL — LOW (ref 1.2–3.4)
LYMPHOCYTES # BLD AUTO: 13.1 % — LOW (ref 20.5–51.1)
MAGNESIUM SERPL-MCNC: 2.1 MG/DL — SIGNIFICANT CHANGE UP (ref 1.8–2.4)
MCHC RBC-ENTMCNC: 26.7 PG — LOW (ref 27–31)
MCHC RBC-ENTMCNC: 30.4 G/DL — LOW (ref 32–37)
MCV RBC AUTO: 87.9 FL — SIGNIFICANT CHANGE UP (ref 80–94)
MONOCYTES # BLD AUTO: 0.84 K/UL — HIGH (ref 0.1–0.6)
MONOCYTES NFR BLD AUTO: 12.2 % — HIGH (ref 1.7–9.3)
NEUTROPHILS # BLD AUTO: 4.48 K/UL — SIGNIFICANT CHANGE UP (ref 1.4–6.5)
NEUTROPHILS NFR BLD AUTO: 65.2 % — SIGNIFICANT CHANGE UP (ref 42.2–75.2)
NRBC # BLD: 0 /100 WBCS — SIGNIFICANT CHANGE UP (ref 0–0)
PLATELET # BLD AUTO: 276 K/UL — SIGNIFICANT CHANGE UP (ref 130–400)
POTASSIUM SERPL-MCNC: 4.3 MMOL/L — SIGNIFICANT CHANGE UP (ref 3.5–5)
POTASSIUM SERPL-SCNC: 4.3 MMOL/L — SIGNIFICANT CHANGE UP (ref 3.5–5)
PROT SERPL-MCNC: 6.1 G/DL — SIGNIFICANT CHANGE UP (ref 6–8)
RBC # BLD: 3.89 M/UL — LOW (ref 4.7–6.1)
RBC # FLD: 19.3 % — HIGH (ref 11.5–14.5)
SODIUM SERPL-SCNC: 143 MMOL/L — SIGNIFICANT CHANGE UP (ref 135–146)
WBC # BLD: 6.87 K/UL — SIGNIFICANT CHANGE UP (ref 4.8–10.8)
WBC # FLD AUTO: 6.87 K/UL — SIGNIFICANT CHANGE UP (ref 4.8–10.8)

## 2021-05-17 PROCEDURE — 99232 SBSQ HOSP IP/OBS MODERATE 35: CPT

## 2021-05-17 RX ORDER — SODIUM CHLORIDE 9 MG/ML
500 INJECTION INTRAMUSCULAR; INTRAVENOUS; SUBCUTANEOUS ONCE
Refills: 0 | Status: COMPLETED | OUTPATIENT
Start: 2021-05-17 | End: 2021-05-17

## 2021-05-17 RX ADMIN — CALAMINE AND ZINC OXIDE AND PHENOL 1 APPLICATION(S): 160; 10 LOTION TOPICAL at 17:19

## 2021-05-17 RX ADMIN — ENOXAPARIN SODIUM 40 MILLIGRAM(S): 100 INJECTION SUBCUTANEOUS at 21:07

## 2021-05-17 RX ADMIN — BUMETANIDE 1 MILLIGRAM(S): 0.25 INJECTION INTRAMUSCULAR; INTRAVENOUS at 05:21

## 2021-05-17 RX ADMIN — CHLORHEXIDINE GLUCONATE 1 APPLICATION(S): 213 SOLUTION TOPICAL at 12:06

## 2021-05-17 RX ADMIN — Medication 650 MILLIGRAM(S): at 19:22

## 2021-05-17 RX ADMIN — LEVETIRACETAM 420 MILLIGRAM(S): 250 TABLET, FILM COATED ORAL at 17:15

## 2021-05-17 RX ADMIN — SODIUM CHLORIDE 1000 MILLILITER(S): 9 INJECTION INTRAMUSCULAR; INTRAVENOUS; SUBCUTANEOUS at 16:31

## 2021-05-17 RX ADMIN — CALAMINE AND ZINC OXIDE AND PHENOL 1 APPLICATION(S): 160; 10 LOTION TOPICAL at 05:22

## 2021-05-17 RX ADMIN — ATORVASTATIN CALCIUM 40 MILLIGRAM(S): 80 TABLET, FILM COATED ORAL at 21:06

## 2021-05-17 RX ADMIN — Medication 300 MILLIGRAM(S): at 12:05

## 2021-05-17 RX ADMIN — LEVETIRACETAM 420 MILLIGRAM(S): 250 TABLET, FILM COATED ORAL at 05:21

## 2021-05-17 RX ADMIN — Medication 325 MILLIGRAM(S): at 12:05

## 2021-05-17 RX ADMIN — Medication 2 GRAM(S): at 05:22

## 2021-05-17 RX ADMIN — PANTOPRAZOLE SODIUM 40 MILLIGRAM(S): 20 TABLET, DELAYED RELEASE ORAL at 12:05

## 2021-05-17 RX ADMIN — Medication 650 MILLIGRAM(S): at 21:05

## 2021-05-17 RX ADMIN — Medication 2 GRAM(S): at 17:15

## 2021-05-17 NOTE — PROGRESS NOTE ADULT - ASSESSMENT
s/p fall with sub dural hematoma  CKD - patient at baseline, likely diabetic nephropathy  HTN - BP low past 24 hours  DM II    Plan:    stop lisinopril  f/u neurology/neurosurgery team recommendations  daily BMP  D/W resident

## 2021-05-17 NOTE — OCCUPATIONAL THERAPY INITIAL EVALUATION ADULT - PLANNED THERAPY INTERVENTIONS, OT EVAL
ADL retraining/balance training/bed mobility training/cognitive, visual perceptual/neuromuscular re-education/ROM/strengthening/stretching/transfer training

## 2021-05-17 NOTE — PROGRESS NOTE ADULT - SUBJECTIVE AND OBJECTIVE BOX
[place] [facility] FOLLOW UP NOTE  --------------------------------------------------------------------------------  24 hour events/subjective: Patient examined. Appears comfortable.    PAST HISTORY  --------------------------------------------------------------------------------  No significant changes to PMH, PSH, FHx, SHx, unless otherwise noted    ALLERGIES & MEDICATIONS  --------------------------------------------------------------------------------  Allergies    No Known Allergies    Standing Inpatient Medications  allopurinol 300 milliGRAM(s) Oral daily  atorvastatin 40 milliGRAM(s) Oral at bedtime  buMETAnide 1 milliGRAM(s) Oral daily  calamine/zinc oxide Lotion 1 Application(s) Topical two times a day  chlorhexidine 4% Liquid 1 Application(s) Topical daily  enoxaparin Injectable 40 milliGRAM(s) SubCutaneous at bedtime  ferrous    sulfate 325 milliGRAM(s) Oral daily  levETIRAcetam  IVPB 500 milliGRAM(s) IV Intermittent every 12 hours  omega-3-Acid Ethyl Esters 2 Gram(s) Oral two times a day  pantoprazole  Injectable 40 milliGRAM(s) IV Push daily  sodium chloride 0.9% Bolus 1000 milliLiter(s) IV Bolus once  sodium chloride 0.9%. 1000 milliLiter(s) IV Continuous <Continuous>    PRN Inpatient Medications  acetaminophen   Tablet .. 650 milliGRAM(s) Oral every 6 hours PRN    VITALS/PHYSICAL EXAM  --------------------------------------------------------------------------------  T(C): 36 (05-17-21 @ 04:09), Max: 36 (05-17-21 @ 04:09)  HR: 95 (05-17-21 @ 11:23) (62 - 96)  BP: 91/51 (05-17-21 @ 11:23) (86/46 - 116/58)  RR: 18 (05-17-21 @ 08:26) (18 - 18)  SpO2: 99% (05-17-21 @ 08:26) (98% - 99%)    05-16-21 @ 07:01  -  05-17-21 @ 07:00  --------------------------------------------------------  IN: 0 mL / OUT: 1780 mL / NET: -1780 mL    05-17-21 @ 07:01  -  05-17-21 @ 13:02  --------------------------------------------------------  IN: 540 mL / OUT: 650 mL / NET: -110 mL    Physical Exam:  	Gen: NAD  	Pulm: CTA B/L  	CV: RRR, S1S2  	Abd: +BS, soft, nontender/nondistended  	: No suprapubic tenderness  	LE: Warm, no edema    LABS/STUDIES  --------------------------------------------------------------------------------              10.4   6.87  >-----------<  276      [05-17-21 @ 04:57]              34.2     143  |  109  |  51  ----------------------------<  99      [05-17-21 @ 04:57]  4.3   |  17  |  1.2        Ca     8.8     [05-17-21 @ 04:57]      Mg     2.1     [05-17-21 @ 04:57]    TPro  6.1  /  Alb  3.5  /  TBili  0.2  /  DBili  x   /  AST  15  /  ALT  14  /  AlkPhos  127  [05-17-21 @ 04:57]    Creatinine Trend:  SCr 1.2 [05-17 @ 04:57]  SCr 1.2 [05-16 @ 08:09]  SCr 1.4 [05-15 @ 05:57]  SCr 1.2 [05-14 @ 04:43]  SCr 1.2 [05-13 @ 15:51]    Urinalysis - [05-13-21 @ 15:51]      Color Colorless / Appearance Clear / SG 1.010 / pH 6.0      Gluc Negative / Ketone Negative  / Bili Negative / Urobili <2 mg/dL       Blood Negative / Protein Trace / Leuk Est Negative / Nitrite Negative      RBC  / WBC  / Hyaline  / Gran  / Sq Epi  / Non Sq Epi  / Bacteria       Iron 55, TIBC 324, %sat 17      [05-14-21 @ 04:43]  Ferritin 79      [05-14-21 @ 04:43]  TSH 4.82      [05-14-21 @ 04:43]  Lipid: chol 116, TG 73, HDL 56, LDL --      [05-14-21 @ 04:43]

## 2021-05-17 NOTE — OCCUPATIONAL THERAPY INITIAL EVALUATION ADULT - VISUAL ASSESSMENT: TRACKING
pt able to track to all visual fields, however demonstrates decreased speed of tracking and decreased visual attention at end ranges

## 2021-05-17 NOTE — OCCUPATIONAL THERAPY INITIAL EVALUATION ADULT - ADL RETRAINING, OT EVAL
Patient will perform lower body dressing with moderate assistance with use of appropriate adaptive equipment as needed by discharge. ; Patient will perform upper body dressing with moderate assistance by discharge.

## 2021-05-17 NOTE — PROGRESS NOTE ADULT - SUBJECTIVE AND OBJECTIVE BOX
MANDA POLANCO 76y Male  MRN#: [id number]   Hospital Day: 4d    HPI:  Mr. Polanco is a 75 yo male patient with PMH of diverticulosis, DM, HTN, HLD, CKD stage 3A, ostearthritis, bleeding gastric ulcer, anemia presenting with period of AMS.     Per wife pt had a large breakfast and shortly after was found acting confused and unable to open his eyes but saying his eyes were open. Ambulance was called, FS was found to be 70. Per wife pt seldom runs a FS under 90. Pt was given PO food/drink and FS improved as did pt's mental status. Wife says pt is currently at baseline in the ED. Pt endorsing chills everytime he goes to urinate xfew days. No hematuria, dysuria, increased frequency in urination. No f/n/v. No abdominal pain/back pain. No cough/sore throat/headache/weakness. Pt endorses fall 10 days ago with no LOC. No A/c.    In ED, patient was normotensive, afebrile. CT head was done showing subacute subdural hematoma. Neurosurgery evaluated patient in ED, no immediate surgical indication. Started on seizure prophylaxis. WIll be admitted to ICU with neurocheck Q 1 hour. (14 May 2021 00:01)      SUBJECTIVE  Patient is a 76y old Male who presents with a chief complaint of Change in mental status (15 May 2021 21:33)  Currently admitted to medicine with the primary diagnosis of Subdural hematoma    INTERVAL HPI AND OVERNIGHT EVENTS:  Patient was examined and seen at bedside. This morning he is resting comfortably in bed      OBJECTIVE  PAST MEDICAL & SURGICAL HISTORY  Diverticulitis    Herniated disc, cervical    Chronic gout of foot, unspecified cause, unspecified laterality    Type 2 diabetes mellitus without complication, unspecified long term insulin use status    Hypertension, unspecified type    High cholesterol    Osteoarthritis of multiple joints, unspecified osteoarthritis type    Sciatica, unspecified laterality    History of tonsillectomy and adenoidectomy    H/O colonoscopy  2018      ALLERGIES:  No Known Allergies    MEDICATIONS:  STANDING MEDICATIONS  allopurinol 300 milliGRAM(s) Oral daily  atorvastatin 40 milliGRAM(s) Oral at bedtime  buMETAnide 1 milliGRAM(s) Oral daily  calamine/zinc oxide Lotion 1 Application(s) Topical two times a day  chlorhexidine 4% Liquid 1 Application(s) Topical daily  enoxaparin Injectable 40 milliGRAM(s) SubCutaneous at bedtime  ferrous    sulfate 325 milliGRAM(s) Oral daily  levETIRAcetam  IVPB 500 milliGRAM(s) IV Intermittent every 12 hours  omega-3-Acid Ethyl Esters 2 Gram(s) Oral two times a day  pantoprazole  Injectable 40 milliGRAM(s) IV Push daily  sodium chloride 0.9% Bolus 1000 milliLiter(s) IV Bolus once  sodium chloride 0.9%. 1000 milliLiter(s) IV Continuous <Continuous>    PRN MEDICATIONS  acetaminophen   Tablet .. 650 milliGRAM(s) Oral every 6 hours PRN      VITAL SIGNS: Last 24 Hours  T(C): 36 (17 May 2021 04:09), Max: 36 (17 May 2021 04:09)  T(F): 96.8 (17 May 2021 04:09), Max: 96.8 (17 May 2021 04:09)  HR: 95 (17 May 2021 11:23) (62 - 96)  BP: 91/51 (17 May 2021 11:23) (86/46 - 116/58)  BP(mean): 65 (17 May 2021 11:23) (65 - 65)  RR: 18 (17 May 2021 08:26) (18 - 18)  SpO2: 99% (17 May 2021 08:26) (98% - 99%)    LABS:                        10.4   6.87  )-----------( 276      ( 17 May 2021 04:57 )             34.2     05-17    143  |  109  |  51<H>  ----------------------------<  99  4.3   |  17  |  1.2    Ca    8.8      17 May 2021 04:57  Mg     2.1     05-17    TPro  6.1  /  Alb  3.5  /  TBili  0.2  /  DBili  x   /  AST  15  /  ALT  14  /  AlkPhos  127<H>  05-17      PHYSICAL EXAM:  GENERAL: Not in distress, AAO x 4  HEENT: PERRL, clear conjunctiva   Neck: supple  Respiratory: CTA b/l  Cardiovascular: +S1/S2; RRR  Abdomen: soft, NT/ND; +BS x4  Extremities: LUE tenderness   Neuro: CN 2-12 intact, AAOx3, Strength 5/5 except LLE 4/5 limited to pain/injury  Skin: Multiple skin lesions pruritic

## 2021-05-17 NOTE — CONSULT NOTE ADULT - ASSESSMENT
IMPRESSION: Rehab of complex right SDH / left hemiparesis    PRECAUTIONS: [   ] Cardiac  [   ] Respiratory  [   ] Seizures [   ] Contact Isolation  [   ] Droplet Isolation  [   ] Other    Weight Bearing Status:     RECOMMENDATION:    Out of Bed to Chair     DVT/Decubiti Prophylaxis    REHAB PLAN:     [  x  ] Bedside P/T 3-5 times a week   [ x   ]   Bedside O/T  2-3 times a week             [    ] Speech Therapy               [    ]  No Rehab Therapy Indicated   Conditioning/ROM                                    ADL  Bed Mobility                                               Conditioning/ROM  Transfers                                                     Bed Mobility  Sitting /Standing Balance                         Transfers                                        Gait Training                                               Sitting/Standing Balance  Stair Training [   ]Applicable                    Home equipment Eval                                                                        Splinting  [   ] Only      GOALS:   ADL   [ x   ]   Independent                    Transfers  [x    ] Independent                          Ambulation  [  x  ] Independent     [  x   ] With device                            [    ]  CG                                                         [    ]  CG                                                                  [    ] CG                            [    ] Min A                                                   [    ] Min A                                                              [    ] Min  A          DISCHARGE PLAN:   [    ]  Good candidate for Intensive Rehabilitation/Hospital based                                             Will tolerate 3hrs Intensive Rehab Daily                                       [     ]  Short Term Rehab in Skilled Nursing Facility                                       [     ]  Home with Outpatient or  services                                         [  x   ]  Possible Candidate for Intensive Hospital based Rehab

## 2021-05-17 NOTE — OCCUPATIONAL THERAPY INITIAL EVALUATION ADULT - GENERAL OBSERVATIONS, REHAB EVAL
Pt received seated in b/s chair in NAD, agreeable to OT evaluation, +tele, +BP cuff, +pulse oxi, BP 91/51 at rest +IV drip, BP inc. to 101/51 to end session.  Left seated in b/s chair in NAD, +chair alarm, +call bell in reach

## 2021-05-17 NOTE — PROGRESS NOTE ADULT - ASSESSMENT
77 YO M with PMHx of diverticulosis, DM type II, HTN, HLD, CKD stage 3A, ostearthritis, bleeding gastric ulcer, anemia presenting with period of AMS.     #Traumatic complex right convexity subdural hematoma  - CT Head No Cont (05/14/21): Right-sided acute on chronic subdural hemorrhage with associated mass effect, unchanged.  - Fall 10 days ago. Was on ASA 81mg PO QD  - Evaluated by neurosurgery. Received platelets on 05/13.   - Continue Keppra 500 mg Q12. Neuro checks and VS Q4. Will get EEG  - Neurovascular consult for MMA embolization     #Hx of HTN  #Hypotensive on 05/16  - Holding Lisinopril. Continue Bumex 1 mg QD  - 05/16: 1.5L NS and started on fluids. Continue NS @ 75    #Diabetes Mellitus type II   - Last HbA1C 6.6 % (05-14-21).  - Monitor FS. Start insulin if FS > 180. Keep between 140 - 180    #CKD stage 3B  - Creatinine Trend: 1.2 mg/dL (17 May 2021 04:57), 1.2 mg/dL (16 May 2021 08:09), 1.4 mg/dL (15 May 2021 05:57), 1.2 mg/dL (14 May 2021 04:43), 1.2 mg/dL (13 May 2021 15:51)  - Continue to monitor. Avoid nephrotoxic drugs     #Misc  - DVT Prophylaxis: Lovenox   - Diet: DASH/TLC; carb consistent   - GI Prophylaxis: Pantoprazole   - Activity: AAT  - Code Status: Full Code    Dispo: Continue to monitor in stroke unit

## 2021-05-17 NOTE — CONSULT NOTE ADULT - SUBJECTIVE AND OBJECTIVE BOX
Neuroendovascular consult:   Procedure Requested: MMA embolization     HPI: Patient is a 75 yo M PMHx diverticulosis, DM, HTN, HLD, CKD stage 3A, OA, bleeding gastric ulcer, and anemia presenting to ED with period of AM, FS 70 per EMS.  Patient endorsed a fall 10 days prior to ED visit (5/14), without LOC. CTH showing R sided acute on chronic subdural hematoma with associated mass effect, neurosurgery recommending no immediate surgical intervention.   Neuroendovascular consulted for MMA embolization.     No overnight events.     PAST MEDICAL & SURGICAL HISTORY:  Diverticulitis  Herniated disc, cervical  Chronic gout of foot, unspecified cause, unspecified laterality  Type 2 diabetes mellitus without complication, unspecified long term insulin use status  Hypertension, unspecified type  High cholesterol  Osteoarthritis of multiple joints, unspecified osteoarthritis type  Sciatica, unspecified laterality  History of tonsillectomy and adenoidectomy  H/O colonoscopy  2018    MEDICATIONS  (STANDING):  allopurinol 300 milliGRAM(s) Oral daily  atorvastatin 40 milliGRAM(s) Oral at bedtime  buMETAnide 1 milliGRAM(s) Oral daily  calamine/zinc oxide Lotion 1 Application(s) Topical two times a day  chlorhexidine 4% Liquid 1 Application(s) Topical daily  enoxaparin Injectable 40 milliGRAM(s) SubCutaneous at bedtime  ferrous    sulfate 325 milliGRAM(s) Oral daily  levETIRAcetam  IVPB 500 milliGRAM(s) IV Intermittent every 12 hours  omega-3-Acid Ethyl Esters 2 Gram(s) Oral two times a day  pantoprazole  Injectable 40 milliGRAM(s) IV Push daily  sodium chloride 0.9% Bolus 1000 milliLiter(s) IV Bolus once  sodium chloride 0.9%. 1000 milliLiter(s) (75 mL/Hr) IV Continuous <Continuous>    Allergies  No Known Allergies  Social History:   Smoking: Yes [ ]  No [ x]   ______pk yrs    FAMILY HISTORY:  No pertinent family history in first degree relatives    Physical Exam:   Vital Signs Last 24 Hrs  T(C): 36 (17 May 2021 04:09), Max: 36 (17 May 2021 04:09)  T(F): 96.8 (17 May 2021 04:09), Max: 96.8 (17 May 2021 04:09)  HR: 95 (17 May 2021 11:23) (62 - 96)  BP: 91/51 (17 May 2021 11:23) (86/46 - 116/58)  BP(mean): 65 (17 May 2021 11:23) (65 - 65)  RR: 18 (17 May 2021 08:26) (18 - 18)  SpO2: 99% (17 May 2021 08:26) (98% - 99%)    Exam:   General - NAD, sitting upright in chair    Neuro - AAOx3, alert and keenly responsive, follows 1-2 step commands, No aphasia, no dysarthria, face symmetrical, No visual deficits, Sensation intact b/l, RUE/RLE +5 strength, LUE/LLE +4 strength due to chronic pain, patient unable to lift LUE for full 10 seconds with drift. No dysmetria.     Labs:                         10.4   6.87  )-----------( 276      ( 17 May 2021 04:57 )             34.2     05-17    143  |  109  |  51<H>  ----------------------------<  99  4.3   |  17  |  1.2    Ca    8.8      17 May 2021 04:57  Mg     2.1     05-17    TPro  6.1  /  Alb  3.5  /  TBili  0.2  /  DBili  x   /  AST  15  /  ALT  14  /  AlkPhos  127<H>  05-17    Radiology & Additional Studies:   CT< from: CT Head No Cont (05.13.21 @ 17:44) >  IMPRESSION:  Complex right convexity subdural hematoma or collection measuring up to 1.5 cm in diameter.Hyperattenuating component along the right anterior falx suggestive of acute blood products. Localized mass effect. No midline shift. MR follow-up may be obtained as clinically warranted.  < end of copied text >  < from: CT Head No Cont (05.14.21 @ 10:39) >  IMPRESSION:  In comparison with the prior CT scan of the brain dated May 13, 2021:  Again demonstrated is a right-sided acute on chronic subdural hemorrhage with associated mass effect, unchanged.  No new areas of acute intracranial hemorrhage.  < end of copied text >    ASSESSMENT/ PLAN:   Patient is a 75 yo M multiple CV risk factors with acute on chronic SDH, NI consulted for MMA embolization.         Risks, benefits, and alternatives to treatment discussed. All questions answered with understanding.    Patient should undergo MMA embolization - can schedule outpatient if medically and neurologically cleared- will need to be admitted to ICU following procedure for monitoring.   Recommending f/u for repeat CTH in 2wk to assess progression vs improvement of bleed.   Discussed plan with Dr. Fernández and Dr. Wynn.   Management continued by medicine and neurology.  Can coordinate outpatient f/u and procedure scheduling pending patient dispo.       Thank you for the courtesy of this consult, please call KATRINA VAZQUEZ [phone] with any further questions.    Neuroendovascular consult:   Procedure Requested: MMA embolization     HPI: Patient is a 75 yo M PMHx diverticulosis, DM, HTN, HLD, CKD stage 3A, OA, bleeding gastric ulcer, and anemia presenting to ED with period of AM, FS 70 per EMS.  Patient endorsed a fall 10 days prior to ED visit (5/14), without LOC. CTH showing R sided acute on chronic subdural hematoma with associated mass effect, neurosurgery recommending no immediate surgical intervention.   Neuroendovascular consulted for MMA embolization.     No overnight events.     PAST MEDICAL & SURGICAL HISTORY:  Diverticulitis  Herniated disc, cervical  Chronic gout of foot, unspecified cause, unspecified laterality  Type 2 diabetes mellitus without complication, unspecified long term insulin use status  Hypertension, unspecified type  High cholesterol  Osteoarthritis of multiple joints, unspecified osteoarthritis type  Sciatica, unspecified laterality  History of tonsillectomy and adenoidectomy  H/O colonoscopy  2018    MEDICATIONS  (STANDING):  allopurinol 300 milliGRAM(s) Oral daily  atorvastatin 40 milliGRAM(s) Oral at bedtime  buMETAnide 1 milliGRAM(s) Oral daily  calamine/zinc oxide Lotion 1 Application(s) Topical two times a day  chlorhexidine 4% Liquid 1 Application(s) Topical daily  enoxaparin Injectable 40 milliGRAM(s) SubCutaneous at bedtime  ferrous    sulfate 325 milliGRAM(s) Oral daily  levETIRAcetam  IVPB 500 milliGRAM(s) IV Intermittent every 12 hours  omega-3-Acid Ethyl Esters 2 Gram(s) Oral two times a day  pantoprazole  Injectable 40 milliGRAM(s) IV Push daily  sodium chloride 0.9% Bolus 1000 milliLiter(s) IV Bolus once  sodium chloride 0.9%. 1000 milliLiter(s) (75 mL/Hr) IV Continuous <Continuous>    Allergies  No Known Allergies  Social History:   Smoking: Yes [ ]  No [ x]   ______pk yrs    FAMILY HISTORY:  No pertinent family history in first degree relatives    Physical Exam:   Vital Signs Last 24 Hrs  T(C): 36 (17 May 2021 04:09), Max: 36 (17 May 2021 04:09)  T(F): 96.8 (17 May 2021 04:09), Max: 96.8 (17 May 2021 04:09)  HR: 95 (17 May 2021 11:23) (62 - 96)  BP: 91/51 (17 May 2021 11:23) (86/46 - 116/58)  BP(mean): 65 (17 May 2021 11:23) (65 - 65)  RR: 18 (17 May 2021 08:26) (18 - 18)  SpO2: 99% (17 May 2021 08:26) (98% - 99%)    Exam:   General - NAD, sitting upright in chair    Neuro - AAOx3, alert and keenly responsive, follows 1-2 step commands, No aphasia, no dysarthria, face symmetrical, No visual deficits, Sensation intact b/l, RUE/RLE +5 strength, LUE/LLE +4 strength due to chronic pain, patient unable to lift LUE for full 10 seconds with drift. No dysmetria.     Labs:                         10.4   6.87  )-----------( 276      ( 17 May 2021 04:57 )             34.2     05-17    143  |  109  |  51<H>  ----------------------------<  99  4.3   |  17  |  1.2    Ca    8.8      17 May 2021 04:57  Mg     2.1     05-17    TPro  6.1  /  Alb  3.5  /  TBili  0.2  /  DBili  x   /  AST  15  /  ALT  14  /  AlkPhos  127<H>  05-17    Radiology & Additional Studies:   CT< from: CT Head No Cont (05.13.21 @ 17:44) >  IMPRESSION:  Complex right convexity subdural hematoma or collection measuring up to 1.5 cm in diameter.Hyperattenuating component along the right anterior falx suggestive of acute blood products. Localized mass effect. No midline shift. MR follow-up may be obtained as clinically warranted.  < end of copied text >  < from: CT Head No Cont (05.14.21 @ 10:39) >  IMPRESSION:  In comparison with the prior CT scan of the brain dated May 13, 2021:  Again demonstrated is a right-sided acute on chronic subdural hemorrhage with associated mass effect, unchanged.  No new areas of acute intracranial hemorrhage.  < end of copied text >    ASSESSMENT/ PLAN:   Patient is a 75 yo M multiple CV risk factors with acute on chronic SDH, NI consulted for MMA embolization.     Patient should undergo MMA embolization - can schedule outpatient if medically and neurologically cleared- will need to be admitted to ICU following procedure for monitoring.   Recommending f/u for repeat CTH in 2wk to assess progression vs improvement of bleed.   Discussed plan with Dr. Fernández and Dr. Wynn.   Management continued by medicine and neurology.  Can coordinate outpatient f/u and procedure scheduling pending patient dispo.     Risks, benefits, and alternatives to treatment discussed. All questions answered with understanding.  Thank you for the courtesy of this consult, please call KATRINA VAZQUEZ [phone] with any further questions.

## 2021-05-17 NOTE — OCCUPATIONAL THERAPY INITIAL EVALUATION ADULT - RANGE OF MOTION EXAMINATION, UPPER EXTREMITY
RUE shoulder WFL (slightly limited from WNL at baseline), elbow/wrist/digits WFL, LUE shoulder ~1/4 AROM ~2/3 PROM, however able to maintain hold at 90* flexion once assisted for ~5 seconds, elbow/wrist/digits WFL

## 2021-05-17 NOTE — CONSULT NOTE ADULT - SUBJECTIVE AND OBJECTIVE BOX
HPI:  Mr. Polanco is a 77 yo male patient with PMH of diverticulosis, DM, HTN, HLD, CKD stage 3A, ostearthritis, bleeding gastric ulcer, anemia presenting with period of AMS.     Per wife pt had a large breakfast and shortly after was found acting confused and unable to open his eyes but saying his eyes were open. Ambulance was called, FS was found to be 70. Per wife pt seldom runs a FS under 90. Pt was given PO food/drink and FS improved as did pt's mental status. Wife says pt is currently at baseline in the ED. Pt endorsing chills everytime he goes to urinate xfew days. No hematuria, dysuria, increased frequency in urination. No f/n/v. No abdominal pain/back pain. No cough/sore throat/headache/weakness. Pt endorses fall 10 days ago with no LOC. No A/c.    In ED, patient was normotensive, afebrile. CT head was done showing subacute subdural hematoma. Neurosurgery evaluated patient in ED, no immediate surgical indication. Started on seizure prophylaxis. WIll be admitted to ICU with neurocheck Q 1 hour. Possible MMA embolization per NI       PAST MEDICAL & SURGICAL HISTORY:  Diverticulitis    Herniated disc, cervical    Chronic gout of foot, unspecified cause, unspecified laterality    Type 2 diabetes mellitus without complication, unspecified long term insulin use status    Hypertension, unspecified type    High cholesterol    Osteoarthritis of multiple joints, unspecified osteoarthritis type    Sciatica, unspecified laterality    History of tonsillectomy and adenoidectomy    H/O colonoscopy  2018        Hospital Course:    TODAY'S SUBJECTIVE & REVIEW OF SYMPTOMS:     Constitutional WNL   Cardio WNL   Resp WNL   GI WNL  Heme WNL  Endo WNL  Skin WNL  MSK WNL  Neuro left side weakness  Cognitive WNL  Psych WNL      MEDICATIONS  (STANDING):  allopurinol 300 milliGRAM(s) Oral daily  atorvastatin 40 milliGRAM(s) Oral at bedtime  buMETAnide 1 milliGRAM(s) Oral daily  calamine/zinc oxide Lotion 1 Application(s) Topical two times a day  chlorhexidine 4% Liquid 1 Application(s) Topical daily  enoxaparin Injectable 40 milliGRAM(s) SubCutaneous at bedtime  ferrous    sulfate 325 milliGRAM(s) Oral daily  levETIRAcetam  IVPB 500 milliGRAM(s) IV Intermittent every 12 hours  omega-3-Acid Ethyl Esters 2 Gram(s) Oral two times a day  pantoprazole  Injectable 40 milliGRAM(s) IV Push daily  sodium chloride 0.9% Bolus 1000 milliLiter(s) IV Bolus once  sodium chloride 0.9%. 1000 milliLiter(s) (75 mL/Hr) IV Continuous <Continuous>    MEDICATIONS  (PRN):  acetaminophen   Tablet .. 650 milliGRAM(s) Oral every 6 hours PRN Mild Pain (1 - 3)      FAMILY HISTORY:  No pertinent family history in first degree relatives        Allergies    No Known Allergies    Intolerances        SOCIAL HISTORY:    [  ] Etoh  [  ] Smoking  [  ] Substance abuse     Home Environment:  [   ] Home Alone  [x   ] Lives with Family  [   ] Home Health Aid    Dwelling:  [   ] Apartment  [ x  ] Private House  [   ] Adult Home  [   ] Skilled Nursing Facility      [   ] Short Term  [   ] Long Term  [  x ] Stairs       Elevator [   ]    FUNCTIONAL STATUS PTA: (Check all that apply)  Ambulation: [  x  ]Independent    [   ] Dependent     [   ] Non-Ambulatory  Assistive Device: [   ] SA Cane  [   ]  Q Cane  [  x ] Walker  [   ]  Wheelchair  ADL : [x   ] Independent  [    ]  Dependent       Vital Signs Last 24 Hrs  T(C): 36 (17 May 2021 04:09), Max: 36 (17 May 2021 04:09)  T(F): 96.8 (17 May 2021 04:09), Max: 96.8 (17 May 2021 04:09)  HR: 79 (17 May 2021 14:16) (68 - 96)  BP: 103/52 (17 May 2021 14:16) (90/59 - 116/58)  BP(mean): 65 (17 May 2021 11:23) (65 - 65)  RR: 18 (17 May 2021 14:16) (18 - 18)  SpO2: 99% (17 May 2021 14:16) (98% - 99%)      PHYSICAL EXAM: Awake & Alert  GENERAL: NAD  HEAD:  Normocephalic  CHEST/LUNG: Clear   HEART: S1S2+  ABDOMEN: Soft, Nontender  EXTREMITIES:  no calf tenderness    NERVOUS SYSTEM:  Cranial Nerves 2-12 intact [   ] Abnormal  [   ]  ROM: WFL all extremities [   ]  Abnormal [ x  ]limited left side  Motor Strength: WFL all extremities  [   ]  Abnormal [ x  ]2-4/5 left side, UE weaker  Sensation: intact to light touch [ x  ] Abnormal [   ]    FUNCTIONAL STATUS:  Bed Mobility: Independent [   ]  Supervision [   ]  Needs Assistance [ x  ]  N/A [   ]  Transfers: Independent [   ]  Supervision [   ]  Needs Assistance [  x ]  N/A [   ]   Ambulation: Independent [   ]  Supervision [   ]  Needs Assistance [   ]  N/A [   ]  ADL: Independent [   ] Requires Assistance [   ] N/A [   ]      LABS:                        10.4   6.87  )-----------( 276      ( 17 May 2021 04:57 )             34.2     05-17    143  |  109  |  51<H>  ----------------------------<  99  4.3   |  17  |  1.2    Ca    8.8      17 May 2021 04:57  Mg     2.1     05-17    TPro  6.1  /  Alb  3.5  /  TBili  0.2  /  DBili  x   /  AST  15  /  ALT  14  /  AlkPhos  127<H>  05-17          RADIOLOGY & ADDITIONAL STUDIES:

## 2021-05-18 LAB
ALBUMIN SERPL ELPH-MCNC: 3.5 G/DL — SIGNIFICANT CHANGE UP (ref 3.5–5.2)
ALP SERPL-CCNC: 119 U/L — HIGH (ref 30–115)
ALT FLD-CCNC: 13 U/L — SIGNIFICANT CHANGE UP (ref 0–41)
ANION GAP SERPL CALC-SCNC: 12 MMOL/L — SIGNIFICANT CHANGE UP (ref 7–14)
AST SERPL-CCNC: 14 U/L — SIGNIFICANT CHANGE UP (ref 0–41)
BASOPHILS # BLD AUTO: 0.03 K/UL — SIGNIFICANT CHANGE UP (ref 0–0.2)
BASOPHILS NFR BLD AUTO: 0.5 % — SIGNIFICANT CHANGE UP (ref 0–1)
BILIRUB SERPL-MCNC: 0.3 MG/DL — SIGNIFICANT CHANGE UP (ref 0.2–1.2)
BUN SERPL-MCNC: 44 MG/DL — HIGH (ref 10–20)
CALCIUM SERPL-MCNC: 8.7 MG/DL — SIGNIFICANT CHANGE UP (ref 8.5–10.1)
CHLORIDE SERPL-SCNC: 110 MMOL/L — SIGNIFICANT CHANGE UP (ref 98–110)
CO2 SERPL-SCNC: 21 MMOL/L — SIGNIFICANT CHANGE UP (ref 17–32)
CREAT SERPL-MCNC: 1 MG/DL — SIGNIFICANT CHANGE UP (ref 0.7–1.5)
EOSINOPHIL # BLD AUTO: 0.78 K/UL — HIGH (ref 0–0.7)
EOSINOPHIL NFR BLD AUTO: 12.4 % — HIGH (ref 0–8)
GLUCOSE BLDC GLUCOMTR-MCNC: 101 MG/DL — HIGH (ref 70–99)
GLUCOSE BLDC GLUCOMTR-MCNC: 155 MG/DL — HIGH (ref 70–99)
GLUCOSE BLDC GLUCOMTR-MCNC: 181 MG/DL — HIGH (ref 70–99)
GLUCOSE BLDC GLUCOMTR-MCNC: 195 MG/DL — HIGH (ref 70–99)
GLUCOSE SERPL-MCNC: 91 MG/DL — SIGNIFICANT CHANGE UP (ref 70–99)
HCT VFR BLD CALC: 31.8 % — LOW (ref 42–52)
HGB BLD-MCNC: 9.9 G/DL — LOW (ref 14–18)
IMM GRANULOCYTES NFR BLD AUTO: 0.5 % — HIGH (ref 0.1–0.3)
LYMPHOCYTES # BLD AUTO: 1.04 K/UL — LOW (ref 1.2–3.4)
LYMPHOCYTES # BLD AUTO: 16.5 % — LOW (ref 20.5–51.1)
MAGNESIUM SERPL-MCNC: 1.9 MG/DL — SIGNIFICANT CHANGE UP (ref 1.8–2.4)
MCHC RBC-ENTMCNC: 27.1 PG — SIGNIFICANT CHANGE UP (ref 27–31)
MCHC RBC-ENTMCNC: 31.1 G/DL — LOW (ref 32–37)
MCV RBC AUTO: 87.1 FL — SIGNIFICANT CHANGE UP (ref 80–94)
MONOCYTES # BLD AUTO: 0.87 K/UL — HIGH (ref 0.1–0.6)
MONOCYTES NFR BLD AUTO: 13.8 % — HIGH (ref 1.7–9.3)
NEUTROPHILS # BLD AUTO: 3.56 K/UL — SIGNIFICANT CHANGE UP (ref 1.4–6.5)
NEUTROPHILS NFR BLD AUTO: 56.3 % — SIGNIFICANT CHANGE UP (ref 42.2–75.2)
NRBC # BLD: 0 /100 WBCS — SIGNIFICANT CHANGE UP (ref 0–0)
PLATELET # BLD AUTO: 261 K/UL — SIGNIFICANT CHANGE UP (ref 130–400)
POTASSIUM SERPL-MCNC: 4 MMOL/L — SIGNIFICANT CHANGE UP (ref 3.5–5)
POTASSIUM SERPL-SCNC: 4 MMOL/L — SIGNIFICANT CHANGE UP (ref 3.5–5)
PROT SERPL-MCNC: 6 G/DL — SIGNIFICANT CHANGE UP (ref 6–8)
RBC # BLD: 3.65 M/UL — LOW (ref 4.7–6.1)
RBC # FLD: 18.9 % — HIGH (ref 11.5–14.5)
SARS-COV-2 RNA SPEC QL NAA+PROBE: SIGNIFICANT CHANGE UP
SODIUM SERPL-SCNC: 143 MMOL/L — SIGNIFICANT CHANGE UP (ref 135–146)
WBC # BLD: 6.31 K/UL — SIGNIFICANT CHANGE UP (ref 4.8–10.8)
WBC # FLD AUTO: 6.31 K/UL — SIGNIFICANT CHANGE UP (ref 4.8–10.8)

## 2021-05-18 PROCEDURE — 99232 SBSQ HOSP IP/OBS MODERATE 35: CPT

## 2021-05-18 RX ORDER — ASPIRIN/CALCIUM CARB/MAGNESIUM 324 MG
81 TABLET ORAL DAILY
Refills: 0 | Status: DISCONTINUED | OUTPATIENT
Start: 2021-05-18 | End: 2021-05-19

## 2021-05-18 RX ORDER — DIPHENHYDRAMINE HCL 50 MG
25 CAPSULE ORAL EVERY 4 HOURS
Refills: 0 | Status: DISCONTINUED | OUTPATIENT
Start: 2021-05-18 | End: 2021-05-19

## 2021-05-18 RX ADMIN — Medication 25 MILLIGRAM(S): at 05:26

## 2021-05-18 RX ADMIN — PANTOPRAZOLE SODIUM 40 MILLIGRAM(S): 20 TABLET, DELAYED RELEASE ORAL at 11:43

## 2021-05-18 RX ADMIN — BUMETANIDE 1 MILLIGRAM(S): 0.25 INJECTION INTRAMUSCULAR; INTRAVENOUS at 05:26

## 2021-05-18 RX ADMIN — ENOXAPARIN SODIUM 40 MILLIGRAM(S): 100 INJECTION SUBCUTANEOUS at 21:09

## 2021-05-18 RX ADMIN — CALAMINE AND ZINC OXIDE AND PHENOL 1 APPLICATION(S): 160; 10 LOTION TOPICAL at 05:26

## 2021-05-18 RX ADMIN — ATORVASTATIN CALCIUM 40 MILLIGRAM(S): 80 TABLET, FILM COATED ORAL at 21:09

## 2021-05-18 RX ADMIN — Medication 81 MILLIGRAM(S): at 11:44

## 2021-05-18 RX ADMIN — Medication 650 MILLIGRAM(S): at 01:45

## 2021-05-18 RX ADMIN — Medication 2 GRAM(S): at 17:44

## 2021-05-18 RX ADMIN — CALAMINE AND ZINC OXIDE AND PHENOL 1 APPLICATION(S): 160; 10 LOTION TOPICAL at 17:43

## 2021-05-18 RX ADMIN — LEVETIRACETAM 420 MILLIGRAM(S): 250 TABLET, FILM COATED ORAL at 05:27

## 2021-05-18 RX ADMIN — Medication 300 MILLIGRAM(S): at 11:43

## 2021-05-18 RX ADMIN — Medication 2 GRAM(S): at 05:36

## 2021-05-18 RX ADMIN — LEVETIRACETAM 420 MILLIGRAM(S): 250 TABLET, FILM COATED ORAL at 17:44

## 2021-05-18 RX ADMIN — Medication 325 MILLIGRAM(S): at 11:44

## 2021-05-18 RX ADMIN — Medication 650 MILLIGRAM(S): at 04:31

## 2021-05-18 RX ADMIN — CHLORHEXIDINE GLUCONATE 1 APPLICATION(S): 213 SOLUTION TOPICAL at 11:44

## 2021-05-18 RX ADMIN — Medication 650 MILLIGRAM(S): at 11:49

## 2021-05-18 NOTE — PROGRESS NOTE ADULT - SUBJECTIVE AND OBJECTIVE BOX
[facility] NEPHROLOGY FOLLOW UP NOTE  --------------------------------------------------------------------------------  24 hour events/subjective: Patient examined. Appears comfortable.    PAST HISTORY  --------------------------------------------------------------------------------  No significant changes to PMH, PSH, FHx, SHx, unless otherwise noted    ALLERGIES & MEDICATIONS  --------------------------------------------------------------------------------  Allergies    No Known Allergies    Intolerances      Standing Inpatient Medications  allopurinol 300 milliGRAM(s) Oral daily  aspirin  chewable 81 milliGRAM(s) Oral daily  atorvastatin 40 milliGRAM(s) Oral at bedtime  buMETAnide 1 milliGRAM(s) Oral daily  calamine/zinc oxide Lotion 1 Application(s) Topical two times a day  chlorhexidine 4% Liquid 1 Application(s) Topical daily  enoxaparin Injectable 40 milliGRAM(s) SubCutaneous at bedtime  ferrous    sulfate 325 milliGRAM(s) Oral daily  levETIRAcetam  IVPB 500 milliGRAM(s) IV Intermittent every 12 hours  omega-3-Acid Ethyl Esters 2 Gram(s) Oral two times a day  pantoprazole  Injectable 40 milliGRAM(s) IV Push daily    PRN Inpatient Medications  acetaminophen   Tablet .. 650 milliGRAM(s) Oral every 6 hours PRN  diphenhydrAMINE 25 milliGRAM(s) Oral every 4 hours PRN      VITALS/PHYSICAL EXAM  --------------------------------------------------------------------------------  T(C): 36.1 (05-18-21 @ 05:39), Max: 36.1 (05-18-21 @ 05:39)  HR: 68 (05-18-21 @ 11:29) (60 - 84)  BP: 105/42 (05-18-21 @ 11:29) (103/52 - 135/67)  RR: 18 (05-18-21 @ 08:00) (18 - 18)  SpO2: 99% (05-18-21 @ 08:00) (98% - 100%)  Wt(kg): --        05-17-21 @ 07:01  -  05-18-21 @ 07:00  --------------------------------------------------------  IN: 2790 mL / OUT: 2200 mL / NET: 590 mL    05-18-21 @ 07:01  -  05-18-21 @ 13:41  --------------------------------------------------------  IN: 0 mL / OUT: 900 mL / NET: -900 mL      Physical Exam:  	Gen: NAD  	Pulm: CTA B/L  	CV: RRR, S1S2  	Abd: +BS, soft, nontender/nondistended  	: No suprapubic tenderness  	LE: Warm, no edema    LABS/STUDIES  --------------------------------------------------------------------------------              9.9    6.31  >-----------<  261      [05-18-21 @ 05:28]              31.8     143  |  110  |  44  ----------------------------<  91      [05-18-21 @ 05:28]  4.0   |  21  |  1.0        Ca     8.7     [05-18-21 @ 05:28]      Mg     1.9     [05-18-21 @ 05:28]    TPro  6.0  /  Alb  3.5  /  TBili  0.3  /  DBili  x   /  AST  14  /  ALT  13  /  AlkPhos  119  [05-18-21 @ 05:28]    Creatinine Trend:  SCr 1.0 [05-18 @ 05:28]  SCr 1.2 [05-17 @ 04:57]  SCr 1.2 [05-16 @ 08:09]  SCr 1.4 [05-15 @ 05:57]  SCr 1.2 [05-14 @ 04:43]    Urinalysis - [05-13-21 @ 15:51]      Color Colorless / Appearance Clear / SG 1.010 / pH 6.0      Gluc Negative / Ketone Negative  / Bili Negative / Urobili <2 mg/dL       Blood Negative / Protein Trace / Leuk Est Negative / Nitrite Negative      RBC  / WBC  / Hyaline  / Gran  / Sq Epi  / Non Sq Epi  / Bacteria       Iron 55, TIBC 324, %sat 17      [05-14-21 @ 04:43]  Ferritin 79      [05-14-21 @ 04:43]  TSH 4.82      [05-14-21 @ 04:43]  Lipid: chol 116, TG 73, HDL 56, LDL --      [05-14-21 @ 04:43]

## 2021-05-18 NOTE — PROGRESS NOTE ADULT - SUBJECTIVE AND OBJECTIVE BOX
MANDA TAVAREZ 76y Male  MRN#: [id number]   Hospital Day: 5d    SUBJECTIVE  Patient is a 76y old Male who presents with a chief complaint of Change in mental status (17 May 2021 14:50)  Currently admitted to medicine with the primary diagnosis of Subdural hematoma      INTERVAL HPI AND OVERNIGHT EVENTS:  Patient was examined and seen at bedside. This morning he is resting comfortably in bed and reports no issues or overnight events.    REVIEW OF SYMPTOMS:  CONSTITUTIONAL: No weakness, fevers or chills; No headaches  EYES: No visual changes, eye pain, or discharge  ENT: No vertigo; No ear pain or change in hearing; No sore throat or difficulty swallowing  NECK: No pain or stiffness  RESPIRATORY: No cough, wheezing, or hemoptysis; No shortness of breath  CARDIOVASCULAR: No chest pain or palpitations  GASTROINTESTINAL: No abdominal or epigastric pain; No nausea, vomiting, or hematemesis; No diarrhea or constipation; No melena or hematochezia  GENITOURINARY: No dysuria, frequency or hematuria  MUSCULOSKELETAL: No joint pain, no muscle pain, no weakness  NEUROLOGICAL: No numbness or weakness  SKIN: No itching or rashes    OBJECTIVE  PAST MEDICAL & SURGICAL HISTORY  Diverticulitis    Herniated disc, cervical    Chronic gout of foot, unspecified cause, unspecified laterality    Type 2 diabetes mellitus without complication, unspecified long term insulin use status    Hypertension, unspecified type    High cholesterol    Osteoarthritis of multiple joints, unspecified osteoarthritis type    Sciatica, unspecified laterality    History of tonsillectomy and adenoidectomy    H/O colonoscopy  2018      ALLERGIES:  No Known Allergies    MEDICATIONS:  STANDING MEDICATIONS  allopurinol 300 milliGRAM(s) Oral daily  aspirin  chewable 81 milliGRAM(s) Oral daily  atorvastatin 40 milliGRAM(s) Oral at bedtime  buMETAnide 1 milliGRAM(s) Oral daily  calamine/zinc oxide Lotion 1 Application(s) Topical two times a day  chlorhexidine 4% Liquid 1 Application(s) Topical daily  enoxaparin Injectable 40 milliGRAM(s) SubCutaneous at bedtime  ferrous    sulfate 325 milliGRAM(s) Oral daily  levETIRAcetam  IVPB 500 milliGRAM(s) IV Intermittent every 12 hours  omega-3-Acid Ethyl Esters 2 Gram(s) Oral two times a day  pantoprazole  Injectable 40 milliGRAM(s) IV Push daily    PRN MEDICATIONS  acetaminophen   Tablet .. 650 milliGRAM(s) Oral every 6 hours PRN  diphenhydrAMINE 25 milliGRAM(s) Oral every 4 hours PRN      VITAL SIGNS: Last 24 Hours  T(C): 36.1 (18 May 2021 05:39), Max: 36.1 (18 May 2021 05:39)  T(F): 96.9 (18 May 2021 05:39), Max: 96.9 (18 May 2021 05:39)  HR: 68 (18 May 2021 11:29) (60 - 84)  BP: 105/42 (18 May 2021 11:29) (103/52 - 135/67)  BP(mean): 85 (18 May 2021 08:00) (77 - 93)  RR: 18 (18 May 2021 08:00) (18 - 18)  SpO2: 99% (18 May 2021 08:00) (98% - 100%)    LABS:                        9.9    6.31  )-----------( 261      ( 18 May 2021 05:28 )             31.8     05-18    143  |  110  |  44<H>  ----------------------------<  91  4.0   |  21  |  1.0    Ca    8.7      18 May 2021 05:28  Mg     1.9     05-18    TPro  6.0  /  Alb  3.5  /  TBili  0.3  /  DBili  x   /  AST  14  /  ALT  13  /  AlkPhos  119<H>  05-18                  RADIOLOGY:      PHYSICAL EXAM:  CONSTITUTIONAL: No acute distress, well-developed, well-groomed, AAOx3  HEAD: Atraumatic, normocephalic  EYES: EOM intact, PERRLA, conjunctiva and sclera clear  ENT: Supple, no masses, no thyromegaly, no bruits, no JVD; moist mucous membranes  PULMONARY: Clear to auscultation bilaterally; no wheezes, rales, or rhonchi  CARDIOVASCULAR: Regular rate and rhythm; no murmurs, rubs, or gallops  GASTROINTESTINAL: Soft, non-tender, non-distended; bowel sounds present  MUSCULOSKELETAL: 2+ peripheral pulses; no clubbing, no cyanosis, no edema  NEUROLOGY: non-focal  SKIN: No rashes or lesions; warm and dry

## 2021-05-18 NOTE — PROGRESS NOTE ADULT - ASSESSMENT
s/p fall with sub dural hematoma  CKD - patient at baseline, likely diabetic nephropathy  HTN - BP low past 24 hours  DM II    Plan:    stopped lisinopril due to low BP, BP better now  f/u neurology/neurosurgery team recommendations  daily BMP

## 2021-05-19 ENCOUNTER — TRANSCRIPTION ENCOUNTER (OUTPATIENT)
Age: 76
End: 2021-05-19

## 2021-05-19 ENCOUNTER — INPATIENT (INPATIENT)
Facility: HOSPITAL | Age: 76
LOS: 8 days | Discharge: HOME HEALTH | End: 2021-05-28
Attending: PHYSICAL MEDICINE & REHABILITATION | Admitting: PHYSICAL MEDICINE & REHABILITATION
Payer: MEDICARE

## 2021-05-19 VITALS
OXYGEN SATURATION: 100 % | DIASTOLIC BLOOD PRESSURE: 64 MMHG | HEIGHT: 70 IN | RESPIRATION RATE: 18 BRPM | SYSTOLIC BLOOD PRESSURE: 144 MMHG | WEIGHT: 195.55 LBS | HEART RATE: 64 BPM | TEMPERATURE: 97 F

## 2021-05-19 VITALS
HEART RATE: 84 BPM | DIASTOLIC BLOOD PRESSURE: 73 MMHG | TEMPERATURE: 97 F | SYSTOLIC BLOOD PRESSURE: 124 MMHG | RESPIRATION RATE: 18 BRPM

## 2021-05-19 DIAGNOSIS — Z98.890 OTHER SPECIFIED POSTPROCEDURAL STATES: Chronic | ICD-10-CM

## 2021-05-19 LAB
ALBUMIN SERPL ELPH-MCNC: 3.9 G/DL — SIGNIFICANT CHANGE UP (ref 3.5–5.2)
ALP SERPL-CCNC: 137 U/L — HIGH (ref 30–115)
ALT FLD-CCNC: 15 U/L — SIGNIFICANT CHANGE UP (ref 0–41)
ANION GAP SERPL CALC-SCNC: 8 MMOL/L — SIGNIFICANT CHANGE UP (ref 7–14)
AST SERPL-CCNC: 19 U/L — SIGNIFICANT CHANGE UP (ref 0–41)
BILIRUB SERPL-MCNC: <0.2 MG/DL — SIGNIFICANT CHANGE UP (ref 0.2–1.2)
BUN SERPL-MCNC: 46 MG/DL — HIGH (ref 10–20)
CALCIUM SERPL-MCNC: 8.9 MG/DL — SIGNIFICANT CHANGE UP (ref 8.5–10.1)
CHLORIDE SERPL-SCNC: 109 MMOL/L — SIGNIFICANT CHANGE UP (ref 98–110)
CO2 SERPL-SCNC: 22 MMOL/L — SIGNIFICANT CHANGE UP (ref 17–32)
CREAT SERPL-MCNC: 1.1 MG/DL — SIGNIFICANT CHANGE UP (ref 0.7–1.5)
GLUCOSE SERPL-MCNC: 101 MG/DL — HIGH (ref 70–99)
HCT VFR BLD CALC: 32.7 % — LOW (ref 42–52)
HGB BLD-MCNC: 10.2 G/DL — LOW (ref 14–18)
MAGNESIUM SERPL-MCNC: 2 MG/DL — SIGNIFICANT CHANGE UP (ref 1.8–2.4)
MCHC RBC-ENTMCNC: 27.4 PG — SIGNIFICANT CHANGE UP (ref 27–31)
MCHC RBC-ENTMCNC: 31.2 G/DL — LOW (ref 32–37)
MCV RBC AUTO: 87.9 FL — SIGNIFICANT CHANGE UP (ref 80–94)
NRBC # BLD: 0 /100 WBCS — SIGNIFICANT CHANGE UP (ref 0–0)
PLATELET # BLD AUTO: 286 K/UL — SIGNIFICANT CHANGE UP (ref 130–400)
POTASSIUM SERPL-MCNC: 4.3 MMOL/L — SIGNIFICANT CHANGE UP (ref 3.5–5)
POTASSIUM SERPL-SCNC: 4.3 MMOL/L — SIGNIFICANT CHANGE UP (ref 3.5–5)
PROT SERPL-MCNC: 6.4 G/DL — SIGNIFICANT CHANGE UP (ref 6–8)
RBC # BLD: 3.72 M/UL — LOW (ref 4.7–6.1)
RBC # FLD: 19 % — HIGH (ref 11.5–14.5)
SODIUM SERPL-SCNC: 139 MMOL/L — SIGNIFICANT CHANGE UP (ref 135–146)
WBC # BLD: 7.1 K/UL — SIGNIFICANT CHANGE UP (ref 4.8–10.8)
WBC # FLD AUTO: 7.1 K/UL — SIGNIFICANT CHANGE UP (ref 4.8–10.8)

## 2021-05-19 PROCEDURE — 70450 CT HEAD/BRAIN W/O DYE: CPT | Mod: 26

## 2021-05-19 PROCEDURE — 99232 SBSQ HOSP IP/OBS MODERATE 35: CPT

## 2021-05-19 RX ORDER — PANTOPRAZOLE SODIUM 20 MG/1
40 TABLET, DELAYED RELEASE ORAL DAILY
Refills: 0 | Status: DISCONTINUED | OUTPATIENT
Start: 2021-05-19 | End: 2021-05-20

## 2021-05-19 RX ORDER — ATORVASTATIN CALCIUM 80 MG/1
40 TABLET, FILM COATED ORAL AT BEDTIME
Refills: 0 | Status: DISCONTINUED | OUTPATIENT
Start: 2021-05-19 | End: 2021-05-28

## 2021-05-19 RX ORDER — BUMETANIDE 0.25 MG/ML
1 INJECTION INTRAMUSCULAR; INTRAVENOUS DAILY
Refills: 0 | Status: DISCONTINUED | OUTPATIENT
Start: 2021-05-19 | End: 2021-05-23

## 2021-05-19 RX ORDER — ASPIRIN/CALCIUM CARB/MAGNESIUM 324 MG
1 TABLET ORAL
Qty: 0 | Refills: 0 | DISCHARGE
Start: 2021-05-19

## 2021-05-19 RX ORDER — ASPIRIN/CALCIUM CARB/MAGNESIUM 324 MG
81 TABLET ORAL DAILY
Refills: 0 | Status: DISCONTINUED | OUTPATIENT
Start: 2021-05-19 | End: 2021-05-28

## 2021-05-19 RX ORDER — QUINAPRIL HYDROCHLORIDE 40 MG/1
1 TABLET, FILM COATED ORAL
Qty: 0 | Refills: 0 | DISCHARGE

## 2021-05-19 RX ORDER — FERROUS SULFATE 325(65) MG
325 TABLET ORAL DAILY
Refills: 0 | Status: DISCONTINUED | OUTPATIENT
Start: 2021-05-19 | End: 2021-05-28

## 2021-05-19 RX ORDER — OMEGA-3 ACID ETHYL ESTERS 1 G
2 CAPSULE ORAL
Refills: 0 | Status: DISCONTINUED | OUTPATIENT
Start: 2021-05-19 | End: 2021-05-28

## 2021-05-19 RX ORDER — ALLOPURINOL 300 MG
300 TABLET ORAL DAILY
Refills: 0 | Status: DISCONTINUED | OUTPATIENT
Start: 2021-05-19 | End: 2021-05-28

## 2021-05-19 RX ORDER — LEVETIRACETAM 250 MG/1
5 TABLET, FILM COATED ORAL
Qty: 0 | Refills: 0 | DISCHARGE
Start: 2021-05-19

## 2021-05-19 RX ORDER — LEVETIRACETAM 250 MG/1
500 TABLET, FILM COATED ORAL EVERY 12 HOURS
Refills: 0 | Status: DISCONTINUED | OUTPATIENT
Start: 2021-05-19 | End: 2021-05-20

## 2021-05-19 RX ORDER — CALAMINE AND ZINC OXIDE AND PHENOL 160; 10 MG/ML; MG/ML
1 LOTION TOPICAL
Refills: 0 | Status: DISCONTINUED | OUTPATIENT
Start: 2021-05-19 | End: 2021-05-28

## 2021-05-19 RX ORDER — ACETAMINOPHEN 500 MG
650 TABLET ORAL EVERY 6 HOURS
Refills: 0 | Status: DISCONTINUED | OUTPATIENT
Start: 2021-05-19 | End: 2021-05-28

## 2021-05-19 RX ORDER — DIPHENHYDRAMINE HCL 50 MG
25 CAPSULE ORAL EVERY 4 HOURS
Refills: 0 | Status: DISCONTINUED | OUTPATIENT
Start: 2021-05-19 | End: 2021-05-24

## 2021-05-19 RX ORDER — ENOXAPARIN SODIUM 100 MG/ML
40 INJECTION SUBCUTANEOUS AT BEDTIME
Refills: 0 | Status: DISCONTINUED | OUTPATIENT
Start: 2021-05-19 | End: 2021-05-28

## 2021-05-19 RX ADMIN — Medication 300 MILLIGRAM(S): at 11:20

## 2021-05-19 RX ADMIN — Medication 25 MILLIGRAM(S): at 11:35

## 2021-05-19 RX ADMIN — CALAMINE AND ZINC OXIDE AND PHENOL 1 APPLICATION(S): 160; 10 LOTION TOPICAL at 17:25

## 2021-05-19 RX ADMIN — CHLORHEXIDINE GLUCONATE 1 APPLICATION(S): 213 SOLUTION TOPICAL at 11:20

## 2021-05-19 RX ADMIN — Medication 2 GRAM(S): at 05:01

## 2021-05-19 RX ADMIN — Medication 81 MILLIGRAM(S): at 11:20

## 2021-05-19 RX ADMIN — PANTOPRAZOLE SODIUM 40 MILLIGRAM(S): 20 TABLET, DELAYED RELEASE ORAL at 11:18

## 2021-05-19 RX ADMIN — CALAMINE AND ZINC OXIDE AND PHENOL 1 APPLICATION(S): 160; 10 LOTION TOPICAL at 05:00

## 2021-05-19 RX ADMIN — Medication 325 MILLIGRAM(S): at 11:20

## 2021-05-19 RX ADMIN — LEVETIRACETAM 420 MILLIGRAM(S): 250 TABLET, FILM COATED ORAL at 05:03

## 2021-05-19 RX ADMIN — Medication 25 MILLIGRAM(S): at 18:09

## 2021-05-19 RX ADMIN — LEVETIRACETAM 420 MILLIGRAM(S): 250 TABLET, FILM COATED ORAL at 17:24

## 2021-05-19 RX ADMIN — BUMETANIDE 1 MILLIGRAM(S): 0.25 INJECTION INTRAMUSCULAR; INTRAVENOUS at 05:00

## 2021-05-19 RX ADMIN — Medication 2 GRAM(S): at 17:25

## 2021-05-19 NOTE — H&P ADULT - HISTORY OF PRESENT ILLNESS
77 yo male patient with PMH of diverticulosis, DM, HTN, HLD, CKD stage 3A, ostearthritis, bleeding gastric ulcer, anemia presenting with period of AMS. Per wife pt had a large breakfast and shortly after was found acting confused and unable to open his eyes but saying his eyes were open. Ambulance was called, FS was found to be 70. Per wife pt seldom runs a FS under 90. Pt was given PO food/drink and FS improved as did pt's mental status. Wife says pt is currently at baseline in the ED. Pt endorsing chills everytime he goes to urinate xfew days. No hematuria, dysuria, increased frequency in urination. No f/n/v. No abdominal pain/back pain. No cough/sore throat/headache/weakness. Pt endorses fall 10 days ago with no LOC. No A/c. In ED, patient was normotensive, afebrile. CT head was done showing subacute subdural hematoma. Neurosurgery evaluated patient in ED, no immediate surgical indication. As per neurosurgery, patient received platelet transfusion for reversal of ASA and started on Keppra 500mg q12.    Patient seen and evaluated by PMR consultation team including PT/OT and found to be a good candidate for acute inpatient rehab.   Latest progress with PT: Bed mobility max assist, transfers mod assist, ambulates 5ft with RW mod assist 75 yo male patient with PMH of diverticulosis, DM, HTN, HLD, CKD stage 3A, ostearthritis, bleeding gastric ulcer, anemia presenting with period of AMS. Per wife pt had a large breakfast and shortly after was found acting confused and unable to open his eyes but saying his eyes were open. Ambulance was called, FS was found to be 70. Per wife pt seldom runs a FS under 90. Pt was given PO food/drink and FS improved as did pt's mental status. Wife says pt is currently at baseline in the ED. Pt endorsing chills everytime he goes to urinate xfew days. No hematuria, dysuria, increased frequency in urination. No f/n/v. No abdominal pain/back pain. No cough/sore throat/headache/weakness. Pt endorses fall 10 days ago with no LOC. No A/c. In ED, patient was normotensive, afebrile. CT head was done showing subacute subdural hematoma. Neurosurgery evaluated patient in ED, no immediate surgical indication. As per neurosurgery, patient received platelet transfusion for reversal of ASA and started on Keppra 500mg q12.    Patient seen and evaluated by PMR consultation team including PT/OT and found to be a good candidate for acute inpatient rehab.   Latest progress with PT: Bed mobility max assist, transfers mod assist, ambulates 5ft with RW mod assist 77 yo male patient with PMH of diverticulosis, DM, HTN, HLD, CKD stage 3A, ostearthritis, bleeding gastric ulcer, anemia presenting with period of AMS. Per wife pt had a large breakfast and shortly after was found acting confused and unable to open his eyes but saying his eyes were open. Ambulance was called, FS was found to be 70. Per wife pt seldom runs a FS under 90. Pt was given PO food/drink and FS improved as did pt's mental status. Wife says pt is currently at baseline in the ED. Pt endorsing chills everytime he goes to urinate xfew days. No hematuria, dysuria, increased frequency in urination. No f/n/v. No abdominal pain/back pain. No cough/sore throat/headache/weakness. Pt endorses fall 10 days ago with no LOC. No A/c. In ED, patient was normotensive, afebrile. CT head was done showing subacute subdural hematoma. Neurosurgery evaluated patient in ED, no immediate surgical indication. As per neurosurgery, patient received platelet transfusion for reversal of ASA and started on Keppra 500mg q12.    He has a history of LLE weakness secondary to an old thigh muscle tear, knee meniscus injury and radiculopathy and was a limited ambulator with a walker PTA.    Patient seen and evaluated by PMR consultation team including PT/OT and found to be a good candidate for acute inpatient rehab.   Latest progress with PT: Bed mobility max assist, transfers mod assist, ambulates 5ft with RW mod assist

## 2021-05-19 NOTE — DISCHARGE NOTE PROVIDER - NSDCMRMEDTOKEN_GEN_ALL_CORE_FT
allopurinol 300 mg oral tablet: 1 tab(s) orally once a day  atorvastatin 40 mg oral tablet: 1 tab(s) orally once a day  bumetanide 1 mg oral tablet: 1 tab(s) orally once a day  calamine topical lotion: 1 application topically 2 times a day  ferrous sulfate 160 mg (50 mg elemental iron) oral tablet, extended release: 1 tab(s) orally 3 times a week  Omega-3 oral capsule: 1 tab(s) orally once a day  Oseni 25 mg-30 mg oral tablet: 1 tab(s) orally once a day  pantoprazole 40 mg oral delayed release tablet: 1 tab(s) orally once a day (before a meal)  quinapril 40 mg oral tablet: 1 tab(s) orally once a day   allopurinol 300 mg oral tablet: 1 tab(s) orally once a day  aspirin 81 mg oral tablet, chewable: 1 tab(s) orally once a day  atorvastatin 40 mg oral tablet: 1 tab(s) orally once a day  bumetanide 1 mg oral tablet: 1 tab(s) orally once a day  calamine topical lotion: 1 application topically 2 times a day  ferrous sulfate 160 mg (50 mg elemental iron) oral tablet, extended release: 1 tab(s) orally 3 times a week  levETIRAcetam 100 mg/mL intravenous solution: 5 milliliter(s) intravenous every 12 hours  Omega-3 oral capsule: 1 tab(s) orally once a day  Oseni 25 mg-30 mg oral tablet: 1 tab(s) orally once a day  pantoprazole 40 mg oral delayed release tablet: 1 tab(s) orally once a day (before a meal)

## 2021-05-19 NOTE — H&P ADULT - NSHPPHYSICALEXAM_GEN_ALL_CORE
PHYSICAL EXAMINATION   VItals: T(C): 35.8 (05-19-21 @ 04:57), Max: 36.4 (05-18-21 @ 20:46)  HR: 88 (05-19-21 @ 04:57) (67 - 94)  BP: 140/66 (05-19-21 @ 04:57) (92/54 - 140/66)  RR: 18 (05-19-21 @ 04:57) (18 - 18)  SpO2: 98% (05-19-21 @ 04:57) (98% - 99%)    General:[  x ] NAD, Resting Comfortable,   [   ] other:                                HEENT: [ x  ] NC/AT, EOMI, PERRL , Normal Conjunctivae,   [   ] other:  Cardio: [ x  ] RRR, no murmer,   [   ] other:                              Pulm: [ x  ] No Respiratory Distress,  Lungs CTAB,   [   ] other:                       Abdomen: [ x  ]ND/NT, Soft,   [   ] other:    : [ x  ] NO BROOKS CATHETER, [   ] BROOKS CATHETER- no meatal tear, no discharge, [   ] other:                                            MSK: [   ] No joint swelling, Full ROM,   [ x  ] other: limited ROM in left shoulder secondary to pain, 110 degrees in flexion                                         Ext: [ x  ]No C/C/E, No calf tenderness,   [   ]other:    Skin: [  x ]intact,   [   ] other:                                                                   Neurological Examination:  Cognitive: [  x  ] AAO x 3,   [    ]  other:                                                                      Attention:  [  x  ] intact,   [    ]  other:                            Memory: [ x   ] intact,    [    ]  other:     Mood/Affect: [  x  ] wnl,    [    ]  other:                                                                             Communication: [  x  ]Fluent, no dysarthria, following commands:  [    ] other:   CN II - XII:  [ x   ] intact,  [    ] other:                                                                                        Motor:   RIGHT UE: [ x  ] WNL,  [   ] other:  LEFT    UE: [   ] WNL,  [  x ] other: 4-/5 limited secondary to pain  RIGHT LE: [ x  ] WNL,  [   ] other:   LEFT    LE: [   ] WNL,  [ x  ] other: 3+/5    Tone: [  x  ] wnl,   [    ]  other:  DTRs: [  x ]symmetric, [   ] other:  Coordination:   [  x  ] intact,   [    ] other:                                                                           Sensory: [ x   ] Intact to light touch,   [    ] other:

## 2021-05-19 NOTE — DISCHARGE NOTE PROVIDER - HOSPITAL COURSE
Mr. Polanco is a 75 yo male patient with PMH of diverticulosis, DM, HTN, HLD, CKD stage 3A, ostearthritis, bleeding gastric ulcer, anemia presenting with period of AMS.   Per wife pt had a large breakfast and shortly after was found acting confused and unable to open his eyes but saying his eyes were open. Ambulance was called, FS was found to be 70. Per wife pt seldom runs a FS under 90. Pt was given PO food/drink and FS improved as did pt's mental status. Wife says pt is currently at baseline in the ED. Pt endorsing chills everytime he goes to urinate xfew days. No hematuria, dysuria, increased frequency in urination. No f/n/v. No abdominal pain/back pain. No cough/sore throat/headache/weakness. Pt endorses fall 10 days ago with no LOC. No A/c.  In ED, patient was normotensive, afebrile. CT head was done showing subacute subdural hematoma. Neurosurgery evaluated patient in ED, no immediate surgical indication. Started on seizure prophylaxis. WIll be admitted to ICU with neurocheck Q 1 hour.      75 yo male patient with PMH of diverticulosis, DM, HTN, HLD, CKD stage 3A, ostearthritis, bleeding gastric ulcer, anemia presenting with period of AMS.     # Traumatic complex right convexity subdural hematoma  - Fall 10 days ago   - On aspirin  - Evaluated by neurosurgery: recommending platelets transfusion for reversal of ASA  - keppra 500 mg q 12    - Neuro checks q 1hour   - CTH: right-sided acute on chronic subdural hemorrhage with associated mass effect, unchanged.No new areas of acute intracranial hemorrhage.    # HTN  - continue Lisinopril 40 mg daily   - Continue Bumex 1 mg daily.    # DM  - holding oral antidiabetic medications  - Fingerstick Q 4 hours    # CKD stage 3B  - Serum creatinine around baseline     # PUD  # Normocytic anemia  - continue pantoprazole 40 mg daily   - Iron/TIBC wnl

## 2021-05-19 NOTE — H&P ADULT - NSHPLABSRESULTS_GEN_ALL_CORE
10.2   7.10  )-----------( 286      ( 19 May 2021 05:26 )             32.7     05-19    139  |  109  |  46<H>  ----------------------------<  101<H>  4.3   |  22  |  1.1    Ca    8.9      19 May 2021 05:26  Mg     2.0     05-19    TPro  6.4  /  Alb  3.9  /  TBili  <0.2  /  DBili  x   /  AST  19  /  ALT  15  /  AlkPhos  137<H>  05-19      EXAM:  CT BRAIN          *** ADDENDUM 05/13/2021  ***    Findings discussed with Dr. Simpson on 5/13/2021 at 6:43 PM.    *** END OF ADDENDUM 05/13/2021  ***        PROCEDURE DATE:  05/13/2021            INTERPRETATION:  Clinical History / Reason for exam: Altered mental status    Technique: CT head without IV contrast performed.  Contiguous unenhanced CT axial images of the head from the base to the vertex with coronal and sagittal reformats.    Comparison: CT head dated 1/22/2021    Findings:    Complex right convexity subdural hematoma or collection measuring up to 1.5 cm in diameter. Hyperattenuating component along the right anterior falx suggestive of acute blood products. Localized mass effect. No midline shift. No hydrocephalus. Gray-white differentiation appears grossly maintained.    The visualized paranasal sinuses and mastoids are well-aerated. Calvarium appears intact.    IMPRESSION:    Complex right convexity subdural hematoma or collection measuring up to 1.5 cm in diameter. Hyperattenuating component along the right anterior falx suggestive of acute blood products. Localized mass effect. No midline shift. MR follow-up may be obtained as clinically warranted.    Callback placed at the time of dictation.      ***Please see the addendum at the top of this report. It may contain additional important information or changes.****        GISELA PATEL MD; Attending Radiologist  This document has been electronically signed. May 13 2021  6:29PM  Addend:GISELA PATEL MD; Attending Radiologist  This addendum was electronically signed on: May 13 2021  6:46PM.  CT Head No Cont:   EXAM:  CT BRAIN            PROCEDURE DATE:  05/19/2021            INTERPRETATION:  CLINICAL INDICATION: Subdural hemorrhage follow-up.    Technique: CT of the head was performed without contrast.    Multiple contiguous axial images were acquired from the skullbase to the vertex without the administration of intravenous contrast.  Coronal and sagittal reformations were made.    COMPARISON:  prior head CT dated 5/14/2021    FINDINGS:    Redemonstration of a mixed density right holohemispheric subdural collection. There are rounded foci of acute hemorrhage noted within the collection. Overall the thickness appears grossly unchanged measuring approximately 1.6 cm. There is mild underlying mass effect without midline shift.    The basal cisternsare patent. There is no hydrocephalus.    The calvarium is intact. The visualized intraorbital compartments, paranasal sinuses and mastoid complexes appear free of acute disease.    IMPRESSION:  No significant change in appearance of a mixed density right holohemispheric subdural hematoma exerting mild underlying mass effect without midline shift. No significant change in acute blood product components within the subdural collection.              TEO CAMARA M.D., ATTENDING RADIOLOGIST  This document has been electronically signed. May 19 2021  9:30AM (05-19-21 @ 07:25)

## 2021-05-19 NOTE — H&P ADULT - NSHPSOCIALHISTORY_GEN_ALL_CORE
Patient denies smoking, etoh or illicit drug use.    Patient lives with wife in private home with 2 stairs to enter and inside.  Patient previously ambulated with assistance with RW inside the house, would rarely leave the house. Reports being independent with ADL's  Has visiting nurse services for wound care of bilateral feet.

## 2021-05-19 NOTE — DISCHARGE NOTE PROVIDER - PROVIDER TOKENS
PROVIDER:[TOKEN:[[id number]]],PROVIDER:[TOKEN:[[id number]]],PROVIDER:[TOKEN:[[id number]]]

## 2021-05-19 NOTE — PROGRESS NOTE ADULT - PROVIDER SPECIALTY LIST ADULT
Nephrology
Nephrology
Internal Medicine
Nephrology
Nephrology
Neurology
Internal Medicine
Neurology
Neurosurgery

## 2021-05-19 NOTE — H&P ADULT - ATTENDING COMMENTS
I examined the patient with the resident and we discussed the findings and treatment plan. Tolerating the rehab program well. I agree with the findings and treatment plan as above and made changes as indicated.      Stable traumatic SDH with gait and balance impairment. No obvious cognitive deficits, but full eval to be done.     Good candidate for acute inpatient rehab, 3 hrs a day. Good prognosis to improve and return home.

## 2021-05-19 NOTE — DISCHARGE NOTE PROVIDER - CARE PROVIDER_API CALL
Sunil Mosqueda  INTERNAL MEDICINE  [address] [place] [address]  Phone: [phone]  Fax: [phone]  Follow Up Time:     Kennedy Marks)  [facility]  [address] [place] [address], [address]  [place] [address]  Phone: [phone]  Fax: [phone]  Follow Up Time:     Kyree Oliver)  Neurology  [address] [place] [address]  Phone: [phone]  Fax: [phone]  Follow Up Time:

## 2021-05-19 NOTE — DISCHARGE NOTE PROVIDER - NSDCDCMDCOMP_GEN_ALL_CORE
Discussed treatment options for OS (Cataract with Trab) then when stable Cataract OD with glc tx, ie: hydrus /omni. This document is complete and the patient is ready for discharge.

## 2021-05-19 NOTE — H&P ADULT - ASSESSMENT
ASSESSMENT/PLAN    Rehab of Traumatic SDH with no LOC  Patient seen and evaluated with Dr. Magana    Traumatic complex right convexity subdural hematoma  - CT Head No Cont (05/14/21): Right-sided acute on chronic subdural hemorrhage with associated mass effect, unchanged.  - Fall 10 days ago. Was on ASA 81mg PO QD  - Evaluated by neurosurgery. Received platelets on 05/13.   - Continue Keppra 500 mg Q12. N  - Neurovascular f/u as outpt in 2 weeks for elective MMA embolization  - f/u Rehab recommendations  - restart ASA 81 QD and repeat HCT in 24 hrs  - if rpt HCT stable, no further inpt neurologic w/u and may d/c to SNF    #Hx of HTN  #Hypotensive on 05/16  - Holding Lisinopril. Continue Bumex 1 mg QD  - hold IVF for now    #Diabetes Mellitus type II   - Last HbA1C 6.6 % (05-14-21).  - Monitor FS. Start insulin if FS > 180. Keep between 140 - 180    #CKD stage 3B  - Creatinine Trend: 1.2 mg/dL (17 May 2021 04:57), 1.2 mg/dL (16 May 2021 08:09), 1.4 mg/dL (15 May 2021 05:57), 1.2 mg/dL (14 May 2021 04:43), 1.2 mg/dL (13 May 2021 15:51)  - Continue to monitor. Avoid nephrotoxic drugs        -Pain control:     -GI/Bowel Mgmt: bowel meds prn    -Bladder management: No issues at this time     -Skin:  No active issues at this time    -FEN     - Diet: DASH/TLC; carb consistent            Precautions / PROPHYLAXIS:      - Falls    - Ortho: Weight bearing status: No issues at this time      - DVT prophylaxis: Lovenox      MEDICAL PROGNOSIS: GOOD            REHAB POTENTIAL: GOOD             ESTIMATED DISPOSITION: HOME WITH HOME CARE       [ x ]  The goals of the IRF admission were discussed with the patient and or family member, who agreed             ELOS:  [  x   ] 7-14    [    ]  14-21    [    ]  Other    THERAPY ORDERS and INITIAL INDIVIDUALIZED PLAN OF CARE:  This initial individualized interdisciplinary plan of care, which was established by me (the attending physiatrist), is based on elements from the post admission evaluation. The interdisciplinary therapy program is to be at least 3 hrs a day, at least 5 days per week from from physical, occupational and/ or speech therapies as ordered by me below.      [ x  ] P.T. 90 mins. /day at least 5 out of 7 days:  [  x ] superficial  modalities prn, [ x  ] A/AAROM, [ x  ] PREs, [ x  ] transfer training,            [ x  ] progressive ambulation, [x   ] stairs                                               [ x  ] O.T. 90 mins. /day at least 5 out of 7 days::  [ x  ] modalities prn,  [ x  ]A/AAROM, [ x  ] PREs, [  x ] functional transfer training, [ x  ] ADL training           [ x  ] cognitive/ perceptual eval and training, [   ] splint eval                                                  [ x  ] S.L.P:  [ x  ] speech eval, [ x  ] swallow eval     [ x  ] Neuropsychology eval     [ x  ] Individualized rec. therapy      RATIONALE FOR INPATIENT ADMISSION - Patient demonstrates the following: (check all that apply)  [X] Medically appropriate for acute rehabilitation admission. Requires interdisiplinary therapy consisting of at least PT and OT, at least 3 hrs. a day at least 5 days a week  [X] Has attainable rehab goals with an appropriate initial discharge plan  [X] Has rehabilitation potential (expected to make a significant improvement within a reasonable period of time)  [X] Requires close medical management by a rehab physician, rehab nursing care,  and comprehensive interdisciplinary team (including PT, OT)    [X] Requires evaluation by a physiatrist at least 3 days a week to evaluate and manage and coordinate rehab and medical problems   ASSESSMENT/PLAN    Rehab of Traumatic SDH with no LOC  Patient seen and evaluated with Dr. Magana  Patient will require at least 3 hours, 5 days per week of PT, OT.     # Traumatic subdural hematoma without LOC  - CT Head Non Cont (05/14/21): Right-sided acute on chronic subdural hemorrhage with associated mass effect, unchanged.  - Fall 10 days ago. Was on ASA 81mg PO QD  - Evaluated by neurosurgery. Received platelets on 05/13.   - Continue Keppra 500 mg q12hrs  - Neurovascular f/u as outpt in 2 weeks for elective MMA embolization  - Continue ASA 81 QD   - Repeat Head CT on 5/19 showed stable bleed without significant change    # HTN  - Continue Bumex 1 mg daily  - Lisinopril on hold    #Diabetes Mellitus type II   - Last HbA1C 6.6 % (05/14/21).  - Monitor FS. Start insulin if FS > 180. Keep between 140 - 180    #CKD stage 3B  - Will continue to monitor. Avoid nephrotoxic drugs        -Pain control: Tylenol prn    -GI/Bowel Mgmt: bowel meds prn    -Bladder management: No issues at this time     -Skin:  No active issues at this time    -FEN     - Diet: DASH/TLC; carb consistent            Precautions / PROPHYLAXIS:      - Falls    - Ortho: Weight bearing status: WBAT      - DVT prophylaxis: Lovenox      MEDICAL PROGNOSIS: GOOD            REHAB POTENTIAL: GOOD             ESTIMATED DISPOSITION: HOME WITH HOME CARE       [ x ]  The goals of the IRF admission were discussed with the patient and or family member, who agreed             ELOS:  [  x   ] 7-14    [    ]  14-21    [    ]  Other    THERAPY ORDERS and INITIAL INDIVIDUALIZED PLAN OF CARE:  This initial individualized interdisciplinary plan of care, which was established by me (the attending physiatrist), is based on elements from the post admission evaluation. The interdisciplinary therapy program is to be at least 3 hrs a day, at least 5 days per week from from physical, occupational and/ or speech therapies as ordered by me below.      [ x  ] P.T. 90 mins. /day at least 5 out of 7 days:  [  x ] superficial  modalities prn, [ x  ] A/AAROM, [ x  ] PREs, [ x  ] transfer training,            [ x  ] progressive ambulation, [x   ] stairs                                               [ x  ] O.T. 90 mins. /day at least 5 out of 7 days::  [ x  ] modalities prn,  [ x  ]A/AAROM, [ x  ] PREs, [  x ] functional transfer training, [ x  ] ADL training           [ x  ] cognitive/ perceptual eval and training, [   ] splint eval                                                  [ x  ] S.L.P:  [ x  ] speech eval, [ x  ] swallow eval     [ x  ] Neuropsychology eval     [ x  ] Individualized rec. therapy      RATIONALE FOR INPATIENT ADMISSION - Patient demonstrates the following: (check all that apply)  [X] Medically appropriate for acute rehabilitation admission. Requires interdisiplinary therapy consisting of at least PT and OT, at least 3 hrs. a day at least 5 days a week  [X] Has attainable rehab goals with an appropriate initial discharge plan  [X] Has rehabilitation potential (expected to make a significant improvement within a reasonable period of time)  [X] Requires close medical management by a rehab physician, rehab nursing care,  and comprehensive interdisciplinary team (including PT, OT)    [X] Requires evaluation by a physiatrist at least 3 days a week to evaluate and manage and coordinate rehab and medical problems   ASSESSMENT/PLAN    Rehab of Traumatic SDH with no LOC  Patient seen and evaluated with Dr. Magana  Patient will require at least 3 hours, 5 days per week of PT, OT, ST and Neuropsych eval    # Traumatic subdural hematoma without LOC  - CT Head Non Cont (05/14/21): Right-sided acute on chronic subdural hemorrhage with associated mass effect, unchanged.  - Fall 10 days ago. Was on ASA 81mg PO QD  - Evaluated by neurosurgery. Received platelets on 05/13. Stable.  - Continue Keppra 500 mg q12hrs prophylxis  - Neurovascular f/u as outpt in 2 weeks for elective MMA embolization  - Continue ASA 81 QD   - Repeat Head CT on 5/19 showed stable bleed without significant change    # HTN  - Continue Bumex 1 mg daily  - Lisinopril on hold    #Diabetes Mellitus type II   - Last HbA1C 6.6 % (05/14/21).  - Monitor FS. Start insulin if FS > 180. Keep between 140 - 180    #CKD stage 3B  - Will continue to monitor. Avoid nephrotoxic drugs        -Pain control: Tylenol prn    -GI/Bowel Mgmt: bowel meds prn    -Bladder management: No issues at this time     -Skin:  No active issues at this time    -FEN     - Diet: DASH/TLC; carb consistent            Precautions / PROPHYLAXIS:      - Falls    - Ortho: Weight bearing status: WBAT      - DVT prophylaxis: Lovenox      MEDICAL PROGNOSIS: GOOD            REHAB POTENTIAL: GOOD             ESTIMATED DISPOSITION: HOME WITH HOME CARE       [ x ]  The goals of the IRF admission were discussed with the patient and or family member, who agreed             ELOS:  [  x   ] 7-14    [    ]  14-21    [    ]  Other    THERAPY ORDERS and INITIAL INDIVIDUALIZED PLAN OF CARE:  This initial individualized interdisciplinary plan of care, which was established by me (the attending physiatrist), is based on elements from the post admission evaluation. The interdisciplinary therapy program is to be at least 3 hrs a day, at least 5 days per week from from physical, occupational and/ or speech therapies as ordered by me below.      [ x  ] P.T. 90 mins. /day at least 5 out of 7 days:  [  x ] superficial  modalities prn, [ x  ] A/AAROM, [ x  ] PREs, [ x  ] transfer training,            [ x  ] progressive ambulation, [x   ] stairs                                               [ x  ] O.T. 90 mins. /day at least 5 out of 7 days::  [ x  ] modalities prn,  [ x  ]A/AAROM, [ x  ] PREs, [  x ] functional transfer training, [ x  ] ADL training           [ x  ] cognitive/ perceptual eval and training, [   ] splint eval                                                  [ x  ] S.L.P:  [ x  ] speech eval, [ x  ] swallow eval     [ x  ] Neuropsychology eval     [ x  ] Individualized rec. therapy      RATIONALE FOR INPATIENT ADMISSION - Patient demonstrates the following: (check all that apply)  [X] Medically appropriate for acute rehabilitation admission. Requires interdisciplinary therapy consisting of at least PT and OT, at least 3 hrs. a day at least 5 days a week  [X] Has attainable rehab goals with an appropriate initial discharge plan  [X] Has rehabilitation potential (expected to make a significant improvement within a reasonable period of time)  [X] Requires close medical management by a rehab physician, rehab nursing care,  and comprehensive interdisciplinary team (including PT, OT)    [X] Requires evaluation by a physiatrist at least 3 days a week to evaluate and manage and coordinate rehab and medical problems   ASSESSMENT/PLAN    Rehab of Traumatic SDH with no LOC  Patient seen and evaluated with Dr. Magana  Patient will require at least 3 hours, 5 days per week of PT, OT, ST and Neuropsych eval    # Traumatic subdural hematoma without LOC  - CT Head Non Cont (05/14/21): Right-sided acute on chronic subdural hemorrhage with associated mass effect, unchanged.  - Fall 10 days ago. Was on ASA 81mg PO QD  - Evaluated by neurosurgery. Received platelets on 05/13. Stable.  - Continue Keppra 500 mg q12hrs prophylxis  - Neurovascular f/u as outpt in 2 weeks for elective MMA embolization  - Continue ASA 81 QD   - Repeat Head CT on 5/19 showed stable bleed without significant change    # HTN  - Continue Bumex 1 mg daily  - Lisinopril on hold    #Diabetes Mellitus type II   - Last HbA1C 6.6 % (05/14/21).  - Monitor FS. Start insulin if FS > 180. Keep between 140 - 180    #CKD stage 3A  - Will continue to monitor. Avoid nephrotoxic drugs        -Pain control: Tylenol prn    -GI/Bowel Mgmt: bowel meds prn    -Bladder management: No issues at this time     -Skin:  No active issues at this time    -FEN     - Diet: DASH/TLC; carb consistent            Precautions / PROPHYLAXIS:      - Falls    - Ortho: Weight bearing status: WBAT      - DVT prophylaxis: Lovenox      MEDICAL PROGNOSIS: GOOD            REHAB POTENTIAL: GOOD             ESTIMATED DISPOSITION: HOME WITH HOME CARE       [ x ]  The goals of the IRF admission were discussed with the patient and or family member, who agreed             ELOS:  [  x   ] 7-14    [    ]  14-21    [    ]  Other    THERAPY ORDERS and INITIAL INDIVIDUALIZED PLAN OF CARE:  This initial individualized interdisciplinary plan of care, which was established by me (the attending physiatrist), is based on elements from the post admission evaluation. The interdisciplinary therapy program is to be at least 3 hrs a day, at least 5 days per week from from physical, occupational and/ or speech therapies as ordered by me below.      [ x  ] P.T. 90 mins. /day at least 5 out of 7 days:  [  x ] superficial  modalities prn, [ x  ] A/AAROM, [ x  ] PREs, [ x  ] transfer training,            [ x  ] progressive ambulation, [x   ] stairs                                               [ x  ] O.T. 90 mins. /day at least 5 out of 7 days::  [ x  ] modalities prn,  [ x  ]A/AAROM, [ x  ] PREs, [  x ] functional transfer training, [ x  ] ADL training           [ x  ] cognitive/ perceptual eval and training, [   ] splint eval                                                  [ x  ] S.L.P:  [ x  ] speech eval, [ x  ] swallow eval     [ x  ] Neuropsychology eval     [ x  ] Individualized rec. therapy      RATIONALE FOR INPATIENT ADMISSION - Patient demonstrates the following: (check all that apply)  [X] Medically appropriate for acute rehabilitation admission. Requires interdisciplinary therapy consisting of at least PT and OT, at least 3 hrs. a day at least 5 days a week  [X] Has attainable rehab goals with an appropriate initial discharge plan  [X] Has rehabilitation potential (expected to make a significant improvement within a reasonable period of time)  [X] Requires close medical management by a rehab physician, rehab nursing care,  and comprehensive interdisciplinary team (including PT, OT)    [X] Requires evaluation by a physiatrist at least 3 days a week to evaluate and manage and coordinate rehab and medical problems

## 2021-05-19 NOTE — PROGRESS NOTE ADULT - ASSESSMENT
77 YO M with PMHx of diverticulosis, DM type II, HTN, HLD, CKD stage 3A, ostearthritis, bleeding gastric ulcer, anemia presenting with period of AMS.     #Traumatic complex right convexity subdural hematoma  - CT Head No Cont (05/14/21): Right-sided acute on chronic subdural hemorrhage with associated mass effect, unchanged.  - Fall 10 days ago. Was on ASA 81mg PO QD  - Evaluated by neurosurgery. Received platelets on 05/13.   - Continue Keppra 500 mg Q12. N  - Neurovascular f/u as outpt in 2 weeks for elective MMA embolization  - f/u Rehab recommendations  - restart ASA 81 QD and repeat HCT in 24 hrs  - if rpt HCT stable, no further inpt neurologic w/u and may d/c to SNF    #Hx of HTN  #Hypotensive on 05/16  - Holding Lisinopril. Continue Bumex 1 mg QD  - hold IVF for now    #Diabetes Mellitus type II   - Last HbA1C 6.6 % (05-14-21).  - Monitor FS. Start insulin if FS > 180. Keep between 140 - 180    #CKD stage 3B  - Creatinine Trend: 1.2 mg/dL (17 May 2021 04:57), 1.2 mg/dL (16 May 2021 08:09), 1.4 mg/dL (15 May 2021 05:57), 1.2 mg/dL (14 May 2021 04:43), 1.2 mg/dL (13 May 2021 15:51)  - Continue to monitor. Avoid nephrotoxic drugs     #Misc  - DVT Prophylaxis: Lovenox   - Diet: DASH/TLC; carb consistent   - GI Prophylaxis: Pantoprazole   - Activity: AAT  - Code Status: Full Code    Dispo: may downgrade to floor

## 2021-05-19 NOTE — PROGRESS NOTE ADULT - SUBJECTIVE AND OBJECTIVE BOX
MANDA TAVAREZ 76y Male  MRN#: [id number]   Hospital Day: 6d    SUBJECTIVE  Patient is a 76y old Male who presents with a chief complaint of Change in mental status (18 May 2021 13:41)  Currently admitted to medicine with the primary diagnosis of Subdural hematoma      INTERVAL HPI AND OVERNIGHT EVENTS:  Patient was examined and seen at bedside. This morning he is resting comfortably in bed and reports no issues or overnight events.    REVIEW OF SYMPTOMS:  CONSTITUTIONAL: No weakness, fevers or chills; No headaches  EYES: No visual changes, eye pain, or discharge  ENT: No vertigo; No ear pain or change in hearing; No sore throat or difficulty swallowing  NECK: No pain or stiffness  RESPIRATORY: No cough, wheezing, or hemoptysis; No shortness of breath  CARDIOVASCULAR: No chest pain or palpitations  GASTROINTESTINAL: No abdominal or epigastric pain; No nausea, vomiting, or hematemesis; No diarrhea or constipation; No melena or hematochezia  GENITOURINARY: No dysuria, frequency or hematuria  MUSCULOSKELETAL: No joint pain, no muscle pain, no weakness  NEUROLOGICAL: No numbness or weakness  SKIN: No itching or rashes    OBJECTIVE  PAST MEDICAL & SURGICAL HISTORY  Diverticulitis    Herniated disc, cervical    Chronic gout of foot, unspecified cause, unspecified laterality    Type 2 diabetes mellitus without complication, unspecified long term insulin use status    Hypertension, unspecified type    High cholesterol    Osteoarthritis of multiple joints, unspecified osteoarthritis type    Sciatica, unspecified laterality    History of tonsillectomy and adenoidectomy    H/O colonoscopy  2018      ALLERGIES:  No Known Allergies    MEDICATIONS:  STANDING MEDICATIONS  allopurinol 300 milliGRAM(s) Oral daily  aspirin  chewable 81 milliGRAM(s) Oral daily  atorvastatin 40 milliGRAM(s) Oral at bedtime  buMETAnide 1 milliGRAM(s) Oral daily  calamine/zinc oxide Lotion 1 Application(s) Topical two times a day  chlorhexidine 4% Liquid 1 Application(s) Topical daily  enoxaparin Injectable 40 milliGRAM(s) SubCutaneous at bedtime  ferrous    sulfate 325 milliGRAM(s) Oral daily  levETIRAcetam  IVPB 500 milliGRAM(s) IV Intermittent every 12 hours  omega-3-Acid Ethyl Esters 2 Gram(s) Oral two times a day  pantoprazole  Injectable 40 milliGRAM(s) IV Push daily    PRN MEDICATIONS  acetaminophen   Tablet .. 650 milliGRAM(s) Oral every 6 hours PRN  diphenhydrAMINE 25 milliGRAM(s) Oral every 4 hours PRN      VITAL SIGNS: Last 24 Hours  T(C): 35.8 (19 May 2021 04:57), Max: 36.4 (18 May 2021 20:46)  T(F): 96.4 (19 May 2021 04:57), Max: 97.6 (18 May 2021 20:46)  HR: 88 (19 May 2021 04:57) (67 - 94)  BP: 140/66 (19 May 2021 04:57) (92/54 - 140/66)  BP(mean): 95 (18 May 2021 23:22) (58 - 95)  RR: 18 (19 May 2021 04:57) (18 - 18)  SpO2: 98% (19 May 2021 04:57) (98% - 99%)    LABS:                        10.2   7.10  )-----------( 286      ( 19 May 2021 05:26 )             32.7     05-19    139  |  109  |  46<H>  ----------------------------<  101<H>  4.3   |  22  |  1.1    Ca    8.9      19 May 2021 05:26  Mg     2.0     05-19    TPro  6.4  /  Alb  3.9  /  TBili  <0.2  /  DBili  x   /  AST  19  /  ALT  15  /  AlkPhos  137<H>  05-19                  RADIOLOGY:      PHYSICAL EXAM:  CONSTITUTIONAL: No acute distress, well-developed, well-groomed, AAOx3  HEAD: Atraumatic, normocephalic  EYES: EOM intact, PERRLA, conjunctiva and sclera clear  ENT: Supple, no masses, no thyromegaly, no bruits, no JVD; moist mucous membranes  PULMONARY: Clear to auscultation bilaterally; no wheezes, rales, or rhonchi  CARDIOVASCULAR: Regular rate and rhythm; no murmurs, rubs, or gallops  GASTROINTESTINAL: Soft, non-tender, non-distended; bowel sounds present  MUSCULOSKELETAL: 2+ peripheral pulses; no clubbing, no cyanosis, no edema  NEUROLOGY: non-focal  SKIN: No rashes or lesions; warm and dry

## 2021-05-19 NOTE — H&P ADULT - NSHPREVIEWOFSYSTEMS_GEN_ALL_CORE
REVIEW OF SYSTEMS   Constiutional:    [  x ] WNL           [   ] poor appetite   [   ] insomnia   [   ] tired   Cardio:                [  x ] WNL           [   ] CP   [   ] REECE   [   ] palpitations               Resp:                   [ x  ] WNL           [   ] SOB   [   ] cough   [   ] wheezing   GI:                        [ x  ] WNL           [   ] constipation   [   ] diarrhea   [   ] abdominal pain   [   ] nausea   [   ] emesis                                :                      [ x  ] WNL           [   ] BROOKS  [   ] dysuria   [   ] difficulty voiding             Endo:                   [ x  ] WNL          [   ] polyuria   [   ] temperature intolerance                 Skin:                     [ x  ] WNL          [   ] pain   [   ] wound   [   ] rash   MSK:                    [   ] WNL          [   ] muscle pain   [ x  ] joint pain/ stiffness   [   ] muscle tenderness   [   ] swelling   Neuro:                 [ x  ] WNL          [   ] HA   [   ] change in vision   [   ] tremor   [   ] weakness   [   ]dysphagia              Cognitive:           [  x ] WNL           [   ]confusion      Psych:                  [ x  ] WNL           [   ] hallucinations   [   ]agitation   [   ] delusion   [   ]depression REVIEW OF SYSTEMS   Constiutional:    [  x ] WNL           [   ] poor appetite   [   ] insomnia   [   ] tired   Cardio:                [  x ] WNL           [   ] CP   [   ] REECE   [   ] palpitations               Resp:                   [ x  ] WNL           [   ] SOB   [   ] cough   [   ] wheezing   GI:                        [ x  ] WNL           [   ] constipation   [   ] diarrhea   [   ] abdominal pain   [   ] nausea   [   ] emesis                                :                      [ x  ] WNL           [   ] BROOKS  [   ] dysuria   [   ] difficulty voiding             Endo:                   [ x  ] WNL          [   ] polyuria   [   ] temperature intolerance                 Skin:                     [ x  ] WNL          [   ] pain   [   ] wound   [   ] rash   MSK:                    [   ] WNL          [   ] muscle pain   [ x  ] joint pain/ stiffness left shoulder  [   ] muscle tenderness   [   ] swelling   Neuro:                 [   ] WNL          [   ] HA   [   ] change in vision   [   ] tremor   [   ] weakness   [   ]dysphagia   [ x ] impaired ambulation           Cognitive:           [  x ] WNL           [   ]confusion      Psych:                  [ x  ] WNL           [   ] hallucinations   [   ]agitation   [   ] delusion   [   ]depression

## 2021-05-19 NOTE — DISCHARGE NOTE PROVIDER - NSDCCPCAREPLAN_GEN_ALL_CORE_FT
PRINCIPAL DISCHARGE DIAGNOSIS  Diagnosis: Subdural hematoma  Assessment and Plan of Treatment: You were admitted to the hospital after presenting with period of altered mental status. Upon arrival you were noted to have a low blood glucose. Your mental status improved after being treated for low blood glucose. You underwent CT of the head which should subacute subdural hematoma. You were noted to have findings consistant with prior subdural hematoma. You were seen by neurology and neurosurgery and they recommended out patient follow up for elective MMA embolization.  Please follow up with your primary care physcian, neurology and neurosurgery within 1-2 week(s) after discharge. Please take your medications as prescribed.

## 2021-05-19 NOTE — DISCHARGE NOTE PROVIDER - CARE PROVIDERS DIRECT ADDRESSES
[email or web address]

## 2021-05-19 NOTE — DISCHARGE NOTE PROVIDER - NSDCFUADDINST_GEN_ALL_CORE_FT
Please follow up with your primary care physician, neurology and neurosurgery within 1-2 week(s) after discharge. Please take your medications as prescribed.

## 2021-05-19 NOTE — PROGRESS NOTE ADULT - ATTENDING COMMENTS
I have personally seen and examined this patient.  I have fully participated in the care of this patient.  I have reviewed all pertinent clinical information, including history, physical exam, plan and note.   I have reviewed all pertinent clinical information and reviewed all relevant imaging and diagnostic studies personally.  Pt w/ acute on chronic SDH currently stable neurologically.  Recommendations as above.  If no plan for MMA embolization this admission, can f/u as outpt with neuroendovascular with repeat HCT in 2 weeks.
I have personally seen and examined this patient on 5/16.  I have fully participated in the care of this patient.  I have reviewed all pertinent clinical information, including history, physical exam, plan and note.  Kathleen is alert. No acute complaint. Reports left shoulder pain. Exam shows left arm weakness. PT/OT. SBP<140.  I have reviewed all pertinent clinical information and reviewed all relevant imaging and diagnostic studies personally.  Recommendations as above.  Agree with above assessment except as noted.
I have personally seen and examined this patient.  I have fully participated in the care of this patient.  I have reviewed all pertinent clinical information, including history, physical exam, plan and note.   I have reviewed all pertinent clinical information and reviewed all relevant imaging and diagnostic studies personally.  Recommendations as above.  Agree with above assessment except as noted.
I have personally seen and examined this patient.  I have fully participated in the care of this patient.  I have reviewed all pertinent clinical information, including history, physical exam, plan and note.   I have reviewed all pertinent clinical information and reviewed all relevant imaging and diagnostic studies personally.  Pt w/ acute on chronic SDH currently stable.  restart ASA and repeat HCT and if stable, may discharge with outpt neuroendovascular f/u to scheduled elective MMA embolization.  Recommendations as above.  Agree with above assessment except as noted.
pt seen and examined. Pt with good stable MS. Concern regarding pain and limited ROM of left uppr and lower ext secondary to trauma and concern for orthopedic injury. Please check xrays as above. Repeat CTH stable. Pending MMA to prevent expansion of SDH and need for surgical intervention as currently no neurosurgical intervention is indicated. Please call for any concerns. Stat head CT for any changes in MS. Please reconsult as needed.
Patient seen this morning on stroke rounds and his imaging, notes, labs and vitals and meds reviewed.  Patient has no change in his neurological exam compared with yesterdays exam documented in EMR    Plan as above

## 2021-05-19 NOTE — PROGRESS NOTE ADULT - SUBJECTIVE AND OBJECTIVE BOX
Nephrology progress note     Patient is a Patient is a 75 yo man with long standing diabetes, retinopathy, nephrotic range proteinurua, bleeding gastric ulcer, gout, and subdural hematoma.  Recent admission with metformin toxicity and KELL and lactic acidosis - required hemodialysis.  He was admitted with confusion but currently is alert clsoe to baseline. Per his account he fell afew days ago  fell asleep and fell of hair) and struch head.  Now with moderate subdural hematoms      ON events/Subjective:     Allergies:  No Known Allergies    Hospital Medications:   MEDICATIONS  (STANDING):  allopurinol 300 milliGRAM(s) Oral daily  aspirin  chewable 81 milliGRAM(s) Oral daily  atorvastatin 40 milliGRAM(s) Oral at bedtime  buMETAnide 1 milliGRAM(s) Oral daily  calamine/zinc oxide Lotion 1 Application(s) Topical two times a day  chlorhexidine 4% Liquid 1 Application(s) Topical daily  enoxaparin Injectable 40 milliGRAM(s) SubCutaneous at bedtime  ferrous    sulfate 325 milliGRAM(s) Oral daily  levETIRAcetam  IVPB 500 milliGRAM(s) IV Intermittent every 12 hours  omega-3-Acid Ethyl Esters 2 Gram(s) Oral two times a day  pantoprazole  Injectable 40 milliGRAM(s) IV Push daily    REVIEW OF SYSTEMS:  CONSTITUTIONAL: No weakness, fevers or chills  EYES/ENT: No visual changes;  No vertigo or throat pain   NECK: No pain or stiffness  RESPIRATORY: No cough, wheezing, hemoptysis; No shortness of breath  CARDIOVASCULAR: No chest pain or palpitations.  GASTROINTESTINAL: No abdominal or epigastric pain. No nausea, vomiting, or hematemesis; No diarrhea or constipation. No melena or hematochezia.  GENITOURINARY: No dysuria, frequency, foamy urine, urinary urgency, incontinence or hematuria  NEUROLOGICAL: No numbness or weakness  SKIN: No itching, burning, rashes, or lesions   VASCULAR: No bilateral lower extremity edema.   All other review of systems is negative unless indicated above.    VITALS:  T(F): 96.4 (21 @ 04:57), Max: 97.6 (21 @ 20:46)  HR: 88 (21 @ 04:57)  BP: 140/66 (21 @ 04:57)  RR: 18 (21 @ 04:57)  SpO2: 98% (21 @ 04:57)  Wt(kg): --     @ 07:01  -   @ 07:00  --------------------------------------------------------  IN: 2790 mL / OUT: 2200 mL / NET: 590 mL     @ 07:01  -   @ 07:00  --------------------------------------------------------  IN: 100 mL / OUT: 2100 mL / NET: -2000 mL        PHYSICAL EXAM:  Constitutional: NAD  HEENT: anicteric sclera, oropharynx clear, MMM  Neck: No JVD  Respiratory: CTAB, no wheezes, rales or rhonchi  Cardiovascular: S1, S2, RRR  Gastrointestinal: BS+, soft, NT/ND  Extremities: No cyanosis or clubbing. No peripheral edema  Neurological: A/O x 3, no focal deficits  Psychiatric: Normal mood, normal affect  : No CVA tenderness. No gil.   Skin: No rashes  Vascular Access:    LABS:      139  |  109  |  46<H>  ----------------------------<  101<H>  4.3   |  22  |  1.1    Ca    8.9      19 May 2021 05:26  Mg     2.0         TPro  6.4  /  Alb  3.9  /  TBili  <0.2  /  DBili      /  AST    /  ALT  15  /  AlkPhos  137<H>                            10.2   7.10  )-----------( 286      ( 19 May 2021 05:26 )             32.7       Urine Studies:  Creatinine Trend: 1.1<--, 1.0<--, 1.2<--, 1.2<--, 1.4<--, 1.2<--  Urinalysis Basic - ( 13 May 2021 15:51 )    Color: Colorless / Appearance: Clear / S.010 / pH:   Gluc:  / Ketone: Negative  / Bili: Negative / Urobili: <2 mg/dL   Blood:  / Protein: Trace / Nitrite: Negative   Leuk Esterase: Negative / RBC:  / WBC    Sq Epi:  / Non Sq Epi:  / Bacteria:       home meds: bumex 1 mg po qd, liptor 40 mg po qd, allopurinol 300 mg po qd, quinapril 40 mg po qd, pantoprazole 40 mg po qd, omega 3 1 gr qid, ASA 81 mg qd, osemi bid    ·	s/p fall 2 weeks ago with head trauma and now subacute moderate subdural hematoma  ·	per neuro- may have subacute subdural on top of acute suggestive of more chronic bleeding independent of recent fall  ·	ckd likely stage 3  ·	bp stable   ·	anemia with adequate iron stores  ·	nephrotic range proteinuria 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

## 2021-05-19 NOTE — PROGRESS NOTE ADULT - ASSESSMENT
Impression:  Mr. Polanco is a 77 yo male patient with PMH of diverticulosis, DM, HTN, HLD, CKD stage 3A, ostearthritis, bleeding gastric ulcer, anemia presenting with period of AMS. CT head was done showing subacute subdural hematoma. Neurosurgery evaluated patient in ED, no immediate surgical indication. CTH stable NIH 0.    Suggestion:  -Follow up as outpatient with neuroendovascular Dr Wynn in 2 weeks with a ct scan of the head day of appt.   -Can continue ASA.   -Can continue Keppra for now.     Irineo Sanders NP  [id number]

## 2021-05-19 NOTE — PROGRESS NOTE ADULT - NSICDXPILOT_GEN_ALL_CORE
[place]

## 2021-05-19 NOTE — PROGRESS NOTE ADULT - SUBJECTIVE AND OBJECTIVE BOX
Talked to patient's wife in his room.     Social History/IADLs: Per wife patient had been independent till this January when he fell and needed subacute rehab for a few weeks (discharged March). Since then wife has taken over bill payment and medication management as he drops the pills and had difficulty writing. She stated that he was able to handle them cognitively and she involves him while doing the bills. Patient was getting PT at home. She gave the background of the fall and stated that patient woke up very early and did not wait for her to go down to the kitchen as he was supposed to and was holding a coffee cup in one hand and walker in the other and fell. He hit his left side. Wife came down 10-15 minutes later and saw him on the floor. She helped him up and then patient later participated in PT. No mental status changes were seen at that time. About 10 days later he had a seizure and was brought in by ambulance.    Patient denies smoking, etoh or illicit drug use.    Patient lives with wife in private home with 2 stairs to enter and inside.  Patient previously ambulated with assistance with RW inside the house, would rarely leave the house. Reports being independent with ADL's  Has visiting nurse services for wound care of bilateral feet. (19 May 2021 10:34)    Previous Cognitive Status: WFL  Previous Psychiatric History: DEnied    Previous Psychiatry/Psychotherapy follow-up: [] No    Current status:  Mood changes: No, likes to joke around and make his wife laugh.  Cognition changes: Denied any significant changes, except he blanks out for a short period at a time maybe due to 'mini-seizures' and comes back.     Behavior changes:  []No    Patient's Primary Language: ENglish  a. Changes in understanding primary language after neurological event?  No     b. Preferred language for health information: English    Family's expectation of the Rehab program and Neuropyschology services: Cognitive and mood evaluation as needed    Family Education: Patient was educated about the rehabilitation process, current status, and family education sessions.

## 2021-05-19 NOTE — PROGRESS NOTE ADULT - SUBJECTIVE AND OBJECTIVE BOX
Neurology Progress Note    Interval History:    Mr. Polanco is a 75 yo male patient with PMH of diverticulosis, DM, HTN, HLD, CKD stage 3A, ostearthritis, bleeding gastric ulcer, anemia presenting with period of AMS.     Per wife pt had a large breakfast and shortly after was found acting confused and unable to open his eyes but saying his eyes were open. Ambulance was called, FS was found to be 70. Per wife pt seldom runs a FS under 90. Pt was given PO food/drink and FS improved as did pt's mental status. Wife says pt is currently at baseline in the ED. Pt endorsing chills everytime he goes to urinate xfew days. No hematuria, dysuria, increased frequency in urination. No f/n/v. No abdominal pain/back pain. No cough/sore throat/headache/weakness. Pt endorses fall 10 days ago with no LOC. No A/c.    In ED, patient was normotensive, afebrile. CT head was done showing subacute subdural hematoma. Neurosurgery evaluated patient in ED, no immediate surgical indication. Started on seizure prophylaxis. WIll be admitted to ICU with neurocheck Q 1 hour. (14 May 2021 00:01)        Vital Signs Last 24 Hrs  T(C): 35.8 (19 May 2021 04:57), Max: 36.4 (18 May 2021 20:46)  T(F): 96.4 (19 May 2021 04:57), Max: 97.6 (18 May 2021 20:46)  HR: 88 (19 May 2021 04:57) (67 - 94)  BP: 140/66 (19 May 2021 04:57) (92/54 - 140/66)  BP(mean): 95 (18 May 2021 23:22) (58 - 95)  RR: 18 (19 May 2021 04:57) (18 - 18)  SpO2: 98% (19 May 2021 04:57) (98% - 99%)    Neurological Exam:   Mental status: Awake, alert and oriented x3.  Recent and remote memory intact.  Naming, repetition and comprehension intact.  Attention/concentration intact.  No dysarthria, no aphasia.  Fund of knowledge appropriate.    Cranial nerves: Pupils equally round and reactive to light, visual fields full, no nystagmus, extraocular muscles intact, V1 through V3 intact bilaterally and symmetric, face symmetric, hearing intact to finger rub, palate elevation symmetric, tongue was midline.  Motor:  MRC grading 5/5 b/l UE/LE.   strength 5/5.  Normal tone and bulk.  No abnormal movements.    Sensation: Intact to light touch, proprioception, and pinprick.   HPI:  Mr. Polanco is a 75 yo male patient with PMH of diverticulosis, DM, HTN, HLD, CKD stage 3A, ostearthritis, bleeding gastric ulcer, anemia presenting with period of AMS.     Per wife pt had a large breakfast and shortly after was found acting confused and unable to open his eyes but saying his eyes were open. Ambulance was called, FS was found to be 70. Per wife pt seldom runs a FS under 90. Pt was given PO food/drink and FS improved as did pt's mental status. Wife says pt is currently at baseline in the ED. Pt endorsing chills everytime he goes to urinate xfew days. No hematuria, dysuria, increased frequency in urination. No f/n/v. No abdominal pain/back pain. No cough/sore throat/headache/weakness. Pt endorses fall 10 days ago with no LOC. No A/c.    In ED, patient was normotensive, afebrile. CT head was done showing subacute subdural hematoma. Neurosurgery evaluated patient in ED, no immediate surgical indication. Started on seizure prophylaxis. WIll be admitted to ICU with neurocheck Q 1 hour. (14 May 2021 00:01)  Coordination: No dysmetria on finger-to-nose and heel-to-shin.  No dysdiadokinesia.  Gait: Narrow and steady. No ataxia.  Romberg negative      NIHSS 0    Medications:  MEDICATIONS  (STANDING):  allopurinol 300 milliGRAM(s) Oral daily  aspirin  chewable 81 milliGRAM(s) Oral daily  atorvastatin 40 milliGRAM(s) Oral at bedtime  buMETAnide 1 milliGRAM(s) Oral daily  calamine/zinc oxide Lotion 1 Application(s) Topical two times a day  chlorhexidine 4% Liquid 1 Application(s) Topical daily  enoxaparin Injectable 40 milliGRAM(s) SubCutaneous at bedtime  ferrous    sulfate 325 milliGRAM(s) Oral daily  levETIRAcetam  IVPB 500 milliGRAM(s) IV Intermittent every 12 hours  omega-3-Acid Ethyl Esters 2 Gram(s) Oral two times a day  pantoprazole  Injectable 40 milliGRAM(s) IV Push daily      Labs:  CBC Full  -  ( 19 May 2021 05:26 )  WBC Count : 7.10 K/uL  RBC Count : 3.72 M/uL  Hemoglobin : 10.2 g/dL  Hematocrit : 32.7 %  Platelet Count - Automated : 286 K/uL  Mean Cell Volume : 87.9 fL  Mean Cell Hemoglobin : 27.4 pg  Mean Cell Hemoglobin Concentration : 31.2 g/dL  Auto Neutrophil # : x  Auto Lymphocyte # : x  Auto Monocyte # : x  Auto Eosinophil # : x  Auto Basophil # : x  Auto Neutrophil % : x  Auto Lymphocyte % : x  Auto Monocyte % : x  Auto Eosinophil % : x  Auto Basophil % : x    05-19    139  |  109  |  46<H>  ----------------------------<  101<H>  4.3   |  22  |  1.1    Ca    8.9      19 May 2021 05:26  Mg     2.0     05-19    TPro  6.4  /  Alb  3.9  /  TBili  <0.2  /  DBili  x   /  AST  19  /  ALT  15  /  AlkPhos  137<H>  05-19    LIVER FUNCTIONS - ( 19 May 2021 05:26 )  Alb: 3.9 g/dL / Pro: 6.4 g/dL / ALK PHOS: 137 U/L / ALT: 15 U/L / AST: 19 U/L / GGT: x

## 2021-05-20 LAB
ALBUMIN SERPL ELPH-MCNC: 3.6 G/DL — SIGNIFICANT CHANGE UP (ref 3.5–5.2)
ALP SERPL-CCNC: 128 U/L — HIGH (ref 30–115)
ALT FLD-CCNC: 15 U/L — SIGNIFICANT CHANGE UP (ref 0–41)
ANION GAP SERPL CALC-SCNC: 13 MMOL/L — SIGNIFICANT CHANGE UP (ref 7–14)
AST SERPL-CCNC: 16 U/L — SIGNIFICANT CHANGE UP (ref 0–41)
BILIRUB SERPL-MCNC: 0.3 MG/DL — SIGNIFICANT CHANGE UP (ref 0.2–1.2)
BUN SERPL-MCNC: 48 MG/DL — HIGH (ref 10–20)
CALCIUM SERPL-MCNC: 8.8 MG/DL — SIGNIFICANT CHANGE UP (ref 8.5–10.1)
CHLORIDE SERPL-SCNC: 108 MMOL/L — SIGNIFICANT CHANGE UP (ref 98–110)
CO2 SERPL-SCNC: 22 MMOL/L — SIGNIFICANT CHANGE UP (ref 17–32)
CREAT SERPL-MCNC: 1 MG/DL — SIGNIFICANT CHANGE UP (ref 0.7–1.5)
GLUCOSE BLDC GLUCOMTR-MCNC: 115 MG/DL — HIGH (ref 70–99)
GLUCOSE BLDC GLUCOMTR-MCNC: 143 MG/DL — HIGH (ref 70–99)
GLUCOSE BLDC GLUCOMTR-MCNC: 154 MG/DL — HIGH (ref 70–99)
GLUCOSE BLDC GLUCOMTR-MCNC: 158 MG/DL — HIGH (ref 70–99)
GLUCOSE BLDC GLUCOMTR-MCNC: 162 MG/DL — HIGH (ref 70–99)
GLUCOSE SERPL-MCNC: 89 MG/DL — SIGNIFICANT CHANGE UP (ref 70–99)
HCT VFR BLD CALC: 33.3 % — LOW (ref 42–52)
HGB BLD-MCNC: 10.2 G/DL — LOW (ref 14–18)
MAGNESIUM SERPL-MCNC: 1.9 MG/DL — SIGNIFICANT CHANGE UP (ref 1.8–2.4)
MCHC RBC-ENTMCNC: 26.9 PG — LOW (ref 27–31)
MCHC RBC-ENTMCNC: 30.6 G/DL — LOW (ref 32–37)
MCV RBC AUTO: 87.9 FL — SIGNIFICANT CHANGE UP (ref 80–94)
NRBC # BLD: 0 /100 WBCS — SIGNIFICANT CHANGE UP (ref 0–0)
PLATELET # BLD AUTO: 289 K/UL — SIGNIFICANT CHANGE UP (ref 130–400)
POTASSIUM SERPL-MCNC: 4.3 MMOL/L — SIGNIFICANT CHANGE UP (ref 3.5–5)
POTASSIUM SERPL-SCNC: 4.3 MMOL/L — SIGNIFICANT CHANGE UP (ref 3.5–5)
PROT SERPL-MCNC: 6.4 G/DL — SIGNIFICANT CHANGE UP (ref 6–8)
RBC # BLD: 3.79 M/UL — LOW (ref 4.7–6.1)
RBC # FLD: 18.7 % — HIGH (ref 11.5–14.5)
SODIUM SERPL-SCNC: 143 MMOL/L — SIGNIFICANT CHANGE UP (ref 135–146)
WBC # BLD: 6.86 K/UL — SIGNIFICANT CHANGE UP (ref 4.8–10.8)
WBC # FLD AUTO: 6.86 K/UL — SIGNIFICANT CHANGE UP (ref 4.8–10.8)

## 2021-05-20 RX ORDER — LEVETIRACETAM 250 MG/1
250 TABLET, FILM COATED ORAL
Refills: 0 | Status: DISCONTINUED | OUTPATIENT
Start: 2021-05-20 | End: 2021-05-28

## 2021-05-20 RX ORDER — LANOLIN ALCOHOL/MO/W.PET/CERES
3 CREAM (GRAM) TOPICAL AT BEDTIME
Refills: 0 | Status: DISCONTINUED | OUTPATIENT
Start: 2021-05-20 | End: 2021-05-28

## 2021-05-20 RX ORDER — MAGNESIUM HYDROXIDE 400 MG/1
30 TABLET, CHEWABLE ORAL DAILY
Refills: 0 | Status: DISCONTINUED | OUTPATIENT
Start: 2021-05-20 | End: 2021-05-28

## 2021-05-20 RX ORDER — PETROLATUM,WHITE
1 JELLY (GRAM) TOPICAL DAILY
Refills: 0 | Status: DISCONTINUED | OUTPATIENT
Start: 2021-05-20 | End: 2021-05-24

## 2021-05-20 RX ORDER — PANTOPRAZOLE SODIUM 20 MG/1
40 TABLET, DELAYED RELEASE ORAL
Refills: 0 | Status: DISCONTINUED | OUTPATIENT
Start: 2021-05-20 | End: 2021-05-28

## 2021-05-20 RX ADMIN — Medication 325 MILLIGRAM(S): at 12:07

## 2021-05-20 RX ADMIN — ENOXAPARIN SODIUM 40 MILLIGRAM(S): 100 INJECTION SUBCUTANEOUS at 00:36

## 2021-05-20 RX ADMIN — ATORVASTATIN CALCIUM 40 MILLIGRAM(S): 80 TABLET, FILM COATED ORAL at 00:36

## 2021-05-20 RX ADMIN — Medication 650 MILLIGRAM(S): at 03:30

## 2021-05-20 RX ADMIN — Medication 1 APPLICATION(S): at 17:24

## 2021-05-20 RX ADMIN — Medication 2 GRAM(S): at 05:26

## 2021-05-20 RX ADMIN — ENOXAPARIN SODIUM 40 MILLIGRAM(S): 100 INJECTION SUBCUTANEOUS at 21:45

## 2021-05-20 RX ADMIN — ATORVASTATIN CALCIUM 40 MILLIGRAM(S): 80 TABLET, FILM COATED ORAL at 21:45

## 2021-05-20 RX ADMIN — PANTOPRAZOLE SODIUM 40 MILLIGRAM(S): 20 TABLET, DELAYED RELEASE ORAL at 12:07

## 2021-05-20 RX ADMIN — LEVETIRACETAM 250 MILLIGRAM(S): 250 TABLET, FILM COATED ORAL at 17:24

## 2021-05-20 RX ADMIN — CALAMINE AND ZINC OXIDE AND PHENOL 1 APPLICATION(S): 160; 10 LOTION TOPICAL at 05:25

## 2021-05-20 RX ADMIN — LEVETIRACETAM 420 MILLIGRAM(S): 250 TABLET, FILM COATED ORAL at 05:25

## 2021-05-20 RX ADMIN — CALAMINE AND ZINC OXIDE AND PHENOL 1 APPLICATION(S): 160; 10 LOTION TOPICAL at 17:24

## 2021-05-20 RX ADMIN — Medication 25 MILLIGRAM(S): at 00:36

## 2021-05-20 RX ADMIN — Medication 650 MILLIGRAM(S): at 00:37

## 2021-05-20 RX ADMIN — Medication 2 GRAM(S): at 17:25

## 2021-05-20 RX ADMIN — BUMETANIDE 1 MILLIGRAM(S): 0.25 INJECTION INTRAMUSCULAR; INTRAVENOUS at 05:25

## 2021-05-20 RX ADMIN — Medication 81 MILLIGRAM(S): at 12:07

## 2021-05-20 RX ADMIN — Medication 300 MILLIGRAM(S): at 12:07

## 2021-05-20 NOTE — PROGRESS NOTE ADULT - SUBJECTIVE AND OBJECTIVE BOX
patient seen for a follow-up evaluation: Post-traumatic amnesia evaluation (GOAT) s/p TBI - GOAT score = 95, no indication of notable Post-traumatic amnesia, pt. able to recall event and is fully oriented, did not provide an accurate date of admission to the hospital. Dc GOAT

## 2021-05-20 NOTE — PROGRESS NOTE ADULT - ASSESSMENT
ASSESSMENT/PLAN    Rehab of Traumatic SDH with no LOC  Patient seen and evaluated with Dr. Magana  Patient requires 3 hrs daily of interdisciplinary inpatient rehab at least 5 days a week.     # Traumatic subdural hematoma without LOC  - CT Head Non Cont (05/14/21): Right-sided acute on chronic subdural hemorrhage with associated mass effect, unchanged.  - Fall 10 days ago. Was on ASA 81mg PO QD  - Evaluated by neurosurgery. Received platelets on 05/13. Stable.  - Continue Keppra 500 mg q12hrs prophylxis  - Neurovascular f/u as outpt in 2 weeks for elective MMA embolization  - Continue ASA 81 QD   - Repeat Head CT on 5/19 showed stable bleed without significant change    # HTN  - Continue Bumex 1 mg daily  - Lisinopril on hold    #Diabetes Mellitus type II   - Last HbA1C 6.6 % (05/14/21).  - Monitor FS. Start insulin if FS > 180. Keep between 140 - 180    #CKD stage 3B  - Will continue to monitor. Avoid nephrotoxic drugs        -Pain control: Tylenol prn    -GI/Bowel Mgmt: bowel meds prn    -Bladder management: No issues at this time     -Skin:  No active issues at this time    -FEN     - Diet: DASH/TLC; carb consistent            Precautions / PROPHYLAXIS:      - Falls    - Ortho: Weight bearing status: WBAT      - DVT prophylaxis: Lovenox      MEDICAL PROGNOSIS: GOOD            REHAB POTENTIAL: GOOD             ESTIMATED DISPOSITION: HOME WITH HOME CARE       [ x ]  The goals of the IRF admission were discussed with the patient and or family member, who agreed             ELOS:  [  x   ] 7-14    [    ]  14-21    [    ]  Other    THERAPY ORDERS and INITIAL INDIVIDUALIZED PLAN OF CARE:  This initial individualized interdisciplinary plan of care, which was established by me (the attending physiatrist), is based on elements from the post admission evaluation. The interdisciplinary therapy program is to be at least 3 hrs a day, at least 5 days per week from from physical, occupational and/ or speech therapies as ordered by me below.      [ x  ] P.T. 90 mins. /day at least 5 out of 7 days:  [  x ] superficial  modalities prn, [ x  ] A/AAROM, [ x  ] PREs, [ x  ] transfer training,            [ x  ] progressive ambulation, [x   ] stairs                                               [ x  ] O.T. 90 mins. /day at least 5 out of 7 days::  [ x  ] modalities prn,  [ x  ]A/AAROM, [ x  ] PREs, [  x ] functional transfer training, [ x  ] ADL training           [ x  ] cognitive/ perceptual eval and training, [   ] splint eval                                                  [ x  ] S.L.P:  [ x  ] speech eval, [ x  ] swallow eval     [ x  ] Neuropsychology eval     [ x  ] Individualized rec. therapy      RATIONALE FOR INPATIENT ADMISSION - Patient demonstrates the following: (check all that apply)  [X] Medically appropriate for acute rehabilitation admission. Requires interdisciplinary therapy consisting of at least PT and OT, at least 3 hrs. a day at least 5 days a week  [X] Has attainable rehab goals with an appropriate initial discharge plan  [X] Has rehabilitation potential (expected to make a significant improvement within a reasonable period of time)  [X] Requires close medical management by a rehab physician, rehab nursing care,  and comprehensive interdisciplinary team (including PT, OT)    [X] Requires evaluation by a physiatrist at least 3 days a week to evaluate and manage and coordinate rehab and medical problems       ASSESSMENT/PLAN    Rehab of Traumatic SDH with no LOC  Patient seen and evaluated with Dr. Magana  Patient requires 3 hrs daily of interdisciplinary inpatient rehab at least 5 days a week.     # Traumatic subdural hematoma without LOC  - CT Head Non Cont (05/14/21): Right-sided acute on chronic subdural hemorrhage with associated mass effect, unchanged.  - Fall 10 days ago. Was on ASA 81mg PO QD  - Evaluated by neurosurgery. Received platelets on 05/13. Stable.  - Continue Keppra 500 mg q12hrs prophylxis  - Neurovascular f/u as outpt in 2 weeks for elective MMA embolization  - Continue ASA 81 QD   - Repeat Head CT on 5/19 showed stable bleed without significant change    # HTN  - Continue Bumex 1 mg daily  - Lisinopril on hold    # Borderline Diabetes Mellitus type II   - Last HbA1C 6.6 % (05/14/21).  - Monitor FS. Start insulin if FS > 180. Keep between 140 - 180    #CKD stage 3  - Will continue to monitor. Avoid nephrotoxic drugs     #Seizure proph  - cont Keppra.     #Anemia  - monitor    #History of Gout  - asymptomatic on allopurinol     -Pain control: Tylenol prn    -GI/Bowel Mgmt: bowel meds prn    -Bladder management: No issues at this time     -Skin:  No active issues at this time    -FEN     - Diet: DASH/TLC; carb consistent            Precautions / PROPHYLAXIS:      - Falls    - Ortho: Weight bearing status: WBAT      - DVT prophylaxis: Lovenox

## 2021-05-20 NOTE — PROGRESS NOTE ADULT - ATTENDING COMMENTS
I examined the patient with the resident and we discussed the findings and treatment plan. Tolerating the rehab program well. I agree with the findings and treatment plan as above. The patient continues to require 3 hrs a day of acute inpatient rehab.   No new complaints. Full rehab w/u in progress. Medically stable. Cont Keppra prophylaxis for now. Fingerstick glucose controlled.  On Lovenox for DVT proph.

## 2021-05-20 NOTE — PROGRESS NOTE ADULT - SUBJECTIVE AND OBJECTIVE BOX
Patient is a 76y old  Male who presents with a chief complaint of Traumatic SDH without LOC (19 May 2021 22:35)      HPI:  77 yo male patient with PMH of diverticulosis, DM, HTN, HLD, CKD stage 3A, ostearthritis, bleeding gastric ulcer, anemia presenting with period of AMS. Per wife pt had a large breakfast and shortly after was found acting confused and unable to open his eyes but saying his eyes were open. Ambulance was called, FS was found to be 70. Per wife pt seldom runs a FS under 90. Pt was given PO food/drink and FS improved as did pt's mental status. Wife says pt is currently at baseline in the ED. Pt endorsing chills everytime he goes to urinate xfew days. No hematuria, dysuria, increased frequency in urination. No f/n/v. No abdominal pain/back pain. No cough/sore throat/headache/weakness. Pt endorses fall 10 days ago with no LOC. No A/c. In ED, patient was normotensive, afebrile. CT head was done showing subacute subdural hematoma. Neurosurgery evaluated patient in ED, no immediate surgical indication. As per neurosurgery, patient received platelet transfusion for reversal of ASA and started on Keppra 500mg q12.    He has a history of LLE weakness secondary to an old thigh muscle tear, knee meniscus injury and radiculopathy and was a limited ambulator with a walker PTA.    Patient seen and evaluated by PMR consultation team including PT/OT and found to be a good candidate for acute inpatient rehab.   Latest progress with PT: Bed mobility max assist, transfers mod assist, ambulates 5ft with RW mod assist (19 May 2021 10:34)      I examined the patient and reviewed the chart. There have been no significant changes since my history and physical except where documented below.    TODAY'S SUBJECTIVE & REVIEW OF SYMPTOMS:  Patient seen and evaluated with Dr. Magana. No active complaints at this time. No acute events overnight. ROS as below.   CLOF: Bed mobility max assist, transfers mod assist, ambulates 5ft with RW mod assist       Constiutional:    [  x ] WNL           [   ] poor appetite   [   ] insomnia   [   ] tired   Cardio:                [ x  ] WNL           [   ] CP   [   ] REECE   [   ] palpitations               Resp:                   [ x  ] WNL           [   ] SOB   [   ] cough   [   ] wheezing   GI:                        [ x  ] WNL           [   ] constipation   [   ] diarrhea   [   ] abdominal pain   [   ] nausea   [   ] emesis                                :                      [x   ] WNL           [   ] BROOKS  [   ] dysuria   [   ] difficulty voiding             Endo:                   [ x  ] WNL          [   ] polyuria   [   ] temperature intolerance                 Skin:                     [ x  ] WNL          [   ] pain   [   ] wound   [   ] rash   MSK:                    [   ] WNL          [   ] muscle pain   [ x  ] joint pain/ stiffness   [   ] muscle tenderness   [   ] swelling   Neuro:                 [ x  ] WNL          [   ] HA   [   ] change in vision   [   ] tremor   [   ] weakness   [   ]dysphagia              Cognitive:           [ x  ] WNL           [   ]confusion      Psych:                  [ x  ] WNL           [   ] hallucinations   [   ]agitation   [   ] delusion   [   ]depression      PHYSICAL EXAM    Vital Signs Last 24 Hrs  T(C): 36.1 (20 May 2021 05:36), Max: 36.1 (19 May 2021 22:07)  T(F): 97 (20 May 2021 05:36), Max: 97 (20 May 2021 05:36)  HR: 73 (20 May 2021 05:36) (64 - 84)  BP: 119/56 (20 May 2021 05:36) (119/56 - 144/64)  BP(mean): 94 (19 May 2021 13:30) (94 - 94)  RR: 18 (20 May 2021 05:36) (18 - 18)  SpO2: 100% (19 May 2021 22:07) (100% - 100%)    Constitutional - [ x  ] NAD, Comfortable        [   ] other:  Chest - [ x  ] CTA     [   ] other:  Cardiovascular - [ x  ] RRR, no murmer     [   ] other:  Abdomen - [ x  ] Soft, NT/ND      [   ] other:        -  [ x ] NO BROOKS CATHETER   [   ] YES  if yes: [   ] NO MEATAL TEAR OR DISCHARGE [   ] other:  Extremities - [ x ] No C/C/E, No calf tenderness       [   ] other:  ROM - [  ] WFL     [ x  ] other: Left shoulder ROM limited to pain  Neurologic Exam -                 Cognitive - [  x ]Awake, Alert, AAO to self, place, date, year, situation         [    ] other:      Communication - [ x  ]Fluent, No dysarthria       [   ] other:      Motor - No focal deficits  RIGHT UE: [ x  ] WNL,  [   ] other:  LEFT    UE: [   ] WNL,  [  x ] other: 4-/5 limited secondary to pain  RIGHT LE: [ x  ] WNL,  [   ] other:   LEFT    LE: [   ] WNL,  [ x  ] other: 3+/5        Sensory - [ x  ] Intact to LT      [    ] other:          Reflexes - [ x  ] wnl/ symmetric     [   ] other:     Psychiatric - [ x  ]Mood stable, Affect WNL     [   ]other:     Skin - [ x  ] intact      [   ] other      acetaminophen   Tablet .. 650 milliGRAM(s) Oral every 6 hours PRN  allopurinol 300 milliGRAM(s) Oral daily  aspirin  chewable 81 milliGRAM(s) Oral daily  atorvastatin 40 milliGRAM(s) Oral at bedtime  buMETAnide 1 milliGRAM(s) Oral daily  calamine/zinc oxide Lotion 1 Application(s) Topical two times a day  diphenhydrAMINE 25 milliGRAM(s) Oral every 4 hours PRN  enoxaparin Injectable 40 milliGRAM(s) SubCutaneous at bedtime  ferrous    sulfate 325 milliGRAM(s) Oral daily  levETIRAcetam  IVPB 500 milliGRAM(s) IV Intermittent every 12 hours  omega-3-Acid Ethyl Esters 2 Gram(s) Oral two times a day  pantoprazole  Injectable 40 milliGRAM(s) IV Push daily      RECENT LABS/IMAGING                        10.2   6.86  )-----------( 289      ( 20 May 2021 06:34 )             33.3     05-20    143  |  108  |  48<H>  ----------------------------<  89  4.3   |  22  |  1.0    Ca    8.8      20 May 2021 06:34  Mg     1.9     05-20    TPro  6.4  /  Alb  3.6  /  TBili  0.3  /  DBili  x   /  AST  16  /  ALT  15  /  AlkPhos  128<H>  05-20             Patient is a 76y old  Male who presents with a chief complaint of Traumatic SDH without LOC (19 May 2021 22:35)      HPI:  75 yo male patient with PMH of diverticulosis, DM, HTN, HLD, CKD stage 3, ostearthritis, bleeding gastric ulcer, anemia presenting with period of AMS. Per wife pt had a large breakfast and shortly after was found acting confused and unable to open his eyes but saying his eyes were open. Ambulance was called, FS was found to be 70. Per wife pt seldom runs a FS under 90. Pt was given PO food/drink and FS improved as did pt's mental status. Wife says pt is currently at baseline in the ED. Pt endorsing chills everytime he goes to urinate xfew days. No hematuria, dysuria, increased frequency in urination. No f/n/v. No abdominal pain/back pain. No cough/sore throat/headache/weakness. Pt endorses fall 10 days ago with no LOC. No A/c. In ED, patient was normotensive, afebrile. CT head was done showing subacute subdural hematoma. Neurosurgery evaluated patient in ED, no immediate surgical indication. As per neurosurgery, patient received platelet transfusion for reversal of ASA and started on Keppra 500mg q12.    He has a history of LLE weakness secondary to an old thigh muscle tear, knee meniscus injury and radiculopathy and was a limited ambulator with a walker PTA.    Patient seen and evaluated by PMR consultation team including PT/OT and found to be a good candidate for acute inpatient rehab.   Latest progress with PT: Bed mobility max assist, transfers mod assist, ambulates 5ft with RW mod assist (19 May 2021 10:34)      I examined the patient and reviewed the chart. There have been no significant changes since my history and physical except where documented below.    TODAY'S SUBJECTIVE & REVIEW OF SYMPTOMS:  Patient seen and evaluated with Dr. Magana. No active complaints at this time. No acute events overnight. ROS as below.   CLOF: Bed mobility max assist, transfers mod assist, ambulates 5ft with RW mod assist on acute care. Full rehab eval in progress.       Constiutional:    [  x ] WNL           [   ] poor appetite   [   ] insomnia   [   ] tired   Cardio:                [ x  ] WNL           [   ] CP   [   ] REECE   [   ] palpitations               Resp:                   [ x  ] WNL           [   ] SOB   [   ] cough   [   ] wheezing   GI:                        [ x  ] WNL           [   ] constipation   [   ] diarrhea   [   ] abdominal pain   [   ] nausea   [   ] emesis                                :                      [x   ] WNL           [   ] BROOKS  [   ] dysuria   [   ] difficulty voiding             Endo:                   [ x  ] WNL          [   ] polyuria   [   ] temperature intolerance                 Skin:                     [ x  ] WNL          [   ] pain   [   ] wound   [   ] rash   MSK:                    [   ] WNL          [   ] muscle pain   [ x  ] joint pain/ stiffness - left shoulder, left hip and knee  [   ] muscle tenderness   [   ] swelling   Neuro:                 [ x  ] WNL          [   ] HA   [   ] change in vision   [   ] tremor   [   ] weakness   [   ]dysphagia              Cognitive:           [ x  ] WNL           [   ]confusion      Psych:                  [ x  ] WNL           [   ] hallucinations   [   ]agitation   [   ] delusion   [   ]depression      PHYSICAL EXAM    Vital Signs Last 24 Hrs  T(C): 36.1 (20 May 2021 05:36), Max: 36.1 (19 May 2021 22:07)  T(F): 97 (20 May 2021 05:36), Max: 97 (20 May 2021 05:36)  HR: 73 (20 May 2021 05:36) (64 - 84)  BP: 119/56 (20 May 2021 05:36) (119/56 - 144/64)  BP(mean): 94 (19 May 2021 13:30) (94 - 94)  RR: 18 (20 May 2021 05:36) (18 - 18)  SpO2: 100% (19 May 2021 22:07) (100% - 100%)    Constitutional - [ x  ] NAD, Comfortable        [   ] other:  Chest - [ x  ] CTA     [   ] other:  Cardiovascular - [ x  ] RRR, no murmer     [   ] other:  Abdomen - [ x  ] Soft, NT/ND      [   ] other:        -  [ x ] NO BROOKS CATHETER   [   ] YES  if yes: [   ] NO MEATAL TEAR OR DISCHARGE [   ] other:  Extremities - [ x ] No C/C/E, No calf tenderness       [   ] other:  ROM - [  ] WFL     [ x  ] other: Left shoulder ROM limited to pain  Neurologic Exam -                 Cognitive - [  x ]Awake, Alert, AAO to self, place, date, year, situation         [    ] other:      Communication - [ x  ]Fluent, No dysarthria       [   ] other:      Motor - No focal deficits  RIGHT UE: [ x  ] WNL,  [   ] other:  LEFT    UE: [   ] WNL,  [  x ] other: 4-/5 limited secondary to pain in shoulder  RIGHT LE: [ x  ] WNL,  [   ] other:   LEFT    LE: [   ] WNL,  [ x  ] other: 3+- 4-/5 - give-away weakness hip and knee. DF intact        Sensory - [ x  ] Intact to LT      [    ] other:          Reflexes - [ x  ] wnl/ symmetric     [   ] other:     Psychiatric - [ x  ]Mood stable, Affect WNL     [   ]other:     Skin - [ x  ] intact      [   ] other      acetaminophen   Tablet .. 650 milliGRAM(s) Oral every 6 hours PRN  allopurinol 300 milliGRAM(s) Oral daily  aspirin  chewable 81 milliGRAM(s) Oral daily  atorvastatin 40 milliGRAM(s) Oral at bedtime  buMETAnide 1 milliGRAM(s) Oral daily  calamine/zinc oxide Lotion 1 Application(s) Topical two times a day  diphenhydrAMINE 25 milliGRAM(s) Oral every 4 hours PRN  enoxaparin Injectable 40 milliGRAM(s) SubCutaneous at bedtime  ferrous    sulfate 325 milliGRAM(s) Oral daily  levETIRAcetam  IVPB 500 milliGRAM(s) IV Intermittent every 12 hours  omega-3-Acid Ethyl Esters 2 Gram(s) Oral two times a day  pantoprazole  Injectable 40 milliGRAM(s) IV Push daily      RECENT LABS/IMAGING                        10.2   6.86  )-----------( 289      ( 20 May 2021 06:34 )             33.3     05-20    143  |  108  |  48<H>  ----------------------------<  89  4.3   |  22  |  1.0    Ca    8.8      20 May 2021 06:34  Mg     1.9     05-20    TPro  6.4  /  Alb  3.6  /  TBili  0.3  /  DBili  x   /  AST  16  /  ALT  15  /  AlkPhos  128<H>  05-20      < from: TTE Echo Complete w/o Contrast w/ Doppler (05.16.21 @ 06:07) >  Summary:   1. LV Ejection Fraction by Tubbs's Method with a biplane EF of 65 %.   2. Moderately increased LV wall thickness.   3. Spectral Doppler shows impaired relaxation pattern of left ventricularmyocardial filling (Grade I diastolic dysfunction).   4. Normal left atrial size.   5. Normal right atrial size.   6. No evidence of mitral valve regurgitation.   7. Sclerotic aortic valve with decreased opening.    < end of copied text >    CAPILLARY BLOOD GLUCOSE      POCT Blood Glucose.: 154 mg/dL (20 May 2021 11:27)  POCT Blood Glucose.: 115 mg/dL (20 May 2021 08:12)

## 2021-05-21 LAB
GLUCOSE BLDC GLUCOMTR-MCNC: 109 MG/DL — HIGH (ref 70–99)
GLUCOSE BLDC GLUCOMTR-MCNC: 146 MG/DL — HIGH (ref 70–99)
GLUCOSE BLDC GLUCOMTR-MCNC: 186 MG/DL — HIGH (ref 70–99)
GLUCOSE BLDC GLUCOMTR-MCNC: 260 MG/DL — HIGH (ref 70–99)

## 2021-05-21 RX ORDER — POLYETHYLENE GLYCOL 3350 17 G/17G
17 POWDER, FOR SOLUTION ORAL DAILY
Refills: 0 | Status: DISCONTINUED | OUTPATIENT
Start: 2021-05-21 | End: 2021-05-27

## 2021-05-21 RX ADMIN — LEVETIRACETAM 250 MILLIGRAM(S): 250 TABLET, FILM COATED ORAL at 06:12

## 2021-05-21 RX ADMIN — Medication 300 MILLIGRAM(S): at 12:14

## 2021-05-21 RX ADMIN — Medication 1 APPLICATION(S): at 12:17

## 2021-05-21 RX ADMIN — Medication 81 MILLIGRAM(S): at 12:14

## 2021-05-21 RX ADMIN — Medication 2 GRAM(S): at 06:13

## 2021-05-21 RX ADMIN — PANTOPRAZOLE SODIUM 40 MILLIGRAM(S): 20 TABLET, DELAYED RELEASE ORAL at 06:12

## 2021-05-21 RX ADMIN — Medication 2 GRAM(S): at 17:21

## 2021-05-21 RX ADMIN — BUMETANIDE 1 MILLIGRAM(S): 0.25 INJECTION INTRAMUSCULAR; INTRAVENOUS at 06:12

## 2021-05-21 RX ADMIN — POLYETHYLENE GLYCOL 3350 17 GRAM(S): 17 POWDER, FOR SOLUTION ORAL at 17:09

## 2021-05-21 RX ADMIN — Medication 325 MILLIGRAM(S): at 12:15

## 2021-05-21 RX ADMIN — ENOXAPARIN SODIUM 40 MILLIGRAM(S): 100 INJECTION SUBCUTANEOUS at 21:52

## 2021-05-21 RX ADMIN — ATORVASTATIN CALCIUM 40 MILLIGRAM(S): 80 TABLET, FILM COATED ORAL at 21:52

## 2021-05-21 RX ADMIN — LEVETIRACETAM 250 MILLIGRAM(S): 250 TABLET, FILM COATED ORAL at 17:09

## 2021-05-21 NOTE — PROGRESS NOTE ADULT - SUBJECTIVE AND OBJECTIVE BOX
Nephrology progress note     Patient is a Patient is a 77 yo man with long standing diabetes, retinopathy, nephrotic range proteinurua, bleeding gastric ulcer, gout, and subdural hematoma.  Recent admission with metformin toxicity and KELL and lactic acidosis - required hemodialysis.  He was admitted with confusion but currently is alert at baseline. Per his account he fell afew days ago  fell asleep and fell of hair) and struch head.  Now with moderate subdural hematoms    ON events/Subjective:     Allergies:  No Known Allergies    Hospital Medications:   MEDICATIONS  (STANDING):  allopurinol 300 milliGRAM(s) Oral daily  AQUAPHOR (petrolatum Ointment) 1 Application(s) Topical daily  aspirin  chewable 81 milliGRAM(s) Oral daily  atorvastatin 40 milliGRAM(s) Oral at bedtime  buMETAnide 1 milliGRAM(s) Oral daily  calamine/zinc oxide Lotion 1 Application(s) Topical two times a day  enoxaparin Injectable 40 milliGRAM(s) SubCutaneous at bedtime  ferrous    sulfate 325 milliGRAM(s) Oral daily  levETIRAcetam 250 milliGRAM(s) Oral two times a day  omega-3-Acid Ethyl Esters 2 Gram(s) Oral two times a day  pantoprazole    Tablet 40 milliGRAM(s) Oral before breakfast  polyethylene glycol 3350 17 Gram(s) Oral daily    REVIEW OF SYSTEMS:  CONSTITUTIONAL: No weakness, fevers or chills  EYES/ENT: No visual changes;  No vertigo or throat pain   NECK: No pain or stiffness  RESPIRATORY: No cough, wheezing, hemoptysis; No shortness of breath  CARDIOVASCULAR: No chest pain or palpitations.  GASTROINTESTINAL: No abdominal or epigastric pain. No nausea, vomiting, or hematemesis; No diarrhea or constipation. No melena or hematochezia.  GENITOURINARY: No dysuria, frequency, foamy urine, urinary urgency, incontinence or hematuria  NEUROLOGICAL: No numbness or weakness  SKIN: No itching, burning, rashes, or lesions   VASCULAR: No bilateral lower extremity edema.   All other review of systems is negative unless indicated above.    VITALS:  T(F): 96.5 (05-21-21 @ 12:43), Max: 97.9 (05-20-21 @ 20:55)  HR: 76 (05-21-21 @ 12:43)  BP: 103/55 (05-21-21 @ 12:43)  RR: 18 (05-21-21 @ 12:43)  SpO2: --  Wt(kg): --    05-19 @ 07:01  -  05-20 @ 07:00  --------------------------------------------------------  IN: 0 mL / OUT: 300 mL / NET: -300 mL    05-20 @ 07:01  -  05-21 @ 07:00  --------------------------------------------------------  IN: 360 mL / OUT: 1700 mL / NET: -1340 mL    05-21 @ 07:01  -  05-21 @ 14:31  --------------------------------------------------------  IN: 570 mL / OUT: 750 mL / NET: -180 mL        PHYSICAL EXAM:  Constitutional: NAD  HEENT: anicteric sclera, oropharynx clear, MMM  Neck: No JVD  Respiratory: CTAB, no wheezes, rales or rhonchi  Cardiovascular: S1, S2, RRR  Gastrointestinal: BS+, soft, NT/ND  Extremities: No cyanosis or clubbing. No peripheral edema  Neurological: A/O x 3, no focal deficits  Psychiatric: Normal mood, normal affect  : No CVA tenderness. No gil.   Skin: No rashes  Vascular Access:    LABS:  05-20    143  |  108  |  48<H>  ----------------------------<  89  4.3   |  22  |  1.0    Ca    8.8      20 May 2021 06:34  Mg     1.9     05-20    TPro  6.4  /  Alb  3.6  /  TBili  0.3  /  DBili      /  AST  16  /  ALT  15  /  AlkPhos  128<H>  05-20                          10.2   6.86  )-----------( 289      ( 20 May 2021 06:34 )             33.3       Urine Studies:  Creatinine Trend: 1.0<--, 1.1<--, 1.0<--, 1.2<--, 1.2<--, 1.4<--      home meds: bumex 1 mg po qd, liptor 40 mg po qd, allopurinol 300 mg po qd, quinapril 40 mg po qd, pantoprazole 40 mg po qd, omega 3 1 gr qid, ASA 81 mg qd, osemi bid    ·	tranfered to rehab  ·	s/p fall 2 weeks ago with head trauma and now subacute moderate subdural hematoma  ·	per neuro- may have subacute subdural on top of acute suggestive of more chronic bleeding independent of recent fall  ·	ckd likely stage 3  ·	bp stable   ·	anemia with adequate iron stores  ·	nephrotic range proteinuria 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

## 2021-05-21 NOTE — PROGRESS NOTE ADULT - ASSESSMENT
ASSESSMENT/PLAN    Rehab of Traumatic SDH with no LOC  Patient seen and evaluated with Dr. Magana  Patient requires 3 hrs daily of interdisciplinary inpatient rehab at least 5 days a week.     # Traumatic subdural hematoma without LOC  - CT Head Non Cont (05/14/21): Right-sided acute on chronic subdural hemorrhage with associated mass effect, unchanged.  - Fall 10 days ago. Was on ASA 81mg PO QD  - Evaluated by neurosurgery. Received platelets on 05/13. Stable.  - Continue Keppra 500 mg q12hrs prophylxis  - Neurovascular f/u as outpt in 2 weeks for elective MMA embolization  - Continue ASA 81 QD   - Repeat Head CT on 5/19 showed stable bleed without significant change    # HTN  - Continue Bumex 1 mg daily  - Lisinopril on hold    # Borderline Diabetes Mellitus type II   - Last HbA1C 6.6 % (05/14/21).  - Monitor FS. Start insulin if FS > 180. Keep between 140 - 180    #CKD stage 3  - Will continue to monitor. Avoid nephrotoxic drugs     #Seizure proph  - cont Keppra.     #Anemia  - monitor    #History of Gout  - asymptomatic on allopurinol    #uncomfortable teeth  dental consult placed      -Pain control: Tylenol prn    -GI/Bowel Mgmt: bowel meds prn    -Bladder management: No issues at this time     -Skin:  No active issues at this time    -FEN     - Diet: DASH/TLC; carb consistent           Precautions / PROPHYLAXIS:      - Falls    - Ortho: Weight bearing status: WBAT      - DVT prophylaxis: Lovenox

## 2021-05-21 NOTE — PROGRESS NOTE ADULT - SUBJECTIVE AND OBJECTIVE BOX
Patient is a 76y old  Male who presents with a chief complaint of Traumatic SDH without LOC (19 May 2021 22:35)      HPI:  75 yo male patient with PMH of diverticulosis, DM, HTN, HLD, CKD stage 3, ostearthritis, bleeding gastric ulcer, anemia presenting with period of AMS. Per wife pt had a large breakfast and shortly after was found acting confused and unable to open his eyes but saying his eyes were open. Ambulance was called, FS was found to be 70. Per wife pt seldom runs a FS under 90. Pt was given PO food/drink and FS improved as did pt's mental status. Wife says pt is currently at baseline in the ED. Pt endorsing chills everytime he goes to urinate xfew days. No hematuria, dysuria, increased frequency in urination. No f/n/v. No abdominal pain/back pain. No cough/sore throat/headache/weakness. Pt endorses fall 10 days ago with no LOC. No A/c. In ED, patient was normotensive, afebrile. CT head was done showing subacute subdural hematoma. Neurosurgery evaluated patient in ED, no immediate surgical indication. As per neurosurgery, patient received platelet transfusion for reversal of ASA and started on Keppra 500mg q12.    He has a history of LLE weakness secondary to an old thigh muscle tear, knee meniscus injury and radiculopathy and was a limited ambulator with a walker PTA.    Patient seen and evaluated by PMR consultation team including PT/OT and found to be a good candidate for acute inpatient rehab.   Latest progress with PT: Bed mobility max assist, transfers mod assist, ambulates 5ft with RW mod assist (19 May 2021 10:34)      I examined the patient and reviewed the chart. There have been no significant changes since my history and physical except where documented below.    TODAY'S SUBJECTIVE & REVIEW OF SYMPTOMS:  Patient seen and evaluated with Dr. Magana. Pt complains thaT HIS TEETH ARE BOTHERING HIM.  HE SAYS THAT SOME ARE UNCOMFORTABLE AND LOOSE.   CLOF: Bed mobility max assist, transfers mod assist, ambulates 5ft with RW mod assist on acute care. Full rehab eval in progress.       Constiutional:    [  x ] WNL           [   ] poor appetite   [   ] insomnia   [   ] tired   Cardio:                [ x  ] WNL           [   ] CP   [   ] REECE   [   ] palpitations               Resp:                   [ x  ] WNL           [   ] SOB   [   ] cough   [   ] wheezing   GI:                        [ x  ] WNL           [   ] constipation   [   ] diarrhea   [   ] abdominal pain   [   ] nausea   [   ] emesis                                :                      [x   ] WNL           [   ] BROOKS  [   ] dysuria   [   ] difficulty voiding             Endo:                   [ x  ] WNL          [   ] polyuria   [   ] temperature intolerance                 Skin:                     [ x  ] WNL          [   ] pain   [   ] wound   [   ] rash   MSK:                    [   ] WNL          [   ] muscle pain   [ x  ] joint pain/ stiffness - left shoulder, left hip and knee  [   ] muscle tenderness   [   ] swelling   Neuro:                 [ x  ] WNL          [   ] HA   [   ] change in vision   [   ] tremor   [   ] weakness   [   ]dysphagia              Cognitive:           [ x  ] WNL           [   ]confusion      Psych:                  [ x  ] WNL           [   ] hallucinations   [   ]agitation   [   ] delusion   [   ]depression      PHYSICAL EXAM  Vital Signs Last 24 Hrs  T(C): 35.8 (21 May 2021 05:40), Max: 36.6 (20 May 2021 20:55)  T(F): 96.4 (21 May 2021 05:40), Max: 97.9 (20 May 2021 20:55)  HR: 91 (21 May 2021 05:40) (83 - 92)  BP: 123/59 (21 May 2021 05:40) (116/60 - 124/56)  BP(mean): --  RR: 18 (21 May 2021 05:40) (18 - 20)  SpO2: --    Constitutional - [ x  ] NAD, Comfortable        [   ] other:  Chest - [ x  ] CTA     [   ] other:  Cardiovascular - [ x  ] RRR, no murmer     [   ] other:  Abdomen - [ x  ] Soft, NT/ND      [   ] other:        -  [ x ] NO BROOKS CATHETER   [   ] YES  if yes: [   ] NO MEATAL TEAR OR DISCHARGE [   ] other:  Extremities - [ x ] No C/C/E, No calf tenderness       [   ] other:  ROM - [  ] WFL     [ x  ] other: Left shoulder ROM limited to pain  Neurologic Exam -                 Cognitive - [  x ]Awake, Alert, AAO to self, place, date, year, situation         [    ] other:      Communication - [ x  ]Fluent, No dysarthria       [   ] other:      Motor - No focal deficits  RIGHT UE: [ x  ] WNL,  [   ] other:  LEFT    UE: [   ] WNL,  [  x ] other: 4-/5 limited secondary to pain in shoulder  RIGHT LE: [ x  ] WNL,  [   ] other:   LEFT    LE: [   ] WNL,  [ x  ] other: 3+- 4-/5 - give-away weakness hip and knee. DF intact        Sensory - [ x  ] Intact to LT      [    ] other:          Reflexes - [ x  ] wnl/ symmetric     [   ] other:     Psychiatric - [ x  ]Mood stable, Affect WNL     [   ]other:     Skin - [ x  ] intact      [   ] other      acetaminophen   Tablet .. 650 milliGRAM(s) Oral every 6 hours PRN  allopurinol 300 milliGRAM(s) Oral daily  aspirin  chewable 81 milliGRAM(s) Oral daily  atorvastatin 40 milliGRAM(s) Oral at bedtime  buMETAnide 1 milliGRAM(s) Oral daily  calamine/zinc oxide Lotion 1 Application(s) Topical two times a day  diphenhydrAMINE 25 milliGRAM(s) Oral every 4 hours PRN  enoxaparin Injectable 40 milliGRAM(s) SubCutaneous at bedtime  ferrous    sulfate 325 milliGRAM(s) Oral daily  levETIRAcetam  IVPB 500 milliGRAM(s) IV Intermittent every 12 hours  omega-3-Acid Ethyl Esters 2 Gram(s) Oral two times a day  pantoprazole  Injectable 40 milliGRAM(s) IV Push daily      RECENT LABS/IMAGING                        10.2   6.86  )-----------( 289      ( 20 May 2021 06:34 )             33.3     05-20    143  |  108  |  48<H>  ----------------------------<  89  4.3   |  22  |  1.0    Ca    8.8      20 May 2021 06:34  Mg     1.9     05-20    TPro  6.4  /  Alb  3.6  /  TBili  0.3  /  DBili  x   /  AST  16  /  ALT  15  /  AlkPhos  128<H>  05-20        POCT Blood Glucose.: 186 mg/dL (05-21-21 @ 07:45)  POCT Blood Glucose.: 143 mg/dL (05-20-21 @ 21:03)  POCT Blood Glucose.: 162 mg/dL (05-20-21 @ 16:04)  POCT Blood Glucose.: 154 mg/dL (05-20-21 @ 11:27)  POCT Blood Glucose.: 115 mg/dL (05-20-21 @ 08:12)  POCT Blood Glucose.: 158 mg/dL (05-19-21 @ 21:41)  POCT Blood Glucose.: 155 mg/dL (05-18-21 @ 20:56)  POCT Blood Glucose.: 181 mg/dL (05-18-21 @ 16:25)  POCT Blood Glucose.: 195 mg/dL (05-18-21 @ 11:34)  POCT Blood Glucose.: 101 mg/dL (05-18-21 @ 07:28)  POCT Blood Glucose.: 112 mg/dL (05-17-21 @ 21:03)  POCT Blood Glucose.: 125 mg/dL (05-17-21 @ 16:23)        < from: TTE Echo Complete w/o Contrast w/ Doppler (05.16.21 @ 06:07) >  Summary:   1. LV Ejection Fraction by Tubbs's Method with a biplane EF of 65 %.   2. Moderately increased LV wall thickness.   3. Spectral Doppler shows impaired relaxation pattern of left ventricularmyocardial filling (Grade I diastolic dysfunction).   4. Normal left atrial size.   5. Normal right atrial size.   6. No evidence of mitral valve regurgitation.   7. Sclerotic aortic valve with decreased opening.    < end of copied text >    CAPILLARY BLOOD GLUCOSE      POCT Blood Glucose.: 154 mg/dL (20 May 2021 11:27)  POCT Blood Glucose.: 115 mg/dL (20 May 2021 08:12)     Patient is a 76y old  Male who presents with a chief complaint of Traumatic SDH without LOC (19 May 2021 22:35)      HPI:  75 yo male patient with PMH of diverticulosis, DM, HTN, HLD, CKD stage 3, ostearthritis, bleeding gastric ulcer, anemia presenting with period of AMS. Per wife pt had a large breakfast and shortly after was found acting confused and unable to open his eyes but saying his eyes were open. Ambulance was called, FS was found to be 70. Per wife pt seldom runs a FS under 90. Pt was given PO food/drink and FS improved as did pt's mental status. Wife says pt is currently at baseline in the ED. Pt endorsing chills everytime he goes to urinate xfew days. No hematuria, dysuria, increased frequency in urination. No f/n/v. No abdominal pain/back pain. No cough/sore throat/headache/weakness. Pt endorses fall 10 days ago with no LOC. No A/c. In ED, patient was normotensive, afebrile. CT head was done showing subacute subdural hematoma. Neurosurgery evaluated patient in ED, no immediate surgical indication. As per neurosurgery, patient received platelet transfusion for reversal of ASA and started on Keppra 500mg q12.    He has a history of LLE weakness secondary to an old thigh muscle tear, knee meniscus injury and radiculopathy and was a limited ambulator with a walker PTA.    Patient seen and evaluated by PMR consultation team including PT/OT and found to be a good candidate for acute inpatient rehab.   Latest progress with PT: Bed mobility max assist, transfers mod assist, ambulates 5ft with RW mod assist (19 May 2021 10:34)      I examined the patient and reviewed the chart. There have been no significant changes since my history and physical except where documented below.    TODAY'S SUBJECTIVE & REVIEW OF SYMPTOMS:  Patient seen and evaluated with Dr. Magana. Pt complains thaT HIS TEETH ARE BOTHERING HIM.  HE SAYS THAT SOME ARE UNCOMFORTABLE AND LOOSE.      CLOF: Bed mobility max assist, transfers mod assist, ambulates 5ft with RW mod assist on acute care. Full rehab eval in progress.       Constiutional:    [  x ] WNL           [   ] poor appetite   [   ] insomnia   [   ] tired   ENT:                                              [  x ] c/o other uncomfortable loose teeth. Requesting dental eval   Cardio:                [ x  ] WNL           [   ] CP   [   ] REECE   [   ] palpitations               Resp:                   [ x  ] WNL           [   ] SOB   [   ] cough   [   ] wheezing   GI:                        [ x  ] WNL           [   ] constipation   [   ] diarrhea   [   ] abdominal pain   [   ] nausea   [   ] emesis                                :                      [x   ] WNL           [   ] BROOKS  [   ] dysuria   [   ] difficulty voiding             Endo:                   [ x  ] WNL          [   ] polyuria   [   ] temperature intolerance                 Skin:                     [ x  ] WNL          [   ] pain   [   ] wound   [   ] rash   MSK:                    [   ] WNL          [   ] muscle pain   [ x  ] joint pain/ stiffness - left shoulder, left hip and knee  [   ] muscle tenderness   [   ] swelling   Neuro:                 [ x  ] WNL          [   ] HA   [   ] change in vision   [   ] tremor   [   ] weakness   [   ]dysphagia              Cognitive:           [ x  ] WNL           [   ]confusion      Psych:                  [ x  ] WNL           [   ] hallucinations   [   ]agitation   [   ] delusion   [   ]depression      PHYSICAL EXAM  Vital Signs Last 24 Hrs  T(C): 35.8 (21 May 2021 05:40), Max: 36.6 (20 May 2021 20:55)  T(F): 96.4 (21 May 2021 05:40), Max: 97.9 (20 May 2021 20:55)  HR: 91 (21 May 2021 05:40) (83 - 92)  BP: 123/59 (21 May 2021 05:40) (116/60 - 124/56)  BP(mean): --  RR: 18 (21 May 2021 05:40) (18 - 20)  SpO2: --    Constitutional - [ x  ] NAD, Comfortable        [   ] other:  Chest - [ x  ] CTA     [   ] other:  Cardiovascular - [ x  ] RRR, no murmer     [   ] other:  Abdomen - [ x  ] Soft, NT/ND      [   ] other:        -  [ x ] NO BROOKS CATHETER   [   ] YES  if yes: [   ] NO MEATAL TEAR OR DISCHARGE [   ] other:  Extremities - [ x ] No C/C/E, No calf tenderness       [   ] other:  ROM - [  ] WFL     [ x  ] other: Left shoulder ROM limited to pain  Neurologic Exam -                 Cognitive - [  x ]Awake, Alert, AAO to self, place, date, year, situation         [    ] other:      Communication - [ x  ]Fluent, No dysarthria       [   ] other:      Motor - No focal deficits  RIGHT UE: [ x  ] WNL,  [   ] other:  LEFT    UE: [   ] WNL,  [  x ] other: 4-/5 limited secondary to pain in shoulder  RIGHT LE: [ x  ] WNL,  [   ] other:   LEFT    LE: [   ] WNL,  [ x  ] other: 3+- 4-/5 - give-away weakness hip and knee. DF intact        Sensory - [ x  ] Intact to LT      [    ] other:          Reflexes - [ x  ] wnl/ symmetric     [   ] other:     Psychiatric - [ x  ]Mood stable, Affect WNL     [   ]other:     Skin - [ x  ] intact      [   ] other      acetaminophen   Tablet .. 650 milliGRAM(s) Oral every 6 hours PRN  allopurinol 300 milliGRAM(s) Oral daily  aspirin  chewable 81 milliGRAM(s) Oral daily  atorvastatin 40 milliGRAM(s) Oral at bedtime  buMETAnide 1 milliGRAM(s) Oral daily  calamine/zinc oxide Lotion 1 Application(s) Topical two times a day  diphenhydrAMINE 25 milliGRAM(s) Oral every 4 hours PRN  enoxaparin Injectable 40 milliGRAM(s) SubCutaneous at bedtime  ferrous    sulfate 325 milliGRAM(s) Oral daily  levETIRAcetam  IVPB 500 milliGRAM(s) IV Intermittent every 12 hours  omega-3-Acid Ethyl Esters 2 Gram(s) Oral two times a day  pantoprazole  Injectable 40 milliGRAM(s) IV Push daily      RECENT LABS/IMAGING                        10.2   6.86  )-----------( 289      ( 20 May 2021 06:34 )             33.3     05-20    143  |  108  |  48<H>  ----------------------------<  89  4.3   |  22  |  1.0    Ca    8.8      20 May 2021 06:34  Mg     1.9     05-20    TPro  6.4  /  Alb  3.6  /  TBili  0.3  /  DBili  x   /  AST  16  /  ALT  15  /  AlkPhos  128<H>  05-20        POCT Blood Glucose.: 186 mg/dL (05-21-21 @ 07:45)  POCT Blood Glucose.: 143 mg/dL (05-20-21 @ 21:03)  POCT Blood Glucose.: 162 mg/dL (05-20-21 @ 16:04)  POCT Blood Glucose.: 154 mg/dL (05-20-21 @ 11:27)  POCT Blood Glucose.: 115 mg/dL (05-20-21 @ 08:12)  POCT Blood Glucose.: 158 mg/dL (05-19-21 @ 21:41)  POCT Blood Glucose.: 155 mg/dL (05-18-21 @ 20:56)  POCT Blood Glucose.: 181 mg/dL (05-18-21 @ 16:25)  POCT Blood Glucose.: 195 mg/dL (05-18-21 @ 11:34)  POCT Blood Glucose.: 101 mg/dL (05-18-21 @ 07:28)  POCT Blood Glucose.: 112 mg/dL (05-17-21 @ 21:03)  POCT Blood Glucose.: 125 mg/dL (05-17-21 @ 16:23)        < from: TTE Echo Complete w/o Contrast w/ Doppler (05.16.21 @ 06:07) >  Summary:   1. LV Ejection Fraction by Tubbs's Method with a biplane EF of 65 %.   2. Moderately increased LV wall thickness.   3. Spectral Doppler shows impaired relaxation pattern of left ventricularmyocardial filling (Grade I diastolic dysfunction).   4. Normal left atrial size.   5. Normal right atrial size.   6. No evidence of mitral valve regurgitation.   7. Sclerotic aortic valve with decreased opening.    < end of copied text >    CAPILLARY BLOOD GLUCOSE      POCT Blood Glucose.: 154 mg/dL (20 May 2021 11:27)  POCT Blood Glucose.: 115 mg/dL (20 May 2021 08:12)

## 2021-05-21 NOTE — PROGRESS NOTE ADULT - ATTENDING COMMENTS
I examined the patient with the resident and we discussed the findings and treatment plan. Tolerating the rehab program well. I agree with the findings and treatment plan as above. The patient continues to require 3 hrs a day of acute inpatient rehab.   No new complaints. Full rehab w/u in progress. Medically stable. Cont Keppra prophylaxis for now. Fingerstick glucose controlled.  On Lovenox for DVT proph. I examined the patient with the resident and we discussed the findings and treatment plan. Tolerating the rehab program well. I agree with the findings and treatment plan as above which I adjusted as indicated.    The patient continues to require 3 hrs a day of acute inpatient rehab. Requires mod assistance for transfers and ambulation secondary to impaired balance.   No new complaints. Full rehab w/u in progress. Medically stable. Cont Keppra prophylaxis for now. Fingerstick glucose controlled.  On Lovenox for DVT proph.

## 2021-05-22 LAB
GLUCOSE BLDC GLUCOMTR-MCNC: 105 MG/DL — HIGH (ref 70–99)
GLUCOSE BLDC GLUCOMTR-MCNC: 110 MG/DL — HIGH (ref 70–99)
GLUCOSE BLDC GLUCOMTR-MCNC: 189 MG/DL — HIGH (ref 70–99)
GLUCOSE BLDC GLUCOMTR-MCNC: 196 MG/DL — HIGH (ref 70–99)

## 2021-05-22 RX ORDER — LISINOPRIL 2.5 MG/1
10 TABLET ORAL DAILY
Refills: 0 | Status: DISCONTINUED | OUTPATIENT
Start: 2021-05-22 | End: 2021-05-24

## 2021-05-22 RX ADMIN — Medication 25 MILLIGRAM(S): at 05:14

## 2021-05-22 RX ADMIN — LEVETIRACETAM 250 MILLIGRAM(S): 250 TABLET, FILM COATED ORAL at 17:36

## 2021-05-22 RX ADMIN — Medication 81 MILLIGRAM(S): at 12:24

## 2021-05-22 RX ADMIN — Medication 2 GRAM(S): at 17:39

## 2021-05-22 RX ADMIN — PANTOPRAZOLE SODIUM 40 MILLIGRAM(S): 20 TABLET, DELAYED RELEASE ORAL at 05:14

## 2021-05-22 RX ADMIN — POLYETHYLENE GLYCOL 3350 17 GRAM(S): 17 POWDER, FOR SOLUTION ORAL at 12:24

## 2021-05-22 RX ADMIN — Medication 2 GRAM(S): at 05:15

## 2021-05-22 RX ADMIN — BUMETANIDE 1 MILLIGRAM(S): 0.25 INJECTION INTRAMUSCULAR; INTRAVENOUS at 05:14

## 2021-05-22 RX ADMIN — Medication 1 APPLICATION(S): at 12:27

## 2021-05-22 RX ADMIN — Medication 25 MILLIGRAM(S): at 17:37

## 2021-05-22 RX ADMIN — ENOXAPARIN SODIUM 40 MILLIGRAM(S): 100 INJECTION SUBCUTANEOUS at 21:36

## 2021-05-22 RX ADMIN — LISINOPRIL 10 MILLIGRAM(S): 2.5 TABLET ORAL at 21:36

## 2021-05-22 RX ADMIN — CALAMINE AND ZINC OXIDE AND PHENOL 1 APPLICATION(S): 160; 10 LOTION TOPICAL at 05:15

## 2021-05-22 RX ADMIN — LEVETIRACETAM 250 MILLIGRAM(S): 250 TABLET, FILM COATED ORAL at 05:14

## 2021-05-22 RX ADMIN — Medication 300 MILLIGRAM(S): at 12:23

## 2021-05-22 RX ADMIN — Medication 325 MILLIGRAM(S): at 12:24

## 2021-05-22 RX ADMIN — ATORVASTATIN CALCIUM 40 MILLIGRAM(S): 80 TABLET, FILM COATED ORAL at 21:35

## 2021-05-22 NOTE — PROGRESS NOTE ADULT - SUBJECTIVE AND OBJECTIVE BOX
MEDICATIONS  (STANDING):  allopurinol 300 milliGRAM(s) Oral daily  AQUAPHOR (petrolatum Ointment) 1 Application(s) Topical daily  aspirin  chewable 81 milliGRAM(s) Oral daily  atorvastatin 40 milliGRAM(s) Oral at bedtime  buMETAnide 1 milliGRAM(s) Oral daily  calamine/zinc oxide Lotion 1 Application(s) Topical two times a day  enoxaparin Injectable 40 milliGRAM(s) SubCutaneous at bedtime  ferrous    sulfate 325 milliGRAM(s) Oral daily  levETIRAcetam 250 milliGRAM(s) Oral two times a day  omega-3-Acid Ethyl Esters 2 Gram(s) Oral two times a day  pantoprazole    Tablet 40 milliGRAM(s) Oral before breakfast  polyethylene glycol 3350 17 Gram(s) Oral daily    MEDICATIONS  (PRN):  acetaminophen   Tablet .. 650 milliGRAM(s) Oral every 6 hours PRN Mild Pain (1 - 3)  aluminum hydroxide/magnesium hydroxide/simethicone Suspension 30 milliLiter(s) Oral every 4 hours PRN Dyspepsia  diphenhydrAMINE 25 milliGRAM(s) Oral every 4 hours PRN Rash and/or Itching  magnesium hydroxide Suspension 30 milliLiter(s) Oral daily PRN Constipation  melatonin 3 milliGRAM(s) Oral at bedtime PRN Insomnia      Patient was stable overnight and expresses no new complaints.    T(C): 36.5 (05-22-21 @ 05:45), Max: 36.5 (05-22-21 @ 05:45)  HR: 88 (05-21-21 @ 20:52) (76 - 88)  BP: 127/58 (05-22-21 @ 05:45) (103/55 - 150/88)  RR: 18 (05-22-21 @ 05:45) (18 - 18)  SpO2: --      PE:    Alert   LUNGS- clear  COR- RRR S1S2  ABD- SOFT, NT  EXTR- w/o edema  NEURO- stable                      Rehab for SDH    Continue full acute rehab program.

## 2021-05-23 LAB
GLUCOSE BLDC GLUCOMTR-MCNC: 109 MG/DL — HIGH (ref 70–99)
GLUCOSE BLDC GLUCOMTR-MCNC: 109 MG/DL — HIGH (ref 70–99)
GLUCOSE BLDC GLUCOMTR-MCNC: 134 MG/DL — HIGH (ref 70–99)
GLUCOSE BLDC GLUCOMTR-MCNC: 155 MG/DL — HIGH (ref 70–99)

## 2021-05-23 PROCEDURE — 73620 X-RAY EXAM OF FOOT: CPT | Mod: 26,50

## 2021-05-23 RX ORDER — SODIUM CHLORIDE 9 MG/ML
1000 INJECTION INTRAMUSCULAR; INTRAVENOUS; SUBCUTANEOUS
Refills: 0 | Status: COMPLETED | OUTPATIENT
Start: 2021-05-23 | End: 2021-05-23

## 2021-05-23 RX ORDER — SODIUM CHLORIDE 9 MG/ML
150 INJECTION INTRAMUSCULAR; INTRAVENOUS; SUBCUTANEOUS ONCE
Refills: 0 | Status: COMPLETED | OUTPATIENT
Start: 2021-05-23 | End: 2021-05-23

## 2021-05-23 RX ORDER — BUMETANIDE 0.25 MG/ML
1 INJECTION INTRAMUSCULAR; INTRAVENOUS DAILY
Refills: 0 | Status: DISCONTINUED | OUTPATIENT
Start: 2021-05-24 | End: 2021-05-24

## 2021-05-23 RX ADMIN — POLYETHYLENE GLYCOL 3350 17 GRAM(S): 17 POWDER, FOR SOLUTION ORAL at 12:02

## 2021-05-23 RX ADMIN — BUMETANIDE 1 MILLIGRAM(S): 0.25 INJECTION INTRAMUSCULAR; INTRAVENOUS at 05:49

## 2021-05-23 RX ADMIN — SODIUM CHLORIDE 150 MILLILITER(S): 9 INJECTION INTRAMUSCULAR; INTRAVENOUS; SUBCUTANEOUS at 19:34

## 2021-05-23 RX ADMIN — Medication 650 MILLIGRAM(S): at 11:53

## 2021-05-23 RX ADMIN — ATORVASTATIN CALCIUM 40 MILLIGRAM(S): 80 TABLET, FILM COATED ORAL at 21:09

## 2021-05-23 RX ADMIN — Medication 1 APPLICATION(S): at 12:00

## 2021-05-23 RX ADMIN — Medication 650 MILLIGRAM(S): at 10:04

## 2021-05-23 RX ADMIN — LEVETIRACETAM 250 MILLIGRAM(S): 250 TABLET, FILM COATED ORAL at 17:43

## 2021-05-23 RX ADMIN — Medication 25 MILLIGRAM(S): at 21:09

## 2021-05-23 RX ADMIN — Medication 2 GRAM(S): at 17:42

## 2021-05-23 RX ADMIN — SODIUM CHLORIDE 150 MILLILITER(S): 9 INJECTION INTRAMUSCULAR; INTRAVENOUS; SUBCUTANEOUS at 16:46

## 2021-05-23 RX ADMIN — ENOXAPARIN SODIUM 40 MILLIGRAM(S): 100 INJECTION SUBCUTANEOUS at 21:09

## 2021-05-23 RX ADMIN — LISINOPRIL 10 MILLIGRAM(S): 2.5 TABLET ORAL at 06:41

## 2021-05-23 RX ADMIN — Medication 2 GRAM(S): at 05:50

## 2021-05-23 RX ADMIN — PANTOPRAZOLE SODIUM 40 MILLIGRAM(S): 20 TABLET, DELAYED RELEASE ORAL at 05:49

## 2021-05-23 RX ADMIN — Medication 325 MILLIGRAM(S): at 11:57

## 2021-05-23 RX ADMIN — LEVETIRACETAM 250 MILLIGRAM(S): 250 TABLET, FILM COATED ORAL at 05:49

## 2021-05-23 RX ADMIN — Medication 81 MILLIGRAM(S): at 11:57

## 2021-05-23 RX ADMIN — Medication 300 MILLIGRAM(S): at 11:57

## 2021-05-23 NOTE — CONSULT NOTE ADULT - ATTENDING COMMENTS
pt seen 5/25   pt has multiple dried eschars on his toes, b/l feet  pt reports having spontaneous blood blisters  no acute blisters present. no skin break/ulcers or signs of infection  no bandaging needed. if noted acute blistering/ruptured blister-->can use silvadene cream  My note supersedes the residents in the event of a discrepancy.

## 2021-05-23 NOTE — PROGRESS NOTE ADULT - SUBJECTIVE AND OBJECTIVE BOX
MEDICATIONS  (STANDING):  allopurinol 300 milliGRAM(s) Oral daily  AQUAPHOR (petrolatum Ointment) 1 Application(s) Topical daily  aspirin  chewable 81 milliGRAM(s) Oral daily  atorvastatin 40 milliGRAM(s) Oral at bedtime  buMETAnide 1 milliGRAM(s) Oral daily  calamine/zinc oxide Lotion 1 Application(s) Topical two times a day  enoxaparin Injectable 40 milliGRAM(s) SubCutaneous at bedtime  ferrous    sulfate 325 milliGRAM(s) Oral daily  levETIRAcetam 250 milliGRAM(s) Oral two times a day  lisinopril 10 milliGRAM(s) Oral daily  omega-3-Acid Ethyl Esters 2 Gram(s) Oral two times a day  pantoprazole    Tablet 40 milliGRAM(s) Oral before breakfast  polyethylene glycol 3350 17 Gram(s) Oral daily    MEDICATIONS  (PRN):  acetaminophen   Tablet .. 650 milliGRAM(s) Oral every 6 hours PRN Mild Pain (1 - 3)  aluminum hydroxide/magnesium hydroxide/simethicone Suspension 30 milliLiter(s) Oral every 4 hours PRN Dyspepsia  diphenhydrAMINE 25 milliGRAM(s) Oral every 4 hours PRN Rash and/or Itching  magnesium hydroxide Suspension 30 milliLiter(s) Oral daily PRN Constipation  melatonin 3 milliGRAM(s) Oral at bedtime PRN Insomnia      Patient was stable overnight and expresses complaint of small abdominal hematomas at sites of enoxaparin injection.    T(C): 36.2 (05-23-21 @ 05:39), Max: 36.2 (05-23-21 @ 05:39)  HR: 78 (05-23-21 @ 05:39) (71 - 78)  BP: 112/55 (05-23-21 @ 05:39) (112/55 - 148/67)  RR: 18 (05-23-21 @ 05:39) (18 - 18)  SpO2: 98% (05-23-21 @ 05:39) (98% - 98%)      PE:    Alert   LUNGS- clear  COR- RRR S1S2  ABD- SOFT, NT  EXTR- w/o edema  NEURO- stable                      Rehab for SDH    Continue full acute rehab program.    Continue lisinopril 10 mg daily dose.    Podiatry note acknowledged.

## 2021-05-24 LAB
ALBUMIN SERPL ELPH-MCNC: 3.7 G/DL — SIGNIFICANT CHANGE UP (ref 3.5–5.2)
ALP SERPL-CCNC: 141 U/L — HIGH (ref 30–115)
ALT FLD-CCNC: 18 U/L — SIGNIFICANT CHANGE UP (ref 0–41)
ANION GAP SERPL CALC-SCNC: 12 MMOL/L — SIGNIFICANT CHANGE UP (ref 7–14)
AST SERPL-CCNC: 19 U/L — SIGNIFICANT CHANGE UP (ref 0–41)
BILIRUB SERPL-MCNC: 0.3 MG/DL — SIGNIFICANT CHANGE UP (ref 0.2–1.2)
BUN SERPL-MCNC: 62 MG/DL — CRITICAL HIGH (ref 10–20)
CALCIUM SERPL-MCNC: 8.7 MG/DL — SIGNIFICANT CHANGE UP (ref 8.5–10.1)
CHLORIDE SERPL-SCNC: 108 MMOL/L — SIGNIFICANT CHANGE UP (ref 98–110)
CO2 SERPL-SCNC: 22 MMOL/L — SIGNIFICANT CHANGE UP (ref 17–32)
CREAT SERPL-MCNC: 1.4 MG/DL — SIGNIFICANT CHANGE UP (ref 0.7–1.5)
GLUCOSE BLDC GLUCOMTR-MCNC: 102 MG/DL — HIGH (ref 70–99)
GLUCOSE BLDC GLUCOMTR-MCNC: 123 MG/DL — HIGH (ref 70–99)
GLUCOSE BLDC GLUCOMTR-MCNC: 134 MG/DL — HIGH (ref 70–99)
GLUCOSE BLDC GLUCOMTR-MCNC: 140 MG/DL — HIGH (ref 70–99)
GLUCOSE SERPL-MCNC: 109 MG/DL — HIGH (ref 70–99)
HCT VFR BLD CALC: 31.9 % — LOW (ref 42–52)
HGB BLD-MCNC: 10.1 G/DL — LOW (ref 14–18)
MAGNESIUM SERPL-MCNC: 2.3 MG/DL — SIGNIFICANT CHANGE UP (ref 1.8–2.4)
MCHC RBC-ENTMCNC: 28.2 PG — SIGNIFICANT CHANGE UP (ref 27–31)
MCHC RBC-ENTMCNC: 31.7 G/DL — LOW (ref 32–37)
MCV RBC AUTO: 89.1 FL — SIGNIFICANT CHANGE UP (ref 80–94)
NRBC # BLD: 0 /100 WBCS — SIGNIFICANT CHANGE UP (ref 0–0)
PLATELET # BLD AUTO: 301 K/UL — SIGNIFICANT CHANGE UP (ref 130–400)
POTASSIUM SERPL-MCNC: 4.4 MMOL/L — SIGNIFICANT CHANGE UP (ref 3.5–5)
POTASSIUM SERPL-SCNC: 4.4 MMOL/L — SIGNIFICANT CHANGE UP (ref 3.5–5)
PROT SERPL-MCNC: 6.3 G/DL — SIGNIFICANT CHANGE UP (ref 6–8)
RBC # BLD: 3.58 M/UL — LOW (ref 4.7–6.1)
RBC # FLD: 18.6 % — HIGH (ref 11.5–14.5)
SODIUM SERPL-SCNC: 142 MMOL/L — SIGNIFICANT CHANGE UP (ref 135–146)
WBC # BLD: 8.55 K/UL — SIGNIFICANT CHANGE UP (ref 4.8–10.8)
WBC # FLD AUTO: 8.55 K/UL — SIGNIFICANT CHANGE UP (ref 4.8–10.8)

## 2021-05-24 PROCEDURE — 93925 LOWER EXTREMITY STUDY: CPT | Mod: 26

## 2021-05-24 PROCEDURE — 99221 1ST HOSP IP/OBS SF/LOW 40: CPT

## 2021-05-24 RX ORDER — HYDROXYZINE HCL 10 MG
10 TABLET ORAL EVERY 8 HOURS
Refills: 0 | Status: DISCONTINUED | OUTPATIENT
Start: 2021-05-24 | End: 2021-05-28

## 2021-05-24 RX ORDER — PETROLATUM,WHITE
1 JELLY (GRAM) TOPICAL
Refills: 0 | Status: DISCONTINUED | OUTPATIENT
Start: 2021-05-24 | End: 2021-05-28

## 2021-05-24 RX ADMIN — Medication 81 MILLIGRAM(S): at 12:06

## 2021-05-24 RX ADMIN — Medication 2 GRAM(S): at 06:05

## 2021-05-24 RX ADMIN — Medication 25 MILLIGRAM(S): at 06:06

## 2021-05-24 RX ADMIN — ENOXAPARIN SODIUM 40 MILLIGRAM(S): 100 INJECTION SUBCUTANEOUS at 22:13

## 2021-05-24 RX ADMIN — Medication 325 MILLIGRAM(S): at 12:06

## 2021-05-24 RX ADMIN — ATORVASTATIN CALCIUM 40 MILLIGRAM(S): 80 TABLET, FILM COATED ORAL at 22:12

## 2021-05-24 RX ADMIN — POLYETHYLENE GLYCOL 3350 17 GRAM(S): 17 POWDER, FOR SOLUTION ORAL at 12:07

## 2021-05-24 RX ADMIN — CALAMINE AND ZINC OXIDE AND PHENOL 1 APPLICATION(S): 160; 10 LOTION TOPICAL at 22:12

## 2021-05-24 RX ADMIN — BUMETANIDE 1 MILLIGRAM(S): 0.25 INJECTION INTRAMUSCULAR; INTRAVENOUS at 06:04

## 2021-05-24 RX ADMIN — PANTOPRAZOLE SODIUM 40 MILLIGRAM(S): 20 TABLET, DELAYED RELEASE ORAL at 06:04

## 2021-05-24 RX ADMIN — Medication 1 APPLICATION(S): at 22:13

## 2021-05-24 RX ADMIN — LEVETIRACETAM 250 MILLIGRAM(S): 250 TABLET, FILM COATED ORAL at 06:04

## 2021-05-24 RX ADMIN — Medication 300 MILLIGRAM(S): at 12:06

## 2021-05-24 RX ADMIN — LEVETIRACETAM 250 MILLIGRAM(S): 250 TABLET, FILM COATED ORAL at 17:08

## 2021-05-24 RX ADMIN — Medication 2 GRAM(S): at 17:08

## 2021-05-24 NOTE — CONSULT NOTE ADULT - SUBJECTIVE AND OBJECTIVE BOX
Patient is a 76y old  Male who presents with a chief complaint of lower right dental pain.       HPI: Pain started yesterday.   75 yo male patient with PMH of diverticulosis, DM, HTN, HLD, CKD stage 3A, ostearthritis, bleeding gastric ulcer, anemia presenting with period of AMS. Per wife pt had a large breakfast and shortly after was found acting confused and unable to open his eyes but saying his eyes were open. Ambulance was called, FS was found to be 70. Per wife pt seldom runs a FS under 90. Pt was given PO food/drink and FS improved as did pt's mental status. Wife says pt is currently at baseline in the ED. Pt endorsing chills everytime he goes to urinate xfew days. No hematuria, dysuria, increased frequency in urination. No f/n/v. No abdominal pain/back pain. No cough/sore throat/headache/weakness. Pt endorses fall 10 days ago with no LOC. No A/c. In ED, patient was normotensive, afebrile. CT head was done showing subacute subdural hematoma. Neurosurgery evaluated patient in ED, no immediate surgical indication. As per neurosurgery, patient received platelet transfusion for reversal of ASA and started on Keppra 500mg q12.    He has a history of LLE weakness secondary to an old thigh muscle tear, knee meniscus injury and radiculopathy and was a limited ambulator with a walker PTA.    Patient seen and evaluated by PMR consultation team including PT/OT and found to be a good candidate for acute inpatient rehab.   Latest progress with PT: Bed mobility max assist, transfers mod assist, ambulates 5ft with RW mod assist (19 May 2021 10:34)      PAST MEDICAL & SURGICAL HISTORY:  Diverticulitis    Herniated disc, cervical    Chronic gout of foot, unspecified cause, unspecified laterality    Type 2 diabetes mellitus without complication, unspecified long term insulin use status    Hypertension, unspecified type    High cholesterol    Osteoarthritis of multiple joints, unspecified osteoarthritis type    Sciatica, unspecified laterality    History of tonsillectomy and adenoidectomy    H/O colonoscopy  2018      (   ) heart valve replacement  (   ) joint replacement  (   ) pregnancy    MEDICATIONS  (STANDING):  allopurinol 300 milliGRAM(s) Oral daily  AQUAPHOR (petrolatum Ointment) 1 Application(s) Topical daily  aspirin  chewable 81 milliGRAM(s) Oral daily  atorvastatin 40 milliGRAM(s) Oral at bedtime  buMETAnide 1 milliGRAM(s) Oral daily  calamine/zinc oxide Lotion 1 Application(s) Topical two times a day  enoxaparin Injectable 40 milliGRAM(s) SubCutaneous at bedtime  ferrous    sulfate 325 milliGRAM(s) Oral daily  levETIRAcetam 250 milliGRAM(s) Oral two times a day  omega-3-Acid Ethyl Esters 2 Gram(s) Oral two times a day  pantoprazole    Tablet 40 milliGRAM(s) Oral before breakfast  polyethylene glycol 3350 17 Gram(s) Oral daily    MEDICATIONS  (PRN):  acetaminophen   Tablet .. 650 milliGRAM(s) Oral every 6 hours PRN Mild Pain (1 - 3)  aluminum hydroxide/magnesium hydroxide/simethicone Suspension 30 milliLiter(s) Oral every 4 hours PRN Dyspepsia  diphenhydrAMINE 25 milliGRAM(s) Oral every 4 hours PRN Rash and/or Itching  magnesium hydroxide Suspension 30 milliLiter(s) Oral daily PRN Constipation  melatonin 3 milliGRAM(s) Oral at bedtime PRN Insomnia      Allergies    No Known Allergies    Intolerances        FAMILY HISTORY:  No pertinent family history in first degree relatives        Vital Signs Last 24 Hrs  T(C): 35.8 (21 May 2021 12:43), Max: 36.6 (20 May 2021 20:55)  T(F): 96.5 (21 May 2021 12:43), Max: 97.9 (20 May 2021 20:55)  HR: 76 (21 May 2021 12:43) (76 - 92)  BP: 103/55 (21 May 2021 12:43) (103/55 - 123/59)  BP(mean): --  RR: 18 (21 May 2021 12:43) (18 - 18)  SpO2: --    LABS:                        10.2   6.86  )-----------( 289      ( 20 May 2021 06:34 )             33.3     05-20    143  |  108  |  48<H>  ----------------------------<  89  4.3   |  22  |  1.0    Ca    8.8      20 May 2021 06:34  Mg     1.9     05-20    TPro  6.4  /  Alb  3.6  /  TBili  0.3  /  DBili  x   /  AST  16  /  ALT  15  /  AlkPhos  128<H>  05-20    WBC Count: 6.86 K/uL [4.80 - 10.80] (05-20 @ 06:34)  Platelet Count - Automated: 289 K/uL [130 - 400] (05-20 @ 06:34)  WBC Count: 7.10 K/uL [4.80 - 10.80] (05-19 @ 05:26)  Platelet Count - Automated: 286 K/uL [130 - 400] (05-19 @ 05:26)          EOE:  TMJ ( -  ) clicks                     ( -  ) pops                     (  - ) crepitus                         ( -  ) trismus             ( -  ) lymphadenopathy             ( -  ) swelling             ( -  ) asymmetry             ( -  ) palpation             (  - ) dyspnea             ( -  ) dysphagia             (  - ) loss of consciousness    IOE:  permanent dentition, multiple missing teeth           hard/soft palate: No pathology noted           tongue: No pathology noted           labial/buccal mucosa: No pathology noted           ( +  ) percussion #31           ( -  ) palpation           (  - ) swelling            ( -  ) abscess           ( -  ) sinus tract          *DENTAL RADIOGRAPHS: none      *ASSESSMENT: Patient reports slight discomfort in lower right region for a day. Tooth #31 positive to percussion, grade 1 mobility. No abscess, no swelling noted. No difficulty breathing/swallowing.       *PLAN: return to the dental clinic Monday morning ( 5/24/21) for further evaluation.         Kimberly treviño DDS pager #[phone]
Podiatry Progress Note    Subjective:   MANDA TAVAREZ is a pleasant well-nourished, well developed 76y Male in no acute distress, alert awake, and oriented to person, place and time.  Patient is a 76y old  Male who presents with a chief complaint of Traumatic SDH without LOC (22 May 2021 12:07). Podiatry consulted for small ulcers to b/l digits. Pt reports having silvadene and DSD for his feet by nurse. Denies any pedal complaints.       PAST MEDICAL & SURGICAL HISTORY:  Diverticulitis    Herniated disc, cervical    Chronic gout of foot, unspecified cause, unspecified laterality    Type 2 diabetes mellitus without complication, unspecified long term insulin use status    Hypertension, unspecified type    High cholesterol    Osteoarthritis of multiple joints, unspecified osteoarthritis type    Sciatica, unspecified laterality    History of tonsillectomy and adenoidectomy    H/O colonoscopy  2018         Objective:  Vital Signs Last 24 Hrs  T(C): 36.2 (23 May 2021 05:39), Max: 36.2 (23 May 2021 05:39)  T(F): 97.2 (23 May 2021 05:39), Max: 97.2 (23 May 2021 05:39)  HR: 78 (23 May 2021 05:39) (71 - 78)  BP: 112/55 (23 May 2021 05:39) (112/55 - 148/67)  BP(mean): --  RR: 18 (23 May 2021 05:39) (18 - 18)  SpO2: 98% (23 May 2021 05:39) (98% - 98%)                        Physical Exam - Lower Extremity Focused:   Derm:   Closed Ulcer Sites to Digits; Right Foot 1, 2, 3; Left Foot 2,5  Minor necrotic patch Left 1st digit plantar aspect     Vascular: DP and PT Pulses Diminished; Foot is Warm to Warm to the touch   Neuro: Protective Sensation Diminished / Moderately Neuropathic     Assessment:   Ulcer sites to b/l Digits - Closed sites, Stable  Necrotic Patch Left 1st digit - Stable    Plan:  Chart reviewed and Patient evaluated. All Questions and Concerns Addressed and Answered  Discussed diagnosis and treatment with patient  No Dressing Needed; Closed Stable Wound Sites  XR Imaging B/L Feet; Pending Results  Recommend; Lower Extremity Arterial Duplex B/L; Consult Vasc if needed  WBAT  Pt stable from Podiatry standpoint  Can f/u w/Dr Rosas upon d/c   Discussed plan w/Attending    Podiatry     
Patient is a 76y old  Male who presents with a chief complaint of Traumatic SDH without LOC (23 May 2021 13:06)      HPI:  75 yo male patient with PMH of diverticulosis, DM, HTN, HLD, CKD stage 3A, ostearthritis, bleeding gastric ulcer, anemia presenting with period of AMS. Per wife pt had a large breakfast and shortly after was found acting confused and unable to open his eyes but saying his eyes were open. Ambulance was called, FS was found to be 70. Per wife pt seldom runs a FS under 90. Pt was given PO food/drink and FS improved as did pt's mental status. Wife says pt is currently at baseline in the ED. Pt endorsing chills everytime he goes to urinate xfew days. No hematuria, dysuria, increased frequency in urination. No f/n/v. No abdominal pain/back pain. No cough/sore throat/headache/weakness. Pt endorses fall 10 days ago with no LOC. No A/c. In ED, patient was normotensive, afebrile. CT head was done showing subacute subdural hematoma. Neurosurgery evaluated patient in ED, no immediate surgical indication. As per neurosurgery, patient received platelet transfusion for reversal of ASA and started on Keppra 500mg q12.    He has a history of LLE weakness secondary to an old thigh muscle tear, knee meniscus injury and radiculopathy and was a limited ambulator with a walker PTA.    Patient seen and evaluated by PMR consultation team including PT/OT and found to be a good candidate for acute inpatient rehab.   Latest progress with PT: Bed mobility max assist, transfers mod assist, ambulates 5ft with RW mod assist (19 May 2021 10:34)      Patient was sent to dental clinic for further evaluation after on-call dental resident consulted on evening of 5/21/21.   Patient states he is currently not in pain but had some pain one day ago.    PAST MEDICAL & SURGICAL HISTORY:  Diverticulitis    Herniated disc, cervical    Chronic gout of foot, unspecified cause, unspecified laterality    Type 2 diabetes mellitus without complication, unspecified long term insulin use status    Hypertension, unspecified type    High cholesterol    Osteoarthritis of multiple joints, unspecified osteoarthritis type    Sciatica, unspecified laterality    History of tonsillectomy and adenoidectomy    H/O colonoscopy  2018      (   ) heart valve replacement  (   ) joint replacement  (   ) pregnancy    MEDICATIONS  (STANDING):  allopurinol 300 milliGRAM(s) Oral daily  AQUAPHOR (petrolatum Ointment) 1 Application(s) Topical daily  aspirin  chewable 81 milliGRAM(s) Oral daily  atorvastatin 40 milliGRAM(s) Oral at bedtime  calamine/zinc oxide Lotion 1 Application(s) Topical two times a day  enoxaparin Injectable 40 milliGRAM(s) SubCutaneous at bedtime  ferrous    sulfate 325 milliGRAM(s) Oral daily  levETIRAcetam 250 milliGRAM(s) Oral two times a day  lisinopril 10 milliGRAM(s) Oral daily  omega-3-Acid Ethyl Esters 2 Gram(s) Oral two times a day  pantoprazole    Tablet 40 milliGRAM(s) Oral before breakfast  polyethylene glycol 3350 17 Gram(s) Oral daily    MEDICATIONS  (PRN):  acetaminophen   Tablet .. 650 milliGRAM(s) Oral every 6 hours PRN Mild Pain (1 - 3)  aluminum hydroxide/magnesium hydroxide/simethicone Suspension 30 milliLiter(s) Oral every 4 hours PRN Dyspepsia  diphenhydrAMINE 25 milliGRAM(s) Oral every 4 hours PRN Rash and/or Itching  magnesium hydroxide Suspension 30 milliLiter(s) Oral daily PRN Constipation  melatonin 3 milliGRAM(s) Oral at bedtime PRN Insomnia      Allergies    No Known Allergies            FAMILY HISTORY:  No pertinent family history in first degree relatives        Vital Signs Last 24 Hrs  T(C): 35.7 (24 May 2021 06:01), Max: 35.7 (24 May 2021 06:01)  T(F): 96.2 (24 May 2021 06:01), Max: 96.2 (24 May 2021 06:01)  HR: 75 (24 May 2021 06:01) (56 - 77)  BP: 114/68 (24 May 2021 06:01) (82/46 - 114/68)  BP(mean): --  RR: 18 (24 May 2021 06:01) (18 - 19)  SpO2: 97% (24 May 2021 06:01) (97% - 98%)    LABS:                        10.1   8.55  )-----------( 301      ( 24 May 2021 06:02 )             31.9     05-24    142  |  108  |  62<HH>  ----------------------------<  109<H>  4.4   |  22  |  1.4    Ca    8.7      24 May 2021 06:02  Mg     2.3     05-24    TPro  6.3  /  Alb  3.7  /  TBili  0.3  /  DBili  x   /  AST  19  /  ALT  18  /  AlkPhos  141<H>  05-24    WBC Count: 8.55 K/uL [4.80 - 10.80] (05-24 @ 06:02)  Platelet Count - Automated: 301 K/uL [130 - 400] (05-24 @ 06:02)          EOE:  no swelling noted  IOE:   multiple decayed and mobile teeth, no swelling, patent airway, pain on palpation and percussion #31. Patient was previously evaluated by on-call resident for dental pain associated with #31 on 5/21/21.          *DENTAL RADIOGRAPHS: 1 periapical lower right #31        *ASSESSMENT: class 1-2 mobility #31, caries noted. No periapical radiolucency noted. Non-restorable #31 requires extraction      *PLAN: Before proceeding with dental extraction, patient requires evaluation and written note from attending physician giving approval for extraction #31.      RECOMMENDATIONS:  1) Return to dental clinic after receiving written approval in EHR from attending physician for dental extraction  2) After discharge form hospital, dental F/U with outpatient dentist for comprehensive dental care.

## 2021-05-24 NOTE — PROGRESS NOTE ADULT - SUBJECTIVE AND OBJECTIVE BOX
Patient is a 76y old  Male who presents with a chief complaint of Traumatic SDH without LOC (19 May 2021 22:35)      HPI:  75 yo male patient with PMH of diverticulosis, DM, HTN, HLD, CKD stage 3, ostearthritis, bleeding gastric ulcer, anemia presenting with period of AMS. Per wife pt had a large breakfast and shortly after was found acting confused and unable to open his eyes but saying his eyes were open. Ambulance was called, FS was found to be 70. Per wife pt seldom runs a FS under 90. Pt was given PO food/drink and FS improved as did pt's mental status. Wife says pt is currently at baseline in the ED. Pt endorsing chills everytime he goes to urinate xfew days. No hematuria, dysuria, increased frequency in urination. No f/n/v. No abdominal pain/back pain. No cough/sore throat/headache/weakness. Pt endorses fall 10 days ago with no LOC. No A/c. In ED, patient was normotensive, afebrile. CT head was done showing subacute subdural hematoma. Neurosurgery evaluated patient in ED, no immediate surgical indication. As per neurosurgery, patient received platelet transfusion for reversal of ASA and started on Keppra 500mg q12.    He has a history of LLE weakness secondary to an old thigh muscle tear, knee meniscus injury and radiculopathy and was a limited ambulator with a walker PTA.    Patient seen and evaluated by PMR consultation team including PT/OT and found to be a good candidate for acute inpatient rehab.   Latest progress with PT: Bed mobility max assist, transfers mod assist, ambulates 5ft with RW mod assist (19 May 2021 10:34)      I examined the patient and reviewed the chart. There have been no significant changes since my history and physical except where documented below.    TODAY'S SUBJECTIVE & REVIEW OF SYMPTOMS:  Patient seen and evaluated with Dr. Magana.  Pts blood pressure has come back up after the liter of normal saline last night. Pt is goign to dental cliic this AM.   CLOF: Bed mobility max assist, transfers mod assist, ambulates 5ft with RW mod assist on acute care. Full rehab eval in progress.       Constiutional:    [  x ] WNL           [   ] poor appetite   [   ] insomnia   [   ] tired   Cardio:                [ x  ] WNL           [   ] CP   [   ] REECE   [   ] palpitations               Resp:                   [ x  ] WNL           [   ] SOB   [   ] cough   [   ] wheezing   GI:                        [ x  ] WNL           [   ] constipation   [   ] diarrhea   [   ] abdominal pain   [   ] nausea   [   ] emesis                                :                      [x   ] WNL           [   ] BROOKS  [   ] dysuria   [   ] difficulty voiding             Endo:                   [ x  ] WNL          [   ] polyuria   [   ] temperature intolerance                 Skin:                     [ x  ] WNL          [   ] pain   [   ] wound   [   ] rash   MSK:                    [   ] WNL          [   ] muscle pain   [ x  ] joint pain/ stiffness - left shoulder, left hip and knee  [   ] muscle tenderness   [   ] swelling   Neuro:                 [ x  ] WNL          [   ] HA   [   ] change in vision   [   ] tremor   [   ] weakness   [   ]dysphagia              Cognitive:           [ x  ] WNL           [   ]confusion      Psych:                  [ x  ] WNL           [   ] hallucinations   [   ]agitation   [   ] delusion   [   ]depression      PHYSICAL EXAM  Vital Signs Last 24 Hrs  T(C): 35.7 (24 May 2021 06:01), Max: 35.7 (24 May 2021 06:01)  T(F): 96.2 (24 May 2021 06:01), Max: 96.2 (24 May 2021 06:01)  HR: 75 (24 May 2021 06:01) (56 - 77)  BP: 114/68 (24 May 2021 06:01) (82/46 - 114/68)  BP(mean): --  RR: 18 (24 May 2021 06:01) (18 - 19)  SpO2: 97% (24 May 2021 06:01) (97% - 98%)    Constitutional - [ x  ] NAD, Comfortable        [   ] other:  Chest - [ x  ] CTA     [   ] other:  Cardiovascular - [ x  ] RRR, no murmer     [   ] other:  Abdomen - [ x  ] Soft, NT/ND      [   ] other:        -  [ x ] NO BROOKS CATHETER   [   ] YES  if yes: [   ] NO MEATAL TEAR OR DISCHARGE [   ] other:  Extremities - [ x ] No C/C/E, No calf tenderness       [   ] other:  ROM - [  ] WFL     [ x  ] other: Left shoulder ROM limited to pain  Neurologic Exam -                 Cognitive - [  x ]Awake, Alert, AAO to self, place, date, year, situation         [    ] other:      Communication - [ x  ]Fluent, No dysarthria       [   ] other:      Motor - No focal deficits  RIGHT UE: [ x  ] WNL,  [   ] other:  LEFT    UE: [   ] WNL,  [  x ] other: 4-/5 limited secondary to pain in shoulder  RIGHT LE: [ x  ] WNL,  [   ] other:   LEFT    LE: [   ] WNL,  [ x  ] other: 3+- 4-/5 - give-away weakness hip and knee. DF intact        Sensory - [ x  ] Intact to LT      [    ] other:          Reflexes - [ x  ] wnl/ symmetric     [   ] other:     Psychiatric - [ x  ]Mood stable, Affect WNL     [   ]other:     Skin - [ x  ] intact      [   ] other      MEDICATIONS  (STANDING):  allopurinol 300 milliGRAM(s) Oral daily  AQUAPHOR (petrolatum Ointment) 1 Application(s) Topical daily  aspirin  chewable 81 milliGRAM(s) Oral daily  atorvastatin 40 milliGRAM(s) Oral at bedtime  calamine/zinc oxide Lotion 1 Application(s) Topical two times a day  enoxaparin Injectable 40 milliGRAM(s) SubCutaneous at bedtime  ferrous    sulfate 325 milliGRAM(s) Oral daily  levETIRAcetam 250 milliGRAM(s) Oral two times a day  lisinopril 10 milliGRAM(s) Oral daily  omega-3-Acid Ethyl Esters 2 Gram(s) Oral two times a day  pantoprazole    Tablet 40 milliGRAM(s) Oral before breakfast  polyethylene glycol 3350 17 Gram(s) Oral daily    MEDICATIONS  (PRN):  acetaminophen   Tablet .. 650 milliGRAM(s) Oral every 6 hours PRN Mild Pain (1 - 3)  aluminum hydroxide/magnesium hydroxide/simethicone Suspension 30 milliLiter(s) Oral every 4 hours PRN Dyspepsia  diphenhydrAMINE 25 milliGRAM(s) Oral every 4 hours PRN Rash and/or Itching  magnesium hydroxide Suspension 30 milliLiter(s) Oral daily PRN Constipation  melatonin 3 milliGRAM(s) Oral at bedtime PRN Insomnia        RECENT LABS/IMAGING                                    10.1   8.55  )-----------( 301      ( 24 May 2021 06:02 )             31.9     05-24    142  |  108  |  62<HH>  ----------------------------<  109<H>  4.4   |  22  |  1.4    Ca    8.7      24 May 2021 06:02  Mg     2.3     05-24    TPro  6.3  /  Alb  3.7  /  TBili  0.3  /  DBili  x   /  AST  19  /  ALT  18  /  AlkPhos  141<H>  05-24        POCT Blood Glucose.: 123 mg/dL (05-24-21 @ 07:50)  POCT Blood Glucose.: 134 mg/dL (05-23-21 @ 21:19)  POCT Blood Glucose.: 155 mg/dL (05-23-21 @ 15:54)  POCT Blood Glucose.: 109 mg/dL (05-23-21 @ 11:11)  POCT Blood Glucose.: 109 mg/dL (05-23-21 @ 07:30)  POCT Blood Glucose.: 189 mg/dL (05-22-21 @ 21:26)  POCT Blood Glucose.: 105 mg/dL (05-22-21 @ 15:58)  POCT Blood Glucose.: 196 mg/dL (05-22-21 @ 11:19)  POCT Blood Glucose.: 110 mg/dL (05-22-21 @ 07:25)  POCT Blood Glucose.: 146 mg/dL (05-21-21 @ 21:50)  POCT Blood Glucose.: 260 mg/dL (05-21-21 @ 16:17)  POCT Blood Glucose.: 109 mg/dL (05-21-21 @ 11:23)      < from: TTE Echo Complete w/o Contrast w/ Doppler (05.16.21 @ 06:07) >  Summary:   1. LV Ejection Fraction by Tubbs's Method with a biplane EF of 65 %.   2. Moderately increased LV wall thickness.   3. Spectral Doppler shows impaired relaxation pattern of left ventricularmyocardial filling (Grade I diastolic dysfunction).   4. Normal left atrial size.   5. Normal right atrial size.   6. No evidence of mitral valve regurgitation.   7. Sclerotic aortic valve with decreased opening.    < end of copied text >     Patient is a 76y old  Male who presents with a chief complaint of Traumatic SDH without LOC (19 May 2021 22:35)      HPI:  77 yo male patient with PMH of diverticulosis, DM, HTN, HLD, CKD stage 3, ostearthritis, bleeding gastric ulcer, anemia presenting with period of AMS. Per wife pt had a large breakfast and shortly after was found acting confused and unable to open his eyes but saying his eyes were open. Ambulance was called, FS was found to be 70. Per wife pt seldom runs a FS under 90. Pt was given PO food/drink and FS improved as did pt's mental status. Wife says pt is currently at baseline in the ED. Pt endorsing chills everytime he goes to urinate xfew days. No hematuria, dysuria, increased frequency in urination. No f/n/v. No abdominal pain/back pain. No cough/sore throat/headache/weakness. Pt endorses fall 10 days ago with no LOC. No A/c. In ED, patient was normotensive, afebrile. CT head was done showing subacute subdural hematoma. Neurosurgery evaluated patient in ED, no immediate surgical indication. As per neurosurgery, patient received platelet transfusion for reversal of ASA and started on Keppra 500mg q12.    He has a history of LLE weakness secondary to an old thigh muscle tear, knee meniscus injury and radiculopathy and was a limited ambulator with a walker PTA.    Patient seen and evaluated by PMR consultation team including PT/OT and found to be a good candidate for acute inpatient rehab.   Latest progress with PT: Bed mobility max assist, transfers mod assist, ambulates 5ft with RW mod assist (19 May 2021 10:34)      I examined the patient and reviewed the chart. There have been no significant changes since my history and physical except where documented below.    TODAY'S SUBJECTIVE & REVIEW OF SYMPTOMS:  Patient seen and evaluated with Dr. Magana.  Pts blood pressure has come back up after the liter of normal saline last night. Pt is goign to dental cliic this AM.   CLOF: Bed mobility max assist, transfers mod assist, ambulates 5ft with RW mod assist on acute care. Full rehab eval in progress.       Constiutional:    [  x ] WNL           [   ] poor appetite   [   ] insomnia   [   ] tired   Cardio:                [ x  ] WNL           [   ] CP   [   ] REECE   [   ] palpitations               Resp:                   [ x  ] WNL           [   ] SOB   [   ] cough   [   ] wheezing   GI:                        [ x  ] WNL           [   ] constipation   [   ] diarrhea   [   ] abdominal pain   [   ] nausea   [   ] emesis                                :                      [x   ] WNL           [   ] BROOKS  [   ] dysuria   [   ] difficulty voiding             Endo:                   [ x  ] WNL          [   ] polyuria   [   ] temperature intolerance                 Skin:                     [ x  ] WNL          [   ] pain   [   ] wound   [   ] rash   MSK:                    [   ] WNL          [   ] muscle pain   [ x  ] joint pain/ stiffness - left shoulder, left hip and knee  [   ] muscle tenderness   [   ] swelling   Neuro:                 [ x  ] WNL          [   ] HA   [   ] change in vision   [   ] tremor   [   ] weakness   [   ]dysphagia              Cognitive:           [ x  ] WNL           [   ]confusion      Psych:                  [ x  ] WNL           [   ] hallucinations   [   ]agitation   [   ] delusion   [   ]depression      PHYSICAL EXAM  Vital Signs Last 24 Hrs  T(C): 35.7 (24 May 2021 06:01), Max: 35.7 (24 May 2021 06:01)  T(F): 96.2 (24 May 2021 06:01), Max: 96.2 (24 May 2021 06:01)  HR: 75 (24 May 2021 06:01) (56 - 77)  BP: 114/68 (24 May 2021 06:01) (82/46 - 114/68)  BP(mean): --  RR: 18 (24 May 2021 06:01) (18 - 19)  SpO2: 97% (24 May 2021 06:01) (97% - 98%)    Constitutional - [ x  ] NAD, Comfortable        [   ] other:  Chest - [ x  ] CTA     [   ] other:  Cardiovascular - [ x  ] RRR, no murmer     [   ] other:  Abdomen - [ x  ] Soft, NT/ND      [   ] other:        -  [ x ] NO BROOKS CATHETER   [   ] YES  if yes: [   ] NO MEATAL TEAR OR DISCHARGE [   ] other:  Extremities - [ x ] No C/C/E, No calf tenderness       [   ] other:  ROM - [  ] WFL     [ x  ] other: Left shoulder ROM limited to pain  Neurologic Exam -                 Cognitive - [  x ]Awake, Alert, AAO to self, place, date, year, situation         [    ] other:      Communication - [ x  ]Fluent, No dysarthria       [   ] other:      Motor - No focal deficits  RIGHT UE: [ x  ] WNL,  [   ] other:  LEFT    UE: [   ] WNL,  [  x ] other: 4-/5 limited secondary to pain in shoulder  RIGHT LE: [ x  ] WNL,  [   ] other:   LEFT    LE: [   ] WNL,  [ x  ] other: 3+- 4-/5 - give-away weakness hip and knee. DF intact        Sensory - [ x  ] Intact to LT      [    ] other:          Reflexes - [ x  ] wnl/ symmetric     [   ] other:     Psychiatric - [ x  ]Mood stable, Affect WNL     [   ]other:     Skin - [ x  ] intact      [   ] other      MEDICATIONS  (STANDING):  allopurinol 300 milliGRAM(s) Oral daily  AQUAPHOR (petrolatum Ointment) 1 Application(s) Topical daily  aspirin  chewable 81 milliGRAM(s) Oral daily  atorvastatin 40 milliGRAM(s) Oral at bedtime  calamine/zinc oxide Lotion 1 Application(s) Topical two times a day  enoxaparin Injectable 40 milliGRAM(s) SubCutaneous at bedtime  ferrous    sulfate 325 milliGRAM(s) Oral daily  levETIRAcetam 250 milliGRAM(s) Oral two times a day  lisinopril 10 milliGRAM(s) Oral daily  omega-3-Acid Ethyl Esters 2 Gram(s) Oral two times a day  pantoprazole    Tablet 40 milliGRAM(s) Oral before breakfast  polyethylene glycol 3350 17 Gram(s) Oral daily    MEDICATIONS  (PRN):  acetaminophen   Tablet .. 650 milliGRAM(s) Oral every 6 hours PRN Mild Pain (1 - 3)  aluminum hydroxide/magnesium hydroxide/simethicone Suspension 30 milliLiter(s) Oral every 4 hours PRN Dyspepsia  diphenhydrAMINE 25 milliGRAM(s) Oral every 4 hours PRN Rash and/or Itching  magnesium hydroxide Suspension 30 milliLiter(s) Oral daily PRN Constipation  melatonin 3 milliGRAM(s) Oral at bedtime PRN Insomnia      RECENT LABS/IMAGING                                    10.1   8.55  )-----------( 301      ( 24 May 2021 06:02 )             31.9     05-24    142  |  108  |  62<HH>  ----------------------------<  109<H>  4.4   |  22  |  1.4    Ca    8.7      24 May 2021 06:02  Mg     2.3     05-24    TPro  6.3  /  Alb  3.7  /  TBili  0.3  /  DBili  x   /  AST  19  /  ALT  18  /  AlkPhos  141<H>  05-24        POCT Blood Glucose.: 123 mg/dL (05-24-21 @ 07:50)  POCT Blood Glucose.: 134 mg/dL (05-23-21 @ 21:19)  POCT Blood Glucose.: 155 mg/dL (05-23-21 @ 15:54)  POCT Blood Glucose.: 109 mg/dL (05-23-21 @ 11:11)  POCT Blood Glucose.: 109 mg/dL (05-23-21 @ 07:30)  POCT Blood Glucose.: 189 mg/dL (05-22-21 @ 21:26)  POCT Blood Glucose.: 105 mg/dL (05-22-21 @ 15:58)  POCT Blood Glucose.: 196 mg/dL (05-22-21 @ 11:19)  POCT Blood Glucose.: 110 mg/dL (05-22-21 @ 07:25)  POCT Blood Glucose.: 146 mg/dL (05-21-21 @ 21:50)  POCT Blood Glucose.: 260 mg/dL (05-21-21 @ 16:17)  POCT Blood Glucose.: 109 mg/dL (05-21-21 @ 11:23)      < from: TTE Echo Complete w/o Contrast w/ Doppler (05.16.21 @ 06:07) >  Summary:   1. LV Ejection Fraction by Tubbs's Method with a biplane EF of 65 %.   2. Moderately increased LV wall thickness.   3. Spectral Doppler shows impaired relaxation pattern of left ventricularmyocardial filling (Grade I diastolic dysfunction).   4. Normal left atrial size.   5. Normal right atrial size.   6. No evidence of mitral valve regurgitation.   7. Sclerotic aortic valve with decreased opening.    < end of copied text >

## 2021-05-24 NOTE — PROGRESS NOTE ADULT - SUBJECTIVE AND OBJECTIVE BOX
Nephrology progress note     Patient is a Patient is a 77 yo man with long standing diabetes, retinopathy, nephrotic range proteinurua, bleeding gastric ulcer, gout, and subdural hematoma.  Recent admission with metformin toxicity and KELL and lactic acidosis - required hemodialysis.  He was admitted with confusion but currently is alert at baseline. Per his account he fell afew days ago  fell asleep and fell of hair) and struch head.  Now with moderate subdural hematoms    ON events/Subjective:     Allergies:  No Known Allergies    Hospital Medications:   MEDICATIONS  (STANDING):  allopurinol 300 milliGRAM(s) Oral daily  AQUAPHOR (petrolatum Ointment) 1 Application(s) Topical daily  aspirin  chewable 81 milliGRAM(s) Oral daily  atorvastatin 40 milliGRAM(s) Oral at bedtime  calamine/zinc oxide Lotion 1 Application(s) Topical two times a day  enoxaparin Injectable 40 milliGRAM(s) SubCutaneous at bedtime  ferrous    sulfate 325 milliGRAM(s) Oral daily  levETIRAcetam 250 milliGRAM(s) Oral two times a day  lisinopril 10 milliGRAM(s) Oral daily  omega-3-Acid Ethyl Esters 2 Gram(s) Oral two times a day  pantoprazole    Tablet 40 milliGRAM(s) Oral before breakfast  polyethylene glycol 3350 17 Gram(s) Oral daily    REVIEW OF SYSTEMS:  CONSTITUTIONAL: No weakness, fevers or chills  EYES/ENT: No visual changes;  No vertigo or throat pain   NECK: No pain or stiffness  RESPIRATORY: No cough, wheezing, hemoptysis; No shortness of breath  CARDIOVASCULAR: No chest pain or palpitations.  GASTROINTESTINAL: No abdominal or epigastric pain. No nausea, vomiting, or hematemesis; No diarrhea or constipation. No melena or hematochezia.  GENITOURINARY: No dysuria, frequency, foamy urine, urinary urgency, incontinence or hematuria  NEUROLOGICAL: No numbness or weakness  SKIN: No itching, burning, rashes, or lesions   VASCULAR: No bilateral lower extremity edema.   All other review of systems is negative unless indicated above.    VITALS:  T(F): 96.5 (05-24-21 @ 13:11), Max: 96.5 (05-24-21 @ 13:11)  HR: 73 (05-24-21 @ 13:11)  BP: 109/66 (05-24-21 @ 13:11)  RR: 18 (05-24-21 @ 13:11)  SpO2: 97% (05-24-21 @ 06:01)  Wt(kg): --    05-22 @ 07:01  -  05-23 @ 07:00  --------------------------------------------------------  IN: 0 mL / OUT: 500 mL / NET: -500 mL    05-23 @ 07:01  -  05-24 @ 07:00  --------------------------------------------------------  IN: 1720 mL / OUT: 600 mL / NET: 1120 mL        PHYSICAL EXAM:  Constitutional: NAD  HEENT: anicteric sclera, oropharynx clear, MMM  Neck: No JVD  Respiratory: CTAB, no wheezes, rales or rhonchi  Cardiovascular: S1, S2, RRR  Gastrointestinal: BS+, soft, NT/ND  Extremities: No cyanosis or clubbing. No peripheral edema  Neurological: A/O x 3, no focal deficits  Psychiatric: Normal mood, normal affect  : No CVA tenderness. No gil.   Skin: No rashes  Vascular Access:    LABS:  05-24    142  |  108  |  62<HH>  ----------------------------<  109<H>  4.4   |  22  |  1.4    Ca    8.7      24 May 2021 06:02  Mg     2.3     05-24    TPro  6.3  /  Alb  3.7  /  TBili  0.3  /  DBili      /  AST  19  /  ALT  18  /  AlkPhos  141<H>  05-24                          10.1   8.55  )-----------( 301      ( 24 May 2021 06:02 )             31.9       Urine Studies:  Creatinine Trend: 1.4<--, 1.0<--, 1.1<--, 1.0<--, 1.2<--, 1.2<--      RADIOLOGY & ADDITIONAL STUDIES:    ·	tranfered to rehab  ·	s/p fall 2 weeks ago with head trauma and now subacute moderate subdural hematoma  ·	per neuro- may have subacute subdural on top of acute suggestive of more chronic bleeding independent of recent fall  ·	ckd likely stage 3 but worsening  ·	BP low at times  ·	LE edema resolved  ·	anemia with adequate iron stores  ·	nephrotic 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  given low BP and increase in bun and creat over baseline would also stop lisinopril

## 2021-05-24 NOTE — PROGRESS NOTE ADULT - ASSESSMENT
ASSESSMENT/PLAN    Rehab of Traumatic SDH with no LOC  Patient seen and evaluated with Dr. Magaan  Patient requires 3 hrs daily of interdisciplinary inpatient rehab at least 5 days a week.     # Traumatic subdural hematoma without LOC  - CT Head Non Cont (05/14/21): Right-sided acute on chronic subdural hemorrhage with associated mass effect, unchanged.  - Fall 10 days ago. Was on ASA 81mg PO QD  - Evaluated by neurosurgery. Received platelets on 05/13. Stable.  - Continue Keppra 500 mg q12hrs prophylxis  - Neurovascular f/u as outpt in 2 weeks for elective MMA embolization  - Continue ASA 81 QD   - Repeat Head CT on 5/19 showed stable bleed without significant change    # HTN  - Bumex 1 mg daily hold  - Lisinopril on hold    # Borderline Diabetes Mellitus type II   - Last HbA1C 6.6 % (05/14/21).  - Monitor FS. Start insulin if FS > 180. Keep between 140 - 180    #CKD stage 3  - Will continue to monitor. Avoid nephrotoxic drugs     #Seizure proph  - cont Keppra.     #Anemia  - monitor    #History of Gout  - asymptomatic on allopurinol    #uncomfortable teeth  follow with dental    #closed ulcers  -xrays and arterial doppler complete but not read  -will follow  -pod rec appreciated       -Pain control: Tylenol prn    -GI/Bowel Mgmt: bowel meds prn    -Bladder management: No issues at this time     -Skin:  No active issues at this time    -FEN     - Diet: DASH/TLC; carb consistent            Precautions / PROPHYLAXIS:      - Falls    - Ortho: Weight bearing status: WBAT      - DVT prophylaxis: Lovenox       ASSESSMENT/PLAN    Rehab of Traumatic SDH with no LOC  Patient seen and evaluated with Dr. Magana  Patient requires 3 hrs daily of interdisciplinary inpatient rehab at least 5 days a week.     # Traumatic subdural hematoma without LOC  - CT Head Non Cont (05/14/21): Right-sided acute on chronic subdural hemorrhage with associated mass effect, unchanged.  - Fall 10 days ago. Was on ASA 81mg PO QD  - Evaluated by neurosurgery. Received platelets on 05/13. Stable.  - Continue Keppra 500 mg q12hrs prophylxis  - Neurovascular f/u as outpt in 2 weeks for elective MMA embolization  - Continue ASA 81 QD   - Repeat Head CT on 5/19 showed stable bleed without significant change    # History of HTN/ current hypotension  - Bumex 1 mg daily hold  - Lisinopril on hold    # Borderline Diabetes Mellitus type II   - Last HbA1C 6.6 % (05/14/21).  - Monitor FS. Start insulin if FS > 180. Keep between 140 - 180    #CKD stage 3  - Bump up in BUN and creatinine likely assoc with low bp and hypovolemia. Now s/p IVF and lisinopril and Bumex d/c'd. Monitor.    #Seizure proph  - cont Keppra. Will d/c.     #Anemia  - monitor    #History of Gout  - asymptomatic on allopurinol    #uncomfortable teeth  follow with dental. OK for extraction.     #closed ulcers  -xrays and arterial doppler complete - no evidence of OM or arterial disease.  -will follow  -pod rec appreciated       -Pain control: Tylenol prn    -GI/Bowel Mgmt: bowel meds prn    -Bladder management: No issues at this time     -Skin:  No active issues at this time    -FEN     - Diet: DASH/TLC; carb consistent            Precautions / PROPHYLAXIS:      - Falls    - Ortho: Weight bearing status: WBAT      - DVT prophylaxis: Lovenox

## 2021-05-24 NOTE — PROGRESS NOTE ADULT - ATTENDING COMMENTS
I examined the patient with the resident and we discussed the findings and treatment plan. Tolerating the rehab program well. I agree with the findings and treatment plan as above which I modified as indicated.  The patient continues to require 3 hrs a day of acute inpatient rehab.     Hypotensive over the weekend and Bumex and Lisinopril were stopped and given IVF with improved BP. BUN and Creat bump assoc with low BP. Will recheck. No evidence of OM or Vascular insufficiency on imaging.  Mod assist for transfers and ambulation. Slow steady gains.

## 2021-05-25 LAB
ANION GAP SERPL CALC-SCNC: 10 MMOL/L — SIGNIFICANT CHANGE UP (ref 7–14)
BUN SERPL-MCNC: 61 MG/DL — CRITICAL HIGH (ref 10–20)
CALCIUM SERPL-MCNC: 9.2 MG/DL — SIGNIFICANT CHANGE UP (ref 8.5–10.1)
CHLORIDE SERPL-SCNC: 106 MMOL/L — SIGNIFICANT CHANGE UP (ref 98–110)
CO2 SERPL-SCNC: 23 MMOL/L — SIGNIFICANT CHANGE UP (ref 17–32)
CREAT SERPL-MCNC: 1.4 MG/DL — SIGNIFICANT CHANGE UP (ref 0.7–1.5)
GLUCOSE BLDC GLUCOMTR-MCNC: 101 MG/DL — HIGH (ref 70–99)
GLUCOSE BLDC GLUCOMTR-MCNC: 104 MG/DL — HIGH (ref 70–99)
GLUCOSE BLDC GLUCOMTR-MCNC: 175 MG/DL — HIGH (ref 70–99)
GLUCOSE BLDC GLUCOMTR-MCNC: 179 MG/DL — HIGH (ref 70–99)
GLUCOSE SERPL-MCNC: 112 MG/DL — HIGH (ref 70–99)
PHOSPHATE SERPL-MCNC: 3.4 MG/DL — SIGNIFICANT CHANGE UP (ref 2.1–4.9)
POTASSIUM SERPL-MCNC: 4.7 MMOL/L — SIGNIFICANT CHANGE UP (ref 3.5–5)
POTASSIUM SERPL-SCNC: 4.7 MMOL/L — SIGNIFICANT CHANGE UP (ref 3.5–5)
SODIUM SERPL-SCNC: 139 MMOL/L — SIGNIFICANT CHANGE UP (ref 135–146)

## 2021-05-25 RX ORDER — SODIUM CHLORIDE 9 MG/ML
1000 INJECTION INTRAMUSCULAR; INTRAVENOUS; SUBCUTANEOUS
Refills: 0 | Status: DISCONTINUED | OUTPATIENT
Start: 2021-05-25 | End: 2021-05-28

## 2021-05-25 RX ORDER — PETROLATUM,WHITE
1 JELLY (GRAM) TOPICAL
Refills: 0 | Status: DISCONTINUED | OUTPATIENT
Start: 2021-05-25 | End: 2021-05-26

## 2021-05-25 RX ADMIN — Medication 2 GRAM(S): at 17:14

## 2021-05-25 RX ADMIN — CALAMINE AND ZINC OXIDE AND PHENOL 1 APPLICATION(S): 160; 10 LOTION TOPICAL at 17:16

## 2021-05-25 RX ADMIN — Medication 2 GRAM(S): at 05:30

## 2021-05-25 RX ADMIN — Medication 300 MILLIGRAM(S): at 11:50

## 2021-05-25 RX ADMIN — Medication 81 MILLIGRAM(S): at 11:50

## 2021-05-25 RX ADMIN — POLYETHYLENE GLYCOL 3350 17 GRAM(S): 17 POWDER, FOR SOLUTION ORAL at 11:50

## 2021-05-25 RX ADMIN — Medication 650 MILLIGRAM(S): at 14:48

## 2021-05-25 RX ADMIN — CALAMINE AND ZINC OXIDE AND PHENOL 1 APPLICATION(S): 160; 10 LOTION TOPICAL at 11:08

## 2021-05-25 RX ADMIN — LEVETIRACETAM 250 MILLIGRAM(S): 250 TABLET, FILM COATED ORAL at 05:29

## 2021-05-25 RX ADMIN — Medication 1 APPLICATION(S): at 17:15

## 2021-05-25 RX ADMIN — Medication 325 MILLIGRAM(S): at 11:50

## 2021-05-25 RX ADMIN — SODIUM CHLORIDE 75 MILLILITER(S): 9 INJECTION INTRAMUSCULAR; INTRAVENOUS; SUBCUTANEOUS at 21:58

## 2021-05-25 RX ADMIN — LEVETIRACETAM 250 MILLIGRAM(S): 250 TABLET, FILM COATED ORAL at 17:13

## 2021-05-25 RX ADMIN — PANTOPRAZOLE SODIUM 40 MILLIGRAM(S): 20 TABLET, DELAYED RELEASE ORAL at 05:29

## 2021-05-25 RX ADMIN — ATORVASTATIN CALCIUM 40 MILLIGRAM(S): 80 TABLET, FILM COATED ORAL at 21:34

## 2021-05-25 RX ADMIN — Medication 1 APPLICATION(S): at 08:11

## 2021-05-25 RX ADMIN — ENOXAPARIN SODIUM 40 MILLIGRAM(S): 100 INJECTION SUBCUTANEOUS at 21:34

## 2021-05-25 NOTE — PROGRESS NOTE ADULT - SUBJECTIVE AND OBJECTIVE BOX
Nephrology progress note     Patient is a Patient is a 75 yo man with long standing diabetes, retinopathy, nephrotic range proteinurua, bleeding gastric ulcer, gout, and subdural hematoma.  Recent admission with metformin toxicity and KELL and lactic acidosis - required hemodialysis.  He was admitted with confusion but currently is alert at baseline. Per his account he fell afew days ago  fell asleep and fell of hair) and struch head.  Now with moderate subdural hematoms      ON events/Subjective:     Allergies:  No Known Allergies    Hospital Medications:   MEDICATIONS  (STANDING):  allopurinol 300 milliGRAM(s) Oral daily  AQUAPHOR (petrolatum Ointment) 1 Application(s) Topical two times a day  aspirin  chewable 81 milliGRAM(s) Oral daily  atorvastatin 40 milliGRAM(s) Oral at bedtime  calamine/zinc oxide Lotion 1 Application(s) Topical two times a day  enoxaparin Injectable 40 milliGRAM(s) SubCutaneous at bedtime  ferrous    sulfate 325 milliGRAM(s) Oral daily  levETIRAcetam 250 milliGRAM(s) Oral two times a day  omega-3-Acid Ethyl Esters 2 Gram(s) Oral two times a day  pantoprazole    Tablet 40 milliGRAM(s) Oral before breakfast  polyethylene glycol 3350 17 Gram(s) Oral daily    REVIEW OF SYSTEMS:  CONSTITUTIONAL: No weakness, fevers or chills  EYES/ENT: No visual changes;  No vertigo or throat pain   NECK: No pain or stiffness  RESPIRATORY: No cough, wheezing, hemoptysis; No shortness of breath  CARDIOVASCULAR: No chest pain or palpitations.  GASTROINTESTINAL: No abdominal or epigastric pain. No nausea, vomiting, or hematemesis; No diarrhea or constipation. No melena or hematochezia.  GENITOURINARY: No dysuria, frequency, foamy urine, urinary urgency, incontinence or hematuria  NEUROLOGICAL: No numbness or weakness  SKIN: No itching, burning, rashes, or lesions   VASCULAR: No bilateral lower extremity edema.   All other review of systems is negative unless indicated above.    VITALS:  T(F): 96.4 (05-25-21 @ 05:42), Max: 96.5 (05-24-21 @ 13:11)  HR: 71 (05-25-21 @ 05:42)  BP: 123/70 (05-25-21 @ 05:42)  RR: 18 (05-25-21 @ 05:42)  SpO2: --  Wt(kg): --    05-23 @ 07:01  -  05-24 @ 07:00  --------------------------------------------------------  IN: 1720 mL / OUT: 600 mL / NET: 1120 mL    05-24 @ 07:01  -  05-25 @ 07:00  --------------------------------------------------------  IN: 0 mL / OUT: 1100 mL / NET: -1100 mL        PHYSICAL EXAM:  Constitutional: NAD  HEENT: anicteric sclera, oropharynx clear, MMM  Neck: No JVD  Respiratory: CTAB, no wheezes, rales or rhonchi  Cardiovascular: S1, S2, RRR  Gastrointestinal: BS+, soft, NT/ND  Extremities: No cyanosis or clubbing. No peripheral edema  Neurological: A/O x 3, no focal deficits  Psychiatric: Normal mood, normal affect  : No CVA tenderness. No gil.   Skin: No rashes  Vascular Access:    LABS:  05-25    139  |  106  |  61<HH>  ----------------------------<  112<H>  4.7   |  23  |  1.4    Ca    9.2      25 May 2021 07:08  Phos  3.4     05-25  Mg     2.3     05-24    TPro  6.3  /  Alb  3.7  /  TBili  0.3  /  DBili      /  AST  19  /  ALT  18  /  AlkPhos  141<H>  05-24                          10.1   8.55  )-----------( 301      ( 24 May 2021 06:02 )             31.9       Urine Studies:  Creatinine Trend: 1.4<--, 1.4<--, 1.0<--, 1.1<--, 1.0<--, 1.2<--    ·	tranfered to rehab  ·	s/p fall 2 weeks ago with head trauma and now subacute moderate subdural hematoma  ·	per neuro- may have subacute subdural on top of acute suggestive of more chronic bleeding independent of recent fall  ·	ckd likely stage 3 but worsening likley prerenal from low BP  ·	BP low at times now improved off BP meds  ·	LE edema resolved  ·	anemia with adequate iron stores  ·	nephrotic 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

## 2021-05-25 NOTE — PROGRESS NOTE ADULT - ATTENDING COMMENTS
I examined the patient with the resident and we discussed the findings and treatment plan. Tolerating the rehab program well. I agree with the findings and treatment plan as above. The patient continues to require 3 hrs a day of acute inpatient rehab.   Medically and neuro stable. Kidney function stable. BP ok off Bumex and Lisinopril. Recheck labs in am  Making steady gains in therapy. No new issues.

## 2021-05-25 NOTE — PROGRESS NOTE ADULT - ASSESSMENT
ASSESSMENT/PLAN    Rehab of Traumatic SDH with no LOC  Patient seen and evaluated with Dr. Magana  Patient requires 3 hrs daily of interdisciplinary inpatient rehab at least 5 days a week.     # Traumatic subdural hematoma without LOC  - CT Head Non Cont (05/14/21): Right-sided acute on chronic subdural hemorrhage with associated mass effect, unchanged.  - Fall 10 days ago. Was on ASA 81mg PO QD  - Evaluated by neurosurgery. Received platelets on 05/13. Stable.  - Continue Keppra 500 mg q12hrs prophylxis  - Neurovascular f/u as outpt in 2 weeks for elective MMA embolization  - Continue ASA 81 QD   - Repeat Head CT on 5/19 showed stable bleed without significant change    # History of HTN/ current hypotension  - Bumex 1 mg daily hold  - Lisinopril on hold    # Borderline Diabetes Mellitus type II   - Last HbA1C 6.6 % (05/14/21).  - Monitor FS. Start insulin if FS > 180. Keep between 140 - 180    #CKD stage 3  - Bump up in BUN and creatinine likely assoc with low bp and hypovolemia. Now s/p IVF and lisinopril and Bumex d/c'd. Monitor.    #Seizure proph  - cont Keppra. Will d/c.     #Anemia  - monitor    #History of Gout  - asymptomatic on allopurinol    #uncomfortable teeth  follow with dental. OK for extraction.     #closed ulcers  -xrays and arterial doppler complete - no evidence of OM or arterial disease.  -will follow  -pod rec appreciated       -Pain control: Tylenol prn    -GI/Bowel Mgmt: bowel meds prn    -Bladder management: No issues at this time     -Skin:  No active issues at this time    -FEN     - Diet: DASH/TLC; carb consistent            Precautions / PROPHYLAXIS:      - Falls    - Ortho: Weight bearing status: WBAT      - DVT prophylaxis: Lovenox       ASSESSMENT/PLAN    Rehab of Traumatic SDH with no LOC  Patient seen and evaluated with Dr. Magana  Patient requires 3 hrs daily of interdisciplinary inpatient rehab at least 5 days a week.     # Traumatic subdural hematoma without LOC  - CT Head Non Cont (05/14/21): Right-sided acute on chronic subdural hemorrhage with associated mass effect, unchanged.  - Fall 10 days ago. Was on ASA 81mg PO QD  - Evaluated by neurosurgery. Received platelets on 05/13. Stable.  - Continue Keppra 500 mg q12hrs prophylxis  - Neurovascular f/u as outpt in 2 weeks for elective MMA embolization  - Continue ASA 81 QD   - Repeat Head CT on 5/19 showed stable bleed without significant change    # History of HTN/ current hypotension  - Bumex 1 mg daily hold  - Lisinopril on hold    # Borderline Diabetes Mellitus type II   - Last HbA1C 6.6 % (05/14/21).  - Monitor FS. Start insulin if FS > 180. Keep between 140 - 180    #CKD stage 3  - Bump up in BUN and creatinine likely assoc with low bp and hypovolemia. Now s/p IVF and lisinopril and Bumex d/c'd. Monitor.    #Seizure proph  - cont Keppra.     #Anemia  - monitor    #History of Gout  - asymptomatic on allopurinol    #uncomfortable teeth  follow with dental. OK for extraction.     #closed ulcers  -xrays and arterial doppler complete - no evidence of OM or arterial disease.  -will follow  -pod rec appreciated       -Pain control: Tylenol prn    -GI/Bowel Mgmt: bowel meds prn    -Bladder management: No issues at this time     -Skin:  No active issues at this time    -FEN     - Diet: DASH/TLC; carb consistent            Precautions / PROPHYLAXIS:      - Falls    - Ortho: Weight bearing status: WBAT      - DVT prophylaxis: Lovenox

## 2021-05-25 NOTE — PROGRESS NOTE ADULT - ATTENDING COMMENTS
pt has multiple dried eschars on his toes, b/l feet  pt reports having spontaneous blood blisters  no acute blisters present. no skin break/ulcers or signs of infection  no bandaging needed. if noted acute blistering/ruptured blister-->can use silvadene cream  My note supersedes the residents in the event of a discrepancy.

## 2021-05-25 NOTE — PROGRESS NOTE ADULT - SUBJECTIVE AND OBJECTIVE BOX
Podiatry Progress Note    Subjective:   MANDA TAVAREZ is a pleasant well-nourished, well developed 76y Male in no acute distress, alert awake, and oriented to person, place and time.  Patient is a 76y old  Male who presents with a chief complaint of Traumatic SDH without LOC (25 May 2021 07:57)  Seen by bedside w/ attending; evaluated bilateral feet;     PAST MEDICAL & SURGICAL HISTORY:  Diverticulitis  Herniated disc, cervical  Chronic gout of foot, unspecified cause, unspecified laterality  Type 2 diabetes mellitus without complication, unspecified long term insulin use status  Hypertension, unspecified type  High cholesterol  Osteoarthritis of multiple joints, unspecified osteoarthritis type  Sciatica, unspecified laterality  History of tonsillectomy and adenoidectomy  H/O colonoscopy  2018    Objective:  Vital Signs Last 24 Hrs  T(C): 35.8 (25 May 2021 05:42), Max: 35.8 (24 May 2021 13:11)  T(F): 96.4 (25 May 2021 05:42), Max: 96.5 (24 May 2021 13:11)  HR: 71 (25 May 2021 05:42) (67 - 73)  BP: 123/70 (25 May 2021 05:42) (101/57 - 123/70)  BP(mean): --  RR: 18 (25 May 2021 05:42) (18 - 18)  SpO2: --                        10.1   8.55  )-----------( 301      ( 24 May 2021 06:02 )             31.9                 05-24    142  |  108  |  62<HH>  ----------------------------<  109<H>  4.4   |  22  |  1.4    Ca    8.7      24 May 2021 06:02  Mg     2.3     05-24    TPro  6.3  /  Alb  3.7  /  TBili  0.3  /  DBili  x   /  AST  19  /  ALT  18  /  AlkPhos  141<H>  05-24    Physical Exam - Lower Extremity Focused:   Derm:   Closed Ulcer Sites to Digits; Right Foot 1, 2, 3; Left Foot 2,5  Minor necrotic patch Left 1st digit plantar aspect     Vascular: DP and PT Pulses Diminished; Foot is Warm to Warm to the touch   Neuro: Protective Sensation Diminished / Moderately Neuropathic     Assessment:   Ulcer sites to b/l Digits - Closed sites, Stable  Necrotic Patch Left 1st digit - Stable    Plan:  Chart reviewed and Patient evaluated. All Questions and Concerns Addressed and Answered  Discussed diagnosis and treatment with patient  No Dressing Needed; Closed Stable Wound Sites  WBAT  Pt stable from Podiatry standpoint  Can f/u w/Dr Rosas upon d/c   Evaluated and Discussed plan w/Attending    Podiatry

## 2021-05-25 NOTE — PROGRESS NOTE ADULT - SUBJECTIVE AND OBJECTIVE BOX
Patient is a 76y old  Male who presents with a chief complaint of Traumatic SDH without LOC (19 May 2021 22:35)      HPI:  77 yo male patient with PMH of diverticulosis, DM, HTN, HLD, CKD stage 3, ostearthritis, bleeding gastric ulcer, anemia presenting with period of AMS. Per wife pt had a large breakfast and shortly after was found acting confused and unable to open his eyes but saying his eyes were open. Ambulance was called, FS was found to be 70. Per wife pt seldom runs a FS under 90. Pt was given PO food/drink and FS improved as did pt's mental status. Wife says pt is currently at baseline in the ED. Pt endorsing chills everytime he goes to urinate xfew days. No hematuria, dysuria, increased frequency in urination. No f/n/v. No abdominal pain/back pain. No cough/sore throat/headache/weakness. Pt endorses fall 10 days ago with no LOC. No A/c. In ED, patient was normotensive, afebrile. CT head was done showing subacute subdural hematoma. Neurosurgery evaluated patient in ED, no immediate surgical indication. As per neurosurgery, patient received platelet transfusion for reversal of ASA and started on Keppra 500mg q12.    He has a history of LLE weakness secondary to an old thigh muscle tear, knee meniscus injury and radiculopathy and was a limited ambulator with a walker PTA.    Patient seen and evaluated by PMR consultation team including PT/OT and found to be a good candidate for acute inpatient rehab.   Latest progress with PT: Bed mobility max assist, transfers mod assist, ambulates 5ft with RW mod assist (19 May 2021 10:34)      I examined the patient and reviewed the chart. There have been no significant changes since my history and physical except where documented below.    TODAY'S SUBJECTIVE & REVIEW OF SYMPTOMS:  Patient seen and evaluated with Dr. Magana.  Pts blood pressure has come back up after the liter of normal saline last night. Pt is goign to dental cliic this AM.   CLOF: Bed mobility partial assist, transfers partial assist, ambulates 75ft with RW touch assist on acute care.        Constiutional:    [  x ] WNL           [   ] poor appetite   [   ] insomnia   [   ] tired   Cardio:                [ x  ] WNL           [   ] CP   [   ] REECE   [   ] palpitations               Resp:                   [ x  ] WNL           [   ] SOB   [   ] cough   [   ] wheezing   GI:                        [ x  ] WNL           [   ] constipation   [   ] diarrhea   [   ] abdominal pain   [   ] nausea   [   ] emesis                                :                      [x   ] WNL           [   ] BROOKS  [   ] dysuria   [   ] difficulty voiding             Endo:                   [ x  ] WNL          [   ] polyuria   [   ] temperature intolerance                 Skin:                     [ x  ] WNL          [   ] pain   [   ] wound   [   ] rash   MSK:                    [   ] WNL          [   ] muscle pain   [ x  ] joint pain/ stiffness - left shoulder, left hip and knee  [   ] muscle tenderness   [   ] swelling   Neuro:                 [ x  ] WNL          [   ] HA   [   ] change in vision   [   ] tremor   [   ] weakness   [   ]dysphagia              Cognitive:           [ x  ] WNL           [   ]confusion      Psych:                  [ x  ] WNL           [   ] hallucinations   [   ]agitation   [   ] delusion   [   ]depression      PHYSICAL EXAM  Vital Signs Last 24 Hrs  T(C): 35.8 (25 May 2021 05:42), Max: 35.8 (24 May 2021 13:11)  T(F): 96.4 (25 May 2021 05:42), Max: 96.5 (24 May 2021 13:11)  HR: 71 (25 May 2021 05:42) (67 - 73)  BP: 123/70 (25 May 2021 05:42) (101/57 - 123/70)  RR: 18 (25 May 2021 05:42) (18 - 18)  SpO2: --      Constitutional - [ x  ] NAD, Comfortable        [   ] other:  Chest - [ x  ] CTA     [   ] other:  Cardiovascular - [ x  ] RRR, no murmer     [   ] other:  Abdomen - [ x  ] Soft, NT/ND      [   ] other:        -  [ x ] NO BROOKS CATHETER   [   ] YES  if yes: [   ] NO MEATAL TEAR OR DISCHARGE [   ] other:  Extremities - [ x ] No C/C/E, No calf tenderness       [   ] other:  ROM - [  ] WFL     [ x  ] other: Left shoulder ROM limited to pain  Neurologic Exam -                 Cognitive - [  x ]Awake, Alert, AAO to self, place, date, year, situation         [    ] other:      Communication - [ x  ]Fluent, No dysarthria       [   ] other:      Motor - No focal deficits  RIGHT UE: [ x  ] WNL,  [   ] other:  LEFT    UE: [   ] WNL,  [  x ] other: 4-/5 limited secondary to pain in shoulder  RIGHT LE: [ x  ] WNL,  [   ] other:   LEFT    LE: [   ] WNL,  [ x  ] other: 3+- 4-/5 - give-away weakness hip and knee. DF intact        Sensory - [ x  ] Intact to LT      [    ] other:          Reflexes - [ x  ] wnl/ symmetric     [   ] other:     Psychiatric - [ x  ]Mood stable, Affect WNL     [   ]other:     Skin - [ x  ] intact      [   ] other      MEDICATIONS  (STANDING):  allopurinol 300 milliGRAM(s) Oral daily  AQUAPHOR (petrolatum Ointment) 1 Application(s) Topical two times a day  aspirin  chewable 81 milliGRAM(s) Oral daily  atorvastatin 40 milliGRAM(s) Oral at bedtime  calamine/zinc oxide Lotion 1 Application(s) Topical two times a day  enoxaparin Injectable 40 milliGRAM(s) SubCutaneous at bedtime  ferrous    sulfate 325 milliGRAM(s) Oral daily  levETIRAcetam 250 milliGRAM(s) Oral two times a day  omega-3-Acid Ethyl Esters 2 Gram(s) Oral two times a day  pantoprazole    Tablet 40 milliGRAM(s) Oral before breakfast  polyethylene glycol 3350 17 Gram(s) Oral daily    MEDICATIONS  (PRN):  acetaminophen   Tablet .. 650 milliGRAM(s) Oral every 6 hours PRN Mild Pain (1 - 3)  aluminum hydroxide/magnesium hydroxide/simethicone Suspension 30 milliLiter(s) Oral every 4 hours PRN Dyspepsia  hydrOXYzine hydrochloride 10 milliGRAM(s) Oral every 8 hours PRN Itching  magnesium hydroxide Suspension 30 milliLiter(s) Oral daily PRN Constipation  melatonin 3 milliGRAM(s) Oral at bedtime PRN Insomnia        RECENT LABS/IMAGING                                                    10.1   8.55  )-----------( 301      ( 24 May 2021 06:02 )             31.9     05-24    142  |  108  |  62<HH>  ----------------------------<  109<H>  4.4   |  22  |  1.4    Ca    8.7      24 May 2021 06:02  Mg     2.3     05-24    TPro  6.3  /  Alb  3.7  /  TBili  0.3  /  DBili  x   /  AST  19  /  ALT  18  /  AlkPhos  141<H>  05-24        POCT Blood Glucose.: 104 mg/dL (05-25-21 @ 07:25)  POCT Blood Glucose.: 102 mg/dL (05-24-21 @ 21:50)  POCT Blood Glucose.: 140 mg/dL (05-24-21 @ 16:24)  POCT Blood Glucose.: 134 mg/dL (05-24-21 @ 12:07)  POCT Blood Glucose.: 123 mg/dL (05-24-21 @ 07:50)  POCT Blood Glucose.: 134 mg/dL (05-23-21 @ 21:19)  POCT Blood Glucose.: 155 mg/dL (05-23-21 @ 15:54)  POCT Blood Glucose.: 109 mg/dL (05-23-21 @ 11:11)  POCT Blood Glucose.: 109 mg/dL (05-23-21 @ 07:30)  POCT Blood Glucose.: 189 mg/dL (05-22-21 @ 21:26)  POCT Blood Glucose.: 105 mg/dL (05-22-21 @ 15:58)  POCT Blood Glucose.: 196 mg/dL (05-22-21 @ 11:19)   Patient is a 76y old  Male who presents with a chief complaint of Traumatic SDH without LOC (19 May 2021 22:35)      HPI:  77 yo male patient with PMH of diverticulosis, DM, HTN, HLD, CKD stage 3, ostearthritis, bleeding gastric ulcer, anemia presenting with period of AMS. Per wife pt had a large breakfast and shortly after was found acting confused and unable to open his eyes but saying his eyes were open. Ambulance was called, FS was found to be 70. Per wife pt seldom runs a FS under 90. Pt was given PO food/drink and FS improved as did pt's mental status. Wife says pt is currently at baseline in the ED. Pt endorsing chills everytime he goes to urinate xfew days. No hematuria, dysuria, increased frequency in urination. No f/n/v. No abdominal pain/back pain. No cough/sore throat/headache/weakness. Pt endorses fall 10 days ago with no LOC. No A/c. In ED, patient was normotensive, afebrile. CT head was done showing subacute subdural hematoma. Neurosurgery evaluated patient in ED, no immediate surgical indication. As per neurosurgery, patient received platelet transfusion for reversal of ASA and started on Keppra 500mg q12.    He has a history of LLE weakness secondary to an old thigh muscle tear, knee meniscus injury and radiculopathy and was a limited ambulator with a walker PTA.    Patient seen and evaluated by PMR consultation team including PT/OT and found to be a good candidate for acute inpatient rehab.   Latest progress with PT: Bed mobility max assist, transfers mod assist, ambulates 5ft with RW mod assist (19 May 2021 10:34)      TODAY'S SUBJECTIVE & REVIEW OF SYMPTOMS:  Patient seen and evaluated with Dr. Magana.  Pts blood pressure has come back up after the liter of normal saline last night. Pt is goign to dental cliic this AM.   CLOF: Bed mobility partial assist, transfers partial assist, ambulates 75ft with RW touch assist        Constiutional:    [  x ] WNL           [   ] poor appetite   [   ] insomnia   [   ] tired   Cardio:                [ x  ] WNL           [   ] CP   [   ] REECE   [   ] palpitations               Resp:                   [ x  ] WNL           [   ] SOB   [   ] cough   [   ] wheezing   GI:                        [ x  ] WNL           [   ] constipation   [   ] diarrhea   [   ] abdominal pain   [   ] nausea   [   ] emesis                                :                      [x   ] WNL           [   ] BROOKS  [   ] dysuria   [   ] difficulty voiding             Endo:                   [ x  ] WNL          [   ] polyuria   [   ] temperature intolerance                 Skin:                     [ x  ] WNL          [   ] pain   [   ] wound   [   ] rash   MSK:                    [   ] WNL          [   ] muscle pain   [ x  ] joint pain/ stiffness - left shoulder, left hip and knee  [   ] muscle tenderness   [   ] swelling   Neuro:                 [ x  ] WNL          [   ] HA   [   ] change in vision   [   ] tremor   [   ] weakness   [   ]dysphagia              Cognitive:           [ x  ] WNL           [   ]confusion      Psych:                  [ x  ] WNL           [   ] hallucinations   [   ]agitation   [   ] delusion   [   ]depression      PHYSICAL EXAM  Vital Signs Last 24 Hrs  T(C): 35.8 (25 May 2021 05:42), Max: 35.8 (24 May 2021 13:11)  T(F): 96.4 (25 May 2021 05:42), Max: 96.5 (24 May 2021 13:11)  HR: 71 (25 May 2021 05:42) (67 - 73)  BP: 123/70 (25 May 2021 05:42) (101/57 - 123/70)  RR: 18 (25 May 2021 05:42) (18 - 18)  SpO2: --      Constitutional - [ x  ] NAD, Comfortable        [   ] other:  Chest - [ x  ] CTA     [   ] other:  Cardiovascular - [ x  ] RRR, no murmer     [   ] other:  Abdomen - [ x  ] Soft, NT/ND      [   ] other:        -  [ x ] NO BROOKS CATHETER   [   ] YES  if yes: [   ] NO MEATAL TEAR OR DISCHARGE [   ] other:  Extremities - [ x ] No C/C/E, No calf tenderness       [   ] other:  ROM - [  ] WFL     [ x  ] other: Left shoulder ROM limited to pain  Neurologic Exam -                 Cognitive - [  x ]Awake, Alert, AAO to self, place, date, year, situation         [    ] other:      Communication - [ x  ]Fluent, No dysarthria       [   ] other:      Motor - No focal deficits  RIGHT UE: [ x  ] WNL,  [   ] other:  LEFT    UE: [   ] WNL,  [  x ] other: 4-/5 limited secondary to pain in shoulder  RIGHT LE: [ x  ] WNL,  [   ] other:   LEFT    LE: [   ] WNL,  [ x  ] other: 3+- 4-/5 - give-away weakness hip and knee. DF intact        Sensory - [ x  ] Intact to LT      [    ] other:          Reflexes - [ x  ] wnl/ symmetric     [   ] other:     Psychiatric - [ x  ]Mood stable, Affect WNL     [   ]other:     Skin - [ x  ] intact      [   ] other      MEDICATIONS  (STANDING):  allopurinol 300 milliGRAM(s) Oral daily  AQUAPHOR (petrolatum Ointment) 1 Application(s) Topical two times a day  aspirin  chewable 81 milliGRAM(s) Oral daily  atorvastatin 40 milliGRAM(s) Oral at bedtime  calamine/zinc oxide Lotion 1 Application(s) Topical two times a day  enoxaparin Injectable 40 milliGRAM(s) SubCutaneous at bedtime  ferrous    sulfate 325 milliGRAM(s) Oral daily  levETIRAcetam 250 milliGRAM(s) Oral two times a day  omega-3-Acid Ethyl Esters 2 Gram(s) Oral two times a day  pantoprazole    Tablet 40 milliGRAM(s) Oral before breakfast  polyethylene glycol 3350 17 Gram(s) Oral daily    MEDICATIONS  (PRN):  acetaminophen   Tablet .. 650 milliGRAM(s) Oral every 6 hours PRN Mild Pain (1 - 3)  aluminum hydroxide/magnesium hydroxide/simethicone Suspension 30 milliLiter(s) Oral every 4 hours PRN Dyspepsia  hydrOXYzine hydrochloride 10 milliGRAM(s) Oral every 8 hours PRN Itching  magnesium hydroxide Suspension 30 milliLiter(s) Oral daily PRN Constipation  melatonin 3 milliGRAM(s) Oral at bedtime PRN Insomnia        RECENT LABS/IMAGING                                                    10.1   8.55  )-----------( 301      ( 24 May 2021 06:02 )             31.9     05-25    139  |  106  |  61<HH>  ----------------------------<  112<H>  4.7   |  23  |  1.4    Ca    9.2      25 May 2021 07:08  Phos  3.4     05-25  Mg     2.3     05-24    TPro  6.3  /  Alb  3.7  /  TBili  0.3  /  DBili  x   /  AST  19  /  ALT  18  /  AlkPhos  141<H>  05-24        POCT Blood Glucose.: 175 mg/dL (05-25-21 @ 16:25)  POCT Blood Glucose.: 101 mg/dL (05-25-21 @ 11:26)  POCT Blood Glucose.: 104 mg/dL (05-25-21 @ 07:25)  POCT Blood Glucose.: 102 mg/dL (05-24-21 @ 21:50)  POCT Blood Glucose.: 140 mg/dL (05-24-21 @ 16:24)  POCT Blood Glucose.: 134 mg/dL (05-24-21 @ 12:07)  POCT Blood Glucose.: 123 mg/dL (05-24-21 @ 07:50)  POCT Blood Glucose.: 134 mg/dL (05-23-21 @ 21:19)  POCT Blood Glucose.: 155 mg/dL (05-23-21 @ 15:54)  POCT Blood Glucose.: 109 mg/dL (05-23-21 @ 11:11)  POCT Blood Glucose.: 109 mg/dL (05-23-21 @ 07:30)  POCT Blood Glucose.: 189 mg/dL (05-22-21 @ 21:26)

## 2021-05-26 LAB
ALBUMIN SERPL ELPH-MCNC: 3.8 G/DL — SIGNIFICANT CHANGE UP (ref 3.5–5.2)
ALP SERPL-CCNC: 148 U/L — HIGH (ref 30–115)
ALT FLD-CCNC: 20 U/L — SIGNIFICANT CHANGE UP (ref 0–41)
ANION GAP SERPL CALC-SCNC: 9 MMOL/L — SIGNIFICANT CHANGE UP (ref 7–14)
AST SERPL-CCNC: 19 U/L — SIGNIFICANT CHANGE UP (ref 0–41)
BILIRUB SERPL-MCNC: 0.3 MG/DL — SIGNIFICANT CHANGE UP (ref 0.2–1.2)
BUN SERPL-MCNC: 54 MG/DL — HIGH (ref 10–20)
CALCIUM SERPL-MCNC: 9 MG/DL — SIGNIFICANT CHANGE UP (ref 8.5–10.1)
CHLORIDE SERPL-SCNC: 109 MMOL/L — SIGNIFICANT CHANGE UP (ref 98–110)
CO2 SERPL-SCNC: 24 MMOL/L — SIGNIFICANT CHANGE UP (ref 17–32)
CREAT SERPL-MCNC: 1 MG/DL — SIGNIFICANT CHANGE UP (ref 0.7–1.5)
GLUCOSE BLDC GLUCOMTR-MCNC: 116 MG/DL — HIGH (ref 70–99)
GLUCOSE BLDC GLUCOMTR-MCNC: 135 MG/DL — HIGH (ref 70–99)
GLUCOSE BLDC GLUCOMTR-MCNC: 144 MG/DL — HIGH (ref 70–99)
GLUCOSE BLDC GLUCOMTR-MCNC: 153 MG/DL — HIGH (ref 70–99)
GLUCOSE SERPL-MCNC: 112 MG/DL — HIGH (ref 70–99)
POTASSIUM SERPL-MCNC: 5 MMOL/L — SIGNIFICANT CHANGE UP (ref 3.5–5)
POTASSIUM SERPL-SCNC: 5 MMOL/L — SIGNIFICANT CHANGE UP (ref 3.5–5)
PROT SERPL-MCNC: 6.5 G/DL — SIGNIFICANT CHANGE UP (ref 6–8)
SODIUM SERPL-SCNC: 142 MMOL/L — SIGNIFICANT CHANGE UP (ref 135–146)

## 2021-05-26 PROCEDURE — 70450 CT HEAD/BRAIN W/O DYE: CPT | Mod: 26

## 2021-05-26 RX ADMIN — LEVETIRACETAM 250 MILLIGRAM(S): 250 TABLET, FILM COATED ORAL at 17:27

## 2021-05-26 RX ADMIN — Medication 1 APPLICATION(S): at 17:28

## 2021-05-26 RX ADMIN — Medication 300 MILLIGRAM(S): at 11:37

## 2021-05-26 RX ADMIN — Medication 10 MILLIGRAM(S): at 21:30

## 2021-05-26 RX ADMIN — ATORVASTATIN CALCIUM 40 MILLIGRAM(S): 80 TABLET, FILM COATED ORAL at 21:30

## 2021-05-26 RX ADMIN — Medication 81 MILLIGRAM(S): at 11:37

## 2021-05-26 RX ADMIN — Medication 1 APPLICATION(S): at 06:49

## 2021-05-26 RX ADMIN — Medication 325 MILLIGRAM(S): at 11:37

## 2021-05-26 RX ADMIN — LEVETIRACETAM 250 MILLIGRAM(S): 250 TABLET, FILM COATED ORAL at 06:43

## 2021-05-26 RX ADMIN — PANTOPRAZOLE SODIUM 40 MILLIGRAM(S): 20 TABLET, DELAYED RELEASE ORAL at 06:43

## 2021-05-26 RX ADMIN — CALAMINE AND ZINC OXIDE AND PHENOL 1 APPLICATION(S): 160; 10 LOTION TOPICAL at 17:28

## 2021-05-26 RX ADMIN — Medication 1 APPLICATION(S): at 06:48

## 2021-05-26 RX ADMIN — SODIUM CHLORIDE 75 MILLILITER(S): 9 INJECTION INTRAMUSCULAR; INTRAVENOUS; SUBCUTANEOUS at 05:38

## 2021-05-26 RX ADMIN — Medication 2 GRAM(S): at 06:44

## 2021-05-26 RX ADMIN — POLYETHYLENE GLYCOL 3350 17 GRAM(S): 17 POWDER, FOR SOLUTION ORAL at 11:38

## 2021-05-26 RX ADMIN — MAGNESIUM HYDROXIDE 30 MILLILITER(S): 400 TABLET, CHEWABLE ORAL at 15:05

## 2021-05-26 RX ADMIN — Medication 10 MILLIGRAM(S): at 07:31

## 2021-05-26 RX ADMIN — Medication 2 GRAM(S): at 17:28

## 2021-05-26 RX ADMIN — ENOXAPARIN SODIUM 40 MILLIGRAM(S): 100 INJECTION SUBCUTANEOUS at 21:30

## 2021-05-26 RX ADMIN — CALAMINE AND ZINC OXIDE AND PHENOL 1 APPLICATION(S): 160; 10 LOTION TOPICAL at 07:12

## 2021-05-26 NOTE — PROGRESS NOTE ADULT - SUBJECTIVE AND OBJECTIVE BOX
Nephrology progress note     Patient is a Patient is a 77 yo man with long standing diabetes, retinopathy, nephrotic range proteinurua, bleeding gastric ulcer, gout, and subdural hematoma.  Recent admission with metformin toxicity and ALAN and lactic acidosis - required hemodialysis.  He was admitted with confusion but currently is alert at baseline. Per his account he fell afew days ago  fell asleep and fell of hair) and struch head.  Now with moderate subdural hematoms    ON events/Subjective:     Allergies:  No Known Allergies    Hospital Medications:   MEDICATIONS  (STANDING):  allopurinol 300 milliGRAM(s) Oral daily  AQUAPHOR (petrolatum Ointment) 1 Application(s) Topical two times a day  aspirin  chewable 81 milliGRAM(s) Oral daily  atorvastatin 40 milliGRAM(s) Oral at bedtime  calamine/zinc oxide Lotion 1 Application(s) Topical two times a day  enoxaparin Injectable 40 milliGRAM(s) SubCutaneous at bedtime  ferrous    sulfate 325 milliGRAM(s) Oral daily  levETIRAcetam 250 milliGRAM(s) Oral two times a day  omega-3-Acid Ethyl Esters 2 Gram(s) Oral two times a day  pantoprazole    Tablet 40 milliGRAM(s) Oral before breakfast  polyethylene glycol 3350 17 Gram(s) Oral daily  sodium chloride 0.9%. 1000 milliLiter(s) (75 mL/Hr) IV Continuous <Continuous>    REVIEW OF SYSTEMS:  CONSTITUTIONAL: No weakness, fevers or chills  EYES/ENT: No visual changes;  No vertigo or throat pain   NECK: No pain or stiffness  RESPIRATORY: No cough, wheezing, hemoptysis; No shortness of breath  CARDIOVASCULAR: No chest pain or palpitations.  GASTROINTESTINAL: No abdominal or epigastric pain. No nausea, vomiting, or hematemesis; No diarrhea or constipation. No melena or hematochezia.  GENITOURINARY: No dysuria, frequency, foamy urine, urinary urgency, incontinence or hematuria  NEUROLOGICAL: No numbness or weakness  SKIN: No itching, burning, rashes, or lesions   VASCULAR: No bilateral lower extremity edema.   All other review of systems is negative unless indicated above.    VITALS:  T(F): 96.6 (05-26-21 @ 05:44), Max: 96.6 (05-26-21 @ 05:41)  HR: 72 (05-26-21 @ 05:44)  BP: 142/67 (05-26-21 @ 05:44)  RR: 18 (05-26-21 @ 05:44)  SpO2: 98% (05-25-21 @ 20:58)  Wt(kg): --    05-24 @ 07:01  -  05-25 @ 07:00  --------------------------------------------------------  IN: 0 mL / OUT: 1100 mL / NET: -1100 mL    05-25 @ 07:01  -  05-26 @ 07:00  --------------------------------------------------------  IN: 0 mL / OUT: 1500 mL / NET: -1500 mL        PHYSICAL EXAM:  Constitutional: NAD  HEENT: anicteric sclera, oropharynx clear, MMM  Neck: No JVD  Respiratory: CTAB, no wheezes, rales or rhonchi  Cardiovascular: S1, S2, RRR  Gastrointestinal: BS+, soft, NT/ND  Extremities: No cyanosis or clubbing. No peripheral edema  Neurological: A/O x 3, no focal deficits  Psychiatric: Normal mood, normal affect  : No CVA tenderness. No gil.   Skin: No rashes  Vascular Access:    LABS:  05-26    142  |  109  |  54<H>  ----------------------------<  112<H>  5.0   |  24  |  1.0    Ca    9.0      26 May 2021 05:24  Phos  3.4     05-25    TPro  6.5  /  Alb  3.8  /  TBili  0.3  /  DBili      /  AST  19  /  ALT  20  /  AlkPhos  148<H>  05-26        Urine Studies:  Creatinine Trend: 1.0<--, 1.4<--, 1.4<--, 1.0<--, 1.1<--, 1.0<--    ·	participating in PT  ·	s/p fall 2 weeks ago with head trauma and now subacute moderate subdural hematoma  ·	per neuro- may have subacute subdural on top of acute suggestive of more chronic bleeding independent of recent fall  ·	ckd likely stage 3  - had Alan but resolving with stopping BP meds  ·	BP low at times now improved off BP meds  ·	LE edema resolved  ·	anemia with adequate iron stores  ·	nephrotic range proteinuria 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

## 2021-05-26 NOTE — PROGRESS NOTE ADULT - ASSESSMENT
ASSESSMENT/PLAN    Rehab of Traumatic SDH with no LOC  Patient seen and evaluated with Dr. Magana  Patient requires 3 hrs daily of interdisciplinary inpatient rehab at least 5 days a week.     # Traumatic subdural hematoma without LOC  - CT Head Non Cont (05/14/21): Right-sided acute on chronic subdural hemorrhage with associated mass effect, unchanged.  - Fall 10 days ago. Was on ASA 81mg PO QD  - Evaluated by neurosurgery. Received platelets on 05/13. Stable.  - Continue Keppra 500 mg q12hrs prophylxis  - Neurovascular f/u as outpt in 2 weeks for elective MMA embolization  - Continue ASA 81 QD   - Repeat Head CT on 5/19 showed stable bleed without significant change    # History of HTN/ current hypotension  - Bumex 1 mg daily hold  - Lisinopril on hold    # Borderline Diabetes Mellitus type II   - Last HbA1C 6.6 % (05/14/21).  - Monitor FS. Start insulin if FS > 180. Keep between 140 - 180    #CKD stage 3  - Bump up in BUN and creatinine likely assoc with low bp and hypovolemia. Now s/p IVF and lisinopril and Bumex d/c'd. Monitor.  - Cr improved from 1.4-->1.0    #Seizure proph  - cont Keppra.     #Anemia  - monitor    #History of Gout  - asymptomatic on allopurinol    #uncomfortable teeth  follow with dental. OK for extraction.     #closed ulcers  -xrays and arterial doppler complete - no evidence of OM or arterial disease.  -will follow  -pod rec appreciated       -Pain control: Tylenol prn    -GI/Bowel Mgmt: bowel meds prn    -Bladder management: No issues at this time     -Skin:  No active issues at this time    -FEN     - Diet: DASH/TLC; carb consistent            Precautions / PROPHYLAXIS:      - Falls    - Ortho: Weight bearing status: WBAT      - DVT prophylaxis: Lovenox       ASSESSMENT/PLAN    Rehab of Traumatic SDH with no LOC  Patient seen and evaluated with Dr. Magana  Patient requires 3 hrs daily of interdisciplinary inpatient rehab at least 5 days a week.     # Traumatic subdural hematoma without LOC  - CT Head Non Cont (05/14/21): Right-sided acute on chronic subdural hemorrhage with associated mass effect, unchanged.  - Evaluated by neurosurgery. Received platelets on 05/13. Stable.  - Continue Keppra 500 mg q12hrs prophylxis  - Neurovascular f/u as outpt in 2 weeks for elective MMA embolization  - Continue ASA 81 QD   - Repeat Head CT on 5/19 showed stable bleed without significant change    # History of HTN/ current hypotension  - Bumex 1 mg daily hold  - Lisinopril on hold    # Borderline Diabetes Mellitus type II   - Last HbA1C 6.6 % (05/14/21).  - Monitor FS. Start insulin if FS > 180. Keep between 140 - 180    #CKD stage 3  - Bump up in BUN and creatinine likely assoc with low bp and hypovolemia. Now s/p IVF and lisinopril and Bumex d/c'd. Monitor.  - Cr improved from 1.4-->1.0    #Seizure proph  - cont Keppra.     #Anemia  - monitor    #History of Gout  - asymptomatic on allopurinol    #uncomfortable teeth  follow with dental. OK for extraction.     #closed ulcers  -xrays and arterial doppler complete - no evidence of OM or arterial disease.  -will follow  -pod rec appreciated       -Pain control: Tylenol prn    -GI/Bowel Mgmt: bowel meds prn    -Bladder management: No issues at this time     -Skin:  No active issues at this time    -FEN     - Diet: DASH/TLC; carb consistent            Precautions / PROPHYLAXIS:      - Falls    - Ortho: Weight bearing status: WBAT      - DVT prophylaxis: Lovenox

## 2021-05-26 NOTE — PROGRESS NOTE ADULT - SUBJECTIVE AND OBJECTIVE BOX
Patient is a 76y old  Male who presents with a chief complaint of Traumatic SDH without LOC (19 May 2021 22:35)      HPI:  77 yo male patient with PMH of diverticulosis, DM, HTN, HLD, CKD stage 3, ostearthritis, bleeding gastric ulcer, anemia presenting with period of AMS. Per wife pt had a large breakfast and shortly after was found acting confused and unable to open his eyes but saying his eyes were open. Ambulance was called, FS was found to be 70. Per wife pt seldom runs a FS under 90. Pt was given PO food/drink and FS improved as did pt's mental status. Wife says pt is currently at baseline in the ED. Pt endorsing chills everytime he goes to urinate xfew days. No hematuria, dysuria, increased frequency in urination. No f/n/v. No abdominal pain/back pain. No cough/sore throat/headache/weakness. Pt endorses fall 10 days ago with no LOC. No A/c. In ED, patient was normotensive, afebrile. CT head was done showing subacute subdural hematoma. Neurosurgery evaluated patient in ED, no immediate surgical indication. As per neurosurgery, patient received platelet transfusion for reversal of ASA and started on Keppra 500mg q12.    He has a history of LLE weakness secondary to an old thigh muscle tear, knee meniscus injury and radiculopathy and was a limited ambulator with a walker PTA.    Patient seen and evaluated by PMR consultation team including PT/OT and found to be a good candidate for acute inpatient rehab.   Latest progress with PT: Bed mobility max assist, transfers mod assist, ambulates 5ft with RW mod assist (19 May 2021 10:34)      TODAY'S SUBJECTIVE & REVIEW OF SYMPTOMS:  Patient seen and evaluated with Dr. Magana.  Patient creatine and BP improved after L of NS given yesterday. Cr 1.4-->1.0. No acute events overnight. Patient s/p tooth removal yesterday. Podiatry recs appreciated.   CLOF: Bed mobility partial assist, transfers partial assist, ambulates 75ft with RW touch assist        Constiutional:    [  x ] WNL           [   ] poor appetite   [   ] insomnia   [   ] tired   Cardio:                [ x  ] WNL           [   ] CP   [   ] REECE   [   ] palpitations               Resp:                   [ x  ] WNL           [   ] SOB   [   ] cough   [   ] wheezing   GI:                        [ x  ] WNL           [   ] constipation   [   ] diarrhea   [   ] abdominal pain   [   ] nausea   [   ] emesis                                :                      [x   ] WNL           [   ] BROOKS  [   ] dysuria   [   ] difficulty voiding             Endo:                   [ x  ] WNL          [   ] polyuria   [   ] temperature intolerance                 Skin:                     [ x  ] WNL          [   ] pain   [   ] wound   [   ] rash   MSK:                    [   ] WNL          [   ] muscle pain   [ x  ] joint pain/ stiffness - left shoulder, left hip and knee  [   ] muscle tenderness   [   ] swelling   Neuro:                 [ x  ] WNL          [   ] HA   [   ] change in vision   [   ] tremor   [   ] weakness   [   ]dysphagia              Cognitive:           [ x  ] WNL           [   ]confusion      Psych:                  [ x  ] WNL           [   ] hallucinations   [   ]agitation   [   ] delusion   [   ]depression      PHYSICAL EXAM  Vital Signs Last 24 Hrs  T(C): 35.9 (26 May 2021 05:44), Max: 35.9 (26 May 2021 05:41)  T(F): 96.6 (26 May 2021 05:44), Max: 96.6 (26 May 2021 05:41)  HR: 72 (26 May 2021 05:44) (68 - 76)  BP: 142/67 (26 May 2021 05:44) (103/61 - 142/67)  RR: 18 (26 May 2021 05:44) (18 - 18)  SpO2: 98% (25 May 2021 20:58) (98% - 98%)        Constitutional - [ x  ] NAD, Comfortable        [   ] other:  Chest - [ x  ] CTA     [   ] other:  Cardiovascular - [ x  ] RRR, no murmer     [   ] other:  Abdomen - [ x  ] Soft, NT/ND      [   ] other:        -  [ x ] NO BROOKS CATHETER   [   ] YES  if yes: [   ] NO MEATAL TEAR OR DISCHARGE [   ] other:  Extremities - [ x ] No C/C/E, No calf tenderness       [   ] other:  ROM - [  ] WFL     [ x  ] other: Left shoulder ROM limited to pain  Neurologic Exam -                 Cognitive - [  x ]Awake, Alert, AAO to self, place, date, year, situation         [    ] other:      Communication - [ x  ]Fluent, No dysarthria       [   ] other:      Motor - No focal deficits  RIGHT UE: [ x  ] WNL,  [   ] other:  LEFT    UE: [   ] WNL,  [  x ] other: 4-/5 limited secondary to pain in shoulder  RIGHT LE: [ x  ] WNL,  [   ] other:   LEFT    LE: [   ] WNL,  [ x  ] other: 3+- 4-/5 - give-away weakness hip and knee. DF intact        Sensory - [ x  ] Intact to LT      [    ] other:          Reflexes - [ x  ] wnl/ symmetric     [   ] other:     Psychiatric - [ x  ]Mood stable, Affect WNL     [   ]other:     Skin - [ x  ] intact      [   ] other      MEDICATIONS  (STANDING):  allopurinol 300 milliGRAM(s) Oral daily  AQUAPHOR (petrolatum Ointment) 1 Application(s) Topical two times a day  aspirin  chewable 81 milliGRAM(s) Oral daily  atorvastatin 40 milliGRAM(s) Oral at bedtime  calamine/zinc oxide Lotion 1 Application(s) Topical two times a day  enoxaparin Injectable 40 milliGRAM(s) SubCutaneous at bedtime  ferrous    sulfate 325 milliGRAM(s) Oral daily  levETIRAcetam 250 milliGRAM(s) Oral two times a day  omega-3-Acid Ethyl Esters 2 Gram(s) Oral two times a day  pantoprazole    Tablet 40 milliGRAM(s) Oral before breakfast  polyethylene glycol 3350 17 Gram(s) Oral daily  sodium chloride 0.9%. 1000 milliLiter(s) (75 mL/Hr) IV Continuous <Continuous>    MEDICATIONS  (PRN):  acetaminophen   Tablet .. 650 milliGRAM(s) Oral every 6 hours PRN Mild Pain (1 - 3)  aluminum hydroxide/magnesium hydroxide/simethicone Suspension 30 milliLiter(s) Oral every 4 hours PRN Dyspepsia  hydrOXYzine hydrochloride 10 milliGRAM(s) Oral every 8 hours PRN Itching  magnesium hydroxide Suspension 30 milliLiter(s) Oral daily PRN Constipation  melatonin 3 milliGRAM(s) Oral at bedtime PRN Insomnia        RECENT LABS/IMAGING                                        05-26    142  |  109  |  54<H>  ----------------------------<  112<H>  5.0   |  24  |  1.0    Ca    9.0      26 May 2021 05:24  Phos  3.4     05-25    TPro  6.5  /  Alb  3.8  /  TBili  0.3  /  DBili  x   /  AST  19  /  ALT  20  /  AlkPhos  148<H>  05-26        POCT Blood Glucose.: 116 mg/dL (05-26-21 @ 07:27)  POCT Blood Glucose.: 179 mg/dL (05-25-21 @ 21:41)  POCT Blood Glucose.: 175 mg/dL (05-25-21 @ 16:25)  POCT Blood Glucose.: 101 mg/dL (05-25-21 @ 11:26)  POCT Blood Glucose.: 104 mg/dL (05-25-21 @ 07:25)  POCT Blood Glucose.: 102 mg/dL (05-24-21 @ 21:50)  POCT Blood Glucose.: 140 mg/dL (05-24-21 @ 16:24)  POCT Blood Glucose.: 134 mg/dL (05-24-21 @ 12:07)  POCT Blood Glucose.: 123 mg/dL (05-24-21 @ 07:50)  POCT Blood Glucose.: 134 mg/dL (05-23-21 @ 21:19)  POCT Blood Glucose.: 155 mg/dL (05-23-21 @ 15:54)  POCT Blood Glucose.: 109 mg/dL (05-23-21 @ 11:11)

## 2021-05-27 LAB
ANION GAP SERPL CALC-SCNC: 10 MMOL/L — SIGNIFICANT CHANGE UP (ref 7–14)
BUN SERPL-MCNC: 42 MG/DL — HIGH (ref 10–20)
CALCIUM SERPL-MCNC: 8.8 MG/DL — SIGNIFICANT CHANGE UP (ref 8.5–10.1)
CHLORIDE SERPL-SCNC: 111 MMOL/L — HIGH (ref 98–110)
CO2 SERPL-SCNC: 21 MMOL/L — SIGNIFICANT CHANGE UP (ref 17–32)
CREAT SERPL-MCNC: 0.9 MG/DL — SIGNIFICANT CHANGE UP (ref 0.7–1.5)
GLUCOSE BLDC GLUCOMTR-MCNC: 104 MG/DL — HIGH (ref 70–99)
GLUCOSE BLDC GLUCOMTR-MCNC: 112 MG/DL — HIGH (ref 70–99)
GLUCOSE BLDC GLUCOMTR-MCNC: 145 MG/DL — HIGH (ref 70–99)
GLUCOSE BLDC GLUCOMTR-MCNC: 209 MG/DL — HIGH (ref 70–99)
GLUCOSE SERPL-MCNC: 112 MG/DL — HIGH (ref 70–99)
POTASSIUM SERPL-MCNC: 4.9 MMOL/L — SIGNIFICANT CHANGE UP (ref 3.5–5)
POTASSIUM SERPL-SCNC: 4.9 MMOL/L — SIGNIFICANT CHANGE UP (ref 3.5–5)
SODIUM SERPL-SCNC: 142 MMOL/L — SIGNIFICANT CHANGE UP (ref 135–146)

## 2021-05-27 RX ORDER — HYDROXYZINE HCL 10 MG
1 TABLET ORAL
Qty: 15 | Refills: 0
Start: 2021-05-27 | End: 2021-05-31

## 2021-05-27 RX ORDER — ATORVASTATIN CALCIUM 80 MG/1
1 TABLET, FILM COATED ORAL
Qty: 30 | Refills: 0
Start: 2021-05-27 | End: 2021-06-25

## 2021-05-27 RX ORDER — ATORVASTATIN CALCIUM 80 MG/1
1 TABLET, FILM COATED ORAL
Qty: 0 | Refills: 0 | DISCHARGE

## 2021-05-27 RX ORDER — ACETAMINOPHEN 500 MG
2 TABLET ORAL
Qty: 0 | Refills: 0 | DISCHARGE
Start: 2021-05-27

## 2021-05-27 RX ORDER — ALOGLIPTIN BENZOATE AND PIOGLITAZONE HYDROCHLORIDE 12.5; 15 MG/1; MG/1
1 TABLET, FILM COATED ORAL
Qty: 0 | Refills: 0 | DISCHARGE

## 2021-05-27 RX ORDER — LEVETIRACETAM 250 MG/1
1 TABLET, FILM COATED ORAL
Qty: 60 | Refills: 0
Start: 2021-05-27 | End: 2021-06-25

## 2021-05-27 RX ORDER — BUMETANIDE 0.25 MG/ML
1 INJECTION INTRAMUSCULAR; INTRAVENOUS
Qty: 30 | Refills: 0
Start: 2021-05-27 | End: 2021-06-25

## 2021-05-27 RX ORDER — BUMETANIDE 0.25 MG/ML
1 INJECTION INTRAMUSCULAR; INTRAVENOUS
Qty: 0 | Refills: 0 | DISCHARGE

## 2021-05-27 RX ADMIN — ATORVASTATIN CALCIUM 40 MILLIGRAM(S): 80 TABLET, FILM COATED ORAL at 20:28

## 2021-05-27 RX ADMIN — SODIUM CHLORIDE 75 MILLILITER(S): 9 INJECTION INTRAMUSCULAR; INTRAVENOUS; SUBCUTANEOUS at 17:38

## 2021-05-27 RX ADMIN — SODIUM CHLORIDE 75 MILLILITER(S): 9 INJECTION INTRAMUSCULAR; INTRAVENOUS; SUBCUTANEOUS at 06:31

## 2021-05-27 RX ADMIN — Medication 1 APPLICATION(S): at 17:15

## 2021-05-27 RX ADMIN — CALAMINE AND ZINC OXIDE AND PHENOL 1 APPLICATION(S): 160; 10 LOTION TOPICAL at 06:31

## 2021-05-27 RX ADMIN — LEVETIRACETAM 250 MILLIGRAM(S): 250 TABLET, FILM COATED ORAL at 06:30

## 2021-05-27 RX ADMIN — Medication 300 MILLIGRAM(S): at 12:50

## 2021-05-27 RX ADMIN — Medication 325 MILLIGRAM(S): at 12:49

## 2021-05-27 RX ADMIN — ENOXAPARIN SODIUM 40 MILLIGRAM(S): 100 INJECTION SUBCUTANEOUS at 20:28

## 2021-05-27 RX ADMIN — PANTOPRAZOLE SODIUM 40 MILLIGRAM(S): 20 TABLET, DELAYED RELEASE ORAL at 06:30

## 2021-05-27 RX ADMIN — Medication 10 MILLIGRAM(S): at 20:28

## 2021-05-27 RX ADMIN — Medication 1 APPLICATION(S): at 06:31

## 2021-05-27 RX ADMIN — Medication 2 GRAM(S): at 06:31

## 2021-05-27 RX ADMIN — Medication 2 GRAM(S): at 17:15

## 2021-05-27 RX ADMIN — Medication 81 MILLIGRAM(S): at 12:49

## 2021-05-27 RX ADMIN — LEVETIRACETAM 250 MILLIGRAM(S): 250 TABLET, FILM COATED ORAL at 17:13

## 2021-05-27 NOTE — PROGRESS NOTE ADULT - SUBJECTIVE AND OBJECTIVE BOX
Nephrology progress note     Patient is a Patient is a 77 yo man with long standing diabetes, retinopathy, nephrotic range proteinurua, bleeding gastric ulcer, gout, and subdural hematoma.  Recent admission with metformin toxicity and ALAN and lactic acidosis - required hemodialysis.  He was admitted with confusion but currently is alert at baseline. Per his account he fell afew days ago  fell asleep and fell of hair) and struch head.  Now with moderate subdural hematoms      ON events/Subjective:     Allergies:  No Known Allergies    Hospital Medications:   MEDICATIONS  (STANDING):  allopurinol 300 milliGRAM(s) Oral daily  AQUAPHOR (petrolatum Ointment) 1 Application(s) Topical two times a day  aspirin  chewable 81 milliGRAM(s) Oral daily  atorvastatin 40 milliGRAM(s) Oral at bedtime  calamine/zinc oxide Lotion 1 Application(s) Topical two times a day  enoxaparin Injectable 40 milliGRAM(s) SubCutaneous at bedtime  ferrous    sulfate 325 milliGRAM(s) Oral daily  levETIRAcetam 250 milliGRAM(s) Oral two times a day  omega-3-Acid Ethyl Esters 2 Gram(s) Oral two times a day  pantoprazole    Tablet 40 milliGRAM(s) Oral before breakfast  polyethylene glycol 3350 17 Gram(s) Oral daily  sodium chloride 0.9%. 1000 milliLiter(s) (75 mL/Hr) IV Continuous <Continuous>    REVIEW OF SYSTEMS:  CONSTITUTIONAL: No weakness, fevers or chills  EYES/ENT: No visual changes;  No vertigo or throat pain   NECK: No pain or stiffness  RESPIRATORY: No cough, wheezing, hemoptysis; No shortness of breath  CARDIOVASCULAR: No chest pain or palpitations.  GASTROINTESTINAL: No abdominal or epigastric pain. No nausea, vomiting, or hematemesis; No diarrhea or constipation. No melena or hematochezia.  GENITOURINARY: No dysuria, frequency, foamy urine, urinary urgency, incontinence or hematuria  NEUROLOGICAL: No numbness or weakness  SKIN: No itching, burning, rashes, or lesions   VASCULAR: No bilateral lower extremity edema.   All other review of systems is negative unless indicated above.    VITALS:  T(F): 96.4 (05-27-21 @ 05:30), Max: 96.4 (05-27-21 @ 05:30)  HR: 68 (05-27-21 @ 05:30)  BP: 155/70 (05-27-21 @ 05:30)  RR: 18 (05-27-21 @ 05:30)  SpO2: 100% (05-27-21 @ 05:30)  Wt(kg): --    05-25 @ 07:01  -  05-26 @ 07:00  --------------------------------------------------------  IN: 0 mL / OUT: 1500 mL / NET: -1500 mL    05-26 @ 07:01  -  05-27 @ 07:00  --------------------------------------------------------  IN: 800 mL / OUT: 701 mL / NET: 99 mL    05-27 @ 07:01  -  05-27 @ 10:17  --------------------------------------------------------  IN: 390 mL / OUT: 300 mL / NET: 90 mL        PHYSICAL EXAM:  Constitutional: NAD  HEENT: anicteric sclera, oropharynx clear, MMM  Neck: No JVD  Respiratory: CTAB, no wheezes, rales or rhonchi  Cardiovascular: S1, S2, RRR  Gastrointestinal: BS+, soft, NT/ND  Extremities: No cyanosis or clubbing. No peripheral edema  Neurological: A/O x 3, no focal deficits  Psychiatric: Normal mood, normal affect  : No CVA tenderness. No gil.   Skin: No rashes  Vascular Access:    LABS:  05-26    142  |  109  |  54<H>  ----------------------------<  112<H>  5.0   |  24  |  1.0    Ca    9.0      26 May 2021 05:24    TPro  6.5  /  Alb  3.8  /  TBili  0.3  /  DBili      /  AST  19  /  ALT  20  /  AlkPhos  148<H>  05-26        Urine Studies:  Creatinine Trend: 1.0<--, 1.4<--, 1.4<--, 1.0<--, 1.1<--, 1.0<--        ·	participating in PT  ·	borderline hyperkalemia  ·	s/p fall 2 weeks ago with head trauma and now subacute moderate subdural hematoma  ·	per neuro- may have subacute subdural on top of acute suggestive of more chronic bleeding independent of recent fall  ·	ckd likely stage 3  - had Alan but resolving with stopping BP meds  ·	BP low at times now improved off BP meds  ·	LE edema resolved  ·	anemia with adequate iron stores  ·	nephrotic range proteinuria 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

## 2021-05-27 NOTE — PROGRESS NOTE ADULT - ASSESSMENT
ASSESSMENT/PLAN    Rehab of Traumatic SDH with no LOC  Patient seen and evaluated with Dr. Magana  Patient requires 3 hrs daily of interdisciplinary inpatient rehab at least 5 days a week.     # Traumatic subdural hematoma without LOC  - CT Head Non Cont (05/14/21): Right-sided acute on chronic subdural hemorrhage with associated mass effect, unchanged.  - Evaluated by neurosurgery. Received platelets on 05/13. Stable.  - Continue Keppra 500 mg q12hrs prophylxis  - Neurovascular f/u as outpt in 2 weeks for elective MMA embolization  - Continue ASA 81 QD   - Repeat Head CT on 5/26 showed stable bleed without significant change    # History of HTN/ current hypotension  - Bumex DC  - Lisinopril on hold    # Borderline Diabetes Mellitus type II   - Last HbA1C 6.6 % (05/14/21).  - Monitor FS. Start insulin if FS > 180. Keep between 140 - 180    #CKD stage 3  - Bump up in BUN and creatinine likely assoc with low bp and hypovolemia. Now s/p IVF and lisinopril and Bumex d/c'd. Monitor.  - Cr improving     #Seizure proph  - cont Keppra.     #Anemia  - monitor    #History of Gout  - asymptomatic on allopurinol    #uncomfortable teeth  follow with dental. OK for extraction.     #closed ulcers  -xrays and arterial doppler complete - no evidence of OM or arterial disease.  -will follow  -pod rec appreciated       -Pain control: Tylenol prn    -GI/Bowel Mgmt:  remove Miralax since pt has diarrhea     -Bladder management: No issues at this time     -Skin:  No active issues at this time    -FEN     - Diet: DASH/TLC; carb consistent            Precautions / PROPHYLAXIS:      - Falls    - Ortho: Weight bearing status: WBAT      - DVT prophylaxis: Lovenox       ASSESSMENT/PLAN    Rehab of Traumatic SDH with no LOC  Patient seen and evaluated with Dr. Magana  Patient requires 3 hrs daily of interdisciplinary inpatient rehab at least 5 days a week.     # Traumatic subdural hematoma without LOC  - CT Head Non Cont (05/14/21): Right-sided acute on chronic subdural hemorrhage with associated mass effect, unchanged.  - Evaluated by neurosurgery. Received platelets on 05/13. Stable.  - Continue Keppra 500 mg q12hrs prophylxis  - Neurovascular f/u as outpt in 2 weeks after discharge for possible elective MMA embolization  - Continue ASA 81 QD   - Repeat Head CT on 5/26 showed stable bleed without significant change    # History of HTN/ current hypotension  - Bumex DC  - Lisinopril on hold    # Borderline Diabetes Mellitus type II   - Last HbA1C 6.6 % (05/14/21).  - Monitor FS. Start insulin if FS > 180. Keep between 140 - 180    #CKD stage 3  - Bump up in BUN and creatinine likely assoc with low bp and hypovolemia. Now s/p IVF and lisinopril and Bumex d/c'd. Monitor.  - Cr improving     #Seizure proph  - cont Keppra.     #Anemia  - monitor    #History of Gout  - asymptomatic on allopurinol    #uncomfortable teeth  follow with dental. OK for extraction.     #closed ulcers/ scabs on both feet  -xrays and arterial doppler complete - no evidence of OM or arterial disease.  -will follow  -pod rec appreciated       -Pain control: Tylenol prn    -GI/Bowel Mgmt:  remove Miralax since pt has diarrhea     -Bladder management: No issues at this time    - Diet: DASH/TLC; carb consistent            Precautions / PROPHYLAXIS:      - Falls    - Ortho: Weight bearing status: WBAT      - DVT prophylaxis: Lovenox

## 2021-05-27 NOTE — PROGRESS NOTE ADULT - SUBJECTIVE AND OBJECTIVE BOX
Patient is a 76y old  Male who presents with a chief complaint of Traumatic SDH without LOC (19 May 2021 22:35)      HPI:  75 yo male patient with PMH of diverticulosis, DM, HTN, HLD, CKD stage 3, ostearthritis, bleeding gastric ulcer, anemia presenting with period of AMS. Per wife pt had a large breakfast and shortly after was found acting confused and unable to open his eyes but saying his eyes were open. Ambulance was called, FS was found to be 70. Per wife pt seldom runs a FS under 90. Pt was given PO food/drink and FS improved as did pt's mental status. Wife says pt is currently at baseline in the ED. Pt endorsing chills everytime he goes to urinate xfew days. No hematuria, dysuria, increased frequency in urination. No f/n/v. No abdominal pain/back pain. No cough/sore throat/headache/weakness. Pt endorses fall 10 days ago with no LOC. No A/c. In ED, patient was normotensive, afebrile. CT head was done showing subacute subdural hematoma. Neurosurgery evaluated patient in ED, no immediate surgical indication. As per neurosurgery, patient received platelet transfusion for reversal of ASA and started on Keppra 500mg q12.    He has a history of LLE weakness secondary to an old thigh muscle tear, knee meniscus injury and radiculopathy and was a limited ambulator with a walker PTA.    Patient seen and evaluated by PMR consultation team including PT/OT and found to be a good candidate for acute inpatient rehab.   Latest progress with PT: Bed mobility max assist, transfers mod assist, ambulates 5ft with RW mod assist (19 May 2021 10:34)      TODAY'S SUBJECTIVE & REVIEW OF SYMPTOMS:  Patient seen and evaluated with Dr. Magana.  Patient had diarrhea sisnce yesterday in afternoon.   CLOF: Bed mobility partial assist, transfers partial assist, ambulates 75ft with RW touch assist        Constiutional:    [  x ] WNL           [   ] poor appetite   [   ] insomnia   [   ] tired   Cardio:                [ x  ] WNL           [   ] CP   [   ] REECE   [   ] palpitations               Resp:                   [ x  ] WNL           [   ] SOB   [   ] cough   [   ] wheezing   GI:                        [ x ] WNL           [   ] constipation   [ x  ] diarrhea   [   ] abdominal pain   [   ] nausea   [   ] emesis                                :                      [x   ] WNL           [   ] BROOKS  [   ] dysuria   [   ] difficulty voiding             Endo:                   [ x  ] WNL          [   ] polyuria   [   ] temperature intolerance                 Skin:                     [ x  ] WNL          [   ] pain   [   ] wound   [   ] rash   MSK:                    [   ] WNL          [   ] muscle pain   [ x  ] joint pain/ stiffness - left shoulder, left hip and knee  [   ] muscle tenderness   [   ] swelling   Neuro:                 [ x  ] WNL          [   ] HA   [   ] change in vision   [   ] tremor   [   ] weakness   [   ]dysphagia              Cognitive:           [ x  ] WNL           [   ]confusion      Psych:                  [ x  ] WNL           [   ] hallucinations   [   ]agitation   [   ] delusion   [   ]depression      PHYSICAL EXAM  Vital Signs Last 24 Hrs  T(C): 35.8 (27 May 2021 05:30), Max: 35.8 (27 May 2021 05:30)  T(F): 96.4 (27 May 2021 05:30), Max: 96.4 (27 May 2021 05:30)  HR: 68 (27 May 2021 05:30) (68 - 78)  BP: 155/70 (27 May 2021 05:30) (118/70 - 155/70)  BP(mean): --  RR: 18 (27 May 2021 05:30) (18 - 18)  SpO2: 100% (27 May 2021 05:30) (97% - 100%)  Constitutional - [ x  ] NAD, Comfortable        [   ] other:  Chest - [ x  ] CTA     [   ] other:  Cardiovascular - [ x  ] RRR, no murmer     [   ] other:  Abdomen - [ x  ] Soft, NT/ND      [   ] other:        -  [ x ] NO BROOKS CATHETER   [   ] YES  if yes: [   ] NO MEATAL TEAR OR DISCHARGE [   ] other:  Extremities - [ x ] No C/C/E, No calf tenderness       [   ] other:  ROM - [  ] WFL     [ x  ] other: Left shoulder ROM limited to pain  Neurologic Exam -                 Cognitive - [  x ]Awake, Alert, AAO to self, place, date, year, situation         [    ] other:      Communication - [ x  ]Fluent, No dysarthria       [   ] other:      Motor - No focal deficits  RIGHT UE: [ x  ] WNL,  [   ] other:  LEFT    UE: [   ] WNL,  [  x ] other: 4-/5 limited secondary to pain in shoulder  RIGHT LE: [ x  ] WNL,  [   ] other:   LEFT    LE: [   ] WNL,  [ x  ] other: 3+- 4-/5 - give-away weakness hip and knee. DF intact        Sensory - [ x  ] Intact to LT      [    ] other:          Reflexes - [ x  ] wnl/ symmetric     [   ] other:     Psychiatric - [ x  ]Mood stable, Affect WNL     [   ]other:     Skin - [ x  ] intact      [   ] other  aluminum hydroxide/magnesium hydroxide/simethicone Suspension 30 milliLiter(s) Oral every 4 hours PRN Dyspepsia  hydrOXYzine hydrochloride 10 milliGRAM(s) Oral every 8 hours PRN Itching  magnesium hydroxide Suspension 30 milliLiter(s) Oral daily PRN Constipation  melatonin 3 milliGRAM(s) Oral at bedtime PRN Insomnia    MEDICATIONS  (STANDING):  allopurinol 300 milliGRAM(s) Oral daily  AQUAPHOR (petrolatum Ointment) 1 Application(s) Topical two times a day  aspirin  chewable 81 milliGRAM(s) Oral daily  atorvastatin 40 milliGRAM(s) Oral at bedtime  calamine/zinc oxide Lotion 1 Application(s) Topical two times a day  enoxaparin Injectable 40 milliGRAM(s) SubCutaneous at bedtime  ferrous    sulfate 325 milliGRAM(s) Oral daily  levETIRAcetam 250 milliGRAM(s) Oral two times a day  omega-3-Acid Ethyl Esters 2 Gram(s) Oral two times a day  pantoprazole    Tablet 40 milliGRAM(s) Oral before breakfast  sodium chloride 0.9%. 1000 milliLiter(s) (75 mL/Hr) IV Continuous <Continuous>    MEDICATIONS  (PRN):  acetaminophen   Tablet .. 650 milliGRAM(s) Oral every 6 hours PRN Mild Pain (1 - 3)  aluminum hydroxide/magnesium hydroxide/simethicone Suspension 30 milliLiter(s) Oral every 4 hours PRN Dyspepsia  hydrOXYzine hydrochloride 10 milliGRAM(s) Oral every 8 hours PRN Itching  magnesium hydroxide Suspension 30 milliLiter(s) Oral daily PRN Constipation  melatonin 3 milliGRAM(s) Oral at bedtime PRN Insomnia    05-26    142  |  109  |  54<H>  ----------------------------<  112<H>  5.0   |  24  |  1.0    Ca    9.0      26 May 2021 05:24    TPro  6.5  /  Alb  3.8  /  TBili  0.3  /  DBili  x   /  AST  19  /  ALT  20  /  AlkPhos  148<H>  05-26        POCT Blood Glucose.: 104 mg/dL (05-27-21 @ 07:22)  POCT Blood Glucose.: 153 mg/dL (05-26-21 @ 20:51)  POCT Blood Glucose.: 144 mg/dL (05-26-21 @ 16:30)  POCT Blood Glucose.: 135 mg/dL (05-26-21 @ 11:39)  POCT Blood Glucose.: 116 mg/dL (05-26-21 @ 07:27)  POCT Blood Glucose.: 179 mg/dL (05-25-21 @ 21:41)  POCT Blood Glucose.: 175 mg/dL (05-25-21 @ 16:25)  POCT Blood Glucose.: 101 mg/dL (05-25-21 @ 11:26)  POCT Blood Glucose.: 104 mg/dL (05-25-21 @ 07:25)  POCT Blood Glucose.: 102 mg/dL (05-24-21 @ 21:50)  POCT Blood Glucose.: 140 mg/dL (05-24-21 @ 16:24)  POCT Blood Glucose.: 134 mg/dL (05-24-21 @ 12:07)   Patient is a 76y old  Male who presents with a chief complaint of Traumatic SDH without LOC (19 May 2021 22:35)      HPI:  77 yo male patient with PMH of diverticulosis, DM, HTN, HLD, CKD stage 3, ostearthritis, bleeding gastric ulcer, anemia presenting with period of AMS. Per wife pt had a large breakfast and shortly after was found acting confused and unable to open his eyes but saying his eyes were open. Ambulance was called, FS was found to be 70. Per wife pt seldom runs a FS under 90. Pt was given PO food/drink and FS improved as did pt's mental status. Wife says pt is currently at baseline in the ED. Pt endorsing chills everytime he goes to urinate xfew days. No hematuria, dysuria, increased frequency in urination. No f/n/v. No abdominal pain/back pain. No cough/sore throat/headache/weakness. Pt endorses fall 10 days ago with no LOC. No A/c. In ED, patient was normotensive, afebrile. CT head was done showing subacute subdural hematoma. Neurosurgery evaluated patient in ED, no immediate surgical indication. As per neurosurgery, patient received platelet transfusion for reversal of ASA and started on Keppra 500mg q12.    He has a history of LLE weakness secondary to an old thigh muscle tear, knee meniscus injury and radiculopathy and was a limited ambulator with a walker PTA.    Patient seen and evaluated by PMR consultation team including PT/OT and found to be a good candidate for acute inpatient rehab.   Latest progress with PT: Bed mobility max assist, transfers mod assist, ambulates 5ft with RW mod assist (19 May 2021 10:34)      TODAY'S SUBJECTIVE & REVIEW OF SYMPTOMS:  Patient seen and evaluated with Dr. Magana.  Patient had diarrhea sisnce yesterday in afternoon.     CLOF: Bed mobility partial assist, transfers partial assist, ambulates 75ft with RW touch assist        Constiutional:    [  x ] WNL           [   ] poor appetite   [   ] insomnia   [   ] tired   Cardio:                [ x  ] WNL           [   ] CP   [   ] REECE   [   ] palpitations               Resp:                   [ x  ] WNL           [   ] SOB   [   ] cough   [   ] wheezing   GI:                        [  ] WNL           [   ] constipation   [ x  ] diarrhea today  [   ] abdominal pain   [   ] nausea   [   ] emesis                                :                      [x   ] WNL           [   ] BROOKS  [   ] dysuria   [   ] difficulty voiding             Endo:                   [ x  ] WNL          [   ] polyuria   [   ] temperature intolerance                 Skin:                     [ x  ] WNL          [   ] pain   [   ] wound   [   ] rash   MSK:                    [   ] WNL          [   ] muscle pain   [ x  ] joint pain/ stiffness - left shoulder, left hip and knee  [   ] muscle tenderness   [   ] swelling   Neuro:                 [ x  ] WNL          [   ] HA   [   ] change in vision   [   ] tremor   [   ] weakness   [   ]dysphagia              Cognitive:           [ x  ] WNL           [   ]confusion      Psych:                  [ x  ] WNL           [   ] hallucinations   [   ]agitation   [   ] delusion   [   ]depression      PHYSICAL EXAM  Vital Signs Last 24 Hrs  T(C): 35.8 (27 May 2021 05:30), Max: 35.8 (27 May 2021 05:30)  T(F): 96.4 (27 May 2021 05:30), Max: 96.4 (27 May 2021 05:30)  HR: 68 (27 May 2021 05:30) (68 - 78)  BP: 155/70 (27 May 2021 05:30) (118/70 - 155/70)  BP(mean): --  RR: 18 (27 May 2021 05:30) (18 - 18)  SpO2: 100% (27 May 2021 05:30) (97% - 100%)    Constitutional - [ x  ] NAD, Comfortable        [   ] other:  Chest - [ x  ] CTA     [   ] other:  Cardiovascular - [ x  ] RRR, no murmer     [   ] other:  Abdomen - [ x  ] Soft, NT/ND      [   ] other:        -  [ x ] NO BROOKS CATHETER   [   ] YES  if yes: [   ] NO MEATAL TEAR OR DISCHARGE [   ] other:  Extremities - [ x ] No C/C/E, No calf tenderness       [   ] other:  ROM - [  ] WFL     [ x  ] other: Left shoulder ROM limited to pain  Neurologic Exam -                 Cognitive - [  x ]Awake, Alert, AAO to self, place, date, year, situation         [    ] other:      Communication - [ x  ]Fluent, No dysarthria       [   ] other:      Motor - No focal deficits  RIGHT UE: [ x  ] WNL,  [   ] other:  LEFT    UE: [   ] WNL,  [  x ] other: 4-/5 limited secondary to pain in shoulder  RIGHT LE: [ x  ] WNL,  [   ] other:   LEFT    LE: [   ] WNL,  [ x  ] other: 3+- 4-/5 - give-away weakness hip and knee. DF intact        Sensory - [ x  ] Intact to LT      [    ] other:          Reflexes - [ x  ] wnl/ symmetric     [   ] other:     Psychiatric - [ x  ]Mood stable, Affect WNL     [   ]other:     Skin - [ x  ] intact      [   ] other    aluminum hydroxide/magnesium hydroxide/simethicone Suspension 30 milliLiter(s) Oral every 4 hours PRN Dyspepsia  hydrOXYzine hydrochloride 10 milliGRAM(s) Oral every 8 hours PRN Itching  magnesium hydroxide Suspension 30 milliLiter(s) Oral daily PRN Constipation  melatonin 3 milliGRAM(s) Oral at bedtime PRN Insomnia    MEDICATIONS  (STANDING):  allopurinol 300 milliGRAM(s) Oral daily  AQUAPHOR (petrolatum Ointment) 1 Application(s) Topical two times a day  aspirin  chewable 81 milliGRAM(s) Oral daily  atorvastatin 40 milliGRAM(s) Oral at bedtime  calamine/zinc oxide Lotion 1 Application(s) Topical two times a day  enoxaparin Injectable 40 milliGRAM(s) SubCutaneous at bedtime  ferrous    sulfate 325 milliGRAM(s) Oral daily  levETIRAcetam 250 milliGRAM(s) Oral two times a day  omega-3-Acid Ethyl Esters 2 Gram(s) Oral two times a day  pantoprazole    Tablet 40 milliGRAM(s) Oral before breakfast  sodium chloride 0.9%. 1000 milliLiter(s) (75 mL/Hr) IV Continuous <Continuous>    MEDICATIONS  (PRN):  acetaminophen   Tablet .. 650 milliGRAM(s) Oral every 6 hours PRN Mild Pain (1 - 3)  aluminum hydroxide/magnesium hydroxide/simethicone Suspension 30 milliLiter(s) Oral every 4 hours PRN Dyspepsia  hydrOXYzine hydrochloride 10 milliGRAM(s) Oral every 8 hours PRN Itching  magnesium hydroxide Suspension 30 milliLiter(s) Oral daily PRN Constipation  melatonin 3 milliGRAM(s) Oral at bedtime PRN Insomnia    05-26    142  |  109  |  54<H>  ----------------------------<  112<H>  5.0   |  24  |  1.0    Ca    9.0      26 May 2021 05:24    TPro  6.5  /  Alb  3.8  /  TBili  0.3  /  DBili  x   /  AST  19  /  ALT  20  /  AlkPhos  148<H>  05-26        POCT Blood Glucose.: 104 mg/dL (05-27-21 @ 07:22)  POCT Blood Glucose.: 153 mg/dL (05-26-21 @ 20:51)  POCT Blood Glucose.: 144 mg/dL (05-26-21 @ 16:30)  POCT Blood Glucose.: 135 mg/dL (05-26-21 @ 11:39)  POCT Blood Glucose.: 116 mg/dL (05-26-21 @ 07:27)  POCT Blood Glucose.: 179 mg/dL (05-25-21 @ 21:41)  POCT Blood Glucose.: 175 mg/dL (05-25-21 @ 16:25)  POCT Blood Glucose.: 101 mg/dL (05-25-21 @ 11:26)  POCT Blood Glucose.: 104 mg/dL (05-25-21 @ 07:25)  POCT Blood Glucose.: 102 mg/dL (05-24-21 @ 21:50)  POCT Blood Glucose.: 140 mg/dL (05-24-21 @ 16:24)  POCT Blood Glucose.: 134 mg/dL (05-24-21 @ 12:07)

## 2021-05-27 NOTE — PROGRESS NOTE ADULT - ASSESSMENT
Unable to contact pt. today, pt. not available to be seen. Family contact/follow-up with patient's wife (Paula Polanco) was completed.   Per family report, pt. currently at baseline cognitively, does not present with decline compared to pre-SDH state. He appears eager to get more therapy at home and appears motivated now. In the past, however, he was reportedly showing decreased interest in therapy activities at home once the home therapy ended and also declining/refusing to go for doctor visits for follow-ups. He appeared frustrated at that time and expressing little belief in the utility of the medical follow-ups. Currently, he appears more motivated and engaged. Home goals for post-discharge activities were discussed, education on mood and cognition provided, as appropriate.   Pt. scheduled for discharge from rehab on 5/28/21, dc'd from neuropsych services

## 2021-05-27 NOTE — PROGRESS NOTE ADULT - ATTENDING COMMENTS
I examined the patient with the resident and we discussed the findings and treatment plan. Tolerating the rehab program well. I agree with the findings and treatment plan as above, which I modified is indicated. The patient continues to require 3 hrs a day of acute inpatient rehab.   Neuro stable. Creatine improved to baseline 1.0 after Bumex and Lisinopril stopped and s/p IVF. BP remains controlled. Will monitor closely off meds.  Making ongoing functional gains.   cont acute rehab program. I examined the patient with the resident and we discussed the findings and treatment plan. Tolerating the rehab program well. I agree with the findings and treatment plan as above, which I modified is indicated. The patient continues to require 3 hrs a day of acute inpatient rehab.   Neuro stable. Creatine improved to baseline 1.0 after Bumex and Lisinopril stopped and s/p IVF. BP remains controlled. Will monitor closely off meds.  Making ongoing functional gains. Ambulates with RW with touch assistance.  Cont acute rehab program.

## 2021-05-28 ENCOUNTER — TRANSCRIPTION ENCOUNTER (OUTPATIENT)
Age: 76
End: 2021-05-28

## 2021-05-28 VITALS
DIASTOLIC BLOOD PRESSURE: 68 MMHG | RESPIRATION RATE: 18 BRPM | TEMPERATURE: 97 F | HEART RATE: 73 BPM | SYSTOLIC BLOOD PRESSURE: 132 MMHG

## 2021-05-28 LAB
GLUCOSE BLDC GLUCOMTR-MCNC: 141 MG/DL — HIGH (ref 70–99)
GLUCOSE BLDC GLUCOMTR-MCNC: 94 MG/DL — SIGNIFICANT CHANGE UP (ref 70–99)

## 2021-05-28 RX ORDER — BUMETANIDE 0.25 MG/ML
1 INJECTION INTRAMUSCULAR; INTRAVENOUS
Qty: 30 | Refills: 0
Start: 2021-05-28 | End: 2021-06-26

## 2021-05-28 RX ORDER — ASPIRIN/CALCIUM CARB/MAGNESIUM 324 MG
1 TABLET ORAL
Qty: 30 | Refills: 0
Start: 2021-05-28 | End: 2021-06-26

## 2021-05-28 RX ORDER — ASPIRIN/CALCIUM CARB/MAGNESIUM 324 MG
1 TABLET ORAL
Qty: 0 | Refills: 0 | DISCHARGE
Start: 2021-05-28 | End: 2021-06-26

## 2021-05-28 RX ORDER — LEVETIRACETAM 250 MG/1
1 TABLET, FILM COATED ORAL
Qty: 60 | Refills: 0
Start: 2021-05-28 | End: 2021-06-26

## 2021-05-28 RX ORDER — ATORVASTATIN CALCIUM 80 MG/1
1 TABLET, FILM COATED ORAL
Qty: 30 | Refills: 0
Start: 2021-05-28 | End: 2021-06-26

## 2021-05-28 RX ORDER — ATORVASTATIN CALCIUM 80 MG/1
1 TABLET, FILM COATED ORAL
Qty: 0 | Refills: 0 | DISCHARGE
Start: 2021-05-28 | End: 2021-06-26

## 2021-05-28 RX ORDER — HYDROXYZINE HCL 10 MG
1 TABLET ORAL
Qty: 15 | Refills: 0
Start: 2021-05-28 | End: 2021-06-01

## 2021-05-28 RX ORDER — OMEPRAZOLE 10 MG/1
1 CAPSULE, DELAYED RELEASE ORAL
Qty: 30 | Refills: 0
Start: 2021-05-28 | End: 2021-06-26

## 2021-05-28 RX ADMIN — Medication 1 APPLICATION(S): at 06:36

## 2021-05-28 RX ADMIN — LEVETIRACETAM 250 MILLIGRAM(S): 250 TABLET, FILM COATED ORAL at 06:35

## 2021-05-28 RX ADMIN — Medication 325 MILLIGRAM(S): at 12:24

## 2021-05-28 RX ADMIN — PANTOPRAZOLE SODIUM 40 MILLIGRAM(S): 20 TABLET, DELAYED RELEASE ORAL at 06:35

## 2021-05-28 RX ADMIN — Medication 300 MILLIGRAM(S): at 12:23

## 2021-05-28 RX ADMIN — Medication 10 MILLIGRAM(S): at 06:35

## 2021-05-28 RX ADMIN — Medication 81 MILLIGRAM(S): at 12:24

## 2021-05-28 RX ADMIN — CALAMINE AND ZINC OXIDE AND PHENOL 1 APPLICATION(S): 160; 10 LOTION TOPICAL at 06:36

## 2021-05-28 RX ADMIN — Medication 2 GRAM(S): at 06:36

## 2021-05-28 NOTE — DISCHARGE NOTE PROVIDER - HOSPITAL COURSE
)      HPI:  77 yo male patient with PMH of diverticulosis, DM, HTN, HLD, CKD stage 3, ostearthritis, bleeding gastric ulcer, anemia presenting with period of AMS with  confusion  and unable to open his eyes but saying his eyes were open.   He was  baseline in the ED. Pt endorsing chills every time he goes to urinate few days. No hematuria, dysuria, increased frequency in urination. No f/n/v. No abdominal pain/back pain. No cough/sore throat/headache/weakness. Pt endorses fall 10 days ago with no LOC. No A/c. In ED, patient was normotensive, afebrile. CT head was done showing subacute subdural hematoma. Neurosurgery evaluated patient in ED, no immediate surgical indication. As per neurosurgery, patient received platelet transfusion for reversal of ASA and started on Keppra 500mg q12.    He has a history of LLE weakness secondary to an old thigh muscle tear, knee meniscus injury and radiculopathy and was a limited ambulator with a walker PTA.    Patient seen and evaluated by PMR consultation team including PT/OT and found to be a good candidate for acute inpatient rehab.     Pt admitted to rehab on 5/19--for rehab of  Traumatic SDH without LOC  He was requiring  Bed mobility with max assist, transfers mod assist, ambulates 5ft with RW mod assist (19 May 2021 10:34)   Traumatic subdural hematoma without LOC  -     while in rehab   - Continued  Keppra 500 mg q12hrs prophylxis  -    - Repeat Head CT on 5/26 showed stable bleed without significant change  - Continue ASA 81 QD out pt      # History of HTN/ current hypotension  - Bumex DC  - Lisinopril resumed as per renal doctor [place]      # Borderline Diabetes Mellitus type II   - Last HbA1C 6.6 % (05/14/21).  - Monitor FS. Start insulin if FS > 180. Keep between 140 - 180     #Seizure proph  - cont Keppra.     #Anemia  - monitor with primary care in out pt     #History of Gout  - asymptomatic on allopurinol    #uncomfortable teeth  follow with dental in out pt     #closed ulcers/ scabs on both feet  continue to follow with pod out pt.. He is being discharged today .  He would need Neurovascular f/u as outpt in 2 weeks after discharge for possible elective MMA embolization, and PMD  follow up in 2 weeks               )      HPI:  75 yo male patient with PMH of diverticulosis, DM, HTN, HLD, CKD stage 3, ostearthritis, bleeding gastric ulcer, anemia presenting with period of AMS with  confusion  and unable to open his eyes but saying his eyes were open.   He was  baseline in the ED. Pt endorsing chills every time he goes to urinate few days. No hematuria, dysuria, increased frequency in urination. No f/n/v. No abdominal pain/back pain. No cough/sore throat/headache/weakness. Pt endorses fall 10 days ago with no LOC. No A/c. In ED, patient was normotensive, afebrile. CT head was done showing subacute subdural hematoma. Neurosurgery evaluated patient in ED, no immediate surgical indication. As per neurosurgery, patient received platelet transfusion for reversal of ASA and started on Keppra 500mg q12.    He has a history of LLE weakness secondary to an old thigh muscle tear, knee meniscus injury and radiculopathy and was a limited ambulator with a walker PTA.    Patient seen and evaluated by PMR consultation team including PT/OT and found to be a good candidate for acute inpatient rehab.     Pt admitted to rehab on 5/19--for rehab of  Traumatic SDH without LOC  He was requiring  Bed mobility with max assist, transfers mod assist, ambulates 5ft with RW mod assist (19 May 2021 10:34)   Traumatic subdural hematoma without LOC  -     while in rehab   - Continued  Keppra 500 mg q12hrs prophylxis  -    - Repeat Head CT on 5/26 showed stable bleed without significant change  - Continue ASA 81 QD out pt      # History of HTN/ current hypotension  - Bumex DC  - Lisinopril resumed as per renal doctor and stopped again due to  low bps.       # Borderline Diabetes Mellitus type II   - Last HbA1C 6.6 % (05/14/21).  - Monitor FS. Start insulin if FS > 180. Keep between 140 - 180     #Seizure proph  - cont Keppra.     #Anemia  - monitor with primary care in out pt     #History of Gout  - asymptomatic on allopurinol    #uncomfortable teeth  follow with dental in out pt     #closed ulcers/ scabs on both feet  continue to follow with pod out pt.. He is being discharged today .  He would need Neurovascular f/u as outpt in 2 weeks after discharge for possible elective MMA embolization, and PMD  follow up in 2 weeks

## 2021-05-28 NOTE — DISCHARGE NOTE PROVIDER - CARE PROVIDERS DIRECT ADDRESSES
[email or web address]

## 2021-05-28 NOTE — PROGRESS NOTE ADULT - ATTENDING COMMENTS
I examined the patient with the resident and we discussed the findings and treatment plan. Tolerating the rehab program well. I agree with the findings and treatment plan as above, which I modified is indicated. The patient continues to require 3 hrs a day of acute inpatient rehab.   Neuro stable. Creatine improved to baseline 1.0 after Bumex and Lisinopril stopped and s/p IVF. BP remains controlled. Will monitor closely off meds.  Making ongoing functional gains. Ambulates with RW with touch assistance.  Cont acute rehab program. I examined the patient with the resident and we discussed the findings and treatment plan. Tolerating the rehab program well. I agree with the findings and treatment plan as above, which I modified is indicated.  Neuro stable. Creatine improved to baseline 1.0 after Bumex and Lisinopril stopped and s/p IVF. BP remains controlled.   Making ongoing functional gains. Ambulates with RW with touch assistance.  F/U with PMD2 weeks.  Neuro f/u Moussavi 2-3 weeks.  Physiatry f/u prn  [facility], [facility]

## 2021-05-28 NOTE — DISCHARGE NOTE NURSING/CASE MANAGEMENT/SOCIAL WORK - PATIENT PORTAL LINK FT
You can access the FollowMyHealth Patient Portal offered by [facility] by registering at the following website: [email or web address]. By joining [facility]’s FollowMyHealth portal, you will also be able to view your health information using other applications (apps) compatible with our system.

## 2021-05-28 NOTE — PROGRESS NOTE ADULT - NSICDXPILOT_GEN_ALL_CORE
[place]

## 2021-05-28 NOTE — DISCHARGE NOTE PROVIDER - PROVIDER TOKENS
PROVIDER:[TOKEN:[[id number]]],PROVIDER:[TOKEN:[[id number]]],PROVIDER:[TOKEN:[[id number]]],PROVIDER:[TOKEN:[[id number]]]

## 2021-05-28 NOTE — DISCHARGE NOTE PROVIDER - CARE PROVIDER_API CALL
Guanakito Wynn)  Neurology; Vascular Neurology  [address], [address]  [place], [place] [id number]  Phone: [phone]  Fax: [phone]  Follow Up Time:     Gm Laurent)  EEGEpilepsy; Neurology  [address], [address]  [place] [address]  Phone: [phone]  Fax: [phone]  Follow Up Time:     Sunil Mosqueda  INTERNAL MEDICINE  [address] [place] [address]  Phone: [phone]  Fax: [phone]  Follow Up Time:     Milton Buchanan  INTERNAL MEDICINE  [address]  [place] [address]  Phone: [phone]  Fax: [phone]  Follow Up Time:

## 2021-05-28 NOTE — PROGRESS NOTE ADULT - SUBJECTIVE AND OBJECTIVE BOX
Nephrology progress note     Patient is a Patient is a 75 yo man with long standing diabetes, retinopathy, nephrotic range proteinurua, bleeding gastric ulcer, gout, and subdural hematoma.  Recent admission with metformin toxicity and ALAN and lactic acidosis - required hemodialysis.  He was admitted with confusion but currently is alert at baseline. Per his account he fell afew days ago  fell asleep and fell of hair) and struch head.  Now with moderate subdural hematoms    ON events/Subjective:     Allergies:  No Known Allergies    Hospital Medications:   MEDICATIONS  (STANDING):  allopurinol 300 milliGRAM(s) Oral daily  AQUAPHOR (petrolatum Ointment) 1 Application(s) Topical two times a day  aspirin  chewable 81 milliGRAM(s) Oral daily  atorvastatin 40 milliGRAM(s) Oral at bedtime  calamine/zinc oxide Lotion 1 Application(s) Topical two times a day  enoxaparin Injectable 40 milliGRAM(s) SubCutaneous at bedtime  ferrous    sulfate 325 milliGRAM(s) Oral daily  levETIRAcetam 250 milliGRAM(s) Oral two times a day  omega-3-Acid Ethyl Esters 2 Gram(s) Oral two times a day  pantoprazole    Tablet 40 milliGRAM(s) Oral before breakfast  sodium chloride 0.9%. 1000 milliLiter(s) (75 mL/Hr) IV Continuous <Continuous>    REVIEW OF SYSTEMS:  CONSTITUTIONAL: No weakness, fevers or chills  EYES/ENT: No visual changes;  No vertigo or throat pain   NECK: No pain or stiffness  RESPIRATORY: No cough, wheezing, hemoptysis; No shortness of breath  CARDIOVASCULAR: No chest pain or palpitations.  GASTROINTESTINAL: No abdominal or epigastric pain. No nausea, vomiting, or hematemesis; No diarrhea or constipation. No melena or hematochezia.  GENITOURINARY: No dysuria, frequency, foamy urine, urinary urgency, incontinence or hematuria  NEUROLOGICAL: No numbness or weakness  SKIN: No itching, burning, rashes, or lesions   VASCULAR: No bilateral lower extremity edema.   All other review of systems is negative unless indicated above.    VITALS:  T(F): 96.4 (05-28-21 @ 05:10), Max: 97.4 (05-27-21 @ 21:50)  HR: 63 (05-28-21 @ 05:10)  BP: 165/75 (05-28-21 @ 05:10)  RR: 20 (05-28-21 @ 05:10)  SpO2: 99% (05-27-21 @ 21:50)  Wt(kg): --    05-26 @ 07:01  -  05-27 @ 07:00  --------------------------------------------------------  IN: 800 mL / OUT: 701 mL / NET: 99 mL    05-27 @ 07:01  -  05-28 @ 07:00  --------------------------------------------------------  IN: 1530 mL / OUT: 1800 mL / NET: -270 mL    05-28 @ 07:01  -  05-28 @ 13:08  --------------------------------------------------------  IN: 845 mL / OUT: 200 mL / NET: 645 mL        PHYSICAL EXAM:  Constitutional: NAD  HEENT: anicteric sclera, oropharynx clear, MMM  Neck: No JVD  Respiratory: CTAB, no wheezes, rales or rhonchi  Cardiovascular: S1, S2, RRR  Gastrointestinal: BS+, soft, NT/ND  Extremities: No cyanosis or clubbing. No peripheral edema  Neurological: A/O x 3, no focal deficits  Psychiatric: Normal mood, normal affect  : No CVA tenderness. No gil.   Skin: No rashes  Vascular Access:    LABS:  05-27    142  |  111<H>  |  42<H>  ----------------------------<  112<H>  4.9   |  21  |  0.9    Ca    8.8      27 May 2021 16:39          Urine Studies:  Creatinine Trend: 0.9<--, 1.0<--, 1.4<--, 1.4<--, 1.0<--, 1.1<--    ·	completed PT  ·	borderline hyperkalemia  ·	s/p fall 2 weeks ago with head trauma and now subacute moderate subdural hematoma  ·	per neuro- may have subacute subdural on top of acute suggestive of more chronic bleeding independent of recent fall  ·	ckd likely stage 3  - had Alan but resolving with stopping BP meds  ·	BP low at times now improved off BP meds  ·	LE edema resolved  ·	anemia with adequate iron stores  ·	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

## 2021-05-28 NOTE — PROGRESS NOTE ADULT - SUBJECTIVE AND OBJECTIVE BOX
Patient is a 76y old  Male who presents with a chief complaint of Traumatic SDH without LOC (19 May 2021 22:35)      HPI:  77 yo male patient with PMH of diverticulosis, DM, HTN, HLD, CKD stage 3, ostearthritis, bleeding gastric ulcer, anemia presenting with period of AMS. Per wife pt had a large breakfast and shortly after was found acting confused and unable to open his eyes but saying his eyes were open. Ambulance was called, FS was found to be 70. Per wife pt seldom runs a FS under 90. Pt was given PO food/drink and FS improved as did pt's mental status. Wife says pt is currently at baseline in the ED. Pt endorsing chills everytime he goes to urinate xfew days. No hematuria, dysuria, increased frequency in urination. No f/n/v. No abdominal pain/back pain. No cough/sore throat/headache/weakness. Pt endorses fall 10 days ago with no LOC. No A/c. In ED, patient was normotensive, afebrile. CT head was done showing subacute subdural hematoma. Neurosurgery evaluated patient in ED, no immediate surgical indication. As per neurosurgery, patient received platelet transfusion for reversal of ASA and started on Keppra 500mg q12.    He has a history of LLE weakness secondary to an old thigh muscle tear, knee meniscus injury and radiculopathy and was a limited ambulator with a walker PTA.    Patient seen and evaluated by PMR consultation team including PT/OT and found to be a good candidate for acute inpatient rehab.   Latest progress with PT: Bed mobility max assist, transfers mod assist, ambulates 5ft with RW mod assist (19 May 2021 10:34)      TODAY'S SUBJECTIVE & REVIEW OF SYMPTOMS:  Patient seen and evaluated with Dr. Magana.  Patient  diarrhea has resolved.  Pt to Dc today.     CLOF: Bed mobility partial assist, transfers partial assist, ambulates 75ft with RW touch assist        Constiutional:    [  x ] WNL           [   ] poor appetite   [   ] insomnia   [   ] tired   Cardio:                [ x  ] WNL           [   ] CP   [   ] REECE   [   ] palpitations               Resp:                   [ x  ] WNL           [   ] SOB   [   ] cough   [   ] wheezing   GI:                        [x  ] WNL           [   ] constipation   [  ] diarrhea  [   ] abdominal pain   [   ] nausea   [   ] emesis                                :                      [x   ] WNL           [   ] BROOKS  [   ] dysuria   [   ] difficulty voiding             Endo:                   [ x  ] WNL          [   ] polyuria   [   ] temperature intolerance                 Skin:                     [ x  ] WNL          [   ] pain   [   ] wound   [   ] rash   MSK:                    [   ] WNL          [   ] muscle pain   [ x  ] joint pain/ stiffness - left shoulder, left hip and knee  [   ] muscle tenderness   [   ] swelling   Neuro:                 [ x  ] WNL          [   ] HA   [   ] change in vision   [   ] tremor   [   ] weakness   [   ]dysphagia              Cognitive:           [ x  ] WNL           [   ]confusion      Psych:                  [ x  ] WNL           [   ] hallucinations   [   ]agitation   [   ] delusion   [   ]depression      PHYSICAL EXAM  Vital Signs Last 24 Hrs  T(C): 35.8 (28 May 2021 05:10), Max: 36.3 (27 May 2021 21:50)  T(F): 96.4 (28 May 2021 05:10), Max: 97.4 (27 May 2021 21:50)  HR: 63 (28 May 2021 05:10) (63 - 76)  BP: 165/75 (28 May 2021 05:10) (128/61 - 165/75)  BP(mean): --  RR: 20 (28 May 2021 05:10) (18 - 20)  SpO2: 99% (27 May 2021 21:50) (99% - 99%)    Constitutional - [ x  ] NAD, Comfortable        [   ] other:  Chest - [ x  ] CTA     [   ] other:  Cardiovascular - [ x  ] RRR, no murmer     [   ] other:  Abdomen - [ x  ] Soft, NT/ND      [   ] other:        -  [ x ] NO BROOKS CATHETER   [   ] YES  if yes: [   ] NO MEATAL TEAR OR DISCHARGE [   ] other:  Extremities - [ x ] No C/C/E, No calf tenderness       [   ] other:  ROM - [  ] WFL     [ x  ] other: Left shoulder ROM limited to pain  Neurologic Exam -                 Cognitive - [  x ]Awake, Alert, AAO to self, place, date, year, situation         [    ] other:      Communication - [ x  ]Fluent, No dysarthria       [   ] other:      Motor - No focal deficits  RIGHT UE: [ x  ] WNL,  [   ] other:  LEFT    UE: [   ] WNL,  [  x ] other: 4-/5 limited secondary to pain in shoulder  RIGHT LE: [ x  ] WNL,  [   ] other:   LEFT    LE: [   ] WNL,  [ x  ] other: 3+- 4-/5 - give-away weakness hip and knee. DF intact        Sensory - [ x  ] Intact to LT      [    ] other:          Reflexes - [ x  ] wnl/ symmetric     [   ] other:     Psychiatric - [ x  ]Mood stable, Affect WNL     [   ]other:     Skin - [ x  ] intact      [   ] other      05-27    142  |  111<H>  |  42<H>  ----------------------------<  112<H>  4.9   |  21  |  0.9    Ca    8.8      27 May 2021 16:39          POCT Blood Glucose.: 94 mg/dL (05-28-21 @ 07:19)  POCT Blood Glucose.: 145 mg/dL (05-27-21 @ 21:22)  POCT Blood Glucose.: 112 mg/dL (05-27-21 @ 16:41)  POCT Blood Glucose.: 209 mg/dL (05-27-21 @ 11:59)  POCT Blood Glucose.: 104 mg/dL (05-27-21 @ 07:22)  POCT Blood Glucose.: 153 mg/dL (05-26-21 @ 20:51)  POCT Blood Glucose.: 144 mg/dL (05-26-21 @ 16:30)  POCT Blood Glucose.: 135 mg/dL (05-26-21 @ 11:39)  POCT Blood Glucose.: 116 mg/dL (05-26-21 @ 07:27)  POCT Blood Glucose.: 179 mg/dL (05-25-21 @ 21:41)  POCT Blood Glucose.: 175 mg/dL (05-25-21 @ 16:25)  POCT Blood Glucose.: 101 mg/dL (05-25-21 @ 11:26)    MEDICATIONS  (STANDING):  allopurinol 300 milliGRAM(s) Oral daily  AQUAPHOR (petrolatum Ointment) 1 Application(s) Topical two times a day  aspirin  chewable 81 milliGRAM(s) Oral daily  atorvastatin 40 milliGRAM(s) Oral at bedtime  calamine/zinc oxide Lotion 1 Application(s) Topical two times a day  enoxaparin Injectable 40 milliGRAM(s) SubCutaneous at bedtime  ferrous    sulfate 325 milliGRAM(s) Oral daily  levETIRAcetam 250 milliGRAM(s) Oral two times a day  omega-3-Acid Ethyl Esters 2 Gram(s) Oral two times a day  pantoprazole    Tablet 40 milliGRAM(s) Oral before breakfast  sodium chloride 0.9%. 1000 milliLiter(s) (75 mL/Hr) IV Continuous <Continuous>    MEDICATIONS  (PRN):  acetaminophen   Tablet .. 650 milliGRAM(s) Oral every 6 hours PRN Mild Pain (1 - 3)  aluminum hydroxide/magnesium hydroxide/simethicone Suspension 30 milliLiter(s) Oral every 4 hours PRN Dyspepsia  hydrOXYzine hydrochloride 10 milliGRAM(s) Oral every 8 hours PRN Itching  magnesium hydroxide Suspension 30 milliLiter(s) Oral daily PRN Constipation  melatonin 3 milliGRAM(s) Oral at bedtime PRN Insomnia

## 2021-05-28 NOTE — DISCHARGE NOTE PROVIDER - NSDCMRMEDTOKEN_GEN_ALL_CORE_FT
acetaminophen 325 mg oral tablet: 2 tab(s) orally every 6 hours, As needed, Mild Pain (1 - 3)  allopurinol 300 mg oral tablet: 1 tab(s) orally once a day  aspirin 81 mg oral tablet, chewable: 1 tab(s) orally once a day  atorvastatin 40 mg oral tablet: 1 tab(s) orally once a day  bumetanide 1 mg oral tablet: 1 tab(s) orally once a day  calamine topical lotion: 1 application topically 2 times a day  ferrous sulfate 160 mg (50 mg elemental iron) oral tablet, extended release: 1 tab(s) orally 3 times a week  hydrOXYzine hydrochloride 10 mg oral tablet: 1 tab(s) orally every 8 hours, As needed, Itching  levETIRAcetam 250 mg oral tablet: 1 tab(s) orally 2 times a day  Omega-3 oral capsule: 1 tab(s) orally once a day  Oseni 25 mg-30 mg oral tablet: 1 tab(s) orally once a day, hold off this med for now, check with your mediclal doctor before restarting it    acetaminophen 325 mg oral tablet: 2 tab(s) orally every 6 hours, As needed, Mild Pain (1 - 3)  allopurinol 300 mg oral tablet: 1 tab(s) orally once a day  aspirin 81 mg oral tablet, chewable: 1 tab(s) orally once a day  atorvastatin 40 mg oral tablet: 1 tab(s) orally once a day  calamine topical lotion: 1 application topically 2 times a day  ferrous sulfate 160 mg (50 mg elemental iron) oral tablet, extended release: 1 tab(s) orally 3 times a week  hydrOXYzine hydrochloride 10 mg oral tablet: 1 tab(s) orally every 8 hours, As needed, Itching  levETIRAcetam 250 mg oral tablet: 1 tab(s) orally 2 times a day  Omega-3 oral capsule: 1 tab(s) orally once a day  Oseni 25 mg-30 mg oral tablet: 1 tab(s) orally once a day, hold off this med for now, check with your mediclal doctor before restarting it

## 2021-05-28 NOTE — PROGRESS NOTE ADULT - REASON FOR ADMISSION
Traumatic SDH without LOC

## 2021-05-28 NOTE — PROGRESS NOTE ADULT - ASSESSMENT
ASSESSMENT/PLAN    Rehab of Traumatic SDH with no LOC  Patient seen and evaluated with Dr. Magana      # Traumatic subdural hematoma without LOC  - CT Head Non Cont (05/14/21): Right-sided acute on chronic subdural hemorrhage with associated mass effect, unchanged.  - Evaluated by neurosurgery. Received platelets on 05/13. Stable.  - Continue Keppra 500 mg q12hrs prophylxis  - Neurovascular f/u as outpt in 2 weeks after discharge for possible elective MMA embolization  - Repeat Head CT on 5/26 showed stable bleed without significant change  - Continue ASA 81 QD out pt      # History of HTN/ current hypotension  - Bumex DC  - Lisinopril on hold   -re-eval with primary care out pt    # Borderline Diabetes Mellitus type II   - Last HbA1C 6.6 % (05/14/21).  - Monitor FS. Start insulin if FS > 180. Keep between 140 - 180    #CKD stage 3  - Bump up in BUN and creatinine likely assoc with low bp and hypovolemia. Now s/p IVF and lisinopril and Bumex d/c'd. Monitor.  - Cr improving     #Seizure proph  - cont Keppra.     #Anemia  - monitor with primary care in out pt     #History of Gout  - asymptomatic on allopurinol    #uncomfortable teeth  follow with dental in out pt     #closed ulcers/ scabs on both feet  continue to follow with pod out pt     -Pain control: Tylenol prn    -GI/Bowel Mgmt:  none     -Bladder management: No issues at this time    - Diet: DASH/TLC; carb consistent        Precautions / PROPHYLAXIS:      - Falls    - Ortho: Weight bearing status: WBAT      - DVT prophylaxis: Lovenox while in pt        ASSESSMENT/PLAN    Rehab of Traumatic SDH with no LOC  Patient seen and evaluated with Dr. Magana      # Traumatic subdural hematoma without LOC  - CT Head Non Cont (05/14/21): Right-sided acute on chronic subdural hemorrhage with associated mass effect, unchanged.  - Evaluated by neurosurgery. Received platelets on 05/13. Stable.  - Continue Keppra 250 mg q12hrs prophylxis  - Neurovascular f/u as outpt in 2 weeks after discharge for possible elective MMA embolization  - Repeat Head CT on 5/26 showed stable bleed without significant change  - Continue ASA 81 QD out pt      # History of HTN/ low BP and KELL in hospital, resolved.  - Bumex DC  - Lisinopril on hold   -re-eval with primary care out pt    # Borderline Diabetes Mellitus type II   - Last HbA1C 6.6 % (05/14/21).  - Monitor FS. Start insulin if FS > 180. Keep between 140 - 180    #CKD stage 3  - Bump up in BUN and creatinine likely assoc with low bp and hypovolemia. Now s/p IVF and lisinopril and Bumex d/c'd. Monitor.  - Cr improved to baseline    #Seizure proph  - cont Keppra.     #Anemia  - monitor with primary care in out pt     #History of Gout  - asymptomatic on allopurinol    #uncomfortable loose teeth  follow with dental in out pt     #closed ulcers/ scabs on both feet  continue to follow with pod out pt. No evidence of ischemia or oweomyelitis     -Pain control: Tylenol prn    - Diet: DASH/TLC; carb consistent     -Activities as tolerated with assistance. No driving.

## 2021-05-28 NOTE — DISCHARGE NOTE PROVIDER - NSDCCPCAREPLAN_GEN_ALL_CORE_FT
PRINCIPAL DISCHARGE DIAGNOSIS  Diagnosis: SDH (subdural hematoma)  Assessment and Plan of Treatment: you had a repeat ct scan on 5/26 th looks stable , you need to make appointment with neuroendovascular doctor  for further care or embolization , report any changes in health to your doctor      SECONDARY DISCHARGE DIAGNOSES  Diagnosis: Diabetes mellitus  Assessment and Plan of Treatment: your blood sugar is well controlled on diet , no need to take  dm med  Oseni for now , or check with your PMd dr Mosqueda or dr Buchanan  before  restarting it .    Diagnosis: Hypertension  Assessment and Plan of Treatment: also better controlled on lower dose of lisinopril , may follow up with dr Buchanan in 2 weeks    Diagnosis: At risk for ulcer of left foot due to diabetes mellitus  Assessment and Plan of Treatment: Please follow up with your podiatrist dr barton   in few weeks, may use silvadine on toes  with open areas or redness ,     PRINCIPAL DISCHARGE DIAGNOSIS  Diagnosis: SDH (subdural hematoma)  Assessment and Plan of Treatment: you had a repeat ct scan on 5/26 th looks stable , you need to make appointment with neuroendovascular doctor  for further care or embolization , report any changes in health to your doctor      SECONDARY DISCHARGE DIAGNOSES  Diagnosis: Diabetes mellitus  Assessment and Plan of Treatment: your blood sugar is well controlled on diet , no need to take  dm med  Oseni for now , or check with your PMd dr Mosqueda or dr Buchanan  before  restarting it .    Diagnosis: Hypertension  Assessment and Plan of Treatment: off meds due to low bp and KELL .  may follow up with dr Buchanan in 2 weeks for advis blanco bp meds    Diagnosis: At risk for ulcer of left foot due to diabetes mellitus  Assessment and Plan of Treatment: Please follow up with your podiatrist dr barton   in few weeks, may use silvadine on toes  with open areas or redness ,

## 2021-05-28 NOTE — PROGRESS NOTE ADULT - PROVIDER SPECIALTY LIST ADULT
Nephrology
Podiatry
Psychology
Rehab Medicine
Nephrology
Nephrology
Physiatry
Nephrology
Nephrology
Physiatry
Nephrology
Rehab Medicine
Psychology
Rehab Medicine
Psychology

## 2021-06-04 DIAGNOSIS — E87.5 HYPERKALEMIA: ICD-10-CM

## 2021-06-04 DIAGNOSIS — E78.5 HYPERLIPIDEMIA, UNSPECIFIED: ICD-10-CM

## 2021-06-04 DIAGNOSIS — L97.528 NON-PRESSURE CHRONIC ULCER OF OTHER PART OF LEFT FOOT WITH OTHER SPECIFIED SEVERITY: ICD-10-CM

## 2021-06-04 DIAGNOSIS — M54.30 SCIATICA, UNSPECIFIED SIDE: ICD-10-CM

## 2021-06-04 DIAGNOSIS — S06.5X0D TRAUMATIC SUBDURAL HEMORRHAGE WITHOUT LOSS OF CONSCIOUSNESS, SUBSEQUENT ENCOUNTER: ICD-10-CM

## 2021-06-04 DIAGNOSIS — R23.4 CHANGES IN SKIN TEXTURE: ICD-10-CM

## 2021-06-04 DIAGNOSIS — N18.31 CHRONIC KIDNEY DISEASE, STAGE 3A: ICD-10-CM

## 2021-06-04 DIAGNOSIS — M1A.0790 IDIOPATHIC CHRONIC GOUT, UNSPECIFIED ANKLE AND FOOT, WITHOUT TOPHUS (TOPHI): ICD-10-CM

## 2021-06-04 DIAGNOSIS — M15.9 POLYOSTEOARTHRITIS, UNSPECIFIED: ICD-10-CM

## 2021-06-04 DIAGNOSIS — D64.9 ANEMIA, UNSPECIFIED: ICD-10-CM

## 2021-06-04 DIAGNOSIS — G40.909 EPILEPSY, UNSPECIFIED, NOT INTRACTABLE, WITHOUT STATUS EPILEPTICUS: ICD-10-CM

## 2021-06-04 DIAGNOSIS — E11.22 TYPE 2 DIABETES MELLITUS WITH DIABETIC CHRONIC KIDNEY DISEASE: ICD-10-CM

## 2021-06-04 DIAGNOSIS — I12.9 HYPERTENSIVE CHRONIC KIDNEY DISEASE WITH STAGE 1 THROUGH STAGE 4 CHRONIC KIDNEY DISEASE, OR UNSPECIFIED CHRONIC KIDNEY DISEASE: ICD-10-CM

## 2021-06-04 DIAGNOSIS — I95.9 HYPOTENSION, UNSPECIFIED: ICD-10-CM

## 2021-06-04 DIAGNOSIS — R26.81 UNSTEADINESS ON FEET: ICD-10-CM

## 2021-06-04 DIAGNOSIS — Z87.19 PERSONAL HISTORY OF OTHER DISEASES OF THE DIGESTIVE SYSTEM: ICD-10-CM

## 2021-06-04 DIAGNOSIS — M54.10 RADICULOPATHY, SITE UNSPECIFIED: ICD-10-CM
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
## 2021-06-04 DIAGNOSIS — E86.1 HYPOVOLEMIA: ICD-10-CM
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
## 2021-06-04 DIAGNOSIS — L97.518 NON-PRESSURE CHRONIC ULCER OF OTHER PART OF RIGHT FOOT WITH OTHER SPECIFIED SEVERITY: ICD-10-CM

## 2021-06-04 DIAGNOSIS — K04.7 PERIAPICAL ABSCESS WITHOUT SINUS: ICD-10-CM

## 2021-06-04 DIAGNOSIS — E11.21 TYPE 2 DIABETES MELLITUS WITH DIABETIC NEPHROPATHY: ICD-10-CM

## 2021-06-04 DIAGNOSIS — K02.9 DENTAL CARIES, UNSPECIFIED: ICD-10-CM

## 2021-06-04 DIAGNOSIS — E11.319 TYPE 2 DIABETES MELLITUS WITH UNSPECIFIED DIABETIC RETINOPATHY WITHOUT MACULAR EDEMA: ICD-10-CM

## 2021-06-17 ENCOUNTER — RESULT REVIEW (OUTPATIENT)
Age: 76
End: 2021-06-17

## 2021-06-17 ENCOUNTER — APPOINTMENT (OUTPATIENT)
Dept: NEUROLOGY | Facility: CLINIC | Age: 76
End: 2021-06-17
Payer: MEDICARE

## 2021-06-17 ENCOUNTER — OUTPATIENT (OUTPATIENT)
Dept: OUTPATIENT SERVICES | Facility: HOSPITAL | Age: 76
LOS: 1 days | Discharge: HOME | End: 2021-06-17
Payer: MEDICARE

## 2021-06-17 VITALS
DIASTOLIC BLOOD PRESSURE: 74 MMHG | WEIGHT: 175 LBS | OXYGEN SATURATION: 98 % | HEART RATE: 86 BPM | HEIGHT: 70 IN | BODY MASS INDEX: 25.05 KG/M2 | TEMPERATURE: 97.6 F | SYSTOLIC BLOOD PRESSURE: 183 MMHG

## 2021-06-17 DIAGNOSIS — Z87.39 PERSONAL HISTORY OF OTHER DISEASES OF THE MUSCULOSKELETAL SYSTEM AND CONNECTIVE TISSUE: ICD-10-CM

## 2021-06-17 DIAGNOSIS — Z86.39 PERSONAL HISTORY OF OTHER ENDOCRINE, NUTRITIONAL AND METABOLIC DISEASE: ICD-10-CM

## 2021-06-17 DIAGNOSIS — K57.90 DIVERTICULOSIS OF INTESTINE, PART UNSPECIFIED, W/OUT PERFORATION OR ABSCESS W/OUT BLEEDING: ICD-10-CM

## 2021-06-17 DIAGNOSIS — Z98.890 OTHER SPECIFIED POSTPROCEDURAL STATES: Chronic | ICD-10-CM

## 2021-06-17 DIAGNOSIS — S06.5X9A TRAUMATIC SUBDURAL HEMORRHAGE WITH LOSS OF CONSCIOUSNESS OF UNSPECIFIED DURATION, INITIAL ENCOUNTER: ICD-10-CM

## 2021-06-17 DIAGNOSIS — Z87.19 PERSONAL HISTORY OF OTHER DISEASES OF THE DIGESTIVE SYSTEM: ICD-10-CM

## 2021-06-17 DIAGNOSIS — Z86.79 PERSONAL HISTORY OF OTHER DISEASES OF THE CIRCULATORY SYSTEM: ICD-10-CM

## 2021-06-17 DIAGNOSIS — Z86.2 PERSONAL HISTORY OF DISEASES OF THE BLOOD AND BLOOD-FORMING ORGANS AND CERTAIN DISORDERS INVOLVING THE IMMUNE MECHANISM: ICD-10-CM

## 2021-06-17 DIAGNOSIS — N18.30 CHRONIC KIDNEY DISEASE, STAGE 3 UNSPECIFIED: ICD-10-CM

## 2021-06-17 PROCEDURE — 70450 CT HEAD/BRAIN W/O DYE: CPT | Mod: 26,MH

## 2021-06-17 PROCEDURE — 99214 OFFICE O/P EST MOD 30 MIN: CPT

## 2021-06-17 RX ORDER — ALLOPURINOL 300 MG/1
300 TABLET ORAL DAILY
Refills: 0 | Status: ACTIVE | COMMUNITY

## 2021-06-17 RX ORDER — LEVETIRACETAM 250 MG/1
250 TABLET, FILM COATED ORAL TWICE DAILY
Qty: 180 | Refills: 3 | Status: ACTIVE | COMMUNITY

## 2021-06-17 RX ORDER — ATORVASTATIN CALCIUM 40 MG/1
40 TABLET, FILM COATED ORAL
Refills: 0 | Status: ACTIVE | COMMUNITY

## 2021-06-17 RX ORDER — ASPIRIN ENTERIC COATED TABLETS 81 MG 81 MG/1
81 TABLET, DELAYED RELEASE ORAL DAILY
Refills: 0 | Status: ACTIVE | COMMUNITY

## 2021-06-17 RX ORDER — CALCIUM CARBONATE/VITAMIN D3 600 MG-10
TABLET ORAL
Refills: 0 | Status: ACTIVE | COMMUNITY

## 2021-06-17 RX ORDER — FERROUS SULFATE, DRIED 160(50) MG
160 (50 FE) TABLET, EXTENDED RELEASE ORAL
Refills: 0 | Status: ACTIVE | COMMUNITY

## 2021-06-17 NOTE — REASON FOR VISIT
[Follow-Up: _____] : a [unfilled] follow-up visit [Spouse] : spouse [FreeTextEntry1] : for evaluation to see if candidate for MMA embolization.

## 2021-06-17 NOTE — ASSESSMENT
[FreeTextEntry1] : Patient is 76y M w/ PMHx of diverticulosis, DM, HTN, HLD, CKD stage 3A, OA, bleeding gastric ulcer, anemia who was found to have a traumatic SDH on 5/13/21 who presents to  clinic for evaluation to see if candidate for MMA embolization. Today patient presents to NI clinic with wife with no new neurological complaints. CTH today on 6/17/21 showed significant improvement of SDH compared to that on 5/13/21. No NI intervention is warranted at this time. \par WE SPENT ABOUT HALF AN HOUR WITH PATIENT DURING THIS VISIT.\par \par PLAN:\par -Repeat CTH in 3 months and follow up in clinic then\par -Maintain BP goal <140/80\par -CALL 911 IF ANY NEW NEUROLOGICAL ISSUES.

## 2021-06-17 NOTE — PHYSICAL EXAM
[FreeTextEntry1] : NIH STROKE SCALE \par \par Item	 Score \par \par 1 a.	Level of Consciousness	 0 \par 1 b.          LOC Questions	 0 \par 1 c.	LOC Commands	 0 \par 2.	Best Gaze	 0 \par 3.	Visual	                 0 \par 4.	Facial Palsy              0 \par 5 a.	Motor Arm - Left	 0 \par 5 b.	Motor Arm - Right	 0 \par 6 a.	Motor Leg - Left	 0 \par 6 b.	Motor Leg - Right	 0 \par 7.	Limb Ataxia	 0 \par 8.	Sensory	                 0 \par 9.	Language	 0 \par 10.	Dysarthria	 0 \par 11.	Extinction and Inattention 	 0 \par __________________________________________ \par \par TOTAL	 0 \par \par mRS: 0 No symptoms at all \par \par \par Neurologic Exam: \par \par Mental status: Awake, alert and oriented x 4. Recent and remote memory intact.  \par \par Language: Follows all commands. Naming, repetition and comprehension intact. Attention/concentration intact. No dysarthria, no aphasia. Fund of knowledge appropriate.  \par \par Cranial nerves: Pupils equally round and reactive to light, visual fields full, no nystagmus, EOMI, face symmetric, hearing intact bilaterally, palate elevation symmetric, tongue was midline, sternocleidomastoid/ shoulder shrug strength bilaterally 5/5.  \par \par Motor: No drifting in all extremities. Normal bulk and tone, strength 5/5 in bilateral upper and lower extremities.  strength 5/5.  \par \par Sensation: Intact to light touch. No neglect.  \par \par Coordination: No dysmetria on finger-to-nose and heel-to-shin.  \par \par Gait: Ambulates with rolling walker.

## 2021-06-17 NOTE — HISTORY OF PRESENT ILLNESS
[FreeTextEntry1] : Patient is 76y M w/ PMHx of diverticulosis, DM, HTN, HLD, CKD stage 3A, OA, bleeding gastric ulcer, anemia who was found to have a traumatic SDH on 5/13/21 who presents to NI clinic for evaluation to see if candidate for MMA embolization.  \par \par Patient initially presented to the ED on 5/13/21 for seizure like episode, AMS and hypoglycemia. CTH showed complex R convexity collection vs SDH up to 1.5cm in diameter.  Repeat CTH stable. Patient endorsed fall ten days ago on ASA without LOC. No neurosurgical intervention was recommended aside from MMA embolization to prevent expansion of the bleed. Patient was seen by the NI Team on 5/17/21 and recommended for possible MMA embolization outpatient and repeat CTH in 2 weeks. \par \par Today patient presents to NI clinic with wife with no new neurological complaints. CTH today on 6/17/21 showed significant improvement of SDH compared to that on 5/13/21.

## 2021-06-18 NOTE — CHART NOTE - NSCHARTNOTEFT_GEN_A_CORE
#Rehab of Traumatic SDH with no LOC  #CKD stage 3 - Cr improving     Pt reports >75% PO intake/appetite. no chewing/swallowing difficulty. no food preferences mentioned. complains of prev constipation -- now resolved, LBM 5/28 per pt. AAOX4; No NFPF, skin: intact; no edema noted per RN flowsheets. Labs reviewed; renal + glucose trends noted ; meds reviewed. No new wts.     Recommendations:  1. Continue with current diet order.  2. Obtain daily wts    [phone]  pt not @ risk, RD to f/u in 7 days.   RD remains available: Angella Mitchell [phone]
Chart note after discharge-- since pt's family ( wife   Paula called regarding  advise on continuation of  keppra.. It was told that Pt may need MMA embolization as per neurosurgery       pt had follow up with neuroendovascular doctor Eduardo's  yesterday . He advised pt to follow up with neurosurgeon who prescribed Keppra for advise on continuation of Keppra . Neurosurgery office was contacted  by writer and information  given to office staff,   for neurosurgery (dr Marks's office  would contact pt and advise  patient ). this info was passed onto  Mr. Polanco,s family .
Patient is a 75 yo man with long standing diabetes, retinopathy, nephrotic range proteinurua, bleeding gastric ulcer, diverticulosis, gout, and subdural hematoma.  Recent admission with metformin toxicity and KELL and lactic acidosis - required hemodialysis.  He was admitted 5/13/21 with confusion but currently is alert at baseline. Per his account he fell a few days PTA; fell asleep and fell off chair and struck his head.  Now with moderate subdural hematoma.    B/CR increased today to 62/1.4, from 48/1.0 on 5/20; repeat BMP tomorrow; had one liter IV fluids last night due to low BP (82/46); bumex was d/c'd; Lisinopril also d/c'd today as per RENAL Dr. RODRIGUEZ;  check PVR x 1 to make sure he is not in retention, although mostly likely he is dry; rest of labs are stable; BP is better today (109/66).    Vital Signs Last 24 Hrs  T(C): 35.8 (24 May 2021 13:11), Max: 35.8 (24 May 2021 13:11)  T(F): 96.5 (24 May 2021 13:11), Max: 96.5 (24 May 2021 13:11)  HR: 73 (24 May 2021 13:11) (72 - 77)  BP: 109/66 (24 May 2021 13:11) (82/46 - 114/68)  BP(mean): --  RR: 18 (24 May 2021 13:11) (18 - 19)  SpO2: 97% (24 May 2021 06:01) (97% - 98%)      LABS:             10.1   8.55  )-----------( 301      ( 24 May 2021 06:02 )             31.9     05-24    142  |  108  |  62<HH>  ----------------------------<  109<H>  4.4   |  22  |  1.4                                                    GFR = 48  Ca    8.7      24 May 2021 06:02  Mg     2.3     05-24    TPro  6.3  /  Alb  3.7  /  TBili  0.3  /  DBili  x   /  AST  19  /  ALT  18  /  AlkPhos  141<H>  05-24    Continue to monitor labs, VS  Renal follow up appreciated.  Will find out from Neuro how long the patient needs to remain on Keppra
75 yo male patient with PMH of diverticulosis, DM, HTN, HLD, CKD stage 3, ostearthritis, bleeding gastric ulcer, anemia presenting with period of AMS. .CT head was done showing subacute subdural hematoma. Neurosurgery evaluated patient in ED, no immediate surgical indication. As per neurosurgery, patient received platelet transfusion for reversal of ASA and started on Keppra 500mg q12.    He has a history of LLE weakness secondary to an old thigh muscle tear, knee meniscus injury and radiculopathy and was a limited ambulator with a walker PTA     Today as per renal lisinopril 10 mg qd started with parameters to hold ,   also rt 3rd toe looks necrotic  will get podiatry to see pt      Vital Signs Last 24 Hrs  T(C): 35.7 (22 May 2021 14:03), Max: 36.5 (22 May 2021 05:45)  T(F): 96.3 (22 May 2021 14:03), Max: 97.7 (22 May 2021 05:45)  HR: 71 (22 May 2021 14:03) (71 - 88)  BP: 148/67 (22 May 2021 14:03) (127/58 - 150/88)  BP(mean): --  RR: 18 (22 May 2021 14:03) (18 - 18)         Rehab of  sdh on keppra, asa , no neuro surgical intervention     # HTN seen by nephro   - Continue Bumex 1 mg daily  - Lisinopril lower dose 10 mg started  as per nephro     # Borderline Diabetes Mellitus type II   - Last HbA1C 6.6 % (05/14/21).  - Monitor FS. Start insulin if FS > 180. Keep between 140 - 180    #CKD stage 3 stable    Avoid nephrotoxic drugs                                 CAPILLARY BLOOD GLUCOSE      POCT Blood Glucose.: 105 mg/dL (22 May 2021 15:58)
Pt with episode of low BP. Pt was asymptomatic.  IV placed, and pt to receive 1L normal Saline at 150ml/hr.  pt encouraged to eat a full meal and drink.   All BP medications held  Dr Mcintosh aware and agrees to plan, will follow

## 2021-06-21 ENCOUNTER — APPOINTMENT (OUTPATIENT)
Dept: NEUROSURGERY | Facility: CLINIC | Age: 76
End: 2021-06-21

## 2021-07-19 ENCOUNTER — APPOINTMENT (OUTPATIENT)
Dept: NEUROSURGERY | Facility: CLINIC | Age: 76
End: 2021-07-19

## 2021-09-14 ENCOUNTER — OUTPATIENT (OUTPATIENT)
Dept: OUTPATIENT SERVICES | Facility: HOSPITAL | Age: 76
LOS: 1 days | Discharge: HOME | End: 2021-09-14
Payer: MEDICARE

## 2021-09-14 DIAGNOSIS — Z98.890 OTHER SPECIFIED POSTPROCEDURAL STATES: Chronic | ICD-10-CM

## 2021-09-14 DIAGNOSIS — S06.5X9A TRAUMATIC SUBDURAL HEMORRHAGE WITH LOSS OF CONSCIOUSNESS OF UNSPECIFIED DURATION, INITIAL ENCOUNTER: ICD-10-CM

## 2021-09-14 DIAGNOSIS — I62.01 NONTRAUMATIC ACUTE SUBDURAL HEMORRHAGE: ICD-10-CM

## 2021-09-14 PROCEDURE — 70450 CT HEAD/BRAIN W/O DYE: CPT | Mod: 26,MH

## 2021-09-17 ENCOUNTER — APPOINTMENT (OUTPATIENT)
Dept: NEUROLOGY | Facility: CLINIC | Age: 76
End: 2021-09-17

## 2021-09-21 ENCOUNTER — APPOINTMENT (OUTPATIENT)
Dept: NEUROLOGY | Facility: CLINIC | Age: 76
End: 2021-09-21

## 2021-10-21 ENCOUNTER — OUTPATIENT (OUTPATIENT)
Dept: OUTPATIENT SERVICES | Facility: HOSPITAL | Age: 76
LOS: 1 days | Discharge: HOME | End: 2021-10-21

## 2021-10-21 VITALS
HEIGHT: 70 IN | TEMPERATURE: 97 F | RESPIRATION RATE: 16 BRPM | SYSTOLIC BLOOD PRESSURE: 171 MMHG | DIASTOLIC BLOOD PRESSURE: 77 MMHG | HEART RATE: 83 BPM | WEIGHT: 175.05 LBS | OXYGEN SATURATION: 99 %

## 2021-10-21 DIAGNOSIS — Z01.818 ENCOUNTER FOR OTHER PREPROCEDURAL EXAMINATION: ICD-10-CM

## 2021-10-21 DIAGNOSIS — R93.2 ABNORMAL FINDINGS ON DIAGNOSTIC IMAGING OF LIVER AND BILIARY TRACT: ICD-10-CM

## 2021-10-21 DIAGNOSIS — Z98.890 OTHER SPECIFIED POSTPROCEDURAL STATES: Chronic | ICD-10-CM

## 2021-10-21 LAB
A1C WITH ESTIMATED AVERAGE GLUCOSE RESULT: 6.1 % — HIGH (ref 4–5.6)
ALBUMIN SERPL ELPH-MCNC: 4.2 G/DL — SIGNIFICANT CHANGE UP (ref 3.5–5.2)
ALP SERPL-CCNC: 128 U/L — HIGH (ref 30–115)
ALT FLD-CCNC: 13 U/L — SIGNIFICANT CHANGE UP (ref 0–41)
ANION GAP SERPL CALC-SCNC: 17 MMOL/L — HIGH (ref 7–14)
APTT BLD: 43.5 SEC — HIGH (ref 27–39.2)
AST SERPL-CCNC: 15 U/L — SIGNIFICANT CHANGE UP (ref 0–41)
BASOPHILS # BLD AUTO: 0.05 K/UL — SIGNIFICANT CHANGE UP (ref 0–0.2)
BASOPHILS NFR BLD AUTO: 0.8 % — SIGNIFICANT CHANGE UP (ref 0–1)
BILIRUB SERPL-MCNC: 0.2 MG/DL — SIGNIFICANT CHANGE UP (ref 0.2–1.2)
BUN SERPL-MCNC: 24 MG/DL — HIGH (ref 10–20)
CALCIUM SERPL-MCNC: 9 MG/DL — SIGNIFICANT CHANGE UP (ref 8.5–10.1)
CHLORIDE SERPL-SCNC: 109 MMOL/L — SIGNIFICANT CHANGE UP (ref 98–110)
CO2 SERPL-SCNC: 22 MMOL/L — SIGNIFICANT CHANGE UP (ref 17–32)
CREAT SERPL-MCNC: 1 MG/DL — SIGNIFICANT CHANGE UP (ref 0.7–1.5)
EOSINOPHIL # BLD AUTO: 0.39 K/UL — SIGNIFICANT CHANGE UP (ref 0–0.7)
EOSINOPHIL NFR BLD AUTO: 5.9 % — SIGNIFICANT CHANGE UP (ref 0–8)
ESTIMATED AVERAGE GLUCOSE: 128 MG/DL — HIGH (ref 68–114)
GLUCOSE SERPL-MCNC: 151 MG/DL — HIGH (ref 70–99)
HCT VFR BLD CALC: 33.4 % — LOW (ref 42–52)
HGB BLD-MCNC: 10.3 G/DL — LOW (ref 14–18)
IMM GRANULOCYTES NFR BLD AUTO: 1.1 % — HIGH (ref 0.1–0.3)
INR BLD: 1.07 RATIO — SIGNIFICANT CHANGE UP (ref 0.65–1.3)
LYMPHOCYTES # BLD AUTO: 1.1 K/UL — LOW (ref 1.2–3.4)
LYMPHOCYTES # BLD AUTO: 16.7 % — LOW (ref 20.5–51.1)
MCHC RBC-ENTMCNC: 29.7 PG — SIGNIFICANT CHANGE UP (ref 27–31)
MCHC RBC-ENTMCNC: 30.8 G/DL — LOW (ref 32–37)
MCV RBC AUTO: 96.3 FL — HIGH (ref 80–94)
MONOCYTES # BLD AUTO: 0.71 K/UL — HIGH (ref 0.1–0.6)
MONOCYTES NFR BLD AUTO: 10.8 % — HIGH (ref 1.7–9.3)
NEUTROPHILS # BLD AUTO: 4.27 K/UL — SIGNIFICANT CHANGE UP (ref 1.4–6.5)
NEUTROPHILS NFR BLD AUTO: 64.7 % — SIGNIFICANT CHANGE UP (ref 42.2–75.2)
NRBC # BLD: 0 /100 WBCS — SIGNIFICANT CHANGE UP (ref 0–0)
PLATELET # BLD AUTO: 272 K/UL — SIGNIFICANT CHANGE UP (ref 130–400)
POTASSIUM SERPL-MCNC: 4.5 MMOL/L — SIGNIFICANT CHANGE UP (ref 3.5–5)
POTASSIUM SERPL-SCNC: 4.5 MMOL/L — SIGNIFICANT CHANGE UP (ref 3.5–5)
PROT SERPL-MCNC: 7.4 G/DL — SIGNIFICANT CHANGE UP (ref 6–8)
PROTHROM AB SERPL-ACNC: 12.3 SEC — SIGNIFICANT CHANGE UP (ref 9.95–12.87)
RBC # BLD: 3.47 M/UL — LOW (ref 4.7–6.1)
RBC # FLD: 17.7 % — HIGH (ref 11.5–14.5)
SODIUM SERPL-SCNC: 148 MMOL/L — HIGH (ref 135–146)
WBC # BLD: 6.59 K/UL — SIGNIFICANT CHANGE UP (ref 4.8–10.8)
WBC # FLD AUTO: 6.59 K/UL — SIGNIFICANT CHANGE UP (ref 4.8–10.8)

## 2021-10-21 RX ORDER — FERROUS SULFATE 325(65) MG
1 TABLET ORAL
Qty: 0 | Refills: 0 | DISCHARGE

## 2021-10-21 RX ORDER — OMEGA-3 ACID ETHYL ESTERS 1 G
1 CAPSULE ORAL
Qty: 0 | Refills: 0 | DISCHARGE

## 2021-10-21 RX ORDER — ALLOPURINOL 300 MG
1 TABLET ORAL
Qty: 0 | Refills: 0 | DISCHARGE

## 2021-10-21 RX ORDER — ALOGLIPTIN BENZOATE AND PIOGLITAZONE HYDROCHLORIDE 12.5; 15 MG/1; MG/1
1 TABLET, FILM COATED ORAL
Qty: 0 | Refills: 0 | DISCHARGE

## 2021-10-21 NOTE — H&P PST ADULT - REASON FOR ADMISSION
77 yo male presents for PAST in preparation for MRI abdomen with and without IV contrast on 10/28/2021 under LSB by Dr. Chavez (Radiology).

## 2021-10-21 NOTE — H&P PST ADULT - NSICDXPASTSURGICALHX_GEN_ALL_CORE_FT
PAST SURGICAL HISTORY:  H/O colonoscopy 2018    H/O knee surgery     History of tonsillectomy and adenoidectomy

## 2021-10-21 NOTE — H&P PST ADULT - NS NEC GEN PE MLT EXAM PC
Normal rate, regular rhythm.  Heart sounds S1, S2.  No murmurs, rubs or gallops. No bruits; no thyromegaly or nodules

## 2021-10-21 NOTE — H&P PST ADULT - HISTORY OF PRESENT ILLNESS
Pt states approx. 1 year ago he fell and was taken to the ER. While in the ER he had imaging done where a liver mass was coincidentally found. Mass was stable at the time but has now grown in size. Now for listed procedure as he cannot remain still and becomes claustrophobic.     Denies any chest pain, difficulty breathing, SOB, palpitations, dysuria, URI, or any other infections in the last 2 weeks. Denies any recent travel, contact, or exposure to any persons with known or suspected COVID-19. Pt also denies COVID testing within the last 2 weeks. Pt denies receiving the COVID vaccine. Denies any suicidal or homicidal ideations. Pt advised to self quarantine until day of procedure. Poor exercise tolerance as pt is unable to climb a flight of stairs. DAVID reviewed with patient. Pt verbalized understanding of all pre-operative instructions.    Anesthesia Alert  NO--Difficult Airway  NO--History of neck surgery or radiation  NO--Limited ROM of neck  NO--History of Malignant hyperthermia  NO--No personal or family history of Pseudocholinesterase deficiency.  NO--Prior Anesthesia Complication  NO--Latex Allergy  NO--Loose teeth  NO--History of Rheumatoid Arthritis  NO--DAVID  NO--Bleeding Risk  NO--Other_____

## 2021-10-21 NOTE — H&P PST ADULT - TRANSFUSION REACTION, PREVIOUS, PROFILE
Lilian presents today for her OB visit.    Patient would like communication of their results via:   Ann    Patient's current [facility] status: Active.     Chaperone needed:  No    [employer] - Patient is on [employer] Optimization Scheduling and is recording her vital signs in the nadya and they are within normal limits.           no

## 2021-10-25 ENCOUNTER — OUTPATIENT (OUTPATIENT)
Dept: OUTPATIENT SERVICES | Facility: HOSPITAL | Age: 76
LOS: 1 days | Discharge: HOME | End: 2021-10-25

## 2021-10-25 DIAGNOSIS — Z98.890 OTHER SPECIFIED POSTPROCEDURAL STATES: Chronic | ICD-10-CM

## 2021-10-25 DIAGNOSIS — Z11.59 ENCOUNTER FOR SCREENING FOR OTHER VIRAL DISEASES: ICD-10-CM

## 2021-10-28 ENCOUNTER — OUTPATIENT (OUTPATIENT)
Dept: OUTPATIENT SERVICES | Facility: HOSPITAL | Age: 76
LOS: 1 days | Discharge: HOME | End: 2021-10-28
Payer: MEDICARE

## 2021-10-28 DIAGNOSIS — Z98.890 OTHER SPECIFIED POSTPROCEDURAL STATES: Chronic | ICD-10-CM

## 2021-10-28 DIAGNOSIS — R93.2 ABNORMAL FINDINGS ON DIAGNOSTIC IMAGING OF LIVER AND BILIARY TRACT: ICD-10-CM

## 2021-10-28 LAB — GLUCOSE BLDC GLUCOMTR-MCNC: 115 MG/DL — HIGH (ref 70–99)

## 2021-10-28 PROCEDURE — 74183 MRI ABD W/O CNTR FLWD CNTR: CPT | Mod: 26,MH

## 2021-10-28 NOTE — CHART NOTE - NSCHARTNOTEFT_GEN_A_CORE
PACU ANESTHESIA ADMISSION NOTE      Procedure:   Post op diagnosis:      ____  Intubated  TV:______       Rate: ______      FiO2: ______    __x__  Patent Airway    __x__  Full return of protective reflexes    __x__  Full recovery from anesthesia / back to baseline     Vitals:   See Anesthesia record  T- 97.8 P- 74 R-18 B/P- 119/56 SPO2- 97% on RA    Mental Status:  __x__ Awake   __x___ Alert   _____ Drowsy   _____ Sedated    Nausea/Vomiting:  __x__ NO  ______Yes,   See Post - Op Orders          Pain Scale (0-10):  __0___    Treatment: ____ None    ____ See Post - Op/PCA Orders    Post - Operative Fluids:   ____ Oral   __x__ See Post - Op Orders    Plan: Discharge:   __x__Home       _____Floor     _____Critical Care    _____  Other:_________________    Comments: No anesthesia complications/issues noted. Discharge to HOME when PACU criteria met.

## 2021-10-28 NOTE — DISCHARGE NOTE NURSING/CASE MANAGEMENT/SOCIAL WORK - NSDPDISTO_GEN_ALL_CORE
Quality 265: Biopsy Follow-Up: Biopsy results reviewed, communicated, tracked, and documented Detail Level: Detailed Home with home care Home

## 2021-12-19 ENCOUNTER — INPATIENT (INPATIENT)
Facility: HOSPITAL | Age: 76
LOS: 4 days | Discharge: NURSING, REHAB OR LONG-TERM CARE | End: 2021-12-24
Attending: INTERNAL MEDICINE | Admitting: INTERNAL MEDICINE
Payer: MEDICARE

## 2021-12-19 VITALS
HEART RATE: 74 BPM | RESPIRATION RATE: 19 BRPM | TEMPERATURE: 93 F | SYSTOLIC BLOOD PRESSURE: 142 MMHG | HEIGHT: 70 IN | OXYGEN SATURATION: 98 % | WEIGHT: 220.02 LBS | DIASTOLIC BLOOD PRESSURE: 66 MMHG

## 2021-12-19 DIAGNOSIS — K57.90 DIVERTICULOSIS OF INTESTINE, PART UNSPECIFIED, WITHOUT PERFORATION OR ABSCESS WITHOUT BLEEDING: ICD-10-CM

## 2021-12-19 DIAGNOSIS — D63.1 ANEMIA IN CHRONIC KIDNEY DISEASE: ICD-10-CM

## 2021-12-19 DIAGNOSIS — M10.9 GOUT, UNSPECIFIED: ICD-10-CM

## 2021-12-19 DIAGNOSIS — Z98.890 OTHER SPECIFIED POSTPROCEDURAL STATES: Chronic | ICD-10-CM

## 2021-12-19 DIAGNOSIS — E11.22 TYPE 2 DIABETES MELLITUS WITH DIABETIC CHRONIC KIDNEY DISEASE: ICD-10-CM

## 2021-12-19 DIAGNOSIS — I13.0 HYPERTENSIVE HEART AND CHRONIC KIDNEY DISEASE WITH HEART FAILURE AND STAGE 1 THROUGH STAGE 4 CHRONIC KIDNEY DISEASE, OR UNSPECIFIED CHRONIC KIDNEY DISEASE: ICD-10-CM

## 2021-12-19 DIAGNOSIS — R68.0 HYPOTHERMIA, NOT ASSOCIATED WITH LOW ENVIRONMENTAL TEMPERATURE: ICD-10-CM

## 2021-12-19 DIAGNOSIS — L97.521 NON-PRESSURE CHRONIC ULCER OF OTHER PART OF LEFT FOOT LIMITED TO BREAKDOWN OF SKIN: ICD-10-CM

## 2021-12-19 DIAGNOSIS — I83.225 VARICOSE VEINS OF LEFT LOWER EXTREMITY WITH BOTH ULCER OTHER PART OF FOOT AND INFLAMMATION: ICD-10-CM

## 2021-12-19 DIAGNOSIS — E11.621 TYPE 2 DIABETES MELLITUS WITH FOOT ULCER: ICD-10-CM
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
## 2021-12-19 DIAGNOSIS — M19.90 UNSPECIFIED OSTEOARTHRITIS, UNSPECIFIED SITE: ICD-10-CM

## 2021-12-19 DIAGNOSIS — G93.41 METABOLIC ENCEPHALOPATHY: ICD-10-CM

## 2021-12-19 DIAGNOSIS — L03.116 CELLULITIS OF LEFT LOWER LIMB: ICD-10-CM
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
## 2021-12-19 DIAGNOSIS — T83.83XA HEMORRHAGE DUE TO GENITOURINARY PROSTHETIC DEVICES, IMPLANTS AND GRAFTS, INITIAL ENCOUNTER: ICD-10-CM

## 2021-12-19 DIAGNOSIS — N18.30 CHRONIC KIDNEY DISEASE, STAGE 3 UNSPECIFIED: ICD-10-CM

## 2021-12-19 DIAGNOSIS — D68.9 COAGULATION DEFECT, UNSPECIFIED: ICD-10-CM

## 2021-12-19 DIAGNOSIS — R04.0 EPISTAXIS: ICD-10-CM

## 2021-12-19 DIAGNOSIS — Z79.82 LONG TERM (CURRENT) USE OF ASPIRIN: ICD-10-CM

## 2021-12-19 DIAGNOSIS — Z79.4 LONG TERM (CURRENT) USE OF INSULIN: ICD-10-CM

## 2021-12-19 DIAGNOSIS — I50.22 CHRONIC SYSTOLIC (CONGESTIVE) HEART FAILURE: ICD-10-CM

## 2021-12-19 DIAGNOSIS — L03.115 CELLULITIS OF RIGHT LOWER LIMB: ICD-10-CM

## 2021-12-19 DIAGNOSIS — R31.9 HEMATURIA, UNSPECIFIED: ICD-10-CM

## 2021-12-19 DIAGNOSIS — N17.9 ACUTE KIDNEY FAILURE, UNSPECIFIED: ICD-10-CM

## 2021-12-19 LAB
ALBUMIN SERPL ELPH-MCNC: 4.1 G/DL — SIGNIFICANT CHANGE UP (ref 3.5–5.2)
ALP SERPL-CCNC: 124 U/L — HIGH (ref 30–115)
ALT FLD-CCNC: 20 U/L — SIGNIFICANT CHANGE UP (ref 0–41)
ANION GAP SERPL CALC-SCNC: 16 MMOL/L — HIGH (ref 7–14)
APPEARANCE UR: CLEAR — SIGNIFICANT CHANGE UP
APTT BLD: 63.5 SEC — HIGH (ref 27–39.2)
AST SERPL-CCNC: 23 U/L — SIGNIFICANT CHANGE UP (ref 0–41)
BACTERIA # UR AUTO: NEGATIVE — SIGNIFICANT CHANGE UP
BASE EXCESS BLDV CALC-SCNC: 1.9 MMOL/L — SIGNIFICANT CHANGE UP (ref -2–3)
BASOPHILS # BLD AUTO: 0.02 K/UL — SIGNIFICANT CHANGE UP (ref 0–0.2)
BASOPHILS NFR BLD AUTO: 0.3 % — SIGNIFICANT CHANGE UP (ref 0–1)
BILIRUB SERPL-MCNC: <0.2 MG/DL — SIGNIFICANT CHANGE UP (ref 0.2–1.2)
BILIRUB UR-MCNC: NEGATIVE — SIGNIFICANT CHANGE UP
BUN SERPL-MCNC: 53 MG/DL — HIGH (ref 10–20)
CA-I SERPL-SCNC: 1.26 MMOL/L — SIGNIFICANT CHANGE UP (ref 1.15–1.33)
CALCIUM SERPL-MCNC: 9.6 MG/DL — SIGNIFICANT CHANGE UP (ref 8.5–10.1)
CHLORIDE SERPL-SCNC: 104 MMOL/L — SIGNIFICANT CHANGE UP (ref 98–110)
CO2 SERPL-SCNC: 20 MMOL/L — SIGNIFICANT CHANGE UP (ref 17–32)
COLOR SPEC: COLORLESS — SIGNIFICANT CHANGE UP
CREAT SERPL-MCNC: 1.6 MG/DL — HIGH (ref 0.7–1.5)
DIFF PNL FLD: NEGATIVE — SIGNIFICANT CHANGE UP
EOSINOPHIL # BLD AUTO: 0.26 K/UL — SIGNIFICANT CHANGE UP (ref 0–0.7)
EOSINOPHIL NFR BLD AUTO: 4.5 % — SIGNIFICANT CHANGE UP (ref 0–8)
EPI CELLS # UR: 0 /HPF — SIGNIFICANT CHANGE UP (ref 0–5)
GAS PNL BLDV: 142 MMOL/L — SIGNIFICANT CHANGE UP (ref 136–145)
GAS PNL BLDV: SIGNIFICANT CHANGE UP
GLUCOSE SERPL-MCNC: 106 MG/DL — HIGH (ref 70–99)
GLUCOSE UR QL: NEGATIVE — SIGNIFICANT CHANGE UP
HCO3 BLDV-SCNC: 28 MMOL/L — SIGNIFICANT CHANGE UP (ref 22–29)
HCT VFR BLD CALC: 33.2 % — LOW (ref 42–52)
HCT VFR BLDA CALC: 33 % — LOW (ref 39–51)
HGB BLD CALC-MCNC: 11.1 G/DL — LOW (ref 12.6–17.4)
HGB BLD-MCNC: 10.4 G/DL — LOW (ref 14–18)
HYALINE CASTS # UR AUTO: 4 /LPF — SIGNIFICANT CHANGE UP (ref 0–7)
IMM GRANULOCYTES NFR BLD AUTO: 0.9 % — HIGH (ref 0.1–0.3)
INR BLD: 1.21 RATIO — SIGNIFICANT CHANGE UP (ref 0.65–1.3)
KETONES UR-MCNC: NEGATIVE — SIGNIFICANT CHANGE UP
LACTATE BLDV-MCNC: 0.9 MMOL/L — SIGNIFICANT CHANGE UP (ref 0.5–2)
LEUKOCYTE ESTERASE UR-ACNC: NEGATIVE — SIGNIFICANT CHANGE UP
LYMPHOCYTES # BLD AUTO: 0.57 K/UL — LOW (ref 1.2–3.4)
LYMPHOCYTES # BLD AUTO: 9.8 % — LOW (ref 20.5–51.1)
MCHC RBC-ENTMCNC: 30.1 PG — SIGNIFICANT CHANGE UP (ref 27–31)
MCHC RBC-ENTMCNC: 31.3 G/DL — LOW (ref 32–37)
MCV RBC AUTO: 96 FL — HIGH (ref 80–94)
MONOCYTES # BLD AUTO: 0.52 K/UL — SIGNIFICANT CHANGE UP (ref 0.1–0.6)
MONOCYTES NFR BLD AUTO: 9 % — SIGNIFICANT CHANGE UP (ref 1.7–9.3)
NEUTROPHILS # BLD AUTO: 4.39 K/UL — SIGNIFICANT CHANGE UP (ref 1.4–6.5)
NEUTROPHILS NFR BLD AUTO: 75.5 % — HIGH (ref 42.2–75.2)
NITRITE UR-MCNC: NEGATIVE — SIGNIFICANT CHANGE UP
NRBC # BLD: 0 /100 WBCS — SIGNIFICANT CHANGE UP (ref 0–0)
PCO2 BLDV: 48 MMHG — SIGNIFICANT CHANGE UP (ref 42–55)
PH BLDV: 7.37 — SIGNIFICANT CHANGE UP (ref 7.32–7.43)
PH UR: 6.5 — SIGNIFICANT CHANGE UP (ref 5–8)
PLATELET # BLD AUTO: 254 K/UL — SIGNIFICANT CHANGE UP (ref 130–400)
PO2 BLDV: 46 MMHG — SIGNIFICANT CHANGE UP
POTASSIUM BLDV-SCNC: 4.7 MMOL/L — SIGNIFICANT CHANGE UP (ref 3.5–5.1)
POTASSIUM SERPL-MCNC: 4.6 MMOL/L — SIGNIFICANT CHANGE UP (ref 3.5–5)
POTASSIUM SERPL-SCNC: 4.6 MMOL/L — SIGNIFICANT CHANGE UP (ref 3.5–5)
PROT SERPL-MCNC: 7.3 G/DL — SIGNIFICANT CHANGE UP (ref 6–8)
PROT UR-MCNC: ABNORMAL
PROTHROM AB SERPL-ACNC: 13.9 SEC — HIGH (ref 9.95–12.87)
RBC # BLD: 3.46 M/UL — LOW (ref 4.7–6.1)
RBC # FLD: 16.2 % — HIGH (ref 11.5–14.5)
RBC CASTS # UR COMP ASSIST: 2 /HPF — SIGNIFICANT CHANGE UP (ref 0–4)
SAO2 % BLDV: 80.3 % — SIGNIFICANT CHANGE UP
SARS-COV-2 RNA SPEC QL NAA+PROBE: SIGNIFICANT CHANGE UP
SODIUM SERPL-SCNC: 140 MMOL/L — SIGNIFICANT CHANGE UP (ref 135–146)
SP GR SPEC: 1.01 — SIGNIFICANT CHANGE UP (ref 1.01–1.03)
UROBILINOGEN FLD QL: SIGNIFICANT CHANGE UP
WBC # BLD: 5.81 K/UL — SIGNIFICANT CHANGE UP (ref 4.8–10.8)
WBC # FLD AUTO: 5.81 K/UL — SIGNIFICANT CHANGE UP (ref 4.8–10.8)
WBC UR QL: 1 /HPF — SIGNIFICANT CHANGE UP (ref 0–5)

## 2021-12-19 PROCEDURE — 99285 EMERGENCY DEPT VISIT HI MDM: CPT | Mod: GC

## 2021-12-19 PROCEDURE — 99223 1ST HOSP IP/OBS HIGH 75: CPT

## 2021-12-19 PROCEDURE — 71045 X-RAY EXAM CHEST 1 VIEW: CPT | Mod: 26

## 2021-12-19 RX ORDER — FUROSEMIDE 40 MG
40 TABLET ORAL
Refills: 0 | Status: DISCONTINUED | OUTPATIENT
Start: 2021-12-19 | End: 2021-12-20

## 2021-12-19 RX ORDER — ATORVASTATIN CALCIUM 80 MG/1
40 TABLET, FILM COATED ORAL AT BEDTIME
Refills: 0 | Status: DISCONTINUED | OUTPATIENT
Start: 2021-12-19 | End: 2021-12-24

## 2021-12-19 RX ORDER — VANCOMYCIN HCL 1 G
1500 VIAL (EA) INTRAVENOUS ONCE
Refills: 0 | Status: COMPLETED | OUTPATIENT
Start: 2021-12-19 | End: 2021-12-19

## 2021-12-19 RX ORDER — NYSTATIN CREAM 100000 [USP'U]/G
1 CREAM TOPICAL ONCE
Refills: 0 | Status: COMPLETED | OUTPATIENT
Start: 2021-12-19 | End: 2021-12-19

## 2021-12-19 RX ORDER — HYDROXYZINE HCL 10 MG
10 TABLET ORAL DAILY
Refills: 0 | Status: DISCONTINUED | OUTPATIENT
Start: 2021-12-19 | End: 2021-12-24

## 2021-12-19 RX ORDER — METRONIDAZOLE 500 MG
500 TABLET ORAL ONCE
Refills: 0 | Status: COMPLETED | OUTPATIENT
Start: 2021-12-19 | End: 2021-12-19

## 2021-12-19 RX ORDER — ASPIRIN/CALCIUM CARB/MAGNESIUM 324 MG
81 TABLET ORAL DAILY
Refills: 0 | Status: DISCONTINUED | OUTPATIENT
Start: 2021-12-19 | End: 2021-12-24

## 2021-12-19 RX ORDER — FUROSEMIDE 40 MG
1 TABLET ORAL
Qty: 0 | Refills: 0 | DISCHARGE

## 2021-12-19 RX ORDER — SODIUM CHLORIDE 9 MG/ML
1500 INJECTION, SOLUTION INTRAVENOUS ONCE
Refills: 0 | Status: COMPLETED | OUTPATIENT
Start: 2021-12-19 | End: 2021-12-19

## 2021-12-19 RX ORDER — LEVETIRACETAM 250 MG/1
250 TABLET, FILM COATED ORAL
Refills: 0 | Status: DISCONTINUED | OUTPATIENT
Start: 2021-12-19 | End: 2021-12-24

## 2021-12-19 RX ORDER — SODIUM CHLORIDE 9 MG/ML
1000 INJECTION, SOLUTION INTRAVENOUS ONCE
Refills: 0 | Status: COMPLETED | OUTPATIENT
Start: 2021-12-19 | End: 2021-12-19

## 2021-12-19 RX ORDER — CEFEPIME 1 G/1
2000 INJECTION, POWDER, FOR SOLUTION INTRAMUSCULAR; INTRAVENOUS ONCE
Refills: 0 | Status: COMPLETED | OUTPATIENT
Start: 2021-12-19 | End: 2021-12-19

## 2021-12-19 RX ORDER — ALLOPURINOL 300 MG
300 TABLET ORAL DAILY
Refills: 0 | Status: DISCONTINUED | OUTPATIENT
Start: 2021-12-19 | End: 2021-12-24

## 2021-12-19 RX ADMIN — CEFEPIME 100 MILLIGRAM(S): 1 INJECTION, POWDER, FOR SOLUTION INTRAMUSCULAR; INTRAVENOUS at 21:38

## 2021-12-19 RX ADMIN — SODIUM CHLORIDE 1000 MILLILITER(S): 9 INJECTION, SOLUTION INTRAVENOUS at 21:38

## 2021-12-19 RX ADMIN — Medication 300 MILLIGRAM(S): at 21:39

## 2021-12-19 RX ADMIN — SODIUM CHLORIDE 1500 MILLILITER(S): 9 INJECTION, SOLUTION INTRAVENOUS at 22:15

## 2021-12-19 RX ADMIN — NYSTATIN CREAM 1 APPLICATION(S): 100000 CREAM TOPICAL at 21:39

## 2021-12-19 RX ADMIN — Medication 100 MILLIGRAM(S): at 21:37

## 2021-12-19 NOTE — H&P ADULT - NSHPPHYSICALEXAM_GEN_ALL_CORE
T(F): 93.6 (12-19-21 @ 21:06), Max: 93.6 (12-19-21 @ 21:06)  HR: 80 (12-19-21 @ 21:06) (74 - 80)  BP: 119/62 (12-19-21 @ 21:06) (119/62 - 142/66)  RR: 18 (12-19-21 @ 21:06) (18 - 19)  SpO2: 98% (12-19-21 @ 21:06) (98% - 98%)      PHYSICAL EXAM:  GENERAL: NAD, well-developed  CHEST/LUNG: CTAB; No wheeze  HEART: RRR; No murmurs, rubs, or gallops  ABDOMEN: Soft, NT/ND; BS present  EXTREMITIES:  No cyanosis, or edema  NEUROLOGY: AAOx3  SKIN: No rashes or lesions T(F): 93.6 (12-19-21 @ 21:06), Max: 93.6 (12-19-21 @ 21:06)  HR: 80 (12-19-21 @ 21:06) (74 - 80)  BP: 119/62 (12-19-21 @ 21:06) (119/62 - 142/66)  RR: 18 (12-19-21 @ 21:06) (18 - 19)  SpO2: 98% (12-19-21 @ 21:06) (98% - 98%)      PHYSICAL EXAM:  GENERAL: NAD, well-developed  CHEST/LUNG: CTAB; No wheeze  HEART: RRR; No murmurs, rubs, or gallops  ABDOMEN: Soft, NT/ND; BS present  EXTREMITIES:  No cyanosis, or edema  NEUROLOGY: AAOx3  SKIN: b/l chronic lower extremity wounds on b/l shins with erythema extending up to the knee.  no active drainage from wound.  rest of skin Warm and dry. T(F): 93.6 (12-19-21 @ 21:06), Max: 93.6 (12-19-21 @ 21:06)  HR: 80 (12-19-21 @ 21:06) (74 - 80)  BP: 119/62 (12-19-21 @ 21:06) (119/62 - 142/66)  RR: 18 (12-19-21 @ 21:06) (18 - 19)  SpO2: 98% (12-19-21 @ 21:06) (98% - 98%)      PHYSICAL EXAM:  GENERAL: NAD, well-developed  HEENT: PERRL; dried blood in left nostril noted.  CHEST/LUNG: CTAB; No wheeze  HEART: RRR; No murmurs, rubs, or gallops  ABDOMEN: Soft, NT/ND; BS present  EXTREMITIES:  No cyanosis, or edema  NEUROLOGY: AAOx3  SKIN: b/l chronic lower extremity wounds on b/l shins with erythema extending up to the knee.  no active drainage from wound.  rest of skin Warm and dry.

## 2021-12-19 NOTE — H&P ADULT - NSHPSOCIALHISTORY_GEN_ALL_CORE
Marital Status:  ( x  )    (   ) Single    (   )    (  )   Lives with: (  ) alone  (  ) children   ( x ) spouse   (  ) parents  (  ) other  Recent Travel: No recent travel    Substance Use (street drugs): ( x ) never used  (  ) other:  Tobacco Usage:  ( x  ) never smoked   (   ) former smoker   (   ) current smoker  (     ) pack year  Alcohol Usage: None

## 2021-12-19 NOTE — ED PROVIDER NOTE - PROGRESS NOTE DETAILS
Authored by Dr. Valdez: rectal temp confirmed 93 kondrat- patient hemodynamically stable. AAOx3 labs and imaging review ok for admission to the floor.  discussed with patient agreeable to plan.

## 2021-12-19 NOTE — ED PROVIDER NOTE - NS ED ROS FT
Constitutional: (+) generalized weakness No fevers or chills.  Eyes:  No visual changes, eye pain, or discharge.  ENT:  No hearing changes. No sore throat.  Neck:  No neck pain or stiffness.  Cardiac:  No CP or edema.  Resp:  No cough or SOB. No hemoptysis.   GI:  No nausea, vomiting, diarrhea, or abdominal pain.  :  No dysuria, frequency, or hematuria.  MSK:  No myalgias or joint pain/swelling.  Neuro:  No headache, dizziness, or weakness.  Skin:  (+) venous stasis wounds. No skin rash.

## 2021-12-19 NOTE — ED PROVIDER NOTE - CARE PLAN
1 Principal Discharge DX:	Hypothermia, endogenous  Secondary Diagnosis:	Leg wound, right  Secondary Diagnosis:	Wound of left leg  Secondary Diagnosis:	KELL (acute kidney injury)

## 2021-12-19 NOTE — H&P ADULT - ATTENDING COMMENTS
76 y male with PMH of CKD3, HFrEF, HTN, HLD, DM admitted for worsening b/l LE erythema and swelling.     # B/L LE Cellulitis  # b/L DFU  # Sepsis on exam   - hemodynamically stable   - failed outpt levofloxacin   - agree with IV cefazolin  - follow cultures  - check ESR --> if elevated r/o OM  - podiatry evaluation     # Epistaxis  # Chronic Anemia   # Elevated PPT  - patient states having intermitted epistaxis  - Hb at baseline   - aptt 63 on admission (aptt: 43 on oct 2021)  - check fac VIII, fac IX, vWB factor   - check iron stores, ferritin, B12, Folate  - hematology consult     # KELL  - Cr 1.6 (baseline 1.1), BUN 53  - hold Lasix and lisinopril   - start NS@65  - check Pro-BNP  - check urine creatinine, Na, Urea  - check renal bladder sono  - trend creatinine, monitor electrolytes  - avoid nephrotoxins and hypotension    # HTN  - hold lisinopril for now   - if bp elevated --> start amlodipine     # DM  - monitor FS  - start SS insulin    # DVT ppx  - SCD    # DASH diet, CHO consistent     # Ambulate as tolerated    # Full code 76 y male with PMH of CKD3, HFrEF, HTN, HLD, DM admitted for worsening b/l LE erythema and swelling.     # B/L LE Cellulitis  # b/L DFU  # Sepsis on exam   - hemodynamically stable   - failed outpt levofloxacin   - agree with IV cefazolin  - follow cultures  - check ESR --> if elevated r/o OM  - podiatry evaluation     # Epistaxis  # Chronic Anemia   # Elevated PPT  - patient states having intermitted epistaxis  - Hb at baseline   - aptt 63 on admission (aptt: 43 on oct 2021)  - check fac VIII, fac IX, vWB factor   - check iron stores, ferritin, B12, Folate  - hematology consult     # KELL  - Cr 1.6 (baseline 1.1), BUN 53  - hold Lasix and lisinopril   - start NS@65  - check Pro-BNP  - check urine creatinine, Na, Urea  - check renal bladder sono  - trend creatinine, monitor electrolytes  - avoid nephrotoxins and hypotension    # HTN  - hold lisinopril for now   - if bp elevated --> start amlodipine     # DM  - monitor FS  - start SS insulin  - check HbA1C    # DVT ppx  - SCD    # DASH diet, CHO consistent     # Ambulate as tolerated    # Full code

## 2021-12-19 NOTE — ED PROVIDER NOTE - PHYSICAL EXAMINATION
PHYSICAL EXAM: I have reviewed current vital signs.  GENERAL: NAD, well-nourished; well-developed.  HEAD:  Normocephalic, atraumatic.  EYES: EOMI, PERRL, conjunctiva and sclera clear.  ENT: MMM, no erythema/exudates.  NECK: Supple, no JVD.  CHEST/LUNG: Clear to auscultation bilaterally; no wheezes, rales, or rhonchi.  HEART: Regular rate and rhythm, normal S1 and S2; no murmurs, rubs, or gallops.  ABDOMEN: Soft, nontender, nondistended.  EXTREMITIES:  2+ peripheral pulses; no clubbing, cyanosis, or edema.  PSYCH: Cooperative, appropriate, normal mood and affect.  NEUROLOGY: A&O x 3. Motor 5/5. Sensory intact. No focal neurological deficits. CN II - XII intact. (-) dysmetria, facial droop, pronator drift.  SKIN: b/l chronic lower extremity wounds on b/l shins with erythema extending up to the knee.  no active drainage from wound.  rest of skin Warm and dry.

## 2021-12-19 NOTE — H&P ADULT - ASSESSMENT
76 M DM, htn, gout, now with bilat LE weeping wounds, treated outpt. failed, referred to ED by dr hemphill/naila for inpt admission for iv abx.   76 y male with PMH of CKD3, HFrEF, HTN, HLD, DM presents with complaints of worsening swelling around his chronic b/l venous stasis wounds and generalized weakness and feeling cold. Patient was recently placed on po levofloxacin without any improvement    # Cellulitis  -  76 y male with PMH of CKD3, HFrEF, HTN, HLD, DM presents with complaints of worsening swelling around his chronic b/l venous stasis wounds and generalized weakness and feeling cold. Patient was recently placed on po levofloxacin without any improvement.    # Cellulitis / hypothermia  - s/p cefepime, vanco and flagyl in ED  - non-purulent wound  - start IV cefazolin  - follow cultures  - c/w heating blanket for hypothermia  - consider ID eval    # KELL  - ? etiology. can be due to cardiorenal syndrome  - c/w home dose of lasix 40 bid  - check Pro-BNP  - check urine creatinine, Na, Urea  - check renal bladder sono  - trend creatinine, monitor electrolytes  - avoid nephrotoxins and hypotension    # DM  - d/c home meds  - monitor FS. Insulin SS    # HTN  - hold quinapril due to KELL for now  - monitor BP of meds.  - resume quinapril if repeat serum creatinine stable or improving    # Anemia  - hgb stable  - check iron stores, ferritin, B12, Folate  - trend h/h    DVT: SCDs  GI: Pantoprazole  Diet: DASH / carb consistent  Activity: Ambulate as tolerated  Dispo: Acute for now 76 y male with PMH of CKD3, HFrEF, HTN, HLD, DM presents with complaints of worsening swelling around his chronic b/l venous stasis wounds and generalized weakness and feeling cold. Patient was recently placed on po levofloxacin without any improvement.    # Cellulitis / hypothermia  - s/p cefepime, vanco and flagyl in ED  - non-purulent wound  - start IV cefazolin  - follow cultures  - c/w heating blanket for hypothermia  - consider ID eval    # KELL  - ? etiology. can be due to cardiorenal syndrome  - c/w home dose of lasix 40 bid  - check Pro-BNP  - check urine creatinine, Na, Urea  - check renal bladder sono  - trend creatinine, monitor electrolytes  - avoid nephrotoxins and hypotension    # DM  - d/c home meds  - monitor FS. Insulin SS    # HTN  - hold quinapril due to KELL for now  - monitor BP of meds.  - resume quinapril if repeat serum creatinine stable or improving    # Anemia  - hgb stable  - check iron stores, ferritin, B12, Folate  - trend h/h    # Episodes of epistaxis  - no active bleed. Dried blood noted in left nostril  - can be repeated trauma vs dryness  - nasal saline spray to keep mucosa moist  - ENT eval if persistent epistaxis otherwise outpt ENT f/up    DVT: SCDs  GI: Pantoprazole  Diet: DASH / carb consistent  Activity: Ambulate as tolerated  Dispo: Acute for now 76 y male with PMH of CKD3, HFrEF, HTN, HLD, DM presents with complaints of worsening swelling around his chronic b/l venous stasis wounds and generalized weakness and feeling cold. Patient was recently placed on po levofloxacin without any improvement.    # Cellulitis / hypothermia  - s/p cefepime, vanco and flagyl in ED  - non-purulent wound  - start IV cefazolin  - follow cultures  - c/w heating blanket for hypothermia  - consider ID eval    # KELL  - ? etiology. can be due to cardiorenal syndrome  - c/w home dose of lasix 40 bid  - check Pro-BNP  - check urine creatinine, Na, Urea  - check renal bladder sono  - trend creatinine, monitor electrolytes  - avoid nephrotoxins and hypotension    # DM  - d/c home meds  - monitor FS. Insulin SS    # HTN  - hold quinapril due to KELL for now  - monitor BP of meds.  - resume quinapril if repeat serum creatinine stable or improving    # Anemia  - hgb stable  - check iron stores, ferritin, B12, Folate  - trend h/h    # Episodes of epistaxis  - no active bleed. Dried blood noted in left nostril  - can be repeated trauma vs dryness  - avoid nasal dryness  - ENT eval if persistent epistaxis otherwise outpt ENT f/up    DVT: SCDs  GI: Pantoprazole  Diet: DASH / carb consistent  Activity: Ambulate as tolerated  Dispo: Acute for now

## 2021-12-19 NOTE — H&P ADULT - HISTORY OF PRESENT ILLNESS
76 M DM, htn, gout, now with bilat LE weeping wounds, failed outpatient meds. Referred to ED by dr hemphill/naila for inpatient admission for iv abx. 76 y male with PMH of CKD3, HFrEF, HTN, HLD, DM presents with complaints of worsening swelling around his chronic b/l venous stasis wounds and generalized weakness and feeling cold. Patient was recently placed on po levofloxacin without any improvement.  per EMS patients vitals where stable throughout transport but notable for a rectal temp of 92.3 F in triage.  Denies HA, dizziness, weakness, fever, cough, CP, SOB, palpitations, Abd pain, N/V, dysuria, hematuria, dark or bloody stool.  In ED: Temp = 93.6; HR = 80; BP = 119/62; RR = 18; SaO2 = 98% on room air. work up noted for wbc 5.81; hgb 10.4; bun 53; cr. 1.6. UA negative. Patient placed on heating blanket for hypothermia and received cefepime 2 gm x 1, Flagyl 500 mg x 1, vanco 1500 mg, LR 2500 cc. 76 y male with PMH of CKD3, HFrEF, HTN, HLD, DM presents with complaints of worsening swelling around his chronic b/l venous stasis wounds and generalized weakness and feeling cold. Patient was recently placed on po levofloxacin without any improvement.  per EMS patients vitals where stable throughout transport but notable for a rectal temp of 92.3 F in triage.  Denies HA, dizziness, weakness, fever, cough, CP, SOB, palpitations, Abd pain, N/V, dysuria, hematuria, dark or bloody stool. Patient also reports episodes of epistaxis.  In ED: Temp = 93.6; HR = 80; BP = 119/62; RR = 18; SaO2 = 98% on room air. work up noted for wbc 5.81; hgb 10.4; bun 53; cr. 1.6. UA negative. Patient placed on heating blanket for hypothermia and received cefepime 2 gm x 1, Flagyl 500 mg x 1, vanco 1500 mg, LR 2500 cc.

## 2021-12-19 NOTE — ED PROVIDER NOTE - ATTENDING CONTRIBUTION TO CARE
76 M DM, htn, gout, now with bilat LE weeping wounds, treated outpt. failed, referred to ED by dr hemphill/naila for inpt admission for iv abx.    found to be hypothermic in triage. rectally. bear hugger. warm fluids. septic w/u.   bilat LE cellulitis, no crepitus, lung dec BS bilat, abd soft, a+ox3.  a/p: labs, imaging, reassess

## 2021-12-19 NOTE — H&P ADULT - NSHPLABSRESULTS_GEN_ALL_CORE
.                          10.4   5.81  )-----------( 254      ( 19 Dec 2021 21:09 )             33.2     12-    140  |  104  |  53<H>  ----------------------------<  106<H>  4.6   |  20  |  1.6<H>    Ca    9.6      19 Dec 2021 21:09    TPro  7.3  /  Alb  4.1  /  TBili  <0.2  /  DBili  x   /  AST  23  /  ALT  20  /  AlkPhos  124<H>  12-19    PT/INR - ( 19 Dec 2021 21:09 )   PT: 13.90 sec;   INR: 1.21 ratio         PTT - ( 19 Dec 2021 21:09 )  PTT:63.5 sec      COVID-19 PCR: NotDetec (19 Dec 2021 21:09)      Urinalysis Basic - ( 19 Dec 2021 22:18 )    Color: Colorless / Appearance: Clear / S.013 / pH: x  Gluc: x / Ketone: Negative  / Bili: Negative / Urobili: <2 mg/dL   Blood: x / Protein: 100 mg/dL / Nitrite: Negative   Leuk Esterase: Negative / RBC: 2 /HPF / WBC 1 /HPF   Sq Epi: x / Non Sq Epi: 0 /HPF / Bacteria: Negative      RADIOLOGY & ADDITIONAL STUDIES: .                          10.4   5.81  )-----------( 254      ( 19 Dec 2021 21:09 )             33.2     12-    140  |  104  |  53<H>  ----------------------------<  106<H>  4.6   |  20  |  1.6<H>    Ca    9.6      19 Dec 2021 21:09    TPro  7.3  /  Alb  4.1  /  TBili  <0.2  /  DBili  x   /  AST  23  /  ALT  20  /  AlkPhos  124<H>  12-19    PT/INR - ( 19 Dec 2021 21:09 )   PT: 13.90 sec;   INR: 1.21 ratio      PTT - ( 19 Dec 2021 21:09 )  PTT:63.5 sec    COVID-19 PCR: NotDetec (19 Dec 2021 21:09)    Blood Gas Venous - Lactate: 0.90 mmol/L (12.. @ 21:57)  Blood Gas Profile - Venous (12. @ 21:57)    pH, Venous: 7.37    pCO2, Venous: 48 mmHg    pO2, Venous: 46 mmHg    HCO3, Venous: 28 mmol/L    Base Excess, Venous: 1.9 mmol/L    Oxygen Saturation, Venous: 80.3 %    Urinalysis Basic - ( 19 Dec 2021 22:18 )    Color: Colorless / Appearance: Clear / S.013 / pH: x  Gluc: x / Ketone: Negative  / Bili: Negative / Urobili: <2 mg/dL   Blood: x / Protein: 100 mg/dL / Nitrite: Negative   Leuk Esterase: Negative / RBC: 2 /HPF / WBC 1 /HPF   Sq Epi: x / Non Sq Epi: 0 /HPF / Bacteria: Negative      RADIOLOGY & ADDITIONAL STUDIES:

## 2021-12-20 LAB
A1C WITH ESTIMATED AVERAGE GLUCOSE RESULT: 5.8 % — HIGH (ref 4–5.6)
ANION GAP SERPL CALC-SCNC: 15 MMOL/L — HIGH (ref 7–14)
APTT BLD: 51 SEC — HIGH (ref 27–39.2)
BASOPHILS # BLD AUTO: 0.04 K/UL — SIGNIFICANT CHANGE UP (ref 0–0.2)
BASOPHILS NFR BLD AUTO: 0.6 % — SIGNIFICANT CHANGE UP (ref 0–1)
BUN SERPL-MCNC: 56 MG/DL — HIGH (ref 10–20)
CALCIUM SERPL-MCNC: 8.8 MG/DL — SIGNIFICANT CHANGE UP (ref 8.5–10.1)
CHLORIDE SERPL-SCNC: 108 MMOL/L — SIGNIFICANT CHANGE UP (ref 98–110)
CO2 SERPL-SCNC: 19 MMOL/L — SIGNIFICANT CHANGE UP (ref 17–32)
CREAT SERPL-MCNC: 1.7 MG/DL — HIGH (ref 0.7–1.5)
CULTURE RESULTS: NO GROWTH — SIGNIFICANT CHANGE UP
EOSINOPHIL # BLD AUTO: 0.28 K/UL — SIGNIFICANT CHANGE UP (ref 0–0.7)
EOSINOPHIL NFR BLD AUTO: 3.9 % — SIGNIFICANT CHANGE UP (ref 0–8)
ERYTHROCYTE [SEDIMENTATION RATE] IN BLOOD: 106 MM/HR — HIGH (ref 0–10)
ESTIMATED AVERAGE GLUCOSE: 120 MG/DL — HIGH (ref 68–114)
FERRITIN SERPL-MCNC: 61 NG/ML — SIGNIFICANT CHANGE UP (ref 30–400)
FOLATE SERPL-MCNC: 5.9 NG/ML — SIGNIFICANT CHANGE UP
GLUCOSE BLDC GLUCOMTR-MCNC: 132 MG/DL — HIGH (ref 70–99)
GLUCOSE BLDC GLUCOMTR-MCNC: 156 MG/DL — HIGH (ref 70–99)
GLUCOSE BLDC GLUCOMTR-MCNC: 92 MG/DL — SIGNIFICANT CHANGE UP (ref 70–99)
GLUCOSE BLDC GLUCOMTR-MCNC: 96 MG/DL — SIGNIFICANT CHANGE UP (ref 70–99)
GLUCOSE SERPL-MCNC: 100 MG/DL — HIGH (ref 70–99)
HCT VFR BLD CALC: 30.6 % — LOW (ref 42–52)
HCT VFR BLD CALC: 31.8 % — LOW (ref 42–52)
HGB BLD-MCNC: 9.5 G/DL — LOW (ref 14–18)
HGB BLD-MCNC: 9.9 G/DL — LOW (ref 14–18)
IMM GRANULOCYTES NFR BLD AUTO: 0.6 % — HIGH (ref 0.1–0.3)
INR BLD: 1.21 RATIO — SIGNIFICANT CHANGE UP (ref 0.65–1.3)
LACTATE SERPL-SCNC: 0.7 MMOL/L — SIGNIFICANT CHANGE UP (ref 0.7–2)
LYMPHOCYTES # BLD AUTO: 0.64 K/UL — LOW (ref 1.2–3.4)
LYMPHOCYTES # BLD AUTO: 8.8 % — LOW (ref 20.5–51.1)
MAGNESIUM SERPL-MCNC: 2.2 MG/DL — SIGNIFICANT CHANGE UP (ref 1.8–2.4)
MCHC RBC-ENTMCNC: 29.9 PG — SIGNIFICANT CHANGE UP (ref 27–31)
MCHC RBC-ENTMCNC: 29.9 PG — SIGNIFICANT CHANGE UP (ref 27–31)
MCHC RBC-ENTMCNC: 31 G/DL — LOW (ref 32–37)
MCHC RBC-ENTMCNC: 31.1 G/DL — LOW (ref 32–37)
MCV RBC AUTO: 96.1 FL — HIGH (ref 80–94)
MCV RBC AUTO: 96.2 FL — HIGH (ref 80–94)
MONOCYTES # BLD AUTO: 0.88 K/UL — HIGH (ref 0.1–0.6)
MONOCYTES NFR BLD AUTO: 12.1 % — HIGH (ref 1.7–9.3)
NEUTROPHILS # BLD AUTO: 5.39 K/UL — SIGNIFICANT CHANGE UP (ref 1.4–6.5)
NEUTROPHILS NFR BLD AUTO: 74 % — SIGNIFICANT CHANGE UP (ref 42.2–75.2)
NRBC # BLD: 0 /100 WBCS — SIGNIFICANT CHANGE UP (ref 0–0)
NRBC # BLD: 0 /100 WBCS — SIGNIFICANT CHANGE UP (ref 0–0)
NT-PROBNP SERPL-SCNC: 930 PG/ML — HIGH (ref 0–300)
PLATELET # BLD AUTO: 221 K/UL — SIGNIFICANT CHANGE UP (ref 130–400)
PLATELET # BLD AUTO: 246 K/UL — SIGNIFICANT CHANGE UP (ref 130–400)
POTASSIUM SERPL-MCNC: 4.6 MMOL/L — SIGNIFICANT CHANGE UP (ref 3.5–5)
POTASSIUM SERPL-SCNC: 4.6 MMOL/L — SIGNIFICANT CHANGE UP (ref 3.5–5)
PROTHROM AB SERPL-ACNC: 13.9 SEC — HIGH (ref 9.95–12.87)
RBC # BLD: 3.18 M/UL — LOW (ref 4.7–6.1)
RBC # BLD: 3.31 M/UL — LOW (ref 4.7–6.1)
RBC # FLD: 16.3 % — HIGH (ref 11.5–14.5)
RBC # FLD: 16.3 % — HIGH (ref 11.5–14.5)
SODIUM SERPL-SCNC: 142 MMOL/L — SIGNIFICANT CHANGE UP (ref 135–146)
SPECIMEN SOURCE: SIGNIFICANT CHANGE UP
VIT B12 SERPL-MCNC: 667 PG/ML — SIGNIFICANT CHANGE UP (ref 232–1245)
WBC # BLD: 7.27 K/UL — SIGNIFICANT CHANGE UP (ref 4.8–10.8)
WBC # BLD: 7.7 K/UL — SIGNIFICANT CHANGE UP (ref 4.8–10.8)
WBC # FLD AUTO: 7.27 K/UL — SIGNIFICANT CHANGE UP (ref 4.8–10.8)
WBC # FLD AUTO: 7.7 K/UL — SIGNIFICANT CHANGE UP (ref 4.8–10.8)

## 2021-12-20 PROCEDURE — 99232 SBSQ HOSP IP/OBS MODERATE 35: CPT | Mod: GC

## 2021-12-20 PROCEDURE — 76775 US EXAM ABDO BACK WALL LIM: CPT | Mod: 26

## 2021-12-20 PROCEDURE — 99221 1ST HOSP IP/OBS SF/LOW 40: CPT

## 2021-12-20 PROCEDURE — 99223 1ST HOSP IP/OBS HIGH 75: CPT

## 2021-12-20 RX ORDER — DEXTROSE 50 % IN WATER 50 %
25 SYRINGE (ML) INTRAVENOUS ONCE
Refills: 0 | Status: DISCONTINUED | OUTPATIENT
Start: 2021-12-20 | End: 2021-12-24

## 2021-12-20 RX ORDER — DEXTROSE 50 % IN WATER 50 %
12.5 SYRINGE (ML) INTRAVENOUS ONCE
Refills: 0 | Status: DISCONTINUED | OUTPATIENT
Start: 2021-12-20 | End: 2021-12-24

## 2021-12-20 RX ORDER — FINASTERIDE 5 MG/1
5 TABLET, FILM COATED ORAL DAILY
Refills: 0 | Status: DISCONTINUED | OUTPATIENT
Start: 2021-12-20 | End: 2021-12-24

## 2021-12-20 RX ORDER — SODIUM CHLORIDE 9 MG/ML
1000 INJECTION, SOLUTION INTRAVENOUS
Refills: 0 | Status: DISCONTINUED | OUTPATIENT
Start: 2021-12-20 | End: 2021-12-24

## 2021-12-20 RX ORDER — TAMSULOSIN HYDROCHLORIDE 0.4 MG/1
0.4 CAPSULE ORAL AT BEDTIME
Refills: 0 | Status: DISCONTINUED | OUTPATIENT
Start: 2021-12-20 | End: 2021-12-24

## 2021-12-20 RX ORDER — DEXTROSE 50 % IN WATER 50 %
15 SYRINGE (ML) INTRAVENOUS ONCE
Refills: 0 | Status: DISCONTINUED | OUTPATIENT
Start: 2021-12-20 | End: 2021-12-24

## 2021-12-20 RX ORDER — INFLUENZA VIRUS VACCINE 15; 15; 15; 15 UG/.5ML; UG/.5ML; UG/.5ML; UG/.5ML
0.7 SUSPENSION INTRAMUSCULAR ONCE
Refills: 0 | Status: DISCONTINUED | OUTPATIENT
Start: 2021-12-20 | End: 2021-12-24

## 2021-12-20 RX ORDER — OXYCODONE AND ACETAMINOPHEN 5; 325 MG/1; MG/1
1 TABLET ORAL EVERY 6 HOURS
Refills: 0 | Status: DISCONTINUED | OUTPATIENT
Start: 2021-12-20 | End: 2021-12-24

## 2021-12-20 RX ORDER — CEFAZOLIN SODIUM 1 G
1000 VIAL (EA) INJECTION EVERY 8 HOURS
Refills: 0 | Status: DISCONTINUED | OUTPATIENT
Start: 2021-12-20 | End: 2021-12-24

## 2021-12-20 RX ORDER — ACETAMINOPHEN 500 MG
650 TABLET ORAL EVERY 6 HOURS
Refills: 0 | Status: DISCONTINUED | OUTPATIENT
Start: 2021-12-20 | End: 2021-12-20

## 2021-12-20 RX ORDER — INSULIN LISPRO 100/ML
VIAL (ML) SUBCUTANEOUS
Refills: 0 | Status: DISCONTINUED | OUTPATIENT
Start: 2021-12-20 | End: 2021-12-24

## 2021-12-20 RX ORDER — GLUCAGON INJECTION, SOLUTION 0.5 MG/.1ML
1 INJECTION, SOLUTION SUBCUTANEOUS ONCE
Refills: 0 | Status: DISCONTINUED | OUTPATIENT
Start: 2021-12-20 | End: 2021-12-24

## 2021-12-20 RX ADMIN — LEVETIRACETAM 250 MILLIGRAM(S): 250 TABLET, FILM COATED ORAL at 17:26

## 2021-12-20 RX ADMIN — Medication 100 MILLIGRAM(S): at 06:01

## 2021-12-20 RX ADMIN — OXYCODONE AND ACETAMINOPHEN 1 TABLET(S): 5; 325 TABLET ORAL at 22:12

## 2021-12-20 RX ADMIN — Medication 100 MILLIGRAM(S): at 21:40

## 2021-12-20 RX ADMIN — Medication 81 MILLIGRAM(S): at 12:03

## 2021-12-20 RX ADMIN — OXYCODONE AND ACETAMINOPHEN 1 TABLET(S): 5; 325 TABLET ORAL at 21:42

## 2021-12-20 RX ADMIN — LEVETIRACETAM 250 MILLIGRAM(S): 250 TABLET, FILM COATED ORAL at 06:06

## 2021-12-20 RX ADMIN — Medication 650 MILLIGRAM(S): at 00:32

## 2021-12-20 RX ADMIN — Medication 300 MILLIGRAM(S): at 12:03

## 2021-12-20 RX ADMIN — Medication 40 MILLIGRAM(S): at 06:02

## 2021-12-20 RX ADMIN — TAMSULOSIN HYDROCHLORIDE 0.4 MILLIGRAM(S): 0.4 CAPSULE ORAL at 21:41

## 2021-12-20 RX ADMIN — OXYCODONE AND ACETAMINOPHEN 1 TABLET(S): 5; 325 TABLET ORAL at 15:16

## 2021-12-20 RX ADMIN — Medication 100 MILLIGRAM(S): at 15:17

## 2021-12-20 RX ADMIN — ATORVASTATIN CALCIUM 40 MILLIGRAM(S): 80 TABLET, FILM COATED ORAL at 21:41

## 2021-12-20 NOTE — PATIENT PROFILE ADULT - FALL HARM RISK - HARM RISK INTERVENTIONS

## 2021-12-20 NOTE — CONSULT NOTE ADULT - ASSESSMENT
A:     Plan:  #Elevated PTT Assessment: 76 y male with PMH of CKD3, HFrEF, HTN, HLD, DM presents with complaints of worsening swelling around his chronic b/l venous stasis wounds and generalized weakness and feeling cold. Patient was recently placed on po levofloxacin without any improvement.  per EMS patients vitals where stable throughout transport but notable for a rectal temp of 92.3 F in triage.      [facility] service consulted due to recurrent episodes of epistaxis and abnormal coagulation studies.   - Dried blood in left nare, no active bleeding. 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  ()   - Had cauterization of left blood vessels in 2019, did not resolve the bleeding   - When has bleeding usually stops by applying pressure for a few hours   - Previously had few blood transfusions due to bleeding gastric ulcer (not sure of other reasons)  - No family Hx of bleeding disorders, has hx of cancer in family   - Has not been worked up for bleeding disorders     Plan:  #Recurrent epistaxis & elevated PT and PTT: R/O VWD vs factor deficiency   - Denies trauma to the nostril   - PT: 12.3 --> 13.9 (since Oct 2021)  - PTT: 43.5 --> 63.5 (since Oct 2021)  - has scattered petechiae over skin   - Only blood thinner is aspirin 81 mg   - No family hx of bleeding disorder, no previous workup  - normal plt count   - Mixing study, factor 8, 9 and vWF quantity study       # Anemia  - hgb downtrending: 10.4--> 9.9 (overnight)  - check iron stores, ferritin, B12, Folate  - trend h/h    # Cellulitis / hypothermia  - s/p cefepime, vanco and flagyl in ED  - non-purulent wound  - started on IV cefazolin    # KELL  - c/w home dose of lasix 40 bid  - check Pro-BNP  - check urine creatinine, Na, Urea  - check renal bladder sono  - trend creatinine, monitor electrolytes  - avoid nephrotoxins and hypotension    # DM  - d/c home meds  - monitor FS. Insulin SS    # HTN  - hold quinapril due to KELL for now  - monitor BP off meds.  - resume quinapril if repeat serum creatinine stable or improving   Assessment: 76 y male with PMH of CKD3, HFrEF, HTN, HLD, DM presents with complaints of worsening swelling around his chronic b/l venous stasis wounds and generalized weakness and feeling cold. Patient was recently placed on po levofloxacin without any improvement.  per EMS patients vitals where stable throughout transport but notable for a rectal temp of 92.3 F in triage.      [facility] service consulted due to recurrent episodes of epistaxis and abnormal coagulation studies.   - Dried blood in left nare, no active bleeding. 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  ()   - Had cauterization of left blood vessels in 2019, did not resolve the bleeding   - When has bleeding usually stops by applying pressure for a few hours   - Previously had few blood transfusions due to bleeding gastric ulcer (not sure of other reasons)  - No family Hx of bleeding disorders, has hx of cancer in family   - Has not been worked up for bleeding disorders     Plan:  #Recurrent epistaxis & elevated PT and PTT:  - Denies trauma to the nostril   - PT: 12.3 --> 13.9 (since Oct 2021)  - PTT: 43.5 --> 63.5 (since Oct 2021)  - has scattered petechiae over skin   - Only blood thinner is aspirin 81 mg   - No family hx of bleeding disorder, no previous workup  - normal plt count   - Workup: Repeat PT/PTT (blue top filled to top)  - VW panel: antigen, multimer, factor 8 and activity   - consult ENT   - consult urology for blood in gil     # Anemia  - hgb downtrending: 10.4--> 9.9 (overnight)  - check iron stores, ferritin, B12, Folate  - trend h/h    # Cellulitis / hypothermia  - s/p cefepime, vanco and flagyl in ED  - non-purulent wound  - started on IV cefazolin    # KELL  - c/w home dose of lasix 40 bid  - check Pro-BNP  - check urine creatinine, Na, Urea  - check renal bladder sono  - trend creatinine, monitor electrolytes  - avoid nephrotoxins and hypotension    # DM  - d/c home meds  - monitor FS. Insulin SS    # HTN  - hold quinapril due to KELL for now  - monitor BP off meds.  - resume quinapril if repeat serum creatinine stable or improving   Assessment: 76 y male with PMH of CKD3, HFrEF, HTN, HLD, DM presents with complaints of worsening swelling around his chronic b/l venous stasis wounds and generalized weakness and feeling cold. Patient was recently placed on po levofloxacin without any improvement.  per EMS patients vitals where stable throughout transport but notable for a rectal temp of 92.3 F in triage.    [facility] service consulted due to recurrent episodes of epistaxis and abnormal coagulation studies.   - Dried blood in left nare, no active bleeding. Has active bleeding the entire night (12/19), mostly clots     IMPRESSION    1. Recurrent epistaxis with possible hematuria (? 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  ()   - Had cauterization of left blood vessels in 2019, did not resolve the bleeding   - When has bleeding usually stops by applying pressure for a few hours   - Previously had few blood transfusions due to bleeding gastric ulcer (not sure of other reasons)  - No family Hx of bleeding disorders, has hx of cancer in family   - Has not been worked up for bleeding disorders   - Elevated PT and PTT: PT: 12.3 --> 13.9 (since Oct 2021); PTT: 43.5 --> 63.5 (since Oct 2021)  - No family hx of bleeding disorder, no previous workup  - normal plt count so unlikely DIC  - Send blue top fully filled: send PT/PTT with mixing studies for both  - VW panel: antigen, multimers, factor VIII level and activity   - ENT consult for recurrent epistaxis with hx of cauterization  - Consult urology for hematuria    # Anemia normocytic  - could be 2/2 underlying blood loss  - check iron stores, ferritin, B12, Folate, MMA, retic count  - trend h/h  - O/P age appropriate cancer screening: colonoscopy.          Assessment: 76 y male with PMH of CKD3, HFrEF, HTN, HLD, DM presents with complaints of worsening swelling around his chronic b/l venous stasis wounds and generalized weakness and feeling cold. Patient was recently placed on po levofloxacin without any improvement.  per EMS patients vitals where stable throughout transport but notable for a rectal temp of 92.3 F in triage.    [facility] service consulted due to recurrent episodes of epistaxis and abnormal coagulation studies.   - Dried blood in left nare, no active bleeding. Has active bleeding the entire night (12/19), mostly clots     IMPRESSION    1. Recurrent epistaxis  2.   hematuria (? 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  ()   - Had cauterization of left blood vessels in 2019, did not resolve the bleeding   - When has bleeding usually stops by applying pressure for a few hours   - Previously had few blood transfusions due to bleeding gastric ulcer (not sure of other reasons)  - No family Hx of bleeding disorders, has hx of cancer in family   - Has not been worked up for bleeding disorders   - Elevated PT and PTT: PT: 12.3 --> 13.9 (since Oct 2021); PTT: 43.5 --> 63.5 (since Oct 2021)  - No family hx of bleeding disorder, no previous workup  - normal plt count so unlikely DIC  - Send blue top fully filled: send PT/PTT with mixing studies for both  - VW panel: antigen, multimers, factor VIII level and activity   - ENT consult for recurrent epistaxis with hx of cauterization  - Consult urology for hematuria    # Anemia normocytic  - could be 2/2 underlying blood loss  - check iron stores, ferritin, B12, Folate, MMA, retic count  - trend h/h  - O/P age appropriate cancer screening: colonoscopy.

## 2021-12-20 NOTE — CONSULT NOTE ADULT - SUBJECTIVE AND OBJECTIVE BOX
Podiatry Consult Note    Subjective:  MANDA TAVAREZ is a pleasant well-nourished, well developed 76y Male in no acute distress, alert awake, and oriented to person, place and time.   Patient is a 76y old  Male who presents with a chief complaint of worsening b/l LE swelling (20 Dec 2021 09:14). Pt seen & evaluated at bedside w/Attending. Previous admission (May 2021), pt reported h/o spontaneous blisters. Pt did not f/u with Podiatry in Outpatient Clinic. Pt states he is taking baby Aspirin.    HPI:  76 y male with PMH of CKD3, HFrEF, HTN, HLD, DM presents with complaints of worsening swelling around his chronic b/l venous stasis wounds and generalized weakness and feeling cold. Patient was recently placed on po levofloxacin without any improvement.  per EMS patients vitals where stable throughout transport but notable for a rectal temp of 92.3 F in triage.  Denies HA, dizziness, weakness, fever, cough, CP, SOB, palpitations, Abd pain, N/V, dysuria, hematuria, dark or bloody stool. Patient also reports episodes of epistaxis.  In ED: Temp = 93.6; HR = 80; BP = 119/62; RR = 18; SaO2 = 98% on room air. work up noted for wbc 5.81; hgb 10.4; bun 53; cr. 1.6. UA negative. Patient placed on heating blanket for hypothermia and received cefepime 2 gm x 1, Flagyl 500 mg x 1, vanco 1500 mg, LR 2500 cc. (19 Dec 2021 22:36)      Past Medical History and Surgical History  PAST MEDICAL & SURGICAL HISTORY:  Diverticulitis    Herniated disc, cervical    Chronic gout of foot, unspecified cause, unspecified laterality    Type 2 diabetes mellitus without complication, unspecified long term insulin use status    Hypertension, unspecified type    High cholesterol    Osteoarthritis of multiple joints, unspecified osteoarthritis type    Sciatica, unspecified laterality    History of tonsillectomy and adenoidectomy    H/O colonoscopy  2018    H/O knee surgery      Objective:  Vital Signs Last 24 Hrs  T(C): 35.8 (20 Dec 2021 05:37), Max: 36.5 (19 Dec 2021 22:33)  T(F): 96.5 (20 Dec 2021 05:37), Max: 97.7 (19 Dec 2021 22:33)  HR: 60 (20 Dec 2021 05:37) (60 - 97)  BP: 105/53 (20 Dec 2021 05:37) (105/53 - 142/66)  BP(mean): 75 (20 Dec 2021 05:37) (75 - 75)  RR: 20 (20 Dec 2021 05:37) (18 - 23)  SpO2: 98% (20 Dec 2021 02:00) (97% - 98%)                        9.9    7.70  )-----------( 246      ( 20 Dec 2021 04:30 )             31.8                 12-20    142  |  108  |  56<H>  ----------------------------<  100<H>  4.6   |  19  |  1.7<H>    Ca    8.8      20 Dec 2021 04:30  Mg     2.2     12-20    TPro  7.3  /  Alb  4.1  /  TBili  <0.2  /  DBili  x   /  AST  23  /  ALT  20  /  AlkPhos  124<H>  12-19        Physical Exam - Lower Extremity Focused:   Derm:   Multiple sanguinous blisters to b/l digits  Circular sanguinous blister, Left plantar forefoot  Skin to b/l feet dry, scaling    Vascular: DP and PT Pulses Diminished; Foot is Warm to Warm to the touch   Neuro: Protective Sensation Diminished / Moderately Neuropathic   MSK: Not assessed     Assessment:  Sanguinous blisters, b/l feet    Plan:  Chart reviewed and Patient evaluated. All Questions and Concerns Addressed and Answered  Discussed diagnosis and treatment with patient  Aseptic debridement of Left plantar forefoot sanguinous blister performed w/#15 blade & dermal curette to bleeding tissue. Wound Flushed w/ NS; Wound Dressed w/ DSD / Kerlix   Local Wound Care; As Stated Above   Previous Lower Extremity Arterial Duplex B/L (5/24/21): normal arterial flow in b/l LE  Pt stable from Podiatry standpoint; pt can f/u in Podiatry Outpatient Clinic at [address] [place] [address], 1 week post DSC  Discussed Plan w/ Attending    Podiatry        Podiatry Consult Note    Subjective:  MANDA TAVAREZ is a pleasant well-nourished, well developed 76y Male in no acute distress, alert awake, and oriented to person, place and time.   Patient is a 76y old  Male who presents with a chief complaint of worsening b/l LE swelling (20 Dec 2021 09:14). Pt seen & evaluated at bedside w/Attending. Previous admission (May 2021), pt reported h/o spontaneous blisters. Pt did not f/u with Podiatry in Outpatient Clinic. Pt states he is taking baby Aspirin.    HPI:  76 y male with PMH of CKD3, HFrEF, HTN, HLD, DM presents with complaints of worsening swelling around his chronic b/l venous stasis wounds and generalized weakness and feeling cold. Patient was recently placed on po levofloxacin without any improvement.  per EMS patients vitals where stable throughout transport but notable for a rectal temp of 92.3 F in triage.  Denies HA, dizziness, weakness, fever, cough, CP, SOB, palpitations, Abd pain, N/V, dysuria, hematuria, dark or bloody stool. Patient also reports episodes of epistaxis.  In ED: Temp = 93.6; HR = 80; BP = 119/62; RR = 18; SaO2 = 98% on room air. work up noted for wbc 5.81; hgb 10.4; bun 53; cr. 1.6. UA negative. Patient placed on heating blanket for hypothermia and received cefepime 2 gm x 1, Flagyl 500 mg x 1, vanco 1500 mg, LR 2500 cc. (19 Dec 2021 22:36)      Past Medical History and Surgical History  PAST MEDICAL & SURGICAL HISTORY:  Diverticulitis    Herniated disc, cervical    Chronic gout of foot, unspecified cause, unspecified laterality    Type 2 diabetes mellitus without complication, unspecified long term insulin use status    Hypertension, unspecified type    High cholesterol    Osteoarthritis of multiple joints, unspecified osteoarthritis type    Sciatica, unspecified laterality    History of tonsillectomy and adenoidectomy    H/O colonoscopy  2018    H/O knee surgery      Objective:  Vital Signs Last 24 Hrs  T(C): 35.8 (20 Dec 2021 05:37), Max: 36.5 (19 Dec 2021 22:33)  T(F): 96.5 (20 Dec 2021 05:37), Max: 97.7 (19 Dec 2021 22:33)  HR: 60 (20 Dec 2021 05:37) (60 - 97)  BP: 105/53 (20 Dec 2021 05:37) (105/53 - 142/66)  BP(mean): 75 (20 Dec 2021 05:37) (75 - 75)  RR: 20 (20 Dec 2021 05:37) (18 - 23)  SpO2: 98% (20 Dec 2021 02:00) (97% - 98%)                        9.9    7.70  )-----------( 246      ( 20 Dec 2021 04:30 )             31.8                 12-20    142  |  108  |  56<H>  ----------------------------<  100<H>  4.6   |  19  |  1.7<H>    Ca    8.8      20 Dec 2021 04:30  Mg     2.2     12-20    TPro  7.3  /  Alb  4.1  /  TBili  <0.2  /  DBili  x   /  AST  23  /  ALT  20  /  AlkPhos  124<H>  12-19        Physical Exam - Lower Extremity Focused:   Derm:   Multiple sanguinous blisters to b/l digits  Circular sanguinous blister, Left plantar forefoot  Skin to b/l feet dry, scaling    Vascular: DP and PT Pulses Diminished; Foot is Warm to Warm to the touch   Neuro: Protective Sensation Diminished / Moderately Neuropathic   MSK: Not assessed     Assessment:  Sanguinous blisters, b/l feet    Plan:  Chart reviewed and Patient evaluated. All Questions and Concerns Addressed and Answered  Discussed diagnosis and treatment with patient  Aseptic debridement of Left plantar forefoot sanguinous blister performed w/#15 blade & dermal curette to bleeding tissue. Wound Flushed w/ NS; Wound Dressed w/ DSD / Kerlix   Local Wound Care; As Stated Above   Previous Lower Extremity Arterial Duplex B/L (5/24/21): normal arterial flow in b/l LE  Pt stable from Podiatry standpoint; pt can f/u in Podiatry Outpatient Clinic at [address] [place] [address], 1 week post DSC      Podiatry

## 2021-12-20 NOTE — CONSULT NOTE ADULT - SUBJECTIVE AND OBJECTIVE BOX
NEPHROLOGY CONSULTATION NOTE    Patient is a 77 yo man from home with DM > 20 ears, HTN, retinopathy, cholestenol PVD, SDH with fal, and gout.  Recent increase lasix due to worsening LE edema.  Admitted with confusion/halucinations and KELL and LE erythema.    PAST MEDICAL & SURGICAL HISTORY:  Diverticulitis    Herniated disc, cervical    Chronic gout of foot, unspecified cause, unspecified laterality    Type 2 diabetes mellitus without complication, unspecified long term insulin use status    Hypertension, unspecified type    High cholesterol    Osteoarthritis of multiple joints, unspecified osteoarthritis type    Sciatica, unspecified laterality    History of tonsillectomy and adenoidectomy    H/O colonoscopy  2018    H/O knee surgery      Allergies:  No Known Allergies    Home Medications Reviewed  Hospital Medications:   MEDICATIONS  (STANDING):  allopurinol 300 milliGRAM(s) Oral daily  aspirin  chewable 81 milliGRAM(s) Oral daily  atorvastatin 40 milliGRAM(s) Oral at bedtime  ceFAZolin   IVPB 1000 milliGRAM(s) IV Intermittent every 8 hours  dextrose 40% Gel 15 Gram(s) Oral once  dextrose 5%. 1000 milliLiter(s) (50 mL/Hr) IV Continuous <Continuous>  dextrose 5%. 1000 milliLiter(s) (100 mL/Hr) IV Continuous <Continuous>  dextrose 50% Injectable 25 Gram(s) IV Push once  dextrose 50% Injectable 12.5 Gram(s) IV Push once  dextrose 50% Injectable 25 Gram(s) IV Push once  furosemide    Tablet 40 milliGRAM(s) Oral two times a day  glucagon  Injectable 1 milliGRAM(s) IntraMuscular once  influenza  Vaccine (HIGH DOSE) 0.7 milliLiter(s) IntraMuscular once  insulin lispro (ADMELOG) corrective regimen sliding scale   SubCutaneous three times a day before meals  levETIRAcetam 250 milliGRAM(s) Oral two times a day      SOCIAL HISTORY:  Denies ETOH,Smoking,   FAMILY HISTORY:  FH: heart disease  both parents          REVIEW OF SYSTEMS:  CONSTITUTIONAL: No weakness, fevers or chills  EYES/ENT: No visual changes;  No vertigo or throat pain   NECK: No pain or stiffness  RESPIRATORY: No cough, wheezing, hemoptysis; No shortness of breath  CARDIOVASCULAR: No chest pain or palpitations.  GASTROINTESTINAL: No abdominal or epigastric pain. No nausea, vomiting, or hematemesis; No diarrhea or constipation. No melena or hematochezia.  GENITOURINARY: No dysuria, frequency, foamy urine, urinary urgency, incontinence or hematuria  NEUROLOGICAL: No numbness or weakness  SKIN: No itching, burning, rashes, or lesions   VASCULAR: No bilateral lower extremity edema.   All other review of systems is negative unless indicated above.    VITALS:  T(F): 96.5 (21 @ 05:37), Max: 97.7 (21 @ 22:33)  HR: 60 (21 05:37)  BP: 105/53 (21 @ 05:37)  RR: 20 (21 05:37)  SpO2: 98% (21 @ 02:00)     @ 07:01  -   07:00  --------------------------------------------------------  IN: 0 mL / OUT: 1700 mL / NET: -1700 mL      Height (cm): 177.8 ( 20:29)  Weight (kg): 99.8 ( 20:29)  BMI (kg/m2): 31.6 ( 20:29)  BSA (m2): 2.17 ( 20:29)    21 @ 07:01  -  21 @ 07:00  --------------------------------------------------------  IN: 0 mL / OUT: 1700 mL / NET: -1700 mL      I&O's Detail    19 Dec 2021 07:01  -  20 Dec 2021 07:00  --------------------------------------------------------  IN:  Total IN: 0 mL    OUT:    Indwelling Catheter - Urethral (mL): 1700 mL  Total OUT: 1700 mL    Total NET: -1700 mL            PHYSICAL EXAM:  Constitutional: NAD  HEENT: anicteric sclera, oropharynx clear, MMM  Neck: No JVD  Respiratory: CTAB, no wheezes, rales or rhonchi  Cardiovascular: S1, S2, RRR  Gastrointestinal: BS+, soft, NT/ND  Extremities: LE edema and erythema bl  Neurological: A/O x 3, no focal deficits  Psychiatric: Normal mood, normal affect  : No CVA tenderness. No gil.   Skin: No rashes  Vascular Access:    LABS:      142  |  108  |  56<H>  ----------------------------<  100<H>  4.6   |  19  |  1.7<H>    Ca    8.8      20 Dec 2021 04:30  Mg     2.2         TPro  7.3  /  Alb  4.1  /  TBili  <0.2  /  DBili      /  AST  23  /  ALT  20  /  AlkPhos  124<H>      Creatinine Trend: 1.7 <--, 1.6 <--                        9.9    7.70  )-----------( 246      ( 20 Dec 2021 04:30 )             31.8     Urine Studies:  Urinalysis Basic - ( 19 Dec 2021 22:18 )    Color: Colorless / Appearance: Clear / S.013 / pH:   Gluc:  / Ketone: Negative  / Bili: Negative / Urobili: <2 mg/dL   Blood:  / Protein: 100 mg/dL / Nitrite: Negative   Leuk Esterase: Negative / RBC: 2 /HPF / WBC 1 /HPF   Sq Epi:  / Non Sq Epi: 0 /HPF / Bacteria: Negative    home Meds: flomax 0.4 po qd, lasix 40 mg po bid, keppra 250 mg p q12, allopurinol 300 mg po qd, ASA 81 mg po qd, lipitor 40 mg po qd, fish oil po qid, osen 25-30 po bid, quinapril 40 mg po qd, valium 5 mg prn  ·	KELL/CKD associated with recent increase lasix  ·	confusion/hallucinations ? etiology (dehydration, infection)  ·	subnephrotic range proteinuria on ace  ·	chronic LE edema ? from proteinuira(but doubt as albumin normal and subnephrotic proteinuria) but more likley vascualr issue (venous stasis)  ·	? bl le cellulitis (no leukocytosis) started on cefazolin   ·	gil placed with gross hematuria (h/u BPH) and ? trauma  ·	BP stable  ·	anemia  ·	h/u SDH    1) suggest to hold lasix and quinapril at this time as with KELL  2) abx per medicine but I question if truely with cellulitis please consult ID  3) suggest head CT (h/o SDH) and with confusion bouts  4) ok with heme f/u and send iron studies  5) for now maintain gil and remove when hematuria resolved - resume flomax  6) renal and bladder sono  to d/w housestaff and wife

## 2021-12-20 NOTE — CONSULT NOTE ADULT - SUBJECTIVE AND OBJECTIVE BOX
Patient is a 76y old  Male who presents with a chief complaint of worsening b/l LE swelling (20 Dec 2021 09:58)      HPI:  76 y male with PMH of CKD3, HFrEF, HTN, HLD, DM presents with complaints of worsening swelling around his chronic b/l venous stasis wounds and generalized weakness and feeling cold. Patient was recently placed on po levofloxacin without any improvement.  per EMS patients vitals where stable throughout transport but notable for a rectal temp of 92.3 F in triage.  Denies HA, dizziness, weakness, fever, cough, CP, SOB, palpitations, Abd pain, N/V, dysuria, hematuria, dark or bloody stool. Patient also reports episodes of epistaxis.  In ED: Temp = 93.6; HR = 80; BP = 119/62; RR = 18; SaO2 = 98% on room air. work up noted for wbc 5.81; hgb 10.4; bun 53; cr. 1.6. UA negative. Patient placed on heating blanket for hypothermia and received cefepime 2 gm x 1, Flagyl 500 mg x 1, vanco 1500 mg, LR 2500 cc. (19 Dec 2021 22:36)   Urology: Pt had Vargas placed in the ED, unknown residual. Urology called to evaluate the pt with new onset hematuria. Pt with an intra vesical lobe of the prostate. The hematuria is most  likely 2/2 to Vargas placement. Pt with medium stream, + day time frequency 2/2 diuretics + nocturia x 1-2, + urgency with incontinence, denies dysuria, prior hematuria, Pt sonogram/ct scan reviewed  pt with large middle lobe of prostate. Hematuria now resolving    PAST MEDICAL & SURGICAL HISTORY:  Diverticulitis    Herniated disc, cervical    Chronic gout of foot, unspecified cause, unspecified laterality    Type 2 diabetes mellitus without complication, unspecified long term insulin use status    Hypertension, unspecified type    High cholesterol    Osteoarthritis of multiple joints, unspecified osteoarthritis type    Sciatica, unspecified laterality    History of tonsillectomy and adenoidectomy    H/O colonoscopy  2018    H/O knee surgery        REVIEW OF SYSTEMS:    [ x ] a 10 point review of systems was negative except where noted    MEDICATIONS  (STANDING):  allopurinol 300 milliGRAM(s) Oral daily  aspirin  chewable 81 milliGRAM(s) Oral daily  atorvastatin 40 milliGRAM(s) Oral at bedtime  ceFAZolin   IVPB 1000 milliGRAM(s) IV Intermittent every 8 hours  dextrose 40% Gel 15 Gram(s) Oral once  dextrose 5%. 1000 milliLiter(s) (50 mL/Hr) IV Continuous <Continuous>  dextrose 5%. 1000 milliLiter(s) (100 mL/Hr) IV Continuous <Continuous>  dextrose 50% Injectable 25 Gram(s) IV Push once  dextrose 50% Injectable 12.5 Gram(s) IV Push once  dextrose 50% Injectable 25 Gram(s) IV Push once  glucagon  Injectable 1 milliGRAM(s) IntraMuscular once  influenza  Vaccine (HIGH DOSE) 0.7 milliLiter(s) IntraMuscular once  insulin lispro (ADMELOG) corrective regimen sliding scale   SubCutaneous three times a day before meals  levETIRAcetam 250 milliGRAM(s) Oral two times a day  tamsulosin 0.4 milliGRAM(s) Oral at bedtime    MEDICATIONS  (PRN):  hydrOXYzine  Oral Tab/Cap - Peds 10 milliGRAM(s) Oral daily PRN Itching  oxycodone    5 mG/acetaminophen 325 mG 1 Tablet(s) Oral every 6 hours PRN Severe Pain (7 - 10)      Allergies    No Known Allergies    SOCIAL HISTORY: No illicit drug use    FAMILY HISTORY:  FH: heart disease  both parents        Vital Signs Last 24 Hrs  T(C): 36.1 (20 Dec 2021 13:00), Max: 36.5 (19 Dec 2021 22:33)  T(F): 97 (20 Dec 2021 13:00), Max: 97.7 (19 Dec 2021 22:33)  HR: 83 (20 Dec 2021 13:00) (60 - 97)  BP: 104/52 (20 Dec 2021 13:00) (104/52 - 142/66)  BP(mean): 75 (20 Dec 2021 05:37) (75 - 75)  RR: 20 (20 Dec 2021 13:00) (18 - 23)  SpO2: 97% (20 Dec 2021 13:00) (96% - 98%)    Daily Height in cm: 177.8 (19 Dec 2021 20:29)      PHYSICAL EXAM:    Constitutional: NAD, obese well-developed, well nourished  HEENT: NC/AT  Neck: no pain, FROM  Back: No CVA tenderness  Respiratory: No accessory respiratory muscle use  Abd: Soft, NT/ND  no organomegally  no hernia  : + penile scrotal edema Vargas draining blood tinged urine, irrigated by RN no clots  Extremities: no edema, + lower extremity dressing 2/2 podiatric surgery  Neurological: A/O x 3  Psychiatric: Normal mood, normal affect  Skin: No rashes    I&O's Summary    19 Dec 2021 07:01  -  20 Dec 2021 07:00  --------------------------------------------------------  IN: 0 mL / OUT: 1700 mL / NET: -1700 mL        LABS:                        9.9    7.70  )-----------( 246      ( 20 Dec 2021 04:30 )             31.8         142  |  108  |  56<H>  ----------------------------<  100<H>  4.6   |  19  |  1.7<H>    Ca    8.8      20 Dec 2021 04:30  Mg     2.2         TPro  7.3  /  Alb  4.1  /  TBili  <0.2  /  DBili  x   /  AST  23  /  ALT  20  /  AlkPhos  124<H>  -    PT/INR - ( 19 Dec 2021 21:09 )   PT: 13.90 sec;   INR: 1.21 ratio         PTT - ( 19 Dec 2021 21:09 )  PTT:63.5 sec  Urinalysis Basic - ( 19 Dec 2021 22:18 )    Color: Colorless / Appearance: Clear / S.013 / pH: x  Gluc: x / Ketone: Negative  / Bili: Negative / Urobili: <2 mg/dL   Blood: x / Protein: 100 mg/dL / Nitrite: Negative   Leuk Esterase: Negative / RBC: 2 /HPF / WBC 1 /HPF   Sq Epi: x / Non Sq Epi: 0 /HPF / Bacteria: Negative      Urine Culture:         RADIOLOGY & ADDITIONAL STUDIES:    < from: US Renal (21 @ 11:07) >    ACC: [id number] EXAM:  US KIDNEY(S)                          PROCEDURE DATE:  2021          INTERPRETATION:  CLINICAL INFORMATION: KELL.    COMPARISON: Retroperitoneal ultrasound 2020    TECHNIQUE: Sonography of the kidneys and bladder.    FINDINGS:    Right kidney: 11.8 cm. No hydronephrosis or calculi.    Left kidney:  10.7 cm. No hydronephrosis or calculi.    Urinary bladder: Nondistended. Vargas catheter.    IMPRESSION:    No hydronephrosis bilaterally.        --- End of Report ---          FIDEL TRIMBLE MD; Resident Radiologist  This document has been electronically signed.  REFUGIO KERR MD; Attending Radiologist  This document has been electronically signed. Dec 20 2021  2:35PM    < end of copied text >      < from: CT Abdomen and Pelvis w/ IV Cont (21 @ 19:32) >    EXAM:  CT ABDOMEN AND PELVIS IC        EXAM:  CT CHEST IC            PROCEDURE DATE:  2021            INTERPRETATION:  REASON FOR EXAM / CLINICAL STATEMENT: Trauma; Fall.  PMHx of DM2, Gout, HTN, diverticulitis, osteoarthritis, sciatica    TECHNIQUE: Contiguous axial CT images were obtained from the thoracic inlet to the pubic symphysis with intravenous contrast. Reformatted images in the coronal and sagittal planes were acquired.    COMPARISON CT: CT scan of the abdomen pelvis dated 2020    OTHER STUDIES USED FOR CORRELATION: None.      FINDINGS CHEST:    TUBES/LINES: None    HEART AND VESSELS: The heart is normal in size. No evidence of central pulmonary embolism. There is no pericardial effusion.    Mild aortic calcifications are noted. There is no evidence of aortic aneurysm or dissection.    Normal caliber aorta and pulmonary artery. Brachiocephalic vessels are unremarkable.    MEDIASTINUM: There are no enlarged mediastinal, hilar or axillary lymph nodes. The visualized portion of the thyroid gland is unremarkable.    AIRWAYS, LUNGS AND PLEURA: The central tracheobronchial tree is patent. There are mild dependent atelectatic changes at the lung bases, otherwise the lungs are clear.  There is no pleural effusion. There is no pneumothorax.        FINDINGS ABDOMEN AND PELVIS:    HEPATIC: The liver is normal in size with no evidence of bile duct dilatation. Compared with the previous CT scan of 2020, there is a new 3.6 x 2.7 cm hypoechoic enhancing lesion in thelateral aspect of the right lobe of the liver (3/70). Hepatic MRI with and without contrast may be useful for further evaluation. The portal vein is patent.    BILIARY: Cholelithiasis.    SPLEEN: Unremarkable.    PANCREAS: The pancreas is normal in size and configuration. No evidence of mass or pancreatitis.    ADRENAL GLANDS: Unremarkable.    KIDNEYS: There is symmetric bilateral renal enhancement. No evidence of hydronephrosis, calcified stones, or solid mass. Right lower pole renal cyst. Several subcentimeter hypodense cortical lesions, too small to characterize, are noted in the left kidney.    ABDOMINOPELVIC NODES: Stable periportal, portacaval, and paraaortic lymph nodes, the largest measuring 2.3 x 1.6 cm.    PELVIC ORGANS: No evidenceof pelvic mass, lymphadenopathy, or fluid collection.    PERITONEUM/MESENTERY/BOWEL: Colonic diverticula noted with no evidence of diverticulitis. No evidence of bowel obstruction, colitis, inflammatory process, or ascites within the abdomen or pelvis. No pneumoperitoneum.    BONES/SOFT TISSUES: Degenerative changes and mild scoliosis of the spine are noted. No acute fractures.    OTHER: No evidence of abdominal aortic aneurysm. Fat-containing right inguinal hernia.      IMPRESSION:      No evidence of intra-thoracic, abdominal or pelvic visceral laceration or hematoma.  No acute fractures are identified.    Compared with the previous CT scan of 2020, there is a new 3.6 x 2.7 cm hypoechoic enhancing lesion in the lateral aspect of the right lobe of the liver (3/70). Hepatic MRI with and without contrast may be useful for further evaluation.              BRIAN FONTAINE MD; Attending Interventional Radiologist  This document has been electronically signed. 2021  8:31PM    < end of copied text >

## 2021-12-20 NOTE — CONSULT NOTE ADULT - ASSESSMENT
75 y/o m with hematuria following Vargas placement    A) traumatic Vargas insertion  KELL  enlarged prostate middle lobe  LUTS  Urge incontinence    P) Continue Flomax, start finasteride 5 mg daily  irrigate Vargas q shift and prn  OP f/u for BPH w/u  scrotal elevation  pt seen and examined with Dr. Coronado

## 2021-12-20 NOTE — PROGRESS NOTE ADULT - SUBJECTIVE AND OBJECTIVE BOX
MANDA TAVAREZ 76y Male  MRN#: [id number]     Hospital Day: 1d    Pt is currently admitted with the primary diagnosis of  HYPOTHERMIA ENDOGENOUS LEG WOUND RIGHT WOUND OF LEFT LEG KELL        SUBJECTIVE     Overnight events      Subjective complaints                                              ----------------------------------------------------------  OBJECTIVE  PAST MEDICAL & SURGICAL HISTORY  Diverticulitis    Herniated disc, cervical    Chronic gout of foot, unspecified cause, unspecified laterality    Type 2 diabetes mellitus without complication, unspecified long term insulin use status    Hypertension, unspecified type    High cholesterol    Osteoarthritis of multiple joints, unspecified osteoarthritis type    Sciatica, unspecified laterality    History of tonsillectomy and adenoidectomy    H/O colonoscopy  2018    H/O knee surgery                                              -----------------------------------------------------------  ALLERGIES:  No Known Allergies                                            ------------------------------------------------------------    HOME MEDICATIONS  Home Medications:  allopurinol 300 mg oral tablet: 1 tab(s) orally once a day (in the morning) (21 Oct 2021 11:33)  aspirin 81 mg oral tablet, chewable: 1 tab(s) orally once a day (in the morning) (21 Oct 2021 11:33)  atorvastatin 40 mg oral tablet: 1 tab(s) orally once a day (at bedtime) (21 Oct 2021 11:33)  furosemide 40 mg oral tablet: 1 tab(s) orally 2 times a day (19 Dec 2021 23:24)  Oseni 25 mg-30 mg oral tablet: 2 tab(s) orally once a day (21 Oct 2021 11:33)  quinapril 40 mg oral tablet: 1 tab(s) orally once a day (19 Dec 2021 23:25)  Tylenol with Codeine #3 oral tablet: 1 tab(s) orally every 6 hours, As Needed (21 Oct 2021 11:33)                           MEDICATIONS:  STANDING MEDICATIONS  allopurinol 300 milliGRAM(s) Oral daily  aspirin  chewable 81 milliGRAM(s) Oral daily  atorvastatin 40 milliGRAM(s) Oral at bedtime  ceFAZolin   IVPB 1000 milliGRAM(s) IV Intermittent every 8 hours  dextrose 40% Gel 15 Gram(s) Oral once  dextrose 5%. 1000 milliLiter(s) IV Continuous <Continuous>  dextrose 5%. 1000 milliLiter(s) IV Continuous <Continuous>  dextrose 50% Injectable 25 Gram(s) IV Push once  dextrose 50% Injectable 12.5 Gram(s) IV Push once  dextrose 50% Injectable 25 Gram(s) IV Push once  furosemide    Tablet 40 milliGRAM(s) Oral two times a day  glucagon  Injectable 1 milliGRAM(s) IntraMuscular once  influenza  Vaccine (HIGH DOSE) 0.7 milliLiter(s) IntraMuscular once  insulin lispro (ADMELOG) corrective regimen sliding scale   SubCutaneous three times a day before meals  levETIRAcetam 250 milliGRAM(s) Oral two times a day    PRN MEDICATIONS  acetaminophen     Tablet .. 650 milliGRAM(s) Oral every 6 hours PRN  hydrOXYzine  Oral Tab/Cap - Peds 10 milliGRAM(s) Oral daily PRN                                            ------------------------------------------------------------  VITAL SIGNS: Last 24 Hours  T(C): 35.8 (20 Dec 2021 05:37), Max: 36.5 (19 Dec 2021 22:33)  T(F): 96.5 (20 Dec 2021 05:37), Max: 97.7 (19 Dec 2021 22:33)  HR: 60 (20 Dec 2021 05:37) (60 - 97)  BP: 105/53 (20 Dec 2021 05:37) (105/53 - 142/66)  BP(mean): 75 (20 Dec 2021 05:37) (75 - 75)  RR: 20 (20 Dec 2021 05:37) (18 - 23)  SpO2: 98% (20 Dec 2021 02:00) (97% - 98%)      21 @ 07:01  -  12-20-21 @ 07:00  --------------------------------------------------------  IN: 0 mL / OUT: 1700 mL / NET: -1700 mL                                             --------------------------------------------------------------  LABS:                        9.9    7.70  )-----------( 246      ( 20 Dec 2021 04:30 )             31.8         140  |  104  |  53<H>  ----------------------------<  106<H>  4.6   |  20  |  1.6<H>    Ca    9.6      19 Dec 2021 21:09    TPro  7.3  /  Alb  4.1  /  TBili  <0.2  /  DBili  x   /  AST  23  /  ALT  20  /  AlkPhos  124<H>  -19    PT/INR - ( 19 Dec 2021 21:09 )   PT: 13.90 sec;   INR: 1.21 ratio         PTT - ( 19 Dec 2021 21:09 )  PTT:63.5 sec  Urinalysis Basic - ( 19 Dec 2021 22:18 )    Color: Colorless / Appearance: Clear / S.013 / pH: x  Gluc: x / Ketone: Negative  / Bili: Negative / Urobili: <2 mg/dL   Blood: x / Protein: 100 mg/dL / Nitrite: Negative   Leuk Esterase: Negative / RBC: 2 /HPF / WBC 1 /HPF   Sq Epi: x / Non Sq Epi: 0 /HPF / Bacteria: Negative                                                            -------------------------------------------------------------  RADIOLOGY:                                            --------------------------------------------------------------    PHYSICAL EXAM:  GENERAL: NAD, lying in bed comfortably  HEAD:  Atraumatic, Normocephalic  EYES: EOMI, PERRLA, conjunctiva and sclera clear  ENT: Bleeding form left nostril  NECK: Supple, No JVD  CHEST/LUNG: Clear to auscultation bilaterally; No rales, rhonchi, wheezing, or rubs. Unlabored respirations  HEART: regular rate and rhythm; No murmurs, rubs, or gallops  ABDOMEN: Bowel sounds present; Soft, Nontender, Nondistended.    EXTREMITIES: + Peripheral Pulses, brisk capillary refill. No clubbing, cyanosis, or edema  NERVOUS SYSTEM:  Alert & Oriented X3, speech clear. No focal deficits   SKIN: No rashes or lesions  FOLEYS: blood in foleys                                           --------------------------------------------------------------

## 2021-12-20 NOTE — PROGRESS NOTE ADULT - ASSESSMENT
76 y male with PMH of CKD3, HFrEF, HTN, HLD, DM presents with complaints of worsening swelling around his chronic b/l venous stasis wounds and generalized weakness and feeling cold. Patient was recently placed on po levofloxacin without any improvement.    # Cellulitis / hypothermia  - s/p cefepime, vanco and flagyl in ED  - non-purulent wound  - started on IV cefazolin  - follow cultures  - c/w heating blanket for hypothermia  - consider ID eval    # KELL  - c/w home dose of lasix 40 bid  - check Pro-BNP  - check urine creatinine, Na, Urea  - check renal bladder sono  - trend creatinine, monitor electrolytes  - avoid nephrotoxins and hypotension    # DM  - d/c home meds  - monitor FS. Insulin SS    # HTN  - hold quinapril due to KELL for now  - monitor BP off meds.  - resume quinapril if repeat serum creatinine stable or improving    # Anemia  - hgb stable  - check iron stores, ferritin, B12, Folate  - trend h/h    # Episodes of epistaxis  - no active bleed. Dried blood noted in left nostril  - can be repeated trauma vs dryness  - avoid nasal dryness  - consider ENT eval    DVT: SCDs  GI: Pantoprazole  Diet: DASH / carb consistent  Activity: Ambulate as tolerated  Dispo: Acute for now

## 2021-12-21 LAB
ALBUMIN SERPL ELPH-MCNC: 3.6 G/DL — SIGNIFICANT CHANGE UP (ref 3.5–5.2)
ALP SERPL-CCNC: 112 U/L — SIGNIFICANT CHANGE UP (ref 30–115)
ALT FLD-CCNC: 15 U/L — SIGNIFICANT CHANGE UP (ref 0–41)
ANION GAP SERPL CALC-SCNC: 17 MMOL/L — HIGH (ref 7–14)
AST SERPL-CCNC: 22 U/L — SIGNIFICANT CHANGE UP (ref 0–41)
BASOPHILS # BLD AUTO: 0.04 K/UL — SIGNIFICANT CHANGE UP (ref 0–0.2)
BASOPHILS NFR BLD AUTO: 0.6 % — SIGNIFICANT CHANGE UP (ref 0–1)
BILIRUB SERPL-MCNC: <0.2 MG/DL — SIGNIFICANT CHANGE UP (ref 0.2–1.2)
BUN SERPL-MCNC: 51 MG/DL — HIGH (ref 10–20)
CALCIUM SERPL-MCNC: 9.4 MG/DL — SIGNIFICANT CHANGE UP (ref 8.5–10.1)
CHLORIDE SERPL-SCNC: 113 MMOL/L — HIGH (ref 98–110)
CO2 SERPL-SCNC: 18 MMOL/L — SIGNIFICANT CHANGE UP (ref 17–32)
CREAT SERPL-MCNC: 1.5 MG/DL — SIGNIFICANT CHANGE UP (ref 0.7–1.5)
EOSINOPHIL # BLD AUTO: 0.3 K/UL — SIGNIFICANT CHANGE UP (ref 0–0.7)
EOSINOPHIL NFR BLD AUTO: 4.6 % — SIGNIFICANT CHANGE UP (ref 0–8)
GLUCOSE BLDC GLUCOMTR-MCNC: 114 MG/DL — HIGH (ref 70–99)
GLUCOSE BLDC GLUCOMTR-MCNC: 120 MG/DL — HIGH (ref 70–99)
GLUCOSE BLDC GLUCOMTR-MCNC: 156 MG/DL — HIGH (ref 70–99)
GLUCOSE BLDC GLUCOMTR-MCNC: 158 MG/DL — HIGH (ref 70–99)
GLUCOSE SERPL-MCNC: 115 MG/DL — HIGH (ref 70–99)
HCT VFR BLD CALC: 31.7 % — LOW (ref 42–52)
HGB BLD-MCNC: 10.1 G/DL — LOW (ref 14–18)
IMM GRANULOCYTES NFR BLD AUTO: 0.8 % — HIGH (ref 0.1–0.3)
LYMPHOCYTES # BLD AUTO: 0.67 K/UL — LOW (ref 1.2–3.4)
LYMPHOCYTES # BLD AUTO: 10.3 % — LOW (ref 20.5–51.1)
MAGNESIUM SERPL-MCNC: 2.4 MG/DL — SIGNIFICANT CHANGE UP (ref 1.8–2.4)
MCHC RBC-ENTMCNC: 30.1 PG — SIGNIFICANT CHANGE UP (ref 27–31)
MCHC RBC-ENTMCNC: 31.9 G/DL — LOW (ref 32–37)
MCV RBC AUTO: 94.3 FL — HIGH (ref 80–94)
MONOCYTES # BLD AUTO: 0.81 K/UL — HIGH (ref 0.1–0.6)
MONOCYTES NFR BLD AUTO: 12.4 % — HIGH (ref 1.7–9.3)
NEUTROPHILS # BLD AUTO: 4.64 K/UL — SIGNIFICANT CHANGE UP (ref 1.4–6.5)
NEUTROPHILS NFR BLD AUTO: 71.3 % — SIGNIFICANT CHANGE UP (ref 42.2–75.2)
NRBC # BLD: 0 /100 WBCS — SIGNIFICANT CHANGE UP (ref 0–0)
PLATELET # BLD AUTO: 212 K/UL — SIGNIFICANT CHANGE UP (ref 130–400)
POTASSIUM SERPL-MCNC: 4.4 MMOL/L — SIGNIFICANT CHANGE UP (ref 3.5–5)
POTASSIUM SERPL-SCNC: 4.4 MMOL/L — SIGNIFICANT CHANGE UP (ref 3.5–5)
PROT SERPL-MCNC: 6.6 G/DL — SIGNIFICANT CHANGE UP (ref 6–8)
RBC # BLD: 3.36 M/UL — LOW (ref 4.7–6.1)
RBC # FLD: 16.4 % — HIGH (ref 11.5–14.5)
SODIUM SERPL-SCNC: 148 MMOL/L — HIGH (ref 135–146)
WBC # BLD: 6.51 K/UL — SIGNIFICANT CHANGE UP (ref 4.8–10.8)
WBC # FLD AUTO: 6.51 K/UL — SIGNIFICANT CHANGE UP (ref 4.8–10.8)

## 2021-12-21 PROCEDURE — 99232 SBSQ HOSP IP/OBS MODERATE 35: CPT

## 2021-12-21 PROCEDURE — 99232 SBSQ HOSP IP/OBS MODERATE 35: CPT | Mod: GC

## 2021-12-21 PROCEDURE — 99223 1ST HOSP IP/OBS HIGH 75: CPT

## 2021-12-21 RX ADMIN — ATORVASTATIN CALCIUM 40 MILLIGRAM(S): 80 TABLET, FILM COATED ORAL at 21:13

## 2021-12-21 RX ADMIN — LEVETIRACETAM 250 MILLIGRAM(S): 250 TABLET, FILM COATED ORAL at 06:00

## 2021-12-21 RX ADMIN — OXYCODONE AND ACETAMINOPHEN 1 TABLET(S): 5; 325 TABLET ORAL at 04:45

## 2021-12-21 RX ADMIN — OXYCODONE AND ACETAMINOPHEN 1 TABLET(S): 5; 325 TABLET ORAL at 22:49

## 2021-12-21 RX ADMIN — FINASTERIDE 5 MILLIGRAM(S): 5 TABLET, FILM COATED ORAL at 11:26

## 2021-12-21 RX ADMIN — TAMSULOSIN HYDROCHLORIDE 0.4 MILLIGRAM(S): 0.4 CAPSULE ORAL at 21:13

## 2021-12-21 RX ADMIN — OXYCODONE AND ACETAMINOPHEN 1 TABLET(S): 5; 325 TABLET ORAL at 11:27

## 2021-12-21 RX ADMIN — Medication 100 MILLIGRAM(S): at 05:59

## 2021-12-21 RX ADMIN — Medication 100 MILLIGRAM(S): at 21:12

## 2021-12-21 RX ADMIN — Medication 300 MILLIGRAM(S): at 11:25

## 2021-12-21 RX ADMIN — OXYCODONE AND ACETAMINOPHEN 1 TABLET(S): 5; 325 TABLET ORAL at 11:57

## 2021-12-21 RX ADMIN — Medication 100 MILLIGRAM(S): at 13:18

## 2021-12-21 RX ADMIN — OXYCODONE AND ACETAMINOPHEN 1 TABLET(S): 5; 325 TABLET ORAL at 04:15

## 2021-12-21 RX ADMIN — Medication 81 MILLIGRAM(S): at 11:25

## 2021-12-21 RX ADMIN — Medication 2: at 18:36

## 2021-12-21 RX ADMIN — LEVETIRACETAM 250 MILLIGRAM(S): 250 TABLET, FILM COATED ORAL at 17:27

## 2021-12-21 RX ADMIN — OXYCODONE AND ACETAMINOPHEN 1 TABLET(S): 5; 325 TABLET ORAL at 21:23

## 2021-12-21 NOTE — CONSULT NOTE ADULT - ASSESSMENT
Ass/ Rec:  Venous stasis ulcer  LE         Wound care - xeroform, kerlix ace bid   IV abx as indicated  Elevation and compression   Will follow.   Ass/ Rec:  Venous stasis ulcer  LE         Wound care - xeroform, kerlix ace bid   IV abx as indicated  Elevation and compression   no OR needed

## 2021-12-21 NOTE — PROGRESS NOTE ADULT - ASSESSMENT
Pt is a 76y M a/w worsening b/l LE edema;  following for hematuria.     ·	16Fr gil in place draining blood tinged urine no clots noted in tubing or collection bag   ·	Hgb 10.1 this am; cont to trend h/h, transfuse prn   ·	Irrigate catheter q shift/ prn   ·	Scrotal elevation   ·	Will d/w attng    Pt is a 76y M a/w worsening b/l LE edema;  following for hematuria.     ·	16Fr gil in place draining blood tinged urine, no clots noted in tubing or collection bag   ·	Hgb 10.1 this am; Cont to trend h/h, transfuse prn   ·	Irrigate catheter q shift/ prn   ·	Cont with Flomax and finasteride    ·	Scrotal elevation   ·	Will d/w attng

## 2021-12-21 NOTE — CONSULT NOTE ADULT - ASSESSMENT
76 year old male with PMH of CKD3, HFrEF, HTN, HLD, DM admitted with b/l LE swelling. ENT consulted for recurrent epistaxis - resolved, no active bleeding during encouter.     Plan:  - No active bleeding noted on exam  - Recommend bacitracin to b/l nares to keep area moist  - H/h stable, transfuse prn  - Avoid: Nasal trauma; no nose rubbing, blowing or manipulating nasal packing, bending with head blow the waist and heavy lifting  - Recommend sneezing with mouth open and pinching nares  - Recommend CT neck soft tissue w/ IV contrast for further evaluation of neck mass   76 year old male with PMH of CKD3, HFrEF, HTN, HLD, DM admitted with b/l LE swelling. ENT consulted for recurrent epistaxis - resolved, no active bleeding during encouter.     Plan:  - No active bleeding noted on exam  - Recommend bacitracin to b/l nares to keep area moist  - H/h stable, transfuse prn  - Avoid: Nasal trauma; no nose rubbing, blowing or manipulating nasal packing, bending with head blow the waist and heavy lifting  - Recommend sneezing with mouth open and pinching nares  - Recommend Ultrasound of the neck for further evaluation of right neck mass   76 year old male with PMH of CKD3, HFrEF, HTN, HLD, DM admitted with b/l LE swelling. ENT consulted for recurrent epistaxis - resolved, no active bleeding during encouter.     Plan:  - No active bleeding noted on exam  - Recommend bacitracin to b/l nares to keep area moist  - H/h stable, transfuse prn  - Avoid: Nasal trauma; no nose rubbing, blowing or manipulating nasal packing, bending with head blow the waist and heavy lifting  - Recommend sneezing with mouth open and pinching nares  - Recommend Ultrasound of the neck for further evaluation of left parotid mass

## 2021-12-21 NOTE — PROGRESS NOTE ADULT - ASSESSMENT
76 y male with PMH of CKD3, HFrEF, HTN, HLD, DM presents with complaints of worsening swelling around his chronic b/l venous stasis wounds and generalized weakness and feeling cold. Patient was recently placed on po levofloxacin without any improvement.    # Cellulitis / hypothermia  - s/p cefepime, vanco and flagyl in ED  - non-purulent wound  - c/w IV cefazolin, can switch to PO on D/c  - follow cultures  -  heating blanket for hypothermia if needed  -  ID recc appreciated    # Hematuria  -Blood in urine  - had similar episode in august, possibly traumatic  - urology following, reccs appreciated         16Fr gil in place draining blood tinged urine, no clots noted in tubing or collection bag          Hgb 10.1 this am; Cont to trend h/h, transfuse prn          Irrigate catheter q shift/ prn         Cont with Flomax and finasteride           Scrotal elevation        # ALAN  - c/w home dose of lasix 40 bid  - check Pro-BNP  - check urine creatinine, Na, Urea  - renal bladder sono shows no hydronephrosis  - trend creatinine, monitor electrolytes  - avoid nephrotoxins and hypotension  - nephro reccs appreciated       continue to hold lasix and quinapril at this time as with Alan       suggest head CT (h/o SDH) as with confusion bouts       for now maintain gil and remove when hematuria resolved - resume flomax      # DM  - d/c home meds  - monitor FS. Insulin SS    # HTN  - hold quinapril due to ALAN for now  - monitor BP off meds.  - resume quinapril if repeat serum creatinine stable or improving    # Anemia  - hgb stable  - check iron stores, ferritin, B12, Folate  - trend h/h    # Episodes of epistaxis  - no active bleed. Dried blood noted in left nostril  - can be repeated trauma vs dryness  - avoid nasal dryness  - consider ENT eval    DVT: SCDs  GI: Pantoprazole  Diet: DASH / carb consistent  Activity: Ambulate as tolerated  Dispo: Acute for now

## 2021-12-21 NOTE — CONSULT NOTE ADULT - SUBJECTIVE AND OBJECTIVE BOX
76y  Male  HPI:  76 y male with PMH of CKD3, HFrEF, HTN, HLD, DM presents with complaints of worsening swelling around his chronic b/l venous stasis wounds and generalized weakness and feeling cold. Patient was recently placed on po levofloxacin without any improvement.  per EMS patients vitals where stable throughout transport but notable for a rectal temp of 92.3 F in triage.  Denies HA, dizziness, weakness, fever, cough, CP, SOB, palpitations, Abd pain, N/V, dysuria, hematuria, dark or bloody stool. Patient also reports episodes of epistaxis.  In ED: Temp = 93.6; HR = 80; BP = 119/62; RR = 18; SaO2 = 98% on room air. work up noted for wbc 5.81; hgb 10.4; bun 53; cr. 1.6. UA negative. Patient placed on heating blanket for hypothermia and received cefepime 2 gm x 1, Flagyl 500 mg x 1, vanco 1500 mg, LR 2500 cc. (19 Dec 2021 22:36)      Burn consulted for wound care recs and wound evaluation.     Allergies    No Known Allergies    Intolerances      PAST MEDICAL & SURGICAL HISTORY:  Diverticulitis    Herniated disc, cervical    Chronic gout of foot, unspecified cause, unspecified laterality    Type 2 diabetes mellitus without complication, unspecified long term insulin use status    Hypertension, unspecified type    High cholesterol    Osteoarthritis of multiple joints, unspecified osteoarthritis type    Sciatica, unspecified laterality    History of tonsillectomy and adenoidectomy    H/O colonoscopy  2018    H/O knee surgery       PHYSICAL EXAM: .  GENERAL: NAD  CHEST/LUNG: no acute cardiopulmonary distress   PSYCH: Cooperative; appropriate.  NEUROLOGY: AAO x 3.   SKIN: adherent dry exudate, mechanically debrided at bedside. base is pink with surrounding erythema. no drainage

## 2021-12-21 NOTE — PROGRESS NOTE ADULT - SUBJECTIVE AND OBJECTIVE BOX
MANDA TAVAREZ 76y Male  MRN#: [id number]     Hospital Day: 2d    Pt is currently admitted with the primary diagnosis of  HYPOTHERMIA ENDOGENOUS LEG WOUND RIGHT WOUND OF LEFT LEG KELL        SUBJECTIVE     Overnight events  None      Subjective complaints  Pt was seen this morning. he was c/o pain in the lt shoulder.                                              ----------------------------------------------------------  OBJECTIVE  PAST MEDICAL & SURGICAL HISTORY  Diverticulitis    Herniated disc, cervical    Chronic gout of foot, unspecified cause, unspecified laterality    Type 2 diabetes mellitus without complication, unspecified long term insulin use status    Hypertension, unspecified type    High cholesterol    Osteoarthritis of multiple joints, unspecified osteoarthritis type    Sciatica, unspecified laterality    History of tonsillectomy and adenoidectomy    H/O colonoscopy  2018    H/O knee surgery                                              -----------------------------------------------------------  ALLERGIES:  No Known Allergies                                            ------------------------------------------------------------    HOME MEDICATIONS  Home Medications:  allopurinol 300 mg oral tablet: 1 tab(s) orally once a day (in the morning) (21 Oct 2021 11:33)  aspirin 81 mg oral tablet, chewable: 1 tab(s) orally once a day (in the morning) (21 Oct 2021 11:33)  atorvastatin 40 mg oral tablet: 1 tab(s) orally once a day (at bedtime) (21 Oct 2021 11:33)  furosemide 40 mg oral tablet: 1 tab(s) orally 2 times a day (19 Dec 2021 23:24)  Oseni 25 mg-30 mg oral tablet: 2 tab(s) orally once a day (21 Oct 2021 11:33)  quinapril 40 mg oral tablet: 1 tab(s) orally once a day (19 Dec 2021 23:25)  Tylenol with Codeine #3 oral tablet: 1 tab(s) orally every 6 hours, As Needed (21 Oct 2021 11:33)                           MEDICATIONS:  STANDING MEDICATIONS  allopurinol 300 milliGRAM(s) Oral daily  aspirin  chewable 81 milliGRAM(s) Oral daily  atorvastatin 40 milliGRAM(s) Oral at bedtime  ceFAZolin   IVPB 1000 milliGRAM(s) IV Intermittent every 8 hours  dextrose 40% Gel 15 Gram(s) Oral once  dextrose 5%. 1000 milliLiter(s) IV Continuous <Continuous>  dextrose 5%. 1000 milliLiter(s) IV Continuous <Continuous>  dextrose 50% Injectable 25 Gram(s) IV Push once  dextrose 50% Injectable 12.5 Gram(s) IV Push once  dextrose 50% Injectable 25 Gram(s) IV Push once  finasteride 5 milliGRAM(s) Oral daily  glucagon  Injectable 1 milliGRAM(s) IntraMuscular once  influenza  Vaccine (HIGH DOSE) 0.7 milliLiter(s) IntraMuscular once  insulin lispro (ADMELOG) corrective regimen sliding scale   SubCutaneous three times a day before meals  levETIRAcetam 250 milliGRAM(s) Oral two times a day  tamsulosin 0.4 milliGRAM(s) Oral at bedtime    PRN MEDICATIONS  hydrOXYzine  Oral Tab/Cap - Peds 10 milliGRAM(s) Oral daily PRN  oxycodone    5 mG/acetaminophen 325 mG 1 Tablet(s) Oral every 6 hours PRN                                            ------------------------------------------------------------  VITAL SIGNS: Last 24 Hours  T(C): 36.1 (21 Dec 2021 06:03), Max: 36.1 (21 Dec 2021 06:03)  T(F): 97 (21 Dec 2021 06:03), Max: 97 (21 Dec 2021 06:03)  HR: 67 (21 Dec 2021 13:40) (67 - 85)  BP: 117/59 (21 Dec 2021 13:40) (107/53 - 142/63)  BP(mean): --  RR: 19 (21 Dec 2021 13:40) (19 - 20)  SpO2: 97% (21 Dec 2021 13:40) (96% - 98%)                                             --------------------------------------------------------------  LABS:                        10.1   6.51  )-----------( 212      ( 21 Dec 2021 04:30 )             31.7     12    148<H>  |  113<H>  |  51<H>  ----------------------------<  115<H>  4.4   |  18  |  1.5    Ca    9.4      21 Dec 2021 04:30  Mg     2.4     12    TPro  6.6  /  Alb  3.6  /  TBili  <0.2  /  DBili  x   /  AST  22  /  ALT  15  /  AlkPhos  112  12    PT/INR - ( 20 Dec 2021 17:44 )   PT: 13.90 sec;   INR: 1.21 ratio         PTT - ( 20 Dec 2021 22:32 )  PTT:51.0 sec  Urinalysis Basic - ( 19 Dec 2021 22:18 )    Color: Colorless / Appearance: Clear / S.013 / pH: x  Gluc: x / Ketone: Negative  / Bili: Negative / Urobili: <2 mg/dL   Blood: x / Protein: 100 mg/dL / Nitrite: Negative   Leuk Esterase: Negative / RBC: 2 /HPF / WBC 1 /HPF   Sq Epi: x / Non Sq Epi: 0 /HPF / Bacteria: Negative        Lactate, Blood: 0.7 mmol/L (21 @ 17:44)        Culture - Urine (collected 19 Dec 2021 22:18)  Source: Clean Catch Clean Catch (Midstream)  Final Report (20 Dec 2021 22:12):    No growth    Culture - Blood (collected 19 Dec 2021 21:09)  Source: .Blood Blood-Peripheral  Preliminary Report (21 Dec 2021 04:01):    No growth to date.    Culture - Blood (collected 19 Dec 2021 21:09)  Source: .Blood Blood-Peripheral  Preliminary Report (21 Dec 2021 04:01):    No growth to date.                                                    -------------------------------------------------------------  RADIOLOGY:  < from: US Renal (21 @ 11:07) >  No hydronephrosis bilaterally.    < end of copied text >  < from: Xray Chest 1 View-PORTABLE IMMEDIATE (21 @ 22:23) >  Left-sided patchy opacities      < end of copied text >                                              --------------------------------------------------------------    PHYSICAL EXAM:  GENERAL: NAD, lying in bed comfortably  HEAD:  Atraumatic, Normocephalic  EYES: EOMI, PERRLA, conjunctiva and sclera clear  ENT: Moist mucous membranes  NECK: Supple, No JVD  CHEST/LUNG: Clear to auscultation bilaterally; No rales, rhonchi, wheezing, or rubs. Unlabored respirations  HEART: regular rate and rhythm; No murmurs, rubs, or gallops  ABDOMEN: Bowel sounds present; Soft, Nontender, Nondistended.    EXTREMITIES: B/L LE swelling and redness  NERVOUS SYSTEM:  Alert & Oriented X3, speech clear. No focal deficits   SKIN:  B/L LE swelling and redness                                           --------------------------------------------------------------

## 2021-12-21 NOTE — CONSULT NOTE ADULT - SUBJECTIVE AND OBJECTIVE BOX
ENT Consult Note: 76 year old male with PMH of CKD3, HFrEF, HTN, HLD, DM admitted with b/l LE swelling. ENT consulted for recurrent epistaxis. Patient seen and examined at bedside with Dr. Bal during rounds. Patient not actively bleeding during encounter. Patient states that he was bleeding a small amount of blood from his nose earlier today but it has stopped in its own. Patient denies any nasal trauma, hx of nose bleeds in the past, any hematologic disorders. No fevers, chills, n/v, CO, SOB.       PAST MEDICAL & SURGICAL HISTORY:  Diverticulitis  Herniated disc, cervical  Chronic gout of foot, unspecified cause, unspecified laterality  Type 2 diabetes mellitus without complication, unspecified long term insulin use status  Hypertension, unspecified type  High cholesterol  Osteoarthritis of multiple joints, unspecified osteoarthritis type  Sciatica, unspecified laterality  History of tonsillectomy and adenoidectomy  H/O colonoscopy 2018  H/O knee surgery      Allergies: No Known Allergies    MEDICATIONS  (STANDING):  allopurinol 300 milliGRAM(s) Oral daily  aspirin  chewable 81 milliGRAM(s) Oral daily  atorvastatin 40 milliGRAM(s) Oral at bedtime  ceFAZolin   IVPB 1000 milliGRAM(s) IV Intermittent every 8 hours  dextrose 40% Gel 15 Gram(s) Oral once  dextrose 5%. 1000 milliLiter(s) (50 mL/Hr) IV Continuous <Continuous>  dextrose 5%. 1000 milliLiter(s) (100 mL/Hr) IV Continuous <Continuous>  dextrose 50% Injectable 25 Gram(s) IV Push once  dextrose 50% Injectable 12.5 Gram(s) IV Push once  dextrose 50% Injectable 25 Gram(s) IV Push once  finasteride 5 milliGRAM(s) Oral daily  glucagon  Injectable 1 milliGRAM(s) IntraMuscular once  influenza  Vaccine (HIGH DOSE) 0.7 milliLiter(s) IntraMuscular once  insulin lispro (ADMELOG) corrective regimen sliding scale   SubCutaneous three times a day before meals  levETIRAcetam 250 milliGRAM(s) Oral two times a day  tamsulosin 0.4 milliGRAM(s) Oral at bedtime    MEDICATIONS  (PRN):  hydrOXYzine  Oral Tab/Cap - Peds 10 milliGRAM(s) Oral daily PRN Itching  oxycodone    5 mG/acetaminophen 325 mG 1 Tablet(s) Oral every 6 hours PRN Severe Pain (7 - 10)    FAMILY HISTORY:  FH: heart disease both parents    Social History:  Marital Status:  ( x  )    (   ) Single    (   )    (  )   Lives with: (  ) alone  (  ) children   ( x ) spouse   (  ) parents  (  ) other  Recent Travel: No recent travel    Substance Use (street drugs): ( x ) never used  (  ) other:  Tobacco Usage:  ( x  ) never smoked   (   ) former smoker   (   ) current smoker  (     ) pack year  Alcohol Usage: None (19 Dec 2021 22:36)    [ x ] A 10 Point Review of Systems was negative except where noted    Vital Signs Last 24 Hrs  T(C): 36.1 (21 Dec 2021 06:03), Max: 36.1 (21 Dec 2021 06:03)  T(F): 97 (21 Dec 2021 06:03), Max: 97 (21 Dec 2021 06:03)  HR: 71 (21 Dec 2021 17:23) (67 - 85)  BP: 124/56 (21 Dec 2021 17:23) (104/46 - 142/63)  RR: 19 (21 Dec 2021 13:40) (19 - 20)  SpO2: 97% (21 Dec 2021 13:40) (96% - 98%)    PHYSICAL EXAM:  Gen: Well-developed, well-nourished, NAD.  No drooling or pooling of secretions.  Good vocal quality, no hoarseness  Skin: Good color, non diaphoretic  HEENT: Head NC/AT. Nares bilaterally patent, no blood or discharge noted. Oral cavity no erythema/edema. Tongue wnl, uvula midline, no tenderness throughout FOM. Posterior oropharynx clear, no blood/discharge noted.   Neck: Trachea midline, supple, +2cm mass noted to the right neck, nonttp  Resp: Breathing easily, no accessory muscle use, no stridor or stertor.    Cardio: +S1/S2  Abd: Soft, nontender, nondistended  Neuro: Awake and alert  Psych: Normal mood, normal affect  Ext: No peripheral edema/cyanosis, SUNSHINE x 4    LABS:                      10.1   6.51  )-----------( 212      ( 21 Dec 2021 04:30 )             31.7     12-21    148<H>  |  113<H>  |  51<H>  ----------------------------<  115<H>  4.4   |  18  |  1.5    Ca    9.4      21 Dec 2021 04:30  Mg     2.4         TPro  6.6  /  Alb  3.6  /  TBili  <0.2  /  DBili  x   /  AST  22  /  ALT  15  /  AlkPhos  112  -    PT/INR - ( 20 Dec 2021 17:44 )   PT: 13.90 sec;   INR: 1.21 ratio         PTT - ( 20 Dec 2021 22:32 )  PTT:51.0 sec  Urinalysis Basic - ( 19 Dec 2021 22:18 )    Color: Colorless / Appearance: Clear / S.013 / pH: x  Gluc: x / Ketone: Negative  / Bili: Negative / Urobili: <2 mg/dL   Blood: x / Protein: 100 mg/dL / Nitrite: Negative   Leuk Esterase: Negative / RBC: 2 /HPF / WBC 1 /HPF   Sq Epi: x / Non Sq Epi: 0 /HPF / Bacteria: Negative        IMAGING/ADDITIONAL STUDIES:  ENT Consult Note: 76 year old male with PMH of CKD3, HFrEF, HTN, HLD, DM admitted with b/l LE swelling. ENT consulted for recurrent epistaxis. Patient seen and examined at bedside with Dr. Bal during rounds. Patient not actively bleeding during encounter. Patient states that he was bleeding a small amount of blood from his nose earlier today but it has stopped in its own. Patient denies any nasal trauma, hx of nose bleeds in the past, any hematologic disorders. No fevers, chills, n/v, CO, SOB.       PAST MEDICAL & SURGICAL HISTORY:  Diverticulitis  Herniated disc, cervical  Chronic gout of foot, unspecified cause, unspecified laterality  Type 2 diabetes mellitus without complication, unspecified long term insulin use status  Hypertension, unspecified type  High cholesterol  Osteoarthritis of multiple joints, unspecified osteoarthritis type  Sciatica, unspecified laterality  History of tonsillectomy and adenoidectomy  H/O colonoscopy 2018  H/O knee surgery      Allergies: No Known Allergies    MEDICATIONS  (STANDING):  allopurinol 300 milliGRAM(s) Oral daily  aspirin  chewable 81 milliGRAM(s) Oral daily  atorvastatin 40 milliGRAM(s) Oral at bedtime  ceFAZolin   IVPB 1000 milliGRAM(s) IV Intermittent every 8 hours  dextrose 40% Gel 15 Gram(s) Oral once  dextrose 5%. 1000 milliLiter(s) (50 mL/Hr) IV Continuous <Continuous>  dextrose 5%. 1000 milliLiter(s) (100 mL/Hr) IV Continuous <Continuous>  dextrose 50% Injectable 25 Gram(s) IV Push once  dextrose 50% Injectable 12.5 Gram(s) IV Push once  dextrose 50% Injectable 25 Gram(s) IV Push once  finasteride 5 milliGRAM(s) Oral daily  glucagon  Injectable 1 milliGRAM(s) IntraMuscular once  influenza  Vaccine (HIGH DOSE) 0.7 milliLiter(s) IntraMuscular once  insulin lispro (ADMELOG) corrective regimen sliding scale   SubCutaneous three times a day before meals  levETIRAcetam 250 milliGRAM(s) Oral two times a day  tamsulosin 0.4 milliGRAM(s) Oral at bedtime    MEDICATIONS  (PRN):  hydrOXYzine  Oral Tab/Cap - Peds 10 milliGRAM(s) Oral daily PRN Itching  oxycodone    5 mG/acetaminophen 325 mG 1 Tablet(s) Oral every 6 hours PRN Severe Pain (7 - 10)    FAMILY HISTORY:  FH: heart disease both parents    Social History:  Marital Status:  ( x  )    (   ) Single    (   )    (  )   Lives with: (  ) alone  (  ) children   ( x ) spouse   (  ) parents  (  ) other  Recent Travel: No recent travel    Substance Use (street drugs): ( x ) never used  (  ) other:  Tobacco Usage:  ( x  ) never smoked   (   ) former smoker   (   ) current smoker  (     ) pack year  Alcohol Usage: None (19 Dec 2021 22:36)    [ x ] A 10 Point Review of Systems was negative except where noted    Vital Signs Last 24 Hrs  T(C): 36.1 (21 Dec 2021 06:03), Max: 36.1 (21 Dec 2021 06:03)  T(F): 97 (21 Dec 2021 06:03), Max: 97 (21 Dec 2021 06:03)  HR: 71 (21 Dec 2021 17:23) (67 - 85)  BP: 124/56 (21 Dec 2021 17:23) (104/46 - 142/63)  RR: 19 (21 Dec 2021 13:40) (19 - 20)  SpO2: 97% (21 Dec 2021 13:40) (96% - 98%)    PHYSICAL EXAM:  Gen: Well-developed, well-nourished, NAD.  No drooling or pooling of secretions.  Good vocal quality, no hoarseness  Skin: Good color, non diaphoretic  HEENT: Head NC/AT. Nares bilaterally patent, no blood or discharge noted. Oral cavity no erythema/edema. Tongue wnl, uvula midline, no tenderness throughout FOM. Posterior oropharynx clear, no blood/discharge noted.   Neck: Trachea midline, supple, +2cm mass noted to the left parotid tail  Resp: Breathing easily, no accessory muscle use, no stridor or stertor.    Cardio: +S1/S2  Abd: Soft, nontender, nondistended  Neuro: Awake and alert  Psych: Normal mood, normal affect  Ext: No peripheral edema/cyanosis, SUNSHINE x 4    LABS:                      10.1   6.51  )-----------( 212      ( 21 Dec 2021 04:30 )             31.7     12-21    148<H>  |  113<H>  |  51<H>  ----------------------------<  115<H>  4.4   |  18  |  1.5    Ca    9.4      21 Dec 2021 04:30  Mg     2.4         TPro  6.6  /  Alb  3.6  /  TBili  <0.2  /  DBili  x   /  AST  22  /  ALT  15  /  AlkPhos  112  12-    PT/INR - ( 20 Dec 2021 17:44 )   PT: 13.90 sec;   INR: 1.21 ratio         PTT - ( 20 Dec 2021 22:32 )  PTT:51.0 sec  Urinalysis Basic - ( 19 Dec 2021 22:18 )    Color: Colorless / Appearance: Clear / S.013 / pH: x  Gluc: x / Ketone: Negative  / Bili: Negative / Urobili: <2 mg/dL   Blood: x / Protein: 100 mg/dL / Nitrite: Negative   Leuk Esterase: Negative / RBC: 2 /HPF / WBC 1 /HPF   Sq Epi: x / Non Sq Epi: 0 /HPF / Bacteria: Negative        IMAGING/ADDITIONAL STUDIES:

## 2021-12-21 NOTE — CONSULT NOTE ADULT - TIME BILLING
Plan    - continue cath for now   -irrigate bcath prn   -Flomax qd   - add finasteride qd   -trial void before discharge
I have personally seen and examined this patient.  I have reviewed all pertinent clinical information and reviewed all relevant imaging and diagnostic studies personally.   I counseled the patient about diagnostic testing and treatment plan. All questions were answered.  I discussed recommendations with the primary team.
wound care

## 2021-12-21 NOTE — CONSULT NOTE ADULT - SUBJECTIVE AND OBJECTIVE BOX
MANDA TAVAREZ  76y, Male  Allergy: No Known Allergies      CHIEF COMPLAINT:   worsening b/l LE swelling (20 Dec 2021 17:02)      LOS  2d    HPI  HPI:  76 y male with PMH of CKD3, HFrEF, HTN, HLD, DM presents with complaints of worsening swelling around his chronic b/l venous stasis wounds and generalized weakness and feeling cold. Patient was recently placed on po levofloxacin without any improvement.  per EMS patients vitals where stable throughout transport but notable for a rectal temp of 92.3 F in triage.  Denies HA, dizziness, weakness, fever, cough, CP, SOB, palpitations, Abd pain, N/V, dysuria, hematuria, dark or bloody stool. Patient also reports episodes of epistaxis.  In ED: Temp = 93.6; HR = 80; BP = 119/62; RR = 18; SaO2 = 98% on room air. work up noted for wbc 5.81; hgb 10.4; bun 53; cr. 1.6. UA negative. Patient placed on heating blanket for hypothermia and received cefepime 2 gm x 1, Flagyl 500 mg x 1, vanco 1500 mg, LR 2500 cc. (19 Dec 2021 22:36)      INFECTIOUS DISEASE HISTORY:  ID consulted for cellulitis, on cefazolin    PMH  PAST MEDICAL & SURGICAL HISTORY:  Diverticulitis    Herniated disc, cervical    Chronic gout of foot, unspecified cause, unspecified laterality    Type 2 diabetes mellitus without complication, unspecified long term insulin use status    Hypertension, unspecified type    High cholesterol    Osteoarthritis of multiple joints, unspecified osteoarthritis type    Sciatica, unspecified laterality    History of tonsillectomy and adenoidectomy    H/O colonoscopy  2018    H/O knee surgery        FAMILY HISTORY  No pertinent family history in first degree relatives    FH: heart disease        SOCIAL HISTORY  Social History:  Marital Status:  ( x  )    (   ) Single    (   )    (  )   Lives with: (  ) alone  (  ) children   ( x ) spouse   (  ) parents  (  ) other  Recent Travel: No recent travel    Substance Use (street drugs): ( x ) never used  (  ) other:  Tobacco Usage:  ( x  ) never smoked   (   ) former smoker   (   ) current smoker  (     ) pack year  Alcohol Usage: None (19 Dec 2021 22:36)        ROS  ***    VITALS:  T(F): 97, Max: 97 (21 @ 13:00)  HR: 69  BP: 142/63  RR: 20Vital Signs Last 24 Hrs  T(C): 36.1 (21 Dec 2021 06:03), Max: 36.1 (20 Dec 2021 13:00)  T(F): 97 (21 Dec 2021 06:03), Max: 97 (20 Dec 2021 13:00)  HR: 69 (21 Dec 2021 06:03) (69 - 85)  BP: 142/63 (21 Dec 2021 06:03) (104/52 - 142/63)  BP(mean): --  RR: 20 (21 Dec 2021 06:03) (20 - 20)  SpO2: 98% (21 Dec 2021 06:03) (97% - 98%)    PHYSICAL EXAM:  ***    TESTS & MEASUREMENTS:                        10.1   6.51  )-----------( 212      ( 21 Dec 2021 04:30 )             31.7         148<H>  |  113<H>  |  51<H>  ----------------------------<  115<H>  4.4   |  18  |  1.5    Ca    9.4      21 Dec 2021 04:30  Mg     2.4         TPro  6.6  /  Alb  3.6  /  TBili  <0.2  /  DBili  x   /  AST  22  /  ALT  15  /  AlkPhos  112      eGFR if Non African American: 44 mL/min/1.73M2 (21 @ 04:30)  eGFR if African American: 51 mL/min/1.73M2 (21 @ 04:30)    LIVER FUNCTIONS - ( 21 Dec 2021 04:30 )  Alb: 3.6 g/dL / Pro: 6.6 g/dL / ALK PHOS: 112 U/L / ALT: 15 U/L / AST: 22 U/L / GGT: x           Urinalysis Basic - ( 19 Dec 2021 22:18 )    Color: Colorless / Appearance: Clear / S.013 / pH: x  Gluc: x / Ketone: Negative  / Bili: Negative / Urobili: <2 mg/dL   Blood: x / Protein: 100 mg/dL / Nitrite: Negative   Leuk Esterase: Negative / RBC: 2 /HPF / WBC 1 /HPF   Sq Epi: x / Non Sq Epi: 0 /HPF / Bacteria: Negative        Culture - Urine (collected 21 @ 22:18)  Source: Clean Catch Clean Catch (Midstream)  Final Report (21 @ 22:12):    No growth    Culture - Blood (collected 21 @ 21:09)  Source: .Blood Blood-Peripheral  Preliminary Report (21 @ 04:01):    No growth to date.    Culture - Blood (collected 21 @ 21:09)  Source: .Blood Blood-Peripheral  Preliminary Report (21 @ 04:01):    No growth to date.    Culture - Urine (collected 21 @ 15:51)  Source: .Urine Clean Catch (Midstream)  Final Report (21 @ 22:26):    <10,000 CFU/mL Normal Urogenital Dasia        Lactate, Blood: 0.7 mmol/L (21 @ 17:44)  Blood Gas Venous - Lactate: 0.90 mmol/L (21 @ 21:57)      INFECTIOUS DISEASES TESTING  COVID-19 PCR: NotDetec (21 @ 21:09)  COVID-19 PCR: NotDetec (21 @ 12:30)  COVID-19 PCR: NotDetec (21 @ 19:10)  COVID-19 PCR: NotDetec (21 @ 16:22)  COVID-19 PCR: NotDetec (21 @ 20:55)      INFLAMMATORY MARKERS  Sedimentation Rate, Erythrocyte: 106 mm/Hr (21 @ 12:52)      RADIOLOGY & ADDITIONAL TESTS:  I have personally reviewed the last Chest xray  CXR      CT      CARDIOLOGY TESTING      MEDICATIONS  allopurinol 300 Oral daily  aspirin  chewable 81 Oral daily  atorvastatin 40 Oral at bedtime  ceFAZolin   IVPB 1000 IV Intermittent every 8 hours  dextrose 40% Gel 15 Oral once  dextrose 5%. 1000 IV Continuous <Continuous>  dextrose 5%. 1000 IV Continuous <Continuous>  dextrose 50% Injectable 25 IV Push once  dextrose 50% Injectable 12.5 IV Push once  dextrose 50% Injectable 25 IV Push once  finasteride 5 Oral daily  glucagon  Injectable 1 IntraMuscular once  influenza  Vaccine (HIGH DOSE) 0.7 IntraMuscular once  insulin lispro (ADMELOG) corrective regimen sliding scale  SubCutaneous three times a day before meals  levETIRAcetam 250 Oral two times a day  tamsulosin 0.4 Oral at bedtime        ANTIBIOTICS:  ceFAZolin   IVPB 1000 milliGRAM(s) IV Intermittent every 8 hours      ALLERGIES:  No Known Allergies         MANDA TAVAREZ  76y, Male  Allergy: No Known Allergies      CHIEF COMPLAINT:   worsening b/l LE swelling (20 Dec 2021 17:02)      LOS  2d    HPI  HPI:  76 y male with PMH of CKD3, HFrEF, HTN, HLD, DM presents with complaints of worsening swelling around his chronic b/l venous stasis wounds and generalized weakness and feeling cold. Patient was recently placed on po levofloxacin without any improvement.  per EMS patients vitals where stable throughout transport but notable for a rectal temp of 92.3 F in triage.  Denies HA, dizziness, weakness, fever, cough, CP, SOB, palpitations, Abd pain, N/V, dysuria, hematuria, dark or bloody stool. Patient also reports episodes of epistaxis.  In ED: Temp = 93.6; HR = 80; BP = 119/62; RR = 18; SaO2 = 98% on room air. work up noted for wbc 5.81; hgb 10.4; bun 53; cr. 1.6. UA negative. Patient placed on heating blanket for hypothermia and received cefepime 2 gm x 1, Flagyl 500 mg x 1, vanco 1500 mg, LR 2500 cc. (19 Dec 2021 22:36)      INFECTIOUS DISEASE HISTORY:  ID consulted for cellulitis, on cefazolin    PMH  PAST MEDICAL & SURGICAL HISTORY:  Diverticulitis    Herniated disc, cervical    Chronic gout of foot, unspecified cause, unspecified laterality    Type 2 diabetes mellitus without complication, unspecified long term insulin use status    Hypertension, unspecified type    High cholesterol    Osteoarthritis of multiple joints, unspecified osteoarthritis type    Sciatica, unspecified laterality    History of tonsillectomy and adenoidectomy    H/O colonoscopy  2018    H/O knee surgery        FAMILY HISTORY  No pertinent family history in first degree relatives    FH: heart disease        SOCIAL HISTORY  Social History:  Marital Status:  ( x  )    (   ) Single    (   )    (  )   Lives with: (  ) alone  (  ) children   ( x ) spouse   (  ) parents  (  ) other  Recent Travel: No recent travel    Substance Use (street drugs): ( x ) never used  (  ) other:  Tobacco Usage:  ( x  ) never smoked   (   ) former smoker   (   ) current smoker  (     ) pack year  Alcohol Usage: None (19 Dec 2021 22:36)        ROS  General: Denies rigors, nightsweats  HEENT: Denies headache, rhinorrhea, sore throat, eye pain  CV: Denies CP, palpitations  PULM: Denies wheezing, hemoptysis  GI: Denies hematemesis, hematochezia, melena  : Denies discharge, hematuria  MSK: Denies arthralgias, myalgias  SKIN: as noted above   NEURO: Denies paresthesias, weakness  PSYCH: Denies depression, anxiety     VITALS:  T(F): 97, Max: 97 (-21 @ 13:00)  HR: 69  BP: 142/63  RR: 20Vital Signs Last 24 Hrs  T(C): 36.1 (21 Dec 2021 06:03), Max: 36.1 (20 Dec 2021 13:00)  T(F): 97 (21 Dec 2021 06:03), Max: 97 (20 Dec 2021 13:00)  HR: 69 (21 Dec 2021 06:03) (69 - 85)  BP: 142/63 (21 Dec 2021 06:03) (104/52 - 142/63)  BP(mean): --  RR: 20 (21 Dec 2021 06:03) (20 - 20)  SpO2: 98% (21 Dec 2021 06:03) (97% - 98%)    PHYSICAL EXAM:  Gen: NAD, resting in bed  HEENT: Normocephalic, atraumatic  Neck: supple, no lymphadenopathy  CV: Regular rate & regular rhythm  Lungs: decreased BS at bases, no fremitus  Abdomen: Soft, BS present  Ext: Warm, well perfused, bilateral LE erythema, superficial blisters RLE, weeping  Neuro: non focal, awake  Skin: no rash,   Lines: no phlebitis     TESTS & MEASUREMENTS:                        10.1   6.51  )-----------( 212      ( 21 Dec 2021 04:30 )             31.7         148<H>  |  113<H>  |  51<H>  ----------------------------<  115<H>  4.4   |  18  |  1.5    Ca    9.4      21 Dec 2021 04:30  Mg     2.4     -    TPro  6.6  /  Alb  3.6  /  TBili  <0.2  /  DBili  x   /  AST  22  /  ALT  15  /  AlkPhos  112      eGFR if Non African American: 44 mL/min/1.73M2 (21 @ 04:30)  eGFR if African American: 51 mL/min/1.73M2 (21 @ 04:30)    LIVER FUNCTIONS - ( 21 Dec 2021 04:30 )  Alb: 3.6 g/dL / Pro: 6.6 g/dL / ALK PHOS: 112 U/L / ALT: 15 U/L / AST: 22 U/L / GGT: x           Urinalysis Basic - ( 19 Dec 2021 22:18 )    Color: Colorless / Appearance: Clear / S.013 / pH: x  Gluc: x / Ketone: Negative  / Bili: Negative / Urobili: <2 mg/dL   Blood: x / Protein: 100 mg/dL / Nitrite: Negative   Leuk Esterase: Negative / RBC: 2 /HPF / WBC 1 /HPF   Sq Epi: x / Non Sq Epi: 0 /HPF / Bacteria: Negative        Culture - Urine (collected 21 @ 22:18)  Source: Clean Catch Clean Catch (Midstream)  Final Report (21 @ 22:12):    No growth    Culture - Blood (collected 21 @ 21:09)  Source: .Blood Blood-Peripheral  Preliminary Report (21 @ 04:01):    No growth to date.    Culture - Blood (collected 21 @ 21:09)  Source: .Blood Blood-Peripheral  Preliminary Report (21 @ 04:01):    No growth to date.    Culture - Urine (collected 21 @ 15:51)  Source: .Urine Clean Catch (Midstream)  Final Report (21 @ 22:26):    <10,000 CFU/mL Normal Urogenital Dasia        Lactate, Blood: 0.7 mmol/L (21 @ 17:44)  Blood Gas Venous - Lactate: 0.90 mmol/L (21 @ 21:57)      INFECTIOUS DISEASES TESTING  COVID-19 PCR: NotDetec (21 @ 21:09)  COVID-19 PCR: NotDetec (21 @ 12:30)  COVID-19 PCR: NotDetec (21 @ 19:10)  COVID-19 PCR: NotDetec (21 @ 16:22)  COVID-19 PCR: NotDetec (21 @ 20:55)      INFLAMMATORY MARKERS  Sedimentation Rate, Erythrocyte: 106 mm/Hr (21 @ 12:52)      RADIOLOGY & ADDITIONAL TESTS:  I have personally reviewed the last Chest xray  CXR      CT      CARDIOLOGY TESTING      MEDICATIONS  allopurinol 300 Oral daily  aspirin  chewable 81 Oral daily  atorvastatin 40 Oral at bedtime  ceFAZolin   IVPB 1000 IV Intermittent every 8 hours  dextrose 40% Gel 15 Oral once  dextrose 5%. 1000 IV Continuous <Continuous>  dextrose 5%. 1000 IV Continuous <Continuous>  dextrose 50% Injectable 25 IV Push once  dextrose 50% Injectable 12.5 IV Push once  dextrose 50% Injectable 25 IV Push once  finasteride 5 Oral daily  glucagon  Injectable 1 IntraMuscular once  influenza  Vaccine (HIGH DOSE) 0.7 IntraMuscular once  insulin lispro (ADMELOG) corrective regimen sliding scale  SubCutaneous three times a day before meals  levETIRAcetam 250 Oral two times a day  tamsulosin 0.4 Oral at bedtime        ANTIBIOTICS:  ceFAZolin   IVPB 1000 milliGRAM(s) IV Intermittent every 8 hours      ALLERGIES:  No Known Allergies

## 2021-12-21 NOTE — PROGRESS NOTE ADULT - SUBJECTIVE AND OBJECTIVE BOX
Nephrology progress note       Patient is a 75 yo man from home with DM > 20 ears, HTN, retinopathy, cholestenol PVD, SDH with fal, and gout.  Recent increase lasix due to worsening LE edema.  Admitted with confusion/halucinations and KELL and LE erythema.      ON events/Subjective:     Allergies:  No Known Allergies    Hospital Medications:   MEDICATIONS  (STANDING):  allopurinol 300 milliGRAM(s) Oral daily  aspirin  chewable 81 milliGRAM(s) Oral daily  atorvastatin 40 milliGRAM(s) Oral at bedtime  ceFAZolin   IVPB 1000 milliGRAM(s) IV Intermittent every 8 hours  dextrose 40% Gel 15 Gram(s) Oral once  dextrose 5%. 1000 milliLiter(s) (50 mL/Hr) IV Continuous <Continuous>  dextrose 5%. 1000 milliLiter(s) (100 mL/Hr) IV Continuous <Continuous>  dextrose 50% Injectable 25 Gram(s) IV Push once  dextrose 50% Injectable 12.5 Gram(s) IV Push once  dextrose 50% Injectable 25 Gram(s) IV Push once  finasteride 5 milliGRAM(s) Oral daily  glucagon  Injectable 1 milliGRAM(s) IntraMuscular once  influenza  Vaccine (HIGH DOSE) 0.7 milliLiter(s) IntraMuscular once  insulin lispro (ADMELOG) corrective regimen sliding scale   SubCutaneous three times a day before meals  levETIRAcetam 250 milliGRAM(s) Oral two times a day  tamsulosin 0.4 milliGRAM(s) Oral at bedtime    REVIEW OF SYSTEMS:  CONSTITUTIONAL: No weakness, fevers or chills  EYES/ENT: No visual changes;  No vertigo or throat pain   NECK: No pain or stiffness  RESPIRATORY: No cough, wheezing, hemoptysis; No shortness of breath  CARDIOVASCULAR: No chest pain or palpitations.  GASTROINTESTINAL: No abdominal or epigastric pain. No nausea, vomiting, or hematemesis; No diarrhea or constipation. No melena or hematochezia.  GENITOURINARY: No dysuria, frequency, foamy urine, urinary urgency, incontinence or hematuria  NEUROLOGICAL: No numbness or weakness  SKIN: No itching, burning, rashes, or lesions   VASCULAR: No bilateral lower extremity edema.   All other review of systems is negative unless indicated above.    VITALS:  T(F): 97 (21 @ 06:03), Max: 97 (21 @ 13:00)  HR: 69 (21 @ 06:03)  BP: 142/63 (21 @ 06:03)  RR: 20 (21 @ 06:03)  SpO2: 98% (21 @ 06:03)  Wt(kg): --     @ 07:01  -   @ 07:00  --------------------------------------------------------  IN: 0 mL / OUT: 1700 mL / NET: -1700 mL        PHYSICAL EXAM:  Constitutional: NAD  HEENT: anicteric sclera, oropharynx clear, MMM  Neck: No JVD  Respiratory: CTAB, no wheezes, rales or rhonchi  Cardiovascular: S1, S2, RRR  Gastrointestinal: BS+, soft, NT/ND  Extremities: bl le edema (improved (left foot bandaged  Neurological: A/O x 3, no focal deficits  Psychiatric: Normal mood, normal affect  : No CVA tenderness. No gil.   Skin: No rashes  Vascular Access:    LABS:      148<H>  |  113<H>  |  51<H>  ----------------------------<  115<H>  4.4   |  18  |  1.5    Ca    9.4      21 Dec 2021 04:30  Mg     2.4         TPro  6.6  /  Alb  3.6  /  TBili  <0.2  /  DBili      /  AST  22  /  ALT  15  /  AlkPhos  112                            10.1   6.51  )-----------( 212      ( 21 Dec 2021 04:30 )             31.7       Urine Studies:  Creatinine Trend: 1.5<--, 1.7<--, 1.6<--  Urinalysis Basic - ( 19 Dec 2021 22:18 )    Color: Colorless / Appearance: Clear / S.013 / pH:   Gluc:  / Ketone: Negative  / Bili: Negative / Urobili: <2 mg/dL   Blood:  / Protein: 100 mg/dL / Nitrite: Negative   Leuk Esterase: Negative / RBC: 2 /HPF / WBC 1 /HPF   Sq Epi:  / Non Sq Epi: 0 /HPF / Bacteria: Negative  home Meds: flomax 0.4 po qd, lasix 40 mg po bid, keppra 250 mg p q12, allopurinol 300 mg po qd, ASA 81 mg po qd, lipitor 40 mg po qd, fish oil po qid, osen 25-30 po bid, quinapril 40 mg po qd, valium 5 mg prn  ·	KELL/CKD associated with recent increase lasix (improved off lasix and quinapril)  ·	confusion/hallucinations ? etiology (dehydration, infection)  ·	subnephrotic range proteinuria was on ACE  ·	chronic LE edema ? from proteinuira (but doubt as albumin normal and subnephrotic proteinuria) but more likely vascular issue (venous stasis)  ·	? bl le cellulitis (no leukocytosis) started on cefazolin   ·	gil placed with gross hematuria (h/u BPH) and ? trauma  ·	BP stable  ·	anemia  ·	h/o SDH    1) continuet to hold lasix and quinapril at this time as with KELL  2) abx per medicine but I question if truely with cellulitis please consult ID  3) suggest head CT (h/o SDH) as with confusion bouts  4) for now maintain gil and remove when hematuria resolved - resume flomax  5) renal and bladder sono  d/w housestaff and wife

## 2021-12-21 NOTE — CONSULT NOTE ADULT - CONSULT REASON
KELL
b/l LE evaluation   DFU
venous stasis
Epistaxis
Recurring bleeding/Elevated PTT
cellulitis
hematuria

## 2021-12-21 NOTE — CONSULT NOTE ADULT - REASON FOR ADMISSION
worsening b/l LE swelling

## 2021-12-21 NOTE — PROGRESS NOTE ADULT - SUBJECTIVE AND OBJECTIVE BOX
UROLOGY PROGRESS NOTE:    Pt is a 76y M a/w worsening b/l LE edema;  following for hematuria. Pt seen and examined at bedside this am. Gil draining blood tinged urine, no clots noted in tubing or collection bag. Denies fevers/chills, suprapubic pain.     MEDICATIONS  (STANDING):  allopurinol 300 milliGRAM(s) Oral daily  aspirin  chewable 81 milliGRAM(s) Oral daily  atorvastatin 40 milliGRAM(s) Oral at bedtime  ceFAZolin   IVPB 1000 milliGRAM(s) IV Intermittent every 8 hours  dextrose 40% Gel 15 Gram(s) Oral once  dextrose 5%. 1000 milliLiter(s) (50 mL/Hr) IV Continuous <Continuous>  dextrose 5%. 1000 milliLiter(s) (100 mL/Hr) IV Continuous <Continuous>  dextrose 50% Injectable 25 Gram(s) IV Push once  dextrose 50% Injectable 12.5 Gram(s) IV Push once  dextrose 50% Injectable 25 Gram(s) IV Push once  finasteride 5 milliGRAM(s) Oral daily  glucagon  Injectable 1 milliGRAM(s) IntraMuscular once  influenza  Vaccine (HIGH DOSE) 0.7 milliLiter(s) IntraMuscular once  insulin lispro (ADMELOG) corrective regimen sliding scale   SubCutaneous three times a day before meals  levETIRAcetam 250 milliGRAM(s) Oral two times a day  tamsulosin 0.4 milliGRAM(s) Oral at bedtime    MEDICATIONS  (PRN):  hydrOXYzine  Oral Tab/Cap - Peds 10 milliGRAM(s) Oral daily PRN Itching  oxycodone    5 mG/acetaminophen 325 mG 1 Tablet(s) Oral every 6 hours PRN Severe Pain (7 - 10)    Review of Systems:  [X] A ten point review of systems was otherwise negative except as noted.    Vital Signs Last 24 Hrs  T(C): 36.1 (21 Dec 2021 06:03), Max: 36.1 (20 Dec 2021 13:00)  T(F): 97 (21 Dec 2021 06:03), Max: 97 (20 Dec 2021 13:00)  HR: 69 (21 Dec 2021 06:03) (69 - 85)  BP: 142/63 (21 Dec 2021 06:03) (104/52 - 142/63)  RR: 20 (21 Dec 2021 06:03) (20 - 20)  SpO2: 96% (21 Dec 2021 07:55) (96% - 98%)    PHYSICAL EXAM:  GEN: NAD  NEURO: Awake and alert   HEENT: NC/AT  RESP: Non-labored breathing  CARDIO: +S1/S2  ABDO: Soft, no ttp  BACK: No CVAT B/L  : +16Fr gil in place draining blood tinged urine, no clots noted in tubing or collection bag; +penoscrotal edema, no ttp   EXT: +ACE dressing to b/l LE     LABS:                        10.1   6.51  )-----------( 212      ( 21 Dec 2021 04:30 )             31.7     12-    148<H>  |  113<H>  |  51<H>  ----------------------------<  115<H>  4.4   |  18  |  1.5    Ca    9.4      21 Dec 2021 04:30  Mg     2.4     12-    TPro  6.6  /  Alb  3.6  /  TBili  <0.2  /  DBili  x   /  AST  22  /  ALT  15  /  AlkPhos  112  12-21    PT/INR - ( 20 Dec 2021 17:44 )   PT: 13.90 sec;   INR: 1.21 ratio         PTT - ( 20 Dec 2021 22:32 )  PTT:51.0 sec  Urinalysis Basic - ( 19 Dec 2021 22:18 )  Color: Colorless / Appearance: Clear / S.013 / pH: x  Gluc: x / Ketone: Negative  / Bili: Negative / Urobili: <2 mg/dL   Blood: x / Protein: 100 mg/dL / Nitrite: Negative   Leuk Esterase: Negative / RBC: 2 /HPF / WBC 1 /HPF   Sq Epi: x / Non Sq Epi: 0 /HPF / Bacteria: Negative

## 2021-12-21 NOTE — CONSULT NOTE ADULT - ATTENDING COMMENTS
left plantar blister-->excisionally debrided w/ curette down to evaluate for underlying ulcerations--->no underlying, deep wounds appreciated  right hallux blood blister-->no acute signs of infection  continue w/ ace wraps to b/l LE  local wound care as above
I was physically present for the key portions of the evaluation and management [ E/M] service provided and agree with the plan which i have reviewed and edited where appropriate     A=hematuria -- likely related to passage of catheter.     = BPH w/ large ML -- based CT imaging
large dressing change-->healing chronic venous stasis ulcers LE-->local wound care/ compression    no surgery needed
Seen and examined. Non bleeding epistaxis, suggest nasal emollients. 2cm left parotid mass noted, obtain U/S as inpatient and can obtain FNA as outpatient.
seen/examined w/ hemonc team; note reviewed; case discussed    PT and PTT elevated  need to confirm : send FULLY FILLED blue top tube  consider mixing studes for both PT and PTT  vWF w/u  ENT eval   eval

## 2021-12-21 NOTE — CONSULT NOTE ADULT - CONSULT REQUESTED DATE/TIME
21-Dec-2021 12:21
20-Dec-2021 09:53
20-Dec-2021 09:58
21-Dec-2021 00:00
21-Dec-2021 18:18
20-Dec-2021 17:03
20-Dec-2021 08:07

## 2021-12-22 LAB
ALBUMIN SERPL ELPH-MCNC: 3.6 G/DL — SIGNIFICANT CHANGE UP (ref 3.5–5.2)
ALP SERPL-CCNC: 113 U/L — SIGNIFICANT CHANGE UP (ref 30–115)
ALT FLD-CCNC: 11 U/L — SIGNIFICANT CHANGE UP (ref 0–41)
ANION GAP SERPL CALC-SCNC: 17 MMOL/L — HIGH (ref 7–14)
APTT 50/50 2HOUR INCUB: 40.2 SEC — HIGH (ref 27.5–36.5)
APTT 50/50 MIX COMMENT: SIGNIFICANT CHANGE UP
APTT BLD: 39.6 SEC — HIGH (ref 27.5–36.5)
APTT BLD: 51.1 SEC — HIGH (ref 27.5–35.5)
AST SERPL-CCNC: 16 U/L — SIGNIFICANT CHANGE UP (ref 0–41)
BASOPHILS # BLD AUTO: 0.04 K/UL — SIGNIFICANT CHANGE UP (ref 0–0.2)
BASOPHILS NFR BLD AUTO: 0.6 % — SIGNIFICANT CHANGE UP (ref 0–1)
BILIRUB SERPL-MCNC: <0.2 MG/DL — SIGNIFICANT CHANGE UP (ref 0.2–1.2)
BUN SERPL-MCNC: 46 MG/DL — HIGH (ref 10–20)
CALCIUM SERPL-MCNC: 9.3 MG/DL — SIGNIFICANT CHANGE UP (ref 8.5–10.1)
CHLORIDE SERPL-SCNC: 111 MMOL/L — HIGH (ref 98–110)
CO2 SERPL-SCNC: 19 MMOL/L — SIGNIFICANT CHANGE UP (ref 17–32)
CREAT SERPL-MCNC: 1.2 MG/DL — SIGNIFICANT CHANGE UP (ref 0.7–1.5)
EOSINOPHIL # BLD AUTO: 0.3 K/UL — SIGNIFICANT CHANGE UP (ref 0–0.7)
EOSINOPHIL NFR BLD AUTO: 4.2 % — SIGNIFICANT CHANGE UP (ref 0–8)
FIBRINOGEN PPP-MCNC: 685 MG/DL — HIGH (ref 290–520)
GLUCOSE BLDC GLUCOMTR-MCNC: 115 MG/DL — HIGH (ref 70–99)
GLUCOSE BLDC GLUCOMTR-MCNC: 116 MG/DL — HIGH (ref 70–99)
GLUCOSE BLDC GLUCOMTR-MCNC: 118 MG/DL — HIGH (ref 70–99)
GLUCOSE BLDC GLUCOMTR-MCNC: 194 MG/DL — HIGH (ref 70–99)
GLUCOSE SERPL-MCNC: 110 MG/DL — HIGH (ref 70–99)
HCT VFR BLD CALC: 31.8 % — LOW (ref 42–52)
HGB BLD-MCNC: 10 G/DL — LOW (ref 14–18)
IMM GRANULOCYTES NFR BLD AUTO: 0.8 % — HIGH (ref 0.1–0.3)
INR BLD: 1.2 RATIO — SIGNIFICANT CHANGE UP (ref 0.65–1.3)
LYMPHOCYTES # BLD AUTO: 0.66 K/UL — LOW (ref 1.2–3.4)
LYMPHOCYTES # BLD AUTO: 9.2 % — LOW (ref 20.5–51.1)
MAGNESIUM SERPL-MCNC: 2.3 MG/DL — SIGNIFICANT CHANGE UP (ref 1.8–2.4)
MCHC RBC-ENTMCNC: 29.8 PG — SIGNIFICANT CHANGE UP (ref 27–31)
MCHC RBC-ENTMCNC: 31.4 G/DL — LOW (ref 32–37)
MCV RBC AUTO: 94.6 FL — HIGH (ref 80–94)
MONOCYTES # BLD AUTO: 0.73 K/UL — HIGH (ref 0.1–0.6)
MONOCYTES NFR BLD AUTO: 10.2 % — HIGH (ref 1.7–9.3)
NEUTROPHILS # BLD AUTO: 5.39 K/UL — SIGNIFICANT CHANGE UP (ref 1.4–6.5)
NEUTROPHILS NFR BLD AUTO: 75 % — SIGNIFICANT CHANGE UP (ref 42.2–75.2)
NRBC # BLD: 0 /100 WBCS — SIGNIFICANT CHANGE UP (ref 0–0)
PAT CTL 2H: 39.7 SEC — HIGH (ref 27.5–36.5)
PLATELET # BLD AUTO: 249 K/UL — SIGNIFICANT CHANGE UP (ref 130–400)
POTASSIUM SERPL-MCNC: 5 MMOL/L — SIGNIFICANT CHANGE UP (ref 3.5–5)
POTASSIUM SERPL-SCNC: 5 MMOL/L — SIGNIFICANT CHANGE UP (ref 3.5–5)
PROT SERPL-MCNC: 6.5 G/DL — SIGNIFICANT CHANGE UP (ref 6–8)
PROTHROM AB SERPL-ACNC: 13.8 SEC — HIGH (ref 9.95–12.87)
PT 100%: 14.3 SEC — HIGH (ref 10.6–13.6)
PT 50/50: 12.5 SEC — SIGNIFICANT CHANGE UP (ref 10.6–14.7)
RBC # BLD: 3.36 M/UL — LOW (ref 4.7–6.1)
RBC # FLD: 16.1 % — HIGH (ref 11.5–14.5)
SODIUM SERPL-SCNC: 147 MMOL/L — HIGH (ref 135–146)
THROMBIN TIME: 23.1 SEC — SIGNIFICANT CHANGE UP (ref 16–25)
WBC # BLD: 7.18 K/UL — SIGNIFICANT CHANGE UP (ref 4.8–10.8)
WBC # FLD AUTO: 7.18 K/UL — SIGNIFICANT CHANGE UP (ref 4.8–10.8)

## 2021-12-22 PROCEDURE — 99233 SBSQ HOSP IP/OBS HIGH 50: CPT

## 2021-12-22 PROCEDURE — 99231 SBSQ HOSP IP/OBS SF/LOW 25: CPT

## 2021-12-22 PROCEDURE — 70450 CT HEAD/BRAIN W/O DYE: CPT | Mod: 26

## 2021-12-22 PROCEDURE — 76536 US EXAM OF HEAD AND NECK: CPT | Mod: 26

## 2021-12-22 RX ADMIN — Medication 300 MILLIGRAM(S): at 11:25

## 2021-12-22 RX ADMIN — Medication 100 MILLIGRAM(S): at 17:11

## 2021-12-22 RX ADMIN — Medication 81 MILLIGRAM(S): at 11:25

## 2021-12-22 RX ADMIN — LEVETIRACETAM 250 MILLIGRAM(S): 250 TABLET, FILM COATED ORAL at 05:56

## 2021-12-22 RX ADMIN — LEVETIRACETAM 250 MILLIGRAM(S): 250 TABLET, FILM COATED ORAL at 17:12

## 2021-12-22 RX ADMIN — TAMSULOSIN HYDROCHLORIDE 0.4 MILLIGRAM(S): 0.4 CAPSULE ORAL at 21:38

## 2021-12-22 RX ADMIN — Medication 100 MILLIGRAM(S): at 21:39

## 2021-12-22 RX ADMIN — Medication 2: at 11:56

## 2021-12-22 RX ADMIN — ATORVASTATIN CALCIUM 40 MILLIGRAM(S): 80 TABLET, FILM COATED ORAL at 21:39

## 2021-12-22 RX ADMIN — Medication 100 MILLIGRAM(S): at 05:56

## 2021-12-22 RX ADMIN — OXYCODONE AND ACETAMINOPHEN 1 TABLET(S): 5; 325 TABLET ORAL at 21:58

## 2021-12-22 RX ADMIN — FINASTERIDE 5 MILLIGRAM(S): 5 TABLET, FILM COATED ORAL at 11:25

## 2021-12-22 NOTE — PROGRESS NOTE ADULT - ASSESSMENT
76 y male with PMH of CKD3, HFrEF, HTN, HLD, DM presents with complaints of worsening swelling around his chronic b/l venous stasis wounds and generalized weakness and feeling cold. Patient was recently placed on po levofloxacin without any improvement.       #Cellulitis / hypothermia- sepsis on admission ruled out    #KELL -Gil   - c/w  IV cefazolin  - follow cultures  -podiatry: debridement left foot, no further work up  -Burn; wound recs, no further work up  -c/w holding lasix and quinapril for now   -nephrology recs;     #Hematuria/  Episodes of epistaxis in the setting of coagulopathy   -prolonged aPTT   -epistaxis overnight, slight  >>ENT following  -heme-onc on board, requesting mixing studies, heme work up  -uro following; hematuria secondary to traumatic gil  >>gil irrigation    #ho subdural hemorrhage  -f/u ct head    follow up hematology work up       76 y male with PMH of CKD3, HFrEF, HTN, HLD, DM presents with complaints of worsening swelling around his chronic b/l venous stasis wounds and generalized weakness and feeling cold. Patient was recently placed on po levofloxacin without any improvement.       #Cellulitis / hypothermia- sepsis on admission ruled out    #KELL -Gil   - c/w  IV cefazolin  - follow cultures  -podiatry: debridement left foot, no further work up  -Burn; wound recs, no further work up  -c/w holding lasix and quinapril for now   -nephrology recs;     #Hematuria/  Episodes of epistaxis in the setting of coagulopathy   -prolonged aPTT   -epistaxis overnight, slight  >>ENT following  -heme-onc on board, requesting mixing studies, heme work up  -uro following; hematuria secondary to traumatic gil  >>gil irrigation    #AMS  #ho subdural hemorrhage  -possibly secondary to septic encephalopathy vs bleed vs Urinary retention  -R neck mass  >>f/u neck US  -f/u ct head    follow up hematology work up

## 2021-12-22 NOTE — PROGRESS NOTE ADULT - SUBJECTIVE AND OBJECTIVE BOX
Nephrology progress note     Patient is a 75 yo man from home with DM > 20 ears, HTN, retinopathy, cholestenol PVD, SDH with fal, and gout.  Recent increase lasix due to worsening LE edema.  Admitted with confusion/halucinations and KELL and LE erythema.      ON events/Subjective:     Allergies:  No Known Allergies    Hospital Medications:   MEDICATIONS  (STANDING):  allopurinol 300 milliGRAM(s) Oral daily  aspirin  chewable 81 milliGRAM(s) Oral daily  atorvastatin 40 milliGRAM(s) Oral at bedtime  ceFAZolin   IVPB 1000 milliGRAM(s) IV Intermittent every 8 hours  dextrose 40% Gel 15 Gram(s) Oral once  dextrose 5%. 1000 milliLiter(s) (50 mL/Hr) IV Continuous <Continuous>  dextrose 5%. 1000 milliLiter(s) (100 mL/Hr) IV Continuous <Continuous>  dextrose 50% Injectable 25 Gram(s) IV Push once  dextrose 50% Injectable 12.5 Gram(s) IV Push once  dextrose 50% Injectable 25 Gram(s) IV Push once  finasteride 5 milliGRAM(s) Oral daily  glucagon  Injectable 1 milliGRAM(s) IntraMuscular once  influenza  Vaccine (HIGH DOSE) 0.7 milliLiter(s) IntraMuscular once  insulin lispro (ADMELOG) corrective regimen sliding scale   SubCutaneous three times a day before meals  levETIRAcetam 250 milliGRAM(s) Oral two times a day  tamsulosin 0.4 milliGRAM(s) Oral at bedtime    REVIEW OF SYSTEMS:  CONSTITUTIONAL: No weakness, fevers or chills  EYES/ENT: No visual changes;  No vertigo or throat pain   NECK: No pain or stiffness  RESPIRATORY: No cough, wheezing, hemoptysis; No shortness of breath  CARDIOVASCULAR: No chest pain or palpitations.  GASTROINTESTINAL: No abdominal or epigastric pain. No nausea, vomiting, or hematemesis; No diarrhea or constipation. No melena or hematochezia.  GENITOURINARY: No dysuria, frequency, foamy urine, urinary urgency, incontinence or hematuria  NEUROLOGICAL: No numbness or weakness  SKIN: No itching, burning, rashes, or lesions   VASCULAR: No bilateral lower extremity edema.   All other review of systems is negative unless indicated above.    VITALS:  T(F): 96 (21 @ 07:46), Max: 96.3 (21 @ 00:28)  HR: 92 (21 @ 07:46)  BP: 115/61 (21 @ 07:46)  RR: 19 (21 @ 07:46)  SpO2: 96% (21 @ 00:28)  Wt(kg): --     @ 07:01  -   @ 07:00  --------------------------------------------------------  IN: 0 mL / OUT: 800 mL / NET: -800 mL        PHYSICAL EXAM:  Constitutional: NAD  HEENT: anicteric sclera, oropharynx clear, MMM  Neck: No JVD  Respiratory: CTAB, no wheezes, rales or rhonchi  Cardiovascular: S1, S2, RRR  Gastrointestinal: BS+, soft, NT/ND  Extremities: bl LE edema  Neurological: A/O x 3, no focal deficits  Psychiatric: Normal mood, normal affect  : No CVA tenderness. No gil.   Skin: No rashes  Vascular Access:    LABS:      148<H>  |  113<H>  |  51<H>  ----------------------------<  115<H>  4.4   |  18  |  1.5    Ca    9.4      21 Dec 2021 04:30  Mg     2.4         TPro  6.6  /  Alb  3.6  /  TBili  <0.2  /  DBili      /  AST  22  /  ALT  15  /  AlkPhos  112                            10.0   7.18  )-----------( 249      ( 22 Dec 2021 04:30 )             31.8       Urine Studies:  Creatinine Trend: 1.5<--, 1.7<--, 1.6<--  Urinalysis Basic - ( 19 Dec 2021 22:18 )    Color: Colorless / Appearance: Clear / S.013 / pH:   Gluc:  / Ketone: Negative  / Bili: Negative / Urobili: <2 mg/dL   Blood:  / Protein: 100 mg/dL / Nitrite: Negative   Leuk Esterase: Negative / RBC: 2 /HPF / WBC 1 /HPF   Sq Epi:  / Non Sq Epi: 0 /HPF / Bacteria: Negative        home Meds: flomax 0.4 po qd, lasix 40 mg po bid, keppra 250 mg p q12, allopurinol 300 mg po qd, ASA 81 mg po qd, lipitor 40 mg po qd, fish oil po qid, osen 25-30 po bid, quinapril 40 mg po qd, valium 5 mg prn    ·	KELL/CKD associated with recent increase lasix (improved off lasix and quinapril) awaiting labs today  ·	confusion/hallucinations ? etiology (dehydration, infection) overall more alert  ·	subnephrotic range proteinuria was on ACE  ·	chronic LE edema ? from proteinuira (but doubt as albumin normal and subnephrotic proteinuria) but more likely vascular issue (venous stasis)  ·	? bl le cellulitis (no leukocytosis) started on cefazolin   ·	gil placed with gross hematuria (h/u BPH) and ? trauma  ·	epistaxis  ·	BP stable  ·	anemia  ·	h/o SDH    1) continue to hold lasix and quinapril at this time as with KELL  2) abx per medicine but I question if truely with cellulitis   3) suggest head CT (h/o SDH) as with confusion bouts  4) for now maintain gil and remove when hematuria resolved - resume flomax and urology fu  5) heme f/u  d/w housestaff and will d/w wife

## 2021-12-22 NOTE — PROGRESS NOTE ADULT - SUBJECTIVE AND OBJECTIVE BOX
CHIEF COMPLAINT:  Patient is a 76y old  Male who presents with a chief complaint of worsening b/l LE swelling (22 Dec 2021 09:21)      INTERVAL HISTORY/OVERNIGHT EVENTS:    12/22  -aox4 this morning, would like to 'get of here'  -gil with sammi dark blood, some clots  -no fevers on room air    ======================  MEDICATIONS:  allopurinol 300 milliGRAM(s) Oral daily  aspirin  chewable 81 milliGRAM(s) Oral daily  atorvastatin 40 milliGRAM(s) Oral at bedtime  ceFAZolin   IVPB 1000 milliGRAM(s) IV Intermittent every 8 hours  dextrose 40% Gel 15 Gram(s) Oral once  dextrose 50% Injectable 25 Gram(s) IV Push once  dextrose 50% Injectable 12.5 Gram(s) IV Push once  dextrose 50% Injectable 25 Gram(s) IV Push once  finasteride 5 milliGRAM(s) Oral daily  glucagon  Injectable 1 milliGRAM(s) IntraMuscular once  influenza  Vaccine (HIGH DOSE) 0.7 milliLiter(s) IntraMuscular once  insulin lispro (ADMELOG) corrective regimen sliding scale   SubCutaneous three times a day before meals  levETIRAcetam 250 milliGRAM(s) Oral two times a day  tamsulosin 0.4 milliGRAM(s) Oral at bedtime    DRIPS:  dextrose 5%. 1000 milliLiter(s) (50 mL/Hr) IV Continuous <Continuous>  dextrose 5%. 1000 milliLiter(s) (100 mL/Hr) IV Continuous <Continuous>    PRN:       ======================  PHYSICAL EXAMINATION:  GEN:  nad.   HEENT:  eomi. ncat. nasal packing/tissue paper r nares  PULM:  b/l bs.  clear.  no wheezing. no crackles or rales.   CARD: regular. s1, s2.  no murmurs.   ABD: +bs. tender, nondistended  EXT:  no new rashes.  bandaged le bilaterally  NEURO:  no new focal deficits. aox4 today  gil; sammi dark blood in gil/drainage bag  ======================  OBJECTIVE:        VS:  T(F): 96 (12-22 @ 07:46), Max: 96.3 (12-22 @ 00:28)  HR: 92 (12-22 @ 07:46) (67 - 92)  BP: 115/61 (12-22 @ 07:46) (104/46 - 168/67)  RR: 19 (12-22 @ 07:46) (18 - 19)  SpO2: 96% (12-22 @ 00:28) (96% - 97%)  CVP(mm Hg): --  CO: --  CI: --  PA: --  PCWP: --    I/O:      12-19 @ 07:01  -  12-20 @ 07:00  --------------------------------------------------------  IN: 0 mL / OUT: 1700 mL / NET: -1700 mL    12-21 @ 07:01  -  12-22 @ 07:00  --------------------------------------------------------  IN: 0 mL / OUT: 800 mL / NET: -800 mL    12-22 @ 07:01  -  12-22 @ 10:37  --------------------------------------------------------  IN: 0 mL / OUT: 900 mL / NET: -900 mL        Weight trend:  Weight (kg): 99.8 (12-19)    ======================    LABS:                          10.0   7.18  )-----------( 249      ( 22 Dec 2021 04:30 )             31.8     12-22    147<H>  |  111<H>  |  46<H>  ----------------------------<  110<H>  5.0   |  19  |  1.2    Ca    9.3      22 Dec 2021 04:30  Mg     2.3     12-22    TPro  6.5  /  Alb  3.6  /  TBili  <0.2  /  DBili  x   /  AST  16  /  ALT  11  /  AlkPhos  113  12-22    LIVER FUNCTIONS - ( 22 Dec 2021 04:30 )  Alb: 3.6 g/dL / Pro: 6.5 g/dL / ALK PHOS: 113 U/L / ALT: 11 U/L / AST: 16 U/L / GGT: x           PT/INR - ( 22 Dec 2021 04:30 )   PT: 13.80 sec;   INR: 1.20 ratio         PTT - ( 20 Dec 2021 22:32 )  PTT:51.0 sec        Serum Pro-Brain Natriuretic Peptide: 930 pg/mL (12-20)        Cultures:    Culture - Urine (collected 12-19)  Source: Clean Catch Clean Catch (Midstream)  Final Report:    No growth    Culture - Blood (collected 12-19)  Source: .Blood Blood-Peripheral  Preliminary Report:    No growth to date.    Culture - Blood (collected 12-19)  Source: .Blood Blood-Peripheral  Preliminary Report:    No growth to date.

## 2021-12-22 NOTE — PROGRESS NOTE ADULT - ASSESSMENT
75 y/o Male with hematuria after  Vargas placement.    A) Traumatic hematuria  Epistaxis  KELL  ? coagulation deficiency     P) Increase frequency of irrigation, Q 4 hrs until clear  with sterile water.  trend Hb  will d/w attending

## 2021-12-22 NOTE — PROGRESS NOTE ADULT - SUBJECTIVE AND OBJECTIVE BOX
Progress Note    Subjective Pt seen and examined at bedside   75 y/o Male with hematuria after  Vargas placement. Pt doing better, but now has epistaxis.  Vargas draining well bloody urine with few small clots.    [ x ] a 10 point review of systems was negative except where noted    Vital signs  T(C): , Max: 35.7 (12-22-21 @ 00:28)  HR: 81 (12-22-21 @ 00:28)  BP: 168/67 (12-22-21 @ 00:28)  SpO2: 96% (12-22-21 @ 00:28)    Gen NC/AT in NAD, + nasal packing  SKIN warm, dry with good tugor  Neck supple, FROM  LUNGS No dyspnea, No accessory muscle use  BACK No CVAT  ABD    Soft non tender, bladder not palpable   Vargas draining bloody urine with few small clots      Labs                        10.1   6.51  )-----------( 212      ( 21 Dec 2021 04:30 )             31.7     21 Dec 2021 04:30    148    |  113    |  51     ----------------------------<  115    4.4     |  18     |  1.5      Ca    9.4        21 Dec 2021 04:30  Mg     2.4       21 Dec 2021 04:30

## 2021-12-23 ENCOUNTER — TRANSCRIPTION ENCOUNTER (OUTPATIENT)
Age: 76
End: 2021-12-23

## 2021-12-23 LAB
ALBUMIN SERPL ELPH-MCNC: 3.5 G/DL — SIGNIFICANT CHANGE UP (ref 3.5–5.2)
ALP SERPL-CCNC: 119 U/L — HIGH (ref 30–115)
ALT FLD-CCNC: 8 U/L — SIGNIFICANT CHANGE UP (ref 0–41)
ANION GAP SERPL CALC-SCNC: 15 MMOL/L — HIGH (ref 7–14)
ANION GAP SERPL CALC-SCNC: 16 MMOL/L — HIGH (ref 7–14)
APTT 50/50 2HOUR INCUB: 39.7 SEC — HIGH (ref 27.5–36.5)
APTT 50/50 MIX COMMENT: SIGNIFICANT CHANGE UP
APTT BLD: 37.8 SEC — HIGH (ref 27.5–36.5)
APTT BLD: 45.6 SEC — HIGH (ref 27.5–35.5)
APTT BLD: 48 SEC — HIGH (ref 27–39.2)
APTT BLD: 49.2 SEC — HIGH (ref 27–39.2)
AST SERPL-CCNC: 17 U/L — SIGNIFICANT CHANGE UP (ref 0–41)
BASOPHILS # BLD AUTO: 0.05 K/UL — SIGNIFICANT CHANGE UP (ref 0–0.2)
BASOPHILS NFR BLD AUTO: 0.6 % — SIGNIFICANT CHANGE UP (ref 0–1)
BILIRUB SERPL-MCNC: <0.2 MG/DL — SIGNIFICANT CHANGE UP (ref 0.2–1.2)
BUN SERPL-MCNC: 41 MG/DL — HIGH (ref 10–20)
BUN SERPL-MCNC: 42 MG/DL — HIGH (ref 10–20)
CALCIUM SERPL-MCNC: 10 MG/DL — SIGNIFICANT CHANGE UP (ref 8.5–10.1)
CALCIUM SERPL-MCNC: 9.4 MG/DL — SIGNIFICANT CHANGE UP (ref 8.5–10.1)
CHLORIDE SERPL-SCNC: 104 MMOL/L — SIGNIFICANT CHANGE UP (ref 98–110)
CHLORIDE SERPL-SCNC: 107 MMOL/L — SIGNIFICANT CHANGE UP (ref 98–110)
CO2 SERPL-SCNC: 19 MMOL/L — SIGNIFICANT CHANGE UP (ref 17–32)
CO2 SERPL-SCNC: 22 MMOL/L — SIGNIFICANT CHANGE UP (ref 17–32)
CREAT SERPL-MCNC: 1.2 MG/DL — SIGNIFICANT CHANGE UP (ref 0.7–1.5)
CREAT SERPL-MCNC: 1.2 MG/DL — SIGNIFICANT CHANGE UP (ref 0.7–1.5)
EOSINOPHIL # BLD AUTO: 0.29 K/UL — SIGNIFICANT CHANGE UP (ref 0–0.7)
EOSINOPHIL NFR BLD AUTO: 3.6 % — SIGNIFICANT CHANGE UP (ref 0–8)
FIBRINOGEN PPP-MCNC: 1044 MG/DL — HIGH (ref 290–520)
FIBRINOGEN PPP-MCNC: >700 MG/DL — HIGH (ref 204.4–570.6)
GLUCOSE BLDC GLUCOMTR-MCNC: 104 MG/DL — HIGH (ref 70–99)
GLUCOSE BLDC GLUCOMTR-MCNC: 104 MG/DL — HIGH (ref 70–99)
GLUCOSE BLDC GLUCOMTR-MCNC: 121 MG/DL — HIGH (ref 70–99)
GLUCOSE BLDC GLUCOMTR-MCNC: 152 MG/DL — HIGH (ref 70–99)
GLUCOSE SERPL-MCNC: 112 MG/DL — HIGH (ref 70–99)
GLUCOSE SERPL-MCNC: 140 MG/DL — HIGH (ref 70–99)
HCT VFR BLD CALC: 31.3 % — LOW (ref 42–52)
HCT VFR BLD CALC: 31.9 % — LOW (ref 42–52)
HGB BLD-MCNC: 9.7 G/DL — LOW (ref 14–18)
HGB BLD-MCNC: 9.9 G/DL — LOW (ref 14–18)
IMM GRANULOCYTES NFR BLD AUTO: 1.1 % — HIGH (ref 0.1–0.3)
INR BLD: 1.16 RATIO — SIGNIFICANT CHANGE UP (ref 0.65–1.3)
INR BLD: 1.22 RATIO — SIGNIFICANT CHANGE UP (ref 0.65–1.3)
LYMPHOCYTES # BLD AUTO: 0.94 K/UL — LOW (ref 1.2–3.4)
LYMPHOCYTES # BLD AUTO: 11.6 % — LOW (ref 20.5–51.1)
MAGNESIUM SERPL-MCNC: 2.1 MG/DL — SIGNIFICANT CHANGE UP (ref 1.8–2.4)
MAGNESIUM SERPL-MCNC: 2.2 MG/DL — SIGNIFICANT CHANGE UP (ref 1.8–2.4)
MCHC RBC-ENTMCNC: 29.7 PG — SIGNIFICANT CHANGE UP (ref 27–31)
MCHC RBC-ENTMCNC: 29.8 PG — SIGNIFICANT CHANGE UP (ref 27–31)
MCHC RBC-ENTMCNC: 31 G/DL — LOW (ref 32–37)
MCHC RBC-ENTMCNC: 31 G/DL — LOW (ref 32–37)
MCV RBC AUTO: 95.7 FL — HIGH (ref 80–94)
MCV RBC AUTO: 96.1 FL — HIGH (ref 80–94)
MONOCYTES # BLD AUTO: 1.11 K/UL — HIGH (ref 0.1–0.6)
MONOCYTES NFR BLD AUTO: 13.7 % — HIGH (ref 1.7–9.3)
NEUTROPHILS # BLD AUTO: 5.63 K/UL — SIGNIFICANT CHANGE UP (ref 1.4–6.5)
NEUTROPHILS NFR BLD AUTO: 69.4 % — SIGNIFICANT CHANGE UP (ref 42.2–75.2)
NRBC # BLD: 0 /100 WBCS — SIGNIFICANT CHANGE UP (ref 0–0)
NRBC # BLD: 0 /100 WBCS — SIGNIFICANT CHANGE UP (ref 0–0)
PAT CTL 2H: 38.9 SEC — HIGH (ref 27.5–36.5)
PLATELET # BLD AUTO: 261 K/UL — SIGNIFICANT CHANGE UP (ref 130–400)
PLATELET # BLD AUTO: 277 K/UL — SIGNIFICANT CHANGE UP (ref 130–400)
POTASSIUM SERPL-MCNC: 4.4 MMOL/L — SIGNIFICANT CHANGE UP (ref 3.5–5)
POTASSIUM SERPL-MCNC: 4.8 MMOL/L — SIGNIFICANT CHANGE UP (ref 3.5–5)
POTASSIUM SERPL-SCNC: 4.4 MMOL/L — SIGNIFICANT CHANGE UP (ref 3.5–5)
POTASSIUM SERPL-SCNC: 4.8 MMOL/L — SIGNIFICANT CHANGE UP (ref 3.5–5)
PROT SERPL-MCNC: 6.5 G/DL — SIGNIFICANT CHANGE UP (ref 6–8)
PROTHROM AB SERPL-ACNC: 13.3 SEC — HIGH (ref 9.95–12.87)
PROTHROM AB SERPL-ACNC: 14 SEC — HIGH (ref 9.95–12.87)
PT 100%: 13.6 SEC — SIGNIFICANT CHANGE UP (ref 10.6–13.6)
PT 50/50: SIGNIFICANT CHANGE UP (ref 10.6–14.7)
RBC # BLD: 3.27 M/UL — LOW (ref 4.7–6.1)
RBC # BLD: 3.32 M/UL — LOW (ref 4.7–6.1)
RBC # FLD: 15.9 % — HIGH (ref 11.5–14.5)
RBC # FLD: 16 % — HIGH (ref 11.5–14.5)
SARS-COV-2 RNA SPEC QL NAA+PROBE: SIGNIFICANT CHANGE UP
SODIUM SERPL-SCNC: 141 MMOL/L — SIGNIFICANT CHANGE UP (ref 135–146)
SODIUM SERPL-SCNC: 142 MMOL/L — SIGNIFICANT CHANGE UP (ref 135–146)
THROMBIN TIME: 18.2 SEC — SIGNIFICANT CHANGE UP (ref 16–25)
VWF AG ACT/NOR PPP IA: 114 % — SIGNIFICANT CHANGE UP (ref 63–170)
VWF:RCO ACT/NOR PPP PL AGG: 96 % — SIGNIFICANT CHANGE UP (ref 40–120)
WBC # BLD: 8.11 K/UL — SIGNIFICANT CHANGE UP (ref 4.8–10.8)
WBC # BLD: 8.19 K/UL — SIGNIFICANT CHANGE UP (ref 4.8–10.8)
WBC # FLD AUTO: 8.11 K/UL — SIGNIFICANT CHANGE UP (ref 4.8–10.8)
WBC # FLD AUTO: 8.19 K/UL — SIGNIFICANT CHANGE UP (ref 4.8–10.8)

## 2021-12-23 PROCEDURE — 99232 SBSQ HOSP IP/OBS MODERATE 35: CPT

## 2021-12-23 PROCEDURE — 99233 SBSQ HOSP IP/OBS HIGH 50: CPT

## 2021-12-23 PROCEDURE — 73030 X-RAY EXAM OF SHOULDER: CPT | Mod: 26,LT

## 2021-12-23 RX ORDER — MORPHINE SULFATE 50 MG/1
1 CAPSULE, EXTENDED RELEASE ORAL ONCE
Refills: 0 | Status: DISCONTINUED | OUTPATIENT
Start: 2021-12-23 | End: 2021-12-23

## 2021-12-23 RX ADMIN — LEVETIRACETAM 250 MILLIGRAM(S): 250 TABLET, FILM COATED ORAL at 18:13

## 2021-12-23 RX ADMIN — TAMSULOSIN HYDROCHLORIDE 0.4 MILLIGRAM(S): 0.4 CAPSULE ORAL at 21:16

## 2021-12-23 RX ADMIN — Medication 100 MILLIGRAM(S): at 21:16

## 2021-12-23 RX ADMIN — Medication 300 MILLIGRAM(S): at 11:28

## 2021-12-23 RX ADMIN — MORPHINE SULFATE 1 MILLIGRAM(S): 50 CAPSULE, EXTENDED RELEASE ORAL at 09:40

## 2021-12-23 RX ADMIN — LEVETIRACETAM 250 MILLIGRAM(S): 250 TABLET, FILM COATED ORAL at 05:19

## 2021-12-23 RX ADMIN — OXYCODONE AND ACETAMINOPHEN 1 TABLET(S): 5; 325 TABLET ORAL at 18:13

## 2021-12-23 RX ADMIN — Medication 100 MILLIGRAM(S): at 15:27

## 2021-12-23 RX ADMIN — Medication 81 MILLIGRAM(S): at 11:28

## 2021-12-23 RX ADMIN — FINASTERIDE 5 MILLIGRAM(S): 5 TABLET, FILM COATED ORAL at 11:29

## 2021-12-23 RX ADMIN — ATORVASTATIN CALCIUM 40 MILLIGRAM(S): 80 TABLET, FILM COATED ORAL at 21:16

## 2021-12-23 RX ADMIN — Medication 2: at 11:32

## 2021-12-23 RX ADMIN — Medication 100 MILLIGRAM(S): at 05:19

## 2021-12-23 NOTE — PROGRESS NOTE ADULT - ASSESSMENT
76 y male with PMH of CKD3, HFrEF, HTN, HLD, DM presents with complaints of worsening swelling around his chronic b/l venous stasis wounds and generalized weakness and feeling cold. Patient was recently placed on po levofloxacin without any improvement.     #Cellulitis / hypothermia- sepsis on admission ruled out    #KELL -Gil   - c/w  IV cefazolin  - follow cultures: ngtd  -podiatry: debridement left foot, no further work up  -Burn; wound recs, no further work up  -c/w holding lasix and quinapril for now  >>Cr 1.2 stable, f/u with nephrology for lasix and ACE       #Hematuria/  Episodes of epistaxis in the setting of coagulopathy   -prolonged aPTT   -epistaxis overnight, slight  >>ENT following, no further work up, conservative management  -heme-onc on board, requesting mixing studies, heme work up  -uro following; hematuria secondary to traumatic gil  >>gil irrigation    #AMS  #ho subdural hemorrhage  -possibly secondary to septic encephalopathy vs bleed vs Urinary retention  -R neck mass  >>f/u neck US; Normal, no further work up  -f/u ct head: normal findings      PENDING  follow up hematology work up; PT eval

## 2021-12-23 NOTE — DISCHARGE NOTE PROVIDER - NSDCCAREPROVSEEN_GEN_ALL_CORE_FT
Dr. Guzmán, Hospitalist  Dr. Buchanan, Nephrology  Dr. Farah, Infectious disease  Dr. Mojica, Burn team  Dr. Rosas, Podiatry  [facility] Primary resident team

## 2021-12-23 NOTE — PROGRESS NOTE ADULT - SUBJECTIVE AND OBJECTIVE BOX
CHIEF COMPLAINT:  Patient is a 76y old  Male who presents with a chief complaint of worsening b/l LE swelling (23 Dec 2021 09:02)      INTERVAL HISTORY/OVERNIGHT EVENTS:  12/23  -vitals labs nrml  -c/o left rotator cuff pain  -gil d/c, no hematuria, voiding normal  12/22  -aox4 this morning, would like to 'get of here'  -gil with sammi dark blood, some clots  -no fevers on room air  ======================  MEDICATIONS:  allopurinol 300 milliGRAM(s) Oral daily  aspirin  chewable 81 milliGRAM(s) Oral daily  atorvastatin 40 milliGRAM(s) Oral at bedtime  ceFAZolin   IVPB 1000 milliGRAM(s) IV Intermittent every 8 hours  dextrose 40% Gel 15 Gram(s) Oral once  dextrose 50% Injectable 25 Gram(s) IV Push once  dextrose 50% Injectable 12.5 Gram(s) IV Push once  dextrose 50% Injectable 25 Gram(s) IV Push once  finasteride 5 milliGRAM(s) Oral daily  glucagon  Injectable 1 milliGRAM(s) IntraMuscular once  influenza  Vaccine (HIGH DOSE) 0.7 milliLiter(s) IntraMuscular once  insulin lispro (ADMELOG) corrective regimen sliding scale   SubCutaneous three times a day before meals  levETIRAcetam 250 milliGRAM(s) Oral two times a day  morphine  - Injectable 1 milliGRAM(s) IV Push once  tamsulosin 0.4 milliGRAM(s) Oral at bedtime    DRIPS:  dextrose 5%. 1000 milliLiter(s) (50 mL/Hr) IV Continuous <Continuous>  dextrose 5%. 1000 milliLiter(s) (100 mL/Hr) IV Continuous <Continuous>    PRN:       ======================  PHYSICAL EXAMINATION:  GEN:  nad.   HEENT:  eomi. ncat  PULM:  b/l bs.  clear.  no wheezing. no crackles or rales.   CARD: regular. s1, s2.  no murmurs.   ABD: +bs. ntnd  EXT:  no new rashes.  no pitting edema b/l .   NEURO:  no new focal deficits.   ======================  OBJECTIVE:        VS:  T(F): 96 (12-23 @ 09:18), Max: 98.6 (12-23 @ 00:00)  HR: 81 (12-23 @ 09:18) (81 - 88)  BP: 127/60 (12-23 @ 09:18) (127/60 - 135/63)  RR: 18 (12-23 @ 09:18) (18 - 18)  SpO2: --  CVP(mm Hg): --  CO: --  CI: --  PA: --  PCWP: --    I/O:      12-21 @ 07:01  -  12-22 @ 07:00  --------------------------------------------------------  IN: 0 mL / OUT: 800 mL / NET: -800 mL    12-22 @ 07:01  -  12-23 @ 07:00  --------------------------------------------------------  IN: 0 mL / OUT: 1650 mL / NET: -1650 mL        Weight trend:  Weight (kg): 99.8 (12-19)    ======================    LABS:                          9.9    8.11  )-----------( 261      ( 23 Dec 2021 07:21 )             31.9     12-23    141  |  107  |  41<H>  ----------------------------<  112<H>  4.8   |  19  |  1.2    Ca    9.4      23 Dec 2021 07:21  Mg     2.2     12-23    TPro  6.5  /  Alb  3.5  /  TBili  <0.2  /  DBili  x   /  AST  17  /  ALT  8   /  AlkPhos  119<H>  12-23    LIVER FUNCTIONS - ( 23 Dec 2021 07:21 )  Alb: 3.5 g/dL / Pro: 6.5 g/dL / ALK PHOS: 119 U/L / ALT: 8 U/L / AST: 17 U/L / GGT: x           PT/INR - ( 23 Dec 2021 07:21 )   PT: 14.00 sec;   INR: 1.22 ratio         PTT - ( 23 Dec 2021 07:21 )  PTT:48.0 sec        Serum Pro-Brain Natriuretic Peptide: 930 pg/mL (12-20)        Cultures:    Culture - Urine (collected 12-19)  Source: Clean Catch Clean Catch (Midstream)  Final Report:    No growth    Culture - Blood (collected 12-19)  Source: .Blood Blood-Peripheral  Preliminary Report:    No growth to date.    Culture - Blood (collected 12-19)  Source: .Blood Blood-Peripheral  Preliminary Report:    No growth to date.

## 2021-12-23 NOTE — DISCHARGE NOTE PROVIDER - NSDCFUADDAPPT_GEN_ALL_CORE_FT
Please follow up with Dr Romero of hematology for further work up of your blood lab abnormalities.     Please follow up outpatient with your primary care doctor for evaluation of your shoulder injury. Xrays were taken here at the hospital.

## 2021-12-23 NOTE — DISCHARGE NOTE PROVIDER - NSDCMRMEDTOKEN_GEN_ALL_CORE_FT
allopurinol 300 mg oral tablet: 1 tab(s) orally once a day (in the morning)  aspirin 81 mg oral tablet, chewable: 1 tab(s) orally once a day (in the morning)  atorvastatin 40 mg oral tablet: 1 tab(s) orally once a day (at bedtime)  furosemide 40 mg oral tablet: 1 tab(s) orally 2 times a day  hydrOXYzine hydrochloride 10 mg oral tablet: 1 tab(s) orally every 8 hours, As needed, Itching  levETIRAcetam 250 mg oral tablet: 1 tab(s) orally 2 times a day  Oseni 25 mg-30 mg oral tablet: 2 tab(s) orally once a day  quinapril 40 mg oral tablet: 1 tab(s) orally once a day  Tylenol with Codeine #3 oral tablet: 1 tab(s) orally every 6 hours, As Needed   allopurinol 300 mg oral tablet: 1 tab(s) orally once a day (in the morning)  aspirin 81 mg oral tablet, chewable: 1 tab(s) orally once a day (in the morning)  atorvastatin 40 mg oral tablet: 1 tab(s) orally once a day (at bedtime)  finasteride 5 mg oral tablet: 1 tab(s) orally once a day  Hydrocortisone 1% In Absorbase topical ointment: Apply topically to affected area-- legs-- 2 times a day  hydrOXYzine hydrochloride 10 mg oral tablet: 1 tab(s) orally every 8 hours, As needed, Itching  levETIRAcetam 250 mg oral tablet: 1 tab(s) orally 2 times a day  Oseni 25 mg-30 mg oral tablet: 2 tab(s) orally once a day  tamsulosin 0.4 mg oral capsule: 1 cap(s) orally once a day (at bedtime)

## 2021-12-23 NOTE — DISCHARGE NOTE PROVIDER - NSDCCPCAREPLAN_GEN_ALL_CORE_FT
PRINCIPAL DISCHARGE DIAGNOSIS  Diagnosis: Stasis dermatitis  Assessment and Plan of Treatment: You have venous stasis of the lower extremities which can cause fluid to build up and infections to occur. You came to the hospital because you were acting confused. You were found to have hypothermia, and possible cellulitis of the lower extremities as well as an acute kidney injury. You were treated with IV antibiotics and fluids. Your medications: lasix and quinipril were held due to their effect on kidneys. Your mental status has returned to baseline. Your kidney function is improving. You also had a blister on the right foot that was removed by podiatry. Please follow up with them outpatient. You will be discharged on oral antibiotics to treat the possible cellulitis which cannot be definitely ruled out.      SECONDARY DISCHARGE DIAGNOSES  Diagnosis: KELL (acute kidney injury)  Assessment and Plan of Treatment: See principal diagnosis

## 2021-12-23 NOTE — PROGRESS NOTE ADULT - ASSESSMENT
Pt is a 76y M a/w worsening b/l LE edema;  following for hematuria s/p traumatic gil placement.    ·	Gross hematuria resolved; gil removed and pt passed TOV   ·	No acute  intervention, recall prn  ·	Will d/w attng

## 2021-12-23 NOTE — PHYSICAL THERAPY INITIAL EVALUATION ADULT - PERTINENT HX OF CURRENT PROBLEM, REHAB EVAL
presents with complaints of worsening swelling around his chronic b/l venous stasis wounds and generalized weakness and feeling cold. Patient was recently placed on po levofloxacin without any improvement.  per EMS patients vitals where stable throughout transport but notable for a rectal temp of 92.3 F in triage.  Denies HA, dizziness, weakness, fever, cough, CP, SOB, palpitations, Abd pain, N/V, dysuria, hematuria, dark or bloody stool. Patient also reports episodes of epistaxis.

## 2021-12-23 NOTE — PROGRESS NOTE ADULT - ATTENDING COMMENTS
# Cellulitis lower ext-- resolving on unasyn--seen by podiatry--rt hallux blood blister removed.  # traumatic hematuria-- has started resolving-- DC gil-- no retention or hydronephrosis on USG  # epistaxis--seen by ENT-- US of parotid for lt mass was negative-- bacitracin and avoid nose picking  #Hx of subdural he-- repeat CT head is normal  # PTT and PT elevated-- mixing study seen by hematology-- suggest-  - Check VW panel  (antigen, multimers), as well as  factor VIII, IX, XI, XII level   - May followup outpatient for results if bleeding controlled  lupus panel        DC planning-- to [facility]
76 y male with PMH of CKD3, HFrEF, HTN, HLD, DM presents with complaints of worsening swelling around his chronic b/l venous stasis wounds and generalized weakness and feeling cold. Patient was recently placed on po levofloxacin without any improvement.  The patient stated that he is ok but not great, he denies any new specific medical complaints  On exam General awake, alert NAD, Lungs clear to ausculation b/l, Heart regular rhythm, Abdomen: soft, non tender non distended, gil with hematuria.  Ext no edema      [] Cellulitis / hypothermia- sepsis on admission ruled out    []KELL -Gil   - ID input appreciated   - c/w  IV cefazolin  - follow cultures  podiatry input appreciated   c/w holding lasix and quinapril for now   nephrology input appreciated        []Hematuria/  Episodes of epistaxis in the setting of coagulopathy   prolonged aPTT   no new epistaxis today   oncology hematology and urology input appreciated   follow up hematology work up    rest of the management as above .  Ent consult was recommended by Hem.  Monitor Hgb     Attending hand off:   Pt is improving, consider PT evaluation for dispo  ID,Nephro,Urology, Hemtaolgy and ENT consulted . as per ID can switch to PO antibiotics on discharge
76 y male with PMH of CKD3, HFrEF, HTN, HLD, DM presents with complaints of worsening swelling around his chronic b/l venous stasis wounds and generalized weakness and feeling cold. Patient was recently placed on po levofloxacin without any improvement.  The patient stated that he is ok, he reports that his epistaxis episodes is old since 9/11      [] Cellulitis / hypothermia- sepsis on admission   []KELL -Vargas   - s/p cefepime, vanco and flagyl in ED  - non-purulent wound  - c/w  IV cefazolin  - follow cultures  consult podiatry/surgery burn   hold lasix for now   consult nephrology and ID     []Hematuria/  Episodes of epistaxis in the setting of coagulopathy   prolonged aPTT   Get mixing study and consult hematology and urology   rest of the management as above
# Cellulitis lower ext-- resolving on unasyn--seen by podiatry--rt hallux blood blister removed.  # traumatic hematuria-- has started resolving-- DC gil-- no retention or hydronephrosis on USG  # epistaxis--seen by ENT-- US of parotid for lt mass was negative-- bacitracin and avoid nose picking  #Hx of subdural he-- repeat CT head is normal  # PTT and PT elevated-- mixing study done will discuss results with hematology    DC planning-- PT eval
seen/examined w hemonc team ;note reviewed; case discussed    obtain LUPUS ANTICOAGULANT, Factors VIII, IX, XI, XII prior to discharge

## 2021-12-23 NOTE — DISCHARGE NOTE PROVIDER - CARE PROVIDERS DIRECT ADDRESSES
,DirectAddress_Unknown,[email or web address] ,[email or web address],[email or web address]

## 2021-12-23 NOTE — PROGRESS NOTE ADULT - SUBJECTIVE AND OBJECTIVE BOX
UROLOGY PROGRESS NOTE:    Pt is a 76y M a/w worsening b/l LE edema;  following for hematuria s/p traumatic gil placement. Pt seen and examined at bedside. Pt reports catheter was removed today am after urine was noted to be clear; since removal he has been able to void. Pt denies abdominal pain, suprapubic pain, N/V.     MEDICATIONS  (STANDING):  allopurinol 300 milliGRAM(s) Oral daily  aspirin  chewable 81 milliGRAM(s) Oral daily  atorvastatin 40 milliGRAM(s) Oral at bedtime  ceFAZolin   IVPB 1000 milliGRAM(s) IV Intermittent every 8 hours  dextrose 40% Gel 15 Gram(s) Oral once  dextrose 5%. 1000 milliLiter(s) (50 mL/Hr) IV Continuous <Continuous>  dextrose 5%. 1000 milliLiter(s) (100 mL/Hr) IV Continuous <Continuous>  dextrose 50% Injectable 25 Gram(s) IV Push once  dextrose 50% Injectable 12.5 Gram(s) IV Push once  dextrose 50% Injectable 25 Gram(s) IV Push once  finasteride 5 milliGRAM(s) Oral daily  glucagon  Injectable 1 milliGRAM(s) IntraMuscular once  influenza  Vaccine (HIGH DOSE) 0.7 milliLiter(s) IntraMuscular once  insulin lispro (ADMELOG) corrective regimen sliding scale   SubCutaneous three times a day before meals  levETIRAcetam 250 milliGRAM(s) Oral two times a day  tamsulosin 0.4 milliGRAM(s) Oral at bedtime    MEDICATIONS  (PRN):  hydrOXYzine  Oral Tab/Cap - Peds 10 milliGRAM(s) Oral daily PRN Itching  oxycodone    5 mG/acetaminophen 325 mG 1 Tablet(s) Oral every 6 hours PRN Severe Pain (7 - 10)    Review of Systems:  [X] A ten point review of systems was otherwise negative except as noted.    Vital Signs Last 24 Hrs  T(C): 35.6 (23 Dec 2021 09:18), Max: 37 (23 Dec 2021 00:00)  T(F): 96 (23 Dec 2021 09:18), Max: 98.6 (23 Dec 2021 00:00)  HR: 81 (23 Dec 2021 09:18) (81 - 88)  BP: 127/60 (23 Dec 2021 09:18) (127/60 - 135/63)  RR: 18 (23 Dec 2021 09:18) (18 - 18)    PHYSICAL EXAM:  GEN: NAD  NEURO: Awake and alert   SKIN: Good color, non diaphoretic  HEENT: NC/AT.  RESP: Non-labored breathing  CARDIO: +S1/S2  ABDO: Soft, NT  EXT: +Ace dressing to b/l LE   : Pt voiding by self, yellow urine noted in urinal     I&O's Summary    22 Dec 2021 07:01  -  23 Dec 2021 07:00  --------------------------------------------------------  IN: 0 mL / OUT: 1650 mL / NET: -1650 mL      LABS:                        9.7    8.19  )-----------( 277      ( 23 Dec 2021 10:40 )             31.3     12-23    142  |  104  |  42<H>  ----------------------------<  140<H>  4.4   |  22  |  1.2    Ca    10.0      23 Dec 2021 10:41  Mg     2.1     12-23    TPro  6.5  /  Alb  3.5  /  TBili  <0.2  /  DBili  x   /  AST  17  /  ALT  8   /  AlkPhos  119<H>  12-23    PT/INR - ( 23 Dec 2021 10:00 )   PT: 13.30 sec;   INR: 1.16 ratio         PTT - ( 23 Dec 2021 10:00 )  PTT:49.2 sec          RADIOLOGY & ADDITIONAL STUDIES:

## 2021-12-23 NOTE — DISCHARGE NOTE PROVIDER - CARE PROVIDER_API CALL
Miltno Buchanan  INTERNAL MEDICINE  [address] [facility] [facility]  [place] [address]  Phone: [phone]  Fax: [phone]  Established Patient  Follow Up Time: 2 weeks    Sunil Mosqueda (DO)  Internal Medicine  [address] [place] [address]  Phone: [phone]  Fax: [phone]  Established Patient  Follow Up Time: 2 weeks    Cuco Rosas (DPM)  Foot Surgery; Podiatric Medicine  [address] [facility], [address] [place] [address]  Phone: [phone]  Fax: [phone]  Follow Up Time: 2 weeks   Milton Buchanan  INTERNAL MEDICINE  [address] [facility], [facility]  [place] [address]  Phone: [phone]  Fax: [phone]  Established Patient  Follow Up Time: 2 weeks    Sunil Mosqueda (DO)  Internal Medicine  [address] [place] [address]  Phone: [phone]  Fax: [phone]  Established Patient  Follow Up Time: 2 weeks    Cuco Rosas (DPM)  Foot Surgery; Podiatric Medicine  [address] [facility], [address]rd [place] [address]  Phone: [phone]  Fax: [phone]  Follow Up Time: 2 weeks    Thanh Dunne (DO)  Hematology; Internal Medicine; Medical Oncology  [address] [place] [address]  Phone: [phone]  Fax: [phone]  Follow Up Time: 1 week

## 2021-12-23 NOTE — PROGRESS NOTE ADULT - SUBJECTIVE AND OBJECTIVE BOX
Nephrology progress note     Patient is a 77 yo man from home with DM > 20 ears, HTN, retinopathy, cholestenol PVD, SDH with fal, and gout.  Recent increase lasix due to worsening LE edema.  Admitted with confusion/halucinations and KELL and LE erythema.    ON events/Subjective:     Allergies:  No Known Allergies    Hospital Medications:   MEDICATIONS  (STANDING):  allopurinol 300 milliGRAM(s) Oral daily  aspirin  chewable 81 milliGRAM(s) Oral daily  atorvastatin 40 milliGRAM(s) Oral at bedtime  ceFAZolin   IVPB 1000 milliGRAM(s) IV Intermittent every 8 hours  dextrose 40% Gel 15 Gram(s) Oral once  dextrose 5%. 1000 milliLiter(s) (50 mL/Hr) IV Continuous <Continuous>  dextrose 5%. 1000 milliLiter(s) (100 mL/Hr) IV Continuous <Continuous>  dextrose 50% Injectable 25 Gram(s) IV Push once  dextrose 50% Injectable 12.5 Gram(s) IV Push once  dextrose 50% Injectable 25 Gram(s) IV Push once  finasteride 5 milliGRAM(s) Oral daily  glucagon  Injectable 1 milliGRAM(s) IntraMuscular once  influenza  Vaccine (HIGH DOSE) 0.7 milliLiter(s) IntraMuscular once  insulin lispro (ADMELOG) corrective regimen sliding scale   SubCutaneous three times a day before meals  levETIRAcetam 250 milliGRAM(s) Oral two times a day  tamsulosin 0.4 milliGRAM(s) Oral at bedtime    REVIEW OF SYSTEMS:  CONSTITUTIONAL: No weakness, fevers or chills  EYES/ENT: No visual changes;  No vertigo or throat pain   NECK: No pain or stiffness  RESPIRATORY: No cough, wheezing, hemoptysis; No shortness of breath  CARDIOVASCULAR: No chest pain or palpitations.  GASTROINTESTINAL: No abdominal or epigastric pain. No nausea, vomiting, or hematemesis; No diarrhea or constipation. No melena or hematochezia.  GENITOURINARY: No dysuria, frequency, foamy urine, urinary urgency, incontinence or hematuria  NEUROLOGICAL: No numbness or weakness  SKIN: No itching, burning, rashes, or lesions   VASCULAR: No bilateral lower extremity edema.   All other review of systems is negative unless indicated above.    VITALS:  T(F): 96 (21 @ 09:18), Max: 98.6 (21 @ 00:00)  HR: 81 (21 @ 09:18)  BP: 127/60 (21 @ 09:18)  RR: 18 (21 @ 09:18)  SpO2: --  Wt(kg): --     @ 07:01  -   @ 07:00  --------------------------------------------------------  IN: 0 mL / OUT: 800 mL / NET: -800 mL     @ 07:01  -   @ 07:00  --------------------------------------------------------  IN: 0 mL / OUT: 1650 mL / NET: -1650 mL        PHYSICAL EXAM:  Constitutional: NAD  HEENT: anicteric sclera, oropharynx clear, MMM  Neck: No JVD  Respiratory: CTAB, no wheezes, rales or rhonchi  Cardiovascular: S1, S2, RRR  Gastrointestinal: BS+, soft, NT/ND  Extremities: No cyanosis or clubbing. No peripheral edema  Neurological: A/O x 3, no focal deficits  Psychiatric: Normal mood, normal affect  : No CVA tenderness. No gil.   Skin: No rashes  Vascular Access:    LABS:      141  |  107  |  41<H>  ----------------------------<  112<H>  4.8   |  19  |  1.2    Ca    9.4      23 Dec 2021 07:21  Mg     2.2         TPro  6.5  /  Alb  3.5  /  TBili  <0.2  /  DBili      /  AST  17  /  ALT  8   /  AlkPhos  119<H>                            9.9    8.11  )-----------( 261      ( 23 Dec 2021 07:21 )             31.9       Urine Studies:  Creatinine Trend: 1.2<--, 1.2<--, 1.5<--, 1.7<--, 1.6<--  Urinalysis Basic - ( 19 Dec 2021 22:18 )    Color: Colorless / Appearance: Clear / S.013 / pH:   Gluc:  / Ketone: Negative  / Bili: Negative / Urobili: <2 mg/dL   Blood:  / Protein: 100 mg/dL / Nitrite: Negative   Leuk Esterase: Negative / RBC: 2 /HPF / WBC 1 /HPF   Sq Epi:  / Non Sq Epi: 0 /HPF / Bacteria: Negative    ·	KELL/CKD associated with recent increase lasix (improved off lasix and quinapril) awaiting labs today  ·	confusion/hallucinations ? etiology (dehydration, infection) overall more alert  ·	subnephrotic range proteinuria was on ACE  ·	chronic LE edema ? from proteinuira (but doubt as albumin normal and subnephrotic proteinuria) but more likely vascular issue (venous stasis)  ·	? bl le cellulitis (no leukocytosis) started on cefazolin   ·	gil removed and no hematuria  ·	epistaxis  ·	BP stable  ·	anemia  ·	h/o SDH  ·	deconditioned will likely need inpatient rehab    1) continue to hold lasix and quinapril at this time as with KELL  2) abx per medicine but I question if truely with cellulitis   3) suggest head CT (h/o SDH) as with confusion bouts  4) monitor for hematuria  5) heme f/u  6) will likely need short term rehab - d/w pt and wife  d/w housestaff and will d/w wife

## 2021-12-23 NOTE — DISCHARGE NOTE PROVIDER - PROVIDER TOKENS
PROVIDER:[TOKEN:[[id number]],FOLLOWUP:[2 weeks],ESTABLISHEDPATIENT:[T]],PROVIDER:[TOKEN:[[id number]],FOLLOWUP:[2 weeks],ESTABLISHEDPATIENT:[T]],PROVIDER:[TOKEN:[[id number]],FOLLOWUP:[2 weeks]] PROVIDER:[TOKEN:[[id number]],FOLLOWUP:[2 weeks],ESTABLISHEDPATIENT:[T]],PROVIDER:[TOKEN:[[id number]],FOLLOWUP:[2 weeks],ESTABLISHEDPATIENT:[T]],PROVIDER:[TOKEN:[[id number]],FOLLOWUP:[2 weeks]],PROVIDER:[TOKEN:[[id number]],FOLLOWUP:[1 week]]

## 2021-12-23 NOTE — PROGRESS NOTE ADULT - ASSESSMENT
Assessment: 76 y male with PMH of CKD3, HFrEF, HTN, HLD, DM presents with complaints of worsening swelling around his chronic b/l venous stasis wounds and generalized weakness and feeling cold. Patient was recently placed on po levofloxacin without any improvement.  per EMS patients vitals where stable throughout transport but notable for a rectal temp of 92.3 F in triage.    Heme Onc service consulted due to recurrent episodes of epistaxis and abnormal coagulation studies.   - Dried blood in left nare, no active bleeding. Has active bleeding the entire night (12/19), mostly clots     IMPRESSION    1. Recurrent epistaxis/elevated PT and PTT/ PTT remains elevated on mixing study  2. 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  ()   - Had cauterization of left blood vessels in 2019, did not resolve the bleeding   - When has bleeding usually stops by applying pressure for a few hours   - Previously had few blood transfusions due to bleeding gastric ulcer (not sure of other reasons)  - No family Hx of bleeding disorders, has hx of cancer in family   - Has not been worked up for bleeding disorders   - Elevated PT and PTT: PT: 12.3 --> 13.9 (since Oct 2021); PTT: 43.5 --> 63.5 (since Oct 2021)  - No intervention per ENT  - PTT on 2hr mix is 39.7, initial is 37.8  - VW panel: antigen, multimers, factor VIII level and activity  pending    ***PENDING***    # Anemia normocytic  - could be 2/2 underlying blood loss  - check iron stores, ferritin, B12, Folate, MMA, retic count  - trend h/h  - O/P age appropriate cancer screening: colonoscopy.          Assessment: 76 y male with PMH of CKD3, HFrEF, HTN, HLD, DM presents with complaints of worsening swelling around his chronic b/l venous stasis wounds and generalized weakness and feeling cold. Patient was recently placed on po levofloxacin without any improvement.  per EMS patients vitals where stable throughout transport but notable for a rectal temp of 92.3 F in triage.    Heme Onc service consulted due to recurrent episodes of epistaxis and abnormal coagulation studies.   - Dried blood in left nare, no active bleeding. Has active bleeding the entire night (12/19), mostly clots     IMPRESSION    1. Recurrent epistaxis/elevated PT &PTT: PTT remains elevated on mixing study  2. Hematuria: seen by ENT, likely traumatic gil insertion     Patient has both PT and PTT elevation, PTT remains elevated after mixing study, likely inhibitor. check lupus profile, factor 8,9,11,12 levels. Can followup as outpatient in 1-2 weeks.     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  ()   - Had cauterization of left blood vessels in 2019, did not resolve the bleeding   - When has bleeding usually stops by applying pressure for a few hours   - Previously had few blood transfusions due to bleeding gastric ulcer (not sure of other reasons)  - No family Hx of bleeding disorders, has hx of cancer in family   - Has not been worked up for bleeding disorders   - Elevated PT and PTT: PT: 12.3 --> 13.9 (since Oct 2021); PTT: 43.5 --> 63.5 (since Oct 2021)  - No intervention per ENT  - PTT on 2hr mix is 39.7, initial is 37.8  - Check VW panel  (antigen, multimers), as well as  factor VIII, IX, XI, XII level   - May followup outpatient for results if bleeding controlled    # Anemia normocytic  - could be 2/2 underlying blood loss  - check iron stores, ferritin,   - B12, Folate wnl  - O/P age appropriate cancer screening: colonoscopy.

## 2021-12-23 NOTE — PROGRESS NOTE ADULT - SUBJECTIVE AND OBJECTIVE BOX
24H events:    Mixing study sent, suggestive of inhibitor    PAST MEDICAL & SURGICAL HISTORY  Diverticulitis    Herniated disc, cervical    Chronic gout of foot, unspecified cause, unspecified laterality    Type 2 diabetes mellitus without complication, unspecified long term insulin use status    Hypertension, unspecified type    High cholesterol    Osteoarthritis of multiple joints, unspecified osteoarthritis type    Sciatica, unspecified laterality    History of tonsillectomy and adenoidectomy    H/O colonoscopy  2018    H/O knee surgery      SOCIAL HISTORY:  Negative for smoking/alcohol/drug use.     ALLERGIES:  No Known Allergies    MEDICATIONS:  STANDING MEDICATIONS  allopurinol 300 milliGRAM(s) Oral daily  aspirin  chewable 81 milliGRAM(s) Oral daily  atorvastatin 40 milliGRAM(s) Oral at bedtime  ceFAZolin   IVPB 1000 milliGRAM(s) IV Intermittent every 8 hours  dextrose 40% Gel 15 Gram(s) Oral once  dextrose 5%. 1000 milliLiter(s) IV Continuous <Continuous>  dextrose 5%. 1000 milliLiter(s) IV Continuous <Continuous>  dextrose 50% Injectable 25 Gram(s) IV Push once  dextrose 50% Injectable 12.5 Gram(s) IV Push once  dextrose 50% Injectable 25 Gram(s) IV Push once  finasteride 5 milliGRAM(s) Oral daily  glucagon  Injectable 1 milliGRAM(s) IntraMuscular once  influenza  Vaccine (HIGH DOSE) 0.7 milliLiter(s) IntraMuscular once  insulin lispro (ADMELOG) corrective regimen sliding scale   SubCutaneous three times a day before meals  levETIRAcetam 250 milliGRAM(s) Oral two times a day  morphine  - Injectable 1 milliGRAM(s) IV Push once  tamsulosin 0.4 milliGRAM(s) Oral at bedtime    PRN MEDICATIONS  hydrOXYzine  Oral Tab/Cap - Peds 10 milliGRAM(s) Oral daily PRN  oxycodone    5 mG/acetaminophen 325 mG 1 Tablet(s) Oral every 6 hours PRN    VITALS:   T(F): 98.6  HR: 88  BP: 135/63  RR: 18  SpO2: --    LABS:                        9.9    8.11  )-----------( 261      ( 23 Dec 2021 07:21 )             31.9     12-23    141  |  107  |  41<H>  ----------------------------<  112<H>  4.8   |  19  |  1.2    Ca    9.4      23 Dec 2021 07:21  Mg     2.2     12-23    TPro  6.5  /  Alb  3.5  /  TBili  <0.2  /  DBili  x   /  AST  17  /  ALT  8   /  AlkPhos  119<H>  12-23    PT/INR - ( 23 Dec 2021 07:21 )   PT: 14.00 sec;   INR: 1.22 ratio         PTT - ( 23 Dec 2021 07:21 )  PTT:48.0 sec              RADIOLOGY:    PHYSICAL EXAM:  GEN: No acute distress  HENT: NCAT, EOMI  LYMPH: No appreciable adenopathy  LUNGS: No respiratory distress, clear to auscultation bilaterally   HEART: regular rate and rhythm  ABD: Soft, non-tender, non-distended  SKIN: No rash  EXT: No edema  NEURO: AAOX3     24H events:    Mixing study sent, suggestive of inhibitor  Bleeding resolved    PAST MEDICAL & SURGICAL HISTORY  Diverticulitis    Herniated disc, cervical    Chronic gout of foot, unspecified cause, unspecified laterality    Type 2 diabetes mellitus without complication, unspecified long term insulin use status    Hypertension, unspecified type    High cholesterol    Osteoarthritis of multiple joints, unspecified osteoarthritis type    Sciatica, unspecified laterality    History of tonsillectomy and adenoidectomy    H/O colonoscopy  2018    H/O knee surgery      SOCIAL HISTORY:  Negative for smoking/alcohol/drug use.     ALLERGIES:  No Known Allergies    MEDICATIONS:  STANDING MEDICATIONS  allopurinol 300 milliGRAM(s) Oral daily  aspirin  chewable 81 milliGRAM(s) Oral daily  atorvastatin 40 milliGRAM(s) Oral at bedtime  ceFAZolin   IVPB 1000 milliGRAM(s) IV Intermittent every 8 hours  dextrose 40% Gel 15 Gram(s) Oral once  dextrose 5%. 1000 milliLiter(s) IV Continuous <Continuous>  dextrose 5%. 1000 milliLiter(s) IV Continuous <Continuous>  dextrose 50% Injectable 25 Gram(s) IV Push once  dextrose 50% Injectable 12.5 Gram(s) IV Push once  dextrose 50% Injectable 25 Gram(s) IV Push once  finasteride 5 milliGRAM(s) Oral daily  glucagon  Injectable 1 milliGRAM(s) IntraMuscular once  influenza  Vaccine (HIGH DOSE) 0.7 milliLiter(s) IntraMuscular once  insulin lispro (ADMELOG) corrective regimen sliding scale   SubCutaneous three times a day before meals  levETIRAcetam 250 milliGRAM(s) Oral two times a day  morphine  - Injectable 1 milliGRAM(s) IV Push once  tamsulosin 0.4 milliGRAM(s) Oral at bedtime    PRN MEDICATIONS  hydrOXYzine  Oral Tab/Cap - Peds 10 milliGRAM(s) Oral daily PRN  oxycodone    5 mG/acetaminophen 325 mG 1 Tablet(s) Oral every 6 hours PRN    VITALS:   T(F): 98.6  HR: 88  BP: 135/63  RR: 18  SpO2: --    LABS:                        9.9    8.11  )-----------( 261      ( 23 Dec 2021 07:21 )             31.9     12-23    141  |  107  |  41<H>  ----------------------------<  112<H>  4.8   |  19  |  1.2    Ca    9.4      23 Dec 2021 07:21  Mg     2.2     12-23    TPro  6.5  /  Alb  3.5  /  TBili  <0.2  /  DBili  x   /  AST  17  /  ALT  8   /  AlkPhos  119<H>  12-23    PT/INR - ( 23 Dec 2021 07:21 )   PT: 14.00 sec;   INR: 1.22 ratio         PTT - ( 23 Dec 2021 07:21 )  PTT:48.0 sec              RADIOLOGY:    PHYSICAL EXAM:  GEN: No acute distress  HENT: NCAT, EOMI  LYMPH: No appreciable adenopathy  LUNGS: No respiratory distress, clear to auscultation bilaterally   HEART: regular rate and rhythm  ABD: Soft, non-tender, non-distended  SKIN: No rash  EXT: Dressing CDI  NEURO: AAOX3

## 2021-12-23 NOTE — CHART NOTE - NSCHARTNOTEFT_GEN_A_CORE
Pt underwent US - no parotid tail mass noted on US, likely prominent tail. No acute ENT intervention, pt can f/u as OP as needed. Reviewed films with Dr Bal.

## 2021-12-23 NOTE — DISCHARGE NOTE PROVIDER - HOSPITAL COURSE
HPI:  76 y male with PMH of CKD3, HFrEF, HTN, HLD, DM presents with complaints of worsening swelling around his chronic b/l venous stasis wounds and generalized weakness and feeling cold. Patient was recently placed on po levofloxacin without any improvement.  per EMS patients vitals where stable throughout transport but notable for a rectal temp of 92.3 F in triage.  Denies HA, dizziness, weakness, fever, cough, CP, SOB, palpitations, Abd pain, N/V, dysuria, hematuria, dark or bloody stool. Patient also reports episodes of epistaxis.  In ED: Temp = 93.6; HR = 80; BP = 119/62; RR = 18; SaO2 = 98% on room air. work up noted for wbc 5.81; hgb 10.4; bun 53; cr. 1.6. UA negative. Patient placed on heating blanket for hypothermia and received cefepime 2 gm x 1, Flagyl 500 mg x 1, vanco 1500 mg, LR 2500 cc. (19 Dec 2021 22:36)    Assessment and plan:    # Cellulitis lower ext/stasis dermatitis-- resolving on cefazolin; pt to be d/c on keflex--seen by podiatry--rt hallux blood blister removed.  # traumatic hematuria-- has started resolving-- DC gil-- no retention or hydronephrosis on USG. Pt urinating on his own, hematuria resolved.  # epistaxis--seen by ENT-- US of parotid for lt mass was negative-- bacitracin and avoid nose picking  #Hx of subdural he-- repeat CT head is normal  # PTT and PT elevated-- mixing study done will discuss results with hematology, pending heme f/u  #Dispo: pt recommends STR, pt awaiting auth to [facility]

## 2021-12-24 ENCOUNTER — TRANSCRIPTION ENCOUNTER (OUTPATIENT)
Age: 76
End: 2021-12-24

## 2021-12-24 VITALS
DIASTOLIC BLOOD PRESSURE: 61 MMHG | SYSTOLIC BLOOD PRESSURE: 130 MMHG | TEMPERATURE: 97 F | RESPIRATION RATE: 18 BRPM | HEART RATE: 92 BPM

## 2021-12-24 LAB
ALBUMIN SERPL ELPH-MCNC: 3.6 G/DL — SIGNIFICANT CHANGE UP (ref 3.5–5.2)
ALP SERPL-CCNC: 121 U/L — HIGH (ref 30–115)
ALT FLD-CCNC: 6 U/L — SIGNIFICANT CHANGE UP (ref 0–41)
ANION GAP SERPL CALC-SCNC: 14 MMOL/L — SIGNIFICANT CHANGE UP (ref 7–14)
AST SERPL-CCNC: 16 U/L — SIGNIFICANT CHANGE UP (ref 0–41)
BASOPHILS # BLD AUTO: 0.04 K/UL — SIGNIFICANT CHANGE UP (ref 0–0.2)
BASOPHILS NFR BLD AUTO: 0.5 % — SIGNIFICANT CHANGE UP (ref 0–1)
BILIRUB SERPL-MCNC: <0.2 MG/DL — SIGNIFICANT CHANGE UP (ref 0.2–1.2)
BUN SERPL-MCNC: 43 MG/DL — HIGH (ref 10–20)
CALCIUM SERPL-MCNC: 9.3 MG/DL — SIGNIFICANT CHANGE UP (ref 8.5–10.1)
CHLORIDE SERPL-SCNC: 104 MMOL/L — SIGNIFICANT CHANGE UP (ref 98–110)
CO2 SERPL-SCNC: 22 MMOL/L — SIGNIFICANT CHANGE UP (ref 17–32)
CREAT SERPL-MCNC: 1.2 MG/DL — SIGNIFICANT CHANGE UP (ref 0.7–1.5)
EOSINOPHIL # BLD AUTO: 0.32 K/UL — SIGNIFICANT CHANGE UP (ref 0–0.7)
EOSINOPHIL NFR BLD AUTO: 4.1 % — SIGNIFICANT CHANGE UP (ref 0–8)
GLUCOSE BLDC GLUCOMTR-MCNC: 120 MG/DL — HIGH (ref 70–99)
GLUCOSE BLDC GLUCOMTR-MCNC: 162 MG/DL — HIGH (ref 70–99)
GLUCOSE BLDC GLUCOMTR-MCNC: 98 MG/DL — SIGNIFICANT CHANGE UP (ref 70–99)
GLUCOSE SERPL-MCNC: 164 MG/DL — HIGH (ref 70–99)
HCT VFR BLD CALC: 31.2 % — LOW (ref 42–52)
HGB BLD-MCNC: 9.7 G/DL — LOW (ref 14–18)
IMM GRANULOCYTES NFR BLD AUTO: 0.9 % — HIGH (ref 0.1–0.3)
LYMPHOCYTES # BLD AUTO: 0.93 K/UL — LOW (ref 1.2–3.4)
LYMPHOCYTES # BLD AUTO: 11.8 % — LOW (ref 20.5–51.1)
MAGNESIUM SERPL-MCNC: 2.1 MG/DL — SIGNIFICANT CHANGE UP (ref 1.8–2.4)
MCHC RBC-ENTMCNC: 29.7 PG — SIGNIFICANT CHANGE UP (ref 27–31)
MCHC RBC-ENTMCNC: 31.1 G/DL — LOW (ref 32–37)
MCV RBC AUTO: 95.4 FL — HIGH (ref 80–94)
MONOCYTES # BLD AUTO: 0.87 K/UL — HIGH (ref 0.1–0.6)
MONOCYTES NFR BLD AUTO: 11.1 % — HIGH (ref 1.7–9.3)
NEUTROPHILS # BLD AUTO: 5.63 K/UL — SIGNIFICANT CHANGE UP (ref 1.4–6.5)
NEUTROPHILS NFR BLD AUTO: 71.6 % — SIGNIFICANT CHANGE UP (ref 42.2–75.2)
NRBC # BLD: 0 /100 WBCS — SIGNIFICANT CHANGE UP (ref 0–0)
PLATELET # BLD AUTO: 290 K/UL — SIGNIFICANT CHANGE UP (ref 130–400)
POTASSIUM SERPL-MCNC: 5 MMOL/L — SIGNIFICANT CHANGE UP (ref 3.5–5)
POTASSIUM SERPL-SCNC: 5 MMOL/L — SIGNIFICANT CHANGE UP (ref 3.5–5)
PROT SERPL-MCNC: 6.7 G/DL — SIGNIFICANT CHANGE UP (ref 6–8)
RBC # BLD: 3.27 M/UL — LOW (ref 4.7–6.1)
RBC # FLD: 15.9 % — HIGH (ref 11.5–14.5)
SODIUM SERPL-SCNC: 140 MMOL/L — SIGNIFICANT CHANGE UP (ref 135–146)
WBC # BLD: 7.86 K/UL — SIGNIFICANT CHANGE UP (ref 4.8–10.8)
WBC # FLD AUTO: 7.86 K/UL — SIGNIFICANT CHANGE UP (ref 4.8–10.8)

## 2021-12-24 PROCEDURE — 99239 HOSP IP/OBS DSCHRG MGMT >30: CPT

## 2021-12-24 RX ORDER — FINASTERIDE 5 MG/1
1 TABLET, FILM COATED ORAL
Qty: 0 | Refills: 0 | DISCHARGE
Start: 2021-12-24

## 2021-12-24 RX ORDER — FUROSEMIDE 40 MG
1 TABLET ORAL
Qty: 0 | Refills: 0 | DISCHARGE

## 2021-12-24 RX ORDER — CEPHALEXIN 500 MG
1 CAPSULE ORAL
Qty: 16 | Refills: 0
Start: 2021-12-24 | End: 2021-12-27

## 2021-12-24 RX ORDER — QUINAPRIL HYDROCHLORIDE 40 MG/1
1 TABLET, FILM COATED ORAL
Qty: 0 | Refills: 0 | DISCHARGE

## 2021-12-24 RX ORDER — TAMSULOSIN HYDROCHLORIDE 0.4 MG/1
1 CAPSULE ORAL
Qty: 0 | Refills: 0 | DISCHARGE
Start: 2021-12-24

## 2021-12-24 RX ORDER — ACETAMINOPHEN WITH CODEINE 300MG-30MG
1 TABLET ORAL
Qty: 0 | Refills: 0 | DISCHARGE

## 2021-12-24 RX ADMIN — FINASTERIDE 5 MILLIGRAM(S): 5 TABLET, FILM COATED ORAL at 12:10

## 2021-12-24 RX ADMIN — Medication 300 MILLIGRAM(S): at 12:10

## 2021-12-24 RX ADMIN — Medication 10 MILLIGRAM(S): at 06:21

## 2021-12-24 RX ADMIN — Medication 100 MILLIGRAM(S): at 12:12

## 2021-12-24 RX ADMIN — Medication 81 MILLIGRAM(S): at 12:10

## 2021-12-24 RX ADMIN — LEVETIRACETAM 250 MILLIGRAM(S): 250 TABLET, FILM COATED ORAL at 05:18

## 2021-12-24 RX ADMIN — Medication 100 MILLIGRAM(S): at 05:18

## 2021-12-24 RX ADMIN — Medication 2: at 12:10

## 2021-12-24 NOTE — PROGRESS NOTE ADULT - PROVIDER SPECIALTY LIST ADULT
Internal Medicine
Heme/Onc
Internal Medicine
Nephrology
Urology
Hospitalist
Internal Medicine
Urology
Urology
Internal Medicine

## 2021-12-24 NOTE — DISCHARGE NOTE NURSING/CASE MANAGEMENT/SOCIAL WORK - PATIENT PORTAL LINK FT
You can access the FollowMyHealth Patient Portal offered by [facility] by registering at the following website: [email or web address]. By joining [facility]’s FollowMyHealth portal, you will also be able to view your health information using other applications (apps) compatible with our system.

## 2021-12-24 NOTE — PROGRESS NOTE ADULT - REASON FOR ADMISSION
worsening b/l LE swelling

## 2021-12-24 NOTE — DISCHARGE NOTE NURSING/CASE MANAGEMENT/SOCIAL WORK - NSDCPEFALRISK_GEN_ALL_CORE
For information on Fall & Injury Prevention, visit: [email or web address] OR  [email or web address] OR  https://www.cdc.gov/steadi/patient.html

## 2021-12-24 NOTE — PROGRESS NOTE ADULT - SUBJECTIVE AND OBJECTIVE BOX
Nephrology progress note     Patient is a 75 yo man from home with DM > 20 ears, HTN, retinopathy, cholestenol PVD, SDH with fal, and gout.  Recent increase lasix due to worsening LE edema.  Admitted with confusion/halucinations and KELL and LE erythema.      ON events/Subjective:     Allergies:  No Known Allergies    Hospital Medications:   MEDICATIONS  (STANDING):  allopurinol 300 milliGRAM(s) Oral daily  aspirin  chewable 81 milliGRAM(s) Oral daily  atorvastatin 40 milliGRAM(s) Oral at bedtime  ceFAZolin   IVPB 1000 milliGRAM(s) IV Intermittent every 8 hours  dextrose 40% Gel 15 Gram(s) Oral once  dextrose 5%. 1000 milliLiter(s) (50 mL/Hr) IV Continuous <Continuous>  dextrose 5%. 1000 milliLiter(s) (100 mL/Hr) IV Continuous <Continuous>  dextrose 50% Injectable 25 Gram(s) IV Push once  dextrose 50% Injectable 12.5 Gram(s) IV Push once  dextrose 50% Injectable 25 Gram(s) IV Push once  finasteride 5 milliGRAM(s) Oral daily  glucagon  Injectable 1 milliGRAM(s) IntraMuscular once  influenza  Vaccine (HIGH DOSE) 0.7 milliLiter(s) IntraMuscular once  insulin lispro (ADMELOG) corrective regimen sliding scale   SubCutaneous three times a day before meals  levETIRAcetam 250 milliGRAM(s) Oral two times a day  tamsulosin 0.4 milliGRAM(s) Oral at bedtime    REVIEW OF SYSTEMS:  CONSTITUTIONAL: No weakness, fevers or chills  EYES/ENT: No visual changes;  No vertigo or throat pain   NECK: No pain or stiffness  RESPIRATORY: No cough, wheezing, hemoptysis; No shortness of breath  CARDIOVASCULAR: No chest pain or palpitations.  GASTROINTESTINAL: No abdominal or epigastric pain. No nausea, vomiting, or hematemesis; No diarrhea or constipation. No melena or hematochezia.  GENITOURINARY: No dysuria, frequency, foamy urine, urinary urgency, incontinence or hematuria  NEUROLOGICAL: No numbness or weakness  SKIN: No itching, burning, rashes, or lesions   VASCULAR: No bilateral lower extremity edema.   All other review of systems is negative unless indicated above.    VITALS:  T(F): 97.5 (21 @ 07:48), Max: 97.9 (21 @ 00:00)  HR: 89 (12-24-21 @ 07:48)  BP: 147/67 (21 @ 07:48)  RR: 18 (21 @ 07:48)  SpO2: --  Wt(kg): --     @ 07:01  -   @ 07:00  --------------------------------------------------------  IN: 0 mL / OUT: 1650 mL / NET: -1650 mL     @ 07:01  -   @ 07:00  --------------------------------------------------------  IN: 0 mL / OUT: 1200 mL / NET: -1200 mL        PHYSICAL EXAM:  Constitutional: NAD  HEENT: anicteric sclera, oropharynx clear, MMM  Neck: No JVD  Respiratory: CTAB, no wheezes, rales or rhonchi  Cardiovascular: S1, S2, RRR  Gastrointestinal: BS+, soft, NT/ND  Extremities: LE bandages  Neurological: A/O x 3, no focal deficits  Psychiatric: Normal mood, normal affect  : No CVA tenderness. No gil.   Skin: No rashes  Vascular Access:    LABS:      142  |  104  |  42<H>  ----------------------------<  140<H>  4.4   |  22  |  1.2    Ca    10.0      23 Dec 2021 10:41  Mg     2.1         TPro  6.5  /  Alb  3.5  /  TBili  <0.2  /  DBili      /  AST  17  /  ALT  8   /  AlkPhos  119<H>                            9.7    8.19  )-----------( 277      ( 23 Dec 2021 10:40 )             31.3       Urine Studies:  Creatinine Trend: 1.2<--, 1.2<--, 1.2<--, 1.5<--, 1.7<--, 1.6<--  Urinalysis Basic - ( 19 Dec 2021 22:18 )    Color: Colorless / Appearance: Clear / S.013 / pH:   Gluc:  / Ketone: Negative  / Bili: Negative / Urobili: <2 mg/dL   Blood:  / Protein: 100 mg/dL / Nitrite: Negative   Leuk Esterase: Negative / RBC: 2 /HPF / WBC 1 /HPF   Sq Epi:  / Non Sq Epi: 0 /HPF / Bacteria: Negative    ·	KELL/CKD associated with recent increase lasix (improved off lasix and quinapril) awaiting labs today  ·	confusion/hallucinations ? etiology (dehydration, infection) overall more alert  ·	subnephrotic range proteinuria was on ACE  ·	chronic LE edema ? from proteinuira (but doubt as albumin normal and subnephrotic proteinuria) but more likely vascular issue (venous stasis)  ·	? bl le cellulitis (no leukocytosis) started on cefazolin   ·	gil removed and no hematuria  ·	epistaxis  ·	BP stable  ·	anemia  ·	h/o SDH  ·	deconditioned for inpatient rehab    1) continue to hold lasix and quinapril at this time as with KELL  2) abx per medicine but I question if truely with cellulitis   3) serologies per heme prior to discharge  6) for short term rehab - d/w pt and wife  d/w medicine team and wife

## 2021-12-24 NOTE — CHART NOTE - NSCHARTNOTEFT_GEN_A_CORE
RESIDENT DISCHARGE NOTE     Patient Name: MANDA TAVAREZ  MRN-[id number]    VITAL SIGNS:  T(F): 97.5 (12-24-21 @ 07:48), Max: 97.9 (12-24-21 @ 00:00)  HR: 89 (12-24-21 @ 07:48)  BP: 147/67 (12-24-21 @ 07:48)  SpO2: --      PHYSICAL EXAMINATION:  CONSTITUTIONAL: No acute distress, well-developed, well-groomed, AAOx3  HEAD: Atraumatic, normocephalic  EYES: EOM intact, PERRLA, conjunctiva and sclera clear  ENT: Supple, no masses, no thyromegaly, no bruits, no JVD; moist mucous membranes  PULMONARY: Clear to auscultation bilaterally; no wheezes, rales, or rhonchi  CARDIOVASCULAR: Regular rate and rhythm; no murmurs, rubs, or gallops  GASTROINTESTINAL: Soft, non-tender, non-distended; bowel sounds present  MUSCULOSKELETAL: 2+ peripheral pulses; no clubbing, no cyanosis, no edema  NEUROLOGY: non-focal  SKIN: No rashes or lesions; warm and dry    TEST RESULTS:                        9.7    8.19  )-----------( 277      ( 23 Dec 2021 10:40 )             31.3     12-23    142  |  104  |  42<H>  ----------------------------<  140<H>  4.4   |  22  |  1.2    Ca    10.0      23 Dec 2021 10:41  Mg     2.1     12-23    TPro  6.5  /  Alb  3.5  /  TBili  <0.2  /  DBili  x   /  AST  17  /  ALT  8   /  AlkPhos  119<H>  12-23      FINAL DISCHARGE INTERVIEW:  Resident Present: Tobin Ponce D.O. RN Present: NA    DISCHARGE MEDICATION RECONCILIATION:  Reviewed with Attending (Name: ___Dr Guzmán__ )    DISPOSITION:  [  ] Home  [  ] Home with Visiting Nursing Services  [ x ] SNF/ NH: [facility]  [  ] Acute Rehab (4A)  [  ] Other (Specify: _____ ) RESIDENT DISCHARGE NOTE     Patient Name: MANDA TAVAREZ  MRN-[id number]    VITAL SIGNS:  T(F): 97.5 (12-24-21 @ 07:48), Max: 97.9 (12-24-21 @ 00:00)  HR: 89 (12-24-21 @ 07:48)  BP: 147/67 (12-24-21 @ 07:48)  SpO2: --      PHYSICAL EXAMINATION:  CONSTITUTIONAL: No acute distress, well-developed, well-groomed, AAOx4  HEAD: Atraumatic, normocephalic  EYES: EOM intact, PERRLA, conjunctiva and sclera clear  ENT: Supple, no masses, no thyromegaly, no bruits, no JVD; moist mucous membranes  PULMONARY: Clear to auscultation bilaterally; no wheezes, rales, or rhonchi  CARDIOVASCULAR: Regular rate and rhythm; no murmurs, rubs, or gallops  GASTROINTESTINAL: Soft, non-tender, non-distended; bowel sounds present  MUSCULOSKELETAL: 2+ peripheral pulses; no clubbing, no cyanosis, no edema, dec motion left ue   NEUROLOGY: non-focal  SKIN: dry scabs LE    TEST RESULTS:                        9.7    8.19  )-----------( 277      ( 23 Dec 2021 10:40 )             31.3     12-23    142  |  104  |  42<H>  ----------------------------<  140<H>  4.4   |  22  |  1.2    Ca    10.0      23 Dec 2021 10:41  Mg     2.1     12-23    TPro  6.5  /  Alb  3.5  /  TBili  <0.2  /  DBili  x   /  AST  17  /  ALT  8   /  AlkPhos  119<H>  12-23      FINAL DISCHARGE INTERVIEW:  Resident Present: Tobin Ponce D.O. RN Present: NA    DISCHARGE MEDICATION RECONCILIATION:  Reviewed with Attending (Name: ___Dr Guzmán__ )    DISPOSITION:  [  ] Home  [  ] Home with Visiting Nursing Services  [ x ] SNF/ NH: [facility]  [  ] Acute Rehab (4A)  [  ] Other (Specify: _____ )

## 2021-12-24 NOTE — PROGRESS NOTE ADULT - SUBJECTIVE AND OBJECTIVE BOX
SUBJECTIVE:    Patient is a 76y old Male who presents with a chief complaint of worsening b/l LE swelling (24 Dec 2021 10:10)    Currently admitted to medicine with the primary diagnosis of Stasis dermatitis       Today is hospital day 5d.     PAST MEDICAL & SURGICAL HISTORY  Diverticulitis    Herniated disc, cervical    Chronic gout of foot, unspecified cause, unspecified laterality    Type 2 diabetes mellitus without complication, unspecified long term insulin use status    Hypertension, unspecified type    High cholesterol    Osteoarthritis of multiple joints, unspecified osteoarthritis type    Sciatica, unspecified laterality    History of tonsillectomy and adenoidectomy    H/O colonoscopy  2018    H/O knee surgery      ALLERGIES:  No Known Allergies    MEDICATIONS:  STANDING MEDICATIONS  allopurinol 300 milliGRAM(s) Oral daily  aspirin  chewable 81 milliGRAM(s) Oral daily  atorvastatin 40 milliGRAM(s) Oral at bedtime  ceFAZolin   IVPB 1000 milliGRAM(s) IV Intermittent every 8 hours  dextrose 40% Gel 15 Gram(s) Oral once  dextrose 5%. 1000 milliLiter(s) IV Continuous <Continuous>  dextrose 5%. 1000 milliLiter(s) IV Continuous <Continuous>  dextrose 50% Injectable 25 Gram(s) IV Push once  dextrose 50% Injectable 12.5 Gram(s) IV Push once  dextrose 50% Injectable 25 Gram(s) IV Push once  finasteride 5 milliGRAM(s) Oral daily  glucagon  Injectable 1 milliGRAM(s) IntraMuscular once  influenza  Vaccine (HIGH DOSE) 0.7 milliLiter(s) IntraMuscular once  insulin lispro (ADMELOG) corrective regimen sliding scale   SubCutaneous three times a day before meals  levETIRAcetam 250 milliGRAM(s) Oral two times a day  tamsulosin 0.4 milliGRAM(s) Oral at bedtime    PRN MEDICATIONS  hydrOXYzine  Oral Tab/Cap - Peds 10 milliGRAM(s) Oral daily PRN  oxycodone    5 mG/acetaminophen 325 mG 1 Tablet(s) Oral every 6 hours PRN    VITALS:   T(F): 97.5  HR: 89  BP: 147/67  RR: 18  SpO2: --    LABS:                        9.7    7.86  )-----------( 290      ( 24 Dec 2021 04:30 )             31.2     12-24    140  |  104  |  43<H>  ----------------------------<  164<H>  5.0   |  22  |  1.2    Ca    9.3      24 Dec 2021 04:30  Mg     2.1     12-24    TPro  6.7  /  Alb  3.6  /  TBili  <0.2  /  DBili  x   /  AST  16  /  ALT  6   /  AlkPhos  121<H>  12-24    PT/INR - ( 23 Dec 2021 10:00 )   PT: 13.30 sec;   INR: 1.16 ratio         PTT - ( 23 Dec 2021 10:00 )  PTT:49.2 sec              RADIOLOGY:    PHYSICAL EXAM:  GEN: No acute distress  LUNGS: Clear to auscultation bilaterally   HEART: S1/S2 present. RRR.   ABD/ GI: Soft, non-tender, non-distended. Bowel sounds present  EXT: NC/NC/NE/2+PP/SUNSHINE  NEURO: AAOX3

## 2021-12-24 NOTE — DISCHARGE NOTE NURSING/CASE MANAGEMENT/SOCIAL WORK - NSTRANSFERBELONGINGSRESP_GEN_A_NUR
Regarding: [place] ADV  stomach pain and bloating   ----- Message from Delores Patel sent at 10/21/2021  3:39 AM CDT -----  Patient Name: Chetna Ramírez    Full Name of Provider seen for current symptoms:   Dr Jeremiah Burnett     Pregnant (If Yes, how long?):  NO     Symptoms:  Stomach pain and bloating     Do you or any of your household members have the following symptoms:  Fever >100.0#F or >38.0#C: No    New or worsening cough, shortness of breath, sore throat, congestion, or runny nose: No    New onset of nausea, vomiting or diarrhea: No    New onset of loss of taste or smell, chills, repeated shaking with chills, muscle pain, or headache: No    Have you, a household member, or another person you have been in contact with tested positive for COVID-19 in the last 14 days?: No    Call Back #: [phone]    Call Center Account # for provider seen for current symptoms:  [id number]    Which State are you currently located in? (enter State name in Summary field):   [place]     yes

## 2021-12-25 LAB
CULTURE RESULTS: SIGNIFICANT CHANGE UP
CULTURE RESULTS: SIGNIFICANT CHANGE UP
FERRITIN SERPL-MCNC: 77 NG/ML — SIGNIFICANT CHANGE UP (ref 30–400)
SPECIMEN SOURCE: SIGNIFICANT CHANGE UP
SPECIMEN SOURCE: SIGNIFICANT CHANGE UP

## 2021-12-27 LAB
FACT XII ACT/NOR PPP: 96 % — SIGNIFICANT CHANGE UP (ref 70–145)
FACT XIIA PPP-ACNC: 76 % — SIGNIFICANT CHANGE UP (ref 45–150)
VWF AG ACT/NOR PPP IA: 34 % — LOW (ref 63–170)

## 2021-12-28 LAB
FACT IX PPP CHRO-ACNC: 115 % — HIGH (ref 50–100)
FACT IX PPP CHRO-ACNC: 134 % — HIGH (ref 50–100)
FACT VIII ACT/NOR PPP: 168 % — HIGH (ref 50–150)
FACT VIII ACT/NOR PPP: 94 % — SIGNIFICANT CHANGE UP (ref 50–150)
LA NT DPL PPP QL: SIGNIFICANT CHANGE UP

## 2021-12-31 LAB
DRVVT SCREEN TO CONFIRM RATIO: SIGNIFICANT CHANGE UP
NORMALIZED SCT PPP-RTO: 1.16 RATIO — SIGNIFICANT CHANGE UP (ref 0–1.16)
NORMALIZED SCT PPP-RTO: SIGNIFICANT CHANGE UP

## 2022-01-10 NOTE — CDI QUERY NOTE - NSCDIOTHERTXTBX_GEN_ALL_CORE_HH
DOCUMENTATION CLARIFICATION FORM     Encounter #: [id number]                                          Patient’s Name:  Perez Polanco  Medical Record #: [id number]                                Admit Date: 12-  CDI Specialist/: Jonah                                Contact #: [phone]    Dear Dr. Chavez,          Date: 1-5-2022                  The Physician’s or Provider’s documentation of the patient’s presentation, evaluation and  medical management, as identified below, may support a diagnosis that is not documented in the medical record.  In order to accurately capture all diagnoses to the greatest degree of specificity reflecting the patient’s actual severity of illness, the documentation in this patient’s medical record requires additional clarification.  Please include more specific documentation, either known or suspected, of a corresponding diagnosis associated with the clinical information described below in your Progress Note and/or Discharge Summary.    CLINICAL INDICATORS:  •	12-20 Podiatry Consult:... Physical Exam - Lower Extremity Focused: Derm: Multiple sanguinous blisters to b/l digits....Circular sanguinous blister, Left plantar forefoot.... debridement of Left plantar forefoot sanguinous blister performed w/#15 blade & dermal curette to bleeding tissue.....  Podiatry Attending: Left plantar blister-->excisionally debrided w/ curette down to evaluate for underlying ulcerations--->no underlying, deep wounds appreciated....right hallux blood blister-->no acute signs of infection    Query  Based on your professional judgment and the above clinical indicators, please clarify if debridement down to level of ulcerations can be further specified as:  •	Excisional debridement down to and including skin  •	Excisional debridement down to and including subcutaneous tissue  •	Other(please specify)_________                                                  Documentation clarification is required for compliance, accuracy in coding and billing, and reporting severity of illness, quality data and risk of mortality.  --------------------------------------------------------------------------------------------------------------------------------------------  DO NOT REMOVE THIS RECORD WITHOUT FIRST NOTIFYING THE CDI SPECIALIS  This form is NOT a part of the permanent Medical Record.

## 2022-01-11 NOTE — CDI QUERY NOTE - NSCDIOTHERTXTBX_GEN_ALL_CORE_HH
DOCUMENTATION CLARIFICATION FORM     Encounter #: [id number]                                           Patient’s Name: Perez Polanco  Medical Record #: [id number]                                 Admit Date: 12-  CDI Specialist/: Jonah                                 Contact #: [phone]    Dear Dr. Guzmán,                       Date: 1-                  The Physician’s or Provider’s documentation of the patient’s presentation, evaluation and  medical management, as identified below, may support a diagnosis that is not documented in the medical record.  In order to accurately capture all diagnoses to the greatest degree of specificity reflecting the patient’s actual severity of illness, the documentation in this patient’s medical record requires additional clarification.  Please include more specific documentation, either known or suspected, of a corresponding diagnosis associated with the clinical information described below in your Progress Note and/or Discharge Summary.    CLINICAL INDICATORS  Documentation  •	12-19 H&P:... presents with complaints of worsening swelling around his chronic b/l venous stasis wounds and generalized weakness and feeling cold.   •	12-20 Nephrology Consult:... Admitted with confusion/hallucinations and KELL and LE erythema.... confusion/hallucinations ? etiology (dehydration, infection)...   •	12-21 ID Consult:... B/l stasis dermatitis, difficult to rule out overlying RLE cellulitis...Sepsis ruled out on admission + Hypothermia  •	12-22 Medicine PN:... AMS...ho subdural hemorrhage -possibly secondary to septic encephalopathy vs bleed vs Urinary retention... repeat CT head is normal  Imaging  •	12-22 CT head Radiology Impression in comparison to the previous head CT 9/14/2021: 1.  The right cerebral convexity subdural collection has continued to decrease, measuring 2 mm, previously 3 mm. The interhemispheric fissure subdural hemorrhage has essentially resolved. 2.  No evidence of new intracranial hemorrhage. No acute infarct or mass effect demonstrated.    QUERY  Based on your clinical judgment and the above clinical indicators, can septic encephalopathy be further specified as:  ( )  Septic encephalopathy was evaluated and ruled out at the time of discharge because sepsis was ruled out, AMS secondary to metabolic encephalopathy ruled in (specify etiology if known)________  (  ) Septic encephalopathy was evaluated and ruled out at the time of discharge because sepsis was ruled out, AMS due to, other (please specify)_____________________________  (  ) Clinically unable to determine      Documentation clarification is required for compliance, accuracy in coding and billing, and reporting severity of illness, quality data   and risk of mortality.  --------------------------------------------------------------------------------------------------------------------------------------------  DO NOT REMOVE THIS RECORD WITHOUT FIRST NOTIFYING THE CDI SP

## 2022-02-01 ENCOUNTER — APPOINTMENT (OUTPATIENT)
Dept: HEMATOLOGY ONCOLOGY | Facility: CLINIC | Age: 77
End: 2022-02-01

## 2022-02-01 LAB — VWF CBA/VWF AG PPP IA-RTO: SIGNIFICANT CHANGE UP

## 2022-02-02 LAB — VWF CBA/VWF AG PPP IA-RTO: SIGNIFICANT CHANGE UP

## 2022-02-15 ENCOUNTER — APPOINTMENT (OUTPATIENT)
Dept: HEMATOLOGY ONCOLOGY | Facility: CLINIC | Age: 77
End: 2022-02-15

## 2022-02-16 ENCOUNTER — EMERGENCY (EMERGENCY)
Facility: HOSPITAL | Age: 77
LOS: 0 days | Discharge: HOME | End: 2022-02-16
Attending: EMERGENCY MEDICINE | Admitting: EMERGENCY MEDICINE
Payer: MEDICARE

## 2022-02-16 VITALS
RESPIRATION RATE: 20 BRPM | HEART RATE: 104 BPM | DIASTOLIC BLOOD PRESSURE: 74 MMHG | OXYGEN SATURATION: 100 % | WEIGHT: 212.08 LBS | HEIGHT: 70 IN | TEMPERATURE: 98 F | SYSTOLIC BLOOD PRESSURE: 156 MMHG

## 2022-02-16 VITALS — HEART RATE: 87 BPM

## 2022-02-16 DIAGNOSIS — R04.0 EPISTAXIS: ICD-10-CM

## 2022-02-16 DIAGNOSIS — N18.4 CHRONIC KIDNEY DISEASE, STAGE 4 (SEVERE): ICD-10-CM

## 2022-02-16 DIAGNOSIS — E11.22 TYPE 2 DIABETES MELLITUS WITH DIABETIC CHRONIC KIDNEY DISEASE: ICD-10-CM

## 2022-02-16 DIAGNOSIS — E78.00 PURE HYPERCHOLESTEROLEMIA, UNSPECIFIED: ICD-10-CM

## 2022-02-16 DIAGNOSIS — I12.9 HYPERTENSIVE CHRONIC KIDNEY DISEASE WITH STAGE 1 THROUGH STAGE 4 CHRONIC KIDNEY DISEASE, OR UNSPECIFIED CHRONIC KIDNEY DISEASE: ICD-10-CM

## 2022-02-16 DIAGNOSIS — E78.5 HYPERLIPIDEMIA, UNSPECIFIED: ICD-10-CM

## 2022-02-16 DIAGNOSIS — Z98.890 OTHER SPECIFIED POSTPROCEDURAL STATES: Chronic | ICD-10-CM

## 2022-02-16 DIAGNOSIS — Z79.82 LONG TERM (CURRENT) USE OF ASPIRIN: ICD-10-CM

## 2022-02-16 PROCEDURE — 99284 EMERGENCY DEPT VISIT MOD MDM: CPT

## 2022-02-16 PROCEDURE — 99283 EMERGENCY DEPT VISIT LOW MDM: CPT

## 2022-02-16 RX ORDER — TRANEXAMIC ACID 100 MG/ML
5 INJECTION, SOLUTION INTRAVENOUS ONCE
Refills: 0 | Status: COMPLETED | OUTPATIENT
Start: 2022-02-16 | End: 2022-02-16

## 2022-02-16 RX ADMIN — TRANEXAMIC ACID 5 MILLILITER(S): 100 INJECTION, SOLUTION INTRAVENOUS at 11:30

## 2022-02-16 NOTE — ED PROVIDER NOTE - PHYSICAL EXAMINATION
VITAL SIGNS: I have reviewed nursing notes and confirm.  CONSTITUTIONAL: Well-developed; well-nourished; in no acute distress.  SKIN: Skin exam is warm and dry, no acute rash.  HEAD: Normocephalic; atraumatic.  EYES: PERRL, EOM intact; conjunctiva and sclera clear.  ENT: Airway clear. Minimal blood noted to posterior pharynx. Clot of blood noted in left nare with oozing. Dried blood noted in right nare.   NECK: Supple; non tender.  CARD: S1, S2 normal; no murmurs, gallops, or rubs. Regular rate and rhythm.  RESP: Clear to auscultation bilaterally. No wheezes, rales or rhonchi.  ABD: Normal bowel sounds; soft; non-distended; non-tender.   EXT: Normal ROM. No edema.  LYMPH: No acute cervical adenopathy.  NEURO: Alert, oriented. Grossly unremarkable. No focal deficits.  PSYCH: Cooperative, appropriate.

## 2022-02-16 NOTE — ED PROVIDER NOTE - PATIENT PORTAL LINK FT
You can access the FollowMyHealth Patient Portal offered by [facility] by registering at the following website: [email or web address]. By joining [employer]’s FollowMyHealth portal, you will also be able to view your health information using other applications (apps) compatible with our system.

## 2022-02-16 NOTE — CONSULT NOTE ADULT - SUBJECTIVE AND OBJECTIVE BOX
ENT CONSULT NOTE:    Patient is a 65 yo male with pmh of HTN, high cholesterol, DM II, diverticulitis, gout presents to the ED with left epistaxis that started at 6AM this morning. Pt's wife at bedside reports pt has been having intermittent nose bleeds for the past two weeks and have spontaneously resolved. Pt also admits to take ASA. Pt admits to spitting up small dark clots. Pt's wife states he has not taken his BP medication this morning. Pt denies fever, chills, N/V, salty or metallic taste, SOB, difficulty breathing.         REVIEW OF SYSTEMS:    [ x ] a 10 point review of systems was negative except where noted      PAST MEDICAL & SURGICAL HISTORY:  Diverticulitis    Herniated disc, cervical    Chronic gout of foot, unspecified cause, unspecified laterality    Type 2 diabetes mellitus without complication, unspecified long term insulin use status    Hypertension, unspecified type    High cholesterol    Osteoarthritis of multiple joints, unspecified osteoarthritis type    Sciatica, unspecified laterality    History of tonsillectomy and adenoidectomy    H/O colonoscopy  2018    H/O knee surgery        Allergies  No Known Allergies        Vital Signs Last 24 Hrs  T(C): 36.7 (16 Feb 2022 08:01), Max: 36.7 (16 Feb 2022 08:01)  T(F): 98 (16 Feb 2022 08:01), Max: 98 (16 Feb 2022 08:01)  HR: 87 (16 Feb 2022 11:00) (87 - 104)  BP: 156/74 (16 Feb 2022 08:01) (156/74 - 156/74)  RR: 20 (16 Feb 2022 08:01) (20 - 20)  SpO2: 100% (16 Feb 2022 08:01) (100% - 100%)    PHYSICAL EXAM:  Gen: NAD, awake & alert. No drooling or pooling of secretions. No respiratory distress. No stridor or stertor. good vocal quality   Skin: Non diaphoretic  Head: Normocephalic, Atraumatic  Eyes: no scleral injection  Ears: No pre/post auricular or tragal TTP bilaterally   Nose: + dried blood to left nares, no active bleeding noted. pale dry nasal mucosa noted to right nares, no active bleeding    Mouth: Oral mucosa moist/pink, uvula midline, posterior oropharynx without edema or erythema. + old blood clot noted to the left posterior OP. pt gargled some water and was able to spit out clot    Neck: Flat, supple, no lymphadenopathy, trachea midline  Respiratory: Breathing easily, no accessory muscle use   CV: extremities without edema  GI: Soft, nontender, nondistended   Musculoskeletal: moving all extremities x 4    Applied TXA topical nasal spray to left nares and held pressure. b/l nares reassessed, no active bleeding noted to b/l or posterior OP

## 2022-02-16 NOTE — PROGRESS NOTE ADULT - SUBJECTIVE AND OBJECTIVE BOX
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  In Er with epistaxis (chronic problem)      PAST MEDICAL & SURGICAL HISTORY:  Diverticulitis    Herniated disc, cervical    Chronic gout of foot, unspecified cause, unspecified laterality    Type 2 diabetes mellitus without complication, unspecified long term insulin use status    Hypertension, unspecified type    High cholesterol    Osteoarthritis of multiple joints, unspecified osteoarthritis type    Sciatica, unspecified laterality    History of tonsillectomy and adenoidectomy    H/O colonoscopy  2018    H/O knee surgery      Allergies:  No Known Allergies    Home Medications Reviewed  Hospital Medications:   MEDICATIONS  (STANDING):  tranexamic acid Injectable for Topical Use 5 milliLiter(s) Topical once      SOCIAL HISTORY:  Denies ETOH,Smoking,   FAMILY HISTORY:  FH: heart disease  both parents          REVIEW OF SYSTEMS:  CONSTITUTIONAL: No weakness, fevers or chills  EYES/ENT: No visual changes;  No vertigo or throat pain   NECK: No pain or stiffness  RESPIRATORY: No cough, wheezing, hemoptysis; No shortness of breath  CARDIOVASCULAR: No chest pain or palpitations.  GASTROINTESTINAL: No abdominal or epigastric pain. No nausea, vomiting, or hematemesis; No diarrhea or constipation. No melena or hematochezia.  GENITOURINARY: No dysuria, frequency, foamy urine, urinary urgency, incontinence or hematuria  NEUROLOGICAL: No numbness or weakness  SKIN: No itching, burning, rashes, or lesions   VASCULAR: No bilateral lower extremity edema.   All other review of systems is negative unless indicated above.    VITALS:  T(F): 98 (02-16-22 @ 08:01), Max: 98 (02-16-22 @ 08:01)  HR: 104 (02-16-22 @ 08:01)  BP: 156/74 (02-16-22 @ 08:01)  RR: 20 (02-16-22 @ 08:01)  SpO2: 100% (02-16-22 @ 08:01)    Height (cm): 177.8 (02-16 @ 08:01)  Weight (kg): 96.2 (02-16 @ 08:01)  BMI (kg/m2): 30.4 (02-16 @ 08:01)  BSA (m2): 2.14 (02-16 @ 08:01)    I&O's Detail        PHYSICAL EXAM:  Constitutional: NAD  HEENT: blood in mouth  Neck: No JVD  Respiratory: CTAB, no wheezes, rales or rhonchi  Cardiovascular: S1, S2, RRR  Gastrointestinal: BS+, soft, NT/ND  Extremities: bl le edema  Neurological: A/O x 3, no focal deficits  Psychiatric: Normal mood, normal affect  : No CVA tenderness. No gil.   Skin: No rashes  Vascular Access:    LABS:        Creatinine Trend:     Urine Studies:        ·	CKD stage 4  ·	DM with nephropathy  ·	HTN  ·	anemia  ·	baseline borderline hyperkalemia  ·	edema  ·	admitted with prolonged epistaxis event    1) source of bleed not easily visualized - ENT to follow  2) suggest checking bmp to evaluate potassium level

## 2022-02-16 NOTE — CONSULT NOTE ADULT - ASSESSMENT
63 yo male with pmh of HTN, high cholesterol, DM II, diverticulitis, gout presents to the ED with left epistaxis that started at 6AM this morning - epistaxis resolved no active bleeding noted from b/l nares and posterior OP    Plan:  ·	TXA topical nasal spray applied to left nares  ·	applied bacitracin to b/l nares, will need to continue bacitracin to b/l nares BID  ·	BP control   ·	recommend air humidifier   ·	Avoid: Nasal trauma; no nose rubbing, blowing or manipulating nasal packing, bending with head blow the waist and heavy lifting  ·	Recommend sneezing with mouth open and pinching nares  ·	pt admits to following with Dr. Petit ENT specialist as outpatient, will make an appointment to see him  ·	no further acute ENT intervention at this time  ·	will discuss with attending

## 2022-02-16 NOTE — ED PROVIDER NOTE - OBJECTIVE STATEMENT
75 yo M with pmhx of HTN, DM, CKD, HLD, gout presenting for evaluation of epistaxis that started this morning at 6am. Patient went to ENT yesterday for epistaxis and had left nare cauterized. Denies any trauma. No cp, sob, fever, chills, abdominal pain, nausea, vomiting, diarrhea, back pain, urinary symptoms, headache, dizziness,  paresthesias, or weakness.

## 2022-02-16 NOTE — ED PROVIDER NOTE - CARE PROVIDER_API CALL
Luis Carlos Bal)  [facility] Surgery  [address] [place] [address]  Phone: [phone]  Fax: [phone]  Follow Up Time: 1-3 Days

## 2022-02-16 NOTE — ED PROVIDER NOTE - ATTENDING CONTRIBUTION TO CARE
76-year-old male PMH CKD, osteoarthritis, elevated cholesterol, HTN, DM, gout , recurrent epistaxis for years presenting for evaluation of epistaxis from his left nare since this morning.  The patient was seen by ENT yesterday but no bleeding was seen during the exam at that point.  Patient denies any additional complaints such as chest pain, SOB, dizziness or lightheadedness, abdominal pain, nausea or vomiting.  Is an elderly man appears chronically ill but in no acute distress, PERRLA,, blood in both nostrils, dried blood to the posterior pharynx and tongue, mild bleeding from the left side, RRR, well-perfused extremities, abdomen soft and nontender to palpation, awake and alert.  The patient was evaluated by ENT, bleeding stopped after TXA,, patient was discharged back to nursing home.  Strict return precautions.  Patient and his family verbalized understanding and are amenable with the plan.

## 2022-02-16 NOTE — ED PROVIDER NOTE - PROGRESS NOTE DETAILS
EP; no bleeding at present. EP: The patient was evaluated by ENT bleeding stopped with TXA, no packing needed.  The patient was observed in the ED no further episodes of epistaxis, he was cleared for discharge home advised to follow-up with his ENT or with ENT associated with this institution in 2 days.  Strict return return precautions given he and his wife verbalized understanding and are amenable with the plan.  Discharged back to nursing home.

## 2022-02-16 NOTE — ED PROVIDER NOTE - NS ED ROS FT
Review of Systems:  	•	CONSTITUTIONAL - no fever, no diaphoresis, no chills  	•	SKIN - no rash  	•	HEMATOLOGIC - no bleeding, no bruising  	•	EYES - no eye pain, no blurry vision  	•	ENT - +epistaxis, no congestion  	•	RESPIRATORY - no shortness of breath, no cough  	•	CARDIAC - no chest pain, no palpitations  	•	GI - no abd pain, no nausea, no vomiting, no diarrhea, no constipation  	•	GENITO-URINARY - no dysuria; no hematuria, no increased urinary frequency  	•	MUSCULOSKELETAL - no joint paint, no swelling, no redness  	•	NEUROLOGIC - no weakness, no headache, no paresthesias, no LOC  	•	PSYCH - no anxiety, no depression  	All other ROS are negative except as documented in HPI.

## 2022-02-25 NOTE — ED ADULT NURSE NOTE - HISTORY OF COVID-19 VACCINATION
Physical Therapy        Physical Therapy Cancel Note      DATE: 2022    NAME: Jemima Causey  MRN: [id number]   : 1974      Patient not seen this date for Physical Therapy due to:     Hemodialysis: Cx, per RN Autumn Chappell, pt is in dialysis, will check back this afternoon as time permits.           Electronically signed by Kal Ramos PTA on 2022 at 11:42 AM No

## 2022-05-25 NOTE — CONSULT NOTE ADULT - ASSESSMENT
Spoke with wife. Informed of new colonoscopy guidelines for 80 year old and above. Encouraged to make appt with gi md prior to procedure. Wife stated had the phone number to the gi department because she used to work there.   ASSESSMENT  76 y male with PMH of CKD3, HFrEF, HTN, HLD, DM presents with complaints of worsening swelling around his chronic b/l venous stasis wounds and generalized weakness and feeling cold. Patient was recently placed on po levofloxacin without any improvement.     IMPRESSION  #  #Sepsis ruled out on admission + Hypothermia   12/19 Blood & Urine cxs NGTD   #Obesity BMI (kg/m2): 31.6  #DM   #CKD  Creatinine, Serum: 1.5 (12-21-21 @ 04:30)    Weight (kg): 99.8 (12-19-21 @ 20:29)    RECOMMENDATIONS  This is an incomplete consult note. All final recommendations to follow after interview and examination of the patient. Please follow recommendations noted below.    If any questions, please call or send a message on [facility] Teams  Please continue to update ID with any pertinent new laboratory or radiographic findings  Spectra [id number]   ASSESSMENT  76 y male with PMH of CKD3, HFrEF, HTN, HLD, DM presents with complaints of worsening swelling around his chronic b/l venous stasis wounds and generalized weakness and feeling cold. Patient was recently placed on po levofloxacin without any improvement.     IMPRESSION  #B/l stasis dermatitis, difficult to rule out overlying RLE cellulitis   #Sepsis ruled out on admission + Hypothermia   12/19 Blood & Urine cxs NGTD   #Obesity BMI (kg/m2): 31.6  #DM   #CKD  Creatinine, Serum: 1.5 (12-21-21 @ 04:30)    Weight (kg): 99.8 (12-19-21 @ 20:29)    RECOMMENDATIONS  - Cefazolin IV, on D/C keflex 500mg four times daily, complete total of 7 days  - Leg elevation and compression  - Topical steroid for stasis dermatitis    Please recall ID [id number]  If any questions, please call or send a message on [employer] Teams  Please continue to update ID with any pertinent new laboratory or radiographic findings  Spectra [id number]

## 2022-10-13 ENCOUNTER — EMERGENCY (EMERGENCY)
Facility: HOSPITAL | Age: 77
LOS: 0 days | Discharge: HOME | End: 2022-10-13
Attending: STUDENT IN AN ORGANIZED HEALTH CARE EDUCATION/TRAINING PROGRAM | Admitting: INTERNAL MEDICINE

## 2022-10-13 VITALS
DIASTOLIC BLOOD PRESSURE: 78 MMHG | TEMPERATURE: 99 F | SYSTOLIC BLOOD PRESSURE: 129 MMHG | HEART RATE: 81 BPM | OXYGEN SATURATION: 99 % | RESPIRATION RATE: 18 BRPM

## 2022-10-13 VITALS
TEMPERATURE: 99 F | HEART RATE: 84 BPM | DIASTOLIC BLOOD PRESSURE: 65 MMHG | SYSTOLIC BLOOD PRESSURE: 135 MMHG | HEIGHT: 70 IN | OXYGEN SATURATION: 96 % | RESPIRATION RATE: 18 BRPM | WEIGHT: 214.95 LBS

## 2022-10-13 DIAGNOSIS — Z98.890 OTHER SPECIFIED POSTPROCEDURAL STATES: Chronic | ICD-10-CM

## 2022-10-13 DIAGNOSIS — Z23 ENCOUNTER FOR IMMUNIZATION: ICD-10-CM
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
## 2022-10-13 DIAGNOSIS — R07.89 OTHER CHEST PAIN: ICD-10-CM

## 2022-10-13 DIAGNOSIS — S51.812A LACERATION WITHOUT FOREIGN BODY OF LEFT FOREARM, INITIAL ENCOUNTER: ICD-10-CM

## 2022-10-13 DIAGNOSIS — M10.9 GOUT, UNSPECIFIED: ICD-10-CM

## 2022-10-13 DIAGNOSIS — N18.9 CHRONIC KIDNEY DISEASE, UNSPECIFIED: ICD-10-CM

## 2022-10-13 DIAGNOSIS — Z79.82 LONG TERM (CURRENT) USE OF ASPIRIN: ICD-10-CM

## 2022-10-13 DIAGNOSIS — E11.9 TYPE 2 DIABETES MELLITUS WITHOUT COMPLICATIONS: ICD-10-CM
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
## 2022-10-13 DIAGNOSIS — I12.9 HYPERTENSIVE CHRONIC KIDNEY DISEASE WITH STAGE 1 THROUGH STAGE 4 CHRONIC KIDNEY DISEASE, OR UNSPECIFIED CHRONIC KIDNEY DISEASE: ICD-10-CM

## 2022-10-13 DIAGNOSIS — E78.5 HYPERLIPIDEMIA, UNSPECIFIED: ICD-10-CM

## 2022-10-13 DIAGNOSIS — M79.632 PAIN IN LEFT FOREARM: ICD-10-CM

## 2022-10-13 LAB
ALBUMIN SERPL ELPH-MCNC: 4.1 G/DL — SIGNIFICANT CHANGE UP (ref 3.5–5.2)
ALP SERPL-CCNC: 93 U/L — SIGNIFICANT CHANGE UP (ref 30–115)
ALT FLD-CCNC: 24 U/L — SIGNIFICANT CHANGE UP (ref 0–41)
ANION GAP SERPL CALC-SCNC: 12 MMOL/L — SIGNIFICANT CHANGE UP (ref 7–14)
APPEARANCE UR: CLEAR — SIGNIFICANT CHANGE UP
APTT BLD: 36.5 SEC — SIGNIFICANT CHANGE UP (ref 27–39.2)
AST SERPL-CCNC: 35 U/L — SIGNIFICANT CHANGE UP (ref 0–41)
BACTERIA # UR AUTO: NEGATIVE — SIGNIFICANT CHANGE UP
BASOPHILS # BLD AUTO: 0.04 K/UL — SIGNIFICANT CHANGE UP (ref 0–0.2)
BASOPHILS NFR BLD AUTO: 0.5 % — SIGNIFICANT CHANGE UP (ref 0–1)
BILIRUB SERPL-MCNC: 0.3 MG/DL — SIGNIFICANT CHANGE UP (ref 0.2–1.2)
BILIRUB UR-MCNC: NEGATIVE — SIGNIFICANT CHANGE UP
BUN SERPL-MCNC: 28 MG/DL — HIGH (ref 10–20)
CALCIUM SERPL-MCNC: 9.2 MG/DL — SIGNIFICANT CHANGE UP (ref 8.4–10.5)
CHLORIDE SERPL-SCNC: 103 MMOL/L — SIGNIFICANT CHANGE UP (ref 98–110)
CK SERPL-CCNC: 744 U/L — HIGH (ref 0–225)
CO2 SERPL-SCNC: 26 MMOL/L — SIGNIFICANT CHANGE UP (ref 17–32)
COLOR SPEC: SIGNIFICANT CHANGE UP
CREAT SERPL-MCNC: 1.4 MG/DL — SIGNIFICANT CHANGE UP (ref 0.7–1.5)
DIFF PNL FLD: NEGATIVE — SIGNIFICANT CHANGE UP
EGFR: 51 ML/MIN/1.73M2 — LOW
EOSINOPHIL # BLD AUTO: 0.03 K/UL — SIGNIFICANT CHANGE UP (ref 0–0.7)
EOSINOPHIL NFR BLD AUTO: 0.4 % — SIGNIFICANT CHANGE UP (ref 0–8)
EPI CELLS # UR: 0 /HPF — SIGNIFICANT CHANGE UP (ref 0–5)
ETHANOL SERPL-MCNC: <10 MG/DL — SIGNIFICANT CHANGE UP
GLUCOSE SERPL-MCNC: 118 MG/DL — HIGH (ref 70–99)
GLUCOSE UR QL: NEGATIVE — SIGNIFICANT CHANGE UP
HCT VFR BLD CALC: 35.1 % — LOW (ref 42–52)
HGB BLD-MCNC: 11 G/DL — LOW (ref 14–18)
HYALINE CASTS # UR AUTO: 0 /LPF — SIGNIFICANT CHANGE UP (ref 0–7)
IMM GRANULOCYTES NFR BLD AUTO: 0.4 % — HIGH (ref 0.1–0.3)
INR BLD: 1.15 RATIO — SIGNIFICANT CHANGE UP (ref 0.65–1.3)
KETONES UR-MCNC: NEGATIVE — SIGNIFICANT CHANGE UP
LACTATE SERPL-SCNC: 1.6 MMOL/L — SIGNIFICANT CHANGE UP (ref 0.7–2)
LEUKOCYTE ESTERASE UR-ACNC: NEGATIVE — SIGNIFICANT CHANGE UP
LIDOCAIN IGE QN: 23 U/L — SIGNIFICANT CHANGE UP (ref 7–60)
LYMPHOCYTES # BLD AUTO: 0.82 K/UL — LOW (ref 1.2–3.4)
LYMPHOCYTES # BLD AUTO: 10.8 % — LOW (ref 20.5–51.1)
MCHC RBC-ENTMCNC: 30.9 PG — SIGNIFICANT CHANGE UP (ref 27–31)
MCHC RBC-ENTMCNC: 31.3 G/DL — LOW (ref 32–37)
MCV RBC AUTO: 98.6 FL — HIGH (ref 80–94)
MONOCYTES # BLD AUTO: 1.28 K/UL — HIGH (ref 0.1–0.6)
MONOCYTES NFR BLD AUTO: 16.8 % — HIGH (ref 1.7–9.3)
NEUTROPHILS # BLD AUTO: 5.42 K/UL — SIGNIFICANT CHANGE UP (ref 1.4–6.5)
NEUTROPHILS NFR BLD AUTO: 71.1 % — SIGNIFICANT CHANGE UP (ref 42.2–75.2)
NITRITE UR-MCNC: NEGATIVE — SIGNIFICANT CHANGE UP
NRBC # BLD: 0 /100 WBCS — SIGNIFICANT CHANGE UP (ref 0–0)
PH UR: 7 — SIGNIFICANT CHANGE UP (ref 5–8)
PLATELET # BLD AUTO: 201 K/UL — SIGNIFICANT CHANGE UP (ref 130–400)
POTASSIUM SERPL-MCNC: 4.3 MMOL/L — SIGNIFICANT CHANGE UP (ref 3.5–5)
POTASSIUM SERPL-SCNC: 4.3 MMOL/L — SIGNIFICANT CHANGE UP (ref 3.5–5)
PROT SERPL-MCNC: 7.1 G/DL — SIGNIFICANT CHANGE UP (ref 6–8)
PROT UR-MCNC: ABNORMAL
PROTHROM AB SERPL-ACNC: 13.2 SEC — HIGH (ref 9.95–12.87)
RBC # BLD: 3.56 M/UL — LOW (ref 4.7–6.1)
RBC # FLD: 15.3 % — HIGH (ref 11.5–14.5)
RBC CASTS # UR COMP ASSIST: 2 /HPF — SIGNIFICANT CHANGE UP (ref 0–4)
SODIUM SERPL-SCNC: 141 MMOL/L — SIGNIFICANT CHANGE UP (ref 135–146)
SP GR SPEC: 1.01 — SIGNIFICANT CHANGE UP (ref 1.01–1.03)
UROBILINOGEN FLD QL: SIGNIFICANT CHANGE UP
WBC # BLD: 7.62 K/UL — SIGNIFICANT CHANGE UP (ref 4.8–10.8)
WBC # FLD AUTO: 7.62 K/UL — SIGNIFICANT CHANGE UP (ref 4.8–10.8)
WBC UR QL: 0 /HPF — SIGNIFICANT CHANGE UP (ref 0–5)

## 2022-10-13 PROCEDURE — 74177 CT ABD & PELVIS W/CONTRAST: CPT | Mod: 26,MA

## 2022-10-13 PROCEDURE — 71045 X-RAY EXAM CHEST 1 VIEW: CPT | Mod: 26

## 2022-10-13 PROCEDURE — 72125 CT NECK SPINE W/O DYE: CPT | Mod: 26,MA

## 2022-10-13 PROCEDURE — 73090 X-RAY EXAM OF FOREARM: CPT | Mod: 26,LT

## 2022-10-13 PROCEDURE — 99284 EMERGENCY DEPT VISIT MOD MDM: CPT | Mod: GC

## 2022-10-13 PROCEDURE — 72170 X-RAY EXAM OF PELVIS: CPT | Mod: 26

## 2022-10-13 PROCEDURE — 93010 ELECTROCARDIOGRAM REPORT: CPT

## 2022-10-13 PROCEDURE — 71260 CT THORAX DX C+: CPT | Mod: 26,MA

## 2022-10-13 PROCEDURE — 70450 CT HEAD/BRAIN W/O DYE: CPT | Mod: 26,MA

## 2022-10-13 RX ORDER — TETANUS TOXOID, REDUCED DIPHTHERIA TOXOID AND ACELLULAR PERTUSSIS VACCINE, ADSORBED 5; 2.5; 8; 8; 2.5 [IU]/.5ML; [IU]/.5ML; UG/.5ML; UG/.5ML; UG/.5ML
0.5 SUSPENSION INTRAMUSCULAR ONCE
Refills: 0 | Status: COMPLETED | OUTPATIENT
Start: 2022-10-13 | End: 2022-10-13

## 2022-10-13 RX ADMIN — TETANUS TOXOID, REDUCED DIPHTHERIA TOXOID AND ACELLULAR PERTUSSIS VACCINE, ADSORBED 0.5 MILLILITER(S): 5; 2.5; 8; 8; 2.5 SUSPENSION INTRAMUSCULAR at 11:26

## 2022-10-13 NOTE — ED PROVIDER NOTE - CARE PLAN
Assessment and plan of treatment:	Plan - labs, panscan, ekg, ua, xr l forearm reassess.   1 Principal Discharge DX:	Fall  Assessment and plan of treatment:	Plan - labs, panscan, ekg, ua, xr l forearm reassess.

## 2022-10-13 NOTE — ED PROVIDER NOTE - CARE PROVIDER_API CALL
Sunil Mosqueda ()  Internal Medicine  [address] [place] [address]  Phone: [phone]  Fax: [phone]  Follow Up Time: 7-10 Days

## 2022-10-13 NOTE — ED PROVIDER NOTE - ATTENDING CONTRIBUTION TO CARE
77-year-old male past medical history diabetes, hyperlipidemia, CKD, gout presents the emergency department status post 2 falls.  Patient reports he tripped over the rug in the bathroom landing on his left side.  Patient denies head trauma or LOC.  Patient denies nausea or vomiting.  Patient reports mild constant pain to left arm sustained multiple skin avulsions.      No fever, nausea, vomiting, chest pain, sob, palpitations, diaphoresis, cough, headache, dizziness, numbness/tingling, neck pain/stiffness, abdominal pain, diarrhea, constipation, melena/brbpr, urinary symptoms, edema, calf pain or swelling, sick contacts, recent travel.    On Exam:  Vital Signs: I have reviewed the initial vital signs.  Constitutional: WDWN in nad.  HEAD: No signs of basilar skull fracture.  Integumentary: +skin tears to LUE with mild ttp, no deformity, soft compartments   EYES: No periorbital swelling/ecchymoses. PERRL, EOM intact. No nystagmus. No subconjunctival hemorrhage.   ENT: MMM. No rhinorrhea/otorrhea. No epistaxis,  No septal hematoma. No mastoid ecchymoses. No intraoral bleeding, No loose or craked teeth, no active bleeding. No malocclusion. No TMJ pain.  NECK: Supple, non-tender, No palpable shelves or step-offs.  BACK: No spinous tenderness. No palpable shelves or step-offs.  Cardiovascular: RRR, radial pulses 2/4 b/l. dp and pt pulses 2/4/ b/l. +L upper and lateral chest wall TTP.   Respiratory: BS present b/l, ctabl, no wheezing or crackles, no stridor. No crepitus. No subq emphysema.   Gastrointestinal: BS present throughout all 4 quadrants, soft, nd, nt. no r/g.  Musculoskeletal: FROM of all extremities. No bony tenderness. No pain to palpation to clavicles, no obvious bony deformities. Distal pulses symmetric.   Neurologic: GCS 15. CN II-XII intact, no facial droop or slurring of speech. Motor 5/5 and sensation intact throughout upper and lower extremities. (-) Pronator.

## 2022-10-13 NOTE — ED ADULT NURSE NOTE - NSIMPLEMENTINTERV_GEN_ALL_ED
Implemented All Fall with Harm Risk Interventions:  [place] to call system. Call bell, personal items and telephone within reach. Instruct patient to call for assistance. Room bathroom lighting operational. Non-slip footwear when patient is off stretcher. Physically safe environment: no spills, clutter or unnecessary equipment. Stretcher in lowest position, wheels locked, appropriate side rails in place. Provide visual cue, wrist band, yellow gown, etc. Monitor gait and stability. Monitor for mental status changes and reorient to person, place, and time. Review medications for side effects contributing to fall risk. Reinforce activity limits and safety measures with patient and family. Provide visual clues: red socks.

## 2022-10-13 NOTE — ED PROVIDER NOTE - PATIENT PORTAL LINK FT
You can access the FollowMyHealth Patient Portal offered by [facility] by registering at the following website: [email or web address]. By joining [facility]’s FollowMyHealth portal, you will also be able to view your health information using other applications (apps) compatible with our system.

## 2022-10-13 NOTE — ED PROVIDER NOTE - NSFOLLOWUPINSTRUCTIONS_ED_ALL_ED_FT
Fall Prevention in the Home, Adult  Falls can cause injuries and can affect people from all age groups. There are many simple things that you can do to make your home safe and to help prevent falls. Ask for help when making these changes, if needed.    What actions can I take to prevent falls?  General instructions     Use good lighting in all rooms. Replace any light bulbs that burn out.  Turn on lights if it is dark. Use night-lights.  Place frequently used items in easy-to-reach places. Lower the shelves around your home if necessary.  Set up furniture so that there are clear paths around it. Avoid moving your furniture around.  Remove throw rugs and other tripping hazards from the floor.  Avoid walking on wet floors.  Fix any uneven floor surfaces.  Add color or contrast paint or tape to grab bars and handrails in your home. Place contrasting color strips on the first and last steps of stairways.  When you use a stepladder, make sure that it is completely opened and that the sides are firmly locked. Have someone hold the ladder while you are using it. Do not climb a closed stepladder.  Be aware of any and all pets.  What can I do in the bathroom?     Keep the floor dry. Immediately clean up any water that spills onto the floor.  Remove soap buildup in the tub or shower on a regular basis.  Use non-skid mats or decals on the floor of the tub or shower.  Attach bath mats securely with double-sided, non-slip rug tape.  If you need to sit down while you are in the shower, use a plastic, non-slip stool.  Image ImageInstall grab bars by the toilet and in the tub and shower. Do not use towel bars as grab bars.  What can I do in the bedroom?     Make sure that a bedside light is easy to reach.  Do not use oversized bedding that drapes onto the floor.  Have a firm chair that has side arms to use for getting dressed.  What can I do in the kitchen?     Clean up any spills right away.  If you need to reach for something above you, use a sturdy step stool that has a grab bar.  Keep electrical cables out of the way.  Do not use floor polish or wax that makes floors slippery. If you must use wax, make sure that it is non-skid floor wax.  What can I do in the stairways?     Do not leave any items on the stairs.  Make sure that you have a light switch at the top of the stairs and the bottom of the stairs. Have them installed if you do not have them.  Make sure that there are handrails on both sides of the stairs. Fix handrails that are broken or loose. Make sure that handrails are as long as the stairways.  Install non-slip stair treads on all stairs in your home.  Avoid having throw rugs at the top or bottom of stairways, or secure the rugs with carpet tape to prevent them from moving.  Choose a carpet design that does not hide the edge of steps on the stairway.  Check any carpeting to make sure that it is firmly attached to the stairs. Fix any carpet that is loose or worn.  What can I do on the outside of my home?     Use bright outdoor lighting.  Regularly repair the edges of walkways and driveways and fix any cracks.  Remove high doorway thresholds.  Trim any shrubbery on the main path into your home.  Regularly check that handrails are securely fastened and in good repair. Both sides of any steps should have handrails.  Install guardrails along the edges of any raised decks or porches.  Clear walkways of debris and clutter, including tools and rocks.  Have leaves, snow, and ice cleared regularly.  Use sand or salt on walkways during winter months.  In the garage, clean up any spills right away, including grease or oil spills.  What other actions can I take?     Wear closed-toe shoes that fit well and support your feet. Wear shoes that have rubber soles or low heels.  Use mobility aids as needed, such as canes, walkers, scooters, and crutches.  Review your medicines with your health care provider. Some medicines can cause dizziness or changes in blood pressure, which increase your risk of falling.  Talk with your health care provider about other ways that you can decrease your risk of falls. This may include working with a physical therapist or  to improve your strength, balance, and endurance.    Where to find more information  Centers for Disease Control and Prevention, JUAN: https://www.cdc.gov  National [place] on Aging: https://tq3rxlt.bina.nih.gov  Contact a health care provider if:  You are afraid of falling at home.  You feel weak, drowsy, or dizzy at home.  You fall at home.  Summary  There are many simple things that you can do to make your home safe and to help prevent falls.  Ways to make your home safe include removing tripping hazards and installing grab bars in the bathroom.  Ask for help when making these changes in your home.  This information is not intended to replace advice given to you by your health care provider. Make sure you discuss any questions you have with your health care provider.

## 2022-10-13 NOTE — ED PROVIDER NOTE - CLINICAL SUMMARY MEDICAL DECISION MAKING FREE TEXT BOX
78 y/o M presented s/p fall x2, reports tripping over mat. CT unremarkable for acute pathology. Pt with skin avulsions to L forearm, cleaned and dressed, no fx on xr. Incidental CT findings discussed pt with wife. Pt uses walker at baseline. Patient to be discharged from ED. Any available test results were discussed with patient and/or family. Verbal instructions given, including instructions to return to ED immediately for any new, worsening, or concerning symptoms. Patient endorsed understanding. Written discharge instructions additionally given, including follow-up plan.

## 2022-10-13 NOTE — ED PROVIDER NOTE - OBJECTIVE STATEMENT
73 y m, pmh of dm, htn, torn meniscus, pw fall. Pt tripped and fell 11pm and and 3am last night, slipped on the rug, no head trauma, landed on left forearm only, +three skin tears, +mild pain, unknown tetanus status, but no pain elsewhere. No cp, abd pain, back pain, pain in extremities otherwise

## 2022-10-13 NOTE — ED ADULT NURSE NOTE - OBJECTIVE STATEMENT
Pt BIBA s/p mechanical trip and fall at home. Pt reports he  fell and slipped out of his bed . Pt has 3 lacs to left lower arm . Pt denies loc, denies head injury, denies AC use.

## 2022-10-13 NOTE — ED PROVIDER NOTE - PHYSICAL EXAMINATION
CONSTITUTIONAL: NAD  SKIN: Warm dry  HEAD: NCAT  EYES: NL inspection  ENT: MMM  NECK: Supple; non tender.  CARD: RRR  RESP: CTAB  ABD: S/NT no R/G  BACK: nontender  EXT: +mild ttp left forearm  NEURO: Grossly unremarkable  PSYCH: Cooperative, appropriate.

## 2022-10-14 LAB
CULTURE RESULTS: SIGNIFICANT CHANGE UP
SPECIMEN SOURCE: SIGNIFICANT CHANGE UP

## 2022-10-14 RX ORDER — ACETAMINOPHEN 500 MG
975 TABLET ORAL ONCE
Refills: 0 | Status: DISCONTINUED | OUTPATIENT
Start: 2022-10-14 | End: 2022-10-13

## 2022-10-14 NOTE — ED ADULT TRIAGE NOTE - RESPIRATORY RATE (BREATHS/MIN)
[Well Developed] : well developed [Well Nourished] : well nourished [No Acute Distress] : no acute distress [Normal Conjunctiva] : normal conjunctiva [Normal Venous Pressure] : normal venous pressure 18 [No Carotid Bruit] : no carotid bruit [Normal S1, S2] : normal S1, S2 [No Murmur] : no murmur [No Rub] : no rub [No Gallop] : no gallop [Clear Lung Fields] : clear lung fields [Good Air Entry] : good air entry [No Respiratory Distress] : no respiratory distress  [Soft] : abdomen soft [Non Tender] : non-tender [No Masses/organomegaly] : no masses/organomegaly [Normal Bowel Sounds] : normal bowel sounds [Normal Gait] : normal gait [No Cyanosis] : no cyanosis [No Clubbing] : no clubbing [No Varicosities] : no varicosities [No Rash] : no rash [No Skin Lesions] : no skin lesions [Moves all extremities] : moves all extremities [No Focal Deficits] : no focal deficits [Normal Speech] : normal speech [Alert and Oriented] : alert and oriented [Normal memory] : normal memory [de-identified] : 2 + edema

## 2022-12-12 NOTE — PHYSICAL THERAPY INITIAL EVALUATION ADULT - DIAGNOSIS, PT EVAL
Vishnu Viveros is a 71 year old male patient that presents today in clinic for the following:    Chief Complaint   Patient presents with     Follow Up     6 month follow up     Imm/Inj     Flu and covid vaccine      The patient's allergies and medications were reviewed as noted. A set of vitals were recorded as noted without incident: /69 (BP Location: Right arm, Patient Position: Sitting, Cuff Size: Adult Regular)   Pulse 67   Temp 97.5  F (36.4  C)   Wt 78.4 kg (172 lb 14.4 oz)   SpO2 98%   BMI 29.68 kg/m  . The patient does not have any other questions for the provider.    Debra Interiano, EMT at 4:26 PM on 12/12/2022    
debility

## 2023-03-24 ENCOUNTER — OUTPATIENT (OUTPATIENT)
Dept: OUTPATIENT SERVICES | Facility: HOSPITAL | Age: 78
LOS: 1 days | End: 2023-03-24
Payer: MEDICARE

## 2023-03-24 DIAGNOSIS — Z98.890 OTHER SPECIFIED POSTPROCEDURAL STATES: Chronic | ICD-10-CM

## 2023-03-24 DIAGNOSIS — Z00.8 ENCOUNTER FOR OTHER GENERAL EXAMINATION: ICD-10-CM

## 2023-03-24 DIAGNOSIS — N20.2 CALCULUS OF KIDNEY WITH CALCULUS OF URETER: ICD-10-CM

## 2023-03-24 PROCEDURE — 76770 US EXAM ABDO BACK WALL COMP: CPT | Mod: 26

## 2023-03-24 PROCEDURE — 76770 US EXAM ABDO BACK WALL COMP: CPT

## 2023-03-25 DIAGNOSIS — N20.2 CALCULUS OF KIDNEY WITH CALCULUS OF URETER: ICD-10-CM

## 2023-05-08 NOTE — ED ADULT NURSE NOTE - CAS EDN DISCHARGE INTERVENTIONS
Myself Myself Myself Myself Myself Myself Myself Myself Myself Myself Myself Myself Myself Myself Myself Marichuy Martinez/PA Myself IV discontinued, cath removed intact No

## 2023-06-12 NOTE — ED ADULT TRIAGE NOTE - WEIGHT IN KG
Azithromycin 200mg/5mls is the dosage of the medication.  Directions: Take 4.2 mls for one day then 2.5 mls for four days.   96.2

## 2024-01-31 ENCOUNTER — INPATIENT (INPATIENT)
Facility: HOSPITAL | Age: 79
LOS: 6 days | Discharge: HOME | DRG: 65 | End: 2024-02-07
Attending: STUDENT IN AN ORGANIZED HEALTH CARE EDUCATION/TRAINING PROGRAM | Admitting: STUDENT IN AN ORGANIZED HEALTH CARE EDUCATION/TRAINING PROGRAM
Payer: MEDICARE

## 2024-01-31 VITALS
RESPIRATION RATE: 18 BRPM | TEMPERATURE: 98 F | DIASTOLIC BLOOD PRESSURE: 51 MMHG | OXYGEN SATURATION: 97 % | HEIGHT: 70 IN | HEART RATE: 76 BPM | WEIGHT: 214.95 LBS | SYSTOLIC BLOOD PRESSURE: 112 MMHG

## 2024-01-31 DIAGNOSIS — Z98.890 OTHER SPECIFIED POSTPROCEDURAL STATES: Chronic | ICD-10-CM

## 2024-01-31 DIAGNOSIS — E87.6 HYPOKALEMIA: ICD-10-CM

## 2024-01-31 LAB
ALBUMIN SERPL ELPH-MCNC: 4.1 G/DL — SIGNIFICANT CHANGE UP (ref 3.5–5.2)
ALP SERPL-CCNC: 77 U/L — SIGNIFICANT CHANGE UP (ref 30–115)
ALT FLD-CCNC: 26 U/L — SIGNIFICANT CHANGE UP (ref 0–41)
ANION GAP SERPL CALC-SCNC: 10 MMOL/L — SIGNIFICANT CHANGE UP (ref 7–14)
ANION GAP SERPL CALC-SCNC: 10 MMOL/L — SIGNIFICANT CHANGE UP (ref 7–14)
APPEARANCE UR: CLEAR — SIGNIFICANT CHANGE UP
AST SERPL-CCNC: 43 U/L — HIGH (ref 0–41)
BACTERIA # UR AUTO: NEGATIVE /HPF — SIGNIFICANT CHANGE UP
BASE EXCESS BLDV CALC-SCNC: -10 MMOL/L — LOW (ref -2–3)
BASOPHILS # BLD AUTO: 0.01 K/UL — SIGNIFICANT CHANGE UP (ref 0–0.2)
BASOPHILS NFR BLD AUTO: 0.1 % — SIGNIFICANT CHANGE UP (ref 0–1)
BILIRUB SERPL-MCNC: <0.2 MG/DL — SIGNIFICANT CHANGE UP (ref 0.2–1.2)
BILIRUB UR-MCNC: NEGATIVE — SIGNIFICANT CHANGE UP
BUN SERPL-MCNC: 115 MG/DL — CRITICAL HIGH (ref 10–20)
BUN SERPL-MCNC: 118 MG/DL — CRITICAL HIGH (ref 10–20)
CA-I SERPL-SCNC: 1.28 MMOL/L — SIGNIFICANT CHANGE UP (ref 1.15–1.33)
CALCIUM SERPL-MCNC: 9.1 MG/DL — SIGNIFICANT CHANGE UP (ref 8.4–10.5)
CALCIUM SERPL-MCNC: 9.1 MG/DL — SIGNIFICANT CHANGE UP (ref 8.4–10.5)
CAST: 2 /LPF — SIGNIFICANT CHANGE UP (ref 0–4)
CHLORIDE SERPL-SCNC: 105 MMOL/L — SIGNIFICANT CHANGE UP (ref 98–110)
CHLORIDE SERPL-SCNC: 114 MMOL/L — HIGH (ref 98–110)
CK SERPL-CCNC: 266 U/L — HIGH (ref 0–225)
CO2 SERPL-SCNC: 17 MMOL/L — SIGNIFICANT CHANGE UP (ref 17–32)
CO2 SERPL-SCNC: 17 MMOL/L — SIGNIFICANT CHANGE UP (ref 17–32)
COLOR SPEC: YELLOW — SIGNIFICANT CHANGE UP
CREAT SERPL-MCNC: 3 MG/DL — HIGH (ref 0.7–1.5)
CREAT SERPL-MCNC: 3.2 MG/DL — HIGH (ref 0.7–1.5)
DIFF PNL FLD: NEGATIVE — SIGNIFICANT CHANGE UP
EGFR: 19 ML/MIN/1.73M2 — LOW
EGFR: 20 ML/MIN/1.73M2 — LOW
EOSINOPHIL # BLD AUTO: 0.14 K/UL — SIGNIFICANT CHANGE UP (ref 0–0.7)
EOSINOPHIL NFR BLD AUTO: 2 % — SIGNIFICANT CHANGE UP (ref 0–8)
GAS PNL BLDV: 134 MMOL/L — LOW (ref 136–145)
GAS PNL BLDV: SIGNIFICANT CHANGE UP
GLUCOSE SERPL-MCNC: 59 MG/DL — LOW (ref 70–99)
GLUCOSE SERPL-MCNC: 81 MG/DL — SIGNIFICANT CHANGE UP (ref 70–99)
GLUCOSE UR QL: NEGATIVE MG/DL — SIGNIFICANT CHANGE UP
HCO3 BLDV-SCNC: 16 MMOL/L — LOW (ref 22–29)
HCT VFR BLD CALC: 27.7 % — LOW (ref 42–52)
HCT VFR BLDA CALC: 26 % — LOW (ref 39–51)
HGB BLD CALC-MCNC: 8.6 G/DL — LOW (ref 12.6–17.4)
HGB BLD-MCNC: 9 G/DL — LOW (ref 14–18)
IMM GRANULOCYTES NFR BLD AUTO: 1.6 % — HIGH (ref 0.1–0.3)
KETONES UR-MCNC: NEGATIVE MG/DL — SIGNIFICANT CHANGE UP
LACTATE BLDV-MCNC: 0.5 MMOL/L — SIGNIFICANT CHANGE UP (ref 0.5–2)
LEUKOCYTE ESTERASE UR-ACNC: NEGATIVE — SIGNIFICANT CHANGE UP
LYMPHOCYTES # BLD AUTO: 0.28 K/UL — LOW (ref 1.2–3.4)
LYMPHOCYTES # BLD AUTO: 4.1 % — LOW (ref 20.5–51.1)
MAGNESIUM SERPL-MCNC: 3.4 MG/DL — CRITICAL HIGH (ref 1.8–2.4)
MCHC RBC-ENTMCNC: 31.3 PG — HIGH (ref 27–31)
MCHC RBC-ENTMCNC: 32.5 G/DL — SIGNIFICANT CHANGE UP (ref 32–37)
MCV RBC AUTO: 96.2 FL — HIGH (ref 80–94)
MONOCYTES # BLD AUTO: 0.64 K/UL — HIGH (ref 0.1–0.6)
MONOCYTES NFR BLD AUTO: 9.3 % — SIGNIFICANT CHANGE UP (ref 1.7–9.3)
NEUTROPHILS # BLD AUTO: 5.69 K/UL — SIGNIFICANT CHANGE UP (ref 1.4–6.5)
NEUTROPHILS NFR BLD AUTO: 82.9 % — HIGH (ref 42.2–75.2)
NITRITE UR-MCNC: NEGATIVE — SIGNIFICANT CHANGE UP
NRBC # BLD: 0 /100 WBCS — SIGNIFICANT CHANGE UP (ref 0–0)
PCO2 BLDV: 36 MMHG — LOW (ref 42–55)
PH BLDV: 7.26 — LOW (ref 7.32–7.43)
PH UR: 5.5 — SIGNIFICANT CHANGE UP (ref 5–8)
PLATELET # BLD AUTO: 149 K/UL — SIGNIFICANT CHANGE UP (ref 130–400)
PMV BLD: 11.4 FL — HIGH (ref 7.4–10.4)
PO2 BLDV: 47 MMHG — HIGH (ref 25–45)
POTASSIUM BLDV-SCNC: 6.8 MMOL/L — CRITICAL HIGH (ref 3.5–5.1)
POTASSIUM SERPL-MCNC: 6.4 MMOL/L — CRITICAL HIGH (ref 3.5–5)
POTASSIUM SERPL-MCNC: SIGNIFICANT CHANGE UP MMOL/L (ref 3.5–5)
POTASSIUM SERPL-SCNC: 6.4 MMOL/L — CRITICAL HIGH (ref 3.5–5)
POTASSIUM SERPL-SCNC: SIGNIFICANT CHANGE UP MMOL/L (ref 3.5–5)
PROT SERPL-MCNC: 6.8 G/DL — SIGNIFICANT CHANGE UP (ref 6–8)
PROT UR-MCNC: 30 MG/DL
RBC # BLD: 2.88 M/UL — LOW (ref 4.7–6.1)
RBC # FLD: 16.7 % — HIGH (ref 11.5–14.5)
RBC CASTS # UR COMP ASSIST: 0 /HPF — SIGNIFICANT CHANGE UP (ref 0–4)
SAO2 % BLDV: 77.4 % — SIGNIFICANT CHANGE UP (ref 67–88)
SODIUM SERPL-SCNC: 132 MMOL/L — LOW (ref 135–146)
SODIUM SERPL-SCNC: 141 MMOL/L — SIGNIFICANT CHANGE UP (ref 135–146)
SP GR SPEC: 1.01 — SIGNIFICANT CHANGE UP (ref 1–1.03)
SQUAMOUS # UR AUTO: 0 /HPF — SIGNIFICANT CHANGE UP (ref 0–5)
UROBILINOGEN FLD QL: 0.2 MG/DL — SIGNIFICANT CHANGE UP (ref 0.2–1)
WBC # BLD: 6.87 K/UL — SIGNIFICANT CHANGE UP (ref 4.8–10.8)
WBC # FLD AUTO: 6.87 K/UL — SIGNIFICANT CHANGE UP (ref 4.8–10.8)
WBC UR QL: 1 /HPF — SIGNIFICANT CHANGE UP (ref 0–5)

## 2024-01-31 PROCEDURE — 93010 ELECTROCARDIOGRAM REPORT: CPT | Mod: 76

## 2024-01-31 PROCEDURE — 70450 CT HEAD/BRAIN W/O DYE: CPT | Mod: 26,MA

## 2024-01-31 PROCEDURE — 83036 HEMOGLOBIN GLYCOSYLATED A1C: CPT

## 2024-01-31 PROCEDURE — 83521 IG LIGHT CHAINS FREE EACH: CPT

## 2024-01-31 PROCEDURE — 82746 ASSAY OF FOLIC ACID SERUM: CPT

## 2024-01-31 PROCEDURE — 83930 ASSAY OF BLOOD OSMOLALITY: CPT

## 2024-01-31 PROCEDURE — 80074 ACUTE HEPATITIS PANEL: CPT

## 2024-01-31 PROCEDURE — 76770 US EXAM ABDO BACK WALL COMP: CPT | Mod: 26

## 2024-01-31 PROCEDURE — 36415 COLL VENOUS BLD VENIPUNCTURE: CPT

## 2024-01-31 PROCEDURE — 97162 PT EVAL MOD COMPLEX 30 MIN: CPT | Mod: GP

## 2024-01-31 PROCEDURE — 71045 X-RAY EXAM CHEST 1 VIEW: CPT | Mod: 26

## 2024-01-31 PROCEDURE — 84443 ASSAY THYROID STIM HORMONE: CPT

## 2024-01-31 PROCEDURE — 86334 IMMUNOFIX E-PHORESIS SERUM: CPT

## 2024-01-31 PROCEDURE — 97530 THERAPEUTIC ACTIVITIES: CPT | Mod: GP

## 2024-01-31 PROCEDURE — 82962 GLUCOSE BLOOD TEST: CPT

## 2024-01-31 PROCEDURE — 80048 BASIC METABOLIC PNL TOTAL CA: CPT

## 2024-01-31 PROCEDURE — 86850 RBC ANTIBODY SCREEN: CPT

## 2024-01-31 PROCEDURE — 85045 AUTOMATED RETICULOCYTE COUNT: CPT

## 2024-01-31 PROCEDURE — 83540 ASSAY OF IRON: CPT

## 2024-01-31 PROCEDURE — 86160 COMPLEMENT ANTIGEN: CPT

## 2024-01-31 PROCEDURE — 82550 ASSAY OF CK (CPK): CPT

## 2024-01-31 PROCEDURE — 83935 ASSAY OF URINE OSMOLALITY: CPT

## 2024-01-31 PROCEDURE — 84156 ASSAY OF PROTEIN URINE: CPT

## 2024-01-31 PROCEDURE — 83010 ASSAY OF HAPTOGLOBIN QUANT: CPT

## 2024-01-31 PROCEDURE — 99285 EMERGENCY DEPT VISIT HI MDM: CPT | Mod: FS

## 2024-01-31 PROCEDURE — 85025 COMPLETE CBC W/AUTO DIFF WBC: CPT

## 2024-01-31 PROCEDURE — 83735 ASSAY OF MAGNESIUM: CPT

## 2024-01-31 PROCEDURE — 85610 PROTHROMBIN TIME: CPT

## 2024-01-31 PROCEDURE — 73560 X-RAY EXAM OF KNEE 1 OR 2: CPT | Mod: LT

## 2024-01-31 PROCEDURE — 72170 X-RAY EXAM OF PELVIS: CPT

## 2024-01-31 PROCEDURE — 71045 X-RAY EXAM CHEST 1 VIEW: CPT

## 2024-01-31 PROCEDURE — 82607 VITAMIN B-12: CPT

## 2024-01-31 PROCEDURE — 82570 ASSAY OF URINE CREATININE: CPT

## 2024-01-31 PROCEDURE — 84300 ASSAY OF URINE SODIUM: CPT

## 2024-01-31 PROCEDURE — 76770 US EXAM ABDO BACK WALL COMP: CPT

## 2024-01-31 PROCEDURE — 82728 ASSAY OF FERRITIN: CPT

## 2024-01-31 PROCEDURE — 97110 THERAPEUTIC EXERCISES: CPT | Mod: GP

## 2024-01-31 PROCEDURE — 73552 X-RAY EXAM OF FEMUR 2/>: CPT | Mod: LT

## 2024-01-31 PROCEDURE — 83921 ORGANIC ACID SINGLE QUANT: CPT

## 2024-01-31 PROCEDURE — 85730 THROMBOPLASTIN TIME PARTIAL: CPT

## 2024-01-31 PROCEDURE — 97116 GAIT TRAINING THERAPY: CPT | Mod: GP

## 2024-01-31 PROCEDURE — 70450 CT HEAD/BRAIN W/O DYE: CPT

## 2024-01-31 PROCEDURE — 93970 EXTREMITY STUDY: CPT

## 2024-01-31 PROCEDURE — 86901 BLOOD TYPING SEROLOGIC RH(D): CPT

## 2024-01-31 PROCEDURE — 93005 ELECTROCARDIOGRAM TRACING: CPT

## 2024-01-31 PROCEDURE — 84100 ASSAY OF PHOSPHORUS: CPT

## 2024-01-31 PROCEDURE — 86038 ANTINUCLEAR ANTIBODIES: CPT

## 2024-01-31 PROCEDURE — 86900 BLOOD TYPING SEROLOGIC ABO: CPT

## 2024-01-31 PROCEDURE — 73620 X-RAY EXAM OF FOOT: CPT | Mod: LT

## 2024-01-31 PROCEDURE — 99223 1ST HOSP IP/OBS HIGH 75: CPT

## 2024-01-31 PROCEDURE — 93970 EXTREMITY STUDY: CPT | Mod: 26

## 2024-01-31 PROCEDURE — 84165 PROTEIN E-PHORESIS SERUM: CPT

## 2024-01-31 PROCEDURE — 86880 COOMBS TEST DIRECT: CPT

## 2024-01-31 PROCEDURE — 84155 ASSAY OF PROTEIN SERUM: CPT

## 2024-01-31 PROCEDURE — 83615 LACTATE (LD) (LDH) ENZYME: CPT

## 2024-01-31 PROCEDURE — 80053 COMPREHEN METABOLIC PANEL: CPT

## 2024-01-31 PROCEDURE — 83550 IRON BINDING TEST: CPT

## 2024-01-31 RX ORDER — ACETAMINOPHEN 500 MG
650 TABLET ORAL EVERY 6 HOURS
Refills: 0 | Status: DISCONTINUED | OUTPATIENT
Start: 2024-01-31 | End: 2024-02-07

## 2024-01-31 RX ORDER — SODIUM ZIRCONIUM CYCLOSILICATE 10 G/10G
10 POWDER, FOR SUSPENSION ORAL THREE TIMES A DAY
Refills: 0 | Status: COMPLETED | OUTPATIENT
Start: 2024-01-31 | End: 2024-02-01

## 2024-01-31 RX ORDER — SENNA PLUS 8.6 MG/1
2 TABLET ORAL AT BEDTIME
Refills: 0 | Status: DISCONTINUED | OUTPATIENT
Start: 2024-01-31 | End: 2024-02-07

## 2024-01-31 RX ORDER — POLYETHYLENE GLYCOL 3350 17 G/17G
17 POWDER, FOR SOLUTION ORAL DAILY
Refills: 0 | Status: DISCONTINUED | OUTPATIENT
Start: 2024-02-01 | End: 2024-02-01

## 2024-01-31 RX ORDER — ONDANSETRON 8 MG/1
4 TABLET, FILM COATED ORAL EVERY 8 HOURS
Refills: 0 | Status: DISCONTINUED | OUTPATIENT
Start: 2024-01-31 | End: 2024-02-07

## 2024-01-31 RX ORDER — LANOLIN ALCOHOL/MO/W.PET/CERES
3 CREAM (GRAM) TOPICAL AT BEDTIME
Refills: 0 | Status: DISCONTINUED | OUTPATIENT
Start: 2024-01-31 | End: 2024-02-07

## 2024-01-31 RX ORDER — LEVETIRACETAM 250 MG/1
250 TABLET, FILM COATED ORAL
Refills: 0 | Status: DISCONTINUED | OUTPATIENT
Start: 2024-01-31 | End: 2024-02-07

## 2024-01-31 RX ORDER — DEXTROSE 50 % IN WATER 50 %
25 SYRINGE (ML) INTRAVENOUS ONCE
Refills: 0 | Status: DISCONTINUED | OUTPATIENT
Start: 2024-01-31 | End: 2024-02-07

## 2024-01-31 RX ORDER — FOLIC ACID 0.8 MG
1 TABLET ORAL DAILY
Refills: 0 | Status: DISCONTINUED | OUTPATIENT
Start: 2024-01-31 | End: 2024-02-07

## 2024-01-31 RX ORDER — ALLOPURINOL 300 MG
1 TABLET ORAL
Qty: 0 | Refills: 0 | DISCHARGE

## 2024-01-31 RX ORDER — ATORVASTATIN CALCIUM 80 MG/1
40 TABLET, FILM COATED ORAL AT BEDTIME
Refills: 0 | Status: DISCONTINUED | OUTPATIENT
Start: 2024-01-31 | End: 2024-02-07

## 2024-01-31 RX ORDER — DEXTROSE 50 % IN WATER 50 %
50 SYRINGE (ML) INTRAVENOUS ONCE
Refills: 0 | Status: COMPLETED | OUTPATIENT
Start: 2024-01-31 | End: 2024-01-31

## 2024-01-31 RX ORDER — CALCIUM GLUCONATE 100 MG/ML
1 VIAL (ML) INTRAVENOUS ONCE
Refills: 0 | Status: COMPLETED | OUTPATIENT
Start: 2024-01-31 | End: 2024-01-31

## 2024-01-31 RX ORDER — LACTULOSE 10 G/15ML
30 SOLUTION ORAL
Refills: 0 | Status: COMPLETED | OUTPATIENT
Start: 2024-01-31 | End: 2024-01-31

## 2024-01-31 RX ORDER — INSULIN HUMAN 100 [IU]/ML
10 INJECTION, SOLUTION SUBCUTANEOUS ONCE
Refills: 0 | Status: COMPLETED | OUTPATIENT
Start: 2024-01-31 | End: 2024-01-31

## 2024-01-31 RX ORDER — INSULIN HUMAN 100 [IU]/ML
5 INJECTION, SOLUTION SUBCUTANEOUS ONCE
Refills: 0 | Status: COMPLETED | OUTPATIENT
Start: 2024-01-31 | End: 2024-01-31

## 2024-01-31 RX ORDER — DEXTROSE 50 % IN WATER 50 %
12.5 SYRINGE (ML) INTRAVENOUS ONCE
Refills: 0 | Status: DISCONTINUED | OUTPATIENT
Start: 2024-01-31 | End: 2024-02-07

## 2024-01-31 RX ORDER — SODIUM CHLORIDE 9 MG/ML
1000 INJECTION INTRAMUSCULAR; INTRAVENOUS; SUBCUTANEOUS
Refills: 0 | Status: DISCONTINUED | OUTPATIENT
Start: 2024-01-31 | End: 2024-02-02

## 2024-01-31 RX ORDER — DEXTROSE 50 % IN WATER 50 %
100 SYRINGE (ML) INTRAVENOUS ONCE
Refills: 0 | Status: COMPLETED | OUTPATIENT
Start: 2024-01-31 | End: 2024-01-31

## 2024-01-31 RX ORDER — HYDROCORTISONE 1 %
1 OINTMENT (GRAM) TOPICAL
Qty: 0 | Refills: 0 | DISCHARGE

## 2024-01-31 RX ORDER — ALOGLIPTIN BENZOATE AND PIOGLITAZONE HYDROCHLORIDE 12.5; 15 MG/1; MG/1
2 TABLET, FILM COATED ORAL
Qty: 0 | Refills: 0 | DISCHARGE

## 2024-01-31 RX ORDER — SODIUM BICARBONATE 1 MEQ/ML
650 SYRINGE (ML) INTRAVENOUS
Refills: 0 | Status: DISCONTINUED | OUTPATIENT
Start: 2024-01-31 | End: 2024-02-07

## 2024-01-31 RX ORDER — SODIUM ZIRCONIUM CYCLOSILICATE 10 G/10G
10 POWDER, FOR SUSPENSION ORAL ONCE
Refills: 0 | Status: COMPLETED | OUTPATIENT
Start: 2024-01-31 | End: 2024-01-31

## 2024-01-31 RX ORDER — INSULIN LISPRO 100/ML
VIAL (ML) SUBCUTANEOUS
Refills: 0 | Status: DISCONTINUED | OUTPATIENT
Start: 2024-01-31 | End: 2024-02-07

## 2024-01-31 RX ORDER — SODIUM ZIRCONIUM CYCLOSILICATE 10 G/10G
10 POWDER, FOR SUSPENSION ORAL THREE TIMES A DAY
Refills: 0 | Status: DISCONTINUED | OUTPATIENT
Start: 2024-01-31 | End: 2024-01-31

## 2024-01-31 RX ADMIN — SODIUM CHLORIDE 60 MILLILITER(S): 9 INJECTION INTRAMUSCULAR; INTRAVENOUS; SUBCUTANEOUS at 13:52

## 2024-01-31 RX ADMIN — ATORVASTATIN CALCIUM 40 MILLIGRAM(S): 80 TABLET, FILM COATED ORAL at 22:48

## 2024-01-31 RX ADMIN — Medication 100 MILLILITER(S): at 13:52

## 2024-01-31 RX ADMIN — INSULIN HUMAN 10 UNIT(S): 100 INJECTION, SOLUTION SUBCUTANEOUS at 21:51

## 2024-01-31 RX ADMIN — Medication 100 GRAM(S): at 13:51

## 2024-01-31 RX ADMIN — LACTULOSE 30 GRAM(S): 10 SOLUTION ORAL at 23:08

## 2024-01-31 RX ADMIN — LACTULOSE 30 GRAM(S): 10 SOLUTION ORAL at 22:49

## 2024-01-31 RX ADMIN — INSULIN HUMAN 5 UNIT(S): 100 INJECTION, SOLUTION SUBCUTANEOUS at 15:30

## 2024-01-31 RX ADMIN — SENNA PLUS 2 TABLET(S): 8.6 TABLET ORAL at 22:49

## 2024-01-31 RX ADMIN — Medication 650 MILLIGRAM(S): at 19:45

## 2024-01-31 RX ADMIN — SODIUM ZIRCONIUM CYCLOSILICATE 10 GRAM(S): 10 POWDER, FOR SUSPENSION ORAL at 22:49

## 2024-01-31 RX ADMIN — Medication 50 MILLILITER(S): at 21:01

## 2024-01-31 RX ADMIN — SODIUM ZIRCONIUM CYCLOSILICATE 10 GRAM(S): 10 POWDER, FOR SUSPENSION ORAL at 13:51

## 2024-01-31 RX ADMIN — Medication 1 GRAM(S): at 14:25

## 2024-01-31 RX ADMIN — SODIUM CHLORIDE 75 MILLILITER(S): 9 INJECTION INTRAMUSCULAR; INTRAVENOUS; SUBCUTANEOUS at 23:07

## 2024-01-31 NOTE — H&P ADULT - ASSESSMENT
Pt is an 77 y/o M PMHx DM, CKD, HTN, HLD presents s/p mechanical fall with head injury 3 days prior found to have acute extra-axial (subdural versus epidural) focal hemorrhage over the high left frontal convexity and acute renal failure, admitted to tele.    #S/p Fall  #Acute extra-axial (subdural versus epidural) focal hemorrhage   #HO subacute subdural hematoma 05/2021  - Mechanical fall on Sunday. Pt initially refused to come to ED, but  PCP and wife convinced him to come today  - Pending final Neurosurgery recs - will hold home ASA and DVT PPX until cleared by Neurosurgery  - CTH Acute extra-axial (subdural versus epidural) focal hemorrhage over the high left frontal convexity measuring up to 9 mm in width. No significant mass effect or midline shift.  - C/w home dose keppra  - PT consult  - Repeat CTH if any acute change in mental status   - Admit to tele     #Acute on chronic renal failure  #NAGMA  #Hyperkalemia  - Renal recs appreciated  - Hold home allopurinol  - KBUS  - IVF NS at 60 cc/hr  - Start Sodium bicarb 650 mg po bid  - FU labs: CPK, SPEP with immunofixation, kappa, lambda, and urine protein/creatinine    #DM  - Takes Oseni 25/30 mg BID - hold oral anti-hyperglycemics and start ISS with FS goal 140-180    #HTN  - Per family no home meds    #HLD  - C/w lipitor 40mg QD    # Misc  - DVT Prophylaxis: HOLD, SCDs once VA duplex read as negative  - GI Prophylaxis: Not indicated  - Diet: DASH/CC  - Dispo: Tele

## 2024-01-31 NOTE — CONSULT NOTE ADULT - ASSESSMENT
77 y/o M PMHx DM, CKD, HTN, HLD presents s/p mechanical fall with head injury 3 days found with extra-axial (subdural versus epidural) focal hemorrhage over the high left frontal convexity measuring up to 9 mm in width. No significant mass effect or midline shift- ENT c/s for evaluation of epistaxis (not active).    Plan:   - No active ENT intervention at this time   - Recommend bacitracin to b/l nares to keep area moist  - Recommend strict blood pressure control.  - Continue to monitor SpO2, recommend humidified oxygen  - Continue to monitor for episodes of rebleeding  - Continue to monitor H/h, transfuse prn  - Avoid: Nasal trauma; no nose rubbing, blowing or manipulating nasal packing, bending with head blow the waist and heavy lifting  - Sneeze with mouth open and pinching nares.  - Outpatient f/u with ENT for further management  - Recall prn   - Will discuss with attending

## 2024-01-31 NOTE — H&P ADULT - HISTORY OF PRESENT ILLNESS
Pt is an 79 y/o M PMHx DM, CKD, HTN, HLD presents s/p mechanical fall with head injury 3 days prior. Wife reports patient was ambulatory with walker on Sunday walking up steps, and upon reaching the top step and making a turn, lost balance and fell backwards, striking L side of head on tile floor. No LOC. Pt was awake and alert. EMS was called but pt did not want to come to ED and was aided up. Pt has been ambulatory since. Wife also reports pt was started on Gabapentin earlier this month which made him have hallucinations and be more off balance and was stopped. Pt reports no headaches,  dizziness, visual or speech deficits.    /81 HR 76RR 18 T98F sat 97% on RA    CT Head No Cont Acute extra-axial (subdural versus epidural) focal hemorrhage over the high left frontal convexity measuring up to 9 mm in width. No significant mass effect or midline shift.    VBG pH 7.26 CO2 36 HCO3 16 K 6.8. Labs Cr 3.2  GFR 19    Seen by nephro and neurosurgery in ED, admitted to medicine

## 2024-01-31 NOTE — H&P ADULT - ATTENDING COMMENTS
*In the event of discrepancy, my note supersedes the resident note.  Date seen: 1/31/24   Agree with HPI above. ROS negative except per HPI. I edited the physical exam above.     Pertinent labs and radiology reviewed:  Hgb 9.0  K 6.8 > 6.4   Cr 3.2 > 3.0  CT head: Acute extra-axial (subdural versus epidural) focal hemorrhage over the high left frontal convexity measuring up to 9 mm in width. No significant mass effect or midline shift.  CXR: normal   ECG: NSR, HR 69, first degree av block, no diffuse peaked T waves or other significant changes     Prior external records/work up reviewed:  Baseline Cr 1.4 in 10/2022     Assessment/Plan:   77 y/o M PMHx DM, CKD, HTN, HLD presents s/p mechanical fall with head injury 3 days prior found to have acute extra-axial (subdural versus epidural) focal hemorrhage over the high left frontal convexity and acute renal failure, admitted to [facility].    #recent mechanical fall   #Acute intracranial hemorrhage   #Hx of subacute subdural hematoma 05/2021  - CT head noted  - neurosurgery saw patient in ED: no acute surgical intervention, f/u further recs   - get orthostatics and PT eval; get repeat CT head in am    - c/w home dose keppra po   - hold home asa and DVT ppx for now     #KELL on CKD   #hyperkalemia   - DDx: likely secondary to volume depletion with underlying DM nephropathy   - monitor bmp around the clock, avoid nephrotoxic meds, monitor I/O; get renal/prostate u/s and urine lytes; CPK, SPEP with immunofixation, kappa, lambda, and urine protein/creatinine  - nephro Dr. Buchanan saw patient in ED  - NS 60cc/hr per nephro; give IV fluid boluses as needed (500cc at a time)   - hold home lisinopril and bumex   - start sodium bicarb po 650mg bid   - s/p insulin/D50/calcium gluconate/lokelma; give another dose of insulin/D50 and start lokelma 10mg TID     #anemia  - trend H/H, maintain active T/S, get iron studies/b12/folate     #acute on chronic epistaxis left naris   - ENT saw patient in ED: bacitracin b/l nares, no acute intervention  - sees o/p ENT: Dr. Vanegas, [facility] on [address].    #DM  - Takes Oseni 25/30 mg BID - hold oral anti-hyperglycemics and start ISS with FS goal 140-180    #HTN  - lisinopril and bumex was stopped as outpatient     #HLD  - C/w lipitor 40mg QD    DVT Prophylaxis: SCDs  Dispo: from home; f/u neurosurgery, f/u nephro, trend Cr/trend K *In the event of discrepancy, my note supersedes the resident note.  Date seen: 1/31/24   Agree with HPI above. ROS negative except per HPI. I edited the physical exam above.     Pertinent labs and radiology reviewed:  Hgb 9.0  K 6.8 > 6.4   Cr 3.2 > 3.0  CT head: Acute extra-axial (subdural versus epidural) focal hemorrhage over the high left frontal convexity measuring up to 9 mm in width. No significant mass effect or midline shift.  CXR: normal   ECG: NSR, HR 69, first degree av block, no diffuse peaked T waves or other significant changes (interpreted by me)      Prior external records/work up reviewed:  Baseline Cr 1.4 in 10/2022     Assessment/Plan:  79yo M w/ PMHx DM, CKD, HTN, HLD presents s/p mechanical fall with head injury 3 days prior found to have acute extra-axial (subdural versus epidural) focal hemorrhage over the high left frontal convexity and acute renal failure, admitted to [facility].    #recent mechanical fall   #Acute intracranial hemorrhage   #Hx of subacute subdural hematoma 05/2021  - CT head noted  - get orthostatics and PT eval; get repeat CT head in am    - neurosurgery saw patient in ED: no acute surgical intervention, f/u further recs   - c/w home dose keppra po   - hold home asa and DVT ppx for now     #KELL on CKD   #hyperkalemia   - DDx: likely secondary to volume depletion with underlying DM nephropathy   - monitor bmp around the clock, avoid nephrotoxic meds, monitor I/O; get renal/prostate u/s and urine lytes; CPK, SPEP with immunofixation, kappa, lambda, and urine protein/creatinine  - nephro Dr. Buchanan saw patient in ED  - NS 60cc/hr per nephro; give IV fluid boluses as needed (500cc at a time)   - hold home lisinopril and bumex   - start sodium bicarb po 650mg bid   - s/p insulin/D50/calcium gluconate/lokelma; give another dose of insulin/D50 and start lokelma 10mg TID     #anemia  - trend H/H, maintain active T/S, get iron studies/b12/folate     #acute on chronic epistaxis left naris   - ENT saw patient in ED: bacitracin b/l nares, no acute intervention  - sees o/p ENT: Dr. Vanegas, [facility] on [address].    #DM  - Takes Oseni 25/30 mg BID - hold oral anti-hyperglycemics and start ISS with FS goal 140-180    #HTN  - lisinopril and bumex was stopped as outpatient     #HLD  - C/w lipitor 40mg QD    DVT Prophylaxis: SCDs  Dispo: from home; f/u neurosurgery, f/u nephro, trend Cr/trend K

## 2024-01-31 NOTE — ED PROVIDER NOTE - OBJECTIVE STATEMENT
77yo male with PMHx DM, CKD, HTN, HLD presents s/p mechanical fall with head injury 3 days prior. Wife reports patient was ambulatory with walker 3 nights ago, walking up steps, and upon reaching the top step and making a turn, lost balance and fell backwards, striking L side of head on tile floor. No LOC. Pt was awake and alert. EMS was called but pt did not want to come to ED and was aided up. Pt has been ambulatory since. No c/o HA, dizziness, visual or speech deficits. Wife reports since earlier in month pt has been intermittently hallucinating which has been made apparent to his medical team. Additionally notes 77yo male with PMHx DM, CKD, HTN, HLD presents s/p mechanical fall with head injury 3 days prior. Wife reports patient was ambulatory with walker 3 nights ago, walking up steps, and upon reaching the top step and making a turn, lost balance and fell backwards, striking L side of head on tile floor. No LOC. Pt was awake and alert. EMS was called but pt did not want to come to ED and was aided up. Pt has been ambulatory since. No c/o HA, dizziness, visual or speech deficits. Wife reports since earlier in month pt has been intermittently hallucinating which has been made apparent to his medical team. Additionally notes that patient has been consuming less water as to not have to ambulate to restroom with walker. Wife notes primary NP came yesterday and provide gentle hydration via IV.

## 2024-01-31 NOTE — ED PROVIDER NOTE - CLINICAL SUMMARY MEDICAL DECISION MAKING FREE TEXT BOX
79 y/o M PMHx DM, CKD, HTN, HLD presents s/p mechanical fall with head injury 3 days prior. Wife reports patient was ambulatory with walker on Sunday walking up steps, and upon reaching the top step and making a turn, lost balance and fell backwards, striking L side of head on tile floor. No LOC. Pt was awake and alert. EMS was called but pt did not want to come to ED and was aided up. Pt has been ambulatory since. Wife also reports pt was started on Gabapentin earlier this month which made him have hallucinations and be more off balance and was stopped. Pt reports no headaches,  dizziness, visual or speech deficits.  /81 HR 76RR 18 T98F sat 97% on RA.  CT Head No Cont Acute extra-axial (subdural versus epidural) focal hemorrhage over the high left frontal convexity measuring up to 9 mm in width. No significant mass effect or midline shift.  Seen by Nephrology and Neurosurgery in ED, admitted to medicine/telemetry for hyperglycemia.

## 2024-01-31 NOTE — ED ADULT NURSE REASSESSMENT NOTE - NS ED NURSE REASSESS COMMENT FT1
BP 88/45 ,  MAP 64 , called and informed admitting resident Dr. Barakat of pts  BP . No new order given as of  this time . Pt denies chest pain , headache, dizziness , nausea , SOB , palpitation this time , frequent v/s , BP monitoring done .

## 2024-01-31 NOTE — ED PROVIDER NOTE - NS_EDPROVIDERDISPOUSERTYPE_ED_A_ED
Attending Attestation (For Attendings USE Only)... 20 minutes 45 minutes 20 minutes 30 minutes 30 minutes 30 minutes 45 minutes 45 minutes 45 minutes 20 minutes 45 minutes 20 minutes 30 minutes 60 minutes

## 2024-01-31 NOTE — ED PROVIDER NOTE - PHYSICAL EXAMINATION
CONST: Well appearing in NAD  EYES: PERRL, EOMI, Sclera and conjunctiva clear.   ENT: Oropharynx normal appearing, no erythema or exudates. Uvula midline.  CARD: Normal S1 S2; Normal rate and rhythm  RESP: Equal BS B/L, No wheezes, rhonchi or rales. No distress  GI: Soft, non-tender, non-distended.  MS: Normal ROM in all extremities. No midline spinal tenderness.  SKIN: Abrasion to posterior RUE. Chronic cellulitis to LLE.  NEURO: A&Ox3, No focal deficits. Strength 5/5 with no sensory deficits. Home

## 2024-01-31 NOTE — PROGRESS NOTE ADULT - SUBJECTIVE AND OBJECTIVE BOX
HPI: Neurosurgery consulted for CTH demonstrating acute extra-axial (Subdural vs Epidural) focal hemorrhage over high left frontal convexity. Pt seen and examined in bedside in ED with pts wife at bedside. 78M PMH CKD, DM, htn, per wife he has has a hx of hallucinations, had fall approx 2 years ago where he had a head bleed and seizure for which he was started on Keppra 250 which was stopped last week by primary provider as it was believed to have causing him confusion. Pt also takes ASA 81 had fall x 2 days ago which pt himself attributes to being a mechanical off of a rug. EMS was alerted but pt refused to come in to the hospital x 2 day ago on day of fall. Pt coming in today as convinced by primary and wife. Per wife pt has some slurred speech following fall which has resolved. Denies headaches, vision changes (double/blurry), weakness, numbness, paresthesias, n/v.       PAST MEDICAL & SURGICAL HISTORY:  Diverticulitis  Herniated disc, cervical  Chronic gout of foot, unspecified cause, unspecified laterality  Type 2 diabetes mellitus without complication, unspecified long term insulin use status  Hypertension, unspecified type  High cholesterol  Osteoarthritis of multiple joints, unspecified osteoarthritis type  Sciatica, unspecified laterality  History of tonsillectomy and adenoidectomy  H/O [id number]  H/O knee surgery          Home Medications:  allopurinol 300 mg oral tablet: 1 tab(s) orally once a day (in the morning) (21 Oct 2021 11:33)  aspirin 81 mg oral tablet, chewable: 1 tab(s) orally once a day (in the morning) (21 Oct 2021 11:33)  atorvastatin 40 mg oral tablet: 1 tab(s) orally once a day (at bedtime) (21 Oct 2021 11:33)  finasteride 5 mg oral tablet: 1 tab(s) orally once a day (24 Dec 2021 12:12)  Hydrocortisone 1% In Absorbase topical ointment: Apply topically to affected area-- legs-- 2 times a day (24 Dec 2021 12:25)  Oseni 25 mg-30 mg oral tablet: 2 tab(s) orally once a day (21 Oct 2021 11:33)  tamsulosin 0.4 mg oral capsule: 1 cap(s) orally once a day (at bedtime) (24 Dec 2021 12:12)      Allergies  No Known Allergies  Intolerances        ROS:  [ x ] A ten-point review of systems is negative except as noted   [  ] Due to altered mental status/intubation, subjective information were not able to be obtained from the patient. History was obtained, to the extent possible, from review of the chart and collateral sources of information    MEDICATIONS  (STANDING):  sodium chloride 0.9%. 1000 milliLiter(s) (60 mL/Hr) IV Continuous <Continuous>    MEDICATIONS  (PRN):      ICU Vital Signs Last 24 Hrs  T(C): 36.7 (2024 08:08), Max: 36.7 (2024 08:08)  T(F): 98 (2024 08:08), Max: 98 (2024 08:08)  HR: 76 (2024 08:08) (76 - 76)  BP: 112/51 (2024 08:08) (112/51 - 112/51)  BP(mean): --  ABP: --  ABP(mean): --  RR: 18 (2024 08:08) (18 - 18)  SpO2: 97% (2024 08:08) (97% - 97%)    O2 Parameters below as of 2024 08:08  Patient On (Oxygen Delivery Method): room air            I&O's Detail                            9.0    6.87  )-----------( 149      ( 2024 09:07 )             27.7         132<L>  |  105  |  118<HH>  ----------------------------<  81  TNP   |  17  |  3.2<H>    Ca    9.1      2024 09:07    TPro  6.8  /  Alb  4.1  /  TBili  <0.2  /  DBili  x   /  AST  43<H>  /  ALT  26  /  AlkPhos  77          Urinalysis Basic - ( 2024 09:07 )    Color: Yellow / Appearance: Clear / S.012 / pH: x  Gluc: 81 mg/dL / Ketone: Negative mg/dL  / Bili: Negative / Urobili: 0.2 mg/dL   Blood: x / Protein: 30 mg/dL / Nitrite: Negative   Leuk Esterase: Negative / RBC: 0 /HPF / WBC 1 /HPF   Sq Epi: x / Non Sq Epi: 0 /HPF / Bacteria: Negative /HPF        On PE:  A&Ox3 following commands  PERRL  EOMI  No droop  No drift  Finger to nose intact  SUNSHINE - good strength  5/5 strength b/l UE  5/5 strength b/l LE  SILT in b/l UE and LE     Radiology:  < from: CT Head No Cont (24 @ 09:45) >  IMPRESSION:    Acute extra-axial (subdural versus epidural) focal hemorrhage over the   high left frontal convexity measuring up to 9 mm in width. No significant   mass effect or midline shift.    Spoke with ERNST ALMAZAN PA on 2024 10:02 AM with readback.    --- End of Report ---      < end of copied text >      Assessment:  78M p/w Acute extra-axial (subdural versus epidural) focal hemorrhage over the high left frontal convexity     Plan: Neurosurgery Consult Note    HPI: Neurosurgery consulted for CTH demonstrating acute extra-axial (Subdural vs Epidural) focal hemorrhage over high left frontal convexity. Pt seen and examined in bedside in ED with pts wife at bedside. 78M PMH CKD, DM, htn, per wife he has has a hx of hallucinations, had fall approx 2 years ago where he had a head bleed and seizure for which he was started on Keppra 250 which was stopped last week by primary provider as it was believed to have causing him confusion. Pt also takes ASA 81 had fall x 2 days ago which pt himself attributes to being a mechanical off of a rug. EMS was alerted but pt refused to come in to the hospital x 2 day ago on day of fall. Pt coming in today as convinced by primary and wife. Per wife pt has some slurred speech following fall which has resolved. Denies headaches, vision changes (double/blurry), weakness, numbness, paresthesias, n/v.       PAST MEDICAL & SURGICAL HISTORY:  Diverticulitis  Herniated disc, cervical  Chronic gout of foot, unspecified cause, unspecified laterality  Type 2 diabetes mellitus without complication, unspecified long term insulin use status  Hypertension, unspecified type  High cholesterol  Osteoarthritis of multiple joints, unspecified osteoarthritis type  Sciatica, unspecified laterality  History of tonsillectomy and adenoidectomy  H/O [id number]  H/O knee surgery          Home Medications:  allopurinol 300 mg oral tablet: 1 tab(s) orally once a day (in the morning) (21 Oct 2021 11:33)  aspirin 81 mg oral tablet, chewable: 1 tab(s) orally once a day (in the morning) (21 Oct 2021 11:33)  atorvastatin 40 mg oral tablet: 1 tab(s) orally once a day (at bedtime) (21 Oct 2021 11:33)  finasteride 5 mg oral tablet: 1 tab(s) orally once a day (24 Dec 2021 12:12)  Hydrocortisone 1% In Absorbase topical ointment: Apply topically to affected area-- legs-- 2 times a day (24 Dec 2021 12:25)  Oseni 25 mg-30 mg oral tablet: 2 tab(s) orally once a day (21 Oct 2021 11:33)  tamsulosin 0.4 mg oral capsule: 1 cap(s) orally once a day (at bedtime) (24 Dec 2021 12:12)      Allergies  No Known Allergies  Intolerances        ROS:  [ x ] A ten-point review of systems is negative except as noted   [  ] Due to altered mental status/intubation, subjective information were not able to be obtained from the patient. History was obtained, to the extent possible, from review of the chart and collateral sources of information    MEDICATIONS  (STANDING):  sodium chloride 0.9%. 1000 milliLiter(s) (60 mL/Hr) IV Continuous <Continuous>    MEDICATIONS  (PRN):      ICU Vital Signs Last 24 Hrs  T(C): 36.7 (2024 08:08), Max: 36.7 (2024 08:08)  T(F): 98 (2024 08:08), Max: 98 (2024 08:08)  HR: 76 (2024 08:08) (76 - 76)  BP: 112/51 (2024 08:08) (112/51 - 112/51)  BP(mean): --  ABP: --  ABP(mean): --  RR: 18 (2024 08:08) (18 - 18)  SpO2: 97% (2024 08:08) (97% - 97%)    O2 Parameters below as of 2024 08:08  Patient On (Oxygen Delivery Method): room air            I&O's Detail                            9.0    6.87  )-----------( 149      ( 2024 09:07 )             27.7         132<L>  |  105  |  118<HH>  ----------------------------<  81  TNP   |  17  |  3.2<H>    Ca    9.1      2024 09:07    TPro  6.8  /  Alb  4.1  /  TBili  <0.2  /  DBili  x   /  AST  43<H>  /  ALT  26  /  AlkPhos  77          Urinalysis Basic - ( 2024 09:07 )    Color: Yellow / Appearance: Clear / S.012 / pH: x  Gluc: 81 mg/dL / Ketone: Negative mg/dL  / Bili: Negative / Urobili: 0.2 mg/dL   Blood: x / Protein: 30 mg/dL / Nitrite: Negative   Leuk Esterase: Negative / RBC: 0 /HPF / WBC 1 /HPF   Sq Epi: x / Non Sq Epi: 0 /HPF / Bacteria: Negative /HPF        On PE:  A&Ox3 following commands  PERRL  EOMI  No droop  No drift  Finger to nose intact  SUNSHINE - good strength  5/5 strength b/l UE  5/5 strength b/l LE  SILT in b/l UE and LE     Radiology:  < from: CT Head No Cont (24 @ 09:45) >  IMPRESSION:    Acute extra-axial (subdural versus epidural) focal hemorrhage over the   high left frontal convexity measuring up to 9 mm in width. No significant   mass effect or midline shift.    Spoke with ERNST ALMAZAN PA on 2024 10:02 AM with readback.    --- End of Report ---      < end of copied text >      Assessment:  78M p/w Acute extra-axial (subdural versus epidural) focal hemorrhage over the high left frontal convexity     Plan: Neurosurgery Consult Note    HPI: Neurosurgery consulted for CTH demonstrating acute extra-axial (Subdural vs Epidural) focal hemorrhage over high left frontal convexity. Pt seen and examined in bedside in ED with pts wife at bedside. 78M PMH CKD, DM, htn, per wife he has has a hx of hallucinations, had fall approx 2 years ago where he had a head bleed and seizure for which he was started on Keppra 250 which was stopped last week by primary provider as it was believed to have causing him confusion. Pt also takes ASA 81 had fall x 2 days ago which pt himself attributes to being a mechanical off of a rug. EMS was alerted but pt refused to come in to the hospital x 2 day ago on day of fall. Pt coming in today as convinced by primary and wife. Per wife pt has some slurred speech following fall which has resolved. Denies headaches, vision changes (double/blurry), weakness, numbness, paresthesias, n/v.       PAST MEDICAL & SURGICAL HISTORY:  Diverticulitis  Herniated disc, cervical  Chronic gout of foot, unspecified cause, unspecified laterality  Type 2 diabetes mellitus without complication, unspecified long term insulin use status  Hypertension, unspecified type  High cholesterol  Osteoarthritis of multiple joints, unspecified osteoarthritis type  Sciatica, unspecified laterality  History of tonsillectomy and adenoidectomy  H/O [id number]  H/O knee surgery          Home Medications:  allopurinol 300 mg oral tablet: 1 tab(s) orally once a day (in the morning) (21 Oct 2021 11:33)  aspirin 81 mg oral tablet, chewable: 1 tab(s) orally once a day (in the morning) (21 Oct 2021 11:33)  atorvastatin 40 mg oral tablet: 1 tab(s) orally once a day (at bedtime) (21 Oct 2021 11:33)  finasteride 5 mg oral tablet: 1 tab(s) orally once a day (24 Dec 2021 12:12)  Hydrocortisone 1% In Absorbase topical ointment: Apply topically to affected area-- legs-- 2 times a day (24 Dec 2021 12:25)  Oseni 25 mg-30 mg oral tablet: 2 tab(s) orally once a day (21 Oct 2021 11:33)  tamsulosin 0.4 mg oral capsule: 1 cap(s) orally once a day (at bedtime) (24 Dec 2021 12:12)      Allergies  No Known Allergies  Intolerances        ROS:  [ x ] A ten-point review of systems is negative except as noted   [  ] Due to altered mental status/intubation, subjective information were not able to be obtained from the patient. History was obtained, to the extent possible, from review of the chart and collateral sources of information    MEDICATIONS  (STANDING):  sodium chloride 0.9%. 1000 milliLiter(s) (60 mL/Hr) IV Continuous <Continuous>    MEDICATIONS  (PRN):      ICU Vital Signs Last 24 Hrs  T(C): 36.7 (2024 08:08), Max: 36.7 (2024 08:08)  T(F): 98 (2024 08:08), Max: 98 (2024 08:08)  HR: 76 (2024 08:08) (76 - 76)  BP: 112/51 (2024 08:08) (112/51 - 112/51)  BP(mean): --  ABP: --  ABP(mean): --  RR: 18 (2024 08:08) (18 - 18)  SpO2: 97% (2024 08:08) (97% - 97%)    O2 Parameters below as of 2024 08:08  Patient On (Oxygen Delivery Method): room air            I&O's Detail                            9.0    6.87  )-----------( 149      ( 2024 09:07 )             27.7         132<L>  |  105  |  118<HH>  ----------------------------<  81  TNP   |  17  |  3.2<H>    Ca    9.1      2024 09:07    TPro  6.8  /  Alb  4.1  /  TBili  <0.2  /  DBili  x   /  AST  43<H>  /  ALT  26  /  AlkPhos  77          Urinalysis Basic - ( 2024 09:07 )    Color: Yellow / Appearance: Clear / S.012 / pH: x  Gluc: 81 mg/dL / Ketone: Negative mg/dL  / Bili: Negative / Urobili: 0.2 mg/dL   Blood: x / Protein: 30 mg/dL / Nitrite: Negative   Leuk Esterase: Negative / RBC: 0 /HPF / WBC 1 /HPF   Sq Epi: x / Non Sq Epi: 0 /HPF / Bacteria: Negative /HPF        On PE:  A&Ox3 following commands  PERRL  EOMI  No droop  No drift  Finger to nose intact  SUNSHINE - good strength  5/5 strength b/l UE  5/5 strength b/l LE  SILT in b/l UE and LE     Radiology:  < from: CT Head No Cont (24 @ 09:45) >  IMPRESSION:    Acute extra-axial (subdural versus epidural) focal hemorrhage over the   high left frontal convexity measuring up to 9 mm in width. No significant   mass effect or midline shift.    Spoke with ERNST ALMAZAN PA on 2024 10:02 AM with readback.    --- End of Report ---      < end of copied text >      Assessment:  78M p/w Acute extra-axial (subdural versus epidural) focal hemorrhage over the high left frontal convexity     Plan:  - No acute neurosurgical intervention   - Care per primary team  - Case and imaging to be discussed with Dr Morrell

## 2024-01-31 NOTE — ED ADULT NURSE NOTE - NSFALLHARMRISKINTERV_ED_ALL_ED
Assistance OOB with selected safe patient handling equipment if applicable/Assistance with ambulation/Communicate risk of Fall with Harm to all staff, patient, and family/Monitor gait and stability/Provide visual cue: red socks, yellow wristband, yellow gown, etc/Reinforce activity limits and safety measures with patient and family/Toileting schedule using arm’s reach rule for commode and bathroom/Bed in lowest position, wheels locked, appropriate side rails in place/Call bell, personal items and telephone in reach/Instruct patient to call for assistance before getting out of bed/chair/stretcher/Non-slip footwear applied when patient is off stretcher/[place] to call system/Physically safe environment - no spills, clutter or unnecessary equipment/Purposeful Proactive Rounding/Room/bathroom lighting operational, light cord in reach

## 2024-01-31 NOTE — ED PROVIDER NOTE - CARE PLAN
Principal Discharge DX:	Hyperkalemia  Secondary Diagnosis:	Head injury  Secondary Diagnosis:	Fall  Secondary Diagnosis:	Acute on chronic renal failure   1

## 2024-01-31 NOTE — H&P ADULT - NS ATTEST RISK PROBLEM GEN_ALL_CORE FT
Problem complexity: hyperkalemia, jourdan on ckd   Data complexity: reviewed metabolic panel, ordered metabolic panel, independent historian wife, interpreted ECG

## 2024-01-31 NOTE — H&P ADULT - NSHPPHYSICALEXAM_GEN_ALL_CORE
T(C): 36.4 (01-31-24 @ 15:54), Max: 36.9 (01-31-24 @ 14:00)  HR: 72 (01-31-24 @ 15:54) (72 - 85)  BP: 105/55 (01-31-24 @ 15:54) (104/49 - 112/51)  RR: 18 (01-31-24 @ 15:54) (18 - 19)  SpO2: 99% (01-31-24 @ 15:54) (97% - 99%)    CONSTITUTIONAL: NAD, laying in bed comfortably  HEENT: No conjunctival or scleral injection, non-icteric  RESP: No respiratory distress, no use of accessory muscles, CTAB  CV: RRR, +S1S2  GI: Soft, NT, ND, no rebound, no guarding  MSK: Equal muscle strength/tone in b/l UE and LE  SKIN: +1 LE swelling with LE wounds wrapped  NEURO: Sensation intact in upper and lower extremities b/l to light touch   PSYCH: AAOx3

## 2024-02-01 LAB
A1C WITH ESTIMATED AVERAGE GLUCOSE RESULT: 5.9 % — HIGH (ref 4–5.6)
ALBUMIN SERPL ELPH-MCNC: 3.8 G/DL — SIGNIFICANT CHANGE UP (ref 3.5–5.2)
ALP SERPL-CCNC: 71 U/L — SIGNIFICANT CHANGE UP (ref 30–115)
ALT FLD-CCNC: 22 U/L — SIGNIFICANT CHANGE UP (ref 0–41)
ANION GAP SERPL CALC-SCNC: 10 MMOL/L — SIGNIFICANT CHANGE UP (ref 7–14)
ANION GAP SERPL CALC-SCNC: 12 MMOL/L — SIGNIFICANT CHANGE UP (ref 7–14)
ANION GAP SERPL CALC-SCNC: 7 MMOL/L — SIGNIFICANT CHANGE UP (ref 7–14)
APTT BLD: 47.1 SEC — HIGH (ref 27–39.2)
AST SERPL-CCNC: 29 U/L — SIGNIFICANT CHANGE UP (ref 0–41)
BASOPHILS # BLD AUTO: 0.01 K/UL — SIGNIFICANT CHANGE UP (ref 0–0.2)
BASOPHILS NFR BLD AUTO: 0.2 % — SIGNIFICANT CHANGE UP (ref 0–1)
BILIRUB SERPL-MCNC: <0.2 MG/DL — SIGNIFICANT CHANGE UP (ref 0.2–1.2)
BUN SERPL-MCNC: 107 MG/DL — CRITICAL HIGH (ref 10–20)
BUN SERPL-MCNC: 111 MG/DL — CRITICAL HIGH (ref 10–20)
BUN SERPL-MCNC: 85 MG/DL — CRITICAL HIGH (ref 10–20)
CALCIUM SERPL-MCNC: 8 MG/DL — LOW (ref 8.4–10.5)
CALCIUM SERPL-MCNC: 9 MG/DL — SIGNIFICANT CHANGE UP (ref 8.4–10.5)
CALCIUM SERPL-MCNC: 9.1 MG/DL — SIGNIFICANT CHANGE UP (ref 8.4–10.5)
CHLORIDE SERPL-SCNC: 110 MMOL/L — SIGNIFICANT CHANGE UP (ref 98–110)
CHLORIDE SERPL-SCNC: 114 MMOL/L — HIGH (ref 98–110)
CHLORIDE SERPL-SCNC: 121 MMOL/L — HIGH (ref 98–110)
CK SERPL-CCNC: 278 U/L — HIGH (ref 0–225)
CO2 SERPL-SCNC: 17 MMOL/L — SIGNIFICANT CHANGE UP (ref 17–32)
CO2 SERPL-SCNC: 18 MMOL/L — SIGNIFICANT CHANGE UP (ref 17–32)
CO2 SERPL-SCNC: 19 MMOL/L — SIGNIFICANT CHANGE UP (ref 17–32)
CREAT SERPL-MCNC: 1.7 MG/DL — HIGH (ref 0.7–1.5)
CREAT SERPL-MCNC: 2.5 MG/DL — HIGH (ref 0.7–1.5)
CREAT SERPL-MCNC: 2.9 MG/DL — HIGH (ref 0.7–1.5)
EGFR: 21 ML/MIN/1.73M2 — LOW
EGFR: 25 ML/MIN/1.73M2 — LOW
EGFR: 40 ML/MIN/1.73M2 — LOW
EOSINOPHIL # BLD AUTO: 0.12 K/UL — SIGNIFICANT CHANGE UP (ref 0–0.7)
EOSINOPHIL NFR BLD AUTO: 2.3 % — SIGNIFICANT CHANGE UP (ref 0–8)
ESTIMATED AVERAGE GLUCOSE: 123 MG/DL — HIGH (ref 68–114)
FERRITIN SERPL-MCNC: 92 NG/ML — SIGNIFICANT CHANGE UP (ref 30–400)
FOLATE SERPL-MCNC: >20 NG/ML — SIGNIFICANT CHANGE UP
GLUCOSE BLDC GLUCOMTR-MCNC: 103 MG/DL — HIGH (ref 70–99)
GLUCOSE BLDC GLUCOMTR-MCNC: 137 MG/DL — HIGH (ref 70–99)
GLUCOSE BLDC GLUCOMTR-MCNC: 172 MG/DL — HIGH (ref 70–99)
GLUCOSE BLDC GLUCOMTR-MCNC: 82 MG/DL — SIGNIFICANT CHANGE UP (ref 70–99)
GLUCOSE BLDC GLUCOMTR-MCNC: 84 MG/DL — SIGNIFICANT CHANGE UP (ref 70–99)
GLUCOSE SERPL-MCNC: 105 MG/DL — HIGH (ref 70–99)
GLUCOSE SERPL-MCNC: 169 MG/DL — HIGH (ref 70–99)
GLUCOSE SERPL-MCNC: 65 MG/DL — LOW (ref 70–99)
HCT VFR BLD CALC: 25.8 % — LOW (ref 42–52)
HGB BLD-MCNC: 8.2 G/DL — LOW (ref 14–18)
IMM GRANULOCYTES NFR BLD AUTO: 2.1 % — HIGH (ref 0.1–0.3)
INR BLD: 1.06 RATIO — SIGNIFICANT CHANGE UP (ref 0.65–1.3)
IRON SATN MFR SERPL: 21 % — SIGNIFICANT CHANGE UP (ref 15–50)
IRON SATN MFR SERPL: 71 UG/DL — SIGNIFICANT CHANGE UP (ref 35–150)
KAPPA LC SER QL IFE: 12.27 MG/DL — HIGH (ref 0.33–1.94)
KAPPA/LAMBDA FREE LIGHT CHAIN RATIO, SERUM: 2.33 RATIO — HIGH (ref 0.26–1.65)
LAMBDA LC SER QL IFE: 5.27 MG/DL — HIGH (ref 0.57–2.63)
LYMPHOCYTES # BLD AUTO: 0.31 K/UL — LOW (ref 1.2–3.4)
LYMPHOCYTES # BLD AUTO: 6 % — LOW (ref 20.5–51.1)
MAGNESIUM SERPL-MCNC: 3.4 MG/DL — CRITICAL HIGH (ref 1.8–2.4)
MCHC RBC-ENTMCNC: 31.2 PG — HIGH (ref 27–31)
MCHC RBC-ENTMCNC: 31.8 G/DL — LOW (ref 32–37)
MCV RBC AUTO: 98.1 FL — HIGH (ref 80–94)
MONOCYTES # BLD AUTO: 0.47 K/UL — SIGNIFICANT CHANGE UP (ref 0.1–0.6)
MONOCYTES NFR BLD AUTO: 9.1 % — SIGNIFICANT CHANGE UP (ref 1.7–9.3)
NEUTROPHILS # BLD AUTO: 4.13 K/UL — SIGNIFICANT CHANGE UP (ref 1.4–6.5)
NEUTROPHILS NFR BLD AUTO: 80.3 % — HIGH (ref 42.2–75.2)
NRBC # BLD: 0 /100 WBCS — SIGNIFICANT CHANGE UP (ref 0–0)
PHOSPHATE SERPL-MCNC: 5.1 MG/DL — HIGH (ref 2.1–4.9)
PLATELET # BLD AUTO: 155 K/UL — SIGNIFICANT CHANGE UP (ref 130–400)
PMV BLD: 11.8 FL — HIGH (ref 7.4–10.4)
POTASSIUM SERPL-MCNC: 5.3 MMOL/L — HIGH (ref 3.5–5)
POTASSIUM SERPL-MCNC: 6.3 MMOL/L — CRITICAL HIGH (ref 3.5–5)
POTASSIUM SERPL-MCNC: 6.4 MMOL/L — CRITICAL HIGH (ref 3.5–5)
POTASSIUM SERPL-SCNC: 5.3 MMOL/L — HIGH (ref 3.5–5)
POTASSIUM SERPL-SCNC: 6.3 MMOL/L — CRITICAL HIGH (ref 3.5–5)
POTASSIUM SERPL-SCNC: 6.4 MMOL/L — CRITICAL HIGH (ref 3.5–5)
PROT SERPL-MCNC: 6.4 G/DL — SIGNIFICANT CHANGE UP (ref 6–8)
PROTHROM AB SERPL-ACNC: 12.1 SEC — SIGNIFICANT CHANGE UP (ref 9.95–12.87)
RBC # BLD: 2.63 M/UL — LOW (ref 4.7–6.1)
RBC # FLD: 17 % — HIGH (ref 11.5–14.5)
SODIUM SERPL-SCNC: 139 MMOL/L — SIGNIFICANT CHANGE UP (ref 135–146)
SODIUM SERPL-SCNC: 143 MMOL/L — SIGNIFICANT CHANGE UP (ref 135–146)
SODIUM SERPL-SCNC: 146 MMOL/L — SIGNIFICANT CHANGE UP (ref 135–146)
TIBC SERPL-MCNC: 341 UG/DL — SIGNIFICANT CHANGE UP (ref 220–430)
TSH SERPL-MCNC: 6.86 UIU/ML — HIGH (ref 0.27–4.2)
UIBC SERPL-MCNC: 270 UG/DL — SIGNIFICANT CHANGE UP (ref 110–370)
VIT B12 SERPL-MCNC: 923 PG/ML — SIGNIFICANT CHANGE UP (ref 232–1245)
WBC # BLD: 5.15 K/UL — SIGNIFICANT CHANGE UP (ref 4.8–10.8)
WBC # FLD AUTO: 5.15 K/UL — SIGNIFICANT CHANGE UP (ref 4.8–10.8)

## 2024-02-01 PROCEDURE — 70450 CT HEAD/BRAIN W/O DYE: CPT | Mod: 26

## 2024-02-01 PROCEDURE — 99233 SBSQ HOSP IP/OBS HIGH 50: CPT

## 2024-02-01 PROCEDURE — 93010 ELECTROCARDIOGRAM REPORT: CPT

## 2024-02-01 RX ORDER — BACITRACIN ZINC 500 UNIT/G
1 OINTMENT IN PACKET (EA) TOPICAL
Refills: 0 | Status: DISCONTINUED | OUTPATIENT
Start: 2024-02-01 | End: 2024-02-07

## 2024-02-01 RX ORDER — FLUOROMETHOLONE 1 MG/ML
1 SOLUTION/ DROPS OPHTHALMIC
Refills: 0 | Status: DISCONTINUED | OUTPATIENT
Start: 2024-02-01 | End: 2024-02-07

## 2024-02-01 RX ORDER — INSULIN HUMAN 100 [IU]/ML
10 INJECTION, SOLUTION SUBCUTANEOUS ONCE
Refills: 0 | Status: COMPLETED | OUTPATIENT
Start: 2024-02-01 | End: 2024-02-01

## 2024-02-01 RX ORDER — DEXTROSE 50 % IN WATER 50 %
50 SYRINGE (ML) INTRAVENOUS ONCE
Refills: 0 | Status: COMPLETED | OUTPATIENT
Start: 2024-02-01 | End: 2024-02-01

## 2024-02-01 RX ORDER — SODIUM ZIRCONIUM CYCLOSILICATE 10 G/10G
10 POWDER, FOR SUSPENSION ORAL THREE TIMES A DAY
Refills: 0 | Status: COMPLETED | OUTPATIENT
Start: 2024-02-01 | End: 2024-02-02

## 2024-02-01 RX ORDER — LACTULOSE 10 G/15ML
30 SOLUTION ORAL
Refills: 0 | Status: COMPLETED | OUTPATIENT
Start: 2024-02-01 | End: 2024-02-01

## 2024-02-01 RX ORDER — CALCIUM GLUCONATE 100 MG/ML
2 VIAL (ML) INTRAVENOUS ONCE
Refills: 0 | Status: COMPLETED | OUTPATIENT
Start: 2024-02-01 | End: 2024-02-01

## 2024-02-01 RX ADMIN — SODIUM ZIRCONIUM CYCLOSILICATE 10 GRAM(S): 10 POWDER, FOR SUSPENSION ORAL at 20:54

## 2024-02-01 RX ADMIN — SENNA PLUS 1 TABLET(S): 8.6 TABLET ORAL at 20:54

## 2024-02-01 RX ADMIN — SODIUM ZIRCONIUM CYCLOSILICATE 10 GRAM(S): 10 POWDER, FOR SUSPENSION ORAL at 05:03

## 2024-02-01 RX ADMIN — FLUOROMETHOLONE 1 DROP(S): 1 SOLUTION/ DROPS OPHTHALMIC at 15:04

## 2024-02-01 RX ADMIN — Medication 1 MILLIGRAM(S): at 11:02

## 2024-02-01 RX ADMIN — SODIUM ZIRCONIUM CYCLOSILICATE 10 GRAM(S): 10 POWDER, FOR SUSPENSION ORAL at 15:04

## 2024-02-01 RX ADMIN — SODIUM CHLORIDE 75 MILLILITER(S): 9 INJECTION INTRAMUSCULAR; INTRAVENOUS; SUBCUTANEOUS at 12:04

## 2024-02-01 RX ADMIN — LACTULOSE 30 GRAM(S): 10 SOLUTION ORAL at 03:23

## 2024-02-01 RX ADMIN — LEVETIRACETAM 250 MILLIGRAM(S): 250 TABLET, FILM COATED ORAL at 05:03

## 2024-02-01 RX ADMIN — Medication 50 MILLILITER(S): at 10:28

## 2024-02-01 RX ADMIN — Medication 650 MILLIGRAM(S): at 05:03

## 2024-02-01 RX ADMIN — FLUOROMETHOLONE 1 DROP(S): 1 SOLUTION/ DROPS OPHTHALMIC at 17:48

## 2024-02-01 RX ADMIN — Medication 650 MILLIGRAM(S): at 17:48

## 2024-02-01 RX ADMIN — Medication 1 APPLICATION(S): at 05:57

## 2024-02-01 RX ADMIN — INSULIN HUMAN 10 UNIT(S): 100 INJECTION, SOLUTION SUBCUTANEOUS at 10:29

## 2024-02-01 RX ADMIN — Medication 1 APPLICATION(S): at 17:48

## 2024-02-01 RX ADMIN — Medication 200 GRAM(S): at 10:29

## 2024-02-01 RX ADMIN — LEVETIRACETAM 250 MILLIGRAM(S): 250 TABLET, FILM COATED ORAL at 17:48

## 2024-02-01 RX ADMIN — ATORVASTATIN CALCIUM 40 MILLIGRAM(S): 80 TABLET, FILM COATED ORAL at 20:53

## 2024-02-01 RX ADMIN — LACTULOSE 30 GRAM(S): 10 SOLUTION ORAL at 04:09

## 2024-02-01 NOTE — PROGRESS NOTE ADULT - SUBJECTIVE AND OBJECTIVE BOX
MANDA TAVAREZ  78y, Male  Allergy: No Known Allergies    Hospital Day: 1d    Patient seen and examined. No acute events overnight    PMH/PSH:  PAST MEDICAL & SURGICAL HISTORY:  Diverticulitis      Herniated disc, cervical      Chronic gout of foot, unspecified cause, unspecified laterality      Type 2 diabetes mellitus without complication, unspecified long term insulin use status      Hypertension, unspecified type      High cholesterol      Osteoarthritis of multiple joints, unspecified osteoarthritis type      Sciatica, unspecified laterality      History of tonsillectomy and adenoidectomy      H/O colonoscopy  2018      H/O knee surgery          VITALS:  T(F): 96.9 (02-01-24 @ 08:11), Max: 98.4 (01-31-24 @ 14:00)  HR: 74 (02-01-24 @ 08:11)  BP: 139/63 (02-01-24 @ 08:11) (88/45 - 141/63)  RR: 18 (02-01-24 @ 04:08)  SpO2: 100% (02-01-24 @ 08:11)    TESTS & MEASUREMENTS:  Weight (Kg): 97.5 (01-31-24 @ 08:08)  BMI (kg/m2): 30.8 (01-31)    01-31-24 @ 07:01  -  02-01-24 @ 07:00  --------------------------------------------------------  IN: 360 mL / OUT: 900 mL / NET: -540 mL                            8.2    5.15  )-----------( 155      ( 01 Feb 2024 06:25 )             25.8     PT/INR - ( 01 Feb 2024 06:25 )   PT: 12.10 sec;   INR: 1.06 ratio         PTT - ( 01 Feb 2024 06:25 )  PTT:47.1 sec  02-01    143  |  114<H>  |  107<HH>  ----------------------------<  65<L>  6.4<HH>   |  19  |  2.5<H>    Ca    9.1      01 Feb 2024 06:25  Phos  5.1     02-01  Mg     3.4     02-01    TPro  6.4  /  Alb  3.8  /  TBili  <0.2  /  DBili  x   /  AST  29  /  ALT  22  /  AlkPhos  71  02-01    LIVER FUNCTIONS - ( 01 Feb 2024 06:25 )  Alb: 3.8 g/dL / Pro: 6.4 g/dL / ALK PHOS: 71 U/L / ALT: 22 U/L / AST: 29 U/L / GGT: x           CARDIAC MARKERS ( 01 Feb 2024 06:25 )  x     / x     / 278 U/L / x     / x      CARDIAC MARKERS ( 31 Jan 2024 20:33 )  x     / x     / 266 U/L / x     / x            Urinalysis Basic - ( 01 Feb 2024 06:25 )    Color: x / Appearance: x / SG: x / pH: x  Gluc: 65 mg/dL / Ketone: x  / Bili: x / Urobili: x   Blood: x / Protein: x / Nitrite: x   Leuk Esterase: x / RBC: x / WBC x   Sq Epi: x / Non Sq Epi: x / Bacteria: x        RADIOLOGY & ADDITIONAL TESTS:    RECENT DIAGNOSTIC ORDERS:  Basic Metabolic Panel: 16:00 (02-01-24 @ 08:57)  Ferritin: AM Sched. Collection: 01-Feb-2024 04:30 (01-31-24 @ 21:54)  Vitamin B12, Serum: AM Sched. Collection: 01-Feb-2024 04:30 (01-31-24 @ 19:25)  Folate, Serum: AM Sched. Collection: 01-Feb-2024 04:30 (01-31-24 @ 19:25)  Thyroid Stimulating Hormone, Serum: AM Sched. Collection: 01-Feb-2024 04:30 (01-31-24 @ 19:25)  Diet, DASH/TLC:   Sodium & Cholesterol Restricted (01-31-24 @ 19:25)  VA Duplex Lower Ext Vein Scan, Bilat: Urgent   Indication: r/o DVT  Transport: Stretcher-Crib  Exam Completed (01-31-24 @ 18:46)  Immunoglobulin Free Light Chains, Serum: 20:00 (01-31-24 @ 18:00)  Free Kappa and Lambda Light Chains, Urine: 20:00 (01-31-24 @ 18:00)  Protein/Creatinine Ratio, Urine: 20:00 (01-31-24 @ 18:00)  Immunofixation, Serum: 20:00 (01-31-24 @ 18:00)  Protein Electrophoresis, Serum: 20:00 (01-31-24 @ 18:00)  Blood Gas Venous - Hemoglobin/Hematocrit: 15:11 (01-31-24 @ 15:59)  Blood Gas Calcium, Ionized - Venous: 15:11 (01-31-24 @ 15:59)  Blood Gas Venous - Potassium: 15:11 (01-31-24 @ 15:59)  Blood Gas Venous - Sodium: 15:11 (01-31-24 @ 15:59)  Blood Gas Profile - Venous: 15:11 (01-31-24 @ 15:59)  Blood Gas Venous - Lactate: 15:11 (01-31-24 @ 15:59)  Wet Read: Routine  WET READ: CXR negative - No infiltrates, No consolidation, (+) atelectesis (01-31-24 @ 13:10)      MEDICATIONS:  MEDICATIONS  (STANDING):  atorvastatin 40 milliGRAM(s) Oral at bedtime  bacitracin   Ointment 1 Application(s) Topical two times a day  dextrose 50% Injectable 25 Gram(s) IV Push once  dextrose 50% Injectable 12.5 Gram(s) IV Push once  dextrose 50% Injectable 25 Gram(s) IV Push once  folic acid 1 milliGRAM(s) Oral daily  insulin lispro (ADMELOG) corrective regimen sliding scale   SubCutaneous three times a day before meals  levETIRAcetam 250 milliGRAM(s) Oral two times a day  polyethylene glycol 3350 17 Gram(s) Oral daily  senna 2 Tablet(s) Oral at bedtime  sodium bicarbonate 650 milliGRAM(s) Oral two times a day  sodium chloride 0.9%. 1000 milliLiter(s) (75 mL/Hr) IV Continuous <Continuous>  sodium zirconium cyclosilicate 10 Gram(s) Oral three times a day    MEDICATIONS  (PRN):  acetaminophen     Tablet .. 650 milliGRAM(s) Oral every 6 hours PRN Temp greater or equal to 38C (100.4F), Mild Pain (1 - 3)  aluminum hydroxide/magnesium hydroxide/simethicone Suspension 30 milliLiter(s) Oral every 4 hours PRN Dyspepsia  melatonin 3 milliGRAM(s) Oral at bedtime PRN Insomnia  ondansetron Injectable 4 milliGRAM(s) IV Push every 8 hours PRN Nausea and/or Vomiting      HOME MEDICATIONS:  allopurinol 300 mg oral tablet (01-31)  aspirin 81 mg oral tablet, chewable (01-31)  atorvastatin 40 mg oral tablet (01-31)  folic acid 1 mg oral tablet (01-31)  Oseni 25 mg-30 mg oral tablet (01-31)      REVIEW OF SYSTEMS:  All other review of systems is negative unless indicated above.     PHYSICAL EXAM:  PHYSICAL EXAM:  GENERAL: NAD  HEAD:  Atraumatic, Normocephalic  NECK: Supple, No JVD  CHEST/LUNG: Clear to auscultation bilaterally; No wheeze  HEART: Regular rate and rhythm; No murmurs, rubs, or gallops  ABDOMEN: Soft, Nontender, Nondistended; Bowel sounds present  EXTREMITIES:  2+ Peripheral Pulses, No clubbing, cyanosis, or edema  SKIN: No rashes or lesions

## 2024-02-01 NOTE — ED ADULT NURSE REASSESSMENT NOTE - NS ED NURSE REASSESS COMMENT FT1
Several episodes of large brown  stool , raymundo care done , protective cream applied at the sacral area , bed linen changed done

## 2024-02-01 NOTE — PROGRESS NOTE ADULT - SUBJECTIVE AND OBJECTIVE BOX
Nephrology progress note     no complaints    ON events/Subjective:     Allergies:  No Known Allergies    Hospital Medications:   MEDICATIONS  (STANDING):  atorvastatin 40 milliGRAM(s) Oral at bedtime  bacitracin   Ointment 1 Application(s) Topical two times a day  calcium gluconate IVPB 2 Gram(s) IV Intermittent once  dextrose 50% Injectable 50 milliLiter(s) IV Push once  dextrose 50% Injectable 12.5 Gram(s) IV Push once  dextrose 50% Injectable 25 Gram(s) IV Push once  dextrose 50% Injectable 25 Gram(s) IV Push once  folic acid 1 milliGRAM(s) Oral daily  insulin lispro (ADMELOG) corrective regimen sliding scale   SubCutaneous three times a day before meals  insulin regular  human recombinant 10 Unit(s) IV Push once  levETIRAcetam 250 milliGRAM(s) Oral two times a day  polyethylene glycol 3350 17 Gram(s) Oral daily  senna 2 Tablet(s) Oral at bedtime  sodium bicarbonate 650 milliGRAM(s) Oral two times a day  sodium chloride 0.9%. 1000 milliLiter(s) (75 mL/Hr) IV Continuous <Continuous>  sodium zirconium cyclosilicate 10 Gram(s) Oral three times a day    REVIEW OF SYSTEMS:  CONSTITUTIONAL: weakness  EYES/ENT: No visual changes;  No vertigo or throat pain   NECK: No pain or stiffness  RESPIRATORY: No cough, wheezing, hemoptysis; No shortness of breath  CARDIOVASCULAR: No chest pain or palpitations.  GASTROINTESTINAL: No abdominal or epigastric pain. No nausea, vomiting, or hematemesis; No diarrhea or constipation. No melena or hematochezia.  GENITOURINARY: No dysuria, frequency, foamy urine, urinary urgency, incontinence or hematuria  NEUROLOGICAL: No numbness or weakness  SKIN: No itching, burning, rashes, or lesions   VASCULAR: No bilateral lower extremity edema.   All other review of systems is negative unless indicated above.    VITALS:  T(F): 96.9 (02-01-24 @ 08:11), Max: 98.4 (01-31-24 @ 14:00)  HR: 74 (02-01-24 @ 08:11)  BP: 139/63 (02-01-24 @ 08:11)  RR: 18 (02-01-24 @ 04:08)  SpO2: 100% (02-01-24 @ 08:11)  Wt(kg): --    01-31 @ 07:01  -  02-01 @ 07:00  --------------------------------------------------------  IN: 360 mL / OUT: 900 mL / NET: -540 mL        PHYSICAL EXAM:  Constitutional: NAD  HEENT: anicteric sclera, oropharynx clear, MMM  Neck: No JVD  Respiratory: CTAB, no wheezes, rales or rhonchi  Cardiovascular: S1, S2, RRR  Gastrointestinal: BS+, soft, NT/ND  Extremities: No cyanosis or clubbing. No peripheral edema  Neurological: A/O x 3, no focal deficits  Psychiatric: Normal mood, normal affect  : No CVA tenderness. No gil.   Skin: ecchymosis  Vascular Access:    LABS:  02-01    143  |  114<H>  |  107<HH>  ----------------------------<  65<L>  6.4<HH>   |  19  |  2.5<H>    Ca    9.1      01 Feb 2024 06:25  Phos  5.1     02-01  Mg     3.4     02-01    TPro  6.4  /  Alb  3.8  /  TBili  <0.2  /  DBili      /  AST  29  /  ALT  22  /  AlkPhos  71  02-01                          8.2    5.15  )-----------( 155      ( 01 Feb 2024 06:25 )             25.8       Urine Studies:  Creatinine Trend: 2.5<--, 2.9<--, 3.0<--, 3.2<--  Urinalysis Basic - ( 01 Feb 2024 06:25 )    Color:  / Appearance:  / SG:  / pH:   Gluc: 65 mg/dL / Ketone:   / Bili:  / Urobili:    Blood:  / Protein:  / Nitrite:    Leuk Esterase:  / RBC:  / WBC    Sq Epi:  / Non Sq Epi:  / Bacteria:       ·failure to thrive  ·renal failure -- likely baseline DM nephropathy and now volume depletion too  ·relative hypotension  ·sp fall with small SDH  ·anemia  ·hyperkalemia as outpt  ·metabolic acidosis  ·bladder sono as out pt 2 days ago with ? prostate vs bladder mass  ·no signs infectious process  ·d/w wife per advanced directives - uncertain at thiss time to include hemodialysis if needed    1) continue NS at 60 cc/hr  2) add sod bic 650 mg 2 tabs po q12  3) continue lokelma  4) please send cpk, SPEP with immuno, kappa, lambda urine prot/creat  5) please resume home meds nasal spray saline 1 spay each nostril tid and fluorometholone 0.1 % opth 1 gtt both eyes bid  am labs  will speak with resident

## 2024-02-01 NOTE — PHYSICAL THERAPY INITIAL EVALUATION ADULT - SPECIFY REASON(S)
Pt pending BLE duplex to r/o DVT, attempted to assess PSH and prior level of function from pt, c/o LLE 10/10 pain, Dr Iniguez made aware. PT to f/u as appropriate.

## 2024-02-01 NOTE — PROGRESS NOTE ADULT - ASSESSMENT
79yo M w/ PMHx DM, CKD, HTN, HLD presents s/p mechanical fall with head injury 3 days prior found to have acute extra-axial (subdural versus epidural) focal hemorrhage over the high left frontal convexity and acute renal failure, admitted to [facility].    #Recent mechanical fall   #Acute intracranial hemorrhage   #Hx of subacute subdural hematoma 05/2021  Repeat CTH reviewed, stable  dw neurosurgery, no acute surgical itnervention warratned  - PT eval  - c/w home dose keppra po   - hold home asa and DVT ppx for now     #KELL on CKD stage 4  #Hyperkalemia   Likely secondary to volume depletion with underlying DM nephropathy   Nephrology following  cr improving with fluids, K still high  - Cont IVF  - hold home lisinopril and bumex   - Continue sodium bicarb po 650mg bid   - f/u SPEP, UPEP, TIERA, urine studies  - Continue lokelma 10 TID    #anemia  - trend H/H, maintain active T/S    #acute on chronic epistaxis left naris   - ENT saw patient in ED: bacitracin b/l nares, no acute intervention  - sees o/p ENT: Dr. Vanegas, [facility] on [address].    #DM  - Takes Oseni 25/30 mg BID - hold oral anti-hyperglycemics and start ISS with FS goal 140-180    #HTN  - lisinopril and bumex held    #HLD  - C/w lipitor 40mg QD    DVT Prophylaxis: SCD    #Progress Note Handoff  Pending (specify): Monitor creatinine and K  Family discussion: dw pt regarding management of ICH and hyperkalemia  Disposition: Home

## 2024-02-02 LAB
ANION GAP SERPL CALC-SCNC: 11 MMOL/L — SIGNIFICANT CHANGE UP (ref 7–14)
ANION GAP SERPL CALC-SCNC: 9 MMOL/L — SIGNIFICANT CHANGE UP (ref 7–14)
BASOPHILS # BLD AUTO: 0.02 K/UL — SIGNIFICANT CHANGE UP (ref 0–0.2)
BASOPHILS NFR BLD AUTO: 0.3 % — SIGNIFICANT CHANGE UP (ref 0–1)
BUN SERPL-MCNC: 73 MG/DL — CRITICAL HIGH (ref 10–20)
BUN SERPL-MCNC: 78 MG/DL — CRITICAL HIGH (ref 10–20)
CALCIUM SERPL-MCNC: 9.2 MG/DL — SIGNIFICANT CHANGE UP (ref 8.4–10.4)
CALCIUM SERPL-MCNC: 9.2 MG/DL — SIGNIFICANT CHANGE UP (ref 8.4–10.5)
CHLORIDE SERPL-SCNC: 119 MMOL/L — HIGH (ref 98–110)
CHLORIDE SERPL-SCNC: 121 MMOL/L — HIGH (ref 98–110)
CO2 SERPL-SCNC: 18 MMOL/L — SIGNIFICANT CHANGE UP (ref 17–32)
CO2 SERPL-SCNC: 19 MMOL/L — SIGNIFICANT CHANGE UP (ref 17–32)
CREAT ?TM UR-MCNC: 35 MG/DL — SIGNIFICANT CHANGE UP
CREAT SERPL-MCNC: 1.6 MG/DL — HIGH (ref 0.7–1.5)
CREAT SERPL-MCNC: 1.7 MG/DL — HIGH (ref 0.7–1.5)
EGFR: 40 ML/MIN/1.73M2 — LOW
EGFR: 43 ML/MIN/1.73M2 — LOW
EOSINOPHIL # BLD AUTO: 0.23 K/UL — SIGNIFICANT CHANGE UP (ref 0–0.7)
EOSINOPHIL NFR BLD AUTO: 3.5 % — SIGNIFICANT CHANGE UP (ref 0–8)
GLUCOSE BLDC GLUCOMTR-MCNC: 108 MG/DL — HIGH (ref 70–99)
GLUCOSE BLDC GLUCOMTR-MCNC: 110 MG/DL — HIGH (ref 70–99)
GLUCOSE BLDC GLUCOMTR-MCNC: 139 MG/DL — HIGH (ref 70–99)
GLUCOSE BLDC GLUCOMTR-MCNC: 96 MG/DL — SIGNIFICANT CHANGE UP (ref 70–99)
GLUCOSE SERPL-MCNC: 133 MG/DL — HIGH (ref 70–99)
GLUCOSE SERPL-MCNC: 83 MG/DL — SIGNIFICANT CHANGE UP (ref 70–99)
HCT VFR BLD CALC: 25.7 % — LOW (ref 42–52)
HGB BLD-MCNC: 8.1 G/DL — LOW (ref 14–18)
IMM GRANULOCYTES NFR BLD AUTO: 2.1 % — HIGH (ref 0.1–0.3)
LYMPHOCYTES # BLD AUTO: 0.31 K/UL — LOW (ref 1.2–3.4)
LYMPHOCYTES # BLD AUTO: 4.7 % — LOW (ref 20.5–51.1)
MAGNESIUM SERPL-MCNC: 2.6 MG/DL — HIGH (ref 1.8–2.4)
MCHC RBC-ENTMCNC: 31 PG — SIGNIFICANT CHANGE UP (ref 27–31)
MCHC RBC-ENTMCNC: 31.5 G/DL — LOW (ref 32–37)
MCV RBC AUTO: 98.5 FL — HIGH (ref 80–94)
MONOCYTES # BLD AUTO: 0.62 K/UL — HIGH (ref 0.1–0.6)
MONOCYTES NFR BLD AUTO: 9.4 % — HIGH (ref 1.7–9.3)
NEUTROPHILS # BLD AUTO: 5.28 K/UL — SIGNIFICANT CHANGE UP (ref 1.4–6.5)
NEUTROPHILS NFR BLD AUTO: 80 % — HIGH (ref 42.2–75.2)
NRBC # BLD: 0 /100 WBCS — SIGNIFICANT CHANGE UP (ref 0–0)
PLATELET # BLD AUTO: 169 K/UL — SIGNIFICANT CHANGE UP (ref 130–400)
PMV BLD: 11.4 FL — HIGH (ref 7.4–10.4)
POTASSIUM SERPL-MCNC: 5.2 MMOL/L — HIGH (ref 3.5–5)
POTASSIUM SERPL-MCNC: 5.6 MMOL/L — HIGH (ref 3.5–5)
POTASSIUM SERPL-SCNC: 5.2 MMOL/L — HIGH (ref 3.5–5)
POTASSIUM SERPL-SCNC: 5.6 MMOL/L — HIGH (ref 3.5–5)
PROT ?TM UR-MCNC: 38 MG/DLG/24H — SIGNIFICANT CHANGE UP
PROT/CREAT UR-RTO: 1.1 RATIO — HIGH (ref 0–0.2)
RBC # BLD: 2.61 M/UL — LOW (ref 4.7–6.1)
RBC # FLD: 17.2 % — HIGH (ref 11.5–14.5)
SODIUM SERPL-SCNC: 147 MMOL/L — HIGH (ref 135–146)
SODIUM SERPL-SCNC: 150 MMOL/L — HIGH (ref 135–146)
WBC # BLD: 6.6 K/UL — SIGNIFICANT CHANGE UP (ref 4.8–10.8)
WBC # FLD AUTO: 6.6 K/UL — SIGNIFICANT CHANGE UP (ref 4.8–10.8)

## 2024-02-02 PROCEDURE — 99221 1ST HOSP IP/OBS SF/LOW 40: CPT

## 2024-02-02 PROCEDURE — 72170 X-RAY EXAM OF PELVIS: CPT | Mod: 26

## 2024-02-02 PROCEDURE — 73552 X-RAY EXAM OF FEMUR 2/>: CPT | Mod: 26,LT

## 2024-02-02 PROCEDURE — 99232 SBSQ HOSP IP/OBS MODERATE 35: CPT

## 2024-02-02 PROCEDURE — 73560 X-RAY EXAM OF KNEE 1 OR 2: CPT | Mod: 26,LT

## 2024-02-02 PROCEDURE — 73620 X-RAY EXAM OF FOOT: CPT | Mod: 26,LT

## 2024-02-02 RX ORDER — SODIUM CHLORIDE 9 MG/ML
1000 INJECTION, SOLUTION INTRAVENOUS
Refills: 0 | Status: DISCONTINUED | OUTPATIENT
Start: 2024-02-02 | End: 2024-02-03

## 2024-02-02 RX ORDER — INFLUENZA VIRUS VACCINE 15; 15; 15; 15 UG/.5ML; UG/.5ML; UG/.5ML; UG/.5ML
0.7 SUSPENSION INTRAMUSCULAR ONCE
Refills: 0 | Status: DISCONTINUED | OUTPATIENT
Start: 2024-02-02 | End: 2024-02-07

## 2024-02-02 RX ORDER — SODIUM ZIRCONIUM CYCLOSILICATE 10 G/10G
10 POWDER, FOR SUSPENSION ORAL THREE TIMES A DAY
Refills: 0 | Status: DISCONTINUED | OUTPATIENT
Start: 2024-02-02 | End: 2024-02-04

## 2024-02-02 RX ADMIN — ATORVASTATIN CALCIUM 40 MILLIGRAM(S): 80 TABLET, FILM COATED ORAL at 22:02

## 2024-02-02 RX ADMIN — SODIUM CHLORIDE 60 MILLILITER(S): 9 INJECTION, SOLUTION INTRAVENOUS at 16:06

## 2024-02-02 RX ADMIN — SODIUM ZIRCONIUM CYCLOSILICATE 10 GRAM(S): 10 POWDER, FOR SUSPENSION ORAL at 05:41

## 2024-02-02 RX ADMIN — Medication 1 APPLICATION(S): at 05:46

## 2024-02-02 RX ADMIN — Medication 1 MILLIGRAM(S): at 11:54

## 2024-02-02 RX ADMIN — LEVETIRACETAM 250 MILLIGRAM(S): 250 TABLET, FILM COATED ORAL at 18:43

## 2024-02-02 RX ADMIN — SODIUM ZIRCONIUM CYCLOSILICATE 10 GRAM(S): 10 POWDER, FOR SUSPENSION ORAL at 22:02

## 2024-02-02 RX ADMIN — LEVETIRACETAM 250 MILLIGRAM(S): 250 TABLET, FILM COATED ORAL at 05:41

## 2024-02-02 RX ADMIN — Medication 1 APPLICATION(S): at 18:43

## 2024-02-02 RX ADMIN — FLUOROMETHOLONE 1 DROP(S): 1 SOLUTION/ DROPS OPHTHALMIC at 05:41

## 2024-02-02 RX ADMIN — SODIUM ZIRCONIUM CYCLOSILICATE 10 GRAM(S): 10 POWDER, FOR SUSPENSION ORAL at 13:50

## 2024-02-02 RX ADMIN — Medication 650 MILLIGRAM(S): at 18:43

## 2024-02-02 RX ADMIN — FLUOROMETHOLONE 1 DROP(S): 1 SOLUTION/ DROPS OPHTHALMIC at 18:43

## 2024-02-02 RX ADMIN — Medication 650 MILLIGRAM(S): at 05:41

## 2024-02-02 RX ADMIN — SENNA PLUS 2 TABLET(S): 8.6 TABLET ORAL at 22:01

## 2024-02-02 NOTE — PROGRESS NOTE ADULT - SUBJECTIVE AND OBJECTIVE BOX
Nephrology progress note     episode epistaxis  ON events/Subjective:     Allergies:  No Known Allergies    Hospital Medications:   MEDICATIONS  (STANDING):  atorvastatin 40 milliGRAM(s) Oral at bedtime  bacitracin   Ointment 1 Application(s) Topical two times a day  dextrose 50% Injectable 12.5 Gram(s) IV Push once  dextrose 50% Injectable 25 Gram(s) IV Push once  dextrose 50% Injectable 25 Gram(s) IV Push once  fluorometholone 0.1% Suspension 1 Drop(s) Both EYES two times a day  folic acid 1 milliGRAM(s) Oral daily  influenza  Vaccine (HIGH DOSE) 0.7 milliLiter(s) IntraMuscular once  insulin lispro (ADMELOG) corrective regimen sliding scale   SubCutaneous three times a day before meals  levETIRAcetam 250 milliGRAM(s) Oral two times a day  senna 2 Tablet(s) Oral at bedtime  sodium bicarbonate 650 milliGRAM(s) Oral two times a day  sodium chloride 0.9%. 1000 milliLiter(s) (75 mL/Hr) IV Continuous <Continuous>  sodium zirconium cyclosilicate 10 Gram(s) Oral three times a day    REVIEW OF SYSTEMS:  CONSTITUTIONAL: weakness  EYES/ENT: No visual changes;  No vertigo or throat pain   NECK: No pain or stiffness  RESPIRATORY: No cough, wheezing, hemoptysis; No shortness of breath  CARDIOVASCULAR: No chest pain or palpitations.  GASTROINTESTINAL: No abdominal or epigastric pain. No nausea, vomiting, or hematemesis; No diarrhea or constipation. No melena or hematochezia.  GENITOURINARY: No dysuria, frequency, foamy urine, urinary urgency, incontinence or hematuria  NEUROLOGICAL: No numbness or weakness  SKIN: No itching, burning, rashes, or lesions   VASCULAR: No bilateral lower extremity edema.   All other review of systems is negative unless indicated above.    VITALS:  T(F): 97.4 (02-02-24 @ 07:49), Max: 98.5 (02-01-24 @ 16:39)  HR: 101 (02-02-24 @ 07:49)  BP: 129/58 (02-02-24 @ 07:49)  RR: 18 (02-02-24 @ 07:49)  SpO2: 96% (02-02-24 @ 07:49)  Wt(kg): --    01-31 @ 07:01  -  02-01 @ 07:00  --------------------------------------------------------  IN: 360 mL / OUT: 900 mL / NET: -540 mL    02-01 @ 07:01  -  02-02 @ 07:00  --------------------------------------------------------  IN: 0 mL / OUT: 2500 mL / NET: -2500 mL        PHYSICAL EXAM:  Constitutional: NAD  HEENT: anicteric sclera, oropharynx clear, MMM  Neck: No JVD  Respiratory: CTAB, no wheezes, rales or rhonchi  Cardiovascular: S1, S2, RRR  Gastrointestinal: BS+, soft, NT/ND  Extremities: No cyanosis or clubbing. No peripheral edema  Neurological: A/O x 3, no focal deficits  Psychiatric: Normal mood, normal affect  : No CVA tenderness. No gil.   Skin: No rashes  Vascular Access:    LABS:  02-02    150<H>  |  121<H>  |  78<HH>  ----------------------------<  83  5.6<H>   |  18  |  1.7<H>    Ca    9.2      02 Feb 2024 08:14  Phos  5.1     02-01  Mg     2.6     02-02    TPro  6.4  /  Alb  3.8  /  TBili  <0.2  /  DBili      /  AST  29  /  ALT  22  /  AlkPhos  71  02-01                          8.1    6.60  )-----------( 169      ( 02 Feb 2024 08:14 )             25.7       Urine Studies:  Creatinine Trend: 1.7<--, 1.7<--, 2.5<--, 2.9<--, 3.0<--, 3.2<--  Urinalysis Basic - ( 02 Feb 2024 08:14 )    Color:  / Appearance:  / SG:  / pH:   Gluc: 83 mg/dL / Ketone:   / Bili:  / Urobili:    Blood:  / Protein:  / Nitrite:    Leuk Esterase:  / RBC:  / WBC    Sq Epi:  / Non Sq Epi:  / Bacteria:     ·failure to thrive  ·renal failure -- likely baseline DM nephropathy and now volume depletion too - improved  hypernatremia  ·epistaxis and seen by ENT - no intervention - this is chronic problem  ·sp fall with small SDH  ·anemia mildly diminished iron stores but uncertain if this is cause of anemia - other causes such as CKD or other bone marrow suppression considered  ·hyperkalemia as outpt too ? RTA 4  ·metabolic acidosis ? RTA 4  ·no signs infectious process  ·d/w wife per advanced directives - uncertain at this time to include hemodialysis if needed    1) uncertain as to cause of hypernatremia especially as on NS IVF - please send urine sod, osm and change iv 1/2 NS at 60 cc/hr  2) continue bicarbonate  3) continue Lokelma  4) consider changing admission to regular medicine (not telemetry) as hyperkalemia improved and it is very difficult for this elderly pt to remain in ER.  It should expedite transfer to floor  5) please send urine prot, creat and bence carrillo protein of urine - fu SPEP - an increase kappa/lambda is not unusual with renal failure but still need to consider paraprotein disease  6) with am labs please send KATHARINA, C3, C4  will d/w resident

## 2024-02-02 NOTE — PROGRESS NOTE ADULT - ASSESSMENT
Pt is a 78y Male admitted with subdural vs epidural hemorrhage - ENT following for epistaxis.     PLAN:  -Surgicell placed to right naris  -Bacitracin to b/l nares  -BP controlled  -Trend H/H, transfuse prn  -Nasal precautions  -Monitor for rebleed episodes  -Patient seen and examined with Dr. Bal

## 2024-02-02 NOTE — PROGRESS NOTE ADULT - SUBJECTIVE AND OBJECTIVE BOX
ENT DAILY PROGRESS NOTE    Pt is a 78y Male admitted with subdural vs epidural hemorrhage - ENT following for epistaxis. Patient seen and examined at bedside, reports right epistaxis began yesterday evening, placed a tissue in his nostril and has had no further bleeding since.     REVIEW OF SYSTEMS   [x] A ten-point review of systems was otherwise negative except as noted.    Allergies  No Known Allergies    MEDICATIONS:  acetaminophen     Tablet .. 650 milliGRAM(s) Oral every 6 hours PRN  aluminum hydroxide/magnesium hydroxide/simethicone Suspension 30 milliLiter(s) Oral every 4 hours PRN  atorvastatin 40 milliGRAM(s) Oral at bedtime  bacitracin   Ointment 1 Application(s) Topical two times a day  dextrose 50% Injectable 25 Gram(s) IV Push once  dextrose 50% Injectable 12.5 Gram(s) IV Push once  dextrose 50% Injectable 25 Gram(s) IV Push once  fluorometholone 0.1% Suspension 1 Drop(s) Both EYES two times a day  folic acid 1 milliGRAM(s) Oral daily  influenza  Vaccine (HIGH DOSE) 0.7 milliLiter(s) IntraMuscular once  insulin lispro (ADMELOG) corrective regimen sliding scale   SubCutaneous three times a day before meals  levETIRAcetam 250 milliGRAM(s) Oral two times a day  melatonin 3 milliGRAM(s) Oral at bedtime PRN  ondansetron Injectable 4 milliGRAM(s) IV Push every 8 hours PRN  senna 2 Tablet(s) Oral at bedtime  sodium bicarbonate 650 milliGRAM(s) Oral two times a day    Vital Signs Last 24 Hrs  T(C): 36.3 (02 Feb 2024 07:49), Max: 36.9 (01 Feb 2024 16:39)  T(F): 97.4 (02 Feb 2024 07:49), Max: 98.5 (01 Feb 2024 16:39)  HR: 101 (02 Feb 2024 07:49) (73 - 101)  BP: 129/58 (02 Feb 2024 07:49) (102/49 - 151/65)  BP(mean): 84 (02 Feb 2024 07:49) (84 - 88)  RR: 18 (02 Feb 2024 07:49) (18 - 18)  SpO2: 96% (02 Feb 2024 07:49) (96% - 99%)    Parameters below as of 02 Feb 2024 07:49  Patient On (Oxygen Delivery Method): room air    02-01 @ 07:01  -  02-02 @ 07:00  --------------------------------------------------------  IN:  Total IN: 0 mL    OUT:    Voided (mL): 2500 mL  Total OUT: 2500 mL    Total NET: -2500 mL    PHYSICAL EXAM:  GEN: NAD, awake and alert. No drooling or pooling of secretions. No stridor or stertor. Good vocal quality, no hoarseness.   SKIN: B/l upper extremity bruising and skin tears.  HEENT: B/l nasal mucosa pink and dry. Left naris with dried blood-tinged mucus, no active bleeding. Right naris with scant blood tinged mucus and hyperemic nasal septum. Oral mucosa pink and moist. No erythema or edema noted to buccal mucosa, tongue, FOM, uvula or posterior oropharynx. Uvula midline. No active bleeding or drainage noted in posterior oropharynx.  NECK: Trachea midline. Neck supple, no TTP to B/L lateral neck.  RESP: Non-labored breathing. No use of accessory muscles.  CARDIO: +S1/S2  ABDO: Soft, NT.  EXT: SUNSHINE x 4    LABS:  CBC-             8.1    6.60  )-----------( 169      ( 02 Feb 2024 08:14 )             25.7     BMP/CMP-02 Feb 2024 08:14  150    |  121    |  78     ----------------------------<  83     5.6     |  18     |  1.7      Ca    9.2        02 Feb 2024 08:14  Phos  5.1       01 Feb 2024 06:25  Mg     2.6       02 Feb 2024 08:14    TPro  6.4    /  Alb  3.8    /  TBili  <0.2   /  DBili  x      /  AST  29     /  ALT  22     /  AlkPhos  71     01 Feb 2024 06:25    Coagulation Studies-  PT/INR - ( 01 Feb 2024 06:25 )   PT: 12.10 sec;   INR: 1.06 ratio    PTT - ( 01 Feb 2024 06:25 )  PTT:47.1 sec

## 2024-02-02 NOTE — PROGRESS NOTE ADULT - ASSESSMENT
79yo M w/ PMHx DM, CKD, HTN, HLD presents s/p mechanical fall with head injury 3 days prior found to have acute extra-axial (subdural versus epidural) focal hemorrhage over the high left frontal convexity and acute renal failure, admitted to [facility].    #Recent mechanical fall   #Acute intracranial hemorrhage   #Hx of subacute subdural hematoma 05/2021  - hgb stable  - Repeat CTH reviewed, stable  - dw neurosurgery, no acute surgical intervention warranted  - PT eval  - c/w home dose keppra po   - hold home asa and DVT ppx for now ; no clear indication for absolute indication for ASA. given recurrent head trauma and bruising would recommend holding indefinitely    #KELL on CKD stage 4  #Hyperkalemia   # Hypernatremia  - difficult to ascertain recent baseline but in 2022 baseline was ~1.5  - on admission sCr: 3.2-->2.5-->1.7  - K 5.6 today  - Na 150  - Likely secondary to volume depletion with underlying DM nephropathy   - Nephrology following  - Cont IVF  - hold home lisinopril and bumex   - Continue sodium bicarb po 650mg bid   - f/u SPEP, UPEP, TIERA, urine studies  - Continue lokelma 10 TID    #anemia  - trend H/H, maintain active T/S    #acute on chronic epistaxis left naris   - ENT saw patient in ED: bacitracin b/l nares, no acute intervention  - sees o/p ENT: Dr. Vanegas, [facility] on [address].    #DM  - Takes Oseni 25/30 mg BID - hold oral anti-hyperglycemics and start ISS with FS goal 140-180    #HTN  - lisinopril and bumex held    #HLD  - C/w lipitor 40mg QD    DVT Prophylaxis: SCD    #Progress Note Handoff  Pending (specify): Monitor creatinine and K, PT eval  Family discussion: dw pt regarding management of ICH and hyperkalemia  Disposition: Home

## 2024-02-02 NOTE — PROGRESS NOTE ADULT - SUBJECTIVE AND OBJECTIVE BOX
MANDA TAVAREZ  78y  Male      Patient is a 78y old  Male who presents with a chief complaint of ARF, Fall (02 Feb 2024 11:40)      INTERVAL HPI/OVERNIGHT EVENTS: no acute events overnight. no major complaints.       REVIEW OF SYSTEMS:  CONSTITUTIONAL: No fever, weight loss, or fatigue  RESPIRATORY: No cough, wheezing, chills or hemoptysis; No shortness of breath  CARDIOVASCULAR: No chest pain, palpitations, dizziness, or leg swelling  GASTROINTESTINAL: No abdominal or epigastric pain. No nausea, vomiting, or hematemesis; No diarrhea or constipation. No melena or hematochezia.  GENITOURINARY: No dysuria, frequency, hematuria, or incontinence  NEUROLOGICAL: No headaches, memory loss, loss of strength, numbness, or tremors  SKIN: No itching, burning, rashes, or lesions   MUSCULOSKELETAL: No joint pain or swelling; No muscle, back, or extremity pain  PSYCHIATRIC: No depression, anxiety, mood swings, or difficulty sleeping  All other review of systems negative    T(C): 36.3 (02-02-24 @ 07:49), Max: 36.9 (02-01-24 @ 16:39)  HR: 101 (02-02-24 @ 07:49) (73 - 101)  BP: 129/58 (02-02-24 @ 07:49) (102/49 - 151/65)  RR: 18 (02-02-24 @ 07:49) (18 - 18)  SpO2: 96% (02-02-24 @ 07:49) (96% - 99%)  Wt(kg): --Vital Signs Last 24 Hrs  T(C): 36.3 (02 Feb 2024 07:49), Max: 36.9 (01 Feb 2024 16:39)  T(F): 97.4 (02 Feb 2024 07:49), Max: 98.5 (01 Feb 2024 16:39)  HR: 101 (02 Feb 2024 07:49) (73 - 101)  BP: 129/58 (02 Feb 2024 07:49) (102/49 - 151/65)  BP(mean): 84 (02 Feb 2024 07:49) (84 - 88)  RR: 18 (02 Feb 2024 07:49) (18 - 18)  SpO2: 96% (02 Feb 2024 07:49) (96% - 99%)    Parameters below as of 02 Feb 2024 07:49  Patient On (Oxygen Delivery Method): room air          02-01-24 @ 07:01  -  02-02-24 @ 07:00  --------------------------------------------------------  IN: 0 mL / OUT: 2500 mL / NET: -2500 mL        PHYSICAL EXAM:  GENERAL: NAD, well-groomed, well-developed  PSYCH: no agitation, baseline mentation  NERVOUS SYSTEM:  Alert & Oriented X3, Motor Strength 5/5 B/L upper and lower extremities; Sensory intact; FTN WNL  PULMONARY: Clear to percussion bilaterally; No rales, rhonchi, wheezing, or rubs  CARDIOVASCULAR: Regular rate and rhythm; No murmurs, rubs, or gallops  GI: Soft, Nontender, Nondistended; Bowel sounds present  EXTREMITIES:  2+ Peripheral Pulses, No clubbing, cyanosis, or edema  LYMPH: No lymphadenopathy noted  SKIN: No rashes or lesions    Consultant(s) Notes Reviewed:  [x ] YES  [ ] NO    Discussed with Consultants/Other Providers [ x] YES     LABS                          8.1    6.60  )-----------( 169      ( 02 Feb 2024 08:14 )             25.7     02-02    150<H>  |  121<H>  |  78<HH>  ----------------------------<  83  5.6<H>   |  18  |  1.7<H>    Ca    9.2      02 Feb 2024 08:14  Phos  5.1     02-01  Mg     2.6     02-02    TPro  6.4  /  Alb  3.8  /  TBili  <0.2  /  DBili  x   /  AST  29  /  ALT  22  /  AlkPhos  71  02-01      Urinalysis Basic - ( 02 Feb 2024 08:14 )    Color: x / Appearance: x / SG: x / pH: x  Gluc: 83 mg/dL / Ketone: x  / Bili: x / Urobili: x   Blood: x / Protein: x / Nitrite: x   Leuk Esterase: x / RBC: x / WBC x   Sq Epi: x / Non Sq Epi: x / Bacteria: x      PT/INR - ( 01 Feb 2024 06:25 )   PT: 12.10 sec;   INR: 1.06 ratio         PTT - ( 01 Feb 2024 06:25 )  PTT:47.1 sec  Lactate Trend    CARDIAC MARKERS ( 01 Feb 2024 06:25 )  x     / x     / 278 U/L / x     / x      CARDIAC MARKERS ( 31 Jan 2024 20:33 )  x     / x     / 266 U/L / x     / x          CAPILLARY BLOOD GLUCOSE      POCT Blood Glucose.: 110 mg/dL (02 Feb 2024 12:03)        RADIOLOGY & ADDITIONAL TESTS:    Imaging Personally Reviewed:  [ ] YES  [ ] NO    HEALTH ISSUES - PROBLEM Dx:

## 2024-02-03 LAB
ALBUMIN SERPL ELPH-MCNC: 3.8 G/DL — SIGNIFICANT CHANGE UP (ref 3.5–5.2)
ALP SERPL-CCNC: 85 U/L — SIGNIFICANT CHANGE UP (ref 30–115)
ALT FLD-CCNC: 24 U/L — SIGNIFICANT CHANGE UP (ref 0–41)
ANION GAP SERPL CALC-SCNC: 10 MMOL/L — SIGNIFICANT CHANGE UP (ref 7–14)
ANION GAP SERPL CALC-SCNC: 11 MMOL/L — SIGNIFICANT CHANGE UP (ref 7–14)
ANION GAP SERPL CALC-SCNC: 13 MMOL/L — SIGNIFICANT CHANGE UP (ref 7–14)
AST SERPL-CCNC: 35 U/L — SIGNIFICANT CHANGE UP (ref 0–41)
BASOPHILS # BLD AUTO: 0.05 K/UL — SIGNIFICANT CHANGE UP (ref 0–0.2)
BASOPHILS NFR BLD AUTO: 0.7 % — SIGNIFICANT CHANGE UP (ref 0–1)
BILIRUB SERPL-MCNC: 0.3 MG/DL — SIGNIFICANT CHANGE UP (ref 0.2–1.2)
BUN SERPL-MCNC: 60 MG/DL — HIGH (ref 10–20)
BUN SERPL-MCNC: 63 MG/DL — CRITICAL HIGH (ref 10–20)
BUN SERPL-MCNC: 66 MG/DL — CRITICAL HIGH (ref 10–20)
CALCIUM SERPL-MCNC: 9.2 MG/DL — SIGNIFICANT CHANGE UP (ref 8.4–10.5)
CALCIUM SERPL-MCNC: 9.2 MG/DL — SIGNIFICANT CHANGE UP (ref 8.4–10.5)
CALCIUM SERPL-MCNC: 9.6 MG/DL — SIGNIFICANT CHANGE UP (ref 8.4–10.5)
CHLORIDE SERPL-SCNC: 117 MMOL/L — HIGH (ref 98–110)
CHLORIDE SERPL-SCNC: 120 MMOL/L — HIGH (ref 98–110)
CHLORIDE SERPL-SCNC: 120 MMOL/L — HIGH (ref 98–110)
CO2 SERPL-SCNC: 17 MMOL/L — SIGNIFICANT CHANGE UP (ref 17–32)
CO2 SERPL-SCNC: 19 MMOL/L — SIGNIFICANT CHANGE UP (ref 17–32)
CO2 SERPL-SCNC: 20 MMOL/L — SIGNIFICANT CHANGE UP (ref 17–32)
CREAT SERPL-MCNC: 1.4 MG/DL — SIGNIFICANT CHANGE UP (ref 0.7–1.5)
CREAT SERPL-MCNC: 1.6 MG/DL — HIGH (ref 0.7–1.5)
CREAT SERPL-MCNC: 1.7 MG/DL — HIGH (ref 0.7–1.5)
EGFR: 40 ML/MIN/1.73M2 — LOW
EGFR: 43 ML/MIN/1.73M2 — LOW
EGFR: 51 ML/MIN/1.73M2 — LOW
EOSINOPHIL # BLD AUTO: 0.16 K/UL — SIGNIFICANT CHANGE UP (ref 0–0.7)
EOSINOPHIL NFR BLD AUTO: 2.1 % — SIGNIFICANT CHANGE UP (ref 0–8)
GLUCOSE BLDC GLUCOMTR-MCNC: 108 MG/DL — HIGH (ref 70–99)
GLUCOSE BLDC GLUCOMTR-MCNC: 114 MG/DL — HIGH (ref 70–99)
GLUCOSE BLDC GLUCOMTR-MCNC: 118 MG/DL — HIGH (ref 70–99)
GLUCOSE BLDC GLUCOMTR-MCNC: 123 MG/DL — HIGH (ref 70–99)
GLUCOSE SERPL-MCNC: 105 MG/DL — HIGH (ref 70–99)
GLUCOSE SERPL-MCNC: 117 MG/DL — HIGH (ref 70–99)
GLUCOSE SERPL-MCNC: 129 MG/DL — HIGH (ref 70–99)
HCT VFR BLD CALC: 25.8 % — LOW (ref 42–52)
HGB BLD-MCNC: 8.2 G/DL — LOW (ref 14–18)
IMM GRANULOCYTES NFR BLD AUTO: 3.7 % — HIGH (ref 0.1–0.3)
LYMPHOCYTES # BLD AUTO: 0.59 K/UL — LOW (ref 1.2–3.4)
LYMPHOCYTES # BLD AUTO: 7.9 % — LOW (ref 20.5–51.1)
MAGNESIUM SERPL-MCNC: 2.3 MG/DL — SIGNIFICANT CHANGE UP (ref 1.8–2.4)
MCHC RBC-ENTMCNC: 31.2 PG — HIGH (ref 27–31)
MCHC RBC-ENTMCNC: 31.8 G/DL — LOW (ref 32–37)
MCV RBC AUTO: 98.1 FL — HIGH (ref 80–94)
MONOCYTES # BLD AUTO: 0.83 K/UL — HIGH (ref 0.1–0.6)
MONOCYTES NFR BLD AUTO: 11.1 % — HIGH (ref 1.7–9.3)
NEUTROPHILS # BLD AUTO: 5.59 K/UL — SIGNIFICANT CHANGE UP (ref 1.4–6.5)
NEUTROPHILS NFR BLD AUTO: 74.5 % — SIGNIFICANT CHANGE UP (ref 42.2–75.2)
NRBC # BLD: 0 /100 WBCS — SIGNIFICANT CHANGE UP (ref 0–0)
OSMOLALITY SERPL: 327 MOS/KG — HIGH (ref 280–301)
OSMOLALITY UR: 508 MOS/KG — SIGNIFICANT CHANGE UP (ref 50–1200)
PLATELET # BLD AUTO: 181 K/UL — SIGNIFICANT CHANGE UP (ref 130–400)
PMV BLD: 11.1 FL — HIGH (ref 7.4–10.4)
POTASSIUM SERPL-MCNC: 4.6 MMOL/L — SIGNIFICANT CHANGE UP (ref 3.5–5)
POTASSIUM SERPL-MCNC: 4.8 MMOL/L — SIGNIFICANT CHANGE UP (ref 3.5–5)
POTASSIUM SERPL-MCNC: 5 MMOL/L — SIGNIFICANT CHANGE UP (ref 3.5–5)
POTASSIUM SERPL-SCNC: 4.6 MMOL/L — SIGNIFICANT CHANGE UP (ref 3.5–5)
POTASSIUM SERPL-SCNC: 4.8 MMOL/L — SIGNIFICANT CHANGE UP (ref 3.5–5)
POTASSIUM SERPL-SCNC: 5 MMOL/L — SIGNIFICANT CHANGE UP (ref 3.5–5)
PROT SERPL-MCNC: 6.8 G/DL — SIGNIFICANT CHANGE UP (ref 6–8)
RBC # BLD: 2.53 M/UL — LOW (ref 4.7–6.1)
RBC # BLD: 2.63 M/UL — LOW (ref 4.7–6.1)
RBC # FLD: 17.5 % — HIGH (ref 11.5–14.5)
RETICS #: 26.1 K/UL — SIGNIFICANT CHANGE UP (ref 25–125)
RETICS/RBC NFR: 1 % — SIGNIFICANT CHANGE UP (ref 0.5–1.5)
SODIUM SERPL-SCNC: 147 MMOL/L — HIGH (ref 135–146)
SODIUM SERPL-SCNC: 150 MMOL/L — HIGH (ref 135–146)
SODIUM SERPL-SCNC: 150 MMOL/L — HIGH (ref 135–146)
SODIUM UR-SCNC: 74 MMOL/L — SIGNIFICANT CHANGE UP
WBC # BLD: 7.5 K/UL — SIGNIFICANT CHANGE UP (ref 4.8–10.8)
WBC # FLD AUTO: 7.5 K/UL — SIGNIFICANT CHANGE UP (ref 4.8–10.8)

## 2024-02-03 PROCEDURE — 99232 SBSQ HOSP IP/OBS MODERATE 35: CPT

## 2024-02-03 RX ORDER — SODIUM CHLORIDE 9 MG/ML
1000 INJECTION, SOLUTION INTRAVENOUS
Refills: 0 | Status: DISCONTINUED | OUTPATIENT
Start: 2024-02-03 | End: 2024-02-07

## 2024-02-03 RX ADMIN — ATORVASTATIN CALCIUM 40 MILLIGRAM(S): 80 TABLET, FILM COATED ORAL at 21:20

## 2024-02-03 RX ADMIN — SENNA PLUS 2 TABLET(S): 8.6 TABLET ORAL at 21:20

## 2024-02-03 RX ADMIN — Medication 1 APPLICATION(S): at 06:06

## 2024-02-03 RX ADMIN — FLUOROMETHOLONE 1 DROP(S): 1 SOLUTION/ DROPS OPHTHALMIC at 18:50

## 2024-02-03 RX ADMIN — LEVETIRACETAM 250 MILLIGRAM(S): 250 TABLET, FILM COATED ORAL at 06:06

## 2024-02-03 RX ADMIN — Medication 1 APPLICATION(S): at 18:52

## 2024-02-03 RX ADMIN — Medication 1 MILLIGRAM(S): at 12:27

## 2024-02-03 RX ADMIN — Medication 3 MILLIGRAM(S): at 21:26

## 2024-02-03 RX ADMIN — SODIUM ZIRCONIUM CYCLOSILICATE 10 GRAM(S): 10 POWDER, FOR SUSPENSION ORAL at 06:06

## 2024-02-03 RX ADMIN — FLUOROMETHOLONE 1 DROP(S): 1 SOLUTION/ DROPS OPHTHALMIC at 06:07

## 2024-02-03 RX ADMIN — LEVETIRACETAM 250 MILLIGRAM(S): 250 TABLET, FILM COATED ORAL at 18:50

## 2024-02-03 RX ADMIN — Medication 650 MILLIGRAM(S): at 18:50

## 2024-02-03 RX ADMIN — SODIUM CHLORIDE 60 MILLILITER(S): 9 INJECTION, SOLUTION INTRAVENOUS at 15:08

## 2024-02-03 RX ADMIN — SODIUM ZIRCONIUM CYCLOSILICATE 10 GRAM(S): 10 POWDER, FOR SUSPENSION ORAL at 21:21

## 2024-02-03 RX ADMIN — Medication 650 MILLIGRAM(S): at 06:06

## 2024-02-03 RX ADMIN — SODIUM ZIRCONIUM CYCLOSILICATE 10 GRAM(S): 10 POWDER, FOR SUSPENSION ORAL at 13:49

## 2024-02-03 NOTE — PHYSICAL THERAPY INITIAL EVALUATION ADULT - GENERAL OBSERVATIONS, REHAB EVAL
950 Pt attempted for PT IE, Dr Burton and Dr Galeano at bedside, pt c/o LLE pain, will order imaging to r/o fx. PT to hold at this time and f/u as appropriate.
PT IE [id number]. Chart reviewed. Pt encountered semi-reclined in bed. In NAD. + IV lock, + rock n roll posey in place

## 2024-02-03 NOTE — PROGRESS NOTE ADULT - ASSESSMENT
A: KELL on CKD.  KELL seems related to volume depleted - improved iwth IVF  hypernatremia - likely related to inadequate water intake and altered thirst mechanism  CKD - ?related to diabetes - does have 1 gm protein in urine and long standing diabetes other possibility is obstruction - patietn could not void during bladder sono, had 135 cc urine in bladder, and reports frequent urination if he drinks water  diabetes mellitu s- BS at target    Suggest:  change IVF to D5W 60 cc/hour - calculated water deficit 3.6 L, would replace 1.8 liters over next 24 hours and monitor serum Na  would do frequent bladder scan to see if patient is retaining urine  monitor electrolytes and renal function closely  BS at target

## 2024-02-03 NOTE — PROGRESS NOTE ADULT - SUBJECTIVE AND OBJECTIVE BOX
78y old  Male who presents with a chief complaint of ARF, Fall (02 Feb 2024 11:40)      INTERVAL HPI/OVERNIGHT EVENTS: no acute events overnight. no major complaints.       REVIEW OF SYSTEMS:  All other review of systems negative except if indicated in HPI      PHYSICAL EXAM:  GENERAL: NAD, well-groomed, well-developed  PSYCH: no agitation, baseline mentation  NERVOUS SYSTEM:  Alert & Oriented X3, Motor Strength 5/5 B/L upper and lower extremities; Sensory intact; FTN WNL  PULMONARY: Clear to percussion bilaterally; No rales, rhonchi, wheezing, or rubs  CARDIOVASCULAR: Regular rate and rhythm; No murmurs, rubs, or gallops  GI: Soft, Nontender, Nondistended; Bowel sounds present  EXTREMITIES:  2+ Peripheral Pulses, No clubbing, cyanosis, or edema  LYMPH: No lymphadenopathy noted  SKIN: No rashes or lesions    Consultant(s) Notes Reviewed:  [x ] YES  [ ] NO    Discussed with Consultants/Other Providers [ x] YES       Vital Signs Last 24 Hrs  T(C): 36.2 (03 Feb 2024 05:09), Max: 36.8 (02 Feb 2024 16:02)  T(F): 97.1 (03 Feb 2024 05:09), Max: 98.3 (02 Feb 2024 16:02)  HR: 89 (03 Feb 2024 05:09) (89 - 101)  BP: 144/65 (03 Feb 2024 05:09) (109/55 - 144/65)  BP(mean): --  RR: 18 (03 Feb 2024 05:09) (18 - 18)  SpO2: 98% (02 Feb 2024 16:02) (98% - 98%)    Parameters below as of 02 Feb 2024 16:02  Patient On (Oxygen Delivery Method): room air      I&Os:  02-02-24 @ 07:01  -  02-03-24 @ 07:00  --------------------------------------------------------  IN: 60 mL / OUT: 0 mL / NET: 60 mL    02-03-24 @ 07:01  -  02-03-24 @ 14:48  --------------------------------------------------------  IN: 820 mL / OUT: 950 mL / NET: -130 mL        MEDICATIONS:  STANDING MEDICATIONS  atorvastatin 40 milliGRAM(s) Oral at bedtime, 01-31-24 @ 18:53  bacitracin   Ointment 1 Application(s) Topical two times a day, 02-01-24 @ 00:55  dextrose 5%. 1000 milliLiter(s) IV Continuous <Continuous>, 02-03-24 @ 14:46  dextrose 50% Injectable 25 Gram(s) IV Push once, 01-31-24 @ 19:02  dextrose 50% Injectable 12.5 Gram(s) IV Push once, 01-31-24 @ 19:02  dextrose 50% Injectable 25 Gram(s) IV Push once, 01-31-24 @ 19:02  fluorometholone 0.1% Suspension 1 Drop(s) Both EYES two times a day, 02-01-24 @ 12:24  folic acid 1 milliGRAM(s) Oral daily, 01-31-24 @ 18:56  influenza  Vaccine (HIGH DOSE) 0.7 milliLiter(s) IntraMuscular once, 02-02-24 @ 09:17  insulin lispro (ADMELOG) corrective regimen sliding scale   SubCutaneous three times a day before meals, 01-31-24 @ 18:55  levETIRAcetam 250 milliGRAM(s) Oral two times a day, 01-31-24 @ 18:53  senna 2 Tablet(s) Oral at bedtime, 01-31-24 @ 21:29  sodium bicarbonate 650 milliGRAM(s) Oral two times a day, 01-31-24 @ 17:58  sodium zirconium cyclosilicate 10 Gram(s) Oral three times a day, 02-02-24 @ 17:40    PRN MEDICATIONS  acetaminophen     Tablet .. 650 milliGRAM(s) Oral every 6 hours, 01-31-24 @ 19:24 PRN  aluminum hydroxide/magnesium hydroxide/simethicone Suspension 30 milliLiter(s) Oral every 4 hours, 01-31-24 @ 19:24 PRN  melatonin 3 milliGRAM(s) Oral at bedtime, 01-31-24 @ 19:24 PRN  ondansetron Injectable 4 milliGRAM(s) IV Push every 8 hours, 01-31-24 @ 19:24 PRN    Diet, DASH/TLC:   Sodium & Cholesterol Restricted  Kosher (02-01-24 @ 15:18) [Active]    LABS:                        8.2    7.50  )-----------( 181      ( 03 Feb 2024 06:35 )             25.8     WBC trend: 7.50 <--, 6.60 <--, 5.15 <--, 6.87 <--  Hgb: 8.2 [02-03-24 @ 06:35]<--, 8.1 [02-02-24 @ 08:14]<--, 8.2 [02-01-24 @ 06:25]<--, 9.0 [01-31-24 @ 09:07]<--    02-03    150<H>  |  120<H>  |  63<HH>  ----------------------------<  105<H>  5.0   |  17  |  1.7<H>    Ca    9.6      03 Feb 2024 06:35  Mg     2.3     02-03    TPro  6.8  /  Alb  3.8  /  TBili  0.3  /  DBili  x   /  AST  35  /  ALT  24  /  AlkPhos  85  02-03    Creatinine trend: 1.7<--, 1.6<--, 1.6<--, 1.7<--, 1.7<--, 2.5<--, 2.9<--  SODIUM TREND: Sodium 150 [02-03 @ 06:35]<--, Sodium 150 [02-03 @ 01:01]<--, Sodium 147 [02-02 @ 21:15]<--, Sodium 150 [02-02 @ 08:14]<--, Sodium 146 [02-01 @ 16:52]<--, Sodium 143 [02-01 @ 06:25]<--      POC Glucose: 108 [02-03-24 @ 12:38]<--, 114 [02-03-24 @ 08:02]<--, 139 [02-02-24 @ 22:09]<--, 108 [02-02-24 @ 18:18]<--        Ferritin: 92 ng/mL (02-01-24 @ 06:25)      Urinalysis Basic - ( 03 Feb 2024 06:35 )    Color: x / Appearance: x / SG: x / pH: x  Gluc: 105 mg/dL / Ketone: x  / Bili: x / Urobili: x   Blood: x / Protein: x / Nitrite: x   Leuk Esterase: x / RBC: x / WBC x   Sq Epi: x / Non Sq Epi: x / Bacteria: x                                              -------------------------------------------------        RADIOLOGY & ADDITIONAL TESTS:    Imaging Personally Reviewed:  [ ] YES  [ ] NO    HEALTH ISSUES - PROBLEM Dx:

## 2024-02-03 NOTE — PROGRESS NOTE ADULT - ASSESSMENT
79yo Male admitted with subdural vs epidural hemorrhage - ENT following for epistaxis      Plan:  - applied mustache dressing to avoid pt nose picking, dressing can be changed prn/q shift by RN   - Recommend strict blood pressure control.  - Recommend bacitracin to b/l nares to keep area moist  - if requiring supplemental O2, recommend with humidified oxygen  - Continue to monitor for episodes of rebleeding  - monitor H/h, transfuse prn  - Avoid: Nasal trauma; no nose rubbing, blowing or manipulating nasal packing, bending with head blow the waist and heavy lifting  - Sneeze with mouth open and pinching nares.  - no further acute ENT intervention at this time   - Will discuss with attending

## 2024-02-03 NOTE — PROGRESS NOTE ADULT - ASSESSMENT
79yo M w/ PMHx DM, CKD, HTN, HLD presents s/p mechanical fall with head injury 3 days prior found to have acute extra-axial (subdural versus epidural) focal hemorrhage over the high left frontal convexity and acute renal failure, admitted to [facility].    #Recent mechanical fall   #Acute intracranial hemorrhage   #Hx of subacute subdural hematoma 05/2021  - hgb stable  - Repeat CTH reviewed, stable  - dw neurosurgery, no acute surgical intervention warranted  - PT eval  - c/w home dose keppra po   - hold home asa and DVT ppx for now ; no clear indication for absolute indication for ASA. given recurrent head trauma and bruising would recommend holding indefinitely    #KELL on CKD stage 4  #Hyperkalemia   # Hypernatremia  - difficult to ascertain recent baseline but in 2022 baseline was ~1.5  - on admission sCr: 3.2-->2.5-->1.7  - K 5.6 today  - Na 150  - Likely secondary to volume depletion with underlying DM nephropathy   - Nephrology following  - Cont IVF  - hold home lisinopril and bumex   - Continue sodium bicarb po 650mg bid   - f/u SPEP, UPEP, TIERA, urine studies  - Continue lokelma 10 TID    #anemia  - trend H/H, maintain active T/S  - iron, b12, folate wnl  - check retic count, haptoglobin, LDH    #acute on chronic epistaxis left naris   - ENT saw patient in ED: bacitracin b/l nares, no acute intervention  - sees o/p ENT: Dr. Vanegas, [facility] on [facility] [address] [facility].    #DM  - Takes Oseni 25/30 mg BID - hold oral anti-hyperglycemics and start ISS with FS goal 140-180    #HTN  - lisinopril and bumex held    #HLD  - C/w lipitor 40mg QD    DVT Prophylaxis: SCD    #Progress Note Handoff  Pending (specify): hypernatremia improvement.  Family discussion: dw patient and family at bedside regarding management and plan  Disposition: rehab vs home with PT

## 2024-02-03 NOTE — PHYSICAL THERAPY INITIAL EVALUATION ADULT - LEVEL OF INDEPENDENCE: SUPINE/SIT, REHAB EVAL
pt presents with posterior lean when attempting to sitting at EOB/maximum assist (25% patients effort)

## 2024-02-03 NOTE — PROGRESS NOTE ADULT - SUBJECTIVE AND OBJECTIVE BOX
patient seen adn examined, wife at bedside.  patient denies being thirsty but says he does not like to drink because it makes him have to urinate frequently.  also not eating much.  no chest pain. no SOB. no abd. pain. no n/v.  Vital Signs Last 24 Hrs  T(C): 36.2 (03 Feb 2024 05:09), Max: 36.8 (02 Feb 2024 16:02)  T(F): 97.1 (03 Feb 2024 05:09), Max: 98.3 (02 Feb 2024 16:02)  HR: 89 (03 Feb 2024 05:09) (89 - 101)  BP: 144/65 (03 Feb 2024 05:09) (109/55 - 144/65)  BP(mean): --  RR: 18 (03 Feb 2024 05:09) (18 - 18)  SpO2: 98% (02 Feb 2024 16:02) (98% - 98%)    Parameters below as of 02 Feb 2024 16:02  Patient On (Oxygen Delivery Method): room air     conj pale, no jaundice  neck no JVD. neck supple  lungs clear to auscultation. no wheezing, no crackles  heart regular rate and rhythm. 2/6 RIA at base, nonradiating  abd. BS pos. soft nontender.  bladder not palpable  extremities skin turgor OK. no edema.                     8.2    7.50  )-----------( 181      ( 03 Feb 2024 06:35 )             25.8   02-03    150<H>  |  120<H>  |  63<HH>  ----------------------------<  105<H>  5.0   |  17  |  1.7<H>    Ca    9.6      03 Feb 2024 06:35  Mg     2.3     02-03    TPro  6.8  /  Alb  3.8  /  TBili  0.3  /  DBili  x   /  AST  35  /  ALT  24  /  AlkPhos  85  02-03    urine prot/creatnine ratio approximately 1.0 gm    CAPILLARY BLOOD GLUCOSE      POCT Blood Glucose.: 114 mg/dL (03 Feb 2024 08:02)  POCT Blood Glucose.: 139 mg/dL (02 Feb 2024 22:09)  POCT Blood Glucose.: 108 mg/dL (02 Feb 2024 18:18)      MEDICATIONS  (STANDING):  atorvastatin 40 milliGRAM(s) Oral at bedtime  bacitracin   Ointment 1 Application(s) Topical two times a day  dextrose 50% Injectable 12.5 Gram(s) IV Push once  dextrose 50% Injectable 25 Gram(s) IV Push once  dextrose 50% Injectable 25 Gram(s) IV Push once  fluorometholone 0.1% Suspension 1 Drop(s) Both EYES two times a day  folic acid 1 milliGRAM(s) Oral daily  influenza  Vaccine (HIGH DOSE) 0.7 milliLiter(s) IntraMuscular once  insulin lispro (ADMELOG) corrective regimen sliding scale   SubCutaneous three times a day before meals  levETIRAcetam 250 milliGRAM(s) Oral two times a day  senna 2 Tablet(s) Oral at bedtime  sodium bicarbonate 650 milliGRAM(s) Oral two times a day  sodium chloride 0.45%. 1000 milliLiter(s) (60 mL/Hr) IV Continuous <Continuous>  sodium zirconium cyclosilicate 10 Gram(s) Oral three times a day

## 2024-02-03 NOTE — PHYSICAL THERAPY INITIAL EVALUATION ADULT - PERTINENT HX OF CURRENT PROBLEM, REHAB EVAL
Pt is an 79 y/o M PMHx DM, CKD, HTN, HLD presents s/p mechanical fall with head injury 3 days prior. Wife reports patient was ambulatory with walker on Sunday walking up steps, and upon reaching the top step and making a turn, lost balance and fell backwards, striking L side of head on tile floor.

## 2024-02-03 NOTE — PROGRESS NOTE ADULT - SUBJECTIVE AND OBJECTIVE BOX
ENT DAILY PROGRESS NOTE    Pt is a 77yo Male admitted with subdural vs epidural hemorrhage - ENT following for epistaxis. Patient seen and examined at bedside. Pt found to be picking his right nares with his fingers. Pt was advised no nose rubbing/blowing, digit picking, bending with head blow the waist and heavy lifting. Pt denies any further bleeding overnight or this AM. Pt notes b/l nares dry. Pt offering no other complaints at this time. As per cover RN, applied bacitracin ointment to b/l nares earlier.          REVIEW OF SYSTEMS   [x] A ten-point review of systems was otherwise negative except as noted.     Allergies  No Known Allergies        MEDICATIONS:  acetaminophen     Tablet .. 650 milliGRAM(s) Oral every 6 hours PRN  aluminum hydroxide/magnesium hydroxide/simethicone Suspension 30 milliLiter(s) Oral every 4 hours PRN  atorvastatin 40 milliGRAM(s) Oral at bedtime  bacitracin   Ointment 1 Application(s) Topical two times a day  dextrose 50% Injectable 12.5 Gram(s) IV Push once  dextrose 50% Injectable 25 Gram(s) IV Push once  dextrose 50% Injectable 25 Gram(s) IV Push once  fluorometholone 0.1% Suspension 1 Drop(s) Both EYES two times a day  folic acid 1 milliGRAM(s) Oral daily  influenza  Vaccine (HIGH DOSE) 0.7 milliLiter(s) IntraMuscular once  insulin lispro (ADMELOG) corrective regimen sliding scale   SubCutaneous three times a day before meals  levETIRAcetam 250 milliGRAM(s) Oral two times a day  melatonin 3 milliGRAM(s) Oral at bedtime PRN  ondansetron Injectable 4 milliGRAM(s) IV Push every 8 hours PRN  senna 2 Tablet(s) Oral at bedtime  sodium bicarbonate 650 milliGRAM(s) Oral two times a day  sodium chloride 0.45%. 1000 milliLiter(s) IV Continuous <Continuous>  sodium zirconium cyclosilicate 10 Gram(s) Oral three times a day      Vital Signs Last 24 Hrs  T(C): 36.2 (03 Feb 2024 05:09), Max: 36.8 (02 Feb 2024 16:02)  T(F): 97.1 (03 Feb 2024 05:09), Max: 98.3 (02 Feb 2024 16:02)  HR: 89 (03 Feb 2024 05:09) (89 - 101)  BP: 144/65 (03 Feb 2024 05:09) (109/55 - 144/65)  RR: 18 (03 Feb 2024 05:09) (18 - 18)  SpO2: 98% (02 Feb 2024 16:02) (98% - 98%)    Parameters below as of 02 Feb 2024 16:02  Patient On (Oxygen Delivery Method): room air      PHYSICAL EXAM:  GEN: NAD, awake and alert. No drooling or pooling of secretions. No stridor or stertor. Good vocal quality, no hoarseness.   SKIN: Good color, non diaphoretic  HEENT: Oral mucosa pink and moist. No erythema or edema noted to buccal mucosa, tongue, FOM, uvula or posterior oropharynx. Uvula midline. no active bleeding noted to b/l nares or posterior OP  NECK: Trachea midline. Neck supple, no TTP to B/L lateral neck.  RESP: Non-labored breathing. No use of accessory muscles.  ABDO: Soft, NT.  EXT: SUNSHINE x 4    LABS:  CBC-                        8.1    6.60  )-----------( 169      ( 02 Feb 2024 08:14 )             25.7     BMP/CMP-  03 Feb 2024 01:01    150    |  120    |  66     ----------------------------<  129    4.8     |  19     |  1.6      Ca    9.2        03 Feb 2024 01:01  Mg     2.6       02 Feb 2024 08:14      Coagulation Studies-    Endocrine Panel-  Calcium: 9.2 mg/dL (02-03 @ 01:01)  Calcium: 9.2 mg/dL (02-02 @ 21:15)  Calcium: 9.2 mg/dL (02-02 @ 08:14)

## 2024-02-04 LAB
ALBUMIN SERPL ELPH-MCNC: 3.5 G/DL — SIGNIFICANT CHANGE UP (ref 3.5–5.2)
ALP SERPL-CCNC: 79 U/L — SIGNIFICANT CHANGE UP (ref 30–115)
ALT FLD-CCNC: 22 U/L — SIGNIFICANT CHANGE UP (ref 0–41)
ANION GAP SERPL CALC-SCNC: 9 MMOL/L — SIGNIFICANT CHANGE UP (ref 7–14)
AST SERPL-CCNC: 29 U/L — SIGNIFICANT CHANGE UP (ref 0–41)
BASOPHILS # BLD AUTO: 0.06 K/UL — SIGNIFICANT CHANGE UP (ref 0–0.2)
BASOPHILS NFR BLD AUTO: 0.8 % — SIGNIFICANT CHANGE UP (ref 0–1)
BILIRUB SERPL-MCNC: 0.3 MG/DL — SIGNIFICANT CHANGE UP (ref 0.2–1.2)
BUN SERPL-MCNC: 53 MG/DL — HIGH (ref 10–20)
CALCIUM SERPL-MCNC: 9.2 MG/DL — SIGNIFICANT CHANGE UP (ref 8.4–10.5)
CHLORIDE SERPL-SCNC: 118 MMOL/L — HIGH (ref 98–110)
CO2 SERPL-SCNC: 21 MMOL/L — SIGNIFICANT CHANGE UP (ref 17–32)
CREAT SERPL-MCNC: 1.4 MG/DL — SIGNIFICANT CHANGE UP (ref 0.7–1.5)
EGFR: 51 ML/MIN/1.73M2 — LOW
EOSINOPHIL # BLD AUTO: 0.25 K/UL — SIGNIFICANT CHANGE UP (ref 0–0.7)
EOSINOPHIL NFR BLD AUTO: 3.5 % — SIGNIFICANT CHANGE UP (ref 0–8)
GLUCOSE BLDC GLUCOMTR-MCNC: 102 MG/DL — HIGH (ref 70–99)
GLUCOSE BLDC GLUCOMTR-MCNC: 109 MG/DL — HIGH (ref 70–99)
GLUCOSE BLDC GLUCOMTR-MCNC: 122 MG/DL — HIGH (ref 70–99)
GLUCOSE BLDC GLUCOMTR-MCNC: 96 MG/DL — SIGNIFICANT CHANGE UP (ref 70–99)
GLUCOSE SERPL-MCNC: 97 MG/DL — SIGNIFICANT CHANGE UP (ref 70–99)
HCT VFR BLD CALC: 24.3 % — LOW (ref 42–52)
HGB BLD-MCNC: 7.7 G/DL — LOW (ref 14–18)
LDH SERPL L TO P-CCNC: 250 U/L — HIGH (ref 50–242)
LYMPHOCYTES # BLD AUTO: 0.87 K/UL — LOW (ref 1.2–3.4)
LYMPHOCYTES # BLD AUTO: 12.2 % — LOW (ref 20.5–51.1)
MAGNESIUM SERPL-MCNC: 2.1 MG/DL — SIGNIFICANT CHANGE UP (ref 1.8–2.4)
MCHC RBC-ENTMCNC: 31 PG — SIGNIFICANT CHANGE UP (ref 27–31)
MCHC RBC-ENTMCNC: 31.7 G/DL — LOW (ref 32–37)
MCV RBC AUTO: 98 FL — HIGH (ref 80–94)
MONOCYTES # BLD AUTO: 1 K/UL — HIGH (ref 0.1–0.6)
MONOCYTES NFR BLD AUTO: 13.9 % — HIGH (ref 1.7–9.3)
NEUTROPHILS # BLD AUTO: 4.74 K/UL — SIGNIFICANT CHANGE UP (ref 1.4–6.5)
NEUTROPHILS NFR BLD AUTO: 66.1 % — SIGNIFICANT CHANGE UP (ref 42.2–75.2)
NRBC # BLD: SIGNIFICANT CHANGE UP /100 WBCS (ref 0–0)
PLATELET # BLD AUTO: 155 K/UL — SIGNIFICANT CHANGE UP (ref 130–400)
PMV BLD: 11.3 FL — HIGH (ref 7.4–10.4)
POTASSIUM SERPL-MCNC: 4.6 MMOL/L — SIGNIFICANT CHANGE UP (ref 3.5–5)
POTASSIUM SERPL-SCNC: 4.6 MMOL/L — SIGNIFICANT CHANGE UP (ref 3.5–5)
PROT SERPL-MCNC: 6.1 G/DL — SIGNIFICANT CHANGE UP (ref 6–8)
RBC # BLD: 2.48 M/UL — LOW (ref 4.7–6.1)
RBC # BLD: 2.48 M/UL — LOW (ref 4.7–6.1)
RBC # FLD: 17.5 % — HIGH (ref 11.5–14.5)
RETICS #: 27.8 K/UL — SIGNIFICANT CHANGE UP (ref 25–125)
RETICS/RBC NFR: 1.1 % — SIGNIFICANT CHANGE UP (ref 0.5–1.5)
SODIUM SERPL-SCNC: 148 MMOL/L — HIGH (ref 135–146)
WBC # BLD: 7.17 K/UL — SIGNIFICANT CHANGE UP (ref 4.8–10.8)
WBC # FLD AUTO: 7.17 K/UL — SIGNIFICANT CHANGE UP (ref 4.8–10.8)

## 2024-02-04 PROCEDURE — 99232 SBSQ HOSP IP/OBS MODERATE 35: CPT

## 2024-02-04 RX ADMIN — Medication 650 MILLIGRAM(S): at 17:23

## 2024-02-04 RX ADMIN — Medication 1 MILLIGRAM(S): at 11:55

## 2024-02-04 RX ADMIN — SODIUM CHLORIDE 100 MILLILITER(S): 9 INJECTION, SOLUTION INTRAVENOUS at 15:37

## 2024-02-04 RX ADMIN — LEVETIRACETAM 250 MILLIGRAM(S): 250 TABLET, FILM COATED ORAL at 05:33

## 2024-02-04 RX ADMIN — ATORVASTATIN CALCIUM 40 MILLIGRAM(S): 80 TABLET, FILM COATED ORAL at 20:09

## 2024-02-04 RX ADMIN — Medication 1 APPLICATION(S): at 05:34

## 2024-02-04 RX ADMIN — SENNA PLUS 2 TABLET(S): 8.6 TABLET ORAL at 20:09

## 2024-02-04 RX ADMIN — FLUOROMETHOLONE 1 DROP(S): 1 SOLUTION/ DROPS OPHTHALMIC at 17:23

## 2024-02-04 RX ADMIN — SODIUM ZIRCONIUM CYCLOSILICATE 10 GRAM(S): 10 POWDER, FOR SUSPENSION ORAL at 05:35

## 2024-02-04 RX ADMIN — FLUOROMETHOLONE 1 DROP(S): 1 SOLUTION/ DROPS OPHTHALMIC at 05:35

## 2024-02-04 RX ADMIN — Medication 650 MILLIGRAM(S): at 05:34

## 2024-02-04 RX ADMIN — LEVETIRACETAM 250 MILLIGRAM(S): 250 TABLET, FILM COATED ORAL at 17:22

## 2024-02-04 NOTE — PROGRESS NOTE ADULT - ASSESSMENT
77yo M w/ PMHx DM, CKD, HTN, HLD presents s/p mechanical fall with head injury 3 days prior found to have acute extra-axial (subdural versus epidural) focal hemorrhage over the high left frontal convexity and acute renal failure, admitted to [facility].    #Recent mechanical fall   #Acute intracranial hemorrhage   #Hx of subacute subdural hematoma 05/2021  - hgb stable  - Repeat CTH reviewed, stable  - dw neurosurgery, no acute surgical intervention warranted  - PT eval  - c/w home dose keppra po   - hold home asa and DVT ppx for now ; no clear indication for absolute indication for ASA. given recurrent head trauma and bruising would recommend holding indefinitely    #KELL on CKD stage 4  #Hyperkalemia   # Hypernatremia  - difficult to ascertain recent baseline but in 2022 baseline was ~1.5  - serum Cr: 3.2-->2.5-->1.7  - Na 148.  D5% increased to 100 cc/hr  - Likely secondary to volume depletion with underlying DM nephropathy   - Nephrology following  - hold home lisinopril and bumex   - Continue sodium bicarb po 650mg bid   - f/u SPEP, UPEP, TIERA, urine studies    #anemia  - trend H/H, maintain active T/S  - iron, b12, folate wnl  - reticulocyte index 0.32, suggestive of hypoproliferation.  noted.  haptoglobin pending result.  - c/w oral iron + folate supplement.    #acute on chronic epistaxis left naris   - ENT saw patient in ED: bacitracin b/l nares, no acute intervention  - sees o/p ENT: Dr. Vanegas, [facility].    #DM  - Takes Oseni 25/30 mg BID - hold oral anti-hyperglycemics and start ISS with FS goal 140-180    #HTN  - lisinopril and bumex held    #HLD  - C/w lipitor 40mg QD    DVT Prophylaxis: SCD    #Progress Note Handoff  Pending (specify): hypernatremia improvement.  Family discussion: dw patient and family at bedside regarding management and plan  Disposition: rehab vs home with PT

## 2024-02-04 NOTE — PROGRESS NOTE ADULT - SUBJECTIVE AND OBJECTIVE BOX
78y old  Male who presents with a chief complaint of ARF, Fall (02 Feb 2024 11:40)      INTERVAL HPI/OVERNIGHT EVENTS: no acute events overnight. no major complaints.       REVIEW OF SYSTEMS:  All other review of systems negative except if indicated in HPI      PHYSICAL EXAM:  GENERAL: NAD  HEAD: atraumatic, normocephalic  EYES: EOMI  ENT: moist mucus membranes   NERVOUS SYSTEM:  Alert & Oriented X3  PULMONARY: Clear to percussion bilaterally; No rales, rhonchi, wheezing, or rubs  CARDIOVASCULAR: Regular rate and rhythm; No murmurs, rubs, or gallops  GI: Soft, Nontender, Nondistended; Bowel sounds present  EXTREMITIES:  2+ Peripheral Pulses, No clubbing, cyanosis, or edema  LYMPH: No lymphadenopathy noted  SKIN: No rashes or lesions    Consultant(s) Notes Reviewed:  [x ] YES  [ ] NO    Discussed with Consultants/Other Providers [ ] YES       Vital Signs Last 24 Hrs  T(C): 36.1 (04 Feb 2024 05:09), Max: 37.2 (03 Feb 2024 21:32)  T(F): 97 (04 Feb 2024 05:09), Max: 98.9 (03 Feb 2024 21:32)  HR: 67 (04 Feb 2024 05:09) (67 - 69)  BP: 137/65 (04 Feb 2024 05:09) (125/57 - 137/65)  BP(mean): --  RR: 18 (04 Feb 2024 05:09) (18 - 18)  SpO2: --      I&Os:  02-03-24 @ 07:01  -  02-04-24 @ 07:00  --------------------------------------------------------  IN: 1480 mL / OUT: 1400 mL / NET: 80 mL        MEDICATIONS:  STANDING MEDICATIONS  atorvastatin 40 milliGRAM(s) Oral at bedtime, 01-31-24 @ 18:53  bacitracin   Ointment 1 Application(s) Topical two times a day, 02-01-24 @ 00:55  dextrose 5%. 1000 milliLiter(s) IV Continuous <Continuous>, 02-03-24 @ 14:46  dextrose 50% Injectable 25 Gram(s) IV Push once, 01-31-24 @ 19:02  dextrose 50% Injectable 12.5 Gram(s) IV Push once, 01-31-24 @ 19:02  dextrose 50% Injectable 25 Gram(s) IV Push once, 01-31-24 @ 19:02  fluorometholone 0.1% Suspension 1 Drop(s) Both EYES two times a day, 02-01-24 @ 12:24  folic acid 1 milliGRAM(s) Oral daily, 01-31-24 @ 18:56  influenza  Vaccine (HIGH DOSE) 0.7 milliLiter(s) IntraMuscular once, 02-02-24 @ 09:17  insulin lispro (ADMELOG) corrective regimen sliding scale   SubCutaneous three times a day before meals, 01-31-24 @ 18:55  levETIRAcetam 250 milliGRAM(s) Oral two times a day, 01-31-24 @ 18:53  senna 2 Tablet(s) Oral at bedtime, 01-31-24 @ 21:29  sodium bicarbonate 650 milliGRAM(s) Oral two times a day, 01-31-24 @ 17:58  sodium zirconium cyclosilicate 10 Gram(s) Oral three times a day, 02-02-24 @ 17:40    PRN MEDICATIONS  acetaminophen     Tablet .. 650 milliGRAM(s) Oral every 6 hours, 01-31-24 @ 19:24 PRN  aluminum hydroxide/magnesium hydroxide/simethicone Suspension 30 milliLiter(s) Oral every 4 hours, 01-31-24 @ 19:24 PRN  melatonin 3 milliGRAM(s) Oral at bedtime, 01-31-24 @ 19:24 PRN  ondansetron Injectable 4 milliGRAM(s) IV Push every 8 hours, 01-31-24 @ 19:24 PRN    Diet, DASH/TLC:   Sodium & Cholesterol Restricted  Kosher (02-01-24 @ 15:18) [Active]    LABS:                        7.7    7.17  )-----------( 155      ( 04 Feb 2024 07:49 )             24.3     WBC trend: 7.17 <--, 7.50 <--, 6.60 <--, 5.15 <--  Hgb: 7.7 [02-04-24 @ 07:49]<--, 8.2 [02-03-24 @ 06:35]<--, 8.1 [02-02-24 @ 08:14]<--, 8.2 [02-01-24 @ 06:25]<--, 9.0 [01-31-24 @ 09:07]<--    02-04    148<H>  |  118<H>  |  53<H>  ----------------------------<  97  4.6   |  21  |  1.4    Ca    9.2      04 Feb 2024 07:49  Mg     2.1     02-04    TPro  6.1  /  Alb  3.5  /  TBili  0.3  /  DBili  x   /  AST  29  /  ALT  22  /  AlkPhos  79  02-04    Creatinine trend: 1.4<--, 1.4<--, 1.7<--, 1.6<--, 1.6<--, 1.7<--, 1.7<--  SODIUM TREND: Sodium 148 [02-04 @ 07:49]<--, Sodium 147 [02-03 @ 18:30]<--, Sodium 150 [02-03 @ 06:35]<--, Sodium 150 [02-03 @ 01:01]<--, Sodium 147 [02-02 @ 21:15]<--, Sodium 150 [02-02 @ 08:14]<--      POC Glucose: 109 [02-04-24 @ 11:41]<--, 96 [02-04-24 @ 08:32]<--, 118 [02-03-24 @ 21:39]<--, 123 [02-03-24 @ 17:11]<--      Ferritin: 92 ng/mL (02-01-24 @ 06:25)      Urinalysis Basic - ( 04 Feb 2024 07:49 )    Color: x / Appearance: x / SG: x / pH: x  Gluc: 97 mg/dL / Ketone: x  / Bili: x / Urobili: x   Blood: x / Protein: x / Nitrite: x   Leuk Esterase: x / RBC: x / WBC x   Sq Epi: x / Non Sq Epi: x / Bacteria: x                                              -------------------------------------------------          RADIOLOGY & ADDITIONAL TESTS:    Imaging Personally Reviewed:  [ ] YES  [ ] NO    HEALTH ISSUES - PROBLEM Dx:

## 2024-02-04 NOTE — PROGRESS NOTE ADULT - SUBJECTIVE AND OBJECTIVE BOX
Please help the patient to schedule an appointment.    Renzo Newell MD  General Internal Medicine  [facility]  4/1/2021, 10:24 PM     patient seen andexamined, wife at bedside.  both patient and wife report patient more alert, taking in more food and water, however patient denies being thirsty   no chest pain. no SOB. no abd. pain. no n/v.    Vital Signs Last 24 Hrs  T(C): 36.1 (04 Feb 2024 05:09), Max: 37.2 (03 Feb 2024 21:32)  T(F): 97 (04 Feb 2024 05:09), Max: 98.9 (03 Feb 2024 21:32)  HR: 67 (04 Feb 2024 05:09) (67 - 97)  BP: 137/65 (04 Feb 2024 05:09) (125/57 - 137/65)  BP(mean): --  RR: 18 (04 Feb 2024 05:09) (18 - 18)  SpO2: --   conj pale, no jaundice  neck no JVD. neck supple  lungs clear to auscultation. no wheezing, no crackles  heart regular rate and rhythm. 2/6 RIA at base, nonradiating  abd. BS pos. soft nontender.  bladder not palpable  extremities skin turgor slightly decreased. no edema.                                  7.7    7.17  )-----------( 155      ( 04 Feb 2024 07:49 )             24.3   02-04    148<H>  |  118<H>  |  53<H>  ----------------------------<  97  4.6   |  21  |  1.4    Ca    9.2      04 Feb 2024 07:49  Mg     2.1     02-04    TPro  6.1  /  Alb  3.5  /  TBili  0.3  /  DBili  x   /  AST  29  /  ALT  22  /  AlkPhos  79  02-04     CAPILLARY BLOOD GLUCOSE      POCT Blood Glucose.: 109 mg/dL (04 Feb 2024 11:41)  POCT Blood Glucose.: 96 mg/dL (04 Feb 2024 08:32)  POCT Blood Glucose.: 118 mg/dL (03 Feb 2024 21:39)  POCT Blood Glucose.: 123 mg/dL (03 Feb 2024 17:11)  POCT Blood Glucose.: 108 mg/dL (03 Feb 2024 12:38)    MEDICATIONS  (STANDING):  atorvastatin 40 milliGRAM(s) Oral at bedtime  bacitracin   Ointment 1 Application(s) Topical two times a day  dextrose 5%. 1000 milliLiter(s) (100 mL/Hr) IV Continuous <Continuous>  dextrose 50% Injectable 25 Gram(s) IV Push once  dextrose 50% Injectable 12.5 Gram(s) IV Push once  dextrose 50% Injectable 25 Gram(s) IV Push once  fluorometholone 0.1% Suspension 1 Drop(s) Both EYES two times a day  folic acid 1 milliGRAM(s) Oral daily  influenza  Vaccine (HIGH DOSE) 0.7 milliLiter(s) IntraMuscular once  insulin lispro (ADMELOG) corrective regimen sliding scale   SubCutaneous three times a day before meals  levETIRAcetam 250 milliGRAM(s) Oral two times a day  senna 2 Tablet(s) Oral at bedtime  sodium bicarbonate 650 milliGRAM(s) Oral two times a day  sodium zirconium cyclosilicate 10 Gram(s) Oral three times a day

## 2024-02-04 NOTE — PROGRESS NOTE ADULT - ASSESSMENT
A: KELL on CKD.  KELL seems related to volume depleted - improved iwth IVF, back to baseline  hypernatremia - likely related to inadequate water intake and altered thirst mechanism, slightly improved since yesterday  CKD - ?related to diabetes - does have 1 gm protein in urine and long standing diabetes other possibility is obstruction - patietn could not void during bladder sono, had 135 cc urine in bladder, and reports frequent urination if he drinks water  diabetes mellitu s- BS at target    Suggest:  continue IVF D5W 100cc/hour - calculated water deficit was 3.6 L,   monitor serum Na closely  would do frequent bladder scan to see if patient is retaining urine  BS at target

## 2024-02-05 ENCOUNTER — TRANSCRIPTION ENCOUNTER (OUTPATIENT)
Age: 79
End: 2024-02-05

## 2024-02-05 LAB
% ALBUMIN: 50.4 % — SIGNIFICANT CHANGE UP
% ALPHA 1: 5.9 % — SIGNIFICANT CHANGE UP
% ALPHA 2: 16.6 % — SIGNIFICANT CHANGE UP
% BETA: 14.1 % — SIGNIFICANT CHANGE UP
% GAMMA: 13 % — SIGNIFICANT CHANGE UP
ALBUMIN SERPL ELPH-MCNC: 3.5 G/DL — SIGNIFICANT CHANGE UP (ref 3.5–5.2)
ALBUMIN SERPL ELPH-MCNC: 3.6 G/DL — SIGNIFICANT CHANGE UP (ref 3.6–5.5)
ALBUMIN/GLOB SERPL ELPH: 1 RATIO — SIGNIFICANT CHANGE UP
ALP SERPL-CCNC: 81 U/L — SIGNIFICANT CHANGE UP (ref 30–115)
ALPHA1 GLOB SERPL ELPH-MCNC: 0.4 G/DL — SIGNIFICANT CHANGE UP (ref 0.1–0.4)
ALPHA2 GLOB SERPL ELPH-MCNC: 1.2 G/DL — HIGH (ref 0.5–1)
ALT FLD-CCNC: 25 U/L — SIGNIFICANT CHANGE UP (ref 0–41)
ANION GAP SERPL CALC-SCNC: 14 MMOL/L — SIGNIFICANT CHANGE UP (ref 7–14)
AST SERPL-CCNC: 37 U/L — SIGNIFICANT CHANGE UP (ref 0–41)
B-GLOBULIN SERPL ELPH-MCNC: 1 G/DL — SIGNIFICANT CHANGE UP (ref 0.5–1)
BASOPHILS # BLD AUTO: 0.02 K/UL — SIGNIFICANT CHANGE UP (ref 0–0.2)
BASOPHILS NFR BLD AUTO: 0.3 % — SIGNIFICANT CHANGE UP (ref 0–1)
BILIRUB SERPL-MCNC: 0.3 MG/DL — SIGNIFICANT CHANGE UP (ref 0.2–1.2)
BUN SERPL-MCNC: 48 MG/DL — HIGH (ref 10–20)
C3 SERPL-MCNC: 171 MG/DL — HIGH (ref 81–157)
C4 SERPL-MCNC: 28 MG/DL — SIGNIFICANT CHANGE UP (ref 13–39)
CALCIUM SERPL-MCNC: 8.9 MG/DL — SIGNIFICANT CHANGE UP (ref 8.4–10.5)
CHLORIDE SERPL-SCNC: 113 MMOL/L — HIGH (ref 98–110)
CO2 SERPL-SCNC: 19 MMOL/L — SIGNIFICANT CHANGE UP (ref 17–32)
CREAT SERPL-MCNC: 1.2 MG/DL — SIGNIFICANT CHANGE UP (ref 0.7–1.5)
DEPRECATED KAPPA LC FREE/LAMBDA SER: 8.01 RATIO — HIGH (ref 0.7–6.02)
EGFR: 61 ML/MIN/1.73M2 — SIGNIFICANT CHANGE UP
EOSINOPHIL # BLD AUTO: 0.26 K/UL — SIGNIFICANT CHANGE UP (ref 0–0.7)
EOSINOPHIL NFR BLD AUTO: 3.3 % — SIGNIFICANT CHANGE UP (ref 0–8)
GAMMA GLOBULIN: 0.9 G/DL — SIGNIFICANT CHANGE UP (ref 0.6–1.6)
GLUCOSE BLDC GLUCOMTR-MCNC: 111 MG/DL — HIGH (ref 70–99)
GLUCOSE BLDC GLUCOMTR-MCNC: 115 MG/DL — HIGH (ref 70–99)
GLUCOSE BLDC GLUCOMTR-MCNC: 98 MG/DL — SIGNIFICANT CHANGE UP (ref 70–99)
GLUCOSE SERPL-MCNC: 112 MG/DL — HIGH (ref 70–99)
HAPTOGLOB SERPL-MCNC: 316 MG/DL — HIGH (ref 34–200)
HCT VFR BLD CALC: 24.8 % — LOW (ref 42–52)
HGB BLD-MCNC: 7.9 G/DL — LOW (ref 14–18)
IMM GRANULOCYTES NFR BLD AUTO: 3.7 % — HIGH (ref 0.1–0.3)
INTERPRETATION SERPL IFE-IMP: SIGNIFICANT CHANGE UP
KAPPA LC UR-MCNC: 109.16 MG/L — HIGH
LAMBDA LC UR-MCNC: 13.63 MG/L — HIGH
LYMPHOCYTES # BLD AUTO: 0.87 K/UL — LOW (ref 1.2–3.4)
LYMPHOCYTES # BLD AUTO: 11.1 % — LOW (ref 20.5–51.1)
MAGNESIUM SERPL-MCNC: 1.8 MG/DL — SIGNIFICANT CHANGE UP (ref 1.8–2.4)
MCHC RBC-ENTMCNC: 31.1 PG — HIGH (ref 27–31)
MCHC RBC-ENTMCNC: 31.9 G/DL — LOW (ref 32–37)
MCV RBC AUTO: 97.6 FL — HIGH (ref 80–94)
MONOCYTES # BLD AUTO: 0.9 K/UL — HIGH (ref 0.1–0.6)
MONOCYTES NFR BLD AUTO: 11.5 % — HIGH (ref 1.7–9.3)
NEUTROPHILS # BLD AUTO: 5.5 K/UL — SIGNIFICANT CHANGE UP (ref 1.4–6.5)
NEUTROPHILS NFR BLD AUTO: 70.1 % — SIGNIFICANT CHANGE UP (ref 42.2–75.2)
NRBC # BLD: 0 /100 WBCS — SIGNIFICANT CHANGE UP (ref 0–0)
PLATELET # BLD AUTO: 165 K/UL — SIGNIFICANT CHANGE UP (ref 130–400)
PMV BLD: 11.3 FL — HIGH (ref 7.4–10.4)
POTASSIUM SERPL-MCNC: 4.4 MMOL/L — SIGNIFICANT CHANGE UP (ref 3.5–5)
POTASSIUM SERPL-SCNC: 4.4 MMOL/L — SIGNIFICANT CHANGE UP (ref 3.5–5)
PROT PATTERN SERPL ELPH-IMP: SIGNIFICANT CHANGE UP
PROT SERPL-MCNC: 6 G/DL — SIGNIFICANT CHANGE UP (ref 6–8)
PROT SERPL-MCNC: 7.2 G/DL — SIGNIFICANT CHANGE UP (ref 6–8.3)
PROT SERPL-MCNC: 7.2 G/DL — SIGNIFICANT CHANGE UP (ref 6–8.3)
RBC # BLD: 2.54 M/UL — LOW (ref 4.7–6.1)
RBC # FLD: 17.1 % — HIGH (ref 11.5–14.5)
SODIUM SERPL-SCNC: 146 MMOL/L — SIGNIFICANT CHANGE UP (ref 135–146)
WBC # BLD: 7.84 K/UL — SIGNIFICANT CHANGE UP (ref 4.8–10.8)
WBC # FLD AUTO: 7.84 K/UL — SIGNIFICANT CHANGE UP (ref 4.8–10.8)

## 2024-02-05 PROCEDURE — 99232 SBSQ HOSP IP/OBS MODERATE 35: CPT

## 2024-02-05 PROCEDURE — 71045 X-RAY EXAM CHEST 1 VIEW: CPT | Mod: 26

## 2024-02-05 RX ORDER — FLUOROMETHOLONE 1 MG/ML
1 SOLUTION/ DROPS OPHTHALMIC
Refills: 0 | Status: DISCONTINUED | OUTPATIENT
Start: 2024-02-05 | End: 2024-02-07

## 2024-02-05 RX ORDER — SODIUM CHLORIDE 0.65 %
1 AEROSOL, SPRAY (ML) NASAL THREE TIMES A DAY
Refills: 0 | Status: DISCONTINUED | OUTPATIENT
Start: 2024-02-05 | End: 2024-02-07

## 2024-02-05 RX ADMIN — LEVETIRACETAM 250 MILLIGRAM(S): 250 TABLET, FILM COATED ORAL at 05:10

## 2024-02-05 RX ADMIN — FLUOROMETHOLONE 1 DROP(S): 1 SOLUTION/ DROPS OPHTHALMIC at 17:30

## 2024-02-05 RX ADMIN — LEVETIRACETAM 250 MILLIGRAM(S): 250 TABLET, FILM COATED ORAL at 17:29

## 2024-02-05 RX ADMIN — ATORVASTATIN CALCIUM 40 MILLIGRAM(S): 80 TABLET, FILM COATED ORAL at 21:16

## 2024-02-05 RX ADMIN — Medication 1 MILLIGRAM(S): at 11:46

## 2024-02-05 RX ADMIN — SENNA PLUS 2 TABLET(S): 8.6 TABLET ORAL at 21:17

## 2024-02-05 RX ADMIN — FLUOROMETHOLONE 1 DROP(S): 1 SOLUTION/ DROPS OPHTHALMIC at 06:09

## 2024-02-05 RX ADMIN — Medication 650 MILLIGRAM(S): at 17:29

## 2024-02-05 RX ADMIN — Medication 1 APPLICATION(S): at 17:30

## 2024-02-05 RX ADMIN — Medication 1 SPRAY(S): at 21:17

## 2024-02-05 RX ADMIN — Medication 1 APPLICATION(S): at 06:09

## 2024-02-05 RX ADMIN — Medication 650 MILLIGRAM(S): at 05:10

## 2024-02-05 NOTE — MEDICAL STUDENT PROGRESS NOTE(EDUCATION) - SUBJECTIVE AND OBJECTIVE BOX
Patient is a 78y old  Male who presents with a chief complaint of ARF, Fall (04 Feb 2024 12:41)      INTERVAL HPI/OVERNIGHT EVENTS:    Patient was seen asleep in bed and was a rousable to interview. Patient denies any pain and stated he was able to walk but still having dizziness. Patient complained of diarrhea overnight and a mild cough.    REVIEW OF SYSTEMS:  CONSTITUTIONAL: No fever, weight loss, or fatigue  EYES: No eye pain, visual disturbances, or discharge  ENMT:  Nose bleed  NECK: No pain or stiffness  RESPIRATORY: dry cough  CARDIOVASCULAR: No chest pain, palpitations, dizziness, or leg swelling  GASTROINTESTINAL: No abdominal or epigastric pain. No nausea, vomiting, or hematemesis; No diarrhea or constipation. No melena or hematochezia.  GENITOURINARY: No dysuria, frequency, hematuria, or incontinence  NEUROLOGICAL: dizziness  SKIN: No itching, burning, rashes, or lesions   LYMPH NODES: No enlarged glands  ENDOCRINE: No heat or cold intolerance; No hair loss  MUSCULOSKELETAL: No joint pain or swelling; No muscle, back, or extremity pain  FAMILY HISTORY:  FH: heart disease  both parents      T(C): 36.6 (02-05-24 @ 05:00), Max: 36.9 (02-04-24 @ 14:40)  HR: 75 (02-05-24 @ 05:00) (60 - 75)  BP: 149/70 (02-05-24 @ 05:00) (110/57 - 149/70)  RR: 18 (02-05-24 @ 05:00) (18 - 18)  SpO2: --  Wt(kg): --Vital Signs Last 24 Hrs  T(C): 36.6 (05 Feb 2024 05:00), Max: 36.9 (04 Feb 2024 14:40)  T(F): 97.9 (05 Feb 2024 05:00), Max: 98.5 (04 Feb 2024 14:40)  HR: 75 (05 Feb 2024 05:00) (60 - 75)  BP: 149/70 (05 Feb 2024 05:00) (110/57 - 149/70)  BP(mean): --  RR: 18 (05 Feb 2024 05:00) (18 - 18)  SpO2: --        PHYSICAL EXAM:  GENERAL: NAD, well-groomed, well-developed  HEAD:  Atraumatic, Normocephalic  EYES: EOMI, PERRLA, conjunctiva and sclera clear  ENMT: No tonsillar erythema, exudates, or enlargement; Moist mucous membranes, Good dentition, No lesions  NECK: Supple, No JVD, Normal thyroid  NERVOUS SYSTEM:  Alert & Oriented X3, Good concentration; Motor Strength 5/5 B/L upper and lower extremities; DTRs 2+ intact and symmetric  CHEST/LUNG: Clear to percussion bilaterally; No rales, rhonchi, wheezing, or rubs  HEART: Regular rate and rhythm; No murmurs, rubs, or gallops  ABDOMEN: Soft, Nontender, Nondistended; Bowel sounds present  EXTREMITIES:  2+ Peripheral Pulses, No clubbing, cyanosis, or edema  LYMPH: No lymphadenopathy noted  SKIN: No rashes or lesions    Consultant(s) Notes Reviewed:  [x ] YES  [ ] NO  Care Discussed with Consultants/Other Providers [ x] YES  [ ] NO    LABS:          RADIOLOGY & ADDITIONAL TESTS:    Imaging Personally Reviewed:  [ ] YES  [ ] NO  acetaminophen     Tablet .. 650 milliGRAM(s) Oral every 6 hours PRN  aluminum hydroxide/magnesium hydroxide/simethicone Suspension 30 milliLiter(s) Oral every 4 hours PRN  atorvastatin 40 milliGRAM(s) Oral at bedtime  bacitracin   Ointment 1 Application(s) Topical two times a day  dextrose 5%. 1000 milliLiter(s) IV Continuous <Continuous>  dextrose 50% Injectable 25 Gram(s) IV Push once  dextrose 50% Injectable 25 Gram(s) IV Push once  dextrose 50% Injectable 12.5 Gram(s) IV Push once  fluorometholone 0.1% Suspension 1 Drop(s) Both EYES two times a day  folic acid 1 milliGRAM(s) Oral daily  influenza  Vaccine (HIGH DOSE) 0.7 milliLiter(s) IntraMuscular once  insulin lispro (ADMELOG) corrective regimen sliding scale   SubCutaneous three times a day before meals  levETIRAcetam 250 milliGRAM(s) Oral two times a day  melatonin 3 milliGRAM(s) Oral at bedtime PRN  ondansetron Injectable 4 milliGRAM(s) IV Push every 8 hours PRN  senna 2 Tablet(s) Oral at bedtime  sodium bicarbonate 650 milliGRAM(s) Oral two times a day      HEALTH ISSUES - PROBLEM Dx:

## 2024-02-05 NOTE — DISCHARGE NOTE NURSING/CASE MANAGEMENT/SOCIAL WORK - NSDCVIVACCINE_GEN_ALL_CORE_FT
Tdap; 13-Oct-2022 11:26; Kandice Rahman); [employer]; [id number] (Exp. Date: 06-Dec-2024); IntraMuscular; Deltoid Right.; 0.5 milliLiter(s); VIS (VIS Published: 09-May-2013, VIS Presented: 13-Oct-2022);

## 2024-02-05 NOTE — PROGRESS NOTE ADULT - ASSESSMENT
79yo M w/ PMHx DM, CKD, HTN, HLD presents s/p mechanical fall with head injury 3 days prior found to have acute extra-axial (subdural versus epidural) focal hemorrhage over the high left frontal convexity and acute renal failure, admitted to [facility].    #Recent mechanical fall   #Acute intracranial hemorrhage   #Hx of subacute subdural hematoma 05/2021  - hgb stable  - Repeat CTH reviewed, stable  - dw neurosurgery, no acute surgical intervention warranted  - PT eval  - c/w home dose keppra po   - hold home asa and DVT ppx for now ; no clear indication for absolute indication for ASA. given recurrent head trauma and bruising would recommend holding indefinitely    #KELL on CKD stage 4  #Hyperkalemia   # Hypernatremia  - difficult to ascertain recent baseline but in 2022 baseline was ~1.5  - serum Cr: 3.2-->2.5-->1.7  - Na 148.  D5% increased to 100 cc/hr  - Likely secondary to volume depletion with underlying DM nephropathy   - Nephrology following  - hold home lisinopril and bumex   - Continue sodium bicarb po 650mg bid   - f/u SPEP, UPEP, TIERA, urine studies    #anemia  - trend H/H, maintain active T/S  - iron, b12, folate wnl  - reticulocyte index 0.32, suggestive of hypoproliferation.  noted.  haptoglobin pending result.  - c/w oral iron + folate supplement.    #acute on chronic epistaxis left naris   - ENT saw patient in ED: bacitracin b/l nares, no acute intervention  - sees o/p ENT: Dr. Vanegas, [facility] on [facility].    #DM  - Takes Oseni 25/30 mg BID - hold oral anti-hyperglycemics and start ISS with FS goal 140-180    #HTN  - lisinopril and bumex held    #HLD  - C/w lipitor 40mg QD    DVT Prophylaxis: SCD    #Progress Note Handoff  Pending (specify): hypernatremia improvement.  Family discussion: dw patient and family at bedside regarding management and plan  Disposition: rehab vs home with PT   79yo M w/ PMHx DM, CKD, HTN, HLD presents s/p mechanical fall with head injury 3 days prior found to have acute extra-axial (subdural versus epidural) focal hemorrhage over the high left frontal convexity and acute renal failure, admitted to [facility].    #Recent mechanical fall   #Acute intracranial hemorrhage   #Hx of subacute subdural hematoma 05/2021  - hgb stable  - Repeat CTH reviewed, stable  - dw neurosurgery, no acute surgical intervention warranted  - PT eval  - c/w home dose keppra po   - hold home asa and DVT ppx for now ; no clear indication for absolute indication for ASA. given recurrent head trauma and bruising would recommend holding indefinitely    #KELL on CKD stage 4:  resolved  #Hyperkalemia : resolved  # Hypernatremia: resolved  - Nephrology following  - hold home lisinopril and bumex   - Continue sodium bicarb po 650mg bid   - f/u SPEP, UPEP, TIERA, urine studies  - heme/onc eval as per nephro recommendations.    #anemia  - trend H/H, maintain active T/S  - iron, b12, folate wnl  - reticulocyte index 0.32, suggestive of hypoproliferation.  noted.  haptoglobin pending result.  - c/w oral iron + folate supplement.    #acute on chronic epistaxis left naris   - ENT saw patient in ED: bacitracin b/l nares, no acute intervention  - sees o/p ENT: Dr. Vanegas, [facility] on [facility] [address].    #DM  - Takes Oseni 25/30 mg BID - hold oral anti-hyperglycemics and start ISS with FS goal 140-180    #HTN  - lisinopril and bumex held    #HLD  - C/w lipitor 40mg QD    DVT Prophylaxis: SCD    #Progress Note Handoff  Pending (specify): hypernatremia improvement.  Family discussion: dw patient and family at bedside regarding management and plan  Disposition: rehab vs home with PT

## 2024-02-05 NOTE — PROGRESS NOTE ADULT - SUBJECTIVE AND OBJECTIVE BOX
Nephrology progress note     cough but otherwise more alert  ON events/Subjective:     Allergies:  No Known Allergies    Hospital Medications:   MEDICATIONS  (STANDING):  atorvastatin 40 milliGRAM(s) Oral at bedtime  bacitracin   Ointment 1 Application(s) Topical two times a day  dextrose 5%. 1000 milliLiter(s) (50 mL/Hr) IV Continuous <Continuous>  dextrose 50% Injectable 25 Gram(s) IV Push once  dextrose 50% Injectable 12.5 Gram(s) IV Push once  dextrose 50% Injectable 25 Gram(s) IV Push once  fluorometholone 0.1% Suspension 1 Drop(s) Both EYES two times a day  folic acid 1 milliGRAM(s) Oral daily  influenza  Vaccine (HIGH DOSE) 0.7 milliLiter(s) IntraMuscular once  insulin lispro (ADMELOG) corrective regimen sliding scale   SubCutaneous three times a day before meals  levETIRAcetam 250 milliGRAM(s) Oral two times a day  senna 2 Tablet(s) Oral at bedtime  sodium bicarbonate 650 milliGRAM(s) Oral two times a day    REVIEW OF SYSTEMS:  CONSTITUTIONAL: No weakness, fevers or chills  EYES/ENT: No visual changes;  No vertigo or throat pain   NECK: No pain or stiffness  RESPIRATORY: No cough, wheezing, hemoptysis; No shortness of breath  CARDIOVASCULAR: No chest pain or palpitations.  GASTROINTESTINAL: No abdominal or epigastric pain. No nausea, vomiting, or hematemesis; No diarrhea or constipation. No melena or hematochezia.  GENITOURINARY: No dysuria, frequency, foamy urine, urinary urgency, incontinence or hematuria  NEUROLOGICAL: No numbness or weakness  SKIN: No itching, burning, rashes, or lesions   VASCULAR: No bilateral lower extremity edema.   All other review of systems is negative unless indicated above.    VITALS:  T(F): 97.9 (02-05-24 @ 05:00), Max: 98.5 (02-04-24 @ 14:40)  HR: 75 (02-05-24 @ 05:00)  BP: 149/70 (02-05-24 @ 05:00)  RR: 18 (02-05-24 @ 05:00)  SpO2: --  Wt(kg): --    02-03 @ 07:01  -  02-04 @ 07:00  --------------------------------------------------------  IN: 1480 mL / OUT: 1400 mL / NET: 80 mL    02-04 @ 07:01  -  02-05 @ 07:00  --------------------------------------------------------  IN: 700 mL / OUT: 0 mL / NET: 700 mL        PHYSICAL EXAM:  Constitutional: NAD  HEENT: anicteric sclera, oropharynx clear, MMM  Neck: No JVD  Respiratory: CTAB, no wheezes, rales or rhonchi  Cardiovascular: S1, S2, RRR  Gastrointestinal: BS+, soft, NT/ND  Extremities: No cyanosis or clubbing. No peripheral edema  Neurological: A/O x 3, no focal deficits  Psychiatric: Normal mood, normal affect  : No CVA tenderness. No gil.   Skin: No rashes  Vascular Access:    LABS:  02-05    146  |  113<H>  |  48<H>  ----------------------------<  112<H>  4.4   |  19  |  1.2    Ca    8.9      05 Feb 2024 06:39  Mg     1.8     02-05    TPro  6.0  /  Alb  3.5  /  TBili  0.3  /  DBili      /  AST  37  /  ALT  25  /  AlkPhos  81  02-05                          7.9    7.84  )-----------( 165      ( 05 Feb 2024 06:39 )             24.8       Urine Studies:  Creatinine Trend: 1.2<--, 1.4<--, 1.4<--, 1.7<--, 1.6<--, 1.6<--  Urinalysis Basic - ( 05 Feb 2024 06:39 )    Color:  / Appearance:  / SG:  / pH:   Gluc: 112 mg/dL / Ketone:   / Bili:  / Urobili:    Blood:  / Protein:  / Nitrite:    Leuk Esterase:  / RBC:  / WBC    Sq Epi:  / Non Sq Epi:  / Bacteria:       Osmolality, Random Urine: 508 mos/kg (02-03 @ 19:12)  Sodium, Random Urine: 74.0 mmoL/L (02-03 @ 19:12)  Creatinine, Random Urine: 35 mg/dL (02-02 @ 12:30)  Protein/Creatinine Ratio Calculation: 1.1 Ratio (02-02 @ 12:30)      ·failure to thrive  ·renal failure -- likely baseline DM nephropathy and now volume depletion too  hypernatremia  BP stable  ·sp fall with small SDH  ·anemia with some schistocytes and mildly elevated LDH  ·hyperkalemia as outpt - improved  ·metabolic acidosis  ·bladder sono as out pt 2 days ago with ? prostate vs bladder mass  ·no signs infectious process  ·d/w wife per advanced directives - uncertain at thiss time to include hemodialysis if needed    1) continue ivf but suggest D5 W at 50 c/hr  2) continue sod bicarbonate  3) given shistocytes, myelocytes and anemia - suggets [facility] onc input  4) please resume home meds nasal spray saline 1 spay each nostril tid and fluorometholone 0.1 % opth 1 gtt both eyes bid  am labs  spoke with resident

## 2024-02-05 NOTE — PROGRESS NOTE ADULT - SUBJECTIVE AND OBJECTIVE BOX
78y old  Male who presents with a chief complaint of ARF, Fall (02 Feb 2024 11:40)      INTERVAL HPI/OVERNIGHT EVENTS: no acute events overnight. no major complaints.       REVIEW OF SYSTEMS:  All other review of systems negative except if indicated in HPI      PHYSICAL EXAM:  GENERAL: NAD  HEAD: atraumatic, normocephalic  EYES: EOMI  ENT: moist mucus membranes   NERVOUS SYSTEM:  Alert & Oriented X3  PULMONARY: Clear to percussion bilaterally; No rales, rhonchi, wheezing, or rubs  CARDIOVASCULAR: Regular rate and rhythm; No murmurs, rubs, or gallops  GI: Soft, Nontender, Nondistended; Bowel sounds present  EXTREMITIES:  2+ Peripheral Pulses, No clubbing, cyanosis, or edema  LYMPH: No lymphadenopathy noted  SKIN: No rashes or lesions    Consultant(s) Notes Reviewed:  [x ] YES  [ ] NO    Discussed with Consultants/Other Providers [ ] YES             RADIOLOGY & ADDITIONAL TESTS:    Imaging Personally Reviewed:  [ ] YES  [ ] NO    HEALTH ISSUES - PROBLEM Dx:        78y old  Male who presents with a chief complaint of ARF, Fall (02 Feb 2024 11:40)      INTERVAL HPI/OVERNIGHT EVENTS: no acute events overnight. no major complaints.       REVIEW OF SYSTEMS:  All other review of systems negative except if indicated in HPI      PHYSICAL EXAM:  GENERAL: NAD  HEAD: atraumatic, normocephalic  EYES: EOMI  ENT: moist mucus membranes   NERVOUS SYSTEM:  Alert & Oriented X3  PULMONARY: Clear to percussion bilaterally; No rales, rhonchi, wheezing, or rubs  CARDIOVASCULAR: Regular rate and rhythm; No murmurs, rubs, or gallops  GI: Soft, Nontender, Nondistended; Bowel sounds present  EXTREMITIES:  2+ Peripheral Pulses, No clubbing, cyanosis, or edema  LYMPH: No lymphadenopathy noted  SKIN: No rashes or lesions    Consultant(s) Notes Reviewed:  [x ] YES  [ ] NO    Discussed with Consultants/Other Providers [ ] YES     Vital Signs Last 24 Hrs  T(C): 36.2 (05 Feb 2024 18:30), Max: 36.7 (05 Feb 2024 12:19)  T(F): 97.2 (05 Feb 2024 18:30), Max: 98 (05 Feb 2024 12:19)  HR: 77 (05 Feb 2024 18:30) (71 - 77)  BP: 147/65 (05 Feb 2024 18:30) (128/59 - 149/70)  BP(mean): --  RR: 18 (05 Feb 2024 18:30) (18 - 18)  SpO2: --    Parameters below as of 05 Feb 2024 18:30  Patient On (Oxygen Delivery Method): room air      I&Os:  02-04-24 @ 07:01  -  02-05-24 @ 07:00  --------------------------------------------------------  IN: 700 mL / OUT: 0 mL / NET: 700 mL    02-05-24 @ 07:01  -  02-05-24 @ 22:57  --------------------------------------------------------  IN: 200 mL / OUT: 400 mL / NET: -200 mL        MEDICATIONS:  STANDING MEDICATIONS  atorvastatin 40 milliGRAM(s) Oral at bedtime, 01-31-24 @ 18:53  bacitracin   Ointment 1 Application(s) Topical two times a day, 02-01-24 @ 00:55  dextrose 5%. 1000 milliLiter(s) IV Continuous <Continuous>, 02-03-24 @ 14:46  dextrose 50% Injectable 25 Gram(s) IV Push once, 01-31-24 @ 19:02  dextrose 50% Injectable 25 Gram(s) IV Push once, 01-31-24 @ 19:02  dextrose 50% Injectable 12.5 Gram(s) IV Push once, 01-31-24 @ 19:02  fluorometholone 0.1% Ointment 1 Application(s) Both EYES two times a day, 02-05-24 @ 15:31  fluorometholone 0.1% Suspension 1 Drop(s) Both EYES two times a day, 02-01-24 @ 12:24  folic acid 1 milliGRAM(s) Oral daily, 01-31-24 @ 18:56  influenza  Vaccine (HIGH DOSE) 0.7 milliLiter(s) IntraMuscular once, 02-02-24 @ 09:17  insulin lispro (ADMELOG) corrective regimen sliding scale   SubCutaneous three times a day before meals, 01-31-24 @ 18:55  levETIRAcetam 250 milliGRAM(s) Oral two times a day, 01-31-24 @ 18:53  senna 2 Tablet(s) Oral at bedtime, 01-31-24 @ 21:29  sodium bicarbonate 650 milliGRAM(s) Oral two times a day, 01-31-24 @ 17:58  sodium chloride 0.65% Nasal 1 Spray(s) Both Nostrils three times a day, 02-05-24 @ 15:31    PRN MEDICATIONS  acetaminophen     Tablet .. 650 milliGRAM(s) Oral every 6 hours, 01-31-24 @ 19:24 PRN  aluminum hydroxide/magnesium hydroxide/simethicone Suspension 30 milliLiter(s) Oral every 4 hours, 01-31-24 @ 19:24 PRN  melatonin 3 milliGRAM(s) Oral at bedtime, 01-31-24 @ 19:24 PRN  ondansetron Injectable 4 milliGRAM(s) IV Push every 8 hours, 01-31-24 @ 19:24 PRN    Diet, DASH/TLC:   Sodium & Cholesterol Restricted  Kosher (02-01-24 @ 15:18) [Active]    LABS:                        7.9    7.84  )-----------( 165      ( 05 Feb 2024 06:39 )             24.8     WBC trend: 7.84 <--, 7.17 <--, 7.50 <--, 6.60 <--  Hgb: 7.9 [02-05-24 @ 06:39]<--, 7.7 [02-04-24 @ 07:49]<--, 8.2 [02-03-24 @ 06:35]<--, 8.1 [02-02-24 @ 08:14]<--, 8.2 [02-01-24 @ 06:25]<--, 9.0 [01-31-24 @ 09:07]<--    02-05    146  |  113<H>  |  48<H>  ----------------------------<  112<H>  4.4   |  19  |  1.2    Ca    8.9      05 Feb 2024 06:39  Mg     1.8     02-05    TPro  6.0  /  Alb  3.5  /  TBili  0.3  /  DBili  x   /  AST  37  /  ALT  25  /  AlkPhos  81  02-05    Creatinine trend: 1.2<--, 1.4<--, 1.4<--, 1.7<--, 1.6<--, 1.6<--, 1.7<--  SODIUM TREND: Sodium 146 [02-05 @ 06:39]<--, Sodium 148 [02-04 @ 07:49]<--, Sodium 147 [02-03 @ 18:30]<--, Sodium 150 [02-03 @ 06:35]<--, Sodium 150 [02-03 @ 01:01]<--, Sodium 147 [02-02 @ 21:15]<--      POC Glucose: 115 [02-05-24 @ 17:00]<--, 98 [02-05-24 @ 11:16]<--, 111 [02-05-24 @ 07:23]<--                  Ferritin: 92 ng/mL (02-01-24 @ 06:25)                Urinalysis Basic - ( 05 Feb 2024 06:39 )    Color: x / Appearance: x / SG: x / pH: x  Gluc: 112 mg/dL / Ketone: x  / Bili: x / Urobili: x   Blood: x / Protein: x / Nitrite: x   Leuk Esterase: x / RBC: x / WBC x   Sq Epi: x / Non Sq Epi: x / Bacteria: x                                              -------------------------------------------------          RADIOLOGY & ADDITIONAL TESTS:    Imaging Personally Reviewed:  [ ] YES  [ ] NO    HEALTH ISSUES - PROBLEM Dx:

## 2024-02-05 NOTE — DISCHARGE NOTE NURSING/CASE MANAGEMENT/SOCIAL WORK - PATIENT PORTAL LINK FT
You can access the FollowMyHealth Patient Portal offered by [facility] by registering at the following website: [email or web address]. By joining [employer]’s FollowMyHealth portal, you will also be able to view your health information using other applications (apps) compatible with our system.

## 2024-02-05 NOTE — MEDICAL STUDENT PROGRESS NOTE(EDUCATION) - ASSESSMENT
77yo M w/ PMHx DM, CKD, HTN, HLD presents s/p mechanical fall with head injury 3 days prior found to have acute extra-axial (subdural versus epidural) focal hemorrhage over the high left frontal convexity and acute renal failure. Patient is admitted for hyperkalemia.     #Recent mechanical fall   #Acute intracranial hemorrhage   #Hx of subacute subdural hematoma 05/2021  - hgb stable  - Repeat CTH reviewed, stable  - dw neurosurgery, no acute surgical intervention warranted  - PT eval - balance training; bed mobility training; gait training; strengthening; transfer training, rec OT  - c/w home dose keppra po   - hold home asa and DVT ppx for now ; no clear indication for absolute indication for ASA. given recurrent head trauma and bruising would recommend holding indefinitely    #KELL on CKD stage 4  #Hyperkalemia   # Hypernatremia  - difficult to ascertain recent baseline but in 2022 baseline was ~1.5  - serum Cr: 3.2-->2.5-->1.7  - Na 148.  D5% increased to 100 cc/hr  - Likely secondary to volume depletion with underlying DM nephropathy   - Nephrology following  - hold home lisinopril and bumex   - Continue sodium bicarb po 650mg bid   - f/u SPEP, UPEP, TIERA, urine studies    #anemia  - trend H/H, maintain active T/S  - iron, b12, folate wnl  - reticulocyte index 0.32, suggestive of hypoproliferation.  noted.  haptoglobin pending result.  - c/w oral iron + folate supplement.    #acute on chronic epistaxis left naris   - ENT saw patient in ED: bacitracin b/l nares, no acute intervention  - sees o/p ENT: Dr. Vanegas, [facility].    #DM  - Takes Oseni 25/30 mg BID - hold oral anti-hyperglycemics and start ISS with FS goal 140-180    #HTN  - lisinopril and bumex held    #HLD  - C/w lipitor 40mg QD    DVT Prophylaxis: SCD    #Progress Note Handoff  Pending (specify): hypernatremia improvement.  Family discussion: dw patient and family at bedside regarding management and plan  Disposition: rehab vs home with PT

## 2024-02-05 NOTE — DISCHARGE NOTE NURSING/CASE MANAGEMENT/SOCIAL WORK - NSDCPEFALRISK_GEN_ALL_CORE
For information on Fall & Injury Prevention, visit: [email or web address] OR  [email or web address] OR  https://www.cdc.gov/steadi/patient.html

## 2024-02-06 ENCOUNTER — TRANSCRIPTION ENCOUNTER (OUTPATIENT)
Age: 79
End: 2024-02-06

## 2024-02-06 LAB
ALBUMIN SERPL ELPH-MCNC: 3.4 G/DL — LOW (ref 3.5–5.2)
ALP SERPL-CCNC: 90 U/L — SIGNIFICANT CHANGE UP (ref 30–115)
ALT FLD-CCNC: 23 U/L — SIGNIFICANT CHANGE UP (ref 0–41)
ANA TITR SER: NEGATIVE — SIGNIFICANT CHANGE UP
ANION GAP SERPL CALC-SCNC: 10 MMOL/L — SIGNIFICANT CHANGE UP (ref 7–14)
AST SERPL-CCNC: 29 U/L — SIGNIFICANT CHANGE UP (ref 0–41)
BASOPHILS # BLD AUTO: 0.02 K/UL — SIGNIFICANT CHANGE UP (ref 0–0.2)
BASOPHILS NFR BLD AUTO: 0.2 % — SIGNIFICANT CHANGE UP (ref 0–1)
BILIRUB SERPL-MCNC: 0.3 MG/DL — SIGNIFICANT CHANGE UP (ref 0.2–1.2)
BUN SERPL-MCNC: 36 MG/DL — HIGH (ref 10–20)
CALCIUM SERPL-MCNC: 8.8 MG/DL — SIGNIFICANT CHANGE UP (ref 8.4–10.4)
CHLORIDE SERPL-SCNC: 114 MMOL/L — HIGH (ref 98–110)
CO2 SERPL-SCNC: 22 MMOL/L — SIGNIFICANT CHANGE UP (ref 17–32)
CREAT SERPL-MCNC: 1.2 MG/DL — SIGNIFICANT CHANGE UP (ref 0.7–1.5)
DIR ANTIGLOB POLYSPECIFIC INTERPRETATION: SIGNIFICANT CHANGE UP
EGFR: 61 ML/MIN/1.73M2 — SIGNIFICANT CHANGE UP
EOSINOPHIL # BLD AUTO: 0.21 K/UL — SIGNIFICANT CHANGE UP (ref 0–0.7)
EOSINOPHIL NFR BLD AUTO: 2.6 % — SIGNIFICANT CHANGE UP (ref 0–8)
GLUCOSE BLDC GLUCOMTR-MCNC: 108 MG/DL — HIGH (ref 70–99)
GLUCOSE BLDC GLUCOMTR-MCNC: 109 MG/DL — HIGH (ref 70–99)
GLUCOSE BLDC GLUCOMTR-MCNC: 111 MG/DL — HIGH (ref 70–99)
GLUCOSE BLDC GLUCOMTR-MCNC: 177 MG/DL — HIGH (ref 70–99)
GLUCOSE SERPL-MCNC: 106 MG/DL — HIGH (ref 70–99)
HCT VFR BLD CALC: 24 % — LOW (ref 42–52)
HGB BLD-MCNC: 7.7 G/DL — LOW (ref 14–18)
IMM GRANULOCYTES NFR BLD AUTO: 1.6 % — HIGH (ref 0.1–0.3)
LDH SERPL L TO P-CCNC: 242 U/L — SIGNIFICANT CHANGE UP (ref 50–242)
LYMPHOCYTES # BLD AUTO: 0.65 K/UL — LOW (ref 1.2–3.4)
LYMPHOCYTES # BLD AUTO: 8.1 % — LOW (ref 20.5–51.1)
MAGNESIUM SERPL-MCNC: 1.8 MG/DL — SIGNIFICANT CHANGE UP (ref 1.8–2.4)
MCHC RBC-ENTMCNC: 31.3 PG — HIGH (ref 27–31)
MCHC RBC-ENTMCNC: 32.1 G/DL — SIGNIFICANT CHANGE UP (ref 32–37)
MCV RBC AUTO: 97.6 FL — HIGH (ref 80–94)
MONOCYTES # BLD AUTO: 0.67 K/UL — HIGH (ref 0.1–0.6)
MONOCYTES NFR BLD AUTO: 8.3 % — SIGNIFICANT CHANGE UP (ref 1.7–9.3)
NEUTROPHILS # BLD AUTO: 6.36 K/UL — SIGNIFICANT CHANGE UP (ref 1.4–6.5)
NEUTROPHILS NFR BLD AUTO: 79.2 % — HIGH (ref 42.2–75.2)
NRBC # BLD: 0 /100 WBCS — SIGNIFICANT CHANGE UP (ref 0–0)
PLATELET # BLD AUTO: 168 K/UL — SIGNIFICANT CHANGE UP (ref 130–400)
PMV BLD: 11 FL — HIGH (ref 7.4–10.4)
POTASSIUM SERPL-MCNC: 4.3 MMOL/L — SIGNIFICANT CHANGE UP (ref 3.5–5)
POTASSIUM SERPL-SCNC: 4.3 MMOL/L — SIGNIFICANT CHANGE UP (ref 3.5–5)
PROT SERPL-MCNC: 6.1 G/DL — SIGNIFICANT CHANGE UP (ref 6–8)
RBC # BLD: 2.46 M/UL — LOW (ref 4.7–6.1)
RBC # FLD: 16.9 % — HIGH (ref 11.5–14.5)
SODIUM SERPL-SCNC: 146 MMOL/L — SIGNIFICANT CHANGE UP (ref 135–146)
WBC # BLD: 8.04 K/UL — SIGNIFICANT CHANGE UP (ref 4.8–10.8)
WBC # FLD AUTO: 8.04 K/UL — SIGNIFICANT CHANGE UP (ref 4.8–10.8)

## 2024-02-06 PROCEDURE — 99232 SBSQ HOSP IP/OBS MODERATE 35: CPT

## 2024-02-06 PROCEDURE — 99223 1ST HOSP IP/OBS HIGH 75: CPT

## 2024-02-06 RX ADMIN — Medication 1 APPLICATION(S): at 17:17

## 2024-02-06 RX ADMIN — Medication 1 APPLICATION(S): at 06:17

## 2024-02-06 RX ADMIN — Medication 2: at 11:14

## 2024-02-06 RX ADMIN — FLUOROMETHOLONE 1 DROP(S): 1 SOLUTION/ DROPS OPHTHALMIC at 17:17

## 2024-02-06 RX ADMIN — LEVETIRACETAM 250 MILLIGRAM(S): 250 TABLET, FILM COATED ORAL at 17:15

## 2024-02-06 RX ADMIN — Medication 650 MILLIGRAM(S): at 17:16

## 2024-02-06 RX ADMIN — Medication 1 SPRAY(S): at 06:18

## 2024-02-06 RX ADMIN — SENNA PLUS 2 TABLET(S): 8.6 TABLET ORAL at 21:17

## 2024-02-06 RX ADMIN — FLUOROMETHOLONE 1 APPLICATION(S): 1 SOLUTION/ DROPS OPHTHALMIC at 06:18

## 2024-02-06 RX ADMIN — LEVETIRACETAM 250 MILLIGRAM(S): 250 TABLET, FILM COATED ORAL at 05:05

## 2024-02-06 RX ADMIN — FLUOROMETHOLONE 1 DROP(S): 1 SOLUTION/ DROPS OPHTHALMIC at 05:05

## 2024-02-06 RX ADMIN — ATORVASTATIN CALCIUM 40 MILLIGRAM(S): 80 TABLET, FILM COATED ORAL at 21:17

## 2024-02-06 RX ADMIN — Medication 1 MILLIGRAM(S): at 11:03

## 2024-02-06 RX ADMIN — Medication 1 SPRAY(S): at 13:05

## 2024-02-06 RX ADMIN — Medication 650 MILLIGRAM(S): at 05:05

## 2024-02-06 NOTE — CONSULT NOTE ADULT - ASSESSMENT
77yo M w/ PMHx DM, CKD, HTN, HLD presents s/p mechanical fall with head injury 3 days prior found to have acute extra-axial (subdural versus epidural) focal hemorrhage over the high left frontal convexity and acute renal failure, admitted to [facility].    Hem/Onc consulted for the evaluation of macrocytic anemia and myelocytes in the blood smear.    ASSESSMENT  #Macrocytic Anemia 2/2   -No h/o nutritional deficiency, Alcohol use  -Hb 7.7(range 7.7-8.2), MCV 97-98  -Retics %: 1  -Reticulocyte index 0.29, Abs reticulocyte count 0.6>>suggestive of hypoproliferative disorder  -TSH(2/1): 6.86 uIU/m  -Urine protein +, K/L ratio(2/2): 109.16  -Immunofixation, Serum(1/31): No Monoclonal Band Identified     PLAN:  -    **THIS IS AN INCOMPLETE NOTE     79yo M w/ PMHx DM, CKD, HTN, HLD presents s/p mechanical fall with head injury 3 days prior found to have acute extra-axial (subdural versus epidural) focal hemorrhage over the high left frontal convexity and acute renal failure, admitted to [facility].    Hem/Onc consulted for the evaluation of macrocytic anemia and myelocytes in the blood smear.    ASSESSMENT  #Macrocytic Anemia likely multifactorial  -No h/o nutritional deficiency, Alcohol use  -Hb on presentation(1/31): 9,Hb today 7.7   -MCV 97-98  -Retics: 1%  -Reticulocyte index 0.29, Absolute reticulocyte count 0.6  -TSH(2/1): 6.86 uIU/m  -Urine protein +, K/L ratio urine (2/2): 109.16, K/L ratio serum (2/2): 2.33 in the setting of acutely reduced GFR to 19(GFR later improved)  -Immunofixation, Serum(1/31): No Monoclonal Band Identified   -Ddx: Likely 2/2 component of CKD vs Acute loss from fall injury vs component of mild hypothyroidism vs MDS vs Multifactorial    PLAN:  -Please send MMA level  -Please send Hep B and Hep C serology  -Please send LDH and BHAVIK test  -Consider CT abdomen/pelvis without contrast to r/o bleeding  -Consider GI evaluation     **THIS IS AN INCOMPLETE NOTE     77yo M w/ PMHx DM, CKD, HTN, HLD presents s/p mechanical fall with head injury 3 days prior found to have acute extra-axial (subdural versus epidural) focal hemorrhage over the high left frontal convexity and acute renal failure, admitted to [facility].    Hem/Onc consulted for the evaluation of macrocytic anemia and myelocytes in the blood smear.    ASSESSMENT  #Macrocytic Anemia likely multifactorial  -No h/o nutritional deficiency, Alcohol use  -Hb on presentation(1/31): 9,Hb today 7.7   -MCV 97-98  -Retics: 1%  -Reticulocyte index 0.29, Absolute reticulocyte count 0.6  -TSH(2/1): 6.86 uIU/m  -Urine protein +, K/L ratio urine (2/2): 109.16, K/L ratio serum (2/2): 2.33 in the setting of acutely reduced GFR to 19(GFR later improved)  -Immunofixation, Serum(1/31): No Monoclonal Band Identified   -Ddx: Likely 2/2 component of CKD vs Acute loss from fall injury vs component of mild hypothyroidism vs MDS vs Multifactorial    PLAN:  -Please send MMA level  -Please send Hep B and Hep C serology  -Please send LDH and BHAVIK test  -Consider CT abdomen/pelvis without contrast to r/o bleeding  -Consider 1U PRBC transfusion prior to discharge  -Consider GI evaluation   -Outpatient Hem/Onc Follow up in 6-8 weeks      Thank you for the courtesy of this consult. Please recall cb Arrington  PGY-2, , [facility]

## 2024-02-06 NOTE — CONSULT NOTE ADULT - SUBJECTIVE AND OBJECTIVE BOX
ENT CONSULT:     HPI:  Pt is an 79 y/o M PMHx DM, CKD, HTN, HLD presents s/p mechanical fall with head injury 3 days prior. Wife reports patient was ambulatory with walker on  walking up steps, and upon reaching the top step and making a turn, lost balance and fell backwards, striking L side of head on tile floor. No LOC. Pt was awake and alert. EMS was called but pt did not want to come to ED and was aided up. Pt has been ambulatory since. Wife also reports pt was started on Gabapentin earlier this month which made him have hallucinations and be more off balance and was stopped. Pt reports no headaches,  dizziness, visual or speech deficits.    /81 HR 76RR 18 T98F sat 97% on RA    CT Head No Cont Acute extra-axial (subdural versus epidural) focal hemorrhage over the high left frontal convexity measuring up to 9 mm in width. No significant mass effect or midline shift.    VBG pH 7.26 CO2 36 HCO3 16 K 6.8. Labs Cr 3.2  GFR 19    Seen by nephro and neurosurgery in ED, admitted to medicine   (2024 18:44)    ENT CONSULT:      79 y/o M PMHx DM, CKD, HTN, HLD presents s/p mechanical fall with head injury 3 days found with extra-axial (subdural versus epidural) focal hemorrhage over the high left frontal convexity measuring up to 9 mm in width. No significant mass effect or midline shift- ENT c/s for evaluation of epistaxis (not active). Patient seen and examined at bedside with wife. Per patient's wife, patient had an epistaxis localized to R naris on Thursday. She reports patient followed up with Dr. Vanegas, [facility]. Patient had right sided epistaxis cauterized and was prescribed bacitracin and air gel. Today, patient had a small left sided epistaxis in ED that had self resolved prior to examination. Denies any fever, chills, SOB/difficult breathing, blood tracking in back of throat.     PAST MEDICAL & SURGICAL HISTORY:  Diverticulitis      Herniated disc, cervical      Chronic gout of foot, unspecified cause, unspecified laterality      Type 2 diabetes mellitus without complication, unspecified long term insulin use status      Hypertension, unspecified type      High cholesterol      Osteoarthritis of multiple joints, unspecified osteoarthritis type      Sciatica, unspecified laterality      History of tonsillectomy and adenoidectomy      H/O colonoscopy        H/O knee surgery          MEDICATIONS  (STANDING):  atorvastatin 40 milliGRAM(s) Oral at bedtime  dextrose 50% Injectable 12.5 Gram(s) IV Push once  dextrose 50% Injectable 25 Gram(s) IV Push once  dextrose 50% Injectable 25 Gram(s) IV Push once  folic acid 1 milliGRAM(s) Oral daily  insulin lispro (ADMELOG) corrective regimen sliding scale   SubCutaneous three times a day before meals  levETIRAcetam 250 milliGRAM(s) Oral two times a day  sodium bicarbonate 650 milliGRAM(s) Oral two times a day  sodium chloride 0.9%. 1000 milliLiter(s) (60 mL/Hr) IV Continuous <Continuous>    MEDICATIONS  (PRN):  acetaminophen     Tablet .. 650 milliGRAM(s) Oral every 6 hours PRN Temp greater or equal to 38C (100.4F), Mild Pain (1 - 3)  aluminum hydroxide/magnesium hydroxide/simethicone Suspension 30 milliLiter(s) Oral every 4 hours PRN Dyspepsia  melatonin 3 milliGRAM(s) Oral at bedtime PRN Insomnia  ondansetron Injectable 4 milliGRAM(s) IV Push every 8 hours PRN Nausea and/or Vomiting      Allergies    No Known Allergies    Intolerances          SOCIAL HISTORY:    FAMILY HISTORY:  FH: heart disease  both parents        REVIEW OF SYSTEMS   [x] A ten-point review of systems was otherwise negative except as noted.    Vital Signs Last 24 Hrs  T(C): 36.4 (2024 15:54), Max: 36.9 (2024 14:00)  T(F): 97.6 (2024 15:54), Max: 98.4 (2024 14:00)  HR: 72 (2024 15:54) (72 - 85)  BP: 105/55 (2024 15:54) (104/49 - 112/51)  BP(mean): 70 (2024 14:00) (70 - 70)  RR: 18 (2024 15:54) (18 - 19)  SpO2: 99% (2024 15:54) (97% - 99%)    Parameters below as of 2024 15:54  Patient On (Oxygen Delivery Method): room air        GEN:  NAD, awake and alert. No drooling or pooling of secretions. No stridor or stertor. Good vocal quality, no hoarseness.   SKIN: Good color, non diaphoretic.  HEENT: NC/AT; NARES: no active bleeding noted bilaterally, large clots note to L nares.   Oral mucosa prink and moist. No erythema or edema noted to buccal mucosa, tongue, FOM, uvula. Uvula midline. +Posterior OP clear without active bleeding or clots noted.   NECK: Traceha midline, Neck supple, no TTP to B/L lateral neck, no cervical LAD.  RESP: No dyspnea, non-labored breathing. No use of accessory muscles.   CARDIO: +S1/S2  ABDO: Soft, NT.  EXT: SUNSHINE x 4      LABS:                        9.0    6.87  )-----------( 149      ( 2024 09:07 )             27.7         132<L>  |  105  |  118<HH>  ----------------------------<  81  TNP   |  17  |  3.2<H>    Ca    9.1      2024 09:07    TPro  6.8  /  Alb  4.1  /  TBili  <0.2  /  DBili  x   /  AST  43<H>  /  ALT  26  /  AlkPhos  77        Urinalysis Basic - ( 2024 09:07 )    Color: Yellow / Appearance: Clear / S.012 / pH: x  Gluc: 81 mg/dL / Ketone: Negative mg/dL  / Bili: Negative / Urobili: 0.2 mg/dL   Blood: x / Protein: 30 mg/dL / Nitrite: Negative   Leuk Esterase: Negative / RBC: 0 /HPF / WBC 1 /HPF   Sq Epi: x / Non Sq Epi: 0 /HPF / Bacteria: Negative /HPF        RADIOLOGY & ADDITIONAL STUDIES:
NEPHROLOGY CONSULTATION NOTE    Patient is a 79 yo man with DM for many years, proteinuria, HTN, retinopathy, cholesterol, PVD, gout.  Over past years with gradual functional decline - not able to leave hoem and followed at home by NP.  He is admitted sp fall with ? SDH and renal failure    PAST MEDICAL & SURGICAL HISTORY:  Diverticulitis      Herniated disc, cervical      Chronic gout of foot, unspecified cause, unspecified laterality      Type 2 diabetes mellitus without complication, unspecified long term insulin use status      Hypertension, unspecified type      High cholesterol      Osteoarthritis of multiple joints, unspecified osteoarthritis type      Sciatica, unspecified laterality      History of tonsillectomy and adenoidectomy      H/O colonoscopy  2018      H/O knee surgery        Allergies:  No Known Allergies    Home Medications Reviewed  Hospital Medications:   MEDICATIONS  (STANDING):  sodium chloride 0.9%. 1000 milliLiter(s) (60 mL/Hr) IV Continuous <Continuous>      SOCIAL HISTORY:  Denies ETOH,Smoking,   FAMILY HISTORY:  FH: heart disease  both parents          REVIEW OF SYSTEMS:  CONSTITUTIONAL: No weakness, fevers or chills  EYES/ENT: No visual changes;  No vertigo or throat pain   NECK: No pain or stiffness  RESPIRATORY: No cough, wheezing, hemoptysis; No shortness of breath  CARDIOVASCULAR: No chest pain or palpitations.  GASTROINTESTINAL: No abdominal or epigastric pain. No nausea, vomiting, or hematemesis; No diarrhea or constipation. No melena or hematochezia.  GENITOURINARY: No dysuria, frequency, foamy urine, urinary urgency, incontinence or hematuria  NEUROLOGICAL: No numbness or weakness  SKIN: No itching, burning, rashes, or lesions   VASCULAR: No bilateral lower extremity edema.   All other review of systems is negative unless indicated above.    VITALS:  T(F): 98 (24 @ 08:08), Max: 98 (24 @ 08:08)  HR: 76 (24 @ 08:08)  BP: 112/51 (24 @ 08:08)  RR: 18 (24 @ 08:08)  SpO2: 97% (24 @ 08:08)    Height (cm): 177.8 ( 08:08)  Weight (kg): 97.5 ( 08:08)  BMI (kg/m2): 30.8 ( 08:08)  BSA (m2): 2.15 ( @ 08:08)    I&O's Detail        PHYSICAL EXAM:  Constitutional: NAD  HEENT: anicteric sclera, oropharynx clear, MMM  Neck: No JVD  Respiratory: CTAB, no wheezes, rales or rhonchi  Cardiovascular: S1, S2, RRR  Gastrointestinal: BS+, soft, NT/ND  Extremities: No cyanosis or clubbing. No peripheral edema  Neurological: A/O x 3, no focal deficits  Psychiatric: Normal mood, normal affect  : No CVA tenderness. No gil.   Skin: No rashes  Vascular Access:    LABS:      132<L>  |  105  |  118<HH>  ----------------------------<  81  TNP   |  17  |  3.2<H>    Ca    9.1      2024 09:07    TPro  6.8  /  Alb  4.1  /  TBili  <0.2  /  DBili      /  AST  43<H>  /  ALT  26  /  AlkPhos  77      Creatinine Trend: 3.2 <--                        9.0    6.87  )-----------( 149      ( 2024 09:07 )             27.7     Urine Studies:  Urinalysis Basic - ( 2024 09:07 )    Color: Yellow / Appearance: Clear / S.012 / pH:   Gluc: 81 mg/dL / Ketone: Negative mg/dL  / Bili: Negative / Urobili: 0.2 mg/dL   Blood:  / Protein: 30 mg/dL / Nitrite: Negative   Leuk Esterase: Negative / RBC: 0 /HPF / WBC 1 /HPF   Sq Epi:  / Non Sq Epi: 0 /HPF / Bacteria: Negative /HPF    home Meds: lisinopril 10 mg op qd (recently stopped), Bumex 1 mg po q d(recently stopped), lipitor 40 mg po qd, FA 1 mg po qd, keppra 250 mg po q12, allopurionol 300 mg po qd, ASA 81 mg po qd, Oseni 25-30 mg o bid, valium 5 mg prn    ·	failure to thrive  ·	renal failure -- likely baseline DM nephropathy and now volume depletion too  ·	relative hypotension  ·	sp fall with small SDH  ·	anemia  ·	hyperkalemia as outpt  ·	metabolic acidosis  ·	bladder sono as out pt 2 days ago with ? prostate vs bladder mass  ·	no signs infectious process  ·	d/w wife per advanced directives - uncertain at thiss time to include hemodialysis if needed    suggest hospital admission - failure to thrive and renal failure    1) renal and bladder and prostate sono - evaluate renal failure and ? prostate/bladdder mass  2) ivf NS at 60 cc/hr  3) hold lisinopril. bumex  4) add sod bicarb 650 mg po bid  4) please send the following labs: CPK, SPEP with immunofixation, kappa, lambda, and urine protein/creatinine              
HEMATOLOGY ONCOLOGY CONSULT     Patient is a 78y old  Male who presents with a chief complaint of ARF, Fall (05 Feb 2024 17:27)      HPI:  Pt is an 77 y/o M PMHx DM, CKD, HTN, HLD presents s/p mechanical fall with head injury 3 days prior. Wife reports patient was ambulatory with walker on Sunday walking up steps, and upon reaching the top step and making a turn, lost balance and fell backwards, striking L side of head on tile floor. No LOC. Pt was awake and alert. EMS was called but pt did not want to come to ED and was aided up. Pt has been ambulatory since. Wife also reports pt was started on Gabapentin earlier this month which made him have hallucinations and be more off balance and was stopped. Pt reports no headaches,  dizziness, visual or speech deficits.    /81 HR 76RR 18 T98F sat 97% on RA    CT Head No Cont Acute extra-axial (subdural versus epidural) focal hemorrhage over the high left frontal convexity measuring up to 9 mm in width. No significant mass effect or midline shift.    VBG pH 7.26 CO2 36 HCO3 16 K 6.8. Labs Cr 3.2  GFR 19    Seen by nephro and neurosurgery in ED, admitted to medicine   (31 Jan 2024 18:44)       ROS negative except as indicated in the HPI.    PAST MEDICAL & SURGICAL HISTORY:  Diverticulitis      Herniated disc, cervical      Chronic gout of foot, unspecified cause, unspecified laterality      Type 2 diabetes mellitus without complication, unspecified long term insulin use status      Hypertension, unspecified type      High cholesterol      Osteoarthritis of multiple joints, unspecified osteoarthritis type      Sciatica, unspecified laterality      History of tonsillectomy and adenoidectomy      H/O colonoscopy  2018      H/O knee surgery          SOCIAL HISTORY:    FAMILY HISTORY:  FH: heart disease  both parents        MEDICATIONS  (STANDING):  atorvastatin 40 milliGRAM(s) Oral at bedtime  bacitracin   Ointment 1 Application(s) Topical two times a day  dextrose 5%. 1000 milliLiter(s) (50 mL/Hr) IV Continuous <Continuous>  dextrose 50% Injectable 25 Gram(s) IV Push once  dextrose 50% Injectable 12.5 Gram(s) IV Push once  dextrose 50% Injectable 25 Gram(s) IV Push once  fluorometholone 0.1% Ointment 1 Application(s) Both EYES two times a day  fluorometholone 0.1% Suspension 1 Drop(s) Both EYES two times a day  folic acid 1 milliGRAM(s) Oral daily  influenza  Vaccine (HIGH DOSE) 0.7 milliLiter(s) IntraMuscular once  insulin lispro (ADMELOG) corrective regimen sliding scale   SubCutaneous three times a day before meals  levETIRAcetam 250 milliGRAM(s) Oral two times a day  senna 2 Tablet(s) Oral at bedtime  sodium bicarbonate 650 milliGRAM(s) Oral two times a day  sodium chloride 0.65% Nasal 1 Spray(s) Both Nostrils three times a day    MEDICATIONS  (PRN):  acetaminophen     Tablet .. 650 milliGRAM(s) Oral every 6 hours PRN Temp greater or equal to 38C (100.4F), Mild Pain (1 - 3)  aluminum hydroxide/magnesium hydroxide/simethicone Suspension 30 milliLiter(s) Oral every 4 hours PRN Dyspepsia  melatonin 3 milliGRAM(s) Oral at bedtime PRN Insomnia  ondansetron Injectable 4 milliGRAM(s) IV Push every 8 hours PRN Nausea and/or Vomiting      Allergies    No Known Allergies    Intolerances        Vital Signs Last 24 Hrs  T(C): 35.6 (06 Feb 2024 08:41), Max: 36.7 (05 Feb 2024 12:19)  T(F): 96 (06 Feb 2024 08:41), Max: 98 (05 Feb 2024 12:19)  HR: 107 (06 Feb 2024 08:41) (71 - 107)  BP: 113/67 (06 Feb 2024 08:41) (113/67 - 147/65)  BP(mean): --  RR: 18 (05 Feb 2024 23:06) (18 - 18)  SpO2: --    Parameters below as of 05 Feb 2024 23:06  Patient On (Oxygen Delivery Method): room air        PHYSICAL EXAM  General: adult in NAD  HEENT: clear oropharynx, anicteric sclera, pink conjunctiva  Neck: supple  CV: normal S1/S2 with no murmur rubs or gallops  Lungs: positive air movement b/l ant lungs, clear to auscultation, no wheezes, no rales  Abdomen: soft non-tender non-distended, no hepatosplenomegaly  Ext: no clubbing cyanosis or edema  Skin: no rashes and no petechiae  Neuro: alert and oriented X 4, no focal deficits      02-05-24 @ 07:01  -  02-06-24 @ 07:00  --------------------------------------------------------  IN: 200 mL / OUT: 1400 mL / NET: -1200 mL      LABS:                          7.7    8.04  )-----------( 168      ( 06 Feb 2024 06:41 )             24.0         Mean Cell Volume : 97.6 fL  Mean Cell Hemoglobin : 31.3 pg  Mean Cell Hemoglobin Concentration : 32.1 g/dL  Auto Neutrophil # : 6.36 K/uL  Auto Lymphocyte # : 0.65 K/uL  Auto Monocyte # : 0.67 K/uL  Auto Eosinophil # : 0.21 K/uL  Auto Basophil # : 0.02 K/uL  Auto Neutrophil % : 79.2 %  Auto Lymphocyte % : 8.1 %  Auto Monocyte % : 8.3 %  Auto Eosinophil % : 2.6 %  Auto Basophil % : 0.2 %      02-06    146  |  114<H>  |  36<H>  ----------------------------<  106<H>  4.3   |  22  |  1.2    Ca    8.8      06 Feb 2024 06:41  Mg     1.8     02-06    TPro  6.1  /  Alb  3.4<L>  /  TBili  0.3  /  DBili  x   /  AST  29  /  ALT  23  /  AlkPhos  90  02-06              Reticulocyte Percent: 1.1 % (02-04 @ 07:49)  Reticulocyte Percent: 1.0 % (02-03 @ 18:30)  Folate, Serum: >20.0 ng/mL (02-01 @ 06:25)  Vitamin B12, Serum: 923 pg/mL (02-01 @ 06:25)  Iron - Total Binding Capacity.: 341 ug/dL (02-01 @ 06:25)  Ferritin: 92 ng/mL (02-01 @ 06:25)

## 2024-02-06 NOTE — DISCHARGE NOTE PROVIDER - ATTENDING DISCHARGE PHYSICAL EXAMINATION:
Vital Signs Last 24 Hrs  T(F): 97.1 (07 Feb 2024 07:30), Max: 100 (06 Feb 2024 15:20)  HR: 74 (07 Feb 2024 07:30) (71 - 75)  BP: 121/57 (07 Feb 2024 07:30) (102/52 - 121/57)  RR: 18 (06 Feb 2024 23:46) (18 - 18)  SpO2: --    PHYSICAL EXAM:  GENERAL: NAD, well-groomed, well-developed  HEAD:  Atraumatic, Normocephalic  EYES: EOMI, conjunctiva and sclera clear  ENMT: Moist mucous membranes, Good dentition, no thrush  NECK: Supple, No JVD  CHEST/LUNG: Clear to auscultation bilaterally, good air entry, non-labored breathing  HEART: RRR; S1/S2, No murmur  ABDOMEN: Soft, Nontender, Nondistended; Bowel sounds present  VASCULAR: Normal pulses, Normal capillary refill  EXTREMITIES: No calf tenderness, No cyanosis, No edema  LYMPH: Normal; No lymphadenopathy noted  SKIN: Warm, Intact  PSYCH: Normal mood, Normal affect  NERVOUS SYSTEM:  A/O x3, Good concentration

## 2024-02-06 NOTE — PROGRESS NOTE ADULT - SUBJECTIVE AND OBJECTIVE BOX
78y old  Male who presents with a chief complaint of ARF, Fall (02 Feb 2024 11:40)      INTERVAL HPI/OVERNIGHT EVENTS: no acute events overnight. no major complaints.       REVIEW OF SYSTEMS:  All other review of systems negative except if indicated in HPI      PHYSICAL EXAM:  GENERAL: NAD  HEAD: atraumatic, normocephalic  EYES: EOMI  ENT: moist mucus membranes   NERVOUS SYSTEM:  Alert & Oriented X3  PULMONARY: Clear to percussion bilaterally; No rales, rhonchi, wheezing, or rubs  CARDIOVASCULAR: Regular rate and rhythm; No murmurs, rubs, or gallops  GI: Soft, Nontender, Nondistended; Bowel sounds present  EXTREMITIES:  2+ Peripheral Pulses, No clubbing, cyanosis, or edema  LYMPH: No lymphadenopathy noted  SKIN: No rashes or lesions    Consultant(s) Notes Reviewed:  [x ] YES  [ ] NO    Discussed with Consultants/Other Providers [ ] YES       Vital Signs Last 24 Hrs  T(C): 35.6 (06 Feb 2024 08:41), Max: 36.3 (05 Feb 2024 23:06)  T(F): 96 (06 Feb 2024 08:41), Max: 97.4 (05 Feb 2024 23:06)  HR: 107 (06 Feb 2024 08:41) (77 - 107)  BP: 113/67 (06 Feb 2024 08:41) (113/67 - 147/65)  BP(mean): --  RR: 18 (05 Feb 2024 23:06) (18 - 18)  SpO2: --    Parameters below as of 05 Feb 2024 23:06  Patient On (Oxygen Delivery Method): room air      I&Os:  02-05-24 @ 07:01  -  02-06-24 @ 07:00  --------------------------------------------------------  IN: 200 mL / OUT: 1400 mL / NET: -1200 mL        MEDICATIONS:  STANDING MEDICATIONS  atorvastatin 40 milliGRAM(s) Oral at bedtime, 01-31-24 @ 18:53  bacitracin   Ointment 1 Application(s) Topical two times a day, 02-01-24 @ 00:55  dextrose 5%. 1000 milliLiter(s) IV Continuous <Continuous>, 02-03-24 @ 14:46  dextrose 50% Injectable 25 Gram(s) IV Push once, 01-31-24 @ 19:02  dextrose 50% Injectable 25 Gram(s) IV Push once, 01-31-24 @ 19:02  dextrose 50% Injectable 12.5 Gram(s) IV Push once, 01-31-24 @ 19:02  fluorometholone 0.1% Ointment 1 Application(s) Both EYES two times a day, 02-05-24 @ 15:31  fluorometholone 0.1% Suspension 1 Drop(s) Both EYES two times a day, 02-01-24 @ 12:24  folic acid 1 milliGRAM(s) Oral daily, 01-31-24 @ 18:56  influenza  Vaccine (HIGH DOSE) 0.7 milliLiter(s) IntraMuscular once, 02-02-24 @ 09:17  insulin lispro (ADMELOG) corrective regimen sliding scale   SubCutaneous three times a day before meals, 01-31-24 @ 18:55  levETIRAcetam 250 milliGRAM(s) Oral two times a day, 01-31-24 @ 18:53  senna 2 Tablet(s) Oral at bedtime, 01-31-24 @ 21:29  sodium bicarbonate 650 milliGRAM(s) Oral two times a day, 01-31-24 @ 17:58  sodium chloride 0.65% Nasal 1 Spray(s) Both Nostrils three times a day, 02-05-24 @ 15:31    PRN MEDICATIONS  acetaminophen     Tablet .. 650 milliGRAM(s) Oral every 6 hours, 01-31-24 @ 19:24 PRN  aluminum hydroxide/magnesium hydroxide/simethicone Suspension 30 milliLiter(s) Oral every 4 hours, 01-31-24 @ 19:24 PRN  melatonin 3 milliGRAM(s) Oral at bedtime, 01-31-24 @ 19:24 PRN  ondansetron Injectable 4 milliGRAM(s) IV Push every 8 hours, 01-31-24 @ 19:24 PRN    Diet, DASH/TLC:   Sodium & Cholesterol Restricted  Kosher (02-01-24 @ 15:18) [Active]    LABS:                        7.7    8.04  )-----------( 168      ( 06 Feb 2024 06:41 )             24.0     WBC trend: 8.04 <--, 7.84 <--, 7.17 <--, 7.50 <--  Hgb: 7.7 [02-06-24 @ 06:41]<--, 7.9 [02-05-24 @ 06:39]<--, 7.7 [02-04-24 @ 07:49]<--, 8.2 [02-03-24 @ 06:35]<--, 8.1 [02-02-24 @ 08:14]<--, 8.2 [02-01-24 @ 06:25]<--    02-06    146  |  114<H>  |  36<H>  ----------------------------<  106<H>  4.3   |  22  |  1.2    Ca    8.8      06 Feb 2024 06:41  Mg     1.8     02-06    TPro  6.1  /  Alb  3.4<L>  /  TBili  0.3  /  DBili  x   /  AST  29  /  ALT  23  /  AlkPhos  90  02-06    Creatinine trend: 1.2<--, 1.2<--, 1.4<--, 1.4<--, 1.7<--, 1.6<--, 1.6<--  SODIUM TREND: Sodium 146 [02-06 @ 06:41]<--, Sodium 146 [02-05 @ 06:39]<--, Sodium 148 [02-04 @ 07:49]<--, Sodium 147 [02-03 @ 18:30]<--, Sodium 150 [02-03 @ 06:35]<--, Sodium 150 [02-03 @ 01:01]<--      POC Glucose: 177 [02-06-24 @ 11:09]<--, 111 [02-06-24 @ 07:32]<--, 115 [02-05-24 @ 17:00]<--        Ferritin: 92 ng/mL (02-01-24 @ 06:25)      Urinalysis Basic - ( 06 Feb 2024 06:41 )    Color: x / Appearance: x / SG: x / pH: x  Gluc: 106 mg/dL / Ketone: x  / Bili: x / Urobili: x   Blood: x / Protein: x / Nitrite: x   Leuk Esterase: x / RBC: x / WBC x   Sq Epi: x / Non Sq Epi: x / Bacteria: x                                              -------------------------------------------------          RADIOLOGY & ADDITIONAL TESTS:    Imaging Personally Reviewed:  [ ] YES  [ ] NO    HEALTH ISSUES - PROBLEM Dx:

## 2024-02-06 NOTE — PROGRESS NOTE ADULT - ASSESSMENT
79yo M w/ PMHx DM, CKD, HTN, HLD presents s/p mechanical fall with head injury 3 days prior found to have acute extra-axial (subdural versus epidural) focal hemorrhage over the high left frontal convexity and acute renal failure, admitted to [facility].    #Recent mechanical fall   #Acute intracranial hemorrhage   #Hx of subacute subdural hematoma 05/2021  - hgb stable  - Repeat CTH reviewed, stable  - dw neurosurgery, no acute surgical intervention warranted  - PT eval  - c/w home dose keppra po   - hold home asa and DVT ppx for now ; no clear indication for absolute indication for ASA. given recurrent head trauma and bruising would recommend holding indefinitely    #KELL on CKD stage 4:  resolved  #Hyperkalemia : resolved  # Hypernatremia: resolved  - Nephrology following  - hold home lisinopril and bumex   - Continue sodium bicarb po 650mg bid   - elevated kappa/lambda with k/l ratio 8.01, TIERA: no monoclonal band;  SPEP = normal pattern  - heme/onc eval: pending final recommendations. work up ordered.    #anemia  - trend H/H, maintain active T/S  - iron, b12, folate wnl  - reticulocyte index 0.32, suggestive of hypoproliferation.  noted.  haptoglobin pending result.  - c/w oral iron + folate supplement.  - elevated kappa/lambda with k/l ratio 8.01, TIERA: no monoclonal band;  SPEP = normal pattern  - heme/onc eval: pending final recommendations. work up ordered.    #acute on chronic epistaxis left naris   - ENT saw patient in ED: bacitracin b/l nares, no acute intervention  - sees o/p ENT: Dr. [facility]egas, [facility] on [address].    #DM  - Takes Oseni 25/30 mg BID - hold oral anti-hyperglycemics and start ISS with FS goal 140-180    #HTN  - lisinopril and bumex held    #HLD  - C/w lipitor 40mg QD    DVT Prophylaxis: SCD    #Progress Note Handoff  Pending (specify): hypernatremia improvement.  Family discussion: dw patient and family at bedside regarding management and plan  Disposition: rehab vs home with PT

## 2024-02-06 NOTE — CONSULT NOTE ADULT - ATTENDING COMMENTS
seen/ examined w/ hemonc fellow; note reviewed; case discussed; agree w/ findings and plan   anemia has been stable prior to admission and on the day of admission; remarkably found to have acute renal failure , which could be an input to cause decrease erythropoietin production; would not start procrit injections yet; at the same time , need to exclude bleeding, such as retroperitoneal bleed, and recommend CT A/P w/o IVC.

## 2024-02-06 NOTE — DISCHARGE NOTE PROVIDER - NSDCCPCAREPLAN_GEN_ALL_CORE_FT
PRINCIPAL DISCHARGE DIAGNOSIS  Diagnosis: Hyperkalemia  Assessment and Plan of Treatment: You came to the hospital for a fall that caused some bleeding in the brain.  You were seen by neurosurgery who cleared you to restart taking aspirin 81mg.  You were also found to have acute kidney injury and during your hospitalization you were also treated by a nephrologist.  You are being sent home with home physical therapy.  Please follow up with your primary care doctor and a hematologist regarding your anemia.      SECONDARY DISCHARGE DIAGNOSES  Diagnosis: Head injury  Assessment and Plan of Treatment:     Diagnosis: Fall  Assessment and Plan of Treatment:     Diagnosis: Acute on chronic renal failure  Assessment and Plan of Treatment:

## 2024-02-06 NOTE — DISCHARGE NOTE PROVIDER - CARE PROVIDERS DIRECT ADDRESSES
,[email or web address]

## 2024-02-06 NOTE — DISCHARGE NOTE PROVIDER - PROVIDER TOKENS
PROVIDER:[TOKEN:[[id number]],FOLLOWUP:[1-3 days]] PROVIDER:[TOKEN:[[id number]],FOLLOWUP:[1-3 days]],PROVIDER:[TOKEN:[[id number]],FOLLOWUP:[1 week]]

## 2024-02-06 NOTE — DISCHARGE NOTE PROVIDER - HOSPITAL COURSE
Pt is an 79 y/o M PMHx DM, CKD, HTN, HLD presents s/p mechanical fall with head injury 3 days prior. Wife reports patient was ambulatory with walker on Sunday walking up steps, and upon reaching the top step and making a turn, lost balance and fell backwards, striking L side of head on tile floor. No LOC. Pt was awake and alert. EMS was called but pt did not want to come to ED and was aided up. Pt has been ambulatory since. Wife also reports pt was started on Gabapentin earlier this month which made him have hallucinations and be more off balance and was stopped. Pt reports no headaches,  dizziness, visual or speech deficits.  /81 HR 76RR 18 T98F sat 97% on RA  CT Head No Cont Acute extra-axial (subdural versus epidural) focal hemorrhage over the high left frontal convexity measuring up to 9 mm in width. No significant mass effect or midline shift.  VBG pH 7.26 CO2 36 HCO3 16 K 6.8. Labs Cr 3.2  GFR 19  Seen by nephro and neurosurgery in ED, admitted to medicine    79yo M w/ PMHx DM, CKD, HTN, HLD presents s/p mechanical fall with head injury 3 days prior found to have acute extra-axial (subdural versus epidural) focal hemorrhage over the high left frontal convexity and acute renal failure, admitted to tele.    #Recent mechanical fall   #Acute intracranial hemorrhage   #Hx of subacute subdural hematoma 05/2021  - hgb stable  - Repeat CTH reviewed, stable  - dw neurosurgery, no acute surgical intervention warranted  - PT eval  - c/w home dose keppra po   - hold home asa and DVT ppx for now ; no clear indication for absolute indication for ASA. given recurrent head trauma and bruising would recommend holding indefinitely    #KELL on CKD stage 4:  resolved  #Hyperkalemia : resolved  # Hypernatremia: resolved  - Nephrology following  - hold home lisinopril and bumex   - Continue sodium bicarb po 650mg bid   - f/u SPEP, UPEP, TIERA, urine studies  - heme/onc eval as per nephro recommendations.    #anemia  - trend H/H, maintain active T/S  - iron, b12, folate wnl  - reticulocyte index 0.32, suggestive of hypoproliferation.  noted.  haptoglobin pending result.  - c/w oral iron + folate supplement.    #acute on chronic epistaxis left naris   - ENT saw patient in ED: bacitracin b/l nares, no acute intervention  - sees o/p ENT: Dr. Vanegas, [facility] on [address].    #DM  - Takes Oseni 25/30 mg BID - hold oral anti-hyperglycemics and start ISS with FS goal 140-180    #HTN  - lisinopril and bumex held    #HLD  - C/w lipitor 40mg QD    DVT Prophylaxis: SCD     Pt is an 77 y/o M PMHx DM, CKD, HTN, HLD presents s/p mechanical fall with head injury 3 days prior. Wife reports patient was ambulatory with walker on Sunday walking up steps, and upon reaching the top step and making a turn, lost balance and fell backwards, striking L side of head on tile floor. No LOC. Pt was awake and alert. EMS was called but pt did not want to come to ED and was aided up. Pt has been ambulatory since. Wife also reports pt was started on Gabapentin earlier this month which made him have hallucinations and be more off balance and was stopped. Pt reports no headaches,  dizziness, visual or speech deficits.  /81 HR 76RR 18 T98F sat 97% on RA  CT Head No Cont Acute extra-axial (subdural versus epidural) focal hemorrhage over the high left frontal convexity measuring up to 9 mm in width. No significant mass effect or midline shift.  VBG pH 7.26 CO2 36 HCO3 16 K 6.8. Labs Cr 3.2  GFR 19  Seen by nephro and neurosurgery in ED, admitted to medicine    79yo M w/ PMHx DM, CKD, HTN, HLD presents s/p mechanical fall with head injury 3 days prior found to have acute extra-axial (subdural versus epidural) focal hemorrhage over the high left frontal convexity and acute renal failure, admitted to [facility].    #Recent mechanical fall   #Acute intracranial hemorrhage   #Hx of subacute subdural hematoma 05/2021  - hgb stable  - Repeat CTH reviewed, stable  - dw neurosurgery, no acute surgical intervention warranted  - PT eval  - c/w home dose keppra po   - hold home asa and DVT ppx for now ; no clear indication for absolute indication for ASA. given recurrent head trauma and bruising would recommend holding indefinitely    #KELL on CKD stage 4  #Hyperkalemia : resolved  # Hypernatremia: resolved  -resolved     #anemia  - iron, b12, folate wnl  - reticulocyte index 0.32, suggestive of hypoproliferation.  noted Hapto 316   - c/w oral iron + folate supplement  -Heme onc Fu outpt     #acute on chronic epistaxis left naris   - ENT saw patient in ED: bacitracin b/l nares, no acute intervention  - sees o/p ENT: Dr. Vanegas, [facility] on [facility].    #DM  - Takes Oseni 25/30 mg BID - hold oral anti-hyperglycemics and start ISS with FS goal 140-180    #HTN  - lisinopril and bumex held    #HLD  - C/w lipitor 40mg QD    Pt stable for DC. Needs Heme onc follow up in 6-8 weeks. Nephrology follow up. PCP.      Pt is an 77 y/o M PMHx DM, CKD, HTN, HLD presents s/p mechanical fall with head injury 3 days prior. Wife reports patient was ambulatory with walker on Sunday walking up steps, and upon reaching the top step and making a turn, lost balance and fell backwards, striking L side of head on tile floor. No LOC. Pt was awake and alert. EMS was called but pt did not want to come to ED and was aided up. Pt has been ambulatory since. Wife also reports pt was started on Gabapentin earlier this month which made him have hallucinations and be more off balance and was stopped. Pt reports no headaches,  dizziness, visual or speech deficits.  /81 HR 76RR 18 T98F sat 97% on RA  CT Head No Cont Acute extra-axial (subdural versus epidural) focal hemorrhage over the high left frontal convexity measuring up to 9 mm in width. No significant mass effect or midline shift.  VBG pH 7.26 CO2 36 HCO3 16 K 6.8. Labs Cr 3.2  GFR 19  Seen by nephro and neurosurgery in ED, admitted to medicine    79yo M w/ PMHx DM, CKD, HTN, HLD presents s/p mechanical fall with head injury 3 days prior found to have acute extra-axial (subdural versus epidural) focal hemorrhage over the high left frontal convexity and acute renal failure, admitted to [facility].    #Recent mechanical fall   #Acute intracranial hemorrhage   #Hx of subacute subdural hematoma 05/2021  - hgb stable  - Repeat CTH reviewed, stable  - dw neurosurgery, no acute surgical intervention warranted  - PT eval  - c/w home dose keppra po   - 2/7 spoke to Perla VAZQUEZ from neurosurgery ok to restart aspirin 81mg     #KELL on CKD stage 4  #Hyperkalemia : resolved  # Hypernatremia: resolved  -resolved   -high was 3.2-->1.4    #anemia  - iron, b12, folate wnl  - reticulocyte index 0.32, suggestive of hypoproliferation.  noted Hapto 316   -  folate supplement  -Heme onc Fu outpt     #acute on chronic epistaxis left naris   - ENT saw patient in ED: bacitracin b/l nares, no acute intervention  - sees o/p ENT: Dr. Vanegas, [facility] on [address].    #DM  - Takes Oseni 25/30 mg BID - hold oral anti-hyperglycemics and start ISS with FS goal 140-180    #HTN  - lisinopril and bumex held    #HLD  - C/w lipitor 40mg QD    Pt stable for DC. Needs Heme onc follow up in 6-8 weeks. Nephrology follow up. PCP.      Pt is an 79 y/o M PMHx DM, CKD, HTN, HLD presents s/p mechanical fall with head injury 3 days prior. Wife reports patient was ambulatory with walker on Sunday walking up steps, and upon reaching the top step and making a turn, lost balance and fell backwards, striking L side of head on tile floor. No LOC. Pt was awake and alert. EMS was called but pt did not want to come to ED and was aided up. Pt has been ambulatory since. Wife also reports pt was started on Gabapentin earlier this month which made him have hallucinations and be more off balance and was stopped. Pt reports no headaches,  dizziness, visual or speech deficits.  /81 HR 76RR 18 T98F sat 97% on RA  CT Head No Cont Acute extra-axial (subdural versus epidural) focal hemorrhage over the high left frontal convexity measuring up to 9 mm in width. No significant mass effect or midline shift.  VBG pH 7.26 CO2 36 HCO3 16 K 6.8. Labs Cr 3.2  GFR 19  Seen by nephro and neurosurgery in ED, admitted to medicine    79yo M w/ PMHx DM, CKD, HTN, HLD presents s/p mechanical fall with head injury 3 days prior found to have acute extra-axial (subdural versus epidural) focal hemorrhage over the high left frontal convexity and acute renal failure, admitted to [facility].    #Recent mechanical fall   #Acute intracranial hemorrhage   #Hx of subacute subdural hematoma 05/2021  - hgb stable  - Repeat CTH reviewed, stable  - dw neurosurgery, no acute surgical intervention warranted  - PT eval  - c/w home dose keppra po   - 2/7 spoke to Perla VAZQUEZ from neurosurgery ok to restart aspirin 81mg     #KELL on CKD stage 4  #Hyperkalemia : resolved  # Hypernatremia: resolved  -resolved   -high was 3.2-->1.4    #anemia  - iron, b12, folate wnl  - reticulocyte index 0.32, suggestive of hypoproliferation.  noted Hapto 316   -  folate supplement  -Heme onc Fu outpt     #acute on chronic epistaxis left naris   - ENT saw patient in ED: bacitracin b/l nares, no acute intervention  - sees o/p ENT: Dr. Vanegas, [facility] on [address].    #DM  - Takes Oseni 25/30 mg BID - hold oral anti-hyperglycemics and start ISS with FS goal 140-180    #HTN  - lisinopril and bumex held    #HLD  - C/w lipitor 40mg QD    Pt stable for DC. Needs Heme onc follow up in 6-8 weeks. Nephrology follow up. PCP.     Patient will be going to subacute rehab

## 2024-02-06 NOTE — PROGRESS NOTE ADULT - SUBJECTIVE AND OBJECTIVE BOX
Nephrology progress note     feeling better    ON events/Subjective:     Allergies:  No Known Allergies    Hospital Medications:   MEDICATIONS  (STANDING):  atorvastatin 40 milliGRAM(s) Oral at bedtime  bacitracin   Ointment 1 Application(s) Topical two times a day  dextrose 5%. 1000 milliLiter(s) (50 mL/Hr) IV Continuous <Continuous>  dextrose 50% Injectable 25 Gram(s) IV Push once  dextrose 50% Injectable 12.5 Gram(s) IV Push once  dextrose 50% Injectable 25 Gram(s) IV Push once  fluorometholone 0.1% Ointment 1 Application(s) Both EYES two times a day  fluorometholone 0.1% Suspension 1 Drop(s) Both EYES two times a day  folic acid 1 milliGRAM(s) Oral daily  influenza  Vaccine (HIGH DOSE) 0.7 milliLiter(s) IntraMuscular once  insulin lispro (ADMELOG) corrective regimen sliding scale   SubCutaneous three times a day before meals  levETIRAcetam 250 milliGRAM(s) Oral two times a day  senna 2 Tablet(s) Oral at bedtime  sodium bicarbonate 650 milliGRAM(s) Oral two times a day  sodium chloride 0.65% Nasal 1 Spray(s) Both Nostrils three times a day    REVIEW OF SYSTEMS:  CONSTITUTIONAL: No weakness, fevers or chills  EYES/ENT: No visual changes;  No vertigo or throat pain   NECK: No pain or stiffness  RESPIRATORY: No cough, wheezing, hemoptysis; No shortness of breath  CARDIOVASCULAR: No chest pain or palpitations.  GASTROINTESTINAL: No abdominal or epigastric pain. No nausea, vomiting, or hematemesis; No diarrhea or constipation. No melena or hematochezia.  GENITOURINARY: No dysuria, frequency, foamy urine, urinary urgency, incontinence or hematuria  NEUROLOGICAL: No numbness or weakness  SKIN: No itching, burning, rashes, or lesions   VASCULAR: No bilateral lower extremity edema.   All other review of systems is negative unless indicated above.    VITALS:  T(F): 96 (02-06-24 @ 08:41), Max: 98 (02-05-24 @ 12:19)  HR: 107 (02-06-24 @ 08:41)  BP: 113/67 (02-06-24 @ 08:41)  RR: 18 (02-05-24 @ 23:06)  SpO2: --  Wt(kg): --    02-04 @ 07:01  -  02-05 @ 07:00  --------------------------------------------------------  IN: 700 mL / OUT: 0 mL / NET: 700 mL    02-05 @ 07:01  -  02-06 @ 07:00  --------------------------------------------------------  IN: 200 mL / OUT: 1400 mL / NET: -1200 mL        PHYSICAL EXAM:  Constitutional: NAD  HEENT: anicteric sclera, oropharynx clear, MMM  Neck: No JVD  Respiratory: CTAB, no wheezes, rales or rhonchi  Cardiovascular: S1, S2, RRR  Gastrointestinal: BS+, soft, NT/ND  Extremities: No cyanosis or clubbing. No peripheral edema  Neurological: A/O x 3, no focal deficits  Psychiatric: Normal mood, normal affect  : No CVA tenderness. No gil.   Skin: No rashes  Vascular Access:    LABS:  02-06    146  |  114<H>  |  36<H>  ----------------------------<  106<H>  4.3   |  22  |  1.2    Ca    8.8      06 Feb 2024 06:41  Mg     1.8     02-06    TPro  6.1  /  Alb  3.4<L>  /  TBili  0.3  /  DBili      /  AST  29  /  ALT  23  /  AlkPhos  90  02-06                          7.7    8.04  )-----------( 168      ( 06 Feb 2024 06:41 )             24.0       Urine Studies:  Creatinine Trend: 1.2<--, 1.2<--, 1.4<--, 1.4<--, 1.7<--, 1.6<--  Urinalysis Basic - ( 06 Feb 2024 06:41 )    Color:  / Appearance:  / SG:  / pH:   Gluc: 106 mg/dL / Ketone:   / Bili:  / Urobili:    Blood:  / Protein:  / Nitrite:    Leuk Esterase:  / RBC:  / WBC    Sq Epi:  / Non Sq Epi:  / Bacteria:       Osmolality, Random Urine: 508 mos/kg (02-03 @ 19:12)  Sodium, Random Urine: 74.0 mmoL/L (02-03 @ 19:12)  Creatinine, Random Urine: 35 mg/dL (02-02 @ 12:30)  Protein/Creatinine Ratio Calculation: 1.1 Ratio (02-02 @ 12:30)    ·failure to thrive  ·renal failure -- likely baseline DM nephropathy and now volume depletion too - improved  hypernatremia improving  BP stable  ·sp fall with small SDH  ·anemia with some schistocytes and mildly elevated LDH  ·hyperkalemia as outpt - improved  ·metabolic acidosis  ·bladder sono as out pt 2 days ago with ? prostate vs bladder mass  ·no signs infectious process  ·d/w wife per advanced directives - uncertain at thiss time to include hemodialysis if needed    1) may stop ivf and pt to hydrate on own  2) suspect KELL from volume depletion and ACE - continue to hold diuretics and ACE  3) hematology consult in progress  4) PT evaluation and consider short term rehab  d/w medicine team

## 2024-02-06 NOTE — DISCHARGE NOTE PROVIDER - CARE PROVIDER_API CALL
Sunil Mosqueda  Internal Medicine  [address] [place] [address]  Phone: [phone]  Fax: [phone]  Follow Up Time: 1-3 days   Sunil Mosqueda  Internal Medicine  [address] [place] [address]  Phone: [phone]  Fax: [phone]  Follow Up Time: 1-3 days    Thanh Dunne  Medical Oncology  [address] [place] [id number]  Phone: [phone]  Fax: [phone]  Follow Up Time: 1 week

## 2024-02-07 VITALS
HEART RATE: 82 BPM | TEMPERATURE: 100 F | DIASTOLIC BLOOD PRESSURE: 52 MMHG | RESPIRATION RATE: 18 BRPM | SYSTOLIC BLOOD PRESSURE: 106 MMHG

## 2024-02-07 LAB
ALBUMIN SERPL ELPH-MCNC: 3.4 G/DL — LOW (ref 3.5–5.2)
ALP SERPL-CCNC: 90 U/L — SIGNIFICANT CHANGE UP (ref 30–115)
ALT FLD-CCNC: 21 U/L — SIGNIFICANT CHANGE UP (ref 0–41)
ANION GAP SERPL CALC-SCNC: 11 MMOL/L — SIGNIFICANT CHANGE UP (ref 7–14)
AST SERPL-CCNC: 25 U/L — SIGNIFICANT CHANGE UP (ref 0–41)
BASOPHILS # BLD AUTO: 0.02 K/UL — SIGNIFICANT CHANGE UP (ref 0–0.2)
BASOPHILS NFR BLD AUTO: 0.2 % — SIGNIFICANT CHANGE UP (ref 0–1)
BILIRUB SERPL-MCNC: 0.3 MG/DL — SIGNIFICANT CHANGE UP (ref 0.2–1.2)
BUN SERPL-MCNC: 40 MG/DL — HIGH (ref 10–20)
CALCIUM SERPL-MCNC: 8.5 MG/DL — SIGNIFICANT CHANGE UP (ref 8.4–10.5)
CHLORIDE SERPL-SCNC: 113 MMOL/L — HIGH (ref 98–110)
CO2 SERPL-SCNC: 22 MMOL/L — SIGNIFICANT CHANGE UP (ref 17–32)
CREAT SERPL-MCNC: 1.4 MG/DL — SIGNIFICANT CHANGE UP (ref 0.7–1.5)
EGFR: 51 ML/MIN/1.73M2 — LOW
EOSINOPHIL # BLD AUTO: 0.22 K/UL — SIGNIFICANT CHANGE UP (ref 0–0.7)
EOSINOPHIL NFR BLD AUTO: 2.7 % — SIGNIFICANT CHANGE UP (ref 0–8)
GLUCOSE BLDC GLUCOMTR-MCNC: 118 MG/DL — HIGH (ref 70–99)
GLUCOSE BLDC GLUCOMTR-MCNC: 127 MG/DL — HIGH (ref 70–99)
GLUCOSE SERPL-MCNC: 103 MG/DL — HIGH (ref 70–99)
HAV IGM SER-ACNC: SIGNIFICANT CHANGE UP
HBV CORE IGM SER-ACNC: SIGNIFICANT CHANGE UP
HBV SURFACE AG SER-ACNC: SIGNIFICANT CHANGE UP
HCT VFR BLD CALC: 24.9 % — LOW (ref 42–52)
HCV AB S/CO SERPL IA: 0.19 S/CO — SIGNIFICANT CHANGE UP (ref 0–0.99)
HCV AB SERPL-IMP: SIGNIFICANT CHANGE UP
HGB BLD-MCNC: 7.8 G/DL — LOW (ref 14–18)
IMM GRANULOCYTES NFR BLD AUTO: 1.3 % — HIGH (ref 0.1–0.3)
LYMPHOCYTES # BLD AUTO: 0.9 K/UL — LOW (ref 1.2–3.4)
LYMPHOCYTES # BLD AUTO: 10.9 % — LOW (ref 20.5–51.1)
MAGNESIUM SERPL-MCNC: 1.9 MG/DL — SIGNIFICANT CHANGE UP (ref 1.8–2.4)
MCHC RBC-ENTMCNC: 31.2 PG — HIGH (ref 27–31)
MCHC RBC-ENTMCNC: 31.3 G/DL — LOW (ref 32–37)
MCV RBC AUTO: 99.6 FL — HIGH (ref 80–94)
MONOCYTES # BLD AUTO: 0.68 K/UL — HIGH (ref 0.1–0.6)
MONOCYTES NFR BLD AUTO: 8.3 % — SIGNIFICANT CHANGE UP (ref 1.7–9.3)
NEUTROPHILS # BLD AUTO: 6.3 K/UL — SIGNIFICANT CHANGE UP (ref 1.4–6.5)
NEUTROPHILS NFR BLD AUTO: 76.6 % — HIGH (ref 42.2–75.2)
NRBC # BLD: 0 /100 WBCS — SIGNIFICANT CHANGE UP (ref 0–0)
PLATELET # BLD AUTO: 185 K/UL — SIGNIFICANT CHANGE UP (ref 130–400)
PMV BLD: 10.9 FL — HIGH (ref 7.4–10.4)
POTASSIUM SERPL-MCNC: 4.1 MMOL/L — SIGNIFICANT CHANGE UP (ref 3.5–5)
POTASSIUM SERPL-SCNC: 4.1 MMOL/L — SIGNIFICANT CHANGE UP (ref 3.5–5)
PROT SERPL-MCNC: 5.9 G/DL — LOW (ref 6–8)
RBC # BLD: 2.5 M/UL — LOW (ref 4.7–6.1)
RBC # FLD: 17.2 % — HIGH (ref 11.5–14.5)
SODIUM SERPL-SCNC: 146 MMOL/L — SIGNIFICANT CHANGE UP (ref 135–146)
WBC # BLD: 8.23 K/UL — SIGNIFICANT CHANGE UP (ref 4.8–10.8)
WBC # FLD AUTO: 8.23 K/UL — SIGNIFICANT CHANGE UP (ref 4.8–10.8)

## 2024-02-07 PROCEDURE — 99239 HOSP IP/OBS DSCHRG MGMT >30: CPT

## 2024-02-07 RX ORDER — FLUOROMETHOLONE 1 MG/ML
1 SOLUTION/ DROPS OPHTHALMIC
Qty: 0 | Refills: 0 | DISCHARGE
Start: 2024-02-07

## 2024-02-07 RX ORDER — DEXTROSE 50 % IN WATER 50 %
50 SYRINGE (ML) INTRAVENOUS
Qty: 0 | Refills: 0 | DISCHARGE
Start: 2024-02-07

## 2024-02-07 RX ORDER — SODIUM BICARBONATE 1 MEQ/ML
1 SYRINGE (ML) INTRAVENOUS
Qty: 28 | Refills: 0
Start: 2024-02-07 | End: 2024-02-20

## 2024-02-07 RX ORDER — FOLIC ACID 0.8 MG
1 TABLET ORAL
Qty: 14 | Refills: 0
Start: 2024-02-07 | End: 2024-02-20

## 2024-02-07 RX ORDER — FOLIC ACID 0.8 MG
1 TABLET ORAL
Refills: 0 | DISCHARGE

## 2024-02-07 RX ORDER — ASPIRIN/CALCIUM CARB/MAGNESIUM 324 MG
1 TABLET ORAL
Qty: 14 | Refills: 0
Start: 2024-02-07 | End: 2024-02-20

## 2024-02-07 RX ORDER — SODIUM BICARBONATE 1 MEQ/ML
1 SYRINGE (ML) INTRAVENOUS
Qty: 0 | Refills: 0 | DISCHARGE
Start: 2024-02-07

## 2024-02-07 RX ORDER — DEXTROSE 50 % IN WATER 50 %
25 SYRINGE (ML) INTRAVENOUS
Qty: 0 | Refills: 0 | DISCHARGE
Start: 2024-02-07

## 2024-02-07 RX ADMIN — Medication 1 SPRAY(S): at 06:22

## 2024-02-07 RX ADMIN — Medication 1 SPRAY(S): at 13:06

## 2024-02-07 RX ADMIN — Medication 1 APPLICATION(S): at 06:22

## 2024-02-07 RX ADMIN — FLUOROMETHOLONE 1 DROP(S): 1 SOLUTION/ DROPS OPHTHALMIC at 06:22

## 2024-02-07 RX ADMIN — LEVETIRACETAM 250 MILLIGRAM(S): 250 TABLET, FILM COATED ORAL at 05:04

## 2024-02-07 RX ADMIN — Medication 650 MILLIGRAM(S): at 05:04

## 2024-02-07 RX ADMIN — Medication 1 SPRAY(S): at 04:02

## 2024-02-07 RX ADMIN — Medication 1 MILLIGRAM(S): at 11:12

## 2024-02-07 RX ADMIN — FLUOROMETHOLONE 1 APPLICATION(S): 1 SOLUTION/ DROPS OPHTHALMIC at 06:22

## 2024-02-07 NOTE — PROGRESS NOTE ADULT - PROVIDER SPECIALTY LIST ADULT
ENT
Hospitalist
Nephrology
Nephrology
Internal Medicine
Nephrology
ENT
Nephrology
Neurosurgery
Hospitalist

## 2024-02-07 NOTE — PROGRESS NOTE ADULT - SUBJECTIVE AND OBJECTIVE BOX
24H events:    Patient is a 78y old Male who presents with a chief complaint of ARF, Fall (06 Feb 2024 14:56)    Primary diagnosis of Hyperkalemia      Day 1:  Day 2:  Day 3:     Today is hospital day 7d. This morning patient was seen and examined at bedside, resting comfortably in bed.    No acute or major events overnight.    Code Status:    Family communication:  Contact date:  Name of person contacted:  Relationship to patient:  Communication details:  What matters most:    PAST MEDICAL & SURGICAL HISTORY  Diverticulitis    Herniated disc, cervical    Chronic gout of foot, unspecified cause, unspecified laterality    Type 2 diabetes mellitus without complication, unspecified long term insulin use status    Hypertension, unspecified type    High cholesterol    Osteoarthritis of multiple joints, unspecified osteoarthritis type    Sciatica, unspecified laterality    History of tonsillectomy and adenoidectomy    H/O colonoscopy  2018    H/O knee surgery      SOCIAL HISTORY:  Social History:      ALLERGIES:  No Known Allergies    MEDICATIONS:  STANDING MEDICATIONS  atorvastatin 40 milliGRAM(s) Oral at bedtime  bacitracin   Ointment 1 Application(s) Topical two times a day  dextrose 5%. 1000 milliLiter(s) IV Continuous <Continuous>  dextrose 50% Injectable 12.5 Gram(s) IV Push once  dextrose 50% Injectable 25 Gram(s) IV Push once  dextrose 50% Injectable 25 Gram(s) IV Push once  fluorometholone 0.1% Ointment 1 Application(s) Both EYES two times a day  fluorometholone 0.1% Suspension 1 Drop(s) Both EYES two times a day  folic acid 1 milliGRAM(s) Oral daily  influenza  Vaccine (HIGH DOSE) 0.7 milliLiter(s) IntraMuscular once  insulin lispro (ADMELOG) corrective regimen sliding scale   SubCutaneous three times a day before meals  levETIRAcetam 250 milliGRAM(s) Oral two times a day  senna 2 Tablet(s) Oral at bedtime  sodium bicarbonate 650 milliGRAM(s) Oral two times a day  sodium chloride 0.65% Nasal 1 Spray(s) Both Nostrils three times a day    PRN MEDICATIONS  acetaminophen     Tablet .. 650 milliGRAM(s) Oral every 6 hours PRN  aluminum hydroxide/magnesium hydroxide/simethicone Suspension 30 milliLiter(s) Oral every 4 hours PRN  melatonin 3 milliGRAM(s) Oral at bedtime PRN  ondansetron Injectable 4 milliGRAM(s) IV Push every 8 hours PRN    VITALS:   T(F): 97.1  HR: 74  BP: 121/57  RR: 18  SpO2: --    PHYSICAL EXAM:  GENERAL: NAD  NERVOUS SYSTEM:  Alert & Oriented X3  PULMONARY: Clear to percussion bilaterally; No rales, rhonchi, wheezing, or rubs  CARDIOVASCULAR: Regular rate and rhythm; No murmurs, rubs, or gallops  GI: Soft, Nontender, Nondistended; Bowel sounds present  EXTREMITIES:  2+ Peripheral Pulses, No clubbing, cyanosis, or edema  SKIN: No rashes or lesions  LABS:                        7.8    8.23  )-----------( 185      ( 07 Feb 2024 06:56 )             24.9     02-07    146  |  113<H>  |  40<H>  ----------------------------<  103<H>  4.1   |  22  |  1.4    Ca    8.5      07 Feb 2024 06:56  Mg     1.9     02-07    TPro  5.9<L>  /  Alb  3.4<L>  /  TBili  0.3  /  DBili  x   /  AST  25  /  ALT  21  /  AlkPhos  90  02-07      Urinalysis Basic - ( 07 Feb 2024 06:56 )    Color: x / Appearance: x / SG: x / pH: x  Gluc: 103 mg/dL / Ketone: x  / Bili: x / Urobili: x   Blood: x / Protein: x / Nitrite: x   Leuk Esterase: x / RBC: x / WBC x   Sq Epi: x / Non Sq Epi: x / Bacteria: x                RADIOLOGY:

## 2024-02-07 NOTE — PROGRESS NOTE ADULT - ASSESSMENT
79yo M w/ PMHx DM, CKD, HTN, HLD presents s/p mechanical fall with head injury 3 days prior found to have acute extra-axial (subdural versus epidural) focal hemorrhage over the high left frontal convexity and acute renal failure, admitted to [facility].    #Recent mechanical fall   #Acute intracranial hemorrhage   #Hx of subacute subdural hematoma 05/2021  - hgb stable  - Repeat CTH reviewed, stable  - dw neurosurgery, no acute surgical intervention warranted  - PT eval  - c/w home dose keppra po   - 2/7 spoke to Perla VAZQUEZ from neurosurgery ok to restart aspirin 81mg     #KELL on CKD stage 4  #Hyperkalemia : resolved  # Hypernatremia: resolved  -resolved   -high was 3.2-->1.4    #anemia  - iron, b12, folate wnl  - reticulocyte index 0.32, suggestive of hypoproliferation.  noted Hapto 316   -  folate supplement  -Heme onc Fu outpt     #acute on chronic epistaxis left naris   - ENT saw patient in ED: bacitracin b/l nares, no acute intervention  - sees o/p ENT: Dr. Vanegas, [facility] on [address].    #DM  - Takes Oseni 25/30 mg BID - hold oral anti-hyperglycemics and start ISS with FS goal 140-180    #HTN  - lisinopril and bumex held    #HLD  - C/w lipitor 40mg QD

## 2024-02-07 NOTE — PROGRESS NOTE ADULT - REASON FOR ADMISSION
ARF, Fall

## 2024-02-08 ENCOUNTER — NON-APPOINTMENT (OUTPATIENT)
Age: 79
End: 2024-02-08

## 2024-02-13 DIAGNOSIS — Z79.84 LONG TERM (CURRENT) USE OF ORAL HYPOGLYCEMIC DRUGS: ICD-10-CM
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
## 2024-02-13 DIAGNOSIS — E78.5 HYPERLIPIDEMIA, UNSPECIFIED: ICD-10-CM

## 2024-02-13 DIAGNOSIS — I12.9 HYPERTENSIVE CHRONIC KIDNEY DISEASE WITH STAGE 1 THROUGH STAGE 4 CHRONIC KIDNEY DISEASE, OR UNSPECIFIED CHRONIC KIDNEY DISEASE: ICD-10-CM

## 2024-02-13 DIAGNOSIS — I61.9 NONTRAUMATIC INTRACEREBRAL HEMORRHAGE, UNSPECIFIED: ICD-10-CM

## 2024-02-13 DIAGNOSIS — E87.5 HYPERKALEMIA: ICD-10-CM

## 2024-02-13 DIAGNOSIS — Z82.49 FAMILY HISTORY OF ISCHEMIC HEART DISEASE AND OTHER DISEASES OF THE CIRCULATORY SYSTEM: ICD-10-CM

## 2024-02-13 DIAGNOSIS — D64.9 ANEMIA, UNSPECIFIED: ICD-10-CM

## 2024-02-13 DIAGNOSIS — R04.0 EPISTAXIS: ICD-10-CM

## 2024-02-13 DIAGNOSIS — E87.0 HYPEROSMOLALITY AND HYPERNATREMIA: ICD-10-CM

## 2024-02-13 DIAGNOSIS — N18.4 CHRONIC KIDNEY DISEASE, STAGE 4 (SEVERE): ICD-10-CM

## 2024-02-13 DIAGNOSIS — E87.20 ACIDOSIS, UNSPECIFIED: ICD-10-CM

## 2024-02-13 DIAGNOSIS — E11.22 TYPE 2 DIABETES MELLITUS WITH DIABETIC CHRONIC KIDNEY DISEASE: ICD-10-CM

## 2024-02-13 DIAGNOSIS — N17.9 ACUTE KIDNEY FAILURE, UNSPECIFIED: ICD-10-CM

## 2024-02-13 LAB — METHYLMALONATE SERPL-SCNC: 252 NMOL/L — SIGNIFICANT CHANGE UP (ref 0–378)

## 2024-02-20 NOTE — ED PROVIDER NOTE - CRITICAL CARE INDICATION, MLM
[Follow-Up: _____] : a [unfilled] follow-up visit patient was critically ill... Patient was critically ill with a high probability of imminent or life threatening deterioration.

## 2024-04-11 NOTE — ED PROVIDER NOTE - CARE PROVIDERS DIRECT ADDRESSES
Price (Do Not Change): 0.00 Detail Level: Simple Instructions: This plan will send the code FBSE to the PM system.  DO NOT or CHANGE the price. ,[email or web address]

## 2024-04-16 NOTE — DISCHARGE NOTE PROVIDER - NSDCHOSPICE_GEN_A_CORE
"Subjective:   Isis Bear is a 19 y.o. female who presents for Pharyngitis (X9days cough/headaches/loss of voice/upper back/neck pain )      19-year-old female who started feeling sick around 10 days ago with typical cold symptoms, thought she was over her symptoms and was feeling improved enough to return to work on Thursday of last week however all of a sudden started noticing more sinus pain and pressure, congestion, productive cough, brain fog and sinus headache.  Mild bilateral otalgia.  She has noted hoarse voice.  No history of asthma, taking Sudafed and Tylenol    Review of Systems   Constitutional:  Positive for malaise/fatigue. Negative for chills and fever.   HENT:  Positive for congestion, sinus pain and sore throat. Negative for ear pain.    Eyes:  Negative for pain.   Respiratory:  Positive for cough and sputum production. Negative for shortness of breath.    Cardiovascular:  Negative for chest pain.   Gastrointestinal:  Negative for abdominal pain, constipation, diarrhea, nausea and vomiting.   Genitourinary:  Negative for dysuria.   Musculoskeletal:  Negative for myalgias.   Skin:  Negative for rash.   Neurological:  Positive for headaches.       Medications, Allergies, and current problem list reviewed today in Epic.     Objective:     /60   Pulse 94   Temp 36.7 °C (98 °F) (Temporal)   Resp 16   Ht 1.626 m (5' 4\")   Wt 54.3 kg (119 lb 12.8 oz)   SpO2 97%     Physical Exam  Vitals reviewed.   Constitutional:       Appearance: Normal appearance.   HENT:      Head: Normocephalic and atraumatic.      Right Ear: Tympanic membrane, ear canal and external ear normal.      Left Ear: Ear canal and external ear normal.      Nose: Congestion and rhinorrhea present.      Comments: Maxillary sinus TTP bilaterally     Mouth/Throat:      Mouth: Mucous membranes are moist.      Pharynx: No oropharyngeal exudate or posterior oropharyngeal erythema.      Comments: POST NASAL DRIP  Eyes:      "
Conjunctiva/sclera: Conjunctivae normal.   Cardiovascular:      Rate and Rhythm: Normal rate and regular rhythm.   Pulmonary:      Effort: Pulmonary effort is normal.      Breath sounds: Normal breath sounds.   Musculoskeletal:      Cervical back: Normal range of motion.   Lymphadenopathy:      Cervical: No cervical adenopathy.   Skin:     General: Skin is warm and dry.      Capillary Refill: Capillary refill takes less than 2 seconds.   Neurological:      Mental Status: She is alert and oriented to person, place, and time.         Assessment/Plan:     Diagnosis and associated orders:     1. Acute bacterial sinusitis  amoxicillin-clavulanate (AUGMENTIN) 875-125 MG Tab      2. Acute cough  promethazine-dextromethorphan (PROMETHAZINE-DM) 6.25-15 MG/5ML syrup         Comments/MDM:     Patient warned about sedation from cough medicine and to use carefully only at night.  Work note provided.  Patient meets IDSA guidelines for ABRS given duration of symptoms, worsening severity, clinical history and physical exam  Consider antihistamine, nasal steroid, anti-inflammatory, and saline rinses  No evidence of venous sinus thrombosis or more serious etiology  Typically these infections display a slow resolution of symptoms starting at 48-72 hours of treatment.  Mild pressure and pain may continue at end of week of antibiotics which is normal and continue the other supportive care until back to baseline.           Differential diagnosis, natural history, supportive care, and indications for immediate follow-up discussed.    Advised the patient to follow-up with the primary care physician for recheck, reevaluation, and consideration of further management.    Please note that this dictation was created using voice recognition software. I have made a reasonable attempt to correct obvious errors, but I expect that there are errors of grammar and possibly content that I did not discover before finalizing the note.    This note was 
electronically signed by Aguila Menchaca PA-C  
Female
No

## 2024-05-03 NOTE — ED ADULT NURSE NOTE - CAS EDN DISCHARGE INTERVENTIONS
5/6/2024      Enrique William  [address]  [facility] [address]        Dear Enrique    Thank you for choosing [facility] for your CT Calcium Heart scan. Below is an explanation of the results as well as follow up recommendations from your scan.     Findings:    Your calcium score is 0. There is no identifiable coronary plaque, however a heart healthy lifestyle is recommended to prevent coronary artery disease.  Your scan indicates an additional finding/findings that need to be reviewed with your primary care physician. This information has already been sent to your primary care physician on file, however please contact your physician's office to coordinate follow up.     If you have any questions, please call our Heart  at,   [phone].       Sincerely,    [facility] CT Heart Scan Program   IV intact

## 2024-08-08 NOTE — DISCHARGE NOTE PROVIDER - NPI NUMBER (FOR SYSADMIN USE ONLY) :
Update History & Physical    The patient's History and Physical of August 8, 2024 was reviewed with the patient and I examined the patient. There was no change. The surgical site was confirmed by the patient and me.     Plan: The risks, benefits, expected outcome, and alternative to the recommended procedure have been discussed with the patient. Patient understands and wants to proceed with the procedure.     Electronically signed by KENDRA DEL VALLE MD on 8/8/2024 at 2:58 PM       [[id number]] [[id number]],[[id number]]

## 2024-11-20 NOTE — ED ADULT NURSE NOTE - CAS TRG GENERAL AIRWAY, MLM
Left Voicemail (1st Attempt) and Sent Mychart (1st Attempt) for waitlisted patient to offer the following appointment:    Provider: Dr. Luna  Appointment Type: Return  Date: 11/20  Time: 1:30 PM  [facility]     Please note there is no guarantee this appointment will be available as it is first come first serve.      
Patent

## 2024-12-22 NOTE — ED PROVIDER NOTE - NS ED ROS FT
Constitutional: (-) fever  Eyes/ENT: (-) visual changes   Cardiovascular: (-) chest pain, (-) syncope  Respiratory: (-) cough, (-) shortness of breath  Gastrointestinal: (-) vomiting, (-) diarrhea  Genitourinary: (-) dysuria, (-) hesitancy, (-) frequency   Musculoskeletal: (-) neck pain, (-) back pain, (+) joint pain  Integumentary: (-) rash, (-) edema  Neurological: (-) headache, (-) altered mental status  Allergic/Immunologic: (-) pruritus
Improved

## 2025-05-16 NOTE — ED ADULT TRIAGE NOTE - NS ED NURSE BANDS TYPE
Called patient to discuss ultrasound results.   Left detailed voicemail informing the patient of normal results and left a call back number for any further questions.   Name band;

## 2025-05-19 NOTE — PHYSICAL THERAPY INITIAL EVALUATION ADULT - STANDING BALANCE: STATIC
You were seen in the emergency department today due to concern for left hip pain, left shoulder pain, nausea and vomiting, as well as weakness and fall.  All of your blood work came back relatively normal.  Your x-rays and your CT scans did not show any abnormalities aside from your chronic left hip issues.  There is no broken bones.  We gave you some IV fluids as well as pain meds and nausea medications while you are in the emergency department.  You had improvement in your symptoms after being given several rounds of medications.  Please make sure that you follow-up with your cardiologist in the outpatient setting after being seen in the emergency department, especially after your ablation.  Please follow-up with your [facility] orthopedist for your chronic left hip pain.  Also please follow-up with your family doctor so that they can continue to address your symptoms if they do not resolve.  Please return here if you develop any sharp chest pain, severe shortness of breath, severe worsening abdominal pain.  
fair minus

## 2025-06-03 ENCOUNTER — OUTPATIENT (OUTPATIENT)
Dept: OUTPATIENT SERVICES | Facility: HOSPITAL | Age: 80
LOS: 1 days | End: 2025-06-03
Payer: MEDICARE

## 2025-06-03 DIAGNOSIS — Z98.890 OTHER SPECIFIED POSTPROCEDURAL STATES: Chronic | ICD-10-CM

## 2025-06-03 DIAGNOSIS — N18.32 CHRONIC KIDNEY DISEASE, STAGE 3B: ICD-10-CM

## 2025-06-03 PROCEDURE — 76770 US EXAM ABDO BACK WALL COMP: CPT | Mod: 26

## 2025-06-03 PROCEDURE — 76770 US EXAM ABDO BACK WALL COMP: CPT

## 2025-06-04 DIAGNOSIS — N18.32 CHRONIC KIDNEY DISEASE, STAGE 3B: ICD-10-CM

## 2025-07-11 NOTE — PROGRESS NOTE ADULT - SUBJECTIVE AND OBJECTIVE BOX
Patient is a 75y old Male who presents with a chief complaint of Generalized weakness (25 Aug 2020 06:23)    No acute events overnight. Patient complaining of pelvic pain this morning. Denies dysuria. Denies chest pain, SOB, N/V/D,    PAST MEDICAL & SURGICAL HISTORY  Sciatica, unspecified laterality  Osteoarthritis of multiple joints, unspecified osteoarthritis type  High cholesterol  Hypertension, unspecified type  Type 2 diabetes mellitus without complication, unspecified long term insulin use status  Chronic gout of foot, unspecified cause, unspecified laterality  Herniated disc, cervical  Diverticulitis      PHYSICAL EXAM  Vital Signs Last 24 Hrs  T(C): 36.3 (25 Aug 2020 04:59), Max: 36.3 (25 Aug 2020 04:59)  T(F): 97.4 (25 Aug 2020 04:59), Max: 97.4 (25 Aug 2020 04:59)  HR: 63 (25 Aug 2020 04:59) (63 - 96)  BP: 143/64 (25 Aug 2020 04:59) (107/53 - 143/64)  BP(mean): --  RR: 18 (25 Aug 2020 04:59) (18 - 18)  SpO2: --    I&O's Summary    GENERAL: No acute distress, well-developed  HEAD:  Atraumatic, Normocephalic  ENT: PERRL, conjunctiva and sclera clear  CHEST/LUNG: Clear to auscultation bilaterally; No wheeze, equal breath sounds bilaterally, respirations nonlabored  HEART: Regular rate and rhythm; No murmurs, rubs, or gallops  ABDOMEN: Soft, nontender, nondistended, mild pubic tenderness on palpation  EXTREMITIES:  No clubbing, cyanosis, or edema  PSYCH: Nl behavior, nl affect  NEUROLOGY: AAOx3, non-focal, moves all extremities spontaneously  SKIN: Normal color, No rashes or lesions      LABS                        8.3    8.53  )-----------( 263      ( 25 Aug 2020 06:14 )             27.2     Hemoglobin: 8.3 g/dL (08-25 @ 06:14)  Hemoglobin: 8.4 g/dL (08-24 @ 18:52)  Hemoglobin: 8.7 g/dL (08-24 @ 06:43)  Hemoglobin: 8.2 g/dL (08-23 @ 04:30)  Hemoglobin: 7.9 g/dL (08-22 @ 16:10)    CBC Full  -  ( 25 Aug 2020 06:14 )  WBC Count : 8.53 K/uL  RBC Count : 3.32 M/uL  Hemoglobin : 8.3 g/dL  Hematocrit : 27.2 %  Platelet Count - Automated : 263 K/uL  Mean Cell Volume : 81.9 fL  Mean Cell Hemoglobin : 25.0 pg  Mean Cell Hemoglobin Concentration : 30.5 g/dL  Auto Neutrophil # : x  Auto Lymphocyte # : x  Auto Monocyte # : x  Auto Eosinophil # : x  Auto Basophil # : x  Auto Neutrophil % : x  Auto Lymphocyte % : x  Auto Monocyte % : x  Auto Eosinophil % : x  Auto Basophil % : x    08-25    144  |  108  |  50<H>  ----------------------------<  104<H>  3.0<L>   |  26  |  3.2<H>    Ca    7.0<L>      25 Aug 2020 06:14  Phos  3.0     08-25  Mg     2.8     08-25    TPro  5.2<L>  /  Alb  3.0<L>  /  TBili  0.3  /  DBili  x   /  AST  39  /  ALT  27  /  AlkPhos  119<H>  08-25    Creatinine Trend: 3.2<--, 3.6<--, 4.0<--, 4.7<--, 4.7<--, 4.5<--  LIVER FUNCTIONS - ( 25 Aug 2020 06:14 )  Alb: 3.0 g/dL / Pro: 5.2 g/dL / ALK PHOS: 119 U/L / ALT: 27 U/L / AST: 39 U/L / GGT: x                             EKG:   MICROBIOLOGY:    IMAGING: 5 (moderate pain)

## 2025-09-15 ENCOUNTER — TRANSCRIPTION ENCOUNTER (OUTPATIENT)
Age: 80
End: 2025-09-15

## 2025-09-19 ENCOUNTER — TRANSCRIPTION ENCOUNTER (OUTPATIENT)
Age: 80
End: 2025-09-19